# Patient Record
Sex: MALE | Race: BLACK OR AFRICAN AMERICAN | Employment: UNEMPLOYED | URBAN - METROPOLITAN AREA
[De-identification: names, ages, dates, MRNs, and addresses within clinical notes are randomized per-mention and may not be internally consistent; named-entity substitution may affect disease eponyms.]

---

## 2017-01-20 DIAGNOSIS — I42.9 CARDIOMYOPATHY (HCC): ICD-10-CM

## 2017-01-20 DIAGNOSIS — I10 ESSENTIAL HYPERTENSION WITH GOAL BLOOD PRESSURE LESS THAN 130/80: ICD-10-CM

## 2017-01-20 DIAGNOSIS — I50.22 CHRONIC SYSTOLIC HEART FAILURE (HCC): ICD-10-CM

## 2017-01-20 RX ORDER — BUMETANIDE 1 MG/1
1 TABLET ORAL DAILY
Qty: 30 TAB | Refills: 5 | Status: SHIPPED | OUTPATIENT
Start: 2017-01-20 | End: 2017-08-03 | Stop reason: SDUPTHER

## 2017-02-08 ENCOUNTER — CLINICAL SUPPORT (OUTPATIENT)
Dept: CARDIOLOGY CLINIC | Age: 52
End: 2017-02-08

## 2017-02-08 ENCOUNTER — OFFICE VISIT (OUTPATIENT)
Dept: CARDIOLOGY CLINIC | Age: 52
End: 2017-02-08

## 2017-02-08 VITALS
SYSTOLIC BLOOD PRESSURE: 110 MMHG | BODY MASS INDEX: 34.05 KG/M2 | HEIGHT: 65 IN | HEART RATE: 60 BPM | DIASTOLIC BLOOD PRESSURE: 80 MMHG | RESPIRATION RATE: 16 BRPM | OXYGEN SATURATION: 99 % | WEIGHT: 204.4 LBS

## 2017-02-08 DIAGNOSIS — E11.9 DIABETES MELLITUS, TYPE II, INSULIN DEPENDENT (HCC): ICD-10-CM

## 2017-02-08 DIAGNOSIS — I10 ESSENTIAL HYPERTENSION: Primary | ICD-10-CM

## 2017-02-08 DIAGNOSIS — R06.09 DYSPNEA ON EXERTION: ICD-10-CM

## 2017-02-08 DIAGNOSIS — I10 ESSENTIAL HYPERTENSION: ICD-10-CM

## 2017-02-08 DIAGNOSIS — I42.9 CARDIOMYOPATHY (HCC): Primary | ICD-10-CM

## 2017-02-08 DIAGNOSIS — I50.22 CHRONIC SYSTOLIC CONGESTIVE HEART FAILURE (HCC): ICD-10-CM

## 2017-02-08 DIAGNOSIS — Z79.4 DIABETES MELLITUS, TYPE II, INSULIN DEPENDENT (HCC): ICD-10-CM

## 2017-02-08 DIAGNOSIS — I42.9 CARDIOMYOPATHY (HCC): ICD-10-CM

## 2017-02-08 NOTE — PROGRESS NOTES
HISTORY OF PRESENT ILLNESS  Sury  is a 54 y.o. male     SUMMARY:   Problem List  Date Reviewed: 2/8/2017          Codes Class Noted    Chronic systolic heart failure ([facility] ICD-10-CM: I50.22  ICD-9-CM: 428.22  2/8/2015        Cardiomyopathy ([facility] ICD-10-CM: I42.9  ICD-9-CM: 425.4  9/26/2014    Overview Addendum 12/11/2015  7:42 AM by Joanie Blanchard MD     2002 (approx) diagnosed [place] by echo, neg stress test and started on usual meds. 2007 (approx) fup echo lvef 25%  2/15 cardiac cath, no sig cad  2/15 single lead AICD implant             Hypertension ICD-10-CM: I10  ICD-9-CM: 401.9  9/26/2014        Diabetes mellitus, type II, insulin dependent (HCC) (Chronic) ICD-10-CM: E11.9, Z79.4  ICD-9-CM: 250.00, V58.67  9/26/2014              Current Outpatient Prescriptions on File Prior to Visit   Medication Sig    bumetanide (BUMEX) 1 mg tablet Take 1 Tab by mouth daily.  spironolactone (ALDACTONE) 25 mg tablet Take 1 Tab by mouth daily. Indications: CHRONIC HEART FAILURE    carvedilol (COREG) 25 mg tablet Take 2 Tabs by mouth two (2) times daily (with meals).  lisinopril (PRINIVIL, ZESTRIL) 40 mg tablet Take 1 Tab by mouth daily.  hydrALAZINE (APRESOLINE) 100 mg tablet Take 1 Tab by mouth three (3) times daily.  potassium 99 mg tablet Take 99 mg by mouth daily.  insulin regular (NOVOLIN R) 100 unit/mL injection by SubCUTAneous route.  Insulin Needles, Disposable, 31 ndle For 75/25 insulin administration    Oral Medication Containers (SHARPS CONTAINER) misc 1    Blood-Glucose Meter monitoring kit 1    glucose blood VI test strips (BLOOD GLUCOSE TEST) strip 1    multivitamin with iron (DAILY MULTI-VITAMINS/IRON) tablet Take 1 tablet by mouth daily. No current facility-administered medications on file prior to visit.         CARDIOLOGY STUDIES TO DATE:  9/14 echo dilated lv with lvef 15%, mod lae, mild to mod mr, mild pul regurg    2/15 echo lvef 50% lae  2/15 echo lvef 65%, no sig valve abnormalities      Chief Complaint   Patient presents with    Cardiomyopathy     HPI :  Mr. Carlos Carroll is doing great. He is still exercising regularly and is taking good care of his diabetes. He says his last A1c was around 6. He had an echocardiogram today, and we reviewed that. His EF is now completely normal.     CARDIAC ROS:   negative for chest pain, dyspnea, palpitations, syncope, orthopnea, paroxysmal nocturnal dyspnea, exertional chest pressure/discomfort, claudication, lower extremity edema    Family History   Problem Relation Age of Onset    Hypertension Father     Diabetes Father     Diabetes Mother     Diabetes Brother     Hypertension Brother        Past Medical History   Diagnosis Date    Diabetes ([facility]     Heart failure ([facility]      CHF, cardiomyopathy    Hypertension     ICD (implantable cardioverter-defibrillator) in place      left upper chest       GENERAL ROS:  A comprehensive review of systems was negative except for that written in the HPI.     Visit Vitals    /80 (BP 1 Location: Left arm, BP Patient Position: Sitting)    Pulse 60    Resp 16    Ht 5' 5\" (1.651 m)    Wt 204 lb 6.4 oz (92.7 kg)    SpO2 99%    BMI 34.01 kg/m2       Wt Readings from Last 3 Encounters:   02/08/17 204 lb 6.4 oz (92.7 kg)   08/03/16 199 lb 9.6 oz (90.5 kg)   03/02/16 203 lb 14.8 oz (92.5 kg)            BP Readings from Last 3 Encounters:   02/08/17 110/80   08/03/16 130/84   03/02/16 (!) 197/106       PHYSICAL EXAM  General appearance: alert, cooperative, no distress, appears stated age  Neck: supple, symmetrical, trachea midline, no adenopathy, no carotid bruit and no JVD  Lungs: clear to auscultation bilaterally  Heart: regular rate and rhythm, S1, S2 normal, no murmur, click, rub or gallop  Extremities: extremities normal, atraumatic, no cyanosis or edema    Lab Results   Component Value Date/Time    Cholesterol, total 152 02/11/2015 01:30 PM    HDL Cholesterol 34 02/11/2015 01:30 PM    LDL, calculated 100.8 02/11/2015 01:30 PM    Triglyceride 86 02/11/2015 01:30 PM    CHOL/HDL Ratio 4.5 02/11/2015 01:30 PM     ASSESSMENT  Mr. Marilyn Sinclair is doing well on a good medical regimen. I am hesitant to drop any of his medications at this point because he has had issues with labile blood pressure in the past, and he is not having any serious side-effects, so we are going to keep things just the way they are for now. He needs no further cardiac testing at this time. current treatment plan is effective, no change in therapy  lab results and schedule of future lab studies reviewed with patient  reviewed diet, exercise and weight control    Encounter Diagnoses   Name Primary?  Cardiomyopathy ([facility] Yes    Essential hypertension     Diabetes mellitus, type II, insulin dependent ([facility]      No orders of the defined types were placed in this encounter. Follow-up Disposition:  Return in about 6 months (around 8/8/2017).     Keila Eisenberg MD  2/8/2017

## 2017-02-08 NOTE — MR AVS SNAPSHOT
Visit Information Date & Time Provider Department Dept. Phone Encounter #  
 2/8/2017  3:00 PM Alex Sena MD [facility] [phone] [id number] Follow-up Instructions Return in about 6 months (around 8/8/2017). Upcoming Health Maintenance Date Due  
 FOOT EXAM Q1 4/24/1977 MICROALBUMIN Q1 4/24/1977 EYE EXAM RETINAL OR DILATED Q1 4/24/1977 DTaP/Tdap/Td series (1 - Tdap) 4/24/1988 HEMOGLOBIN A1C Q6M 8/10/2015 LIPID PANEL Q1 2/11/2016 INFLUENZA AGE 9 TO ADULT 8/1/2016 Allergies as of 2/8/2017  Review Complete On: 2/8/2017 By: Alex Sena MD  
 No Known Allergies Current Immunizations  Never Reviewed Name Date Pneumococcal Polysaccharide (PPSV-23) 2/10/2015 10:26 AM  
  
 Not reviewed this visit You Were Diagnosed With   
  
 Codes Comments Cardiomyopathy ([facility]    -  Primary ICD-10-CM: I42.9 ICD-9-CM: 425.4 Essential hypertension     ICD-10-CM: I10 
ICD-9-CM: 401.9 Diabetes mellitus, type II, insulin dependent (HCC)     ICD-10-CM: E11.9, Z79.4 ICD-9-CM: 250.00, V58.67 Vitals BP Pulse Resp Height(growth percentile) Weight(growth percentile) SpO2  
 110/80 (BP 1 Location: Left arm, BP Patient Position: Sitting) 60 16 5' 5\" (1.651 m) 204 lb 6.4 oz (92.7 kg) 99% BMI Smoking Status 34.01 kg/m2 Never Smoker BMI and BSA Data Body Mass Index Body Surface Area 34.01 kg/m 2 2.06 m 2 Preferred Pharmacy Pharmacy Name Phone [facility] [address], [address] [place] [address] Avenue [phone] Your Updated Medication List  
  
   
This list is accurate as of: 2/8/17  3:27 PM.  Always use your most recent med list.  
  
  
  
  
 Blood-Glucose Meter monitoring kit 1  
  
 bumetanide 1 mg tablet Commonly known as:  Shantanu Benz Take 1 Tab by mouth daily. carvedilol 25 mg tablet Commonly known as:  Marlene Chen  
 Take 2 Tabs by mouth two (2) times daily (with meals). DAILY MULTI-VITAMINS/IRON tablet Generic drug:  multivitamin with iron Take 1 tablet by mouth daily. glucose blood VI test strips strip Commonly known as:  blood glucose test  
1  
  
 hydrALAZINE 100 mg tablet Commonly known as:  APRESOLINE Take 1 Tab by mouth three (3) times daily. Insulin Needles (Disposable) 31 Ndle For 75/25 insulin administration  
  
 lisinopril 40 mg tablet Commonly known as:  Ora Bee Take 1 Tab by mouth daily. NovoLIN R 100 unit/mL injection Generic drug:  insulin regular  
by SubCUTAneous route. Oral Medication Containers Misc Commonly known as:  SHARPS CONTAINER  
1  
  
 potassium 99 mg tablet Take 99 mg by mouth daily. spironolactone 25 mg tablet Commonly known as:  ALDACTONE Take 1 Tab by mouth daily. Indications: CHRONIC HEART FAILURE Follow-up Instructions Return in about 6 months (around 8/8/2017). Introducing [facility] & HEALTH SERVICES! [facility] introduces [employer] patient portal. Now you can access parts of your medical record, email your doctor's office, and request medication refills online. 1. In your internet browser, go to [email or web address] 2. Click on the First Time User? Click Here link in the Sign In box. You will see the New Member Sign Up page. 3. Enter your Kyp Access Code exactly as it appears below. You will not need to use this code after youve completed the sign-up process. If you do not sign up before the expiration date, you must request a new code. · Kyp Access Code: [email or web address] Expires: 5/9/2017  3:27 PM 
 
4. Enter the last four digits of your Social Security Number (xxxx) and Date of Birth (mm/dd/yyyy) as indicated and click Submit. You will be taken to the next sign-up page. 5. Create a Kyp ID.  This will be your Kyp login ID and cannot be changed, so think of one that is secure and easy to remember. 6. Create a [employer] password. You can change your password at any time. 7. Enter your Password Reset Question and Answer. This can be used at a later time if you forget your password. 8. Enter your e-mail address. You will receive e-mail notification when new information is available in [address] 9. Click Sign Up. You can now view and download portions of your medical record. 10. Click the Download Summary menu link to download a portable copy of your medical information. If you have questions, please visit the Frequently Asked Questions section of the [employer] website. Remember, [employer] is NOT to be used for urgent needs. For medical emergencies, dial 911. Now available from your iPhone and Android! Please provide this summary of care documentation to your next provider. Your primary care clinician is listed as Octavio Swift. If you have any questions after today's visit, please call [phone].

## 2017-05-04 ENCOUNTER — OFFICE VISIT (OUTPATIENT)
Dept: CARDIOLOGY CLINIC | Age: 52
End: 2017-05-04

## 2017-05-04 VITALS
DIASTOLIC BLOOD PRESSURE: 80 MMHG | OXYGEN SATURATION: 98 % | BODY MASS INDEX: 34.09 KG/M2 | HEART RATE: 81 BPM | RESPIRATION RATE: 16 BRPM | SYSTOLIC BLOOD PRESSURE: 120 MMHG | HEIGHT: 65 IN | WEIGHT: 204.6 LBS

## 2017-05-04 DIAGNOSIS — I42.9 CARDIOMYOPATHY, UNSPECIFIED TYPE (HCC): Primary | ICD-10-CM

## 2017-05-04 DIAGNOSIS — E11.9 DIABETES MELLITUS, TYPE II, INSULIN DEPENDENT (HCC): Chronic | ICD-10-CM

## 2017-05-04 DIAGNOSIS — R07.9 CHEST PAIN, UNSPECIFIED TYPE: ICD-10-CM

## 2017-05-04 DIAGNOSIS — Z79.4 DIABETES MELLITUS, TYPE II, INSULIN DEPENDENT (HCC): Chronic | ICD-10-CM

## 2017-05-04 DIAGNOSIS — I50.22 CHRONIC SYSTOLIC HEART FAILURE (HCC): ICD-10-CM

## 2017-05-04 DIAGNOSIS — I10 ESSENTIAL HYPERTENSION: ICD-10-CM

## 2017-05-04 NOTE — MR AVS SNAPSHOT
Visit Information Date & Time Provider Department Dept. Phone Encounter #  
 5/4/2017 11:40 AM Evie Garcia MD [facility] [place] [phone] [id number] Follow-up Instructions Return in about 6 months (around 11/4/2017). Your Appointments 8/9/2017  2:20 PM  
ESTABLISHED PATIENT with Evie Garcia MD  
[facility] ([facility]) Appt Note: 6 month follow up  
 [address]  
[address] [facility] 7 [address] Upcoming Health Maintenance Date Due  
 FOOT EXAM Q1 4/24/1977 MICROALBUMIN Q1 4/24/1977 EYE EXAM RETINAL OR DILATED Q1 4/24/1977 DTaP/Tdap/Td series (1 - Tdap) 4/24/1988 HEMOGLOBIN A1C Q6M 8/10/2015 LIPID PANEL Q1 2/11/2016 FOBT Q 1 YEAR AGE 50-75 4/24/2017 INFLUENZA AGE 9 TO ADULT 8/1/2017 Allergies as of 5/4/2017  Review Complete On: 5/4/2017 By: Evie Garcia MD  
 No Known Allergies Current Immunizations  Never Reviewed Name Date Pneumococcal Polysaccharide (PPSV-23) 2/10/2015 10:26 AM  
  
 Not reviewed this visit You Were Diagnosed With   
  
 Codes Comments Cardiomyopathy, unspecified type    -  Primary ICD-10-CM: I42.9 ICD-9-CM: 425.4 Essential hypertension     ICD-10-CM: I10 
ICD-9-CM: 401.9 Chronic systolic heart failure (HCC)     ICD-10-CM: I50.22 ICD-9-CM: 428.22 Diabetes mellitus, type II, insulin dependent (HCC)     ICD-10-CM: E11.9, Z79.4 ICD-9-CM: 250.00, V58.67 Chest pain, unspecified type     ICD-10-CM: R07.9 ICD-9-CM: 786.50 Vitals BP Pulse Resp Height(growth percentile) Weight(growth percentile) SpO2  
 120/80 (BP 1 Location: Left arm, BP Patient Position: Sitting) 81 16 5' 5\" (1.651 m) 204 lb 9.6 oz (92.8 kg) 98% BMI Smoking Status 34.05 kg/m2 Never Smoker Vitals History BMI and BSA Data Body Mass Index Body Surface Area 34.05 kg/m 2 2.06 m 2 Preferred Pharmacy Pharmacy Name Phone [facility] [address] [place] [address] [address] [phone] Your Updated Medication List  
  
   
This list is accurate as of: 5/4/17 12:11 PM.  Always use your most recent med list.  
  
  
  
  
 Blood-Glucose Meter monitoring kit 1  
  
 bumetanide 1 mg tablet Commonly known as:  Shelagh [place] Take 1 Tab by mouth daily. carvedilol 25 mg tablet Commonly known as:  Azalia Denton Take 2 Tabs by mouth two (2) times daily (with meals). DAILY MULTI-VITAMINS/IRON tablet Generic drug:  multivitamin with iron Take 1 tablet by mouth daily. glucose blood VI test strips strip Commonly known as:  blood glucose test  
1 Insulin Needles (Disposable) 31 Ndle For 75/25 insulin administration  
  
 lisinopril 40 mg tablet Commonly known as:  Tomás Daft Take 1 Tab by mouth daily. NovoLIN R 100 unit/mL injection Generic drug:  insulin regular  
by SubCUTAneous route. Oral Medication Containers Misc Commonly known as:  SHARPS CONTAINER  
1  
  
 potassium 99 mg tablet Take 99 mg by mouth daily. spironolactone 25 mg tablet Commonly known as:  ALDACTONE Take 1 Tab by mouth daily. Indications: CHRONIC HEART FAILURE We Performed the Following AMB POC EKG ROUTINE W/ 12 LEADS, INTER & REP [24226 CPT(R)] Follow-up Instructions Return in about 6 months (around 11/4/2017). Introducing [facility] & HEALTH SERVICES! [facility] introduces [employer] patient portal. Now you can access parts of your medical record, email your doctor's office, and request medication refills online. 1. In your internet browser, go to [email or web address] 2. Click on the First Time User? Click Here link in the Sign In box. You will see the New Member Sign Up page. 3. Enter your [employer] Access Code exactly as it appears below. You will not need to use this code after youve completed the sign-up process. If you do not sign up before the expiration date, you must request a new code. · [employer] Access Code: [email or web address] Expires: 5/9/2017  4:27 PM 
 
4. Enter the last four digits of your Social Security Number (xxxx) and Date of Birth (mm/dd/yyyy) as indicated and click Submit. You will be taken to the next sign-up page. 5. Create a Fyber ID. This will be your Fyber login ID and cannot be changed, so think of one that is secure and easy to remember. 6. Create a F[employer]ber password. You can change your password at any time. 7. Enter your Password Reset Question and Answer. This can be used at a later time if you forget your password. 8. Enter your e-mail address. You will receive e-mail notification when new information is available in [address] 9. Click Sign Up. You can now view and download portions of your medical record. 10. Click the Download Summary menu link to download a portable copy of your medical information. If you have questions, please visit the Frequently Asked Questions section of the [employer] website. Remember, [employer] is NOT to be used for urgent needs. For medical emergencies, dial 911. Now available from your iPhone and Android! Please provide this summary of care documentation to your next provider. Your primary care clinician is listed as Fernando Roman. If you have any questions after today's visit, please call [phone].

## 2017-05-04 NOTE — PROGRESS NOTES
HISTORY OF PRESENT ILLNESS  Chloe Vaughan is a 50 y.o. male     SUMMARY:   Problem List  Date Reviewed: 5/4/2017          Codes Class Noted    Chronic systolic heart failure ([facility] ICD-10-CM: I50.22  ICD-9-CM: 428.22  2/8/2015        Cardiomyopathy ([facility] ICD-10-CM: I42.9  ICD-9-CM: 425.4  9/26/2014    Overview Addendum 12/11/2015  7:42 AM by Heron Swartz MD     2002 (approx) diagnosed [place] by echo, neg stress test and started on usual meds. 2007 (approx) fup echo lvef 25%  2/15 cardiac cath, no sig cad  2/15 single lead AICD implant             Hypertension ICD-10-CM: I10  ICD-9-CM: 401.9  9/26/2014        Diabetes mellitus, type II, insulin dependent (HCC) (Chronic) ICD-10-CM: E11.9, Z79.4  ICD-9-CM: 250.00, V58.67  9/26/2014              Current Outpatient Prescriptions on File Prior to Visit   Medication Sig    bumetanide (BUMEX) 1 mg tablet Take 1 Tab by mouth daily.  spironolactone (ALDACTONE) 25 mg tablet Take 1 Tab by mouth daily. Indications: CHRONIC HEART FAILURE    carvedilol (COREG) 25 mg tablet Take 2 Tabs by mouth two (2) times daily (with meals).  lisinopril (PRINIVIL, ZESTRIL) 40 mg tablet Take 1 Tab by mouth daily.  hydrALAZINE (APRESOLINE) 100 mg tablet Take 1 Tab by mouth three (3) times daily.  potassium 99 mg tablet Take 99 mg by mouth daily.  insulin regular (NOVOLIN R) 100 unit/mL injection by SubCUTAneous route.  Insulin Needles, Disposable, 31 ndle For 75/25 insulin administration    Oral Medication Containers (SHARPS CONTAINER) misc 1    Blood-Glucose Meter monitoring kit 1    glucose blood VI test strips (BLOOD GLUCOSE TEST) strip 1    multivitamin with iron (DAILY MULTI-VITAMINS/IRON) tablet Take 1 tablet by mouth daily. No current facility-administered medications on file prior to visit.         CARDIOLOGY STUDIES TO DATE:  9/14 echo dilated lv with lvef 15%, mod lae, mild to mod mr, mild pul regurg    2/15 echo lvef 50% lae  2/15 echo lvef 65%, no sig valve abnormalities      Chief Complaint   Patient presents with    Cardiomyopathy     HPI :  Mr. Corine Anaya is doing well. He had gone back on Isordil a month or two ago because he was no longer using Viagra and when he ran out the nurses said he needed to come in here and talk to us about that before we considered refilling it. He and I had a long conversation and basically at this time with recovery of his LV function, I do not think there is any reason for him to stay on either Isordil or Hydralazine since he is already on Coreg and Lisinopril. Dr. Ana Canales is following his lipids. CARDIAC ROS:   negative for chest pain, dyspnea, palpitations, syncope, orthopnea, paroxysmal nocturnal dyspnea, exertional chest pressure/discomfort, claudication, lower extremity edema    Family History   Problem Relation Age of Onset    Hypertension Father     Diabetes Father     Diabetes Mother     Diabetes Brother     Hypertension Brother        Past Medical History:   Diagnosis Date    Diabetes ([address]     Heart failure ([address]     CHF, cardiomyopathy    Hypertension     ICD (implantable cardioverter-defibrillator) in place     left upper chest       GENERAL ROS:  A comprehensive review of systems was negative except for that written in the HPI.     Visit Vitals    /80 (BP 1 Location: Left arm, BP Patient Position: Sitting)    Pulse 81    Resp 16    Ht 5' 5\" (1.651 m)    Wt 204 lb 9.6 oz (92.8 kg)    SpO2 98%    BMI 34.05 kg/m2       Wt Readings from Last 3 Encounters:   05/04/17 204 lb 9.6 oz (92.8 kg)   02/08/17 204 lb 6.4 oz (92.7 kg)   08/03/16 199 lb 9.6 oz (90.5 kg)            BP Readings from Last 3 Encounters:   05/04/17 120/80   02/08/17 110/80   08/03/16 130/84       PHYSICAL EXAM  General appearance: alert, cooperative, no distress, appears stated age  Neck: supple, symmetrical, trachea midline, no adenopathy, no carotid bruit and no JVD  Lungs: clear to auscultation bilaterally  Heart: regular rate and rhythm, S1, S2 normal, no murmur, click, rub or gallop  Extremities: extremities normal, atraumatic, no cyanosis or edema    Lab Results   Component Value Date/Time    Cholesterol, total 152 02/11/2015 01:30 PM    HDL Cholesterol 34 02/11/2015 01:30 PM    LDL, calculated 100.8 02/11/2015 01:30 PM    Triglyceride 86 02/11/2015 01:30 PM    CHOL/HDL Ratio 4.5 02/11/2015 01:30 PM     ASSESSMENT  Mr. Sandy Romberg is stable, asymptomatic and well compensated at this point and needs no cardiac testing. We will make the changes that I outlined and will see how he does. We will get his most recent blood work from Dr. Darion Lang. current treatment plan is effective, no change in therapy  lab results and schedule of future lab studies reviewed with patient  reviewed diet, exercise and weight control    Encounter Diagnoses   Name Primary?  Cardiomyopathy, unspecified type Yes    Essential hypertension     Chronic systolic heart failure (HCC)     Diabetes mellitus, type II, insulin dependent (HCC)     Chest pain, unspecified type      Orders Placed This Encounter    AMB POC EKG ROUTINE W/ 12 LEADS, INTER & REP       Follow-up Disposition:  Return in about 6 months (around 11/4/2017).     Moe Rosen MD  5/4/2017

## 2017-05-15 ENCOUNTER — APPOINTMENT (OUTPATIENT)
Dept: GENERAL RADIOLOGY | Age: 52
End: 2017-05-15
Attending: EMERGENCY MEDICINE
Payer: MEDICARE

## 2017-05-15 ENCOUNTER — HOSPITAL ENCOUNTER (EMERGENCY)
Age: 52
Discharge: HOME | End: 2017-05-15
Attending: EMERGENCY MEDICINE
Payer: MEDICARE

## 2017-05-15 VITALS
RESPIRATION RATE: 16 BRPM | TEMPERATURE: 97.8 F | DIASTOLIC BLOOD PRESSURE: 73 MMHG | SYSTOLIC BLOOD PRESSURE: 130 MMHG | OXYGEN SATURATION: 100 % | HEART RATE: 59 BPM | BODY MASS INDEX: 33.75 KG/M2 | WEIGHT: 202.82 LBS

## 2017-05-15 DIAGNOSIS — R05.9 COUGH: Primary | ICD-10-CM

## 2017-05-15 DIAGNOSIS — R07.89 CHEST TIGHTNESS: ICD-10-CM

## 2017-05-15 LAB — TROPONIN I BLD-MCNC: <0.04 NG/ML (ref 0–0.08)

## 2017-05-15 PROCEDURE — 99284 EMERGENCY DEPT VISIT MOD MDM: CPT

## 2017-05-15 PROCEDURE — 36415 COLL VENOUS BLD VENIPUNCTURE: CPT | Performed by: EMERGENCY MEDICINE

## 2017-05-15 PROCEDURE — 71020 XR CHEST PA LAT: CPT

## 2017-05-15 PROCEDURE — 93005 ELECTROCARDIOGRAM TRACING: CPT

## 2017-05-15 PROCEDURE — 84484 ASSAY OF TROPONIN QUANT: CPT

## 2017-05-15 RX ORDER — ALBUTEROL SULFATE 90 UG/1
2 AEROSOL, METERED RESPIRATORY (INHALATION)
Qty: 1 INHALER | Refills: 0 | Status: SHIPPED | OUTPATIENT
Start: 2017-05-15 | End: 2018-06-15 | Stop reason: ALTCHOICE

## 2017-05-15 RX ORDER — PREDNISONE 20 MG/1
60 TABLET ORAL DAILY
Qty: 15 TAB | Refills: 0 | Status: SHIPPED | OUTPATIENT
Start: 2017-05-15 | End: 2017-05-20

## 2017-05-15 NOTE — ED NOTES
Assumed care of patient who presents to ED with c/o cough, rhinitis, and intermittent sharp chest and back tightness x 1 week. He saw his cardiologist and PCP and was told he had a virus. Patient presents because symptoms not resolved. Patient in NAD. Dr. Carly Whelan in to assess patient. Call bell in reach. Will continue to monitor.

## 2017-05-15 NOTE — ED PROVIDER NOTES
HPI Comments: Bunny Negron is a 50 y.o. male with PMHx of HTN, and DM presenting ambulatory to [facility] c/o coughing for for 1 week and a half. Pt notes that the cough is occasionally productive with clear mucus. He reports additional symptoms of chest tightness, SOB, rhinorrhea, sneezing, itchiness, and sore throat. Per pt, his symptoms are worse at night. Pt notes that his symptoms feel like allergies, but the last time something similar happened he needed heart surgery and had an ICD placed. However, he has sen his cardiologist who does not think that his symptoms are cardiac related given normal blood pressure and Echo 1 month ago. His last stress test was 1 year ago and the results were normal. Pt notes that his EF levels are almost back to normal. He denies nausea, vomiting, diaphoresis, or wheezing. PCP: Malissa Avendaño MD    There are no other complaints, changes, or physical findings at this time. The history is provided by the patient. No  was used. Past Medical History:   Diagnosis Date    Diabetes ([address]     Heart failure ([address]     CHF, cardiomyopathy    Hypertension     ICD (implantable cardioverter-defibrillator) in place     left upper chest       Past Surgical History:   Procedure Laterality Date    HX HEART CATHETERIZATION  2/2015    x1    HX IMPLANTABLE CARDIOVERTER DEFIBRILLATOR  2/16/2015         Family History:   Problem Relation Age of Onset    Hypertension Father     Diabetes Father     Diabetes Mother     Diabetes Brother     Hypertension Brother        Social History     Social History    Marital status:      Spouse name: N/A    Number of children: N/A    Years of education: N/A     Occupational History    Not on file.      Social History Main Topics    Smoking status: Never Smoker    Smokeless tobacco: Never Used    Alcohol use No    Drug use: No    Sexual activity: Not on file     Other Topics Concern    Not on file     Social History Narrative         ALLERGIES: Review of patient's allergies indicates no known allergies. Review of Systems   Constitutional: Negative for chills, diaphoresis, fever and unexpected weight change. HENT: Positive for rhinorrhea, sneezing and sore throat. Eyes: Negative for pain. Respiratory: Positive for cough, chest tightness and shortness of breath. Negative for wheezing and stridor. Cardiovascular: Negative for chest pain and leg swelling. Gastrointestinal: Negative for abdominal pain, blood in stool, diarrhea, nausea and vomiting. Genitourinary: Negative for difficulty urinating, dysuria and flank pain. Musculoskeletal: Negative for back pain and neck stiffness. Skin: Negative for rash. +itchiness   Neurological: Negative for seizures, syncope, weakness, light-headedness and headaches. Psychiatric/Behavioral: Negative for confusion. Patient Vitals for the past 12 hrs:   Temp Pulse Resp BP SpO2   05/15/17 1259 97.8 °F (36.6 °C) (!) 59 16 130/73 100 %            Physical Exam   Constitutional: He is oriented to person, place, and time. He appears well-developed and well-nourished. No distress. HENT:   Nose: Nose normal.   Mouth/Throat: Oropharynx is clear and moist. No oropharyngeal exudate. +nasal crease   Eyes: Conjunctivae and EOM are normal. Pupils are equal, round, and reactive to light. No scleral icterus. Neck: Normal range of motion. Neck supple. No JVD present. Cardiovascular: Normal rate, regular rhythm, normal heart sounds and intact distal pulses. No murmur heard. Pulmonary/Chest: Effort normal and breath sounds normal. No stridor. No respiratory distress. He has no wheezes. He has no rales. ICD left upper chest   Abdominal: Soft. Bowel sounds are normal. He exhibits no distension. There is no tenderness. There is no rebound and no guarding. Musculoskeletal: He exhibits no edema or tenderness.    Neurological: He is alert and oriented to person, place, and time. He has normal strength. He displays no atrophy and no tremor. No cranial nerve deficit or sensory deficit. He exhibits normal muscle tone. He displays a negative Romberg sign. Coordination and gait normal.   Reflex Scores:       Bicep reflexes are 2+ on the right side and 2+ on the left side. Patellar reflexes are 2+ on the right side and 2+ on the left side. Skin: Skin is warm and dry. He is not diaphoretic. Psychiatric: He has a normal mood and affect. Nursing note and vitals reviewed. MDM  Number of Diagnoses or Management Options  Chest tightness:   Cough:   Diagnosis management comments: Cough and intermittent chest tightness in pt with significant allergic symptoms. CXR clear. Troponin negative. Symptoms most likely represent reactive airway disease as component of his atopy. Pt reassured. Stable for discharge. Amount and/or Complexity of Data Reviewed  Clinical lab tests: ordered and reviewed  Tests in the radiology section of CPT®: ordered and reviewed  Tests in the medicine section of CPT®: ordered and reviewed  Review and summarize past medical records: yes  Independent visualization of images, tracings, or specimens: yes    Patient Progress  Patient progress: stable    ED Course       Procedures    EKG interpretation: (Preliminary)  2:12 PM  Rhythm: sinus bradycardia with 1st degree AV block; and regular . Rate (approx.): 58; Axis: left axis deviation; IA interval: 212, Poor R Wave Progression; QRS interval: normal ; ST/T wave: T wave abnormality; Other findings: no changes from previous ekg.     LABORATORY TESTS:  Recent Results (from the past 12 hour(s))   EKG, 12 LEAD, INITIAL    Collection Time: 05/15/17  2:12 PM   Result Value Ref Range    Ventricular Rate 58 BPM    Atrial Rate 58 BPM    P-R Interval 212 ms    QRS Duration 106 ms    Q-T Interval 418 ms    QTC Calculation (Bezet) 410 ms    Calculated P Axis 58 degrees    Calculated R Axis -34 degrees Calculated T Axis -148 degrees    Diagnosis       Sinus bradycardia with 1st degree AV block  Left axis deviation  Low voltage QRS  Cannot rule out Anterior infarct , age undetermined  T wave abnormality, consider lateral ischemia  When compared with ECG of 08-FEB-2015 11:10,  Significant changes have occurred     POC TROPONIN-I    Collection Time: 05/15/17  2:33 PM   Result Value Ref Range    Troponin-I (POC) <0.04 0.00 - 0.08 ng/mL       IMAGING RESULTS:    EXAM: XR CHEST PA LAT     INDICATION: Cough.     COMPARISON: February 16, 2015.     FINDINGS: PA and lateral radiographs of the chest demonstrate clear lungs and  pleural margins. Cardiac, mediastinal and hilar contours appear normal. A left  actually pacemaker with single electrode extending to the right ventricular apex  is again shown. Diffuse hepatic skeletal hyperostosis is again noted.      IMPRESSION  IMPRESSION: No acute findings       IMPRESSION:  1. Cough    2. Chest tightness        PLAN:  1. Discharge Medication List as of 5/15/2017  2:54 PM      START taking these medications    Details   predniSONE (DELTASONE) 20 mg tablet Take 3 Tabs by mouth daily for 5 days. , Normal, Disp-15 Tab, R-0      albuterol (PROVENTIL HFA, VENTOLIN HFA, PROAIR HFA) 90 mcg/actuation inhaler Take 2 Puffs by inhalation every four (4) hours as needed for Wheezing., Normal, Disp-1 Inhaler, R-0         CONTINUE these medications which have NOT CHANGED    Details   bumetanide (BUMEX) 1 mg tablet Take 1 Tab by mouth daily. , Normal, Disp-30 Tab, R-5      spironolactone (ALDACTONE) 25 mg tablet Take 1 Tab by mouth daily. Indications: CHRONIC HEART FAILURE, Normal, Disp-90 Tab, R-3      carvedilol (COREG) 25 mg tablet Take 2 Tabs by mouth two (2) times daily (with meals). , Normal, Disp-120 Tab, R-5      lisinopril (PRINIVIL, ZESTRIL) 40 mg tablet Take 1 Tab by mouth daily. , Normal, Disp-30 Tab, R-5      potassium 99 mg tablet Take 99 mg by mouth daily. , Historical Med insulin regular (NOVOLIN R) 100 unit/mL injection by SubCUTAneous route., Historical Med      Insulin Needles, Disposable, 31 ndle For 75/25 insulin administration, Print, Disp-150 Each, R-2      Oral Medication Containers (SHARPS CONTAINER) misc 1, Print, Disp-1 Each, R-1      Blood-Glucose Meter monitoring kit 1, Print, Disp-1 Kit, R-0      glucose blood VI test strips (BLOOD GLUCOSE TEST) strip 1, Print, Disp-1 Package, R-11      multivitamin with iron (DAILY MULTI-VITAMINS/IRON) tablet Take 1 tablet by mouth daily. , Historical Med           2. Follow-up Information     Follow up With Details Comments Silvano Chiang MD Call in 2 days  [address] [address]  [phone]      Herrera House MD Call As needed [address] [facility] [phone]          Return to ED if worse     DISCHARGE NOTE:  2:58 PM  The patient is ready for discharge. The patient's signs, symptoms, diagnosis, and discharge instructions have been discussed and the patient has conveyed their understanding. The patient is to follow up as recommended or return to the ER should their symptoms worsen. Plan has been discussed and the patient is in agreement. This note is prepared by Paty Robert, acting as Scribe for MD Simona Purvis MD: The scribe's documentation has been prepared under my direction and personally reviewed by me in its entirety. I confirm that the note above accurately reflects all work, treatment, procedures, and medical decision making performed by me.

## 2017-05-15 NOTE — ED NOTES
MD has reviewed discharge instructions with the patient. The patient verbalized understanding. Patient discharged home.

## 2017-05-15 NOTE — DISCHARGE INSTRUCTIONS
Cough: Care Instructions  Your Care Instructions  A cough is your body's response to something that bothers your throat or airways. Many things can cause a cough. You might cough because of a cold or the flu, bronchitis, or asthma. Smoking, postnasal drip, allergies, and stomach acid that backs up into your throat also can cause coughs. A cough is a symptom, not a disease. Most coughs stop when the cause, such as a cold, goes away. You can take a few steps at home to cough less and feel better. Follow-up care is a key part of your treatment and safety. Be sure to make and go to all appointments, and call your doctor if you are having problems. It's also a good idea to know your test results and keep a list of the medicines you take. How can you care for yourself at home? · Drink lots of water and other fluids. This helps thin the mucus and soothes a dry or sore throat. Honey or lemon juice in hot water or tea may ease a dry cough. · Take cough medicine as directed by your doctor. · Prop up your head on pillows to help you breathe and ease a dry cough. · Try cough drops to soothe a dry or sore throat. Cough drops don't stop a cough. Medicine-flavored cough drops are no better than candy-flavored drops or hard candy. · Do not smoke. Avoid secondhand smoke. If you need help quitting, talk to your doctor about stop-smoking programs and medicines. These can increase your chances of quitting for good. When should you call for help? Call 911 anytime you think you may need emergency care. For example, call if:  · You have severe trouble breathing. Call your doctor now or seek immediate medical care if:  · You cough up blood. · You have new or worse trouble breathing. · You have a new or higher fever. · You have a new rash.   Watch closely for changes in your health, and be sure to contact your doctor if:  · You cough more deeply or more often, especially if you notice more mucus or a change in the color of your mucus. · You have new symptoms, such as a sore throat, an earache, or sinus pain. · You do not get better as expected. Where can you learn more? Go to http://jennifer-ysabel.info/. Enter D279 in the search box to learn more about \"Cough: Care Instructions. \"  Current as of: May 27, 2016  Content Version: 11.2  © 5941-7561 [employer]. Care instructions adapted under license by [employer] (which disclaims liability or warranty for this information). If you have questions about a medical condition or this instruction, always ask your healthcare professional. [address] any warranty or liability for your use of this information.

## 2017-05-16 LAB
ATRIAL RATE: 58 BPM
CALCULATED P AXIS, ECG09: 58 DEGREES
CALCULATED R AXIS, ECG10: -34 DEGREES
CALCULATED T AXIS, ECG11: -148 DEGREES
DIAGNOSIS, 93000: NORMAL
P-R INTERVAL, ECG05: 212 MS
Q-T INTERVAL, ECG07: 418 MS
QRS DURATION, ECG06: 106 MS
QTC CALCULATION (BEZET), ECG08: 410 MS
VENTRICULAR RATE, ECG03: 58 BPM

## 2017-07-05 DIAGNOSIS — R06.02 SOB (SHORTNESS OF BREATH): ICD-10-CM

## 2017-07-05 RX ORDER — LISINOPRIL 40 MG/1
40 TABLET ORAL DAILY
Qty: 30 TAB | Refills: 5 | Status: SHIPPED | OUTPATIENT
Start: 2017-07-05 | End: 2018-02-12 | Stop reason: SDUPTHER

## 2017-11-21 ENCOUNTER — OFFICE VISIT (OUTPATIENT)
Dept: CARDIOLOGY CLINIC | Age: 52
End: 2017-11-21

## 2017-11-21 VITALS
HEIGHT: 65 IN | WEIGHT: 206.6 LBS | BODY MASS INDEX: 34.42 KG/M2 | SYSTOLIC BLOOD PRESSURE: 120 MMHG | DIASTOLIC BLOOD PRESSURE: 84 MMHG | RESPIRATION RATE: 16 BRPM

## 2017-11-21 DIAGNOSIS — I10 ESSENTIAL HYPERTENSION: ICD-10-CM

## 2017-11-21 DIAGNOSIS — I42.0 DILATED CARDIOMYOPATHY (HCC): Primary | ICD-10-CM

## 2017-11-21 DIAGNOSIS — I50.22 CHRONIC SYSTOLIC HEART FAILURE (HCC): ICD-10-CM

## 2017-11-21 DIAGNOSIS — I10 ESSENTIAL HYPERTENSION WITH GOAL BLOOD PRESSURE LESS THAN 130/80: ICD-10-CM

## 2017-11-21 RX ORDER — BUMETANIDE 1 MG/1
TABLET ORAL
Qty: 30 TAB | Refills: 3 | Status: SHIPPED | OUTPATIENT
Start: 2017-11-21 | End: 2018-02-12 | Stop reason: SDUPTHER

## 2017-11-21 NOTE — PROGRESS NOTES
HISTORY OF PRESENT ILLNESS  Ken Castanon is a 50 y.o. male     SUMMARY:   Problem List  Date Reviewed: 11/21/2017          Codes Class Noted    Chronic systolic heart failure ([facility] ICD-10-CM: I50.22  ICD-9-CM: 428.22  2/8/2015        Cardiomyopathy ([facility] ICD-10-CM: I42.9  ICD-9-CM: 425.4  9/26/2014    Overview Addendum 11/9/2017  7:20 AM by Mackenzie Angel MD     2002 (approx) diagnosed [place] by echo, neg stress test and started on usual meds. 2007 (approx) fup echo lvef 25%  2/15 cardiac cath, no sig cad  2/15 single lead AICD implant  2/17 lvh, otherwise normal echo             Hypertension ICD-10-CM: I10  ICD-9-CM: 401.9  9/26/2014        Diabetes mellitus, type II, insulin dependent (HCC) (Chronic) ICD-10-CM: E11.9, Z79.4  ICD-9-CM: 250.00, V58.67  9/26/2014              Current Outpatient Prescriptions on File Prior to Visit   Medication Sig    carvedilol (COREG) 25 mg tablet TAKE TWO TABLETS BY MOUTH TWICE DAILY WITH MEALS    spironolactone (ALDACTONE) 25 mg tablet TAKE ONE TABLET BY MOUTH ONCE DAILY    lisinopril (PRINIVIL, ZESTRIL) 40 mg tablet Take 1 Tab by mouth daily.  albuterol (PROVENTIL HFA, VENTOLIN HFA, PROAIR HFA) 90 mcg/actuation inhaler Take 2 Puffs by inhalation every four (4) hours as needed for Wheezing.  potassium 99 mg tablet Take 99 mg by mouth daily.  insulin regular (NOVOLIN R) 100 unit/mL injection by SubCUTAneous route.  Insulin Needles, Disposable, 31 ndle For 75/25 insulin administration    Blood-Glucose Meter monitoring kit 1    glucose blood VI test strips (BLOOD GLUCOSE TEST) strip 1    multivitamin with iron (DAILY MULTI-VITAMINS/IRON) tablet Take 1 tablet by mouth daily.  Oral Medication Containers (SHARPS CONTAINER) misc 1     No current facility-administered medications on file prior to visit.         CARDIOLOGY STUDIES TO DATE:  9/14 echo dilated lv with lvef 15%, mod lae, mild to mod mr, mild pul regurg    2/15 echo lvef 50% lae  2/15 echo lvef 65%, no sig valve abnormalities       Chief Complaint   Patient presents with    Cardiomyopathy     HPI :  Mr. Briana Harley is doing well from a cardiac standpoint with no worrisome symptoms. He has gotten out of his exercise routine, put on a little bit of weight and he says his last hemoglobin A1C was up over 8, which is a major change for where he was a year or so ago. CARDIAC ROS:   negative for chest pain, dyspnea, palpitations, syncope, orthopnea, paroxysmal nocturnal dyspnea, exertional chest pressure/discomfort, claudication, lower extremity edema    Family History   Problem Relation Age of Onset    Hypertension Father     Diabetes Father     Diabetes Mother     Diabetes Brother     Hypertension Brother        Past Medical History:   Diagnosis Date    [facility] ([facility]     Heart failure ([facility]     CHF, cardiomyopathy    Hypertension     ICD (implantable cardioverter-defibrillator) in place     left upper chest       GENERAL ROS:  A comprehensive review of systems was negative except for that written in the HPI.     Visit Vitals    /84 (BP 1 Location: Right arm, BP Patient Position: Sitting)    Resp 16    Ht 5' 5\" (1.651 m)    Wt 206 lb 9.6 oz (93.7 kg)    BMI 34.38 kg/m2       Wt Readings from Last 3 Encounters:   11/21/17 206 lb 9.6 oz (93.7 kg)   05/15/17 202 lb 13.2 oz (92 kg)   05/04/17 204 lb 9.6 oz (92.8 kg)            BP Readings from Last 3 Encounters:   11/21/17 120/84   05/15/17 130/73   05/04/17 120/80       PHYSICAL EXAM  General appearance: alert, cooperative, no distress, appears stated age  Neck: supple, symmetrical, trachea midline, no adenopathy, no carotid bruit and no JVD  Lungs: clear to auscultation bilaterally  Heart: regular rate and rhythm, S1, S2 normal, no murmur, click, rub or gallop  Extremities: extremities normal, atraumatic, no cyanosis or edema    Lab Results   Component Value Date/Time    Cholesterol, total 152 02/11/2015 01:30 PM    HDL Cholesterol 34 02/11/2015 01:30 PM    LDL, calculated 100.8 02/11/2015 01:30 PM    Triglyceride 86 02/11/2015 01:30 PM    CHOL/HDL Ratio 4.5 02/11/2015 01:30 PM     ASSESSMENT  Mr. Ishan Pereira is stable, asymptomatic and well compensated on a good medical regimen and needs no cardiac testing at this time. current treatment plan is effective, no change in therapy  lab results and schedule of future lab studies reviewed with patient  reviewed diet, exercise and weight control    Encounter Diagnoses   Name Primary?     Dilated cardiomyopathy ([address] Yes    Essential hypertension     Essential hypertension with goal blood pressure less than 130/80     Chronic systolic heart failure (HCC)      Orders Placed This Encounter    bumetanide (BUMEX) 1 mg tablet       Follow-up Disposition: Not on File    Mackenzie Angel MD  11/21/2017

## 2017-11-21 NOTE — MR AVS SNAPSHOT
Visit Information Date & Time Provider Department Dept. Phone Encounter #  
 11/21/2017  3:00 PM Alyson Escalera MD [facility] [place] [phone] [id number] Your Appointments 5/22/2018  3:00 PM  
ESTABLISHED PATIENT with Alyson Escalera MD  
[facility] ([facility]) Appt Note: 6 mo f/u per Dr. Patricia Hargrove [address]  [address] [place] [address]  
[address] [address] Upcoming Health Maintenance Date Due  
 FOOT EXAM Q1 4/24/1977 MICROALBUMIN Q1 4/24/1977 EYE EXAM RETINAL OR DILATED Q1 4/24/1977 DTaP/Tdap/Td series (1 - Tdap) 4/24/1988 HEMOGLOBIN A1C Q6M 8/10/2015 LIPID PANEL Q1 2/11/2016 FOBT Q 1 YEAR AGE 50-75 4/24/2017 Influenza Age 5 to Adult 8/1/2017 Allergies as of 11/21/2017  Review Complete On: 11/21/2017 By: Alyson Escalera MD  
 No Known Allergies Current Immunizations  Never Reviewed Name Date Pneumococcal Polysaccharide (PPSV-23) 2/10/2015 10:26 AM  
  
 Not reviewed this visit You Were Diagnosed With   
  
 Codes Comments Dilated cardiomyopathy ([facility]    -  Primary ICD-10-CM: I42.0 ICD-9-CM: 425.4 Essential hypertension     ICD-10-CM: I10 
ICD-9-CM: 401.9 Essential hypertension with goal blood pressure less than 130/80     ICD-10-CM: I10 
ICD-9-CM: 401.9 Chronic systolic heart failure (HCC)     ICD-10-CM: I50.22 ICD-9-CM: 428.22 Vitals BP Resp Height(growth percentile) Weight(growth percentile) BMI Smoking Status 120/84 (BP 1 Location: Right arm, BP Patient Position: Sitting) 16 5' 5\" (1.651 m) 206 lb 9.6 oz (93.7 kg) 34.38 kg/m2 Never Smoker BMI and BSA Data Body Mass Index Body Surface Area  
 34.38 kg/m 2 2.07 m 2 Preferred Pharmacy Pharmacy Name Phone [facility] [address] [phone] Your Updated Medication List  
  
   
This list is accurate as of: 11/21/17  3:31 PM.  Always use your most recent med list.  
  
  
  
  
 albuterol 90 mcg/actuation inhaler Commonly known as:  PROVENTIL HFA, VENTOLIN HFA, PROAIR HFA Take 2 Puffs by inhalation every four (4) hours as needed for Wheezing. Blood-Glucose Meter monitoring kit 1  
  
 bumetanide 1 mg tablet Commonly known as:  Andrey Jw TAKE HALF TABLET BY MOUTH ONCE DAILY  
  
 carvedilol 25 mg tablet Commonly known as:  COREG  
TAKE TWO TABLETS BY MOUTH TWICE DAILY WITH MEALS  
  
 DAILY MULTI-VITAMINS/IRON tablet Generic drug:  multivitamin with iron Take 1 tablet by mouth daily. glucose blood VI test strips strip Commonly known as:  blood glucose test  
1 Insulin Needles (Disposable) 31 Ndle For 75/25 insulin administration  
  
 lisinopril 40 mg tablet Commonly known as:  Kassi Duarte Take 1 Tab by mouth daily. NovoLIN R 100 unit/mL injection Generic drug:  insulin regular  
by SubCUTAneous route. Oral Medication Containers Misc Commonly known as:  SHARPS CONTAINER  
1  
  
 potassium 99 mg tablet Take 99 mg by mouth daily. spironolactone 25 mg tablet Commonly known as:  ALDACTONE  
TAKE ONE TABLET BY MOUTH ONCE DAILY Prescriptions Sent to Pharmacy Refills  
 bumetanide (BUMEX) 1 mg tablet 3 Sig: TAKE HALF TABLET BY MOUTH ONCE DAILY Class: Normal  
 Pharmacy: [address] [place], [address] [place] [address] Ph #: [phone] Introducing [facility]! [facility] introduces [employer] patient portal. Now you can access parts of your medical record, email your doctor's office, and request medication refills online. 1. In your internet browser, go to [email or web address] [email or web address]/Data [employer]acet 2. Click on the First Time User? Click Here link in the Sign In box. You will see the New Member Sign Up page. 3. Enter your TravelLine Access Code exactly as it appears below. You will not need to use this code after youve completed the sign-up process. If you do not sign up before the expiration date, you must request a new code. · TravelLine Access Code: [email or web address] Expires: 2/19/2018  3:14 PM 
 
4. Enter the last four digits of your Social Security Number (xxxx) and Date of Birth (mm/dd/yyyy) as indicated and click Submit. You will be taken to the next sign-up page. 5. Create a TravelLine ID. This will be your TravelLine login ID and cannot be changed, so think of one that is secure and easy to remember. 6. Create a TravelLine password. You can change your password at any time. 7. Enter your Password Reset Question and Answer. This can be used at a later time if you forget your password. 8. Enter your e-mail address. You will receive e-mail notification when new information is available in [address] 9. Click Sign Up. You can now view and download portions of your medical record. 10. Click the Download Summary menu link to download a portable copy of your medical information. If you have questions, please visit the Frequently Asked Questions section of the TravelLine website. Remember, TravelLine is NOT to be used for urgent needs. For medical emergencies, dial 911. Now available from your iPhone and Android! Please provide this summary of care documentation to your next provider. Your primary care clinician is listed as Altamease Andres. If you have any questions after today's visit, please call [phone].

## 2018-02-12 DIAGNOSIS — I10 ESSENTIAL HYPERTENSION WITH GOAL BLOOD PRESSURE LESS THAN 130/80: ICD-10-CM

## 2018-02-12 DIAGNOSIS — I50.22 CHRONIC SYSTOLIC HEART FAILURE (HCC): ICD-10-CM

## 2018-02-12 DIAGNOSIS — R06.02 SOB (SHORTNESS OF BREATH): ICD-10-CM

## 2018-02-12 RX ORDER — BUMETANIDE 1 MG/1
TABLET ORAL
Qty: 45 TAB | Refills: 1 | Status: SHIPPED | OUTPATIENT
Start: 2018-02-12 | End: 2018-02-15 | Stop reason: SDUPTHER

## 2018-02-12 RX ORDER — LISINOPRIL 40 MG/1
40 TABLET ORAL DAILY
Qty: 90 TAB | Refills: 1 | Status: SHIPPED | OUTPATIENT
Start: 2018-02-12 | End: 2018-06-15 | Stop reason: SDUPTHER

## 2018-02-12 NOTE — TELEPHONE ENCOUNTER
Requested Prescriptions     Signed Prescriptions Disp Refills    bumetanide (BUMEX) 1 mg tablet 45 Tab 1     Sig: TAKE HALF TABLET BY MOUTH ONCE DAILY     Authorizing Provider: Yessi Rushing     Ordering User: FRANCISCO Zavala    lisinopril (PRINIVIL, ZESTRIL) 40 mg tablet 90 Tab 1     Sig: Take 1 Tab by mouth daily. Authorizing Provider: Yessi Rushing     Ordering User: Taurus Webber   '    Verbal order per Dr. Chip Hanson. Follow up scheduled on 5-22-18.

## 2018-02-12 NOTE — TELEPHONE ENCOUNTER
Pt states he needs a new prescription for bumetanide because he needs to take a whole pill instead of half due to holding too much fluid. Requested Prescriptions     Pending Prescriptions Disp Refills    bumetanide (BUMEX) 1 mg tablet 30 Tab 3     Sig: TAKE HALF TABLET BY MOUTH ONCE DAILY    lisinopril (PRINIVIL, ZESTRIL) 40 mg tablet 30 Tab 5     Sig: Take 1 Tab by mouth daily. Thanks!   Kathy Fontanez

## 2018-02-15 ENCOUNTER — TELEPHONE (OUTPATIENT)
Dept: CARDIOLOGY CLINIC | Age: 53
End: 2018-02-15

## 2018-02-15 DIAGNOSIS — I10 ESSENTIAL HYPERTENSION WITH GOAL BLOOD PRESSURE LESS THAN 130/80: ICD-10-CM

## 2018-02-15 DIAGNOSIS — I50.22 CHRONIC SYSTOLIC HEART FAILURE (HCC): ICD-10-CM

## 2018-02-15 RX ORDER — BUMETANIDE 0.5 MG/1
0.5 TABLET ORAL DAILY
Qty: 30 TAB | Refills: 5 | Status: SHIPPED | OUTPATIENT
Start: 2018-02-15 | End: 2019-08-21 | Stop reason: ALTCHOICE

## 2018-02-15 NOTE — TELEPHONE ENCOUNTER
Pharmacy told pt that he needs a new script written for the bumetanide. It needs to change from 1 mg to .5 mg before they fill it. Please give her a call back [phone]. Thanks!   Angel Anne

## 2018-02-15 NOTE — TELEPHONE ENCOUNTER
Requested Prescriptions     Signed Prescriptions Disp Refills    bumetanide (BUMEX) 0.5 mg tablet 30 Tab 5     Sig: Take 1 Tab by mouth daily. Authorizing Provider: Marta Postin     Ordering User: Loretta Carver    Per Dr. Gosia Burnett verbal orders     I corrected rx. I made a note to pharmacy to ensure patient knows to take the 0.5 mg (full tab) daily.

## 2018-03-31 DIAGNOSIS — I10 ESSENTIAL HYPERTENSION WITH GOAL BLOOD PRESSURE LESS THAN 130/80: ICD-10-CM

## 2018-03-31 DIAGNOSIS — I42.9 CARDIOMYOPATHY (HCC): ICD-10-CM

## 2018-03-31 DIAGNOSIS — I50.22 CHRONIC SYSTOLIC HEART FAILURE (HCC): ICD-10-CM

## 2018-04-03 RX ORDER — CARVEDILOL 25 MG/1
TABLET ORAL
Qty: 180 TAB | Refills: 1 | Status: SHIPPED | OUTPATIENT
Start: 2018-04-03 | End: 2018-07-04 | Stop reason: SDUPTHER

## 2018-04-03 NOTE — TELEPHONE ENCOUNTER
Request for Coreg 25 mg BID. Last office visit 11/21/17, next office visit 5/22/18. Refills per verbal order from Dr. Shahnaz Barron.

## 2018-05-07 DIAGNOSIS — I10 ESSENTIAL HYPERTENSION WITH GOAL BLOOD PRESSURE LESS THAN 130/80: ICD-10-CM

## 2018-05-07 DIAGNOSIS — I50.22 CHRONIC SYSTOLIC HEART FAILURE (HCC): ICD-10-CM

## 2018-06-15 ENCOUNTER — OFFICE VISIT (OUTPATIENT)
Dept: CARDIOLOGY CLINIC | Age: 53
End: 2018-06-15

## 2018-06-15 VITALS
WEIGHT: 211 LBS | DIASTOLIC BLOOD PRESSURE: 90 MMHG | BODY MASS INDEX: 35.16 KG/M2 | OXYGEN SATURATION: 99 % | HEIGHT: 65 IN | HEART RATE: 66 BPM | RESPIRATION RATE: 18 BRPM | SYSTOLIC BLOOD PRESSURE: 130 MMHG

## 2018-06-15 DIAGNOSIS — E11.9 DIABETES MELLITUS, TYPE II, INSULIN DEPENDENT (HCC): ICD-10-CM

## 2018-06-15 DIAGNOSIS — Z79.4 DIABETES MELLITUS, TYPE II, INSULIN DEPENDENT (HCC): ICD-10-CM

## 2018-06-15 DIAGNOSIS — I42.0 DILATED CARDIOMYOPATHY (HCC): ICD-10-CM

## 2018-06-15 DIAGNOSIS — E66.01 SEVERE OBESITY (BMI 35.0-39.9): ICD-10-CM

## 2018-06-15 DIAGNOSIS — I10 ESSENTIAL HYPERTENSION: Primary | ICD-10-CM

## 2018-06-15 DIAGNOSIS — R06.02 SOB (SHORTNESS OF BREATH): ICD-10-CM

## 2018-06-15 RX ORDER — LISINOPRIL 20 MG/1
20 TABLET ORAL DAILY
Qty: 30 TAB | Refills: 11 | Status: SHIPPED | OUTPATIENT
Start: 2018-06-15 | End: 2018-09-27 | Stop reason: SDUPTHER

## 2018-06-15 NOTE — MR AVS SNAPSHOT
[address] [facility] [address] [facility] 
[phone] Patient: Alexis Almanzar MRN: [id number] :1965 Visit Information Date & Time Provider Department Dept. Phone Encounter #  
 6/15/2018  8:20 AM Zoey Barroso MD [facility] [place] [phone] [id number] Follow-up Instructions Return in about 6 months (around 12/15/2018). Upcoming Health Maintenance Date Due  
 FOOT EXAM Q1 1977 MICROALBUMIN Q1 1977 EYE EXAM RETINAL OR DILATED Q1 1977 DTaP/Tdap/Td series (1 - Tdap) 1988 HEMOGLOBIN A1C Q6M 8/10/2015 LIPID PANEL Q1 2016 FOBT Q 1 YEAR AGE 50-75 2017 MEDICARE YEARLY EXAM 3/14/2018 Influenza Age 5 to Adult 2018 Allergies as of 6/15/2018  Review Complete On: 6/15/2018 By: Zoey Barroso MD  
 No Known Allergies Current Immunizations  Never Reviewed Name Date Pneumococcal Polysaccharide (PPSV-23) 2/10/2015 10:26 AM  
  
 Not reviewed this visit You Were Diagnosed With   
  
 Codes Comments Essential hypertension    -  Primary ICD-10-CM: I10 
ICD-9-CM: 401.9 Dilated cardiomyopathy ([facility]     ICD-10-CM: I42.0 ICD-9-CM: 425.4 Severe obesity (BMI 35.0-39.9) (HCC)     ICD-10-CM: E66.01 
ICD-9-CM: 278.01 Diabetes mellitus, type II, insulin dependent (HCC)     ICD-10-CM: E11.9, Z79.4 ICD-9-CM: 250.00, V58.67 Vitals BP Pulse Resp Height(growth percentile) Weight(growth percentile) SpO2  
 130/90 66 18 5' 5\" (1.651 m) 211 lb (95.7 kg) 99% BMI Smoking Status 35.11 kg/m2 Never Smoker Vitals History BMI and BSA Data Body Mass Index Body Surface Area  
 35.11 kg/m 2 2.09 m 2 Preferred Pharmacy Pharmacy Name Phone [facility] [address] [place] [address] Dr [address], [address] [place] [address] Avenue [phone] Your Updated Medication List  
  
   
 This list is accurate as of 6/15/18  8:46 AM.  Always use your most recent med list.  
  
  
  
  
 Blood-Glucose Meter monitoring kit 1  
  
 bumetanide 0.5 mg tablet Commonly known as:  Ole Fabian Take 1 Tab by mouth daily. carvedilol 25 mg tablet Commonly known as:  COREG  
TAKE TWO TABLETS BY MOUTH TWICE DAILY WITH MEALS  
  
 DAILY MULTI-VITAMINS/IRON tablet Generic drug:  multivitamin with iron Take 1 tablet by mouth daily. glucose blood VI test strips strip Commonly known as:  blood glucose test  
1 Insulin Needles (Disposable) 31 Ndle For 75/25 insulin administration  
  
 lisinopril 40 mg tablet Commonly known as:  Isa Drought Take 1 Tab by mouth daily. NovoLIN R Regular U-100 Insuln 100 unit/mL injection Generic drug:  insulin regular 20 Units by SubCUTAneous route. Oral Medication Containers Misc Commonly known as:  SHARPS CONTAINER  
1  
  
 potassium 99 mg tablet Take 99 mg by mouth daily. spironolactone 25 mg tablet Commonly known as:  ALDACTONE  
TAKE ONE TABLET BY MOUTH ONCE DAILY Follow-up Instructions Return in about 6 months (around 12/15/2018). Introducing [facility]! Maykel Momin introduces SendHub patient portal. Now you can access parts of your medical record, email your doctor's office, and request medication refills online. 1. In your internet browser, go to [email or web address] 2. Click on the First Time User? Click Here link in the Sign In box. You will see the New Member Sign Up page. 3. Enter your SendHub Access Code exactly as it appears below. You will not need to use this code after youve completed the sign-up process. If you do not sign up before the expiration date, you must request a new code. · SendHub Access Code: [email or web address] Expires: 9/13/2018  8:45 AM 
 
4.  Enter the last four digits of your Social Security Number (xxxx) and Date of Birth (mm/dd/yyyy) as indicated and click Submit. You will be taken to the next sign-up page. 5. Create a wali ID. This will be your wali login ID and cannot be changed, so think of one that is secure and easy to remember. 6. Create a wali password. You can change your password at any time. 7. Enter your Password Reset Question and Answer. This can be used at a later time if you forget your password. 8. Enter your e-mail address. You will receive e-mail notification when new information is available in [address] 9. Click Sign Up. You can now view and download portions of your medical record. 10. Click the Download Summary menu link to download a portable copy of your medical information. If you have questions, please visit the Frequently Asked Questions section of the wali website. Remember, wali is NOT to be used for urgent needs. For medical emergencies, dial 911. Now available from your iPhone and Android! Please provide this summary of care documentation to your next provider. Your primary care clinician is listed as Moon Hu. If you have any questions after today's visit, please call [phone].

## 2018-06-15 NOTE — PROGRESS NOTES
HISTORY OF PRESENT ILLNESS  Georges Singh is a 48 y.o. male     SUMMARY:   Problem List  Date Reviewed: 6/15/2018          Codes Class Noted    Severe obesity (BMI 35.0-39.9) ([facility] ICD-10-CM: E66.01  ICD-9-CM: 278.01  6/15/2018        Chronic systolic heart failure ([facility] ICD-10-CM: I50.22  ICD-9-CM: 428.22  2/8/2015        Cardiomyopathy ([facility] ICD-10-CM: I42.9  ICD-9-CM: 425.4  9/26/2014    Overview Addendum 11/9/2017  7:20 AM by Cathie Day MD     2002 (approx) diagnosed [place] by echo, neg stress test and started on usual meds. 2007 (approx) fup echo lvef 25%  2/15 cardiac cath, no sig cad  2/15 single lead AICD implant  2/17 lvh, otherwise normal echo             Hypertension ICD-10-CM: I10  ICD-9-CM: 401.9  9/26/2014        Diabetes mellitus, type II, insulin dependent (HCC) (Chronic) ICD-10-CM: E11.9, Z79.4  ICD-9-CM: 250.00, V58.67  9/26/2014              Current Outpatient Prescriptions on File Prior to Visit   Medication Sig    carvedilol (COREG) 25 mg tablet TAKE TWO TABLETS BY MOUTH TWICE DAILY WITH MEALS    bumetanide (BUMEX) 0.5 mg tablet Take 1 Tab by mouth daily.  lisinopril (PRINIVIL, ZESTRIL) 40 mg tablet Take 1 Tab by mouth daily. (Patient taking differently: Take 20 mg by mouth daily.)    spironolactone (ALDACTONE) 25 mg tablet TAKE ONE TABLET BY MOUTH ONCE DAILY    potassium 99 mg tablet Take 99 mg by mouth daily.  insulin regular (NOVOLIN R) 100 unit/mL injection 20 Units by SubCUTAneous route.  Insulin Needles, Disposable, 31 ndle For 75/25 insulin administration    Oral Medication Containers (SHARPS CONTAINER) misc 1    Blood-Glucose Meter monitoring kit 1    glucose blood VI test strips (BLOOD GLUCOSE TEST) strip 1    multivitamin with iron (DAILY MULTI-VITAMINS/IRON) tablet Take 1 tablet by mouth daily. No current facility-administered medications on file prior to visit.         CARDIOLOGY STUDIES TO DATE:  9/14 echo dilated lv with lvef 15%, mod lae, mild to mod mr, mild pul regurg    2/15 echo lvef 50% lae  2/15 echo lvef 65%, no sig valve abnormalities        Chief Complaint   Patient presents with    Cardiomyopathy     HPI :  Mr. Ashford Host apparently has developed some kidney issues and is now seeing Dr. Barney Jimenez, who cut his Lisinopril dose and cut his Bumex and put him on Spironolactone. He has gotten out of his exercise routine and continues to gain weight and sugars have not been well controlled. Apparently, his wife got a kidney transplant in April, which put a wrinkle in things, though fortunately she is doing really, really well. CARDIAC ROS:   negative for chest pain, dyspnea, palpitations, syncope, orthopnea, paroxysmal nocturnal dyspnea, exertional chest pressure/discomfort, claudication, lower extremity edema    Family History   Problem Relation Age of Onset    Hypertension Father     Diabetes Father     Diabetes Mother     Diabetes Brother     Hypertension Brother        Past Medical History:   Diagnosis Date    Diabetes ([facility] [facility]     Heart failure ([facility] [facility]     CHF, cardiomyopathy    Hypertension     ICD (implantable cardioverter-defibrillator) in place     left upper chest       GENERAL ROS:  A comprehensive review of systems was negative except for that written in the HPI.     Visit Vitals    /90    Pulse 66    Resp 18    Ht 5' 5\" (1.651 m)    Wt 211 lb (95.7 kg)    SpO2 99%    BMI 35.11 kg/m2       Wt Readings from Last 3 Encounters:   06/15/18 211 lb (95.7 kg)   11/21/17 206 lb 9.6 oz (93.7 kg)   05/15/17 202 lb 13.2 oz (92 kg)            BP Readings from Last 3 Encounters:   06/15/18 130/90   11/21/17 120/84   05/15/17 130/73       PHYSICAL EXAM  General appearance: alert, cooperative, no distress, appears stated age  Neck: supple, symmetrical, trachea midline, no adenopathy, no carotid bruit and no JVD  Lungs: clear to auscultation bilaterally  Heart: regular rate and rhythm, S1, S2 normal, no murmur, click, rub or gallop  Extremities: extremities normal, atraumatic, no cyanosis or edema    Lab Results   Component Value Date/Time    Cholesterol, total 152 02/11/2015 01:30 PM    HDL Cholesterol 34 02/11/2015 01:30 PM    LDL, calculated 100.8 (H) 02/11/2015 01:30 PM    Triglyceride 86 02/11/2015 01:30 PM    CHOL/HDL Ratio 4.5 02/11/2015 01:30 PM     ASSESSMENT  Mr. Beau Vela is stable from a cardiac perspective on a good medical regimen and needs no cardiac testing. I talked to him about the importance of keeping followup with Dr. Valentin Carolina, who should be managing his blood pressure medicines now that he has developed kidney disease since those medicines with diabetics can all interact in an unfavorable way. current treatment plan is effective, no change in therapy  lab results and schedule of future lab studies reviewed with patient  reviewed diet, exercise and weight control    Encounter Diagnoses   Name Primary?  Essential hypertension Yes    Dilated cardiomyopathy (HCC)     Severe obesity (BMI 35.0-39.9) ([address]     Diabetes mellitus, type II, insulin dependent ([address]      No orders of the defined types were placed in this encounter. Follow-up Disposition:  Return in about 6 months (around 12/15/2018).     Alverto Kwan MD  6/15/2018

## 2018-07-04 DIAGNOSIS — I50.22 CHRONIC SYSTOLIC HEART FAILURE (HCC): ICD-10-CM

## 2018-07-04 DIAGNOSIS — I42.9 CARDIOMYOPATHY (HCC): ICD-10-CM

## 2018-07-04 DIAGNOSIS — I10 ESSENTIAL HYPERTENSION WITH GOAL BLOOD PRESSURE LESS THAN 130/80: ICD-10-CM

## 2018-07-05 RX ORDER — CARVEDILOL 25 MG/1
TABLET ORAL
Qty: 180 TAB | Refills: 1 | Status: SHIPPED | OUTPATIENT
Start: 2018-07-05 | End: 2019-08-12 | Stop reason: SDUPTHER

## 2018-09-27 DIAGNOSIS — R06.02 SOB (SHORTNESS OF BREATH): ICD-10-CM

## 2018-09-27 RX ORDER — LISINOPRIL 20 MG/1
20 TABLET ORAL DAILY
Qty: 90 TAB | Refills: 3 | Status: SHIPPED | OUTPATIENT
Start: 2018-09-27 | End: 2019-08-09 | Stop reason: ALTCHOICE

## 2018-09-27 NOTE — TELEPHONE ENCOUNTER
Requested Prescriptions     Signed Prescriptions Disp Refills    lisinopril (PRINIVIL, ZESTRIL) 20 mg tablet 90 Tab 3     Sig: Take 1 Tab by mouth daily.      Authorizing Provider: Nella Neil     Ordering User: Aishwarya Delgado    Per Dr. Dimitry Best verbal orders

## 2019-08-05 ENCOUNTER — HOSPITAL ENCOUNTER (EMERGENCY)
Age: 54
Discharge: HOME | End: 2019-08-05
Attending: EMERGENCY MEDICINE | Admitting: EMERGENCY MEDICINE
Payer: MEDICARE

## 2019-08-05 ENCOUNTER — APPOINTMENT (OUTPATIENT)
Dept: GENERAL RADIOLOGY | Age: 54
End: 2019-08-05
Attending: PHYSICIAN ASSISTANT
Payer: MEDICARE

## 2019-08-05 VITALS
WEIGHT: 216 LBS | BODY MASS INDEX: 32.74 KG/M2 | TEMPERATURE: 97.8 F | HEART RATE: 67 BPM | DIASTOLIC BLOOD PRESSURE: 99 MMHG | SYSTOLIC BLOOD PRESSURE: 159 MMHG | HEIGHT: 68 IN | OXYGEN SATURATION: 99 % | RESPIRATION RATE: 20 BRPM

## 2019-08-05 DIAGNOSIS — B97.89 VIRAL RESPIRATORY ILLNESS: ICD-10-CM

## 2019-08-05 DIAGNOSIS — R79.89 ELEVATED BRAIN NATRIURETIC PEPTIDE (BNP) LEVEL: ICD-10-CM

## 2019-08-05 DIAGNOSIS — J98.8 VIRAL RESPIRATORY ILLNESS: ICD-10-CM

## 2019-08-05 DIAGNOSIS — R06.02 SOB (SHORTNESS OF BREATH): Primary | ICD-10-CM

## 2019-08-05 LAB
ALBUMIN SERPL-MCNC: 3.7 G/DL (ref 3.5–5)
ALBUMIN/GLOB SERPL: 1.1 {RATIO} (ref 1.1–2.2)
ALP SERPL-CCNC: 115 U/L (ref 45–117)
ALT SERPL-CCNC: 17 U/L (ref 12–78)
ANION GAP SERPL CALC-SCNC: 8 MMOL/L (ref 5–15)
AST SERPL-CCNC: 12 U/L (ref 15–37)
BASOPHILS # BLD: 0.1 K/UL (ref 0–0.1)
BASOPHILS NFR BLD: 1 % (ref 0–1)
BILIRUB SERPL-MCNC: 0.2 MG/DL (ref 0.2–1)
BNP SERPL-MCNC: 3394 PG/ML
BUN SERPL-MCNC: 26 MG/DL (ref 6–20)
BUN/CREAT SERPL: 12 (ref 12–20)
CALCIUM SERPL-MCNC: 9.1 MG/DL (ref 8.5–10.1)
CHLORIDE SERPL-SCNC: 105 MMOL/L (ref 97–108)
CO2 SERPL-SCNC: 24 MMOL/L (ref 21–32)
COMMENT, HOLDF: NORMAL
CREAT SERPL-MCNC: 2.19 MG/DL (ref 0.7–1.3)
DIFFERENTIAL METHOD BLD: ABNORMAL
EOSINOPHIL # BLD: 0.2 K/UL (ref 0–0.4)
EOSINOPHIL NFR BLD: 3 % (ref 0–7)
ERYTHROCYTE [DISTWIDTH] IN BLOOD BY AUTOMATED COUNT: 13.2 % (ref 11.5–14.5)
GLOBULIN SER CALC-MCNC: 3.5 G/DL (ref 2–4)
GLUCOSE SERPL-MCNC: 227 MG/DL (ref 65–100)
HCT VFR BLD AUTO: 37 % (ref 36.6–50.3)
HGB BLD-MCNC: 11.6 G/DL (ref 12.1–17)
IMM GRANULOCYTES # BLD AUTO: 0 K/UL (ref 0–0.04)
IMM GRANULOCYTES NFR BLD AUTO: 0 % (ref 0–0.5)
LYMPHOCYTES # BLD: 1.6 K/UL (ref 0.8–3.5)
LYMPHOCYTES NFR BLD: 21 % (ref 12–49)
MCH RBC QN AUTO: 26.7 PG (ref 26–34)
MCHC RBC AUTO-ENTMCNC: 31.4 G/DL (ref 30–36.5)
MCV RBC AUTO: 85.1 FL (ref 80–99)
MONOCYTES # BLD: 0.8 K/UL (ref 0–1)
MONOCYTES NFR BLD: 10 % (ref 5–13)
NEUTS SEG # BLD: 5.1 K/UL (ref 1.8–8)
NEUTS SEG NFR BLD: 65 % (ref 32–75)
NRBC # BLD: 0 K/UL (ref 0–0.01)
NRBC BLD-RTO: 0 PER 100 WBC
PLATELET # BLD AUTO: 171 K/UL (ref 150–400)
PMV BLD AUTO: 12.3 FL (ref 8.9–12.9)
POTASSIUM SERPL-SCNC: 3.8 MMOL/L (ref 3.5–5.1)
PROT SERPL-MCNC: 7.2 G/DL (ref 6.4–8.2)
RBC # BLD AUTO: 4.35 M/UL (ref 4.1–5.7)
SAMPLES BEING HELD,HOLD: NORMAL
SODIUM SERPL-SCNC: 137 MMOL/L (ref 136–145)
TROPONIN I SERPL-MCNC: <0.05 NG/ML
WBC # BLD AUTO: 7.7 K/UL (ref 4.1–11.1)

## 2019-08-05 PROCEDURE — 99285 EMERGENCY DEPT VISIT HI MDM: CPT

## 2019-08-05 PROCEDURE — 83880 ASSAY OF NATRIURETIC PEPTIDE: CPT

## 2019-08-05 PROCEDURE — 80053 COMPREHEN METABOLIC PANEL: CPT

## 2019-08-05 PROCEDURE — 93005 ELECTROCARDIOGRAM TRACING: CPT

## 2019-08-05 PROCEDURE — 36415 COLL VENOUS BLD VENIPUNCTURE: CPT

## 2019-08-05 PROCEDURE — 84484 ASSAY OF TROPONIN QUANT: CPT

## 2019-08-05 PROCEDURE — 71046 X-RAY EXAM CHEST 2 VIEWS: CPT

## 2019-08-05 PROCEDURE — 85025 COMPLETE CBC W/AUTO DIFF WBC: CPT

## 2019-08-05 PROCEDURE — 96374 THER/PROPH/DIAG INJ IV PUSH: CPT

## 2019-08-05 PROCEDURE — 74011000250 HC RX REV CODE- 250: Performed by: EMERGENCY MEDICINE

## 2019-08-05 RX ORDER — BUMETANIDE 0.25 MG/ML
0.5 INJECTION INTRAMUSCULAR; INTRAVENOUS
Status: COMPLETED | OUTPATIENT
Start: 2019-08-05 | End: 2019-08-05

## 2019-08-05 RX ADMIN — BUMETANIDE 0.5 MG: 0.25 INJECTION INTRAMUSCULAR; INTRAVENOUS at 20:57

## 2019-08-05 NOTE — ED NOTES
Bedside shift change report given to [address] (oncoming nurse) by Regna Strauss RN (offgoing nurse). Report included the following information SBAR and ED Summary.

## 2019-08-05 NOTE — ED TRIAGE NOTES
Triage Note: Patient is coming in with cough, sneezing, headache for the past 2 days. Patient started with chest pain and back pain yesterday. Patient states son has been sick with the same symptoms.

## 2019-08-05 NOTE — ED PROVIDER NOTES
48 y.o. male with past medical history significant for HTN, diabetes, heart failure, and ICD who presents from home via personal vehicle with chief complaint of congestion. Patient states onset of  symptoms began 5 days ago. Patient states congestion is associated with SOB, cough, chest tightness, and SOB. Patient reports symptoms are similar to previous pneumonia diagnosis. Patient states his pneumonia resulted in a catheterization and the placement of an AICD due to CHF. Patient notes starting 3 days ago he also started having elevated BP levels. Patient affirms sore throat, SOB, non-productive cough, congestion, back pain, and chest tightness. There are no other acute medical concerns at this time. Social hx: Never smoker  PCP: Mili Hollis MD    Note written by John Eckert, as dictated by Lani Jose MD 7:26 PM       The history is provided by the patient. No  was used.         Past Medical History:   Diagnosis Date    Diabetes ([address]     Heart failure ([address]     CHF, cardiomyopathy    Hypertension     ICD (implantable cardioverter-defibrillator) in place     left upper chest       Past Surgical History:   Procedure Laterality Date    HX HEART CATHETERIZATION  2/2015    x1    HX IMPLANTABLE CARDIOVERTER DEFIBRILLATOR  2/16/2015         Family History:   Problem Relation Age of Onset    Hypertension Father     Diabetes Father     Diabetes Mother     Diabetes Brother     Hypertension Brother        Social History     Socioeconomic History    Marital status:      Spouse name: Not on file    Number of children: Not on file    Years of education: Not on file    Highest education level: Not on file   Occupational History    Not on file   Social Needs    Financial resource strain: Not on file    Food insecurity:     Worry: Not on file     Inability: Not on file    Transportation needs:     Medical: Not on file     Non-medical: Not on file   Tobacco Use    Smoking status: Never Smoker    Smokeless tobacco: Never Used   Substance and Sexual Activity    Alcohol use: No    Drug use: No    Sexual activity: Not on file   Lifestyle    Physical activity:     Days per week: Not on file     Minutes per session: Not on file    Stress: Not on file   Relationships    Social connections:     Talks on phone: Not on file     Gets together: Not on file     Attends Latter day service: Not on file     Active member of club or organization: Not on file     Attends meetings of clubs or organizations: Not on file     Relationship status: Not on file    Intimate partner violence:     Fear of current or ex partner: Not on file     Emotionally abused: Not on file     Physically abused: Not on file     Forced sexual activity: Not on file   Other Topics Concern    Not on file   Social History Narrative    Not on file         ALLERGIES: Patient has no known allergies. Review of Systems   Constitutional: Negative for appetite change, chills and fever. HENT: Positive for congestion and sore throat. Negative for rhinorrhea and trouble swallowing. Eyes: Negative for photophobia. Respiratory: Positive for cough, chest tightness and shortness of breath. Cardiovascular: Negative for chest pain and palpitations. Gastrointestinal: Negative for abdominal pain, nausea and vomiting. Genitourinary: Negative for dysuria, frequency and hematuria. Musculoskeletal: Positive for back pain. Negative for arthralgias. Neurological: Negative for dizziness, syncope and weakness. Psychiatric/Behavioral: Negative for behavioral problems. The patient is not nervous/anxious. Vitals:    08/05/19 1821 08/05/19 1908   BP:  (!) 184/109   Pulse:  67   Resp:  22   Temp:  97.8 °F (36.6 °C)   SpO2: 99% 99%   Weight:  98 kg (216 lb)   Height:  5' 8\" (1.727 m)            Physical Exam   Constitutional: He appears well-developed and well-nourished. HENT:   Head: Normocephalic and atraumatic. Mouth/Throat: Oropharynx is clear and moist.   Eyes: Pupils are equal, round, and reactive to light. EOM are normal.   Neck: Normal range of motion. Neck supple. Cardiovascular: Normal rate, regular rhythm, normal heart sounds and intact distal pulses. Exam reveals no gallop and no friction rub. No murmur heard. AICD located in left upper chest wall   Pulmonary/Chest: Effort normal. No respiratory distress. He has no wheezes. He has no rales. Abdominal: Soft. There is no tenderness. There is no rebound. Musculoskeletal: Normal range of motion. He exhibits no tenderness. Neurological: He is alert. No cranial nerve deficit. Motor; symmetric   Skin: No erythema. Psychiatric: He has a normal mood and affect. His behavior is normal.   Nursing note and vitals reviewed. Note written by John Fulton, as dictated by Romana Bejarano MD 7:26 PM       [facility]       Procedures          Note: Blood pressure is down to 150/95. Patient's breathing is better. Chest x-ray is clear. Recent echocardiogram was improved with normal ejection fraction. Previous echocardiogram showed grade 1 diastolic dysfunction. Patient's nephrologist had taken him off Spironolactone. He continues to take Bumex, lisinopril, and a beta-blocker. D-dimer is elevated over 3000 but had been 8000 previously. Patient reports a 6 or 8-hour episode of shortness of breath that resolved after getting to the ER. Plan is to consult the cardiologist about treatment plan. 8:07 PM  Purnima Herrera MD     CONSULT NOTE:  8:24 PM Romana Bejarano MD spoke with Dr. Dixie Enrique, Consult for Cardiology. Discussed available diagnostic tests and clinical findings. Plan is to give IV diuretic and meet Dr. Calvin Zimmer in the office later this week. ED EKG interpretation:  Rhythm: normal sinus rhythm; and regular . Rate (approx.): 75; Axis: normal; P wave: normal; QRS interval: prolonged; ST/T wave: T wave inverted; in  Lead: ; Other findings: .  This EKG was interpreted by Didier García MD,ED Provider.  9:37 PM

## 2019-08-06 LAB
ATRIAL RATE: 73 BPM
CALCULATED P AXIS, ECG09: 53 DEGREES
CALCULATED R AXIS, ECG10: -23 DEGREES
CALCULATED T AXIS, ECG11: 167 DEGREES
DIAGNOSIS, 93000: NORMAL
P-R INTERVAL, ECG05: 192 MS
Q-T INTERVAL, ECG07: 414 MS
QRS DURATION, ECG06: 124 MS
QTC CALCULATION (BEZET), ECG08: 456 MS
VENTRICULAR RATE, ECG03: 73 BPM

## 2019-08-06 NOTE — ED NOTES
Discharge instructions given to patient by MD and nurse. Pt has been given counseling and verbalizes understanding. IV d/c. Pt ambulated off of unit in no signs of distress.

## 2019-08-06 NOTE — DISCHARGE INSTRUCTIONS
Shortness of Breath: Care Instructions  Your Care Instructions  Shortness of breath has many causes. Sometimes conditions such as anxiety can lead to shortness of breath. Some people get mild shortness of breath when they exercise. Trouble breathing also can be a symptom of a serious problem, such as asthma, lung disease, emphysema, heart problems, and pneumonia. If your shortness of breath continues, you may need tests and treatment. Watch for any changes in your breathing and other symptoms. Follow-up care is a key part of your treatment and safety. Be sure to make and go to all appointments, and call your doctor if you are having problems. It's also a good idea to know your test results and keep a list of the medicines you take. How can you care for yourself at home? · Do not smoke or allow others to smoke around you. If you need help quitting, talk to your doctor about stop-smoking programs and medicines. These can increase your chances of quitting for good. · Get plenty of rest and sleep. · Take your medicines exactly as prescribed. Call your doctor if you think you are having a problem with your medicine. · Find healthy ways to deal with stress. ? Exercise daily. ? Get plenty of sleep. ? Eat regularly and well. When should you call for help? Call 911 anytime you think you may need emergency care. For example, call if:    · You have severe shortness of breath.     · You have symptoms of a heart attack. These may include:  ? Chest pain or pressure, or a strange feeling in the chest.  ? Sweating. ? Shortness of breath. ? Nausea or vomiting. ? Pain, pressure, or a strange feeling in the back, neck, jaw, or upper belly or in one or both shoulders or arms. ? Lightheadedness or sudden weakness. ? A fast or irregular heartbeat. After you call 911, the  may tell you to chew 1 adult-strength or 2 to 4 low-dose aspirin. Wait for an ambulance.  Do not try to drive yourself.    Call your doctor now or seek immediate medical care if:    · Your shortness of breath gets worse or you start to wheeze. Wheezing is a high-pitched sound when you breathe.     · You wake up at night out of breath or have to prop your head up on several pillows to breathe.     · You are short of breath after only light activity or while at rest.    Watch closely for changes in your health, and be sure to contact your doctor if:    · You do not get better over the next 1 to 2 days. Where can you learn more? Go to http://jennifer-ysabel.info/. Enter S780 in the search box to learn more about \"Shortness of Breath: Care Instructions. \"  Current as of: September 5, 2018  Content Version: 12.1  © 9054-2469 [employer]. Care instructions adapted under license by [employer] (which disclaims liability or warranty for this information). If you have questions about a medical condition or this instruction, always ask your healthcare professional. [place] [address] any warranty or liability for your use of this information. Viral Infections: Care Instructions  Your Care Instructions    You don't feel well, but it's not clear what's causing it. You may have a viral infection. Viruses cause many illnesses, such as the common cold, influenza, fever, rashes, and the diarrhea, nausea, and vomiting that are often called \"stomach flu. \" You may wonder if antibiotic medicines could make you feel better. But antibiotics only treat infections caused by bacteria. They don't work on viruses. The good news is that viral infections usually aren't serious. Most will go away in a few days without medical treatment. In the meantime, there are a few things you can do to make yourself more comfortable. Follow-up care is a key part of your treatment and safety. Be sure to make and go to all appointments, and call your doctor if you are having problems.  It's also a good idea to know your test results and keep a list of the medicines you take. How can you care for yourself at home? · Get plenty of rest if you feel tired. · Take an over-the-counter pain medicine if needed, such as acetaminophen (Tylenol), ibuprofen (Advil, Motrin), or naproxen (Aleve). Read and follow all instructions on the label. · Be careful when taking over-the-counter cold or flu medicines and Tylenol at the same time. Many of these medicines have acetaminophen, which is Tylenol. Read the labels to make sure that you are not taking more than the recommended dose. Too much acetaminophen (Tylenol) can be harmful. · Drink plenty of fluids, enough so that your urine is light yellow or clear like water. If you have kidney, heart, or liver disease and have to limit fluids, talk with your doctor before you increase the amount of fluids you drink. · Stay home from work, school, and other public places while you have a fever. When should you call for help? Call 911 anytime you think you may need emergency care. For example, call if:    · You have severe trouble breathing.     · You passed out (lost consciousness).    Call your doctor now or seek immediate medical care if:    · You seem to be getting much sicker.     · You have a new or higher fever.     · You have blood in your stools.     · You have new belly pain, or your pain gets worse.     · You have a new rash.    Watch closely for changes in your health, and be sure to contact your doctor if:    · You start to get better and then get worse.     · You do not get better as expected. Where can you learn more? Go to http://jennifer-ysabel.info/. Enter F626 in the search box to learn more about \"Viral Infections: Care Instructions. \"  Current as of: July 30, 2018  Content Version: 12.1  © 6677-4286 Healthwise, [employer]. Care instructions adapted under license by [employer] (which disclaims liability or warranty for this information).  If you have questions about a medical condition or this instruction, always ask your healthcare professional. [address] any warranty or liability for your use of this information. Patient Education      Return to ER if breathing gets worse  Shortness of Breath: Care Instructions  Your Care Instructions  Shortness of breath has many causes. Sometimes conditions such as anxiety can lead to shortness of breath. Some people get mild shortness of breath when they exercise. Trouble breathing also can be a symptom of a serious problem, such as asthma, lung disease, emphysema, heart problems, and pneumonia. If your shortness of breath continues, you may need tests and treatment. Watch for any changes in your breathing and other symptoms. Follow-up care is a key part of your treatment and safety. Be sure to make and go to all appointments, and call your doctor if you are having problems. It's also a good idea to know your test results and keep a list of the medicines you take. How can you care for yourself at home? · Do not smoke or allow others to smoke around you. If you need help quitting, talk to your doctor about stop-smoking programs and medicines. These can increase your chances of quitting for good. · Get plenty of rest and sleep. · Take your medicines exactly as prescribed. Call your doctor if you think you are having a problem with your medicine. · Find healthy ways to deal with stress. ? Exercise daily. ? Get plenty of sleep. ? Eat regularly and well. When should you call for help? Call 911 anytime you think you may need emergency care. For example, call if:    · You have severe shortness of breath.     · You have symptoms of a heart attack. These may include:  ? Chest pain or pressure, or a strange feeling in the chest.  ? Sweating. ? Shortness of breath. ? Nausea or vomiting.   ? Pain, pressure, or a strange feeling in the back, neck, jaw, or upper belly or in one or both shoulders or arms. ? Lightheadedness or sudden weakness. ? A fast or irregular heartbeat. After you call 911, the  may tell you to chew 1 adult-strength or 2 to 4 low-dose aspirin. Wait for an ambulance. Do not try to drive yourself.    Call your doctor now or seek immediate medical care if:    · Your shortness of breath gets worse or you start to wheeze. Wheezing is a high-pitched sound when you breathe.     · You wake up at night out of breath or have to prop your head up on several pillows to breathe.     · You are short of breath after only light activity or while at rest.    Watch closely for changes in your health, and be sure to contact your doctor if:    · You do not get better over the next 1 to 2 days. Where can you learn more? Go to http://jennifer-ysabel.info/. Enter S780 in the search box to learn more about \"Shortness of Breath: Care Instructions. \"  Current as of: September 5, 2018  Content Version: 12.1  © 4667-3915 [employer]. Care instructions adapted under license by [employer] (which disclaims liability or warranty for this information). If you have questions about a medical condition or this instruction, always ask your healthcare professional. [address] any warranty or liability for your use of this information. Patient Education        Viral Infections: Care Instructions  Your Care Instructions    You don't feel well, but it's not clear what's causing it. You may have a viral infection. Viruses cause many illnesses, such as the common cold, influenza, fever, rashes, and the diarrhea, nausea, and vomiting that are often called \"stomach flu. \" You may wonder if antibiotic medicines could make you feel better. But antibiotics only treat infections caused by bacteria. They don't work on viruses. The good news is that viral infections usually aren't serious.  Most will go away in a few days without medical treatment. In the meantime, there are a few things you can do to make yourself more comfortable. Follow-up care is a key part of your treatment and safety. Be sure to make and go to all appointments, and call your doctor if you are having problems. It's also a good idea to know your test results and keep a list of the medicines you take. How can you care for yourself at home? · Get plenty of rest if you feel tired. · Take an over-the-counter pain medicine if needed, such as acetaminophen (Tylenol), ibuprofen (Advil, Motrin), or naproxen (Aleve). Read and follow all instructions on the label. · Be careful when taking over-the-counter cold or flu medicines and Tylenol at the same time. Many of these medicines have acetaminophen, which is Tylenol. Read the labels to make sure that you are not taking more than the recommended dose. Too much acetaminophen (Tylenol) can be harmful. · Drink plenty of fluids, enough so that your urine is light yellow or clear like water. If you have kidney, heart, or liver disease and have to limit fluids, talk with your doctor before you increase the amount of fluids you drink. · Stay home from work, school, and other public places while you have a fever. When should you call for help? Call 911 anytime you think you may need emergency care. For example, call if:    · You have severe trouble breathing.     · You passed out (lost consciousness).    Call your doctor now or seek immediate medical care if:    · You seem to be getting much sicker.     · You have a new or higher fever.     · You have blood in your stools.     · You have new belly pain, or your pain gets worse.     · You have a new rash.    Watch closely for changes in your health, and be sure to contact your doctor if:    · You start to get better and then get worse.     · You do not get better as expected. Where can you learn more? Go to http://phyllis.info/.   Enter E637 in the search box to learn more about \"Viral Infections: Care Instructions. \"  Current as of: July 30, 2018  Content Version: 12.1  © 4545-2212 Healthwise[employer]. Care instructions adapted under license by [employer] (which disclaims liability or warranty for this information). If you have questions about a medical condition or this instruction, always ask your healthcare professional. [address] any warranty or liability for your use of this information.

## 2019-08-09 ENCOUNTER — OFFICE VISIT (OUTPATIENT)
Dept: CARDIOLOGY CLINIC | Age: 54
End: 2019-08-09

## 2019-08-09 VITALS
SYSTOLIC BLOOD PRESSURE: 144 MMHG | RESPIRATION RATE: 17 BRPM | WEIGHT: 219.4 LBS | BODY MASS INDEX: 33.25 KG/M2 | HEIGHT: 68 IN | DIASTOLIC BLOOD PRESSURE: 94 MMHG | OXYGEN SATURATION: 99 % | HEART RATE: 67 BPM

## 2019-08-09 DIAGNOSIS — I10 ESSENTIAL HYPERTENSION WITH GOAL BLOOD PRESSURE LESS THAN 130/80: ICD-10-CM

## 2019-08-09 DIAGNOSIS — I42.8 OTHER CARDIOMYOPATHY (HCC): Primary | ICD-10-CM

## 2019-08-09 DIAGNOSIS — E11.9 DIABETES MELLITUS, TYPE II, INSULIN DEPENDENT (HCC): ICD-10-CM

## 2019-08-09 DIAGNOSIS — R06.02 SOB (SHORTNESS OF BREATH): ICD-10-CM

## 2019-08-09 DIAGNOSIS — Z79.4 DIABETES MELLITUS, TYPE II, INSULIN DEPENDENT (HCC): ICD-10-CM

## 2019-08-09 RX ORDER — LISINOPRIL 40 MG/1
40 TABLET ORAL DAILY
COMMUNITY
End: 2019-11-26 | Stop reason: SDUPTHER

## 2019-08-09 NOTE — PROGRESS NOTES
HISTORY OF PRESENT ILLNESS  Jim Aleman is a 48 y.o. male     SUMMARY:   Problem List  Date Reviewed: 8/9/2019          Codes Class Noted    Severe obesity (BMI 35.0-39. 9) ICD-10-CM: E66.01  ICD-9-CM: 278.01  6/15/2018        Chronic [facility] ([facility] ICD-10-CM: I50.22  ICD-9-CM: 428.22  2/8/2015        Cardiomyopathy ([facility] ICD-10-CM: I42.9  ICD-9-CM: 425.4  9/26/2014    Overview Addendum 11/9/2017  7:20 AM by Kimi Deluca MD     2002 (approx) diagnosed [place] by echo, neg stress test and started on usual meds. 2007 (approx) fup echo lvef 25%  2/15 cardiac cath, no sig cad  2/15 single lead AICD implant  2/17 lvh, otherwise normal echo             Hypertension ICD-10-CM: I10  ICD-9-CM: 401.9  9/26/2014        Diabetes mellitus, type II, insulin dependent (HCC) (Chronic) ICD-10-CM: E11.9, Z79.4  ICD-9-CM: 250.00, V58.67  9/26/2014              Current Outpatient Medications on File Prior to Visit   Medication Sig    carvedilol (COREG) 25 mg tablet TAKE 2 TABLETS BY MOUTH TWICE DAILY WITH MEALS    bumetanide (BUMEX) 0.5 mg tablet Take 1 Tab by mouth daily.  insulin regular (NOVOLIN R) 100 unit/mL injection 20 Units by SubCUTAneous route two (2) times a day.  Insulin Needles, Disposable, 31 ndle For 75/25 insulin administration    Oral Medication Containers (SHARPS CONTAINER) misc 1    Blood-Glucose Meter monitoring kit 1    glucose blood VI test strips (BLOOD GLUCOSE TEST) strip 1    multivitamin with iron (DAILY MULTI-VITAMINS/IRON) tablet Take 1 tablet by mouth daily.  potassium 99 mg tablet Take 99 mg by mouth daily. No current facility-administered medications on file prior to visit.         CARDIOLOGY STUDIES TO DATE:  9/14 echo dilated lv with lvef 15%, mod lae, mild to mod mr, mild pul regurg    2/15 echo lvef 50% lae  2/15 echo lvef 65%, no sig valve abnormalities  2/17 lvh, otherwise normal echo    Chief Complaint   Patient presents with    Cardiomyopathy HPI :  Mr. Brian Walton was recently in the emergency room with chest congestion and cough and shortness of breath. I have reviewed that record and summarized it in the appropriate places in his chart. His workup was unrevealing. Chest x-ray was negative. The only real abnormality was his chronic renal insufficiency and he had an elevated BNP. His symptoms have been gradually resolving. He says his most recent A1c was about 8.5. He just recently started exercising and he has had some problems with elevated blood pressures for several weeks. He says he is following a low sodium diet. CARDIAC ROS:   negative for chest pain, palpitations, syncope, orthopnea, paroxysmal nocturnal dyspnea, exertional chest pressure/discomfort, claudication, lower extremity edema    Family History   Problem Relation Age of Onset    Hypertension Father     Diabetes Father     Diabetes Mother     Diabetes Brother     Hypertension Brother        Past Medical History:   Diagnosis Date    Diabetes ([facility]     Heart failure ([facility]     CHF, cardiomyopathy    Hypertension     ICD (implantable cardioverter-defibrillator) in place     left upper chest       GENERAL ROS:  A comprehensive review of systems was negative except for that written in the HPI.     Visit Vitals  BP (!) 144/94 (BP 1 Location: Right arm, BP Patient Position: Sitting)   Pulse 67   Resp 17   Ht 5' 8\" (1.727 m)   Wt 219 lb 6.4 oz (99.5 kg)   SpO2 99%   BMI 33.36 kg/m²       Wt Readings from Last 3 Encounters:   08/09/19 219 lb 6.4 oz (99.5 kg)   08/05/19 216 lb (98 kg)   06/15/18 211 lb (95.7 kg)            BP Readings from Last 3 Encounters:   08/09/19 (!) 144/94   08/05/19 (!) 159/99   06/15/18 130/90       PHYSICAL EXAM  General appearance: alert, cooperative, no distress, appears stated age  Neurologic: Alert and oriented X 3  Neck: supple, symmetrical, trachea midline, no adenopathy, no carotid bruit and no JVD  Lungs: clear to auscultation bilaterally  Heart: regular rate and rhythm, S1, S2 normal, no murmur, click, rub or gallop  Extremities: extremities normal, atraumatic, no cyanosis or edema    Lab Results   Component Value Date/Time    Cholesterol, total 152 02/11/2015 01:30 PM    HDL Cholesterol 34 02/11/2015 01:30 PM    LDL, calculated 100.8 (H) 02/11/2015 01:30 PM    Triglyceride 86 02/11/2015 01:30 PM    CHOL/HDL Ratio 4.5 02/11/2015 01:30 PM     ASSESSMENT  Mr. Carol Jeffries does not appear to be in heart failure. However, it is concerning about his elevated BNP, though that must be taken in light of his renal insufficiency as well. We need to repeat his echocardiogram and when he returns, we will recheck his blood pressure. In the meantime, I want him to increase his Lisinopril to 40 mg a day and we gave him written instructions and prescription. current treatment plan is effective, no change in therapy  lab results and schedule of future lab studies reviewed with patient  reviewed diet, exercise and weight control    Encounter Diagnoses   Name Primary?  Other cardiomyopathy ([address] Yes    Essential hypertension with goal blood pressure less than 130/80     SOB (shortness of breath)     Diabetes mellitus, type II, insulin dependent (HCC)      No orders of the defined types were placed in this encounter. Follow-up and Dispositions    · Return in about 3 months (around 11/9/2019).          Bev Johnson MD  8/9/2019

## 2019-08-12 DIAGNOSIS — I10 ESSENTIAL HYPERTENSION WITH GOAL BLOOD PRESSURE LESS THAN 130/80: ICD-10-CM

## 2019-08-12 DIAGNOSIS — I50.22 CHRONIC SYSTOLIC HEART FAILURE (HCC): ICD-10-CM

## 2019-08-12 DIAGNOSIS — I42.9 CARDIOMYOPATHY (HCC): ICD-10-CM

## 2019-08-12 RX ORDER — CARVEDILOL 25 MG/1
TABLET ORAL
Qty: 360 TAB | Refills: 1 | Status: SHIPPED | OUTPATIENT
Start: 2019-08-12 | End: 2020-03-24

## 2019-08-12 NOTE — TELEPHONE ENCOUNTER
Requested Prescriptions     Signed Prescriptions Disp Refills    carvedilol (COREG) 25 mg tablet 360 Tab 1     Sig: TAKE 2 TABLETS BY MOUTH TWICE DAILY WITH MEALS     Authorizing Provider: Pura Martinez     Ordering User: Asha Tam    Per Dr. Gely Barrientos verbal orders

## 2019-08-16 ENCOUNTER — OFFICE VISIT (OUTPATIENT)
Dept: CARDIOLOGY CLINIC | Age: 54
End: 2019-08-16

## 2019-08-16 VITALS — DIASTOLIC BLOOD PRESSURE: 88 MMHG | SYSTOLIC BLOOD PRESSURE: 132 MMHG

## 2019-08-16 DIAGNOSIS — I10 ESSENTIAL HYPERTENSION: Primary | ICD-10-CM

## 2019-08-16 NOTE — PROGRESS NOTES
Patient in the office for echo and BP check. /92 right arm. He has increased his lisinopril to 40 mg and did take meds today. Patient stated his SBP at home yesterday was around 120 at this time and he feels good. He checks BP in left arm at home so I rechecked in left arm and got lower reading 132/88. Patient will continue meds and continue to monitor BP at home. He will call if he gets readings >140/90. Patient verbalized understanding and denied further questions or concerns.

## 2019-08-20 ENCOUNTER — TELEPHONE (OUTPATIENT)
Dept: CARDIOLOGY CLINIC | Age: 54
End: 2019-08-20

## 2019-08-20 NOTE — TELEPHONE ENCOUNTER
Patient of Dr. Abhijit Helm. Dr. Chapin Hargrove read patient's echo, reviewed chart note, and advised patient be seen by a physician in the office this week. He also advised patient have a CBC and CMP. Called patient. Verified patient's identity with two identifiers. Notified him Dr. Enrico Garrett is out of the office and Dr. Chapin Hargrove reviewed his echo results. Notified him of Dr. Shahzad Nugent advice. Patient is seeing his kidney doctor (Dr. Ranjan Winter) today. Patient agreed to appointment with Dr. Chapin Hargrove tomorrow and I told him we could send him for labs after the appointment if needed. Will request records from Dr. Ranjan Winter. Patient verbalized understanding and denied further questions or concerns. Future Appointments   Date Time Provider Chelsy Tammie   8/21/2019  9:00 AM Marita Man  [address]   11/15/2019 11:40 AM MD Dereck Connolly [address] records request to Dr. Steph Rios office. Received note from Dr. Steph Rios office.

## 2019-08-21 ENCOUNTER — OFFICE VISIT (OUTPATIENT)
Dept: CARDIOLOGY CLINIC | Age: 54
End: 2019-08-21

## 2019-08-21 VITALS
SYSTOLIC BLOOD PRESSURE: 146 MMHG | OXYGEN SATURATION: 98 % | HEART RATE: 71 BPM | RESPIRATION RATE: 16 BRPM | DIASTOLIC BLOOD PRESSURE: 96 MMHG | HEIGHT: 68 IN | BODY MASS INDEX: 33.25 KG/M2 | WEIGHT: 219.4 LBS

## 2019-08-21 DIAGNOSIS — I42.0 DILATED CARDIOMYOPATHY (HCC): ICD-10-CM

## 2019-08-21 DIAGNOSIS — E11.9 DIABETES MELLITUS, TYPE II, INSULIN DEPENDENT (HCC): ICD-10-CM

## 2019-08-21 DIAGNOSIS — Z79.4 DIABETES MELLITUS, TYPE II, INSULIN DEPENDENT (HCC): ICD-10-CM

## 2019-08-21 DIAGNOSIS — I50.22 CHRONIC SYSTOLIC HEART FAILURE (HCC): ICD-10-CM

## 2019-08-21 DIAGNOSIS — I10 ESSENTIAL HYPERTENSION: Primary | ICD-10-CM

## 2019-08-21 DIAGNOSIS — N18.30 CKD (CHRONIC KIDNEY DISEASE) STAGE 3, GFR 30-59 ML/MIN (HCC): ICD-10-CM

## 2019-08-21 RX ORDER — AMLODIPINE BESYLATE 5 MG/1
5 TABLET ORAL DAILY
Qty: 30 TAB | Refills: 11 | Status: SHIPPED | OUTPATIENT
Start: 2019-08-21 | End: 2020-08-31

## 2019-08-21 RX ORDER — BUMETANIDE 1 MG/1
TABLET ORAL DAILY
COMMUNITY
End: 2020-11-27 | Stop reason: SDUPTHER

## 2019-08-21 NOTE — PROGRESS NOTES
HISTORY OF PRESENT ILLNESS  Neena Madden is a 48 y.o. male. He has diabetes and chronic kidney disease followed by Dr. Nelda Singletary. He also has a history of dilated nonischemic cardiomyopathy with an implantable defibrillator. His EF with blood pressure control has been as good as 50% done six months ago. A recent echocardiogram shows that it is back to 25%. When last seen by Dr. Enciso, his lisinopril was increased to 40 mg a day. Blood work was done yesterday by Dr. Nelda Singletary but I do not have the results. I do have his note which suggested tapering the lisinopril considering adding back Aldactone. Previously when he took Aldactone he had hyperkalemia. He also took Norvasc years ago but it was stopped for unclear reasons. At one point, he was also on hydralazine with Isordil. His creatinine has been as high as 2.5 noted in Dr. Deepti Prado notes. Two weeks ago, it was 2.15. HPI  Patient Active Problem List   Diagnosis Code    [facility] I[facility]9    Hypertension I10    Diabetes mellitus, type II, insulin dependent (HCC) E11.9, Z79.4    Chronic systolic heart failure (HCC) I50.22    Severe obesity (BMI 35.0-39. 9) E66.01     Current Outpatient Medications   Medication Sig Dispense Refill    bumetanide (BUMEX) 1 mg tablet Take  by mouth daily.  amLODIPine (NORVASC) 5 mg tablet Take 1 Tab by mouth daily. 30 Tab 11    carvedilol (COREG) 25 mg tablet TAKE 2 TABLETS BY MOUTH TWICE DAILY WITH MEALS 360 Tab 1    lisinopril (PRINIVIL, ZESTRIL) 40 mg tablet Take 40 mg by mouth daily.  insulin regular (NOVOLIN R) 100 unit/mL injection 20 Units by SubCUTAneous route two (2) times a day.       Insulin Needles, Disposable, 31 ndle For 75/25 insulin administration 150 Each 2    Oral Medication Containers (SHARPS CONTAINER) misc 1 1 Each 1    Blood-Glucose Meter monitoring kit 1 1 Kit 0    glucose blood VI test strips (BLOOD GLUCOSE TEST) strip 1 1 Package 11    multivitamin with iron (DAILY MULTI-VITAMINS/IRON) tablet Take 1 tablet by mouth daily. Past Medical History:   Diagnosis Date    Diabetes ([facility]     Heart failure ([facility]     CHF, cardiomyopathy    Hypertension     ICD (implantable cardioverter-defibrillator) in place     left upper chest     Past Surgical History:   Procedure Laterality Date    HX HEART CATHETERIZATION  2/2015    x1    HX IMPLANTABLE CARDIOVERTER DEFIBRILLATOR  2/16/2015       Review of Systems   Constitutional: Negative. HENT: Negative. Eyes: Negative. Respiratory: Negative. Cardiovascular: Negative. Gastrointestinal: Negative. Musculoskeletal: Negative. Skin: Negative. Neurological: Negative. Endo/Heme/Allergies: Negative. Psychiatric/Behavioral: Negative. Visit Vitals  BP (!) 154/104 (BP 1 Location: Left arm, BP Patient Position: Sitting)   Pulse 71   Resp 16   Ht 5' 8\" (1.727 m)   Wt 219 lb 6.4 oz (99.5 kg)   SpO2 98%   BMI 33.36 kg/m²       Physical Exam   Constitutional: He is oriented to person, place, and time. He appears well-nourished. HENT:   Head: Atraumatic. Eyes: Conjunctivae are normal.   Neck: Neck supple. Cardiovascular: Normal rate, regular rhythm and normal heart sounds. Exam reveals no gallop and no friction rub. No murmur heard. Pulmonary/Chest: He has no wheezes. He has no rales. Abdominal: Bowel sounds are normal. There is no tenderness. Musculoskeletal: He exhibits no edema. Neurological: He is oriented to person, place, and time. No cranial nerve deficit. Skin: Skin is dry. Psychiatric: His behavior is normal.   Nursing note and vitals reviewed. ASSESSMENT and PLAN  It certainly seems that his left ventricular function varies depending on his degree of blood pressure control. I am concerned that the high dose lisinopril may be bumping his creatinine further although I do not know his recent lab values.  For now, I will empirically reduce the lisinopril from 40 to 20 mg a day and will start him back on amlodipine 5 mg a day. He will call if his blood pressure is not improved and the dose can be increased to 10 mg a day. He has no edema to exam today. I will have him return to see Dr. Naomi Rivas in two weeks.

## 2020-01-09 DIAGNOSIS — R06.02 SOB (SHORTNESS OF BREATH): ICD-10-CM

## 2020-01-09 RX ORDER — LISINOPRIL 20 MG/1
TABLET ORAL
Qty: 30 TAB | Refills: 0 | Status: SHIPPED | OUTPATIENT
Start: 2020-01-09 | End: 2020-02-11

## 2020-01-09 NOTE — TELEPHONE ENCOUNTER
Requested Prescriptions     Signed Prescriptions Disp Refills    lisinopril (PRINIVIL, ZESTRIL) 20 mg tablet 30 Tab 0     Sig: TAKE 1 TABLET BY MOUTH ONCE DAILY *NEED  APPT  FOR  FURTHER  REFILLS*     Authorizing Provider: Chelo Villegas     Ordering User: Pura Pillai    Per Dr. Larissa Lorenzo verbal orders

## 2020-02-10 DIAGNOSIS — R06.02 SOB (SHORTNESS OF BREATH): ICD-10-CM

## 2020-02-11 RX ORDER — LISINOPRIL 20 MG/1
TABLET ORAL
Qty: 30 TAB | Refills: 0 | Status: SHIPPED | OUTPATIENT
Start: 2020-02-11 | End: 2021-07-19 | Stop reason: SDUPTHER

## 2020-02-22 ENCOUNTER — APPOINTMENT (OUTPATIENT)
Dept: GENERAL RADIOLOGY | Age: 55
End: 2020-02-22
Attending: NURSE PRACTITIONER
Payer: MEDICARE

## 2020-02-22 ENCOUNTER — HOSPITAL ENCOUNTER (EMERGENCY)
Age: 55
Discharge: HOME | End: 2020-02-22
Attending: EMERGENCY MEDICINE | Admitting: EMERGENCY MEDICINE
Payer: MEDICARE

## 2020-02-22 VITALS
HEART RATE: 80 BPM | SYSTOLIC BLOOD PRESSURE: 195 MMHG | RESPIRATION RATE: 16 BRPM | TEMPERATURE: 98.4 F | DIASTOLIC BLOOD PRESSURE: 96 MMHG | OXYGEN SATURATION: 100 %

## 2020-02-22 DIAGNOSIS — J06.9 VIRAL URI WITH COUGH: ICD-10-CM

## 2020-02-22 DIAGNOSIS — H65.01 NON-RECURRENT ACUTE SEROUS OTITIS MEDIA OF RIGHT EAR: Primary | ICD-10-CM

## 2020-02-22 PROCEDURE — 99282 EMERGENCY DEPT VISIT SF MDM: CPT

## 2020-02-22 PROCEDURE — 71046 X-RAY EXAM CHEST 2 VIEWS: CPT

## 2020-02-22 RX ORDER — MUPIROCIN 20 MG/G
OINTMENT TOPICAL 2 TIMES DAILY
Qty: 1 G | Refills: 1 | Status: SHIPPED | OUTPATIENT
Start: 2020-02-22 | End: 2020-12-30 | Stop reason: ALTCHOICE

## 2020-02-22 RX ORDER — AMOXICILLIN AND CLAVULANATE POTASSIUM 875; 125 MG/1; MG/1
1 TABLET, FILM COATED ORAL 2 TIMES DAILY
Qty: 14 TAB | Refills: 0 | Status: SHIPPED | OUTPATIENT
Start: 2020-02-22 | End: 2020-12-30 | Stop reason: ALTCHOICE

## 2020-02-22 NOTE — LETTER
NOTIFICATION RETURN TO WORK / SCHOOL 
 
2/22/2020 8:51 PM 
 
Mr. Collette Mention [address] [place] [address] [phone] To Whom It May Concern: 
 
Collette Mention is currently under the care of [place] [id number], [facility] DEP. He will return to work/school on: 2/25/2020 If there are questions or concerns please have the patient contact our office. Sincerely, Jose Luis Diaz NP

## 2020-02-22 NOTE — ED PROVIDER NOTES
48 y.o. male with past medical history significant for HTN, DM, and HFrEF (25%) s/p AICD who presents from home with right ear pain and cough. Pt c/o  productive cough for about a week and a half that is worse at night when lying down with associated pain in right ear, rhinorrhea, headache, cold sore to right mouth and sore throat. Denies fevers, chills. Pt states that he has not been taking any medication for his symptoms. No ill contacts. Pt denies chest pain, leg swelling, fever, dizziness, SOB, or noticed change in weight. Pt states that he takes Coreg 25mg BID. His BP is elevated today because he did not take is morning coreg. There are no other acute medical concerns at this time. Social hx: Denies tobacco use or EtOH use.   PCP: Siomara Arreola MD    Note written by John Abdi, as dictated by Arabella Flores NP 6:48 PM             Past Medical History:   Diagnosis Date    [facility]     Heart failure ([facility]     CHF, cardiomyopathy    Hypertension     ICD (implantable cardioverter-defibrillator) in place     left upper chest       Past Surgical History:   Procedure Laterality Date    HX HEART CATHETERIZATION  2/2015    x1    HX IMPLANTABLE CARDIOVERTER DEFIBRILLATOR  2/16/2015         Family History:   Problem Relation Age of Onset    Hypertension Father     Diabetes Father     Diabetes Mother     Diabetes Brother     Hypertension Brother        Social History     Socioeconomic History    Marital status:      Spouse name: Not on file    Number of children: Not on file    Years of education: Not on file    Highest education level: Not on file   Occupational History    Not on file   Social Needs    Financial resource strain: Not on file    Food insecurity:     Worry: Not on file     Inability: Not on file    Transportation needs:     Medical: Not on file     Non-medical: Not on file   Tobacco Use    Smoking status: Never Smoker    Smokeless tobacco: Never Used Substance and Sexual Activity    Alcohol use: No    Drug use: No    Sexual activity: Not on file   Lifestyle    Physical activity:     Days per week: Not on file     Minutes per session: Not on file    Stress: Not on file   Relationships    Social connections:     Talks on phone: Not on file     Gets together: Not on file     Attends Jainism service: Not on file     Active member of club or organization: Not on file     Attends meetings of clubs or organizations: Not on file     Relationship status: Not on file    Intimate partner violence:     Fear of current or ex partner: Not on file     Emotionally abused: Not on file     Physically abused: Not on file     Forced sexual activity: Not on file   Other Topics Concern    Not on file   Social History Narrative    Not on file         ALLERGIES: Patient has no known allergies. Review of Systems   Constitutional: Negative for chills, diaphoresis, fever and unexpected weight change. HENT: Positive for ear pain, rhinorrhea and sore throat. Negative for facial swelling and trouble swallowing. Eyes: Negative for visual disturbance. Respiratory: Positive for cough. Negative for shortness of breath and wheezing. Cardiovascular: Negative for chest pain and leg swelling. Gastrointestinal: Negative for abdominal pain, diarrhea, nausea and vomiting. Genitourinary: Negative for dysuria. Musculoskeletal: Positive for neck pain. Negative for joint swelling and myalgias. Skin: Negative. Negative for rash. Neurological: Positive for headaches. Hematological: Negative for adenopathy. Psychiatric/Behavioral: Negative for suicidal ideas. All other systems reviewed and are negative. Vitals:    02/22/20 1823   BP: (!) 195/96   Pulse: 80   Resp: 16   Temp: 98.4 °F (36.9 °C)   SpO2: 100%            Physical Exam  Vitals signs and nursing note reviewed. Constitutional:       General: He is not in acute distress.      Appearance: He is well-developed. HENT:      Head: Normocephalic and atraumatic. Right Ear: Tenderness present. No drainage. No mastoid tenderness. Tympanic membrane is erythematous and bulging. Left Ear: There is impacted cerumen. Ears:      Comments: Right post auricular lymph tenderness. And inferior to right ear. Tragus ttp. NO canal swelling. Mouth/Throat:      Mouth: Mucous membranes are moist.      Pharynx: Oropharynx is clear. Uvula midline. No pharyngeal swelling, oropharyngeal exudate, posterior oropharyngeal erythema or uvula swelling. Comments:  Crusting on right chin  Eyes:      Conjunctiva/sclera: Conjunctivae normal.   Neck:      Musculoskeletal: Full passive range of motion without pain, normal range of motion and neck supple. Comments: Right posterior cervical lymph node tenderness. NO swelling. Cardiovascular:      Rate and Rhythm: Normal rate and regular rhythm. Heart sounds: Normal heart sounds. Pulmonary:      Effort: Pulmonary effort is normal. No respiratory distress. Breath sounds: Normal breath sounds. Abdominal:      Palpations: Abdomen is soft. Tenderness: There is no abdominal tenderness. There is no guarding. Musculoskeletal: Normal range of motion. Skin:     General: Skin is warm and dry. Neurological:      Mental Status: He is alert and oriented to person, place, and time. Motor: No abnormal muscle tone. Note written by John Jesus, as dictated by Nelson Maher NP 6:56 PM    MDM  Number of Diagnoses or Management Options  Non-recurrent acute serous otitis media of right ear:   Viral URI with cough:   Diagnosis management comments: Patient is a 59-year-old male with a past medical history of hypertension, diabetes and heart failure who is reportedly medication compliant who presents today with a significant symptoms of right ear pain and a cough for the past week and a half. Pain is worse in the right ear.   Has taken no medication for this. On examination he has an erythematous tympanic membrane. Difficult to examine fully given partial obscured by cerumen. He has pain to the right tragus and some postauricular pain. No mastoid tenderness. No hearing difficulty. Endorses sore throat the examination shows no sign of infection or inflammation. Complaining of a cough that is worse at nighttime. His lungs are clear with no wheezing. Chest x-ray shows no effusions or pneumonia. Oxygen saturation is 100%. Symptoms are most likely viral in nature. However right ear examination is concerning for infectious process. We will start him on Augmentin. Recommend Tylenol for pain. He can do Robitussin with codeine at nighttime only for his cough given his history of hypertension. Blood pressure discussed with the patient. He will take his medications when he gets home which is when they are due. No concern for fluid overload today. Discussed all the test results with the patient. Should his symptoms not improve after a couple of days on his antibiotics, he will follow-up with his primary care doctor. Should his symptoms worsen in any way, he will return back to the emergency department       Amount and/or Complexity of Data Reviewed  Tests in the radiology section of CPT®: ordered and reviewed           Procedures  8:43 PM  Patient's results have been reviewed with them. Patient and/or family have verbally conveyed their understanding and agreement of the patient's signs, symptoms, diagnosis, treatment and prognosis and additionally agree to follow up as recommended or return to the Emergency Room should their condition change prior to follow-up. Discharge instructions have also been provided to the patient with some educational information regarding their diagnosis as well a list of reasons why they would want to return to the ER prior to their follow-up appointment should their condition change.

## 2020-02-22 NOTE — ED TRIAGE NOTES
Triage note: Pt arrives with c/o right earache, persistent productive cough, and chest congestion for a few days

## 2020-02-23 NOTE — DISCHARGE INSTRUCTIONS
Patient Education        Middle Ear Fluid: Care Instructions  Your Care Instructions    Fluid often builds up inside the ear during a cold or allergies. Usually the fluid drains away, but sometimes a small tube in the ear, called the eustachian tube, stays blocked for months. Symptoms of fluid buildup may include:  · Popping, ringing, or a feeling of fullness or pressure in the ear. · Trouble hearing. · Balance problems and dizziness. In most cases, you can treat yourself at home. Follow-up care is a key part of your treatment and safety. Be sure to make and go to all appointments, and call your doctor if you are having problems. It's also a good idea to know your test results and keep a list of the medicines you take. How can you care for yourself at home? · In most cases, the fluid clears up within a few months without treatment. You may need more tests if the fluid does not clear up after 3 months. · If your doctor prescribed antibiotics, take them as directed. Do not stop taking them just because you feel better. You need to take the full course of antibiotics. When should you call for help? Call your doctor now or seek immediate medical care if:    · You have symptoms of infection, such as:  ? Increased pain, swelling, warmth, or redness. ? Pus draining from the area. ? A fever.    Watch closely for changes in your health, and be sure to contact your doctor if:    · You notice changes in hearing.     · You do not get better as expected. Where can you learn more? Go to http://jennifer-ysabel.info/. Enter Y022 in the search box to learn more about \"Middle Ear Fluid: Care Instructions. \"  Current as of: October 21, 2018  Content Version: 12.2  © 1060-9125 [employer]. Care instructions adapted under license by [employer] (which disclaims liability or warranty for this information).  If you have questions about a medical condition or this instruction, always ask your healthcare professional. [place] [address] any warranty or liability for your use of this information.

## 2020-03-24 DIAGNOSIS — I50.22 CHRONIC SYSTOLIC HEART FAILURE (HCC): ICD-10-CM

## 2020-03-24 DIAGNOSIS — I42.9 CARDIOMYOPATHY (HCC): ICD-10-CM

## 2020-03-24 DIAGNOSIS — I10 ESSENTIAL HYPERTENSION WITH GOAL BLOOD PRESSURE LESS THAN 130/80: ICD-10-CM

## 2020-03-24 RX ORDER — CARVEDILOL 25 MG/1
TABLET ORAL
Qty: 360 TAB | Refills: 0 | Status: SHIPPED | OUTPATIENT
Start: 2020-03-24 | End: 2020-08-03

## 2020-03-24 NOTE — TELEPHONE ENCOUNTER
Requested Prescriptions     Signed Prescriptions Disp Refills    carvediloL (COREG) 25 mg tablet 360 Tab 0     Sig: TAKE 2 TABLETS BY MOUTH TWICE DAILY WITH MEALS     Authorizing Provider: Virgina Olszewski     Ordering User: Aishwarya Best verbal orders

## 2020-08-02 DIAGNOSIS — I10 ESSENTIAL HYPERTENSION WITH GOAL BLOOD PRESSURE LESS THAN 130/80: ICD-10-CM

## 2020-08-02 DIAGNOSIS — I42.9 CARDIOMYOPATHY (HCC): ICD-10-CM

## 2020-08-02 DIAGNOSIS — I50.22 CHRONIC SYSTOLIC HEART FAILURE (HCC): ICD-10-CM

## 2020-08-03 RX ORDER — CARVEDILOL 25 MG/1
TABLET ORAL
Qty: 120 TAB | Refills: 0 | Status: SHIPPED | OUTPATIENT
Start: 2020-08-03 | End: 2020-10-07

## 2020-08-03 NOTE — TELEPHONE ENCOUNTER
Requested Prescriptions     Signed Prescriptions Disp Refills    carvediloL (COREG) 25 mg tablet 120 Tab 0     Sig: Take 2 tablets by mouth twice daily. NEED TO SCHEDULE OFFICE OR VIRTUAL FOLLOW UP FOR FURTHER REFILLS.      Authorizing Provider: Claudia Brantley     Ordering User: Marsha Sheffield    Per Dr. Victoriano Stark verbal orders

## 2020-08-31 DIAGNOSIS — I10 ESSENTIAL HYPERTENSION WITH GOAL BLOOD PRESSURE LESS THAN 130/80: Primary | ICD-10-CM

## 2020-08-31 RX ORDER — AMLODIPINE BESYLATE 5 MG/1
5 TABLET ORAL DAILY
Qty: 30 TAB | Refills: 0 | Status: SHIPPED | OUTPATIENT
Start: 2020-08-31 | End: 2020-10-05

## 2020-08-31 NOTE — TELEPHONE ENCOUNTER
Requested Prescriptions     Signed Prescriptions Disp Refills    amLODIPine (NORVASC) 5 mg tablet 30 Tab 0     Sig: Take 1 Tab by mouth daily.  PLEASE SCHEDULE VIRTUAL OR OFFICE VISIT FOLLOW UP     Authorizing Provider: Tanya Finch     Ordering User: Hannah Diamond    Per Dr. Sebas Hancock verbal order

## 2020-10-05 DIAGNOSIS — I10 ESSENTIAL HYPERTENSION WITH GOAL BLOOD PRESSURE LESS THAN 130/80: ICD-10-CM

## 2020-10-05 RX ORDER — AMLODIPINE BESYLATE 5 MG/1
TABLET ORAL
Qty: 30 TAB | Refills: 0 | Status: SHIPPED | OUTPATIENT
Start: 2020-10-05 | End: 2020-11-09

## 2020-10-05 NOTE — TELEPHONE ENCOUNTER
Requested Prescriptions     Signed Prescriptions Disp Refills    amLODIPine (NORVASC) 5 mg tablet 30 Tab 0     Sig: TAKE 1 TABLET BY MOUTH ONCE DAILY.  *PLEASE SCHEDULE VIRTUAL OR OFFICE VISIT FOLLOW-UP*     Authorizing Provider: Ale Lehman     Ordering User: Lawanda Pavon    Per Dr. Anoop Kilpatrick verbal order

## 2020-10-07 DIAGNOSIS — I42.9 CARDIOMYOPATHY (HCC): ICD-10-CM

## 2020-10-07 DIAGNOSIS — I50.22 CHRONIC SYSTOLIC HEART FAILURE (HCC): ICD-10-CM

## 2020-10-07 DIAGNOSIS — I10 ESSENTIAL HYPERTENSION WITH GOAL BLOOD PRESSURE LESS THAN 130/80: ICD-10-CM

## 2020-10-07 RX ORDER — CARVEDILOL 25 MG/1
TABLET ORAL
Qty: 120 TAB | Refills: 0 | Status: SHIPPED | OUTPATIENT
Start: 2020-10-07 | End: 2020-12-10

## 2020-10-07 NOTE — TELEPHONE ENCOUNTER
Requested Prescriptions     Signed Prescriptions Disp Refills    carvediloL (COREG) 25 mg tablet 120 Tab 0     Sig: TAKE 2 TABLETS BY MOUTH TWICE DAILY.  NEEDS TO SCHEDULE OFFICE OR VIRTUAL FOLLOW-UP FOR FURTHER REFILLS     Authorizing Provider: Irina Roblero     Ordering User: Michael Landers    Per Dr. Asha Mckay verbal orders

## 2020-11-08 DIAGNOSIS — I10 ESSENTIAL HYPERTENSION WITH GOAL BLOOD PRESSURE LESS THAN 130/80: ICD-10-CM

## 2020-11-09 RX ORDER — AMLODIPINE BESYLATE 5 MG/1
TABLET ORAL
Qty: 15 TAB | Refills: 0 | Status: SHIPPED | OUTPATIENT
Start: 2020-11-09 | End: 2020-11-30

## 2020-11-09 NOTE — TELEPHONE ENCOUNTER
Requested Prescriptions     Signed Prescriptions Disp Refills    amLODIPine (NORVASC) 5 mg tablet 15 Tab 0     Sig: TAKE 1 TABLET BY MOUTH ONCE DAILY.  *PLEASE SCHEDULE VIRTUAL OR OFFICE VISIT FOLLOW-UP*     Authorizing Provider: Ruth Ann Peters     Ordering User: Iveth Patton    Per Dr. Meseret Womack verbal order     NEEDS VIRTUAL OR OFFICE VISIT F/U FOR FURTHER REFILLS

## 2020-11-27 DIAGNOSIS — R06.02 SOB (SHORTNESS OF BREATH): ICD-10-CM

## 2020-11-27 DIAGNOSIS — I50.22 CHRONIC SYSTOLIC HEART FAILURE (HCC): ICD-10-CM

## 2020-11-27 DIAGNOSIS — I10 ESSENTIAL HYPERTENSION WITH GOAL BLOOD PRESSURE LESS THAN 130/80: Primary | ICD-10-CM

## 2020-11-27 DIAGNOSIS — I10 ESSENTIAL HYPERTENSION WITH GOAL BLOOD PRESSURE LESS THAN 130/80: ICD-10-CM

## 2020-11-27 RX ORDER — BUMETANIDE 1 MG/1
1 TABLET ORAL DAILY
Qty: 30 TAB | Refills: 0 | Status: SHIPPED | OUTPATIENT
Start: 2020-11-27 | End: 2020-12-28

## 2020-11-27 NOTE — TELEPHONE ENCOUNTER
Requested Prescriptions     Signed Prescriptions Disp Refills    bumetanide (BUMEX) 1 mg tablet 30 Tab 0     Sig: Take 1 Tab by mouth daily.      Authorizing Provider: Jennifer Armendariz     Ordering User: Joselin Cortez    Per Dr. Precious Manzanares verbal order

## 2020-11-30 RX ORDER — AMLODIPINE BESYLATE 5 MG/1
5 TABLET ORAL DAILY
Qty: 30 TAB | Refills: 0 | Status: SHIPPED | OUTPATIENT
Start: 2020-11-30 | End: 2021-02-17

## 2020-12-09 DIAGNOSIS — I10 ESSENTIAL HYPERTENSION WITH GOAL BLOOD PRESSURE LESS THAN 130/80: ICD-10-CM

## 2020-12-09 DIAGNOSIS — I50.22 CHRONIC SYSTOLIC HEART FAILURE (HCC): ICD-10-CM

## 2020-12-09 DIAGNOSIS — I42.9 CARDIOMYOPATHY (HCC): ICD-10-CM

## 2020-12-10 RX ORDER — CARVEDILOL 25 MG/1
TABLET ORAL
Qty: 120 TAB | Refills: 0 | Status: SHIPPED | OUTPATIENT
Start: 2020-12-10 | End: 2020-12-30 | Stop reason: SDUPTHER

## 2020-12-10 NOTE — TELEPHONE ENCOUNTER
Requested Prescriptions     Signed Prescriptions Disp Refills    carvediloL (COREG) 25 mg tablet 120 Tab 0     Sig: TAKE 2 TABLETS BY MOUTH TWICE DAILY .  APPOINTMENT REQUIRED FOR FUTURE REFILLS     Authorizing Provider: Tracie Martínez     Ordering User: Ilan Joshi    Per Dr. Taylor Justin verbal orders     Future Appointments   Date Time Provider Chelsy Cho   12/30/2020 10:20 AM MD BHARTI Whatley AMB

## 2020-12-26 DIAGNOSIS — R06.02 SOB (SHORTNESS OF BREATH): ICD-10-CM

## 2020-12-26 DIAGNOSIS — I50.22 CHRONIC SYSTOLIC HEART FAILURE (HCC): ICD-10-CM

## 2020-12-26 DIAGNOSIS — I10 ESSENTIAL HYPERTENSION WITH GOAL BLOOD PRESSURE LESS THAN 130/80: ICD-10-CM

## 2020-12-28 RX ORDER — BUMETANIDE 1 MG/1
TABLET ORAL
Qty: 30 TAB | Refills: 0 | Status: SHIPPED | OUTPATIENT
Start: 2020-12-28 | End: 2020-12-30 | Stop reason: SDUPTHER

## 2020-12-28 NOTE — TELEPHONE ENCOUNTER
Requested Prescriptions     Signed Prescriptions Disp Refills    bumetanide (BUMEX) 1 mg tablet 30 Tab 0     Sig: Take 1 tablet by mouth once daily     Authorizing Provider: Sasha Hurtado     Ordering User: Roselia Moreno    Per Dr. Lady Gil verbal orders     Future Appointments   Date Time Provider Chelsy Cho   12/30/2020 10:20 AM MD BHARTI Douglas AMB

## 2020-12-30 ENCOUNTER — OFFICE VISIT (OUTPATIENT)
Dept: CARDIOLOGY CLINIC | Age: 55
End: 2020-12-30
Payer: MEDICARE

## 2020-12-30 VITALS
DIASTOLIC BLOOD PRESSURE: 100 MMHG | RESPIRATION RATE: 18 BRPM | SYSTOLIC BLOOD PRESSURE: 150 MMHG | HEART RATE: 74 BPM | BODY MASS INDEX: 33.34 KG/M2 | WEIGHT: 220 LBS | HEIGHT: 68 IN | OXYGEN SATURATION: 98 %

## 2020-12-30 DIAGNOSIS — I10 ESSENTIAL HYPERTENSION WITH GOAL BLOOD PRESSURE LESS THAN 130/80: ICD-10-CM

## 2020-12-30 DIAGNOSIS — I42.0 DILATED CARDIOMYOPATHY (HCC): Primary | ICD-10-CM

## 2020-12-30 DIAGNOSIS — I10 ESSENTIAL HYPERTENSION: ICD-10-CM

## 2020-12-30 DIAGNOSIS — I50.22 CHRONIC SYSTOLIC HEART FAILURE (HCC): ICD-10-CM

## 2020-12-30 DIAGNOSIS — N18.32 STAGE 3B CHRONIC KIDNEY DISEASE (HCC): ICD-10-CM

## 2020-12-30 DIAGNOSIS — I42.9 CARDIOMYOPATHY (HCC): ICD-10-CM

## 2020-12-30 DIAGNOSIS — R06.02 SOB (SHORTNESS OF BREATH): ICD-10-CM

## 2020-12-30 PROCEDURE — G0463 HOSPITAL OUTPT CLINIC VISIT: HCPCS | Performed by: SPECIALIST

## 2020-12-30 PROCEDURE — G8755 DIAS BP > OR = 90: HCPCS | Performed by: SPECIALIST

## 2020-12-30 PROCEDURE — 99214 OFFICE O/P EST MOD 30 MIN: CPT | Performed by: SPECIALIST

## 2020-12-30 PROCEDURE — 3017F COLORECTAL CA SCREEN DOC REV: CPT | Performed by: SPECIALIST

## 2020-12-30 PROCEDURE — G8417 CALC BMI ABV UP PARAM F/U: HCPCS | Performed by: SPECIALIST

## 2020-12-30 PROCEDURE — G8510 SCR DEP NEG, NO PLAN REQD: HCPCS | Performed by: SPECIALIST

## 2020-12-30 PROCEDURE — G8427 DOCREV CUR MEDS BY ELIG CLIN: HCPCS | Performed by: SPECIALIST

## 2020-12-30 PROCEDURE — G8753 SYS BP > OR = 140: HCPCS | Performed by: SPECIALIST

## 2020-12-30 RX ORDER — CARVEDILOL 25 MG/1
50 TABLET ORAL 2 TIMES DAILY
Qty: 60 TAB | Refills: 5 | Status: SHIPPED | OUTPATIENT
Start: 2020-12-30 | End: 2021-07-19 | Stop reason: SDUPTHER

## 2020-12-30 RX ORDER — BUMETANIDE 1 MG/1
TABLET ORAL
Qty: 30 TAB | Refills: 5 | Status: SHIPPED | OUTPATIENT
Start: 2020-12-30 | End: 2021-07-19 | Stop reason: SDUPTHER

## 2020-12-30 NOTE — PROGRESS NOTES
HISTORY OF PRESENT ILLNESS  Julieta Flynn is a 50 y.o. male     SUMMARY:   Problem List  Date Reviewed: 12/30/2020          Codes Class Noted    Severe obesity (BMI 35.0-39. 9) ICD-10-CM: E66.01  ICD-9-CM: 278.01  6/15/2018        Chronic systolic [facility] failure ([facility] ICD-10-CM: I50.22  ICD-9-CM: 428.22  2/8/2015        Cardiomyopathy ([facility] ICD-10-CM: I42.9  ICD-9-CM: 425.4  9/26/2014    Overview Addendum 11/9/2017  7:20 AM by Stas Patiño MD     2002 (approx) diagnosed [place] by echo, neg stress test and started on usual meds. 2007 (approx) fup echo lvef 25%  2/15 cardiac cath, no sig cad  2/15 single lead AICD implant  2/17 lvh, otherwise normal echo             Hypertension ICD-10-CM: I10  ICD-9-CM: 401.9  9/26/2014        Diabetes mellitus, type II, insulin dependent (HCC) (Chronic) ICD-10-CM: E11.9, Z79.4  ICD-9-CM: 250.00, V58.67  9/26/2014              Current Outpatient Medications on File Prior to Visit   Medication Sig    OTHER Patient reports taking statin prescribed by endocrinologist    bumetanide (BUMEX) 1 mg tablet Take 1 tablet by mouth once daily    carvediloL (COREG) 25 mg tablet TAKE 2 TABLETS BY MOUTH TWICE DAILY . APPOINTMENT REQUIRED FOR FUTURE REFILLS (Patient taking differently: 25 mg. Patient taking two tablets once daily since 03/2020    Original dose:  (TAKE 2 TABLETS BY MOUTH TWICE DAILY . APPOINTMENT REQUIRED FOR FUTURE REFILLS))    amLODIPine (NORVASC) 5 mg tablet Take 1 Tab by mouth daily.  lisinopril (PRINIVIL, ZESTRIL) 20 mg tablet TAKE 1 TABLET BY MOUTH ONCE DAILY . APPOINTMENT REQUIRED FOR FUTURE REFILLS    insulin regular (NOVOLIN R) 100 unit/mL injection 20 Units by SubCUTAneous route two (2) times a day.     Insulin Needles, Disposable, 31 ndle For 75/25 insulin administration    Blood-Glucose Meter monitoring kit 1    glucose blood VI test strips (BLOOD GLUCOSE TEST) strip 1    multivitamin with iron (DAILY MULTI-VITAMINS/IRON) tablet Take 1 tablet by mouth daily. No current facility-administered medications on file prior to visit. CARDIOLOGY STUDIES TO DATE:  9/14 echo dilated lv with lvef 15%, mod lae, mild to mod mr, mild pul regurg    2/15 echo lvef 50% lae  2/15 echo lvef 65%, no sig valve abnormalities  2/17 lvh, otherwise normal echo  08/16/19 dilated lv with mod lvh and lvef 25%. Lae, mild mr    Chief Complaint   Patient presents with    Follow-up     HPI :  He has not been seen since August 2019. At that time his lisinopril dose was reduced and he was started on 5 mg of amlodipine. In March April he had shingles and blood pressure was extremely low for more than a month so he cut off all his medications and then a few months ago started back but he is only taking his carvedilol once a day. He had a virtual visit with his nephrologist in September and was told his kidney function was stable. His hemoglobin A1c was over 14 a couple of months ago so he is now seeing an endocrinologist.  He is back in the gym some but has not lost the weight that he is gained. Fortunately he is not having any cardiac symptoms. His blood pressure is elevated today. CARDIAC ROS:   negative for chest pain, dyspnea, palpitations, syncope, orthopnea, paroxysmal nocturnal dyspnea, exertional chest pressure/discomfort, claudication, lower extremity edema    Family History   Problem Relation Age of Onset    Hypertension Father     Diabetes Father     Diabetes Mother     Diabetes Brother     Hypertension Brother        Past Medical History:   Diagnosis Date    Diabetes ([facility]     Heart failure ([facility]     CHF, cardiomyopathy    Hypertension     ICD (implantable cardioverter-defibrillator) in place     left upper chest       GENERAL ROS:  A comprehensive review of systems was negative except for that written in the HPI.     Visit Vitals  BP (!) 150/100 (BP 1 Location: Left arm, BP Patient Position: Sitting)   Pulse 74   Resp 18   Ht 5' 8\" (1.727 m)   Wt 220 lb (99.8 kg)   SpO2 98%   BMI 33.45 kg/m²       Wt Readings from Last 3 Encounters:   12/30/20 220 lb (99.8 kg)   08/21/19 219 lb 6.4 oz (99.5 kg)   08/16/19 219 lb (99.3 kg)            BP Readings from Last 3 Encounters:   12/30/20 (!) 150/100   02/22/20 (!) 195/96   08/21/19 (!) 146/96       PHYSICAL EXAM  General appearance: alert, cooperative, no distress, appears stated age  Neurologic: Alert and oriented X 3  Neck: supple, symmetrical, trachea midline, no adenopathy, no carotid bruit and no JVD  Lungs: clear to auscultation bilaterally  Heart: regular rate and rhythm, S1, S2 normal, no murmur, click, rub or gallop  Extremities: extremities normal, atraumatic, no cyanosis or edema    Lab Results   Component Value Date/Time    Cholesterol, total 152 02/11/2015 01:30 PM    HDL Cholesterol 34 02/11/2015 01:30 PM    LDL, calculated 100.8 (H) 02/11/2015 01:30 PM    Triglyceride 86 02/11/2015 01:30 PM    CHOL/HDL Ratio 4.5 02/11/2015 01:30 PM     ASSESSMENT :      He needs better blood pressure control. Working to start by increasing his carvedilol up to 50 mg twice a day. He will monitor at home and if it remains elevated will double his amlodipine. We see him in follow-up he needs a repeat echo to see her  terms of LV function. We will get a copy of his most recent lab work from his nephrologist.  current treatment plan is effective, no change in therapy  lab results and schedule of future lab studies reviewed with patient  reviewed diet, exercise and weight control    Encounter Diagnoses   Name Primary?  Dilated cardiomyopathy ([facility] Yes    Essential hypertension     Stage 3b chronic kidney disease      Orders Placed This Encounter    OTHER       Follow-up and Dispositions    · Return in about 6 months (around 6/30/2021). Viridiana Cross MD  12/30/2020  Please note that this dictation was completed with [employer], the [employer] voice recognition software.   Quite often unanticipated grammatical, syntax, homophones, and other interpretive errors are inadvertently transcribed by the computer software. Please disregard these errors. Please excuse any errors that have escaped final proofreading. Thank you.

## 2021-02-17 ENCOUNTER — OFFICE VISIT (OUTPATIENT)
Dept: CARDIOLOGY CLINIC | Age: 56
End: 2021-02-17
Payer: MEDICARE

## 2021-02-17 ENCOUNTER — ANCILLARY PROCEDURE (OUTPATIENT)
Dept: CARDIOLOGY CLINIC | Age: 56
End: 2021-02-17
Payer: MEDICARE

## 2021-02-17 VITALS
BODY MASS INDEX: 33.8 KG/M2 | WEIGHT: 223 LBS | SYSTOLIC BLOOD PRESSURE: 140 MMHG | HEIGHT: 68 IN | DIASTOLIC BLOOD PRESSURE: 90 MMHG

## 2021-02-17 VITALS
HEART RATE: 72 BPM | HEIGHT: 68 IN | WEIGHT: 223 LBS | SYSTOLIC BLOOD PRESSURE: 140 MMHG | RESPIRATION RATE: 14 BRPM | OXYGEN SATURATION: 98 % | DIASTOLIC BLOOD PRESSURE: 90 MMHG | BODY MASS INDEX: 33.8 KG/M2

## 2021-02-17 DIAGNOSIS — I10 ESSENTIAL HYPERTENSION WITH GOAL BLOOD PRESSURE LESS THAN 130/80: ICD-10-CM

## 2021-02-17 DIAGNOSIS — I42.0 DILATED CARDIOMYOPATHY (HCC): Primary | ICD-10-CM

## 2021-02-17 DIAGNOSIS — I42.0 DILATED CARDIOMYOPATHY (HCC): ICD-10-CM

## 2021-02-17 DIAGNOSIS — I10 ESSENTIAL HYPERTENSION: ICD-10-CM

## 2021-02-17 DIAGNOSIS — E11.9 DIABETES MELLITUS, TYPE II, INSULIN DEPENDENT (HCC): ICD-10-CM

## 2021-02-17 DIAGNOSIS — N18.32 STAGE 3B CHRONIC KIDNEY DISEASE (HCC): ICD-10-CM

## 2021-02-17 DIAGNOSIS — Z79.4 DIABETES MELLITUS, TYPE II, INSULIN DEPENDENT (HCC): ICD-10-CM

## 2021-02-17 LAB
ECHO AO ASC DIAM: 2.92 CM
ECHO AO ROOT DIAM: 3.42 CM
ECHO AV AREA PEAK VELOCITY: 2.21 CM2
ECHO AV AREA VTI: 2.48 CM2
ECHO AV AREA/BSA PEAK VELOCITY: 1 CM2/M2
ECHO AV AREA/BSA VTI: 1.2 CM2/M2
ECHO AV MEAN GRADIENT: 3.05 MMHG
ECHO AV PEAK GRADIENT: 5.97 MMHG
ECHO AV PEAK VELOCITY: 122.2 CM/S
ECHO AV VTI: 23.5 CM
ECHO LA AREA 4C: 24.55 CM2
ECHO LA MAJOR AXIS: 4.8 CM
ECHO LA MINOR AXIS: 2.24 CM
ECHO LA VOL 2C: 73 ML (ref 18–58)
ECHO LA VOL 4C: 81 ML (ref 18–58)
ECHO LA VOL BP: 85.39 ML (ref 18–58)
ECHO LA VOL/BSA BIPLANE: 39.89 ML/M2 (ref 16–28)
ECHO LA VOLUME INDEX A2C: 34.1 ML/M2 (ref 16–28)
ECHO LA VOLUME INDEX A4C: 37.84 ML/M2 (ref 16–28)
ECHO LV E' LATERAL VELOCITY: 3.98 CM/S
ECHO LV E' SEPTAL VELOCITY: 3.66 CM/S
ECHO LV EDV A2C: 161.24 ML
ECHO LV EDV A4C: 181.76 ML
ECHO LV EDV BP: 177.05 ML (ref 67–155)
ECHO LV EDV INDEX A4C: 84.9 ML/M2
ECHO LV EDV INDEX BP: 82.7 ML/M2
ECHO LV EDV NDEX A2C: 75.3 ML/M2
ECHO LV EJECTION FRACTION A2C: 30 PERCENT
ECHO LV EJECTION FRACTION A4C: 26 PERCENT
ECHO LV EJECTION FRACTION BIPLANE: 28.6 PERCENT (ref 55–100)
ECHO LV ESV A2C: 113.36 ML
ECHO LV ESV A4C: 135.03 ML
ECHO LV ESV BP: 126.35 ML (ref 22–58)
ECHO LV ESV INDEX A2C: 53 ML/M2
ECHO LV ESV INDEX A4C: 63.1 ML/M2
ECHO LV ESV INDEX BP: 59 ML/M2
ECHO LV GLOBAL LONGITUDINAL STRAIN (GLS): -7.7 PERCENT
ECHO LV INTERNAL DIMENSION DIASTOLIC: 5.95 CM (ref 4.2–5.9)
ECHO LV INTERNAL DIMENSION SYSTOLIC: 4.98 CM
ECHO LV IVSD: 1.41 CM (ref 0.6–1)
ECHO LV MASS 2D: 392.3 G (ref 88–224)
ECHO LV MASS INDEX 2D: 183.3 G/M2 (ref 49–115)
ECHO LV POSTERIOR WALL DIASTOLIC: 1.44 CM (ref 0.6–1)
ECHO LVOT DIAM: 2.2 CM
ECHO LVOT PEAK GRADIENT: 2.01 MMHG
ECHO LVOT PEAK VELOCITY: 70.85 CM/S
ECHO LVOT SV: 58.2 ML
ECHO LVOT VTI: 15.26 CM
ECHO MV A VELOCITY: 83.39 CM/S
ECHO MV AREA PHT: 4.25 CM2
ECHO MV E DECELERATION TIME (DT): 178.55 MS
ECHO MV E VELOCITY: 82.39 CM/S
ECHO MV E/A RATIO: 0.99
ECHO MV E/E' LATERAL: 20.7
ECHO MV E/E' RATIO (AVERAGED): 21.61
ECHO MV E/E' SEPTAL: 22.51
ECHO MV PRESSURE HALF TIME (PHT): 51.78 MS
ECHO RV TAPSE: 2.49 CM (ref 1.5–2)
GLOBAL LONGITUDINAL STRAIN 2 CHAMBER: -9.4 PERCENT
GLOBAL LONGITUDINAL STRAIN 4 CHAMBER: -6.9 PERCENT
GLOBAL LONGITUDINAL STRAIN LONG AXIS: -6.7 PERCENT
LA VOL DISK BP: 78.5 ML (ref 18–58)

## 2021-02-17 PROCEDURE — 3046F HEMOGLOBIN A1C LEVEL >9.0%: CPT | Performed by: SPECIALIST

## 2021-02-17 PROCEDURE — G8510 SCR DEP NEG, NO PLAN REQD: HCPCS | Performed by: SPECIALIST

## 2021-02-17 PROCEDURE — G0463 HOSPITAL OUTPT CLINIC VISIT: HCPCS | Performed by: SPECIALIST

## 2021-02-17 PROCEDURE — 99214 OFFICE O/P EST MOD 30 MIN: CPT | Performed by: SPECIALIST

## 2021-02-17 PROCEDURE — G8753 SYS BP > OR = 140: HCPCS | Performed by: SPECIALIST

## 2021-02-17 PROCEDURE — 93306 TTE W/DOPPLER COMPLETE: CPT | Performed by: SPECIALIST

## 2021-02-17 PROCEDURE — G8417 CALC BMI ABV UP PARAM F/U: HCPCS | Performed by: SPECIALIST

## 2021-02-17 PROCEDURE — G8755 DIAS BP > OR = 90: HCPCS | Performed by: SPECIALIST

## 2021-02-17 PROCEDURE — G8427 DOCREV CUR MEDS BY ELIG CLIN: HCPCS | Performed by: SPECIALIST

## 2021-02-17 PROCEDURE — 2022F DILAT RTA XM EVC RTNOPTHY: CPT | Performed by: SPECIALIST

## 2021-02-17 PROCEDURE — 3017F COLORECTAL CA SCREEN DOC REV: CPT | Performed by: SPECIALIST

## 2021-02-17 RX ORDER — AMLODIPINE BESYLATE 10 MG/1
10 TABLET ORAL DAILY
Qty: 30 TAB | Refills: 5 | Status: SHIPPED | OUTPATIENT
Start: 2021-02-17 | End: 2021-05-19 | Stop reason: SDUPTHER

## 2021-02-17 NOTE — PROGRESS NOTES
HISTORY OF PRESENT ILLNESS  Merlinda Furlough is a 50 y.o. male     SUMMARY:   Problem List  Date Reviewed: 2/17/2021          Codes Class Noted    Severe obesity (BMI 35.0-39. 9) ICD-10-CM: E66.01  ICD-9-CM: 278.01  6/15/2018        Chronic systolic heart failure ([facility] ICD-10-CM: I50.22  ICD-9-CM: 428.22  2/8/2015        Cardiomyopathy ([facility] ICD-10-CM: I42.9  ICD-9-CM: 425.4  9/26/2014    Overview Addendum 11/9/2017  7:20 AM by Violetta Campos MD     2002 (approx) diagnosed chippenham by echo, neg stress test and started on usual meds. 2007 (approx) fup echo lvef 25%  2/15 cardiac cath, no sig cad  2/15 single lead AICD implant  2/17 lvh, otherwise normal echo             Hypertension ICD-10-CM: I10  ICD-9-CM: 401.9  9/26/2014        Diabetes mellitus, type II, insulin dependent (HCC) (Chronic) ICD-10-CM: E11.9, Z79.4  ICD-9-CM: 250.00, V58.67  9/26/2014              Current Outpatient Medications on File Prior to Visit   Medication Sig    OTHER Patient reports taking statin prescribed by endocrinologist    bumetanide (BUMEX) 1 mg tablet Take 1 tablet by mouth once daily    carvediloL (COREG) 25 mg tablet Take 2 Tabs by mouth two (2) times a day.  amLODIPine (NORVASC) 5 mg tablet Take 1 Tab by mouth daily.  lisinopril (PRINIVIL, ZESTRIL) 20 mg tablet TAKE 1 TABLET BY MOUTH ONCE DAILY . APPOINTMENT REQUIRED FOR FUTURE REFILLS    insulin regular (NOVOLIN R) 100 unit/mL injection 20 Units by SubCUTAneous route two (2) times a day.  Insulin Needles, Disposable, 31 ndle For 75/25 insulin administration    Blood-Glucose Meter monitoring kit 1    glucose blood VI test strips (BLOOD GLUCOSE TEST) strip 1    multivitamin with iron (DAILY MULTI-VITAMINS/IRON) tablet Take 1 tablet by mouth daily. No current facility-administered medications on file prior to visit.         CARDIOLOGY STUDIES TO DATE:  9/14 echo dilated lv with lvef 15%, mod lae, mild to mod mr, mild pul regurg    2/15 echo lvef 50% lae  2/15 echo lvef 65%, no sig valve abnormalities  2/17 lvh, otherwise normal echo  08/16/19 dilated lv with mod lvh and lvef 25%. Lae, mild mr    Chief Complaint   Patient presents with    Follow-up     HPI :  He is doing well though his blood pressure is still elevated. When we last met we doubled his carvedilol dose. He is back in the gym and his weight has leveled out. He says his sugars have been running in the 1 40-1 50 range which is pretty good for him. CARDIAC ROS:   negative for chest pain, dyspnea, palpitations, syncope, orthopnea, paroxysmal nocturnal dyspnea, exertional chest pressure/discomfort, claudication, lower extremity edema    Family History   Problem Relation Age of Onset    Hypertension Father     Diabetes Father     Diabetes Mother     Diabetes Brother     Hypertension Brother        Past Medical History:   Diagnosis Date    Diabetes ([facility]     Heart failure ([facility]     CHF, cardiomyopathy    Hypertension     ICD (implantable cardioverter-defibrillator) in place     left upper chest       GENERAL ROS:  A comprehensive review of systems was negative except for that written in the HPI.     Visit Vitals  BP (!) 140/90 (BP 1 Location: Right arm, BP Patient Position: Sitting, BP Cuff Size: Adult)   Pulse 72   Resp 14   Ht 5' 8\" (1.727 m)   Wt 223 lb (101.2 kg)   SpO2 98%   BMI 33.91 kg/m²       Wt Readings from Last 3 Encounters:   02/17/21 223 lb (101.2 kg)   02/17/21 223 lb (101.2 kg)   12/30/20 220 lb (99.8 kg)            BP Readings from Last 3 Encounters:   02/17/21 (!) 140/90   02/17/21 (!) 140/90   12/30/20 (!) 150/100       PHYSICAL EXAM  General appearance: alert, cooperative, no distress, appears stated age  Neurologic: Alert and oriented X 3  Neck: supple, symmetrical, trachea midline, no adenopathy, no carotid bruit and no JVD  Lungs: clear to auscultation bilaterally  Heart: regular rate and rhythm, S1, S2 normal, no murmur, click, rub or gallop  Extremities: extremities normal, atraumatic, no cyanosis or edema    Lab Results   Component Value Date/Time    Cholesterol, total 152 02/11/2015 01:30 PM    HDL Cholesterol 34 02/11/2015 01:30 PM    LDL, calculated 100.8 (H) 02/11/2015 01:30 PM    Triglyceride 86 02/11/2015 01:30 PM    CHOL/HDL Ratio 4.5 02/11/2015 01:30 PM     ASSESSMENT :      He had an echocardiogram today and will follow those results up by phone. We are going to double his amlodipine dose and going forward we may need to increase his lisinopril but I want to get his most recent all lab work before we make that change since he does have kidney disease. current treatment plan is effective, no change in therapy  lab results and schedule of future lab studies reviewed with patient  reviewed diet, exercise and weight control    Encounter Diagnoses   Name Primary?  Dilated cardiomyopathy (HCC) Yes    Essential hypertension     Stage 3b chronic kidney disease     Diabetes mellitus, type II, insulin dependent ([facility]      No orders of the defined types were placed in this encounter. Follow-up and Dispositions    · Return in about 3 months (around 5/17/2021). Kraig Roger MD  2/17/2021  Please note that this dictation was completed with Catmoji, the computer voice recognition software. Quite often unanticipated grammatical, syntax, homophones, and other interpretive errors are inadvertently transcribed by the computer software. Please disregard these errors. Please excuse any errors that have escaped final proofreading. Thank you.

## 2021-02-18 NOTE — PROGRESS NOTES
Heart muscle has gotten weak again, probably because of poorly controlled bp. Stay on meds.  We will repeat echo in a few months

## 2021-02-23 ENCOUNTER — TELEPHONE (OUTPATIENT)
Dept: CARDIOLOGY CLINIC | Age: 56
End: 2021-02-23

## 2021-02-23 NOTE — TELEPHONE ENCOUNTER
----- Message from Haja Griffiths MD sent at 2/18/2021  8:12 AM EST -----  Heart muscle has gotten weak again, probably because of poorly controlled bp. Stay on meds.  We will repeat echo in a few months

## 2021-02-26 NOTE — TELEPHONE ENCOUNTER
Identifiers x 2. Informed of echo results and Dr. Elzbieta Boyle recommendations. Confirmed blood pressure medications and follow up. Verbalized understanding.       Future Appointments   Date Time Provider Chelsy Cho   5/17/2021  9:40 AM MD BHARTI Nathan AMB

## 2021-05-19 DIAGNOSIS — I10 ESSENTIAL HYPERTENSION WITH GOAL BLOOD PRESSURE LESS THAN 130/80: ICD-10-CM

## 2021-05-19 RX ORDER — AMLODIPINE BESYLATE 10 MG/1
10 TABLET ORAL DAILY
Qty: 90 TABLET | Refills: 1 | Status: SHIPPED | OUTPATIENT
Start: 2021-05-19 | End: 2021-11-24 | Stop reason: SDUPTHER

## 2021-05-19 NOTE — TELEPHONE ENCOUNTER
Requested Prescriptions     Signed Prescriptions Disp Refills    amLODIPine (NORVASC) 10 mg tablet 90 Tablet 1     Sig: Take 1 Tablet by mouth daily.      Authorizing Provider: Missy Garcia     Ordering User: Yvette Nugent    Per Dr. Christiana Barthel verbal orders

## 2021-07-19 ENCOUNTER — OFFICE VISIT (OUTPATIENT)
Dept: CARDIOLOGY CLINIC | Age: 56
End: 2021-07-19
Payer: MEDICARE

## 2021-07-19 VITALS
WEIGHT: 224 LBS | HEART RATE: 78 BPM | SYSTOLIC BLOOD PRESSURE: 150 MMHG | OXYGEN SATURATION: 99 % | BODY MASS INDEX: 33.95 KG/M2 | HEIGHT: 68 IN | DIASTOLIC BLOOD PRESSURE: 90 MMHG | RESPIRATION RATE: 18 BRPM

## 2021-07-19 DIAGNOSIS — N18.32 STAGE 3B CHRONIC KIDNEY DISEASE (HCC): ICD-10-CM

## 2021-07-19 DIAGNOSIS — I50.22 CHRONIC SYSTOLIC HEART FAILURE (HCC): ICD-10-CM

## 2021-07-19 DIAGNOSIS — R06.02 SOB (SHORTNESS OF BREATH): ICD-10-CM

## 2021-07-19 DIAGNOSIS — E11.9 DIABETES MELLITUS, TYPE II, INSULIN DEPENDENT (HCC): ICD-10-CM

## 2021-07-19 DIAGNOSIS — I10 ESSENTIAL HYPERTENSION WITH GOAL BLOOD PRESSURE LESS THAN 130/80: ICD-10-CM

## 2021-07-19 DIAGNOSIS — Z79.4 DIABETES MELLITUS, TYPE II, INSULIN DEPENDENT (HCC): ICD-10-CM

## 2021-07-19 DIAGNOSIS — I42.9 CARDIOMYOPATHY (HCC): ICD-10-CM

## 2021-07-19 DIAGNOSIS — I42.0 DILATED CARDIOMYOPATHY (HCC): Primary | ICD-10-CM

## 2021-07-19 PROCEDURE — 99214 OFFICE O/P EST MOD 30 MIN: CPT | Performed by: SPECIALIST

## 2021-07-19 PROCEDURE — G8753 SYS BP > OR = 140: HCPCS | Performed by: SPECIALIST

## 2021-07-19 PROCEDURE — 3046F HEMOGLOBIN A1C LEVEL >9.0%: CPT | Performed by: SPECIALIST

## 2021-07-19 PROCEDURE — G8417 CALC BMI ABV UP PARAM F/U: HCPCS | Performed by: SPECIALIST

## 2021-07-19 PROCEDURE — 3017F COLORECTAL CA SCREEN DOC REV: CPT | Performed by: SPECIALIST

## 2021-07-19 PROCEDURE — 2022F DILAT RTA XM EVC RTNOPTHY: CPT | Performed by: SPECIALIST

## 2021-07-19 PROCEDURE — G8755 DIAS BP > OR = 90: HCPCS | Performed by: SPECIALIST

## 2021-07-19 PROCEDURE — G0463 HOSPITAL OUTPT CLINIC VISIT: HCPCS | Performed by: SPECIALIST

## 2021-07-19 PROCEDURE — G8427 DOCREV CUR MEDS BY ELIG CLIN: HCPCS | Performed by: SPECIALIST

## 2021-07-19 PROCEDURE — G8510 SCR DEP NEG, NO PLAN REQD: HCPCS | Performed by: SPECIALIST

## 2021-07-19 RX ORDER — BUMETANIDE 1 MG/1
TABLET ORAL
Qty: 30 TABLET | Refills: 5 | Status: SHIPPED | OUTPATIENT
Start: 2021-07-19 | End: 2021-12-06

## 2021-07-19 RX ORDER — HUMAN INSULIN 100 [IU]/ML
22 INJECTION, SUSPENSION SUBCUTANEOUS
COMMUNITY
Start: 2021-06-09

## 2021-07-19 RX ORDER — LISINOPRIL 40 MG/1
40 TABLET ORAL DAILY
Qty: 30 TABLET | Refills: 5 | Status: SHIPPED | OUTPATIENT
Start: 2021-07-19 | End: 2021-12-07 | Stop reason: ALTCHOICE

## 2021-07-19 RX ORDER — SIMVASTATIN 40 MG/1
TABLET, FILM COATED ORAL
COMMUNITY
Start: 2021-06-03

## 2021-07-19 RX ORDER — CARVEDILOL 25 MG/1
50 TABLET ORAL 2 TIMES DAILY
Qty: 60 TABLET | Refills: 5 | Status: SHIPPED | OUTPATIENT
Start: 2021-07-19 | End: 2022-03-09 | Stop reason: SDUPTHER

## 2021-07-19 NOTE — PROGRESS NOTES
HISTORY OF PRESENT ILLNESS  Cortney Akers is a 49 y.o. male     SUMMARY:   Problem List  Date Reviewed: 7/19/2021        Codes Class Noted    Severe obesity (BMI 35.0-39. 9) ICD-10-CM: E66.01  ICD-9-CM: 278.01  6/15/2018        Chronic systolic heart failure ([facility] ICD-10-CM: I50.22  ICD-9-CM: 428.22  2/8/2015        Cardiomyopathy ([facility] ICD-10-CM: I42.9  ICD-9-CM: 425.4  9/26/2014    Overview Addendum 11/9/2017  7:20 AM by Alesia Lynn MD     2002 (approx) diagnosed chippenham by echo, neg stress test and started on usual meds. 2007 (approx) fup echo lvef 25%  2/15 cardiac cath, no sig cad  2/15 single lead AICD implant  2/17 lvh, otherwise normal echo             Hypertension ICD-10-CM: I10  ICD-9-CM: 401.9  9/26/2014        Diabetes mellitus, type II, insulin dependent (HCC) (Chronic) ICD-10-CM: E11.9, Z79.4  ICD-9-CM: 250.00, V58.67  9/26/2014              Current Outpatient Medications on File Prior to Visit   Medication Sig    NovoLIN 70/30 U-100 Insulin 100 unit/mL (70-30) injection INJECT UP TO 32 UNITS SUBCUTANEOUSLY WITH BREAKFAST AND WITH SUPPER FOLLOWING SLIDING SCALE AS DIRECTED BY DR Omar Oscar    simvastatin (ZOCOR) 40 mg tablet TAKE 1 TABLET BY MOUTH ONCE DAILY AT BEDTIME    amLODIPine (NORVASC) 10 mg tablet Take 1 Tablet by mouth daily.  bumetanide (BUMEX) 1 mg tablet Take 1 tablet by mouth once daily    carvediloL (COREG) 25 mg tablet Take 2 Tabs by mouth two (2) times a day.  lisinopril (PRINIVIL, ZESTRIL) 20 mg tablet TAKE 1 TABLET BY MOUTH ONCE DAILY . APPOINTMENT REQUIRED FOR FUTURE REFILLS    Insulin Needles, Disposable, 31 ndle For 75/25 insulin administration    Blood-Glucose Meter monitoring kit 1    glucose blood VI test strips (BLOOD GLUCOSE TEST) strip 1    multivitamin with iron (DAILY MULTI-VITAMINS/IRON) tablet Take 1 tablet by mouth daily. No current facility-administered medications on file prior to visit.        CARDIOLOGY STUDIES TO DATE:  9/14 echo dilated lv with lvef 15%, mod lae, mild to mod mr, mild pul regurg     2/15 echo lvef 50% lae   2/15 echo lvef 65%, no sig valve abnormalities   2/17 lvh, otherwise normal echo   08/16/19 dilated lv with mod lvh and lvef 25%. Lae, mild mr  2/21 echo lvef 29% lvh, lae, tr mr    Chief Complaint   Patient presents with    Follow-up     HPI :  He is doing well though his blood pressure is up today. 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 He did take all his blood pressure medicine late today which may be part of the problem. He sees the kidney doctors in August as well. He said he is not sure what his last A1c was but it is coming down dramatically over the last 6 months  CARDIAC ROS:   negative for chest pain, dyspnea, palpitations, syncope, orthopnea, paroxysmal nocturnal dyspnea, exertional chest pressure/discomfort, claudication, lower extremity edema    Family History   Problem Relation Age of Onset    Hypertension Father     Diabetes Father     Diabetes Mother     Diabetes Brother     Hypertension Brother        Past Medical History:   Diagnosis Date    Diabetes ([address]     Heart failure ([address]     CHF, cardiomyopathy    Hypertension     ICD (implantable cardioverter-defibrillator) in place     left upper chest       GENERAL ROS:  A comprehensive review of systems was negative except for that written in the HPI.     Visit Vitals  BP (!) 150/90 (BP 1 Location: Left upper arm, BP Patient Position: Sitting, BP Cuff Size: Adult)   Pulse 78   Resp 18   Ht 5' 8\" (1.727 m)   Wt 224 lb (101.6 kg)   SpO2 99%   BMI 34.06 kg/m²       Wt Readings from Last 3 Encounters:   07/19/21 224 lb (101.6 kg)   02/17/21 223 lb (101.2 kg)   02/17/21 223 lb (101.2 kg)            BP Readings from Last 3 Encounters:   07/19/21 (!) 150/90   02/17/21 (!) 140/90   02/17/21 (!) 140/90       PHYSICAL EXAM  General appearance: alert, cooperative, no distress, appears stated age  Neurologic: Alert and oriented X 3  Neck: supple, symmetrical, trachea midline, no adenopathy, no carotid bruit and no JVD  Lungs: clear to auscultation bilaterally  Heart: regular rate and rhythm, S1, S2 normal, no murmur, click, rub or gallop  Extremities: extremities normal, atraumatic, no cyanosis or edema    Lab Results   Component Value Date/Time    Cholesterol, total 152 02/11/2015 01:30 PM    HDL Cholesterol 34 02/11/2015 01:30 PM    LDL, calculated 100.8 (H) 02/11/2015 01:30 PM    Triglyceride 86 02/11/2015 01:30 PM    CHOL/HDL Ratio 4.5 02/11/2015 01:30 PM     ASSESSMENT :      He is stable and probably class I right now. He still needs to work on his weight and were going to double his lisinopril to 40 mg a day. We will get a copy of his most recent blood work and I again mention the importance of salt restriction. We will repeat his echo when he returns for follow-up. current treatment plan is effective, no change in therapy  lab results and schedule of future lab studies reviewed with patient  reviewed diet, exercise and weight control    Encounter Diagnoses   Name Primary?  Dilated cardiomyopathy ([address] Yes    Essential hypertension with goal blood pressure less than 130/80     Stage 3b chronic kidney disease (HCC)     Diabetes mellitus, type II, insulin dependent (HCC)      Orders Placed This Encounter    NovoLIN 70/30 U-100 Insulin 100 unit/mL (70-30) injection    simvastatin (ZOCOR) 40 mg tablet       Follow-up and Dispositions    · Return in about 3 months (around 10/19/2021). Kit Vasquez MD  7/19/2021  Please note that this dictation was completed with Picfair, the [employer] voice recognition software. Quite often unanticipated grammatical, syntax, homophones, and other interpretive errors are inadvertently transcribed by the computer software. Please disregard these errors. Please excuse any errors that have escaped final proofreading. Thank you.

## 2021-08-24 ENCOUNTER — OFFICE VISIT (OUTPATIENT)
Dept: URGENT CARE | Age: 56
End: 2021-08-24
Payer: MEDICARE

## 2021-08-24 VITALS — OXYGEN SATURATION: 98 % | TEMPERATURE: 98.1 F | HEART RATE: 78 BPM | RESPIRATION RATE: 16 BRPM

## 2021-08-24 DIAGNOSIS — Z20.822 EXPOSURE TO COVID-19 VIRUS: Primary | ICD-10-CM

## 2021-08-24 DIAGNOSIS — Z20.822 SUSPECTED COVID-19 VIRUS INFECTION: ICD-10-CM

## 2021-08-24 LAB — SARS-COV-2 POC: NEGATIVE

## 2021-08-24 PROCEDURE — 87426 SARSCOV CORONAVIRUS AG IA: CPT | Performed by: FAMILY MEDICINE

## 2021-08-24 PROCEDURE — 3017F COLORECTAL CA SCREEN DOC REV: CPT | Performed by: FAMILY MEDICINE

## 2021-08-24 PROCEDURE — G8432 DEP SCR NOT DOC, RNG: HCPCS | Performed by: FAMILY MEDICINE

## 2021-08-24 PROCEDURE — G8417 CALC BMI ABV UP PARAM F/U: HCPCS | Performed by: FAMILY MEDICINE

## 2021-08-24 PROCEDURE — 99212 OFFICE O/P EST SF 10 MIN: CPT | Performed by: FAMILY MEDICINE

## 2021-08-24 PROCEDURE — G8427 DOCREV CUR MEDS BY ELIG CLIN: HCPCS | Performed by: FAMILY MEDICINE

## 2021-08-24 PROCEDURE — G8756 NO BP MEASURE DOC: HCPCS | Performed by: FAMILY MEDICINE

## 2021-08-24 NOTE — PROGRESS NOTES
This patient was seen at [address] [place] [address] Urgent Care while in their vehicle due to COVID-19 pandemic with PPE and focused examination in order to decrease community viral transmission. The patient/guardian gave verbal consent to treat. The history is provided by the patient. Daughter tested positive for covid  Mild cold/ cough- no fever/ chills      Past Medical History:   Diagnosis Date    Diabetes ([facility]     Heart failure ([facility]     CHF, cardiomyopathy    Hypertension     ICD (implantable cardioverter-defibrillator) in place     left upper chest        Past Surgical History:   Procedure Laterality Date    HX HEART CATHETERIZATION  2/2015    x1    HX IMPLANTABLE CARDIOVERTER DEFIBRILLATOR  2/16/2015         Family History   Problem Relation Age of Onset    Hypertension Father     Diabetes Father     Diabetes Mother     Diabetes Brother     Hypertension Brother         Social History     Socioeconomic History    Marital status:      Spouse name: Not on file    Number of children: Not on file    Years of education: Not on file    Highest education level: Not on file   Occupational History    Not on file   Tobacco Use    Smoking status: Never Smoker    Smokeless tobacco: Never Used   Vaping Use    Vaping Use: Never used   Substance and Sexual Activity    Alcohol use: No    Drug use: No    Sexual activity: Not on file   Other Topics Concern    Not on file   Social History Narrative    Not on file     Social Determinants of Health     Financial Resource Strain:     Difficulty of Paying Living Expenses:    Food Insecurity:     Worried About Running Out of Food in the Last Year:     Ran Out of Food in the Last Year:    Transportation Needs:     Lack of Transportation (Medical):      Lack of Transportation (Non-Medical):    Physical Activity:     Days of Exercise per Week:     Minutes of Exercise per Session:    Stress:     Feeling of Stress :    Social Connections:     Frequency of Communication with Friends and Family:     Frequency of Social Gatherings with Friends and Family:     Attends L[employer] Services:     Active Member of Clubs or Organizations:     Attends Club or Organization Meetings:     Marital Status:    Intimate Partner Violence:     Fear of Current or Ex-Partner:     Emotionally Abused:     Physically Abused:     Sexually Abused: ALLERGIES: Patient has no known allergies. Review of Systems   All other systems reviewed and are negative. Vitals:    08/24/21 1238   Pulse: 78   Resp: 16   Temp: 98.1 °F (36.7 °C)   SpO2: 98%       Physical Exam  Vitals and nursing note reviewed. Constitutional:       General: He is not in acute distress. Appearance: He is not ill-appearing. Pulmonary:      Effort: Pulmonary effort is normal. No respiratory distress. Breath sounds: Normal breath sounds. MDM    Procedures      ICD-10-CM ICD-9-CM    1. Exposure to COVID-19 virus  Z20.822 V01.79 AMB POC SARS-COV-2   2. Suspected COVID-19 virus infection  Z20.822 V01.79 AMB POC SARS-COV-2     No orders of the defined types were placed in this encounter. No results found for any visits on 08/24/21. The patients condition was discussed with the patient and they understand. The patient is to follow up with primary care doctor. If signs and symptoms become worse the pt is to go to the ER. The patient is to take medications as prescribed.

## 2021-11-24 DIAGNOSIS — I10 ESSENTIAL HYPERTENSION WITH GOAL BLOOD PRESSURE LESS THAN 130/80: ICD-10-CM

## 2021-11-24 RX ORDER — AMLODIPINE BESYLATE 10 MG/1
10 TABLET ORAL DAILY
Qty: 90 TABLET | Refills: 1 | Status: SHIPPED | OUTPATIENT
Start: 2021-11-24 | End: 2022-05-24

## 2021-11-24 NOTE — TELEPHONE ENCOUNTER
Patient requesting refill, patient is out of this [facility] [address] DONAL JACKSON  [phone]          [facility]:[phone]

## 2021-11-24 NOTE — TELEPHONE ENCOUNTER
Requested Prescriptions     Signed Prescriptions Disp Refills    amLODIPine (NORVASC) 10 mg tablet 90 Tablet 1     Sig: Take 1 Tablet by mouth daily.      Authorizing Provider: Jacklyn Rangel     Ordering User: Patti Dietz verbal orders

## 2021-12-06 ENCOUNTER — OFFICE VISIT (OUTPATIENT)
Dept: CARDIOLOGY CLINIC | Age: 56
End: 2021-12-06
Payer: MEDICARE

## 2021-12-06 ENCOUNTER — ANCILLARY PROCEDURE (OUTPATIENT)
Dept: CARDIOLOGY CLINIC | Age: 56
End: 2021-12-06
Payer: MEDICARE

## 2021-12-06 VITALS
SYSTOLIC BLOOD PRESSURE: 136 MMHG | DIASTOLIC BLOOD PRESSURE: 84 MMHG | WEIGHT: 225 LBS | OXYGEN SATURATION: 97 % | BODY MASS INDEX: 34.1 KG/M2 | HEIGHT: 68 IN | RESPIRATION RATE: 14 BRPM

## 2021-12-06 VITALS — BODY MASS INDEX: 34.1 KG/M2 | WEIGHT: 225 LBS | HEIGHT: 68 IN

## 2021-12-06 DIAGNOSIS — I10 PRIMARY HYPERTENSION: ICD-10-CM

## 2021-12-06 DIAGNOSIS — R06.02 SOB (SHORTNESS OF BREATH): ICD-10-CM

## 2021-12-06 DIAGNOSIS — I42.0 DILATED CARDIOMYOPATHY (HCC): Primary | ICD-10-CM

## 2021-12-06 DIAGNOSIS — N18.32 STAGE 3B CHRONIC KIDNEY DISEASE (HCC): ICD-10-CM

## 2021-12-06 DIAGNOSIS — I10 ESSENTIAL HYPERTENSION WITH GOAL BLOOD PRESSURE LESS THAN 130/80: ICD-10-CM

## 2021-12-06 DIAGNOSIS — I42.0 DILATED CARDIOMYOPATHY (HCC): ICD-10-CM

## 2021-12-06 DIAGNOSIS — I50.22 CHRONIC SYSTOLIC HEART FAILURE (HCC): ICD-10-CM

## 2021-12-06 LAB
ECHO AO ASC DIAM: 3.19 CM
ECHO AO ROOT DIAM: 3.13 CM
ECHO AV AREA PEAK VELOCITY: 1.92 CM2
ECHO AV AREA VTI: 1.76 CM2
ECHO AV AREA/BSA PEAK VELOCITY: 0.9 CM2/M2
ECHO AV AREA/BSA VTI: 0.8 CM2/M2
ECHO AV MEAN GRADIENT: 4.66 MMHG
ECHO AV PEAK GRADIENT: 7.3 MMHG
ECHO AV PEAK VELOCITY: 135.05 CM/S
ECHO AV VTI: 27.57 CM
ECHO LA AREA 4C: 26.8 CM2
ECHO LA MAJOR AXIS: 4.76 CM
ECHO LA MINOR AXIS: 2.21 CM
ECHO LA VOL 2C: 64.91 ML (ref 18–58)
ECHO LA VOL 4C: 90.75 ML (ref 18–58)
ECHO LA VOL BP: 88.07 ML (ref 18–58)
ECHO LA VOL/BSA BIPLANE: 40.96 ML/M2 (ref 16–28)
ECHO LA VOLUME INDEX A2C: 30.19 ML/M2 (ref 16–28)
ECHO LA VOLUME INDEX A4C: 42.21 ML/M2 (ref 16–28)
ECHO LV E' LATERAL VELOCITY: 3.56 CM/S
ECHO LV E' SEPTAL VELOCITY: 4 CM/S
ECHO LV EDV A2C: 163.32 ML
ECHO LV EDV A4C: 157.63 ML
ECHO LV EDV BP: 163.26 ML (ref 67–155)
ECHO LV EDV INDEX A4C: 73.3 ML/M2
ECHO LV EDV INDEX BP: 75.9 ML/M2
ECHO LV EDV NDEX A2C: 76 ML/M2
ECHO LV EJECTION FRACTION A2C: 29 PERCENT
ECHO LV EJECTION FRACTION A4C: 31 PERCENT
ECHO LV EJECTION FRACTION BIPLANE: 28.1 PERCENT (ref 55–100)
ECHO LV ESV A2C: 115.63 ML
ECHO LV ESV A4C: 108.17 ML
ECHO LV ESV BP: 117.4 ML (ref 22–58)
ECHO LV ESV INDEX A2C: 53.8 ML/M2
ECHO LV ESV INDEX A4C: 50.3 ML/M2
ECHO LV ESV INDEX BP: 54.6 ML/M2
ECHO LV INTERNAL DIMENSION DIASTOLIC: 5.56 CM (ref 4.2–5.9)
ECHO LV INTERNAL DIMENSION SYSTOLIC: 4.6 CM
ECHO LV IVSD: 1.21 CM (ref 0.6–1)
ECHO LV MASS 2D: 321.4 G (ref 88–224)
ECHO LV MASS INDEX 2D: 149.5 G/M2 (ref 49–115)
ECHO LV POSTERIOR WALL DIASTOLIC: 1.46 CM (ref 0.6–1)
ECHO LVOT DIAM: 2.07 CM
ECHO LVOT PEAK GRADIENT: 2.39 MMHG
ECHO LVOT PEAK VELOCITY: 77.37 CM/S
ECHO LVOT SV: 48.4 ML
ECHO LVOT VTI: 14.43 CM
ECHO MV A VELOCITY: 99.48 CM/S
ECHO MV E DECELERATION TIME (DT): 195.7 MS
ECHO MV E VELOCITY: 92.06 CM/S
ECHO MV E/A RATIO: 0.93
ECHO MV E/E' LATERAL: 25.86
ECHO MV E/E' RATIO (AVERAGED): 24.44
ECHO MV E/E' SEPTAL: 23.02
ECHO PV MAX VELOCITY: 79.27 CM/S
ECHO PV PEAK INSTANTANEOUS GRADIENT SYSTOLIC: 2.51 MMHG
LA VOL DISK BP: 82.65 ML (ref 18–58)

## 2021-12-06 PROCEDURE — G8754 DIAS BP LESS 90: HCPCS | Performed by: SPECIALIST

## 2021-12-06 PROCEDURE — 93306 TTE W/DOPPLER COMPLETE: CPT | Performed by: SPECIALIST

## 2021-12-06 PROCEDURE — G8417 CALC BMI ABV UP PARAM F/U: HCPCS | Performed by: SPECIALIST

## 2021-12-06 PROCEDURE — 99213 OFFICE O/P EST LOW 20 MIN: CPT | Performed by: SPECIALIST

## 2021-12-06 PROCEDURE — G8752 SYS BP LESS 140: HCPCS | Performed by: SPECIALIST

## 2021-12-06 PROCEDURE — G8432 DEP SCR NOT DOC, RNG: HCPCS | Performed by: SPECIALIST

## 2021-12-06 PROCEDURE — G8427 DOCREV CUR MEDS BY ELIG CLIN: HCPCS | Performed by: SPECIALIST

## 2021-12-06 PROCEDURE — G0463 HOSPITAL OUTPT CLINIC VISIT: HCPCS | Performed by: SPECIALIST

## 2021-12-06 PROCEDURE — 3017F COLORECTAL CA SCREEN DOC REV: CPT | Performed by: SPECIALIST

## 2021-12-06 RX ORDER — BUMETANIDE 0.5 MG/1
0.5 TABLET ORAL 2 TIMES DAILY
Qty: 60 TABLET | Refills: 5 | Status: SHIPPED | OUTPATIENT
Start: 2021-12-06 | End: 2021-12-08 | Stop reason: SDUPTHER

## 2021-12-06 NOTE — PROGRESS NOTES
HISTORY OF PRESENT ILLNESS  Amanda Samuel is a 49 y.o. male     SUMMARY:   Problem List  Date Reviewed: 12/6/2021          Codes Class Noted    Severe obesity (BMI 35.0-39. 9) ICD-10-CM: E66.01  ICD-9-CM: 278.01  6/15/2018        Chronic systolic heart failure ([facility] ICD-10-CM: I50.22  ICD-9-CM: 428.22  2/8/2015        Cardiomyopathy ([facility] ICD-10-CM: I42.9  ICD-9-CM: 425.4  9/26/2014    Overview Addendum 11/9/2017  7:20 AM by Claudia Nava MD     2002 (approx) diagnosed chippenham by echo, neg stress test and started on usual meds. 2007 (approx) fup echo lvef 25%  2/15 cardiac cath, no sig cad  2/15 single lead AICD implant  2/17 lvh, otherwise normal echo             Hypertension ICD-10-CM: I10  ICD-9-CM: 401.9  9/26/2014        Diabetes mellitus, type II, insulin dependent (HCC) (Chronic) ICD-10-CM: E11.9, Z79.4  ICD-9-CM: 250.00, V58.67  9/26/2014              Current Outpatient Medications on File Prior to Visit   Medication Sig    amLODIPine (NORVASC) 10 mg tablet Take 1 Tablet by mouth daily.  NovoLIN 70/30 U-100 Insulin 100 unit/mL (70-30) injection INJECT UP TO 32 UNITS SUBCUTANEOUSLY WITH BREAKFAST AND WITH SUPPER FOLLOWING SLIDING SCALE AS DIRECTED BY DR Torsten New    simvastatin (ZOCOR) 40 mg tablet TAKE 1 TABLET BY MOUTH ONCE DAILY AT BEDTIME    lisinopriL (PRINIVIL, ZESTRIL) 40 mg tablet Take 1 Tablet by mouth daily.  carvediloL (COREG) 25 mg tablet Take 2 Tablets by mouth two (2) times a day.  bumetanide (BUMEX) 1 mg tablet Take 1 tablet by mouth once daily    Insulin Needles, Disposable, 31 ndle For 75/25 insulin administration    Blood-Glucose Meter monitoring kit 1    glucose blood VI test strips (BLOOD GLUCOSE TEST) strip 1    multivitamin with iron (DAILY MULTI-VITAMINS/IRON) tablet Take 1 tablet by mouth daily. No current facility-administered medications on file prior to visit.        CARDIOLOGY STUDIES TO DATE:  9/14 echo dilated lv with lvef 15%, mod lae, mild to mod mr, mild pul regurg     2/15 echo lvef 50% lae   2/15 echo lvef 65%, no sig valve abnormalities   2/17 lvh, otherwise normal echo   08/16/19 dilated lv with mod lvh and lvef 25%. Lae, mild mr  2/21 echo lvef 29% lvh, lae, tr mr    Chief Complaint   Patient presents with    Follow-up     HPI :  He is doing fairly well. Blood pressure is finally controlled and although he is not lost any weight he is exercising regularly with no worrisome symptoms. A says his last A1c was around 8 and he is keeping regular follow-up with nephrology. CARDIAC ROS:   negative for chest pain, dyspnea, palpitations, syncope, orthopnea, paroxysmal nocturnal dyspnea, exertional chest pressure/discomfort, claudication, lower extremity edema    Family History   Problem Relation Age of Onset    Hypertension Father     Diabetes Father     Diabetes Mother     Diabetes Brother     Hypertension Brother        Past Medical History:   Diagnosis Date    [facility] ([facility]     Heart failure ([facility]     CHF, cardiomyopathy    Hypertension     ICD (implantable cardioverter-defibrillator) in place     left upper chest       GENERAL ROS:  A comprehensive review of systems was negative except for that written in the HPI.     Visit Vitals  /84 (BP 1 Location: Right arm, BP Patient Position: Sitting, BP Cuff Size: Adult)   Resp 14   Ht 5' 8\" (1.727 m)   Wt 225 lb (102.1 kg)   SpO2 97%   BMI 34.21 kg/m²       Wt Readings from Last 3 Encounters:   12/06/21 225 lb (102.1 kg)   12/06/21 225 lb (102.1 kg)   07/19/21 224 lb (101.6 kg)            BP Readings from Last 3 Encounters:   12/06/21 136/84   07/19/21 (!) 150/90   02/17/21 (!) 140/90       PHYSICAL EXAM  General appearance: alert, cooperative, no distress, appears stated age  Neurologic: Alert and oriented X 3  Neck: supple, symmetrical, trachea midline, no adenopathy, no carotid bruit and no JVD  Lungs: clear to auscultation bilaterally  Heart: regular rate and rhythm, S1, S2 normal, no murmur, click, rub or gallop  Extremities: extremities normal, atraumatic, no cyanosis or edema    Lab Results   Component Value Date/Time    Cholesterol, total 152 02/11/2015 01:30 PM    HDL Cholesterol 34 02/11/2015 01:30 PM    LDL, calculated 100.8 (H) 02/11/2015 01:30 PM    Triglyceride 86 02/11/2015 01:30 PM    CHOL/HDL Ratio 4.5 02/11/2015 01:30 PM     ASSESSMENT :      He is stable asymptomatic, and well compensated on a good medical regimen. An echocardiogram today will follow these results up by phone. current treatment plan is effective, no change in therapy  lab results and schedule of future lab studies reviewed with patient  reviewed diet, exercise and weight control    Encounter Diagnoses   Name Primary?  Dilated cardiomyopathy ([facility] Yes    Primary hypertension     Stage 3b chronic kidney disease ([facility]      No orders of the defined types were placed in this encounter. Follow-up and Dispositions    · Return in about 4 months (around 4/6/2022). Kike James MD  12/6/2021  Please note that this dictation was completed with Palamida, the [employer] voice recognition software. Quite often unanticipated grammatical, syntax, homophones, and other interpretive errors are inadvertently transcribed by the computer software. Please disregard these errors. Please excuse any errors that have escaped final proofreading. Thank you.

## 2021-12-07 ENCOUNTER — TELEPHONE (OUTPATIENT)
Dept: CARDIOLOGY CLINIC | Age: 56
End: 2021-12-07

## 2021-12-07 DIAGNOSIS — I42.0 DILATED CARDIOMYOPATHY (HCC): Primary | ICD-10-CM

## 2021-12-07 RX ORDER — SACUBITRIL AND VALSARTAN 97; 103 MG/1; MG/1
1 TABLET, FILM COATED ORAL 2 TIMES DAILY
Qty: 60 TABLET | Refills: 3 | Status: SHIPPED | OUTPATIENT
Start: 2021-12-07 | End: 2022-04-12

## 2021-12-07 NOTE — TELEPHONE ENCOUNTER
Called stephen TINEO on  requesting call back. Also said I would send [employer] message. Also sent [employer] message. Detail Level: Detailed

## 2021-12-07 NOTE — PROGRESS NOTES
Heart muscle is still weak. We should stop lisinopril for 3 days and then begin full dose entresto. He has plenty of bp and has been on 40mg lisinopril, so dont think we need to titrate entresto. Lets bring him in in 3 months for limited echo.  thanks

## 2021-12-07 NOTE — TELEPHONE ENCOUNTER
Called pt. Verified patient's identity with two identifiers. Notified pt of results and Dr. Taran Orellana message. Explained importance of stopping lisinopril completely 3 days prior to new med and how entresto is taken, also why entresto titrating is being skipped, etc. Scheduled limited echo and appointment in 3 months. Patient verbalized understanding and denied further questions or concerns. Requested Prescriptions     Signed Prescriptions Disp Refills    sacubitriL-valsartan (Entresto)  mg tablet 60 Tablet 3     Sig: Take 1 Tablet by mouth two (2) times a day.  Stop lisinopril 3 days prior to starting entresto     Authorizing Provider: Alejandrina Tate     Ordering User: [facility]    Per Dr. Taran Orellana verbal orders     Future Appointments   Date Time Provider Chelsy Cho   3/9/2022 10:00 AM VASCULAR, ADELFO GONZALEZ   3/9/2022 10:40 AM MD BHARTI Longoria AMB

## 2021-12-08 DIAGNOSIS — R06.02 SOB (SHORTNESS OF BREATH): ICD-10-CM

## 2021-12-08 DIAGNOSIS — I42.0 DILATED CARDIOMYOPATHY (HCC): ICD-10-CM

## 2021-12-08 DIAGNOSIS — I10 ESSENTIAL HYPERTENSION WITH GOAL BLOOD PRESSURE LESS THAN 130/80: ICD-10-CM

## 2021-12-08 DIAGNOSIS — I50.22 CHRONIC SYSTOLIC HEART FAILURE (HCC): ICD-10-CM

## 2021-12-08 RX ORDER — BUMETANIDE 0.5 MG/1
0.5 TABLET ORAL 2 TIMES DAILY
Qty: 60 TABLET | Refills: 5 | Status: SHIPPED | OUTPATIENT
Start: 2021-12-08 | End: 2022-04-12

## 2022-02-24 NOTE — TELEPHONE ENCOUNTER
----- Message from Jules Pedro MD sent at 12/7/2021  8:52 AM EST -----  Heart muscle is still weak. We should stop lisinopril for 3 days and then begin full dose entresto. He has plenty of bp and has been on 40mg lisinopril, so dont think we need to titrate entresto. Lets bring him in in 3 months for limited echo.  thanks same name as above

## 2022-03-09 ENCOUNTER — ANCILLARY PROCEDURE (OUTPATIENT)
Dept: CARDIOLOGY CLINIC | Age: 57
End: 2022-03-09
Payer: MEDICARE

## 2022-03-09 ENCOUNTER — OFFICE VISIT (OUTPATIENT)
Dept: CARDIOLOGY CLINIC | Age: 57
End: 2022-03-09
Payer: MEDICARE

## 2022-03-09 VITALS
RESPIRATION RATE: 14 BRPM | SYSTOLIC BLOOD PRESSURE: 134 MMHG | WEIGHT: 226 LBS | BODY MASS INDEX: 34.25 KG/M2 | DIASTOLIC BLOOD PRESSURE: 86 MMHG | OXYGEN SATURATION: 98 % | HEART RATE: 70 BPM | HEIGHT: 68 IN

## 2022-03-09 DIAGNOSIS — I50.22 CHRONIC SYSTOLIC HEART FAILURE (HCC): ICD-10-CM

## 2022-03-09 DIAGNOSIS — I42.0 DILATED CARDIOMYOPATHY (HCC): ICD-10-CM

## 2022-03-09 DIAGNOSIS — I10 ESSENTIAL HYPERTENSION: ICD-10-CM

## 2022-03-09 DIAGNOSIS — I42.9 CARDIOMYOPATHY (HCC): ICD-10-CM

## 2022-03-09 DIAGNOSIS — I42.0 DILATED CARDIOMYOPATHY (HCC): Primary | ICD-10-CM

## 2022-03-09 DIAGNOSIS — I10 ESSENTIAL HYPERTENSION WITH GOAL BLOOD PRESSURE LESS THAN 130/80: ICD-10-CM

## 2022-03-09 LAB
ECHO AO ROOT DIAM: 3.2 CM
ECHO AO ROOT INDEX: 1.49 CM/M2
ECHO LA DIAMETER INDEX: 2.51 CM/M2
ECHO LA DIAMETER: 5.4 CM
ECHO LA TO AORTIC ROOT RATIO: 1.69
ECHO LV EDV A2C: 189 ML
ECHO LV EDV A4C: 165 ML
ECHO LV EDV BP: 184 ML (ref 67–155)
ECHO LV EDV INDEX A4C: 77 ML/M2
ECHO LV EDV INDEX BP: 86 ML/M2
ECHO LV EDV NDEX A2C: 88 ML/M2
ECHO LV EJECTION FRACTION 3D: 28 %
ECHO LV EJECTION FRACTION A2C: 28 %
ECHO LV EJECTION FRACTION A4C: 30 %
ECHO LV EJECTION FRACTION BIPLANE: 31 % (ref 55–100)
ECHO LV ESV A2C: 135 ML
ECHO LV ESV A4C: 114 ML
ECHO LV ESV BP: 127 ML (ref 22–58)
ECHO LV ESV INDEX A2C: 63 ML/M2
ECHO LV ESV INDEX A4C: 53 ML/M2
ECHO LV ESV INDEX BP: 59 ML/M2
ECHO LV FRACTIONAL SHORTENING: 14 % (ref 28–44)
ECHO LV GLOBAL LONGITUDINAL STRAIN (GLS): -5.8 %
ECHO LV INTERNAL DIMENSION DIASTOLE INDEX: 3.07 CM/M2
ECHO LV INTERNAL DIMENSION DIASTOLIC: 6.6 CM (ref 4.2–5.9)
ECHO LV INTERNAL DIMENSION SYSTOLIC INDEX: 2.65 CM/M2
ECHO LV INTERNAL DIMENSION SYSTOLIC: 5.7 CM
ECHO LV IVSD: 1.3 CM (ref 0.6–1)
ECHO LV MASS 2D: 452.4 G (ref 88–224)
ECHO LV MASS INDEX 2D: 210.4 G/M2 (ref 49–115)
ECHO LV POSTERIOR WALL DIASTOLIC: 1.5 CM (ref 0.6–1)
ECHO LV RELATIVE WALL THICKNESS RATIO: 0.45
ECHO LVOT CARDIAC OUTPUT: 3.6 LITER/MINUTE
ECHO LVOT CARDIAC OUTPUT: 3.6 LITER/MINUTE

## 2022-03-09 PROCEDURE — G8427 DOCREV CUR MEDS BY ELIG CLIN: HCPCS | Performed by: SPECIALIST

## 2022-03-09 PROCEDURE — G8417 CALC BMI ABV UP PARAM F/U: HCPCS | Performed by: SPECIALIST

## 2022-03-09 PROCEDURE — 3017F COLORECTAL CA SCREEN DOC REV: CPT | Performed by: SPECIALIST

## 2022-03-09 PROCEDURE — G8754 DIAS BP LESS 90: HCPCS | Performed by: SPECIALIST

## 2022-03-09 PROCEDURE — G0463 HOSPITAL OUTPT CLINIC VISIT: HCPCS | Performed by: SPECIALIST

## 2022-03-09 PROCEDURE — G8752 SYS BP LESS 140: HCPCS | Performed by: SPECIALIST

## 2022-03-09 PROCEDURE — 99213 OFFICE O/P EST LOW 20 MIN: CPT | Performed by: SPECIALIST

## 2022-03-09 PROCEDURE — 76376 3D RENDER W/INTRP POSTPROCES: CPT | Performed by: SPECIALIST

## 2022-03-09 PROCEDURE — G8510 SCR DEP NEG, NO PLAN REQD: HCPCS | Performed by: SPECIALIST

## 2022-03-09 PROCEDURE — 93356 MYOCRD STRAIN IMG SPCKL TRCK: CPT | Performed by: SPECIALIST

## 2022-03-09 PROCEDURE — 93308 TTE F-UP OR LMTD: CPT | Performed by: SPECIALIST

## 2022-03-09 RX ORDER — CARVEDILOL 25 MG/1
50 TABLET ORAL 2 TIMES DAILY
Qty: 120 TABLET | Refills: 5 | Status: SHIPPED | OUTPATIENT
Start: 2022-03-09 | End: 2022-09-20

## 2022-03-09 RX ORDER — LISINOPRIL 40 MG/1
20 TABLET ORAL DAILY
COMMUNITY
End: 2022-04-12

## 2022-03-09 NOTE — PROGRESS NOTES
HISTORY OF PRESENT ILLNESS  Siomara Ely is a 49 y.o. male     SUMMARY:   Problem List  Date Reviewed: 3/9/2022          Codes Class Noted    Severe obesity (BMI 35.0-39. 9) ICD-10-CM: E66.01  ICD-9-CM: 278.01  6/15/2018        Chronic systolic heart failure ([facility] ICD-10-CM: I50.22  ICD-9-CM: 428.22  2/8/2015        Cardiomyopathy ([facility] ICD-10-CM: I42.9  ICD-9-CM: 425.4  9/26/2014    Overview Addendum 11/9/2017  7:20 AM by Lupe Gonzalez MD     2002 (approx) diagnosed [place] by echo, neg stress test and started on usual meds. 2007 (approx) fup echo lvef 25%  2/15 cardiac cath, no sig cad  2/15 single lead AICD implant  2/17 lvh, otherwise normal echo             Hypertension ICD-10-CM: I10  ICD-9-CM: 401.9  9/26/2014        Diabetes mellitus, type II, insulin dependent (HCC) (Chronic) ICD-10-CM: E11.9, Z79.4  ICD-9-CM: 250.00, V58.67  9/26/2014              Current Outpatient Medications on File Prior to Visit   Medication Sig    lisinopriL (PRINIVIL, ZESTRIL) 40 mg tablet Take 20 mg by mouth daily.  bumetanide (BUMEX) 0.5 mg tablet Take 1 Tablet by mouth two (2) times a day.  amLODIPine (NORVASC) 10 mg tablet Take 1 Tablet by mouth daily.  NovoLIN 70/30 U-100 Insulin 100 unit/mL (70-30) injection INJECT UP TO 32 UNITS SUBCUTANEOUSLY WITH BREAKFAST AND WITH SUPPER FOLLOWING SLIDING SCALE AS DIRECTED BY DR Maine Mcginnis    simvastatin (ZOCOR) 40 mg tablet TAKE 1 TABLET BY MOUTH ONCE DAILY AT BEDTIME    carvediloL (COREG) 25 mg tablet Take 2 Tablets by mouth two (2) times a day.  Insulin Needles, Disposable, 31 ndle For 75/25 insulin administration    Blood-Glucose Meter monitoring kit 1    glucose blood VI test strips (BLOOD GLUCOSE TEST) strip 1    multivitamin with iron (DAILY MULTI-VITAMINS/IRON) tablet Take 1 tablet by mouth daily.  sacubitriL-valsartan (Entresto)  mg tablet Take 1 Tablet by mouth two (2) times a day.  Stop lisinopril 3 days prior to starting entresto (Patient not taking: Reported on 3/9/2022)     No current facility-administered medications on file prior to visit. CARDIOLOGY STUDIES TO DATE:  9/14 echo dilated lv with lvef 15%, mod lae, mild to mod mr, mild pul regurg     2/15 echo lvef 50% lae   2/15 echo lvef 65%, no sig valve abnormalities   2/17 lvh, otherwise normal echo   08/16/19 dilated lv with mod lvh and lvef 25%. Lae, mild mr  2/21 echo lvef 29% lvh, lae, tr mr  12/21 lvef 28%, mild lve, lvh, lae, pacer icd present    Chief Complaint   Patient presents with    Follow-up     HPI :  He is doing well from a cardiac standpoint with really no complaints. He tried to get [facility] through patient assistance but was unsuccessful in spite of our help so he is back on lisinopril. He is got an appointment with nephrology next month and his echo today showed no improvement in his ejection fraction. His AICD is being monitored by [facility]. He says his sugars have been running from . He is not exercising as much as he would like because of work constraints but his weight is stable. CARDIAC ROS:   negative for chest pain, palpitations, syncope, orthopnea, paroxysmal nocturnal dyspnea, exertional chest pressure/discomfort, claudication, lower extremity edema    Family History   Problem Relation Age of Onset    Hypertension Father     Diabetes Father     Diabetes Mother     Diabetes Brother     Hypertension Brother        Past Medical History:   Diagnosis Date    Diabetes ([facility]     Heart failure ([facility]     CHF, cardiomyopathy    Hypertension     ICD (implantable cardioverter-defibrillator) in place     left upper chest       GENERAL ROS:  A comprehensive review of systems was negative except for that written in the HPI.     Visit Vitals  /86 (BP 1 Location: Right arm, BP Patient Position: Sitting, BP Cuff Size: Adult)   Pulse 70   Resp 14   Ht 5' 8\" (1.727 m)   Wt 226 lb (102.5 kg)   SpO2 98%   BMI 34.36 kg/m²       Wt Readings from Last 3 Encounters: 03/09/22 226 lb (102.5 kg)   12/06/21 225 lb (102.1 kg)   12/06/21 225 lb (102.1 kg)            BP Readings from Last 3 Encounters:   03/09/22 134/86   12/06/21 136/84   07/19/21 (!) 150/90       PHYSICAL EXAM  General appearance: alert, cooperative, no distress, appears stated age  Neurologic: Alert and oriented X 3  Neck: supple, symmetrical, trachea midline, no adenopathy, no carotid bruit and no JVD  Lungs: clear to auscultation bilaterally  Heart: regular rate and rhythm, S1, S2 normal, no murmur, click, rub or gallop  Extremities: extremities normal, atraumatic, no cyanosis or edema    Lab Results   Component Value Date/Time    Cholesterol, total 152 02/11/2015 01:30 PM    HDL Cholesterol 34 02/11/2015 01:30 PM    LDL, calculated 100.8 (H) 02/11/2015 01:30 PM    Triglyceride 86 02/11/2015 01:30 PM    CHOL/HDL Ratio 4.5 02/11/2015 01:30 PM     ASSESSMENT :      He is stable and fairly well compensated at this point. Working to refer him back to advanced heart failure since his ejection fraction is not improved and he does have diabetes and significant renal insufficiency which complicates his management. They may be away for them to get him some medications in spite of his income and they need to be involved now with everything else is going on. He is agreeable to that. current treatment plan is effective, no change in therapy  lab results and schedule of future lab studies reviewed with patient  reviewed diet, exercise and weight control    Encounter Diagnoses   Name Primary?  Dilated cardiomyopathy ([facility] Yes    Essential hypertension      Orders Placed This Encounter    lisinopriL (PRINIVIL, ZESTRIL) 40 mg tablet       Follow-up and Dispositions    · Return in about 3 months (around 6/9/2022). Lazarus Benson MD  3/9/2022  Please note that this dictation was completed with [employer], the [employer] voice recognition software.   Quite often unanticipated grammatical, syntax, homophones, and other interpretive errors are inadvertently transcribed by the computer software. Please disregard these errors. Please excuse any errors that have escaped final proofreading. Thank you.

## 2022-03-15 ENCOUNTER — TELEPHONE (OUTPATIENT)
Dept: CARDIOLOGY CLINIC | Age: 57
End: 2022-03-15

## 2022-03-15 NOTE — TELEPHONE ENCOUNTER
New referral received from Dr. Aye Hamilton. Patient has been called and scheduled. Appointment on Thursday March 24th at 1pm.    Patient is aware to bring all medications with him to this appointment. Informed patient that our office will call the day before with an reminder and will screen for Covid at that time.

## 2022-03-23 ENCOUNTER — TELEPHONE (OUTPATIENT)
Dept: CARDIOLOGY CLINIC | Age: 57
End: 2022-03-23

## 2022-03-23 NOTE — TELEPHONE ENCOUNTER
Patient called to reschedule his new patient appointment.       Appointment will be rescheduled to Tuesday April 12th at 1pm.

## 2022-04-12 ENCOUNTER — OFFICE VISIT (OUTPATIENT)
Dept: CARDIOLOGY CLINIC | Age: 57
End: 2022-04-12
Payer: MEDICARE

## 2022-04-12 VITALS
HEART RATE: 61 BPM | BODY MASS INDEX: 36 KG/M2 | OXYGEN SATURATION: 98 % | RESPIRATION RATE: 16 BRPM | TEMPERATURE: 97.7 F | HEIGHT: 67 IN | SYSTOLIC BLOOD PRESSURE: 120 MMHG | WEIGHT: 229.4 LBS | DIASTOLIC BLOOD PRESSURE: 78 MMHG

## 2022-04-12 DIAGNOSIS — I50.22 CHRONIC SYSTOLIC HEART FAILURE (HCC): ICD-10-CM

## 2022-04-12 DIAGNOSIS — R06.02 SOB (SHORTNESS OF BREATH): ICD-10-CM

## 2022-04-12 DIAGNOSIS — E61.1 IRON DEFICIENCY: ICD-10-CM

## 2022-04-12 DIAGNOSIS — I10 ESSENTIAL HYPERTENSION WITH GOAL BLOOD PRESSURE LESS THAN 130/80: ICD-10-CM

## 2022-04-12 DIAGNOSIS — Z79.4 DIABETES MELLITUS, TYPE II, INSULIN DEPENDENT (HCC): ICD-10-CM

## 2022-04-12 DIAGNOSIS — Z13.220 LIPID SCREENING: ICD-10-CM

## 2022-04-12 DIAGNOSIS — R53.83 FATIGUE, UNSPECIFIED TYPE: Primary | ICD-10-CM

## 2022-04-12 DIAGNOSIS — E55.9 VITAMIN D DEFICIENCY: ICD-10-CM

## 2022-04-12 DIAGNOSIS — E11.9 DIABETES MELLITUS, TYPE II, INSULIN DEPENDENT (HCC): ICD-10-CM

## 2022-04-12 PROCEDURE — 99203 OFFICE O/P NEW LOW 30 MIN: CPT | Performed by: INTERNAL MEDICINE

## 2022-04-12 RX ORDER — GLUCOSAMINE SULFATE 1500 MG
1000 POWDER IN PACKET (EA) ORAL DAILY
COMMUNITY

## 2022-04-12 RX ORDER — ISOSORBIDE DINITRATE 10 MG/1
10 TABLET ORAL 3 TIMES DAILY
Qty: 90 TABLET | Refills: 2 | Status: SHIPPED | OUTPATIENT
Start: 2022-04-12 | End: 2022-08-15

## 2022-04-12 RX ORDER — BUMETANIDE 1 MG/1
1 TABLET ORAL 2 TIMES DAILY
Qty: 60 TABLET | Refills: 0 | Status: SHIPPED | OUTPATIENT
Start: 2022-04-12 | End: 2022-05-18

## 2022-04-12 RX ORDER — HYDRALAZINE HYDROCHLORIDE 10 MG/1
10 TABLET, FILM COATED ORAL 3 TIMES DAILY
Qty: 90 TABLET | Refills: 2 | Status: SHIPPED | OUTPATIENT
Start: 2022-04-12 | End: 2022-08-15

## 2022-04-12 NOTE — PATIENT INSTRUCTIONS
Medication changes:    STOP taking lisinopril    Start taking Hydralazine 10 mg 1 tablet 3 times a day    Start taking Isordil 10 mg 1 tablet 3 times a day    Increase Bumex to 1 mg 1 tablet 2 times a day    DO NOT take Entresto    Please take this to your pharmacy to notify them of the change in medications. Testing Ordered:    Labs have been ordered to be drawn in one week at [address] [place] [address]. Lab slips given in clinic, please take these with you to have your labs drawn. We will call you with any abnormal results that require a change in medication regimen. Other Recommendations:      Call [facility] in 3 days (on Friday) with a log of your weights, blood pressures and heart rates. Take your weight every day at the same time. Take your blood pressure before medications and 2 hours after medications. A referral to sleep medicine and ACP specialists has been placed. You will be contacted for scheduling. A referral to Nutrition has been placed. Please contact [phone] to schedule an appointment. Ensure your drinking an adequate amount of water with a goal of 6-8 eight ounce glasses (1.5-2 liters) of fluid daily. Your urine should be clear and light yellow straw colored. If your blood pressure begins to consistently run below 90/60 and/or you begin to experience dizziness or lightheadedness, please contact the [facility] [address] at [phone]. Follow up 7-10 days with [facility]      Please monitor your weights daily upon waking and after using the bathroom. Keep a written records of your weights and bring to your next appointment. If you have a weight gain of 3 or more pounds overnight OR 5 or more pounds in one week please contact our office. Thank you for allowing us the privilege of being a part of your healthcare team! Please do not hesitate to contact our office at [phone] with any questions or concerns.        Virtual Heart Failure Support Group    [facility] Araceli Swartz invites you to learn more about heart failure and to share your questions, ideas, and experiences with others. Each month, the Heart Failure Support Group features a new educational topic and a guest speaker, followed by an interactive discussion. Our Heart Failure Nurse Navigator will moderate each session. You will be able to participate by phone, tablet or computer through [address] [place] [address]. This support group takes place on the 3rd Thursday of each month from 6:00-7:30PM. All individuals living with heart failure and their caregivers are welcome to join. If you are interested in participating, please contact us at [email or web address] and you will be sent the link to join the [employer].

## 2022-04-12 NOTE — PROGRESS NOTES
[facility] in [address] [facility], [facility] Note    Patient name: Luana Cordova  Patient : 1965  Patient MRN: [id number]  Date of service: 22    Primary care physician: Meggan Stephens MD  Primary general cardiologist:  Dr. Juliocesar Otoole    Primary F cardiologist: Denise Andrade MD    CHIEF COMPLAINT:  Chronic systolic heart failure    PLAN OF CARE:  · 48 y/o male with h/o non-ischemic cardiomyopathy, LVEF 28%, stage C, NYHA class IIIA symptoms and CKD, stage 3 (Cr 2.1-2.47) undergoing optimization of GDMT (taken off nephrotoxic meds through diuresis and to allow renal recovery and up-titrating alternative HF meds); once patient is euvolemic he should have [address] +/- ischemic evaluation (possibly LHC depending on renal function, last [facility] )  · Most likely etiology of worsening HF is chronic volume overload due to underestimated severity of renal failure +/- possibly inadequately/untreated MINA +/- need EKG next visit to r/o LBBB +/- undergoing eval    PLAN:  Continue current medical therapy for heart failure  Continue current dose of coreg 50mg twice daily  Continue current dose of norvasc 10mg daily  Discontinue lisinopril and do not take entresto through diuresis; to allow renal recovery  Start hydralazine 10mg TID and isordil 10mg TID and uptitrate on the phone and next visit  Continue current dose of spironolactone  Does not take SGLT2 inhibitor  Increase current dose of bumex 1mg twice daily  Not on allopurinol or uloric, check uric acid level  Does not take aspirin   Continue current dose, check lipid profile, CPK and LFT   ICD interrogation every 3 months per routine  Schedule sleep study    Obtain genetic testing  Check 6MW when diuresed  Acreening HF labs: CBC, BMP, Mg, LFT, uric acid, pro-NT-BNP, iron profile with ferritin, TSH, vitamin D level, lipid profile, CPK, gammopathy profile, HIV, hepatitis panel, JAYDA, HGBA1C, PSA screen  Reinforced low salt diet  Reinforced fluid restriction to 6 x 8oz glasses per day  Provided educational materials \"Living with heart failure\"   Provided advanced care plan forms to be filled out    Referral to nutritionist for weight loss and HF diet   Follow-up with nephrology (has referral next month)  Follow-up with primary cardiologist  Follow-up with EP cardiologist  Recommend flu, covid and pneumonia vaccinations  Return to [facility] in 7-10 days with NP to review labs and check on diuresis    IMPRESSION:  Fatigue  Shortness of breath  Volume overload  Chronic systolic heart failure   Stage C, NYHA class IIIA symptoms   Non-ischemic cardiomyopathy, LVEF 28% (HF diagnosed around 2002)   Normal coronaries by [facility] 2015  S/p single lead ICD (2/2015)  Cardiac risk factors   HTN   HL   DM2   Morbid obesity, BMI 35  CKD, stage 3  Anemia    CARDIAC IMAGING:  Echo (3/9/22)    Study limited to the assessment of ejection fraction.   Left Ventricle: Left ventricle is moderately dilated. Mildly increased wall thickness. Severe global hypokinesis present. Calculared ejection fraction is 28% by 3D volume and 31% by 2D Fernandez's biplane. Global longitudinal strain is reduced with a value of -5.8%. Echo (12/6/21)  · LV: Calculated LVEF is 28%. Biplane method used to measure ejection fraction. Mildly dilated left ventricle. Mildly to moderately increased wall thickness. Moderate-to-severely and globally reduced systolic function. Moderate (grade 2) left ventricular diastolic dysfunction. · LA: Mildly dilated left atrium. Left Atrium volume index is 40 mL/m2. · MV: Moderate mitral annular calcification. Very mild mitral valve regurgitation is present. · RV: Not well visualized. Pacer/ICD present. Echo (2/17/21)  · LV: Calculated LVEF is 29%. Biplane method used to measure ejection fraction. Mildly dilated left ventricle. Mild to moderate hypertrophy. Severely and globally reduced systolic function.  [place] motion: normal. Abnormal left ventricular strain. Global longitudinal strain is -8%. Moderate (grade 2) left ventricular diastolic dysfunction. · LA: Mildly dilated left atrium. Left Atrium volume index is 40 mL/m2. · MV: Moderate mitral annular calcification. Very mild mitral valve regurgitation is present. Echo (8/16/19)  · Left Ventricle: Mildly dilated left ventricle. Moderate concentric hypertrophy. Severe global systolic dysfunction. Estimated left ventricular ejection fraction is 21 - 25%. Biplane method used to measure ejection fraction. Left ventricular global hypokinesis. Abnormal left ventricular strain. Inconclusive left ventricular diastolic function. · Left Atrium: Mildly dilated left atrium. · Mitral Valve: Moderate mitral annular calcification. Mild mitral valve regurgitation. EKG (8/5/19) NSR 73bpm QRS 124ms  LHC (2/105) normal cors    HEMODYNAMICS:  RHC not done  CPEST not done    6 Min Walk Report 3/2/2015   (PRE) HR 78   (PRE) O2 Sat 99   (POST) HR 90   (POST) O2 Sat 99   Distance in Meters 386.18     HISTORY OF PRESENT ILLNESS:  I had the pleasure of seeing Varun Gallardo in [address] [place] [address] at [facility] [place]. Briefly, Varun Gallardo is a 49 y.o. male with h/o morbid obesity, BMI 35, HTN, HL, DM2, chronic systolic heart failure, stage C, NYHA class IIIA symptoms, non-ischemic cardiomyopathy, LVEF 28% (HF diagnosed around 2002), normal coronaries by [facility] 2015 s/p single lead ICD (2/2015) and worsening renal function, CKD stage 2 and anemia. Patient was referred to [facility] to establish long-term care. INTERVAL HISTORY:  Today, patient presents for initial clinic visit. Patient is doing \"okay\". Patient walked to our clinic from parking lot slowly. Patient can walk half a block without symptoms of fatigue or shortness of breath. Patient can perform home activities without problem.      Patient denies symptoms of volume overload or leg edema. Patient states has abdominal bloating but no change of appetite. Patient's weight remained stable. Patient has two pillow orthopnea, denies PND or nocturia. Patient denies irregular heart rate or palpitations. No presyncope or syncope. ICD has not fired. Patient denies other cardiac symptoms such as chest pain or leg pain with walking. Patient is compliant with fluid restriction and taking medications as prescribed. Patient manages his own medications. REVIEW OF SYSTEMS:  General: Denies fever, night sweats. Ear, nose and throat: Denies difficulty hearing, sinus problems, runny nose, post-nasal drip, ringing in ears, mouth sores, loose teeth, ear pain, nosebleeds, sore throate, facial pain or numbess  Cardiovascular: see above in the interval history  Respiratory: Denies cough, wheezing, sputum production, hemoptysis. Gastrointestinal: Denies heartburn, constipation, diarrhea, abdominal pain, nausea, vomiting, difficulty swallowing, blood in stool  Kidney and bladder: Denies painful urination, frequent urination, urgency  Musculoskeletal: Denies joint pain, muscle weakness  Skin and hair: Denies change in existing skin lesions, hair loss or increase, breast changes    PHYSICAL EXAM:  Visit Vitals  /78 (BP 1 Location: Right arm, BP Patient Position: Sitting, BP Cuff Size: Adult)   Pulse 61   Temp 97.7 °F (36.5 °C) (Oral)   Resp 16   Ht 5' 7\" (1.702 m)   Wt 229 lb 6.4 oz (104.1 kg)   SpO2 98%   BMI 35.93 kg/m²     General: Patient is well developed, well-nourished in no acute distress  HEENT: Normocephalic and atraumatic. No scleral icterus. Pupils are equal, round and reactive to light and accomodation. No conjunctival injection. Oropharynx is clear. Neck: Supple. No evidence of thyroid enlargements or lymphadenopathy. JVD: Cannot be appreciated   Lungs: Breath sounds are equal and clear bilaterally. No wheezes, rhonchi, or rales.   Heart: Regular rate and rhythm with normal S1 and S2. No murmurs, gallops or rubs. Abdomen: Soft, no mass or tenderness. No organomegaly or hernia. Bowel sounds present. Genitourinary and rectal: deferred  Extremities: No cyanosis, clubbing, or edema. Neurologic: No focal sensory or motor deficits are noted. Grossly intact. Psychiatric: Awake, alert an doriented x 3. Appropriate mood and affect. Skin: Warm, dry and well perfused. No lesions, nodules or rashes are noted. PAST MEDICAL HISTORY:  Past Medical History:   Diagnosis Date    Diabetes ([address]     Heart failure (HCC)     CHF, cardiomyopathy    Hypertension     ICD (implantable cardioverter-defibrillator) in place     left upper chest       PAST SURGICAL HISTORY:  Past Surgical History:   Procedure Laterality Date    HX HEART CATHETERIZATION  2/2015    x1    HX IMPLANTABLE CARDIOVERTER DEFIBRILLATOR  2/16/2015       FAMILY HISTORY:  Family History   Problem Relation Age of Onset    Hypertension Father     Diabetes Father     Diabetes Mother     Diabetes Brother     Hypertension Brother        SOCIAL HISTORY:  Social History     Socioeconomic History    Marital status:    Tobacco Use    Smoking status: Never Smoker    Smokeless tobacco: Never Used   Vaping Use    Vaping Use: Never used   Substance and Sexual Activity    Alcohol use: No    Drug use: No       LABORATORY RESULTS:  No flowsheet data found. ALLERGY:  No Known Allergies     CURRENT MEDICATIONS:    Current Outpatient Medications:     cholecalciferol (VITAMIN D3) 25 mcg (1,000 unit) cap, Take 1,000 Units by mouth daily. , Disp: , Rfl:     bumetanide (BUMEX) 1 mg tablet, Take 1 Tablet by mouth two (2) times a day., Disp: 60 Tablet, Rfl: 0    hydrALAZINE (APRESOLINE) 10 mg tablet, Take 1 Tablet by mouth three (3) times daily. , Disp: 90 Tablet, Rfl: 2    isosorbide dinitrate (ISORDIL) 10 mg tablet, Take 1 Tablet by mouth three (3) times daily. , Disp: 90 Tablet, Rfl: 2    carvediloL (COREG) 25 mg tablet, Take 2 Tablets by mouth two (2) times a day., Disp: 120 Tablet, Rfl: 5    amLODIPine (NORVASC) 10 mg tablet, Take 1 Tablet by mouth daily. , Disp: 90 Tablet, Rfl: 1    NovoLIN 70/30 U-100 Insulin 100 unit/mL (70-30) injection, INJECT UP TO 32 UNITS SUBCUTANEOUSLY WITH BREAKFAST AND WITH SUPPER FOLLOWING SLIDING SCALE AS DIRECTED BY DR Pancho Monique, Disp: , Rfl:     simvastatin (ZOCOR) 40 mg tablet, TAKE 1 TABLET BY MOUTH ONCE DAILY AT BEDTIME, Disp: , Rfl:     Insulin Needles, Disposable, 31 ndle, For 75/25 insulin administration, Disp: 150 Each, Rfl: 2    Blood-Glucose Meter monitoring kit, 1, Disp: 1 Kit, Rfl: 0    glucose blood VI test strips (BLOOD GLUCOSE TEST) strip, 1, Disp: 1 Package, Rfl: 11    multivitamin with iron (DAILY MULTI-VITAMINS/IRON) tablet, Take 1 tablet by mouth daily. , Disp: , Rfl:     Thank you for your referral and allowing me to participate in this patient's care.     Marce Cobos MD PhD  [address] [place] [address], [address]  Phone: [phone]  Fax: [phone]    PATIENT CARE TEAM:  Patient Care Team:  Shauna Russell MD as PCP - General (Family Medicine)  Edward Garcia MD (Cardiology)  Oxana Stubbs MD (Nephrology)     Total visit time: 40 minutes (> 50% spent face-to-face counseling)

## 2022-04-12 NOTE — LETTER
4/12/2022 1:20 PM    Dear Dr. Tom Way,     Thank you for your consult of patient Claus Mccarty to the [address] [place] [address] at [facility].  He was seen on 4/12/2022. I have attached a copy of the progress note detailing my exam and plan of care. We appreciate your confidence in our clinic to provide the highest quality of care to your patient. If I can answer any questions regarding our plan of care, please don't hesitate to contact my office at [phone].           Sincerely,          Andres Herrera MD

## 2022-04-13 ENCOUNTER — TELEPHONE (OUTPATIENT)
Dept: CARDIOLOGY CLINIC | Age: 57
End: 2022-04-13

## 2022-04-13 ENCOUNTER — PATIENT OUTREACH (OUTPATIENT)
Dept: CASE MANAGEMENT | Age: 57
End: 2022-04-13

## 2022-04-13 NOTE — TELEPHONE ENCOUNTER
Nutrition referral faxed to [facility] ([phone]). This will be reviewed and they will contact patient to schedule his appointment.

## 2022-04-13 NOTE — ACP (ADVANCE CARE PLANNING)
Advance Care Planning   Ambulatory ACP Specialist Patient Outreach    Date:  4/13/2022    ACP Specialist:  Isak Ravi LPN    Outreach call to patient in follow-up to ACP Specialist referral from:    [x] PCP  [] Provider   [] Ambulatory Care Management [] Other     For:                  [x] Advance Directive Assistance              [] Complete Portable DNR order              [] Complete POST/MOST              [] Code Status Discussion             [] Discuss Goals of Care             [] Early ACP Decision-Making              [] Other (Specify)    Date Referral Received: 4/13/22    Today's Outreach:  [x] First   [] Second  [] Third       Third outreach made by: [] Phone  [] Email / mail    [] [email or web address]     Intervention:  [] Spoke with Patient   [x] Left VM requesting return call      Outcome: The first attempt was made to contact pt regarding the ACP referral. No answer on mobile phone. VM left requesting a return call. Will attempt second outreach within one week. Next Step:   [] ACP scheduled conversation  [x] Outreach again in one week               [] Email / Mail ACP Info Sheets  [] Email / Mail Advance Directive   [] Closing referral.  Routing closure to referring provider/staff and to ACP Specialist . [] Closure letter mailed to patient with invitation to contact ACP Specialist if / when ready.   Thank you for this referral.

## 2022-04-15 ENCOUNTER — PATIENT MESSAGE (OUTPATIENT)
Dept: CARDIOLOGY CLINIC | Age: 57
End: 2022-04-15

## 2022-04-15 ENCOUNTER — TELEPHONE (OUTPATIENT)
Dept: CARDIOLOGY CLINIC | Age: 57
End: 2022-04-15

## 2022-04-15 NOTE — TELEPHONE ENCOUNTER
1018 - called pt to check on weights, blood pressures and heart rates since making medication changes on 4/12 at office visit using two pt identifiers. Pt states he is at work and does not have his BP/HR/wt readings with him but will call once he gets home from work. Asked pt what time he would be home, pt states it will be around 6 pm today. Advised pt our office closes at 5 pm and requested pt send Epiclist message with bp/hr/wts once he gets home today. Pt states understanding and has no further questions or concerns at this time.     Hugo Olmedo RN

## 2022-04-18 ENCOUNTER — TELEPHONE (OUTPATIENT)
Dept: CARDIOLOGY CLINIC | Age: 57
End: 2022-04-18

## 2022-04-18 NOTE — TELEPHONE ENCOUNTER
Tip eHad NP  You 4 minutes ago (9:17 AM)     LP    Looks great, continue. Message text      3078 - called pt using two pt identifiers. Advised pt of ZAHRA Miller NP above message. Pt states understanding and has no further questions or concerns at this time.     Evens Carrion RN

## 2022-04-18 NOTE — TELEPHONE ENCOUNTER
Sleep Medicine referral faxed to [facility] [address] ([phone]). This will be reviewed and they will contact patient to schedule his appointment.

## 2022-04-21 ENCOUNTER — TELEPHONE (OUTPATIENT)
Dept: CARDIOLOGY CLINIC | Age: 57
End: 2022-04-21

## 2022-04-21 NOTE — TELEPHONE ENCOUNTER
Telephone Call RE:  Appointment reminder     Outcome:     [x] Patient confirmed appointment   [] Patient rescheduled appointment for    [] Unable to reach  [] Left message             [] Other:     ---------------------             [x] Screened for [facility]

## 2022-04-22 ENCOUNTER — OFFICE VISIT (OUTPATIENT)
Dept: CARDIOLOGY CLINIC | Age: 57
End: 2022-04-22
Payer: MEDICARE

## 2022-04-22 VITALS
BODY MASS INDEX: 35.75 KG/M2 | HEIGHT: 67 IN | WEIGHT: 227.8 LBS | DIASTOLIC BLOOD PRESSURE: 82 MMHG | HEART RATE: 69 BPM | RESPIRATION RATE: 12 BRPM | OXYGEN SATURATION: 98 % | TEMPERATURE: 97.2 F | SYSTOLIC BLOOD PRESSURE: 122 MMHG

## 2022-04-22 DIAGNOSIS — I50.22 CHRONIC SYSTOLIC HEART FAILURE (HCC): Primary | ICD-10-CM

## 2022-04-22 PROCEDURE — 99214 OFFICE O/P EST MOD 30 MIN: CPT | Performed by: NURSE PRACTITIONER

## 2022-04-22 PROCEDURE — G8427 DOCREV CUR MEDS BY ELIG CLIN: HCPCS | Performed by: NURSE PRACTITIONER

## 2022-04-22 PROCEDURE — 93000 ELECTROCARDIOGRAM COMPLETE: CPT | Performed by: NURSE PRACTITIONER

## 2022-04-22 PROCEDURE — G8432 DEP SCR NOT DOC, RNG: HCPCS | Performed by: NURSE PRACTITIONER

## 2022-04-22 PROCEDURE — G8752 SYS BP LESS 140: HCPCS | Performed by: NURSE PRACTITIONER

## 2022-04-22 PROCEDURE — G8417 CALC BMI ABV UP PARAM F/U: HCPCS | Performed by: NURSE PRACTITIONER

## 2022-04-22 PROCEDURE — 3017F COLORECTAL CA SCREEN DOC REV: CPT | Performed by: NURSE PRACTITIONER

## 2022-04-22 PROCEDURE — G8754 DIAS BP LESS 90: HCPCS | Performed by: NURSE PRACTITIONER

## 2022-04-22 NOTE — PATIENT INSTRUCTIONS
Medication changes:     No medication changes     Please take this to your pharmacy to notify them of the change in medications. Testing Ordered:    Genetic testing completed in clinic    ekg completed in clinic     Please present to a local lab rhea to have labs drawn next week. You will be notified of any abnormal results that require a change in medication regimen. Other Recommendations:     Please monitor your weights daily upon waking and after using the bathroom. Keep a written records of your weights and bring to your next appointment. If you have a weight gain of 3 or more pounds overnight OR 5 or more pounds in one week please contact our office. Take blood pressures and heart rates in the morning before medication and two hours after medication. Write them down and keep a log. Report log on April 29th. [phone] option 2       Ensure your drinking an adequate amount of water with a goal of 6-8 eight ounce glasses (1.5-2 liters) of fluid daily. Your urine should be clear and light yellow straw colored. If your blood pressure begins to consistently run below 90/60 and/or you begin to experience dizziness or lightheadedness, please contact the [facility] at [phone]. Follow up 1 month with [facility]      Please monitor your weights daily upon waking and after using the bathroom. Keep a written records of your weights and bring to your next appointment. If you have a weight gain of 3 or more pounds overnight OR 5 or more pounds in one week please contact our office. Thank you for allowing us the privilege of kayley  Please do not hesitate to contact our office at [phone] with any questions or concerns. Virtual Heart Failure [address] invites you to learn more about heart failure and to share your questions, ideas, and experiences with others.  Each month, the Heart Failure Support Group features a new educational topic and a guest speaker, followed by an interactive discussion. Our Heart Failure Nurse Navigator will moderate each session. You will be able to participate by phone, tablet or computer through [address] [place] [address]. This support group takes place on the 3rd Thursday of each month from 6:00-7:30PM. All individuals living with heart failure and their caregivers are welcome to join. If you are interested in participating, please contact us at [email or web address] and you will be sent the link to join the [employer].

## 2022-04-22 NOTE — PROGRESS NOTES
[facility] in [place], [facility] Note    Patient name: Stacie Barriga  Patient : 1965  Patient MRN: [id number]  Date of service: 22    Primary care physician: Armando Guzman MD  Primary general cardiologist:  Dr. Mary Kay Wood    Primary F cardiologist: Anitra Dos Santos MD    CHIEF COMPLAINT:  Chronic systolic heart failure    PLAN OF CARE:  · 50 y/o male with h/o non-ischemic cardiomyopathy, LVEF 28%, stage C, NYHA class IIIA symptoms and CKD, stage 3 (Cr 2.1-2.47) undergoing optimization of GDMT (taken off nephrotoxic meds through diuresis and to allow renal recovery and up-titrating alternative HF meds); once patient is euvolemic he should have [address] +/- ischemic evaluation (possibly LHC depending on renal function, last [place] [facility] )  · Most likely etiology of worsening HF is chronic volume overload due to underestimated severity of renal failure +/- possibly inadequately/untreated MINA; evaluation ongoing     PLAN:  Continue current medical therapy for heart failure  Continue current dose of coreg 50mg twice daily  Continue current dose of norvasc 10mg daily  Hold ARNI/MRA to allow renal recovery  Continue hydralazine 10mg TID and isordil 10mg TID   Does not take SGLT2 inhibitor, labs pending   Continue bumex 1mg twice daily; will reassess via phone next week   Not on allopurinol or uloric, check uric acid level  Does not take aspirin   Continue current dose of statin, check lipid profile, CPK and LFT   ICD interrogation every 3 months per routine  Sleep study scheduled May 13th  Genetic testing today  Check 6MW when diuresed  Screening HF labs: CBC, BMP, Mg, LFT, uric acid, pro-NT-BNP, iron profile with ferritin, TSH, vitamin D level, lipid profile, CPK, gammopathy profile, HIV, hepatitis panel, JAYDA, HGBA1C, PSA screen - not done at last visit, patient to go to lab within 1 week   Reinforced low salt diet  Reinforced fluid restriction to 6 x 8oz glasses per day  Provided educational materials \"Living with heart failure\"   Previously provided advanced care plan forms to be filled out    Follow-up with nephrology (has referral next month)  Follow-up with primary cardiologist  Follow-up with EP cardiologist  Recommend flu, covid and pneumonia vaccinations    Return in 1 month     IMPRESSION:  Fatigue  Shortness of breath  Volume overload  Chronic systolic heart failure   Stage C, NYHA class IIIA symptoms   Non-ischemic cardiomyopathy, LVEF 28% (HF diagnosed around 2002)   Normal coronaries by [facility] 2015  S/p single lead ICD (2/2015)  Cardiac risk factors   HTN   HL   DM2   Morbid obesity, BMI 35  CKD, stage 3  Anemia    CARDIAC IMAGING:  Echo (3/9/22)    Study limited to the assessment of ejection fraction.   Left Ventricle: Left ventricle is moderately dilated. Mildly increased wall thickness. Severe global hypokinesis present. Calculared ejection fraction is 28% by 3D volume and 31% by 2D Fernandez's biplane. Global longitudinal strain is reduced with a value of -5.8%. Echo (12/6/21)  · LV: Calculated LVEF is 28%. Biplane method used to measure ejection fraction. Mildly dilated left ventricle. Mildly to moderately increased wall thickness. Moderate-to-severely and globally reduced systolic function. Moderate (grade 2) left ventricular diastolic dysfunction. · LA: Mildly dilated left atrium. Left Atrium volume index is 40 mL/m2. · MV: Moderate mitral annular calcification. Very mild mitral valve regurgitation is present. · RV: Not well visualized. Pacer/ICD present. Echo (2/17/21)  · LV: Calculated LVEF is 29%. Biplane method used to measure ejection fraction. Mildly dilated left ventricle. Mild to moderate hypertrophy. Severely and globally reduced systolic function. Wall motion: normal. Abnormal left ventricular strain. Global longitudinal strain is -8%. Moderate (grade 2) left ventricular diastolic dysfunction.   · LA: Mildly dilated left atrium. Left Atrium volume index is 40 mL/m2. · MV: Moderate mitral annular calcification. Very mild mitral valve regurgitation is present. Echo (8/16/19)  · Left Ventricle: Mildly dilated left ventricle. Moderate concentric hypertrophy. Severe global systolic dysfunction. Estimated left ventricular ejection fraction is 21 - 25%. Biplane method used to measure ejection fraction. Left ventricular global hypokinesis. Abnormal left ventricular strain. Inconclusive left ventricular diastolic function. · Left Atrium: Mildly dilated left atrium. · Mitral Valve: Moderate mitral annular calcification. Mild mitral valve regurgitation. EKG (8/5/19) NSR 73bpm QRS 124ms  LHC (2/105) normal cors    HEMODYNAMICS:  RHC not done  CPEST not done    6 Min Walk Report 3/2/2015   (PRE) HR 78   (PRE) O2 Sat 99   (POST) HR 90   (POST) O2 Sat 99   Distance in Meters 386.18     HISTORY OF PRESENT ILLNESS:  I had the pleasure of seeing Prem Hines in [address] [place] [address] at [facility] in [place]. Briefly, Prem Hines is a 49 y.o. male with h/o morbid obesity, BMI 35, HTN, HL, DM2, chronic systolic heart failure, stage C, NYHA class IIIA symptoms, non-ischemic cardiomyopathy, LVEF 28% (HF diagnosed around 2002), normal coronaries by [facility] 2015 s/p single lead ICD (2/2015) and worsening renal function, CKD stage 2 and anemia. Patient was referred to [facility] to establish long-term care. INTERVAL HISTORY:  Today, patient presents for follow up visit. He feels \"much better\" today following increase in diuretics at last visit. He denies dizziness, lightheadedness, CP, palpitations, nausea, vomiting. His LE edema is nearly resolved  He is compliant with his medications and trying to follow a low Na+ diet. REVIEW OF SYSTEMS:  General: Denies fever, night sweats.   Ear, nose and throat: Denies difficulty hearing, sinus problems, runny nose, post-nasal drip, ringing in ears, mouth sores, loose teeth, ear pain, nosebleeds, sore throate, facial pain or numbess  Cardiovascular: see above in the interval history  Respiratory: Denies cough, wheezing, sputum production, hemoptysis. Gastrointestinal: Denies heartburn, constipation, diarrhea, abdominal pain, nausea, vomiting, difficulty swallowing, blood in stool  Kidney and bladder: Denies painful urination, frequent urination, urgency  Musculoskeletal: Denies joint pain, muscle weakness  Skin and hair: Denies change in existing skin lesions, hair loss or increase    PHYSICAL EXAM:  Visit Vitals  /82 (BP 1 Location: Left arm, BP Patient Position: Sitting, BP Cuff Size: Adult)   Pulse 69   Temp 97.2 °F (36.2 °C) (Oral)   Resp 12   Ht 5' 7\" (1.702 m)   Wt 227 lb 12.8 oz (103.3 kg)   SpO2 98%   BMI 35.68 kg/m²     General: Patient is well developed, well-nourished in no acute distress  HEENT: Normocephalic and atraumatic. No scleral icterus. Pupils are equal, round and reactive to light and accomodation. No conjunctival injection. Oropharynx is clear. Neck: Supple. No evidence of thyroid enlargements or lymphadenopathy. JVD: Cannot be appreciated   Lungs: Breath sounds are equal and clear bilaterally. No wheezes, rhonchi, or rales. Heart: Regular rate and rhythm with normal S1 and S2. No murmurs, gallops or rubs. Abdomen: Soft, no mass or tenderness. No organomegaly or hernia. Bowel sounds present. Genitourinary and rectal: deferred  Extremities: No cyanosis, clubbing, or edema. Neurologic: No focal sensory or motor deficits are noted. Grossly intact. Psychiatric: Awake, alert an doriented x 3. Appropriate mood and affect. Skin: Warm, dry and well perfused. No lesions, nodules or rashes are noted.     PAST MEDICAL HISTORY:  Past Medical History:   Diagnosis Date    Diabetes [facility]     Heart failure ([facility]     CHF, cardiomyopathy    Hypertension     ICD (implantable cardioverter-defibrillator) in place     left upper chest       PAST SURGICAL HISTORY:  Past Surgical History:   Procedure Laterality Date    HX HEART CATHETERIZATION  2/2015    x1    HX IMPLANTABLE CARDIOVERTER DEFIBRILLATOR  2/16/2015       FAMILY HISTORY:  Family History   Problem Relation Age of Onset    Hypertension Father     Diabetes Father     Diabetes Mother     Diabetes Brother     Hypertension Brother        SOCIAL HISTORY:  Social History     Socioeconomic History    Marital status:    Tobacco Use    Smoking status: Never Smoker    Smokeless tobacco: Never Used   Vaping Use    Vaping Use: Never used   Substance and Sexual Activity    Alcohol use: No    Drug use: No       LABORATORY RESULTS:  No flowsheet data found. ALLERGY:  No Known Allergies     CURRENT MEDICATIONS:    Current Outpatient Medications:     cholecalciferol (VITAMIN D3) 25 mcg (1,000 unit) cap, Take 1,000 Units by mouth daily. , Disp: , Rfl:     bumetanide (BUMEX) 1 mg tablet, Take 1 Tablet by mouth two (2) times a day., Disp: 60 Tablet, Rfl: 0    hydrALAZINE (APRESOLINE) 10 mg tablet, Take 1 Tablet by mouth three (3) times daily. , Disp: 90 Tablet, Rfl: 2    isosorbide dinitrate (ISORDIL) 10 mg tablet, Take 1 Tablet by mouth three (3) times daily. , Disp: 90 Tablet, Rfl: 2    carvediloL (COREG) 25 mg tablet, Take 2 Tablets by mouth two (2) times a day., Disp: 120 Tablet, Rfl: 5    amLODIPine (NORVASC) 10 mg tablet, Take 1 Tablet by mouth daily. , Disp: 90 Tablet, Rfl: 1    NovoLIN 70/30 U-100 Insulin 100 unit/mL (70-30) injection, INJECT UP TO 32 UNITS SUBCUTANEOUSLY WITH BREAKFAST AND WITH SUPPER FOLLOWING SLIDING SCALE AS DIRECTED BY DR Waddell Houck, Disp: , Rfl:     simvastatin (ZOCOR) 40 mg tablet, TAKE 1 TABLET BY MOUTH ONCE DAILY AT BEDTIME, Disp: , Rfl:     Insulin Needles, Disposable, 31 ndle, For 75/25 insulin administration, Disp: 150 Each, Rfl: 2    Blood-Glucose Meter monitoring kit, 1, Disp: 1 Kit, Rfl: 0    glucose blood VI test strips (BLOOD GLUCOSE TEST) strip, 1, Disp: 1 Package, Rfl: 11    multivitamin with iron (DAILY MULTI-VITAMINS/IRON) tablet, Take 1 tablet by mouth daily. , Disp: , Rfl:     Thank you for your referral and allowing me to participate in this patient's care.     Fredy Meadows NP   [address] [place] [address], [address]  Phone: [phone]    PATIENT CARE TEAM:  Patient Care Team:  Mary Allan MD as PCP - General (Family Medicine)  Giselle Javed MD (Cardiology)  Starr Ruby MD (Nephrology)

## 2022-04-26 ENCOUNTER — PATIENT OUTREACH (OUTPATIENT)
Dept: CASE MANAGEMENT | Age: 57
End: 2022-04-26

## 2022-04-26 NOTE — ACP (ADVANCE CARE PLANNING)
Advance Care Planning   Ambulatory ACP Specialist Patient Outreach    Date:  4/26/2022    ACP Specialist:  Jerome Metz LPN    Outreach call to patient in follow-up to ACP Specialist referral from:    [x] PCP  [] Provider   [] Ambulatory Care Management [] Other     For:                  [x] Advance Directive Assistance              [] Complete Portable DNR order              [] Complete POST/MOST              [] Code Status Discussion             [] Discuss Goals of Care             [] Early ACP Decision-Making              [] Other (Specify)    Date Referral Received: 4/13/22    Today's Outreach:  [] First   [x] Second  [] Third       Third outreach made by: [] Phone  [] Email / mail    [] [employer]     Intervention:  [x] Spoke with Patient   [] Left VM requesting return call      Outcome: LPN spoke with the pt who wishes to move forward in having an ACP conversation with an ACP specialist. Pt is alert and oriented x4. Appointment scheduled for 5/2/2022 at 1 pm. ACP information has been e-mailed to the pt for review prior to the appointment. Next Step:   [x] ACP scheduled conversation  [] Outreach again in one week               [x] Email / Mail ACP Info Sheets  [] Email / Mail Advance Directive   [] Closing referral.  Routing closure to referring provider/staff and to ACP Specialist . [] Closure letter mailed to patient with invitation to contact ACP Specialist if / when ready.   Thank you for this referral.

## 2022-04-29 ENCOUNTER — TELEPHONE (OUTPATIENT)
Dept: CARDIOLOGY CLINIC | Age: 57
End: 2022-04-29

## 2022-04-29 ENCOUNTER — DOCUMENTATION ONLY (OUTPATIENT)
Dept: CASE MANAGEMENT | Age: 57
End: 2022-04-29

## 2022-04-29 ENCOUNTER — HOSPITAL ENCOUNTER (EMERGENCY)
Age: 57
Discharge: HOME | End: 2022-04-29
Attending: EMERGENCY MEDICINE
Payer: MEDICARE

## 2022-04-29 VITALS
RESPIRATION RATE: 18 BRPM | BODY MASS INDEX: 35.08 KG/M2 | DIASTOLIC BLOOD PRESSURE: 77 MMHG | SYSTOLIC BLOOD PRESSURE: 143 MMHG | HEART RATE: 84 BPM | WEIGHT: 224 LBS | OXYGEN SATURATION: 100 % | TEMPERATURE: 98.5 F
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
## 2022-04-29 PROCEDURE — 74011250637 HC RX REV CODE- 250/637: Performed by: EMERGENCY MEDICINE

## 2022-04-29 PROCEDURE — 99283 EMERGENCY DEPT VISIT LOW MDM: CPT

## 2022-04-29 RX ORDER — ACETAMINOPHEN 500 MG
1000 TABLET ORAL ONCE
Status: COMPLETED | OUTPATIENT
Start: 2022-04-29 | End: 2022-04-29

## 2022-04-29 RX ADMIN — ACETAMINOPHEN 1000 MG: 500 TABLET ORAL at 14:00
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
## 2022-04-29 NOTE — ACP (ADVANCE CARE PLANNING)
Advance Care Planning   Ambulatory ACP Specialist Patient Outreach    Date:  4/29/2022    ACP Specialist:  Edward Johnson LCSW  Outreach call to patient in follow-up to ACP Specialist referral from:    [] PCP  [x] Provider   [] Ambulatory Care Management [] Other     For:                  [x] Advance Directive Assistance              [] Complete Portable DNR order              [] Complete POST/MOST              [] Code Status Discussion             [] Discuss Goals of Care             [] Early ACP Decision-Making              [] Other (Specify)    Date Referral Received:4/13/22    Today's Outreach:  [x] First   [] Second  [] Third       Third outreach made by: [] Phone  [] Email / mail    [] MyChart     Intervention:  [] Spoke with Patient   [] Left VM requesting return call      Outcome: Reminder call provided regarding upcoming ACP appt. Unfortunately, there was no answer at the time of this call. VM was left with details of call. Review of EMR notes ACP from 2015. Will review it for accuracy and complete new document if needed. Next Step:   [x] ACP scheduled conversation  [] Outreach again in one week               [] Email / Mail ACP Info Sheets  [] Email / Mail Advance Directive   [] Closing referral.  Routing closure to referring provider/staff and to ACP Specialist . [] Closure letter mailed to patient with invitation to contact ACP Specialist if / when ready.   Thank you for this referral.         Edward Johnson LCSW, [facility]  [address] [address]

## 2022-04-29 NOTE — ED NOTES
Patient is alert and oriented x 4 and in no acute distress at this time. Respirations are at a regular rate, depth, and pattern. Patient updated on plan of care and has no questions or concerns at this time. Call bell within reach. Will continue to monitor. Please reference nursing assessment. Emergency Department Nursing Plan of Care       The Nursing Plan of Care is developed from the Nursing assessment and Emergency Department Attending provider initial evaluation. The plan of care may be reviewed in the ED Provider note.     The Plan of Care was developed with the following considerations:   Patient / Family readiness to learn indicated by:verbalized understanding and successful return demonstration  Persons(s) to be included in education: patient  Barriers to Learning/Limitations:No    Signed     Khoa López RN    4/29/2022   1:50 PM
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
## 2022-05-02 ENCOUNTER — DOCUMENTATION ONLY (OUTPATIENT)
Dept: CASE MANAGEMENT | Age: 57
End: 2022-05-02

## 2022-05-02 ENCOUNTER — PATIENT MESSAGE (OUTPATIENT)
Dept: CARDIOLOGY CLINIC | Age: 57
End: 2022-05-02

## 2022-05-02 NOTE — ACP (ADVANCE CARE PLANNING)
Advance Care Planning     Advance Care Planning Clinical Specialist  Conversation Note      Date of ACP Conversation: 05/02/22    Conversation Conducted with:  Patient with Decision Making Capacity    ACP Clinical Specialist: Dixon Sicard, [address] [facility] Decision Maker:    Current Designated Health Care Decision Maker:     Primary Decision Maker: Linda Leung Partner - [phone]    Secondary Decision Maker: Yessy Baldwin - [phone]    Today we identified his HCDMs, reviewed his wishes for various medical scenarios, and will complete a new ACP document. Care Preferences    Hospitalization: \"If your health worsens and it becomes clear that your chance of recovery is unlikely, what would your preference be regarding hospitalization? \"    Choice:  [x]  The patient wants hospitalization  []  The patient prefers comfort-focused treatment without hospitalization. Pt explains that he would prefer to go to the hospital, as he worries about his family's ability to remain in a place, like the home, after his death. Ventilation: \"If you were in your present state of health and suddenly became very ill and were unable to breathe on your own, what would your preference be about the use of a ventilator (breathing machine) if it were available to you? \"      If patient would desire the use of a ventilator (breathing machine), answer \"yes\", if not \"no\":yes, \"If they think that they can improve my condition, then yes. \"     \"If your health worsens and it becomes clear that your chance of recovery is unlikely, what would your preference be about the use of a ventilator (breathing machine) if it were available to you? \"     Would the patient desire the use of a ventilator (breathing machine)? NO. \"I don't want to be on it if it is just keeping me alive. \"      Resuscitation  \"CPR works best to restart the heart when there is a sudden event, like a heart attack, in someone who is otherwise healthy. Unfortunately, CPR does not typically restart the heart for people who have serious health conditions or who are very sick. \"    \"In the event your heart stopped as a result of an underlying serious health condition, would you want attempts to be made to restart your heart (answer \"yes\" for attempt to resuscitate) or would you prefer a natural death (answer \"no\" for do not attempt to resuscitate)? \" yes, Pt was informed of the risks/benefits associated with CPR. He explains that he WOULD want CPR at this time in his life, because he still has a child under 17y/o. \"Maybe in another 5 or 10 years, once he is on his own, I may not want to go through that (CPR). \" Normalized changing medical wishes over time. Encouraged Pt to keep his medical team up to date on his wishes. Also discussed the ability to \"turn off\" defibrillator in circumstances where Pt's do not wish to be shocked. Introduced idea of DNR, should he make this choice in the future. [x] Yes  [] No   Educated Patient / Ligia Leonard regarding differences between Advance Directives and portable DNR orders. Length of ACP Conversation in minutes:  45 minutes    Conversation Outcomes:  [x] ACP discussion completed  [] Existing advance directive reviewed with patient; no changes to patient's previously recorded wishes   [x] New Advance Directive completed   [] Portable Do Not Resuscitate prepared for Provider review and signature  [] POLST/POST/MOLST/MOST prepared for Provider review and signature      Follow-up plan:    [] Schedule follow-up conversation to continue planning  [] Referred individual to Provider for additional questions/concerns   [x] Advised patient/agent/surrogate to review completed ACP document and update if needed with changes in condition, patient preferences or care setting     [x] This note routed to one or more involved healthcare providers    5/2- Pt was very pleasant and well engaged for his conversation.  He talked openly about his wishes for medical care and commented that some of his answers may change as his youngest child grows up and moves out of the home. At this time, Pt's hope is to live as long as he can to see his family grow and improve. He was able to identify his HCDMs and will be completing a new ACP document. Once finalized, his document will be uploaded into the EMR. At that time, this referral will be recommended for closure.       Maricarmen Francisco, MAGUI, [facility]-  Advance Care   Population Health

## 2022-05-04 NOTE — TELEPHONE ENCOUNTER
Mar Rodarte NP  You 7 minutes ago (3:39 PM)     LP    Please ask him to go get the labs that were ordered at his last visit and we can make changes if needed. Message text      [phone] - called pt using two pt identifiers. Advised pt of ZAHRA Miller NP above message. Pt states he will try to go tomorrow. Asked pt if he still had lab slips that were provided in clinic. Pt states he has lab slips. Instructed pt to take lab slips with him to have labs drawn. Pt states understanding and has no further questions or concerns at this time.     Alicia Fernandez RN

## 2022-05-09 LAB
25(OH)D3+25(OH)D2 SERPL-MCNC: 23.2 NG/ML (ref 30–100)
ALBUMIN SERPL ELPH-MCNC: 3.6 G/DL (ref 2.9–4.4)
ALBUMIN SERPL-MCNC: 4 G/DL (ref 3.8–4.9)
ALBUMIN/GLOB SERPL: 1.2 {RATIO} (ref 0.7–1.7)
ALBUMIN/GLOB SERPL: 1.5 {RATIO} (ref 1.2–2.2)
ALP SERPL-CCNC: 124 IU/L (ref 44–121)
ALPHA1 GLOB SERPL ELPH-MCNC: 0.3 G/DL (ref 0–0.4)
ALPHA2 GLOB SERPL ELPH-MCNC: 0.7 G/DL (ref 0.4–1)
ALT SERPL-CCNC: 9 IU/L (ref 0–44)
AST SERPL-CCNC: 16 IU/L (ref 0–40)
B-GLOBULIN SERPL ELPH-MCNC: 1 G/DL (ref 0.7–1.3)
BILIRUB SERPL-MCNC: 0.3 MG/DL (ref 0–1.2)
BUN SERPL-MCNC: 30 MG/DL (ref 6–24)
BUN/CREAT SERPL: 13 (ref 9–20)
CALCIUM SERPL-MCNC: 9.2 MG/DL (ref 8.7–10.2)
CENTROMERE B AB SER-ACNC: <0.2 AI (ref 0–0.9)
CHLORIDE SERPL-SCNC: 100 MMOL/L (ref 96–106)
CHOLEST SERPL-MCNC: 132 MG/DL (ref 100–199)
CHROMATIN AB SERPL-ACNC: <0.2 AI (ref 0–0.9)
CK SERPL-CCNC: 125 U/L (ref 41–331)
CO2 SERPL-SCNC: 21 MMOL/L (ref 20–29)
CREAT SERPL-MCNC: 2.29 MG/DL (ref 0.76–1.27)
DSDNA AB SER-ACNC: <1 IU/ML (ref 0–9)
EGFR: 33 ML/MIN/1.73
ENA JO1 AB SER-ACNC: <0.2 AI (ref 0–0.9)
ENA RNP AB SER-ACNC: <0.2 AI (ref 0–0.9)
ENA SCL70 AB SER-ACNC: <0.2 AI (ref 0–0.9)
ENA SM AB SER-ACNC: <0.2 AI (ref 0–0.9)
ENA SS-A AB SER-ACNC: <0.2 AI (ref 0–0.9)
ENA SS-B AB SER-ACNC: <0.2 AI (ref 0–0.9)
ERYTHROCYTE [DISTWIDTH] IN BLOOD BY AUTOMATED COUNT: 13.8 % (ref 11.6–15.4)
ERYTHROCYTE [SEDIMENTATION RATE] IN BLOOD BY WESTERGREN METHOD: 34 MM/HR (ref 0–30)
EST. AVERAGE GLUCOSE BLD GHB EST-MCNC: 186 MG/DL
FERRITIN SERPL-MCNC: 100 NG/ML (ref 30–400)
GAMMA GLOB SERPL ELPH-MCNC: 1.1 G/DL (ref 0.4–1.8)
GLOBULIN SER CALC-MCNC: 2.7 G/DL (ref 1.5–4.5)
GLOBULIN SER-MCNC: 3.1 G/DL (ref 2.2–3.9)
GLUCOSE SERPL-MCNC: 298 MG/DL (ref 65–99)
HBA1C MFR BLD: 8.1 % (ref 4.8–5.6)
HCT VFR BLD AUTO: 31.5 % (ref 37.5–51)
HDLC SERPL-MCNC: 39 MG/DL
HGB BLD-MCNC: 9.9 G/DL (ref 13–17.7)
IGA SERPL-MCNC: 246 MG/DL (ref 90–386)
IGG SERPL-MCNC: 1162 MG/DL (ref 603–1613)
IGM SERPL-MCNC: 35 MG/DL (ref 20–172)
INTERPRETATION SERPL IEP-IMP: ABNORMAL
IRON SATN MFR SERPL: 12 % (ref 15–55)
IRON SERPL-MCNC: 33 UG/DL (ref 38–169)
KAPPA LC FREE SER-MCNC: 48.8 MG/L (ref 3.3–19.4)
KAPPA LC FREE/LAMBDA FREE SER: 1.39 {RATIO} (ref 0.26–1.65)
LAMBDA LC FREE SERPL-MCNC: 35.1 MG/L (ref 5.7–26.3)
LDLC SERPL CALC-MCNC: 79 MG/DL (ref 0–99)
M PROTEIN SERPL ELPH-MCNC: ABNORMAL G/DL
MAGNESIUM SERPL-MCNC: 1.9 MG/DL (ref 1.6–2.3)
MCH RBC QN AUTO: 26.1 PG (ref 26.6–33)
MCHC RBC AUTO-ENTMCNC: 31.4 G/DL (ref 31.5–35.7)
MCV RBC AUTO: 83 FL (ref 79–97)
NT-PROBNP SERPL-MCNC: 3323 PG/ML (ref 0–210)
PLATELET # BLD AUTO: 216 X10E3/UL (ref 150–450)
PLEASE NOTE:, 149534: ABNORMAL
POTASSIUM SERPL-SCNC: 4.4 MMOL/L (ref 3.5–5.2)
PROT SERPL-MCNC: 6.7 G/DL (ref 6–8.5)
RBC # BLD AUTO: 3.79 X10E6/UL (ref 4.14–5.8)
SEE BELOW, 164869: NORMAL
SODIUM SERPL-SCNC: 139 MMOL/L (ref 134–144)
T4 FREE SERPL-MCNC: 1.29 NG/DL (ref 0.82–1.77)
TIBC SERPL-MCNC: 265 UG/DL (ref 250–450)
TRIGL SERPL-MCNC: 68 MG/DL (ref 0–149)
TROPONIN T SERPL HS-MCNC: 56 NG/L (ref 0–22)
TSH SERPL DL<=0.005 MIU/L-ACNC: 2.08 UIU/ML (ref 0.45–4.5)
UIBC SERPL-MCNC: 232 UG/DL (ref 111–343)
URATE SERPL-MCNC: 8.4 MG/DL (ref 3.8–8.4)
VLDLC SERPL CALC-MCNC: 14 MG/DL (ref 5–40)
WBC # BLD AUTO: 7.5 X10E3/UL (ref 3.4–10.8)

## 2022-05-10 ENCOUNTER — TELEPHONE (OUTPATIENT)
Dept: CARDIOLOGY CLINIC | Age: 57
End: 2022-05-10

## 2022-05-10 NOTE — TELEPHONE ENCOUNTER
Las from 5/5 reviewed (received 5/9). Cr slightly increased, 2.29. NTproBNP remains markedly elevated 3323. Inquire re: weights, symptoms. Iron low, send to [facility] for Venofer 200 mg IV x 2.     BG remains elevated and HbA1c 8.1; further management per PCP. Will forward results to PCP. Vit D deficient, will confirm pt taking repletion as ordered. Follow up as scheduled with [facility] 5/20.

## 2022-05-10 NOTE — TELEPHONE ENCOUNTER
[phone] - called pt using two pt identifiers. Pt request to call me back in a little while. Will await return call. 5/11/22:  1503 - called pt d/t not receiving return call, no answer, mailbox full, unable to leave voicemail. Will send [employer] message requesting for pt to call. Will attempt to call pt again later. 5/12/22:  Received voicemail from pt returning call. 1422 - called pt using two pt identifiers. Advised pt of ZAHRA Miller NP below message. Pt reports his weight has been between 220-221 lbs. Pt reports he still has swelling in his L ankle but has decreased since his appointment. Pt denies any SOB, chest pain, heart palpitations and lightheaded/dizziness. Pt reports he has an endocrinologist Dr. Naomi Bass who he request we send his Lab results to as well re: BG and HA1C. Advised pt I would fax those results to both his PCP and endocrine dr. Fany Garcia pt I would send venofer order to [facility], pt request he go to [facility]. Pt reports he has not been taking his vit d listed on his med list but will start. Advised pt I would let provider know about vit d supplement, symptoms and his weights and call back with any recommendations. Pt states understanding and has no further questions or concerns at this time. Venofer order faxed to [facility]. Fax confirmation received. Labs faxed to PCP and Endocrinologist. Fax confirmation received. Cherie Murrell NP  You 21 hours ago (4:33 PM)     LP    OK, pt to resume Vit D as listed in chart. Message text      [email or web address] message sent to pt via pt request with above recommendations from ZAHRA Miller NP.    Ana Cabrera, RN

## 2022-05-16 RX ORDER — SODIUM CHLORIDE 9 MG/ML
25 INJECTION, SOLUTION INTRAVENOUS ONCE
Status: DISPENSED | OUTPATIENT
Start: 2022-05-17 | End: 2022-05-17

## 2022-05-17 ENCOUNTER — HOSPITAL ENCOUNTER (OUTPATIENT)
Dept: INFUSION THERAPY | Age: 57
Discharge: HOME | End: 2022-05-17

## 2022-05-19 ENCOUNTER — TELEPHONE (OUTPATIENT)
Dept: CARDIOLOGY CLINIC | Age: 57
End: 2022-05-19

## 2022-05-19 NOTE — TELEPHONE ENCOUNTER
Telephone Call RE:  Appointment reminder     Outcome:     [] Patient confirmed appointment   [] Patient rescheduled appointment for    [x] Unable to reach  [] Left message             [] Other:     ---------------------             [] Screened for COVID    Voicemail was full.     Sharri Lovelace

## 2022-05-23 RX ORDER — SODIUM CHLORIDE 9 MG/ML
25 INJECTION, SOLUTION INTRAVENOUS CONTINUOUS
Status: DISPENSED | OUTPATIENT
Start: 2022-05-31 | End: 2022-05-31

## 2022-05-24 ENCOUNTER — HOSPITAL ENCOUNTER (OUTPATIENT)
Dept: INFUSION THERAPY | Age: 57
Discharge: HOME | End: 2022-05-24

## 2022-05-24 DIAGNOSIS — I10 ESSENTIAL HYPERTENSION WITH GOAL BLOOD PRESSURE LESS THAN 130/80: ICD-10-CM

## 2022-05-24 RX ORDER — AMLODIPINE BESYLATE 10 MG/1
TABLET ORAL
Qty: 90 TABLET | Refills: 0 | Status: SHIPPED | OUTPATIENT
Start: 2022-05-24 | End: 2022-08-25

## 2022-05-31 ENCOUNTER — HOSPITAL ENCOUNTER (OUTPATIENT)
Dept: INFUSION THERAPY | Age: 57
Discharge: HOME | End: 2022-05-31

## 2022-05-31 RX ORDER — SODIUM CHLORIDE 9 MG/ML
25 INJECTION, SOLUTION INTRAVENOUS CONTINUOUS
Status: DISPENSED | OUTPATIENT
Start: 2022-06-07 | End: 2022-06-07

## 2022-06-07 ENCOUNTER — HOSPITAL ENCOUNTER (OUTPATIENT)
Dept: INFUSION THERAPY | Age: 57
Discharge: HOME | End: 2022-06-07

## 2022-06-07 RX ORDER — SODIUM CHLORIDE 9 MG/ML
25 INJECTION, SOLUTION INTRAVENOUS ONCE
Status: DISPENSED | OUTPATIENT
Start: 2022-06-14 | End: 2022-06-14

## 2022-06-13 ENCOUNTER — TELEPHONE (OUTPATIENT)
Dept: CARDIOLOGY CLINIC | Age: 57
End: 2022-06-13

## 2022-06-13 NOTE — TELEPHONE ENCOUNTER
Telephone call to patient to re-schedule one month follow up appointment for Thursday, 6/23/22 at 9:00 a.m. with  Darlene Belle NP.

## 2022-06-14 ENCOUNTER — HOSPITAL ENCOUNTER (OUTPATIENT)
Dept: INFUSION THERAPY | Age: 57
Discharge: HOME | End: 2022-06-14

## 2022-06-20 ENCOUNTER — TELEPHONE (OUTPATIENT)
Dept: CARDIOLOGY CLINIC | Age: 57
End: 2022-06-20

## 2022-06-20 DIAGNOSIS — I50.22 CHRONIC SYSTOLIC HEART FAILURE (HCC): ICD-10-CM

## 2022-06-20 DIAGNOSIS — I10 ESSENTIAL HYPERTENSION WITH GOAL BLOOD PRESSURE LESS THAN 130/80: ICD-10-CM

## 2022-06-20 DIAGNOSIS — R06.02 SOB (SHORTNESS OF BREATH): ICD-10-CM

## 2022-06-20 RX ORDER — BUMETANIDE 1 MG/1
1 TABLET ORAL 2 TIMES DAILY
Qty: 60 TABLET | Refills: 0 | Status: SHIPPED | OUTPATIENT
Start: 2022-06-20 | End: 2022-07-18 | Stop reason: SDUPTHER

## 2022-06-20 NOTE — TELEPHONE ENCOUNTER
Patient left  requesting refill for bumex. Requested Prescriptions     Signed Prescriptions Disp Refills    bumetanide (BUMEX) 1 mg tablet 60 Tablet 0     Sig: Take 1 Tablet by mouth two (2) times a day. Authorizing Provider: Jac Johnson     Ordering User: Mary Morrissey     Called patient using two patient identifiers. Notified patient that a refill for bumex was sent but advised him he will need to follow up as directed on 6/23 this Thursday at 9 am to continue to receive refills. patient sated understanding and states he will be coming to his appt on Thursday.  Meryle Libman RN

## 2022-06-22 ENCOUNTER — TELEPHONE (OUTPATIENT)
Dept: CARDIOLOGY CLINIC | Age: 57
End: 2022-06-22

## 2022-06-22 NOTE — TELEPHONE ENCOUNTER
Telephone Call RE:  Appointment reminder     Outcome:     [] Patient confirmed appointment   [] Patient rescheduled appointment for    [] Unable to reach  [] Left message             [] Other:     ---------------------             [] Screened for [facility]

## 2022-06-23 ENCOUNTER — OFFICE VISIT (OUTPATIENT)
Dept: CARDIOLOGY CLINIC | Age: 57
End: 2022-06-23
Payer: MEDICARE

## 2022-06-23 VITALS
OXYGEN SATURATION: 99 % | SYSTOLIC BLOOD PRESSURE: 138 MMHG | RESPIRATION RATE: 16 BRPM | HEART RATE: 70 BPM | WEIGHT: 227.4 LBS | DIASTOLIC BLOOD PRESSURE: 82 MMHG | BODY MASS INDEX: 35.69 KG/M2 | TEMPERATURE: 98 F | HEIGHT: 67 IN

## 2022-06-23 DIAGNOSIS — I42.8 NICM (NONISCHEMIC CARDIOMYOPATHY) (HCC): ICD-10-CM

## 2022-06-23 DIAGNOSIS — I50.22 CHRONIC SYSTOLIC HEART FAILURE (HCC): ICD-10-CM

## 2022-06-23 DIAGNOSIS — R06.02 SOB (SHORTNESS OF BREATH): Primary | ICD-10-CM

## 2022-06-23 PROCEDURE — 99214 OFFICE O/P EST MOD 30 MIN: CPT | Performed by: NURSE PRACTITIONER

## 2022-06-23 PROCEDURE — G8417 CALC BMI ABV UP PARAM F/U: HCPCS | Performed by: NURSE PRACTITIONER

## 2022-06-23 PROCEDURE — G8432 DEP SCR NOT DOC, RNG: HCPCS | Performed by: NURSE PRACTITIONER

## 2022-06-23 PROCEDURE — 94618 PULMONARY STRESS TESTING: CPT | Performed by: NURSE PRACTITIONER

## 2022-06-23 PROCEDURE — G8752 SYS BP LESS 140: HCPCS | Performed by: NURSE PRACTITIONER

## 2022-06-23 PROCEDURE — 3017F COLORECTAL CA SCREEN DOC REV: CPT | Performed by: NURSE PRACTITIONER

## 2022-06-23 PROCEDURE — G8427 DOCREV CUR MEDS BY ELIG CLIN: HCPCS | Performed by: NURSE PRACTITIONER

## 2022-06-23 PROCEDURE — G8754 DIAS BP LESS 90: HCPCS | Performed by: NURSE PRACTITIONER

## 2022-06-23 NOTE — PROGRESS NOTES
[facility] in [place], [facility] Note    Patient name: Eden Rose  Patient : 1965  Patient MRN: [id number]  Date of service: 22    Primary care physician: Haydee Ball MD  Primary general cardiologist:  Dr. Christelle Lacy    Primary F cardiologist: Lorna Camp MD    CHIEF COMPLAINT:  Chronic systolic heart failure    PLAN OF CARE:  · 57 y/o male with h/o non-ischemic cardiomyopathy, LVEF 28%, stage C, NYHA class IIIA symptoms and CKD, stage 3 (Cr 2.1-2.47) undergoing optimization of GDMT (taken off nephrotoxic meds through diuresis and to allow renal recovery and up-titrating alternative HF meds); once patient is euvolemic he should have [address] +/- ischemic evaluation (possibly LHC depending on renal function, last [facility] )  · Most likely etiology of worsening HF is chronic volume overload due to underestimated severity of renal failure +/- possibly inadequately/untreated MINA; evaluation ongoing       PLAN:  Continue current medical therapy for heart failure  Continue coreg 50mg twice daily  Continue norvasc 10mg daily  Hold ARNI/MRA to allow renal recovery  Continue hydralazine 10mg TID and isordil 10mg TID   Does not take SGLT2 inhibitor, labs pending   Increase bumex 2mg QAM and then 1mg QPM through , then Monday back to 1mg BID  Not on allopurinol or uloric, check uric acid level  Does not take aspirin   Continue current dose of statin, check lipid profile, CPK and LFT   Needs to call to schedule iron infusions   ICD interrogation every 3 months per routine  Has consult scheduled with sleep med next week  Check 6MW    Echo again in Sept   Routine HF labs:   Reinforced low salt diet  Reinforced fluid restriction to 6 x 8oz glasses per day  Previously provided advanced care plan forms to be filled out    Follow-up with nephrology, has appt in August  Follow-up with primary cardiologist  Follow-up with EP cardiologist  Recommend flu, covid and pneumonia vaccinations    Follow up in [facility] in Sept with or after echo. IMPRESSION:  Fatigue  Shortness of breath  Volume overload  Chronic systolic heart failure   Stage C, NYHA class IIIA symptoms   Non-ischemic cardiomyopathy, LVEF 28% (HF diagnosed around 2002)   Normal coronaries by [facility] 2015  S/p single lead ICD (2/2015)  Cardiac risk factors   HTN   HL   DM2   Morbid obesity, BMI 35  CKD, stage 3  Anemia        CARDIAC IMAGING:  Echo (3/9/22)    Study limited to the assessment of ejection fraction.   Left Ventricle: Left ventricle is moderately dilated. Mildly increased wall thickness. Severe global hypokinesis present. Calculared ejection fraction is 28% by 3D volume and 31% by 2D Fernandez's biplane. Global longitudinal strain is reduced with a value of -5.8%. Echo (12/6/21)  · LV: Calculated LVEF is 28%. Biplane method used to measure ejection fraction. Mildly dilated left ventricle. Mildly to moderately increased wall thickness. Moderate-to-severely and globally reduced systolic function. Moderate (grade 2) left ventricular diastolic dysfunction. · LA: Mildly dilated left atrium. Left Atrium volume index is 40 mL/m2. · MV: Moderate mitral annular calcification. Very mild mitral valve regurgitation is present. · RV: Not well visualized. Pacer/ICD present. Echo (2/17/21)  · LV: Calculated LVEF is 29%. Biplane method used to measure ejection fraction. Mildly dilated left ventricle. Mild to moderate hypertrophy. Severely and globally reduced systolic function. Wall motion: normal. Abnormal left ventricular strain. Global longitudinal strain is -8%. Moderate (grade 2) left ventricular diastolic dysfunction. · LA: Mildly dilated left atrium. Left Atrium volume index is 40 mL/m2. · MV: Moderate mitral annular calcification. Very mild mitral valve regurgitation is present. Echo (8/16/19)  · Left Ventricle: Mildly dilated left ventricle.  Moderate concentric hypertrophy. Severe global systolic dysfunction. Estimated left ventricular ejection fraction is 21 - 25%. Biplane method used to measure ejection fraction. Left ventricular global hypokinesis. Abnormal left ventricular strain. Inconclusive left ventricular diastolic function. · Left Atrium: Mildly dilated left atrium. · Mitral Valve: Moderate mitral annular calcification. Mild mitral valve regurgitation. EKG (8/5/19) NSR 73bpm QRS 124ms  LHC (2/105) normal cors      6 Min Walk Report 3/2/2015   (PRE) HR 78   (PRE) O2 Sat 99   (POST) HR 90   (POST) O2 Sat 99   Distance in Meters 386.18     HISTORY OF PRESENT ILLNESS:  I had the pleasure of seeing Claus Mccarty in [facility] at [address] in [facility]. Briefly, Claus Mccarty is a 56 y.o. male with h/o morbid obesity, BMI 35, HTN, HL, DM2, chronic systolic heart failure, stage C, NYHA class IIIA symptoms, non-ischemic cardiomyopathy, LVEF 28% (HF diagnosed around 2002), normal coronaries by [facility] 2015 s/p single lead ICD (2/2015) and worsening renal function, CKD stage 2 and anemia. [facility]. INTERVAL HISTORY:  Today, patient presents for follow up visit. He reports feeling well overall. He notes that he has a little bit of leg swelling and some increased SOB starting back. He is able to walk around the grocery store without issue. He can walk up one flight of stairs without any issue. He is working full time, office job and feels well with that, tries to get up frequently to move and take a break from sitting. He denies dizziness, lightheadedness, CP, palpitations, nausea, vomiting. His LE edema is nearly resolved  He is compliant with his medications and trying to follow a low Na+ diet. REVIEW OF SYSTEMS:  Review of Systems   Constitutional: Negative for chills, fever and weight loss.    Respiratory: Positive for shortness of breath. Negative for cough. Cardiovascular: Positive for leg swelling. Negative for chest pain, palpitations and orthopnea. Gastrointestinal: Negative for abdominal pain, constipation, diarrhea, heartburn, nausea and vomiting. Neurological: Negative for dizziness and weakness. PHYSICAL EXAM:  Visit Vitals  /82 (BP 1 Location: Left arm, BP Patient Position: Sitting)   Pulse 70   Temp 98 °F (36.7 °C) (Oral)   Resp 16   Ht 5' 7\" (1.702 m)   Wt 227 lb 6.4 oz (103.1 kg)   SpO2 99%   BMI 35.62 kg/m²     Physical Exam  Constitutional:       General: He is not in acute distress. Appearance: Normal appearance. HENT:      Head: Normocephalic and atraumatic. Neck:      Vascular: No hepatojugular reflux or JVD. Cardiovascular:      Rate and Rhythm: Normal rate and regular rhythm. Pulses: Normal pulses. Heart sounds: Normal heart sounds. No murmur heard. No friction rub. No gallop. Pulmonary:      Effort: Pulmonary effort is normal. No respiratory distress. Breath sounds: Normal breath sounds. Abdominal:      General: Bowel sounds are normal. There is no distension. Palpations: Abdomen is soft. Tenderness: There is no abdominal tenderness. Skin:     General: Skin is warm and dry. Capillary Refill: Capillary refill takes less than 2 seconds. Neurological:      General: No focal deficit present. Mental Status: He is alert and oriented to person, place, and time. Psychiatric:         Mood and Affect: Mood normal.         Behavior: Behavior normal.         Thought Content:  Thought content normal.         Judgment: Judgment normal.            PAST MEDICAL HISTORY:  Past Medical History:   Diagnosis Date    Diabetes ([facility]     Heart failure ([facility]     CHF, cardiomyopathy    Hypertension     ICD (implantable cardioverter-defibrillator) in place     left upper chest       PAST SURGICAL HISTORY:  Past Surgical History:   Procedure Laterality Date    HX HEART CATHETERIZATION  2/2015    x1    HX IMPLANTABLE CARDIOVERTER DEFIBRILLATOR  2/16/2015       FAMILY HISTORY:  Family History   Problem Relation Age of Onset    Hypertension Father     Diabetes Father     Diabetes Mother     Diabetes Brother     Hypertension Brother        SOCIAL HISTORY:  Social History     Socioeconomic History    Marital status:    Tobacco Use    Smoking status: Never Smoker    Smokeless tobacco: Never Used   Vaping Use    Vaping Use: Never used   Substance and Sexual Activity    Alcohol use: No    Drug use: No       LABORATORY RESULTS:  Labs Latest Ref Rng & Units 5/5/2022   WBC 3.4 - 10.8 x10E3/uL 7.5   RBC 4.14 - 5.80 x10E6/uL 3.79(L)   Hemoglobin 13.0 - 17.7 g/dL 9.9(L)   Hematocrit 37.5 - 51.0 % 31. 5(L)   MCV 79 - 97 fL 83   Platelets 585 - 318 B97O6/   Albumin 3.8 - 4.9 g/dL 4.0   Calcium 8.7 - 10.2 mg/dL 9.2   Glucose 65 - 99 mg/dL 298(H)   BUN 6 - 24 mg/dL 30(H)   Creatinine 0.76 - 1.27 mg/dL 2.29(H)   Sodium 134 - 144 mmol/L 139   Potassium 3.5 - 5.2 mmol/L 4.4   TSH 0.450 - 4.500 uIU/mL 2.080   Some recent data might be hidden       ALLERGY:  No Known Allergies     CURRENT MEDICATIONS:    Current Outpatient Medications:     bumetanide (BUMEX) 1 mg tablet, Take 1 Tablet by mouth two (2) times a day., Disp: 60 Tablet, Rfl: 0    amLODIPine (NORVASC) 10 mg tablet, Take 1 tablet by mouth once daily, Disp: 90 Tablet, Rfl: 0    cholecalciferol (VITAMIN D3) 25 mcg (1,000 unit) cap, Take 1,000 Units by mouth daily. , Disp: , Rfl:     hydrALAZINE (APRESOLINE) 10 mg tablet, Take 1 Tablet by mouth three (3) times daily. , Disp: 90 Tablet, Rfl: 2    isosorbide dinitrate (ISORDIL) 10 mg tablet, Take 1 Tablet by mouth three (3) times daily. , Disp: 90 Tablet, Rfl: 2    carvediloL (COREG) 25 mg tablet, Take 2 Tablets by mouth two (2) times a day., Disp: 120 Tablet, Rfl: 5    NovoLIN 70/30 U-100 Insulin 100 unit/mL (70-30) injection, 22 Units. , Disp: , Rfl:    simvastatin (ZOCOR) 40 mg tablet, TAKE 1 TABLET BY MOUTH ONCE DAILY AT BEDTIME, Disp: , Rfl:     Insulin Needles, Disposable, 31 ndle, For 75/25 insulin administration, Disp: 150 Each, Rfl: 2    Blood-Glucose Meter monitoring kit, 1, Disp: 1 Kit, Rfl: 0    glucose blood VI test strips (BLOOD GLUCOSE TEST) strip, 1, Disp: 1 Package, Rfl: 11    multivitamin with iron (DAILY MULTI-VITAMINS/IRON) tablet, Take 1 tablet by mouth daily. , Disp: , Rfl:     Thank you for your referral and allowing me to participate in this patient's care.     Nicki Bhagat NP  [address]  Office [phone]  Fax [phone]    PATIENT CARE TEAM:  Patient Care Team:  Liliane Frankel, MD as PCP - General (Family Medicine)  Rebeca Paz MD (Cardiovascular Disease Physician)  Jonelle Rincon MD (Nephrology)  Sanjuana Chen MD (Internal Medicine Physician)

## 2022-06-23 NOTE — PROGRESS NOTES
Chief Complaint   Patient presents with    CHF     f/u1 month     Patient states Shortness of breath and ankle swelling has returned for the last couple of weeks. 1. Have you been to the ER, urgent care clinic since your last visit? Hospitalized since your last visit? No    2. Have you seen or consulted any other health care providers outside of the [address] [place] [address] since your last visit? Include any pap smears or colon screening.  No    Visit Vitals  /82 (BP 1 Location: Left arm, BP Patient Position: Sitting)   Pulse 70   Temp 98 °F (36.7 °C) (Oral)   Resp 16   Ht 5' 7\" (1.702 m)   Wt 227 lb 6.4 oz (103.1 kg)   SpO2 99%   BMI 35.62 kg/m²

## 2022-06-23 NOTE — PATIENT INSTRUCTIONS
Medication changes:    Increase bumex to 2 mg in the morning and 1 mg in the afternoon until Sunday, then on Monday, go back to 1 mg twice daily    Please take this to your pharmacy to notify them of the change in medications. Testing Ordered:    Lab work has been ordered. Please present to a [facility] location of your choice next week with the orders provided to have lab work done. Please wear a mask when you present to [facility] and practice diligent hand hygeine before and after your visit. Our office will notify you of any abnormal results requiring changes to your current plan of care. An order for an echocardiogram  has been placed to be done in september. You will be receiving an automated call from [facility] to schedule this test. If you are unavailable to receive the call or would like to contact coordination of care yourself you may contact [phone] to schedule. You will need to contact coordination of care yourself if you miss their calls as they will only make 3 attempts to reach you. Other Recommendations:      Please call the outpatient infusion center: [phone]  To schedule your iron infusion     Ensure your drinking an adequate amount of water with a goal of 6-8 eight ounce glasses (1.5-2 liters) of fluid daily. Your urine should be clear and light yellow straw colored. If your blood pressure begins to consistently run below 90/60 and/or you begin to experience dizziness or lightheadedness, please contact the [facility] at [phone]. Follow up in 3 months after your echocardiogram with NP with [facility]      Please monitor your weights daily upon waking and after using the bathroom. Keep a written records of your weights and bring to your next appointment. If you have a weight gain of 3 or more pounds overnight OR 5 or more pounds in one week please contact our office.        Thank you for allowing us the privilege of being a part of your healthcare team! Please do not hesitate to contact our office at [phone] with any questions or concerns. Virtual Heart Failure [address] invites you to learn more about heart failure and to share your questions, ideas, and experiences with others. Each month, the Heart Failure Support Group features a new educational topic and a guest speaker, followed by an interactive discussion. Our Heart Failure Nurse Navigator will moderate each session. You will be able to participate by phone, tablet or computer through [employer]. This support group takes place on the 3rd Thursday of each month from 6:00-7:30PM. All individuals living with heart failure and their caregivers are welcome to join. If you are interested in participating, please contact us at [email or web address] and you will be sent the link to join the [employer].

## 2022-06-30 ENCOUNTER — OFFICE VISIT (OUTPATIENT)
Dept: SLEEP MEDICINE | Age: 57
End: 2022-06-30
Payer: MEDICARE

## 2022-06-30 VITALS
BODY MASS INDEX: 36.1 KG/M2 | TEMPERATURE: 98.2 F | HEIGHT: 67 IN | DIASTOLIC BLOOD PRESSURE: 82 MMHG | OXYGEN SATURATION: 98 % | SYSTOLIC BLOOD PRESSURE: 134 MMHG | WEIGHT: 230 LBS | HEART RATE: 73 BPM

## 2022-06-30 DIAGNOSIS — G47.33 OSA (OBSTRUCTIVE SLEEP APNEA): Primary | ICD-10-CM

## 2022-06-30 DIAGNOSIS — I10 PRIMARY HYPERTENSION: ICD-10-CM

## 2022-06-30 DIAGNOSIS — I50.22 CHRONIC SYSTOLIC HEART FAILURE (HCC): ICD-10-CM

## 2022-06-30 PROCEDURE — G8432 DEP SCR NOT DOC, RNG: HCPCS | Performed by: INTERNAL MEDICINE

## 2022-06-30 PROCEDURE — G8752 SYS BP LESS 140: HCPCS | Performed by: INTERNAL MEDICINE

## 2022-06-30 PROCEDURE — G8417 CALC BMI ABV UP PARAM F/U: HCPCS | Performed by: INTERNAL MEDICINE

## 2022-06-30 PROCEDURE — 3017F COLORECTAL CA SCREEN DOC REV: CPT | Performed by: INTERNAL MEDICINE

## 2022-06-30 PROCEDURE — G8427 DOCREV CUR MEDS BY ELIG CLIN: HCPCS | Performed by: INTERNAL MEDICINE

## 2022-06-30 PROCEDURE — 99204 OFFICE O/P NEW MOD 45 MIN: CPT | Performed by: INTERNAL MEDICINE

## 2022-06-30 PROCEDURE — G8754 DIAS BP LESS 90: HCPCS | Performed by: INTERNAL MEDICINE

## 2022-06-30 NOTE — PATIENT INSTRUCTIONS
[address] [place]. [place], [address]  Tel.  [phone]  Fax. 100 [facility] 60  [place], [address] [facility]  Tel.  [phone]  Fax. [phone] [address]  Tel.  [phone]  Fax. [phone]     Sleep Apnea: After Your Visit  Your Care Instructions  Sleep apnea occurs when you frequently stop breathing for 10 seconds or longer during sleep. It can be mild to severe, based on the number of times per hour that you stop breathing or have slowed breathing. Blocked or narrowed airways in your nose, mouth, or throat can cause sleep apnea. Your airway can become blocked when your throat muscles and tongue relax during sleep. Sleep apnea is common, occurring in 1 out of 20 individuals. Individuals having any of the following characteristics should be evaluated and treated right away due to high risk and detrimental consequences from untreated sleep apnea:  1. Obesity  2. Congestive Heart failure  3. Atrial Fibrillation  4. Uncontrolled Hypertension  5. Type II Diabetes  6. Night-time Arrhythmias  7. Stroke  8. Pulmonary Hypertension  9. High-risk Driving Populations (pilots, truck drivers, etc.)  10. Patients Considering Weight-loss Surgery    How do you know you have sleep apnea? You probably have sleep apnea if you answer 'yes' to 3 or more of the following questions:  S - Have you been told that you Snore? T - Are you often Tired during the day? O - Has anyone Observed you stop breathing while sleeping? P- Do you have (or are being treated for) high blood Pressure? B - Are you obese (Body Mass Index > 35)? A - Is your Age 48years old or older? N - Is your Neck size greater than 16 inches? G - Are you male Gender? A sleep physician can prescribe a breathing device that prevents tissues in the throat from blocking your airway.  Or your doctor may recommend using a dental device (oral breathing device) to help keep your airway open. In some cases, surgery may be needed to remove enlarged tissues in the throat. Follow-up care is a key part of your treatment and safety. Be sure to make and go to all appointments, and call your doctor if you are having problems. It's also a good idea to know your test results and keep a list of the medicines you take. How can you care for yourself at home? · Lose weight, if needed. It may reduce the number of times you stop breathing or have slowed breathing. · Go to bed at the same time every night. · Sleep on your side. It may stop mild apnea. If you tend to roll onto your back, sew a pocket in the back of your pajama top. Put a tennis ball into the pocket, and stitch the pocket shut. This will help keep you from sleeping on your back. · Avoid alcohol and medicines such as sleeping pills and sedatives before bed. · Do not smoke. Smoking can make sleep apnea worse. If you need help quitting, talk to your doctor about stop-smoking programs and medicines. These can increase your chances of quitting for good. · Prop up the head of your bed 4 to 6 inches by putting bricks under the legs of the bed. · Treat breathing problems, such as a stuffy nose, caused by a cold or allergies. · Use a continuous positive airway pressure (CPAP) breathing machine if lifestyle changes do not help your apnea and your doctor recommends it. The machine keeps your airway from closing when you sleep. · If CPAP does not help you, ask your doctor whether you should try other breathing machines. A bilevel positive airway pressure machine has two types of air pressureâone for breathing in and one for breathing out. Another device raises or lowers air pressure as needed while you breathe. · If your nose feels dry or bleeds when using one of these machines, talk with your doctor about increasing moisture in the air. A humidifier may help.   · If your nose is runny or stuffy from using a breathing machine, talk with your doctor about using decongestants or a corticosteroid nasal spray. When should you call for help? Watch closely for changes in your health, and be sure to contact your doctor if:  · You still have sleep apnea even though you have made lifestyle changes. · You are thinking of trying a device such as CPAP. · You are having problems using a CPAP or similar machine. Where can you learn more? Go to [employer]. Enter B032 in the search box to learn more about \"Sleep Apnea: After Your Visit. \"   © 7184-5252 Healthwise, Incorporated. Care instructions adapted under license by [facility] [employer] (which disclaims liability or warranty for this information). This care instruction is for use with your licensed healthcare professional. If you have questions about a medical condition or this instruction, always ask your healthcare professional. Eren[employer] Ros any warranty or liability for your use of this information. PROPER SLEEP HYGIENE    What to avoid  · Do not have drinks with caffeine, such as coffee or black tea, for 8 hours before bed. · Do not smoke or use other types of tobacco near bedtime. Nicotine is a stimulant and can keep you awake. · Avoid drinking alcohol late in the evening, because it can cause you to wake in the middle of the night. · Do not eat a big meal close to bedtime. If you are hungry, eat a light snack. · Do not drink a lot of water close to bedtime, because the need to urinate may wake you up during the night. · Do not read or watch TV in bed. Use the bed only for sleeping and sexual activity. What to try  · Go to bed at the same time every night, and wake up at the same time every morning. Do not take naps during the day. · Keep your bedroom quiet, dark, and cool. · Get regular exercise, but not within 3 to 4 hours of your bedtime. .  · Sleep on a comfortable pillow and mattress.   · If watching the clock makes you anxious, turn it facing away from you so you cannot see the time. · If you worry when you lie down, start a worry book. Well before bedtime, write down your worries, and then set the book and your concerns aside. · Try meditation or other relaxation techniques before you go to bed. · If you cannot fall asleep, get up and go to another room until you feel sleepy. Do something relaxing. Repeat your bedtime routine before you go to bed again. · Make your house quiet and calm about an hour before bedtime. Turn down the lights, turn off the TV, log off the computer, and turn down the volume on music. This can help you relax after a busy day. Drowsy Driving  The [address] [place] [address] Road Traffic Safety Administration cites drowsiness as a causing factor in more than 207,857 police reported crashes annually, resulting in 76,000 injuries and 1,500 deaths. Other surveys suggest 55% of people polled have driven while drowsy in the past year, 23% had fallen asleep but not crashed, 3% crashed, and 2% had and accident due to drowsy driving. Who is at risk? Young Drivers: One study of drowsy driving accidents states that 55% of the drivers were under 25 years. Of those, 75% were male. Shift Workers and Travelers: People who work overnight or travel across time zones frequently are at higher risk of experiencing Circadian Rhythm Disorders. They are trying to work and function when their body is programed to sleep. Sleep Deprived: Lack of sleep has a serious impact on your ability to pay attention or focus on a task. Consistently getting less than the average of 8 hours your body needs creates partial or cumulative sleep deprivation. Untreated Sleep Disorders: Sleep Apnea, Narcolepsy, R.L.S., and other sleep disorders (untreated) prevent a person from getting enough restful sleep. This leads to excessive daytime sleepiness and increases the risk for drowsy driving accidents by up to 7 times.   Medications / Alcohol: Even over the counter medications can cause drowsiness. Medications that impair a drivers attention should have a warning label. Alcohol naturally makes you sleepy and on its own can cause accidents. Combined with excessive drowsiness its effects are amplified. Signs of Drowsy Driving:   * You don't remember driving the last few miles   * You may drift out of your ted   * You are unable to focus and your thoughts wander   * You may yawn more often than normal   * You have difficulty keeping your eyes open / nodding off   * Missing traffic signs, speeding, or tailgating  Prevention-   Good sleep hygiene, lifestyle and behavioral choices have the most impact on drowsy driving. There is no substitute for sleep and the average person requires 8 hours nightly. If you find yourself driving drowsy, stop and sleep. Consider the sleep hygiene tips provided during your visit as well. Medication Refill Policy: Refills for all medications require 1 week advance notice. Please have your pharmacy fax a refill request. We are unable to fax, or call in \"controled substance\" medications and you will need to pick these prescriptions up from our office. M[employer] Activation    Thank you for requesting access to [employer]. Please follow the instructions below to securely access and download your online medical record. M[employer]aya allows you to send messages to your doctor, view your test results, renew your prescriptions, schedule appointments, and more. How Do I Sign Up? 1. In your internet browser, go to [email or web address]. 2. Click on the First Time User? Click Here link in the Sign In box. You will see the New Member Sign Up page. 3. Enter your [employer] Access Code exactly as it appears below. You will not need to use this code after youve completed the sign-up process. If you do not sign up before the expiration date, you must request a new code. M[employer]aya Access Code:  Activation code not generated  Current Mabaya Status: Active (This is the date your [employer] access code will )    4. Enter the last four digits of your Social Security Number (xxxx) and Date of Birth (mm/dd/yyyy) as indicated and click Submit. You will be taken to the next sign-up page. 5. Create a Etonkidst ID. This will be your [employer] login ID and cannot be changed, so think of one that is secure and easy to remember. 6. Create a [employer] password. You can change your password at any time. 7. Enter your Password Reset Question and Answer. This can be used at a later time if you forget your password. 8. Enter your e-mail address. You will receive e-mail notification when new information is available in [address] 9. Click Sign Up. You can now view and download portions of your medical record. 10. Click the Download Summary menu link to download a portable copy of your medical information. Additional Information    If you have questions, please call [phone]. Remember, [employer] is NOT to be used for urgent needs. For medical emergencies, dial 911.

## 2022-06-30 NOTE — PROGRESS NOTES
[address] [facility] [facility]. [place], [address]  Tel.  [phone]  Fax. 100 [facility] 60  [place], [address] [facility]  Tel.  [phone]  Fax. [phone] [address] [facility] Bak[address]  Tel.  [phone]  Fax. [phone]       Varun Gallardo is a 56y.o. year old male referred by Nelly Farnsworth MD  for evaluation of a sleep disorder. ASSESSMENT/PLAN:      ICD-10-CM ICD-9-CM    1. MINA (obstructive sleep apnea)  G47.33 327.23 POLYSOMNOGRAPHY 1 NIGHT   2. Chronic systolic heart failure (HCC)  I50.22 428.22    3. Primary hypertension  I10 401.9    4. BMI 35.0-35.9,adult  Z68.35 V85.35        Patient has a history and examination consistent with the diagnosis of sleep apnea. Follow-up and Dispositions    · Return for telephone follow-up after testing is completed. * The patient currently has a High Risk for having sleep apnea. STOP-BANG score 7.    * Sleep testing was ordered for initial evaluation. Orders Placed This Encounter    POLYSOMNOGRAPHY 1 NIGHT     Standing Status:   Future     Standing Expiration Date:   9/30/2022     Order Specific Question:   Reason for Exam     Answer:   MINA       * He was provided information on sleep apnea including corresponding risk factors and the importance of proper treatment. * Treatment options were reviewed in detail. he would like to proceed with PAP therapy. Patient will be seen in follow-up in 6-8 weeks after PAP setup to gauge treatment response and adherence to therapy. * The patient was counseled regarding proper sleep hygiene, with emphasis on ensuring sufficient total sleep time; safe driving and the benefits of exercise and weight loss. * All of his questions were addressed.     2. Chronic systolic heart failure ([address] - continue on current regimen, he will continue to monitor his symptom and follow up with his primary care provider for reevaluation/adjustment of medications if warranted. I have reviewed the relationship between hypertension as it relates to sleep-disordered breathing. 3. Hypertension -  continue on current regimen, he will continue to monitor his BP and follow up with his primary care provider for reevaluation/adjustment of medications if warranted. I have reviewed the relationship between hypertension as it relates to sleep-disordered breathing. 4. Recommended a dedicated weight loss program through appropriate diet and exercise regimen as significant weight reduction has been shown to reduce severity of obstructive sleep apnea. SUBJECTIVE/OBJECTIVE:    Minoo Lay is an 56 y.o. male referred for evaluation for a sleep disorder. He complains of snoring associated with awakening in the middle of the night because of urination. He does report of feeling tired on waking on some days and has a history heart failure. He was referred by cardiology for sleep evaluation. Symptoms began a few years ago, unchanged since that time. He usually can fall asleep in 15-20 minutes. Family or house members note snoring. He denies of symptoms indicative of cataplexy, sleep paralysis or sleep related hallucinations. He denies of a history of unusual movements occurring during sleep. Minoo Lay (P) does wake up frequently at night. He (P) is bothered by waking up too early and left unable to get back to sleep. He actually sleeps about (P) 4 hours at night and wakes up about (P) 4 times during the night. He (P) does not work shifts: [place] Gore indicates he (P) does get too little sleep at night. His bedtime is (P) 2330. He awakens at (P) 0841. He (P) does take naps. He takes (P) 1 naps a week lasting (P) 45 min to an hour. He has the following observed behaviors: (P) Loud snoring,Light snoring;  . Other remarks: The patient has not undergone diagnostic testing for the current problems.      Review of Systems:  Constitutional:  No significant weight loss or weight gain  Eyes:  No blurred vision  CVS:  No significant chest pain  Pulm:  No significant shortness of breath  GI:  No significant nausea or vomiting  :  No significant nocturia  Musculoskeletal:  No significant joint pain at night  Skin:  No significant rashes  Neuro:  No significant dizziness   Psych:  No active mood issues    Sleep Review of Systems: notable for Negative difficulty falling asleep; Positive awakenings at night; Positive perceived regular dreaming; Negative nightmares; Negative  early morning headaches; Negative  memory problems; Negative  concentration issues; Negative caffeine;  Negative alcohol;   Negative history of any automobile or occupational accidents due to daytime drowsiness. Kirksey Sleepiness Score: (P) 7   and Modified F.O.S.Q. Score Total / 2: (P) 18.5    Visit Vitals  /82   Pulse 73   Temp 98.2 °F (36.8 °C)   Ht 5' 7\" (1.702 m)   Wt 230 lb (104.3 kg)   SpO2 98%   BMI 36.02 kg/m²           General:   Alert, oriented, not in acute distress   Eyes:  Anicteric Sclerae; intact EOM's   Nose:  No obvious nasal septum deviation    Oropharynx:   Mallampati score 4, thick tongue base, uvula not seen due to low-lying soft palate, narrow tonsilo-pharyngeal pilars, tongue scalloped   Neck:   midline trachea,  no JVD   Chest/Lungs:  symmetrical lung expansion ,clear lung fields on auscultation    CVS:  Normal rate, regular rhythm    Extremities:  No obvious rashes, mild edema    Neuro:  No focal deficits; No obvious tremor    Psych:  Normal eye contact; normal  affect, normal countenance          Ariel Anna MD, FAASM  Diplomate American Board of Sleep Medicine  Diplomate in Sleep Medicine - ABP    Electronically signed.  06/30/22

## 2022-07-07 ENCOUNTER — TELEPHONE (OUTPATIENT)
Dept: CARDIOLOGY CLINIC | Age: 57
End: 2022-07-07

## 2022-07-07 NOTE — LETTER
7/7/2022 12:29 PM    Mr. Juan Bass  [address] [facility], [address] [facility] [address]     Dear Mr. Hood Vadim is a copy of your genetic testing results that were completed during your visit to the [address] [place] [address]. I am pleased to let you that your results were negative. No further testing is necessary for genetics. I will be happy to discuss this further with you at your next appointment in the heart failure clinic.          Sincerely,            Le Overton MD

## 2022-07-18 ENCOUNTER — TELEPHONE (OUTPATIENT)
Dept: CARDIOLOGY CLINIC | Age: 57
End: 2022-07-18

## 2022-07-18 DIAGNOSIS — R06.02 SOB (SHORTNESS OF BREATH): ICD-10-CM

## 2022-07-18 DIAGNOSIS — I10 ESSENTIAL HYPERTENSION WITH GOAL BLOOD PRESSURE LESS THAN 130/80: ICD-10-CM

## 2022-07-18 DIAGNOSIS — I50.22 CHRONIC SYSTOLIC HEART FAILURE (HCC): ICD-10-CM

## 2022-07-18 RX ORDER — BUMETANIDE 1 MG/1
1 TABLET ORAL 2 TIMES DAILY
Qty: 60 TABLET | Refills: 1 | Status: SHIPPED | OUTPATIENT
Start: 2022-07-18 | End: 2022-09-12

## 2022-07-18 NOTE — TELEPHONE ENCOUNTER
Requested Prescriptions     Signed Prescriptions Disp Refills    bumetanide (BUMEX) 1 mg tablet 60 Tablet 1     Sig: Take 1 Tablet by mouth two (2) times a day.      Authorizing Provider: Saundra Vazquez     Ordering User: Leonardo Ann     Patient notified

## 2022-07-18 NOTE — TELEPHONE ENCOUNTER
Received call from patient stating there are no more refills left for his medication bumetanide. Patient prescriptions are filled with [facility] on [facility]. Patient requesting call back.

## 2022-08-24 ENCOUNTER — TELEPHONE (OUTPATIENT)
Dept: SLEEP MEDICINE | Age: 57
End: 2022-08-24

## 2022-08-25 ENCOUNTER — TELEPHONE (OUTPATIENT)
Dept: SLEEP MEDICINE | Age: 57
End: 2022-08-25

## 2022-08-25 DIAGNOSIS — G47.33 OSA (OBSTRUCTIVE SLEEP APNEA): Primary | ICD-10-CM

## 2022-08-25 DIAGNOSIS — I10 ESSENTIAL HYPERTENSION WITH GOAL BLOOD PRESSURE LESS THAN 130/80: ICD-10-CM

## 2022-08-25 RX ORDER — AMLODIPINE BESYLATE 10 MG/1
TABLET ORAL
Qty: 90 TABLET | Refills: 0 | Status: SHIPPED | OUTPATIENT
Start: 2022-08-25

## 2022-08-25 NOTE — TELEPHONE ENCOUNTER
Patient called to reschedule PSG, order expires on 9/30/22  Needs a new order to link PSG for 10/7/22

## 2022-08-25 NOTE — TELEPHONE ENCOUNTER
Orders Placed This Encounter    POLYSOMNOGRAPHY 1 NIGHT     Standing Status:   Future     Standing Expiration Date:   2/25/2023     Order Specific Question:   Reason for Exam     Answer:   MINA

## 2022-08-25 NOTE — TELEPHONE ENCOUNTER
Requested Prescriptions     Signed Prescriptions Disp Refills    amLODIPine (NORVASC) 10 mg tablet 90 Tablet 0     Sig: Take 1 tablet by mouth once daily.  *NEED TO SCHEDULE FOLLOW UP APPOINTMENT FOR FURTHER REFILLS*     Authorizing Provider: Adolfo Leyva     Ordering User: Xi Figueroa    Per Dr. Morgan Stevens verbal orders

## 2022-09-19 DIAGNOSIS — I50.22 CHRONIC SYSTOLIC HEART FAILURE (HCC): ICD-10-CM

## 2022-09-19 DIAGNOSIS — I42.9 CARDIOMYOPATHY (HCC): ICD-10-CM

## 2022-09-19 DIAGNOSIS — I10 ESSENTIAL HYPERTENSION WITH GOAL BLOOD PRESSURE LESS THAN 130/80: ICD-10-CM

## 2022-09-20 RX ORDER — CARVEDILOL 25 MG/1
TABLET ORAL
Qty: 120 TABLET | Refills: 0 | Status: SHIPPED | OUTPATIENT
Start: 2022-09-20 | End: 2022-10-24

## 2022-09-20 NOTE — TELEPHONE ENCOUNTER
Requested Prescriptions     Signed Prescriptions Disp Refills    carvediloL (COREG) 25 mg tablet 120 Tablet 0     Sig: Take 2 tablets by mouth twice daily     Authorizing Provider: Antonio Torres     Ordering User: Martha Hoff    Per Dr. Michelle Guthrie verbal orders

## 2022-10-04 ENCOUNTER — TELEPHONE (OUTPATIENT)
Dept: CARDIOLOGY CLINIC | Age: 57
End: 2022-10-04

## 2022-10-04 NOTE — TELEPHONE ENCOUNTER
I called patient per Baljeet Mcmahon to cancel appt for 10/5/22 due to patient not scheduling echocardiogram and not getting requested labwork. Call went to Voicemail.   I called his girlfriend Kamryn Sheffield (on PHI) and gave her number to schedule echo and get labs and then call back to reschedule appt

## 2022-10-06 ENCOUNTER — TELEPHONE (OUTPATIENT)
Dept: SLEEP MEDICINE | Age: 57
End: 2022-10-06

## 2022-10-21 ENCOUNTER — HOSPITAL ENCOUNTER (OUTPATIENT)
Dept: NON INVASIVE DIAGNOSTICS | Age: 57
Discharge: HOME | End: 2022-10-21
Attending: NURSE PRACTITIONER
Payer: MEDICARE

## 2022-10-21 DIAGNOSIS — R06.02 SOB (SHORTNESS OF BREATH): ICD-10-CM

## 2022-10-21 DIAGNOSIS — I50.22 CHRONIC SYSTOLIC HEART FAILURE (HCC): ICD-10-CM

## 2022-10-21 LAB
ECHO AO ROOT DIAM: 2 CM
ECHO AV AREA PEAK VELOCITY: 1.5 CM2
ECHO AV AREA PLAN: 2.9 CM2
ECHO AV PEAK GRADIENT: 6 MMHG
ECHO AV PEAK VELOCITY: 1.3 M/S
ECHO AV VELOCITY RATIO: 0.62
ECHO LA DIAMETER: 5.6 CM
ECHO LA TO AORTIC ROOT RATIO: 2.8
ECHO LA VOL 2C: 190 ML (ref 18–58)
ECHO LA VOL 4C: 97 ML (ref 18–58)
ECHO LA VOL BP: 136 ML (ref 18–58)
ECHO LA VOLUME AREA LENGTH: 158 ML
ECHO LV EDV A4C: 191 ML
ECHO LV EJECTION FRACTION A4C: 24 %
ECHO LV ESV A4C: 145 ML
ECHO LV FRACTIONAL SHORTENING: 17 % (ref 28–44)
ECHO LV INTERNAL DIMENSION DIASTOLIC: 6.5 CM (ref 4.2–5.9)
ECHO LV INTERNAL DIMENSION SYSTOLIC: 5.4 CM
ECHO LV IVSD: 1 CM (ref 0.6–1)
ECHO LV MASS 2D: 358.6 G (ref 88–224)
ECHO LV POSTERIOR WALL DIASTOLIC: 1.4 CM (ref 0.6–1)
ECHO LV RELATIVE WALL THICKNESS RATIO: 0.43
ECHO LVOT AREA: 2.5 CM2
ECHO LVOT DIAM: 1.8 CM
ECHO LVOT PEAK GRADIENT: 3 MMHG
ECHO LVOT PEAK VELOCITY: 0.8 M/S
ECHO MV A VELOCITY: 0.24 M/S
ECHO MV AREA PHT: 5.6 CM2
ECHO MV AREA PLAN: 5.5 CM2
ECHO MV E DECELERATION TIME (DT): 110.8 MS
ECHO MV E VELOCITY: 0.69 M/S
ECHO MV E/A RATIO: 2.88
ECHO MV MAX VELOCITY: 1.4 M/S
ECHO MV MEAN GRADIENT: 3 MMHG
ECHO MV MEAN VELOCITY: 0.7 M/S
ECHO MV PEAK GRADIENT: 8 MMHG
ECHO MV PRESSURE HALF TIME (PHT): 39.2 MS
ECHO MV REGURGITANT PEAK GRADIENT: 121 MMHG
ECHO MV REGURGITANT PEAK VELOCITY: 5.5 M/S
ECHO MV VTI: 32.7 CM
ECHO PULMONARY ARTERY END DIASTOLIC PRESSURE: 22 MMHG
ECHO PV MAX VELOCITY: 0.7 M/S
ECHO PV PEAK GRADIENT: 2 MMHG

## 2022-10-21 PROCEDURE — 93306 TTE W/DOPPLER COMPLETE: CPT

## 2022-10-21 PROCEDURE — 93306 TTE W/DOPPLER COMPLETE: CPT | Performed by: SPECIALIST

## 2022-10-23 DIAGNOSIS — I10 ESSENTIAL HYPERTENSION WITH GOAL BLOOD PRESSURE LESS THAN 130/80: ICD-10-CM

## 2022-10-23 DIAGNOSIS — I50.22 CHRONIC SYSTOLIC HEART FAILURE (HCC): ICD-10-CM

## 2022-10-23 DIAGNOSIS — I42.9 CARDIOMYOPATHY (HCC): ICD-10-CM

## 2022-10-24 RX ORDER — CARVEDILOL 25 MG/1
50 TABLET ORAL 2 TIMES DAILY
Qty: 120 TABLET | Refills: 0 | Status: SHIPPED | OUTPATIENT
Start: 2022-10-24 | End: 2022-11-25

## 2022-10-24 NOTE — TELEPHONE ENCOUNTER
Requested Prescriptions     Signed Prescriptions Disp Refills    carvediloL (COREG) 25 mg tablet 120 Tablet 0     Sig: Take 2 Tablets by mouth two (2) times a day.  *NEED TO SCHEDULE FOLLOW UP APPOINTMENT FOR FURTHER REFILLS*     Authorizing Provider: Radha Stewart     Ordering User: Peterson Ocasio    Per Dr. Lopez Showers verbal orders

## 2022-11-04 ENCOUNTER — APPOINTMENT (OUTPATIENT)
Dept: GENERAL RADIOLOGY | Age: 57
End: 2022-11-04
Attending: NURSE PRACTITIONER
Payer: MEDICARE

## 2022-11-04 ENCOUNTER — HOSPITAL ENCOUNTER (EMERGENCY)
Age: 57
Discharge: HOME | End: 2022-11-04
Attending: EMERGENCY MEDICINE
Payer: MEDICARE

## 2022-11-04 VITALS
SYSTOLIC BLOOD PRESSURE: 126 MMHG | WEIGHT: 231.04 LBS | TEMPERATURE: 97.9 F | DIASTOLIC BLOOD PRESSURE: 74 MMHG | HEART RATE: 74 BPM | HEIGHT: 68 IN | BODY MASS INDEX: 35.02 KG/M2 | OXYGEN SATURATION: 96 % | RESPIRATION RATE: 18 BRPM

## 2022-11-04 DIAGNOSIS — R60.0 BILATERAL LEG EDEMA: ICD-10-CM

## 2022-11-04 DIAGNOSIS — R05.1 ACUTE COUGH: ICD-10-CM

## 2022-11-04 DIAGNOSIS — N17.9 AKI (ACUTE KIDNEY INJURY) (HCC): Primary | ICD-10-CM

## 2022-11-04 DIAGNOSIS — R09.81 SINUS CONGESTION: ICD-10-CM

## 2022-11-04 LAB
ALBUMIN SERPL-MCNC: 3.5 G/DL (ref 3.5–5)
ALBUMIN/GLOB SERPL: 0.9 {RATIO} (ref 1.1–2.2)
ALP SERPL-CCNC: 117 U/L (ref 45–117)
ALT SERPL-CCNC: 37 U/L (ref 12–78)
ANION GAP SERPL CALC-SCNC: 6 MMOL/L (ref 5–15)
AST SERPL-CCNC: 19 U/L (ref 15–37)
BASOPHILS # BLD: 0.1 K/UL (ref 0–0.1)
BASOPHILS NFR BLD: 1 % (ref 0–1)
BILIRUB SERPL-MCNC: 0.9 MG/DL (ref 0.2–1)
BNP SERPL-MCNC: 8701 PG/ML (ref 0–125)
BUN SERPL-MCNC: 31 MG/DL (ref 6–20)
BUN/CREAT SERPL: 12 (ref 12–20)
CALCIUM SERPL-MCNC: 8.9 MG/DL (ref 8.5–10.1)
CHLORIDE SERPL-SCNC: 104 MMOL/L (ref 97–108)
CO2 SERPL-SCNC: 27 MMOL/L (ref 21–32)
CREAT SERPL-MCNC: 2.52 MG/DL (ref 0.7–1.3)
DIFFERENTIAL METHOD BLD: ABNORMAL
EOSINOPHIL # BLD: 0.1 K/UL (ref 0–0.4)
EOSINOPHIL NFR BLD: 1 % (ref 0–7)
ERYTHROCYTE [DISTWIDTH] IN BLOOD BY AUTOMATED COUNT: 17 % (ref 11.5–14.5)
FLUAV AG NPH QL IA: NEGATIVE
FLUBV AG NOSE QL IA: NEGATIVE
GLOBULIN SER CALC-MCNC: 3.7 G/DL (ref 2–4)
GLUCOSE SERPL-MCNC: 104 MG/DL (ref 65–100)
HCT VFR BLD AUTO: 31.6 % (ref 36.6–50.3)
HGB BLD-MCNC: 9.8 G/DL (ref 12.1–17)
IMM GRANULOCYTES # BLD AUTO: 0.1 K/UL (ref 0–0.04)
IMM GRANULOCYTES NFR BLD AUTO: 1 % (ref 0–0.5)
LYMPHOCYTES # BLD: 0.9 K/UL (ref 0.8–3.5)
LYMPHOCYTES NFR BLD: 6 % (ref 12–49)
MCH RBC QN AUTO: 25.7 PG (ref 26–34)
MCHC RBC AUTO-ENTMCNC: 31 G/DL (ref 30–36.5)
MCV RBC AUTO: 82.7 FL (ref 80–99)
MONOCYTES # BLD: 0.9 K/UL (ref 0–1)
MONOCYTES NFR BLD: 6 % (ref 5–13)
NEUTS SEG # BLD: 13.2 K/UL (ref 1.8–8)
NEUTS SEG NFR BLD: 85 % (ref 32–75)
NRBC # BLD: 0 K/UL (ref 0–0.01)
NRBC BLD-RTO: 0 PER 100 WBC
PLATELET # BLD AUTO: 199 K/UL (ref 150–400)
PMV BLD AUTO: 12.2 FL (ref 8.9–12.9)
POTASSIUM SERPL-SCNC: 4.1 MMOL/L (ref 3.5–5.1)
PROT SERPL-MCNC: 7.2 G/DL (ref 6.4–8.2)
RBC # BLD AUTO: 3.82 M/UL (ref 4.1–5.7)
SODIUM SERPL-SCNC: 137 MMOL/L (ref 136–145)
TROPONIN-HIGH SENSITIVITY: 43 NG/L (ref 0–76)
WBC # BLD AUTO: 15.3 K/UL (ref 4.1–11.1)

## 2022-11-04 PROCEDURE — 36415 COLL VENOUS BLD VENIPUNCTURE: CPT

## 2022-11-04 PROCEDURE — 84484 ASSAY OF TROPONIN QUANT: CPT

## 2022-11-04 PROCEDURE — 99284 EMERGENCY DEPT VISIT MOD MDM: CPT

## 2022-11-04 PROCEDURE — 83880 ASSAY OF NATRIURETIC PEPTIDE: CPT

## 2022-11-04 PROCEDURE — 71045 X-RAY EXAM CHEST 1 VIEW: CPT

## 2022-11-04 PROCEDURE — 80053 COMPREHEN METABOLIC PANEL: CPT

## 2022-11-04 PROCEDURE — 85025 COMPLETE CBC W/AUTO DIFF WBC: CPT

## 2022-11-04 PROCEDURE — 87804 INFLUENZA ASSAY W/OPTIC: CPT

## 2022-11-04 RX ORDER — DEXTROMETHORPHAN HYDROBROMIDE, GUAIFENESIN 20; 400 MG/20ML; MG/20ML
SOLUTION ORAL
Qty: 118 ML | Refills: 0 | Status: SHIPPED | OUTPATIENT
Start: 2022-11-04

## 2022-11-04 RX ORDER — AMOXICILLIN 875 MG/1
875 TABLET, FILM COATED ORAL 2 TIMES DAILY
Qty: 20 TABLET | Refills: 0 | Status: SHIPPED | OUTPATIENT
Start: 2022-11-04 | End: 2022-11-14

## 2022-11-04 NOTE — ED NOTES
Patient presents with N/V, chills, dyspnea, cough, congestion, and leg swelling X weeks. Hx of CHF. 2+ pitting edema in BLE. A&OX4. Emergency Department Nursing Plan of Care       The Nursing Plan of Care is developed from the Nursing assessment and Emergency Department Attending provider initial evaluation. The plan of care may be reviewed in the ED Provider note.     The Plan of Care was developed with the following considerations:   Patient / Family readiness to learn indicated by:verbalized understanding and appropriate questions asked  Persons(s) to be included in education: patient  Barriers to Learning/Limitations:No    Signed     Davina Toure RN    11/4/2022   10:23 AM

## 2022-11-04 NOTE — ED TRIAGE NOTES
TRIAGE NOTE:  Patient here with c/o flu like symptoms for 2-3 weeks. Patient states that he has has a cough and fatigue, and also reports recent vomiting. Patient denies contact with anyone with known illness, denies fevers. Patient however does report concern that he has a \"cardiac problem\" and states he has been on Bumex for fluid control, and currently reports bilateral ankle swelling.

## 2022-11-11 ENCOUNTER — TELEPHONE (OUTPATIENT)
Dept: CARDIOLOGY CLINIC | Age: 57
End: 2022-11-11

## 2022-11-11 DIAGNOSIS — R06.02 SOB (SHORTNESS OF BREATH): ICD-10-CM

## 2022-11-11 DIAGNOSIS — I50.22 CHRONIC SYSTOLIC HEART FAILURE (HCC): ICD-10-CM

## 2022-11-11 DIAGNOSIS — I10 ESSENTIAL HYPERTENSION WITH GOAL BLOOD PRESSURE LESS THAN 130/80: ICD-10-CM

## 2022-11-11 NOTE — TELEPHONE ENCOUNTER
Spoke with patient to schedule follow up appt.   Scheduled 11/23 @ 8:30am      Patient would also like call from nursing regarding his recent blood work that he had taken at the hospital last Friday

## 2022-11-15 RX ORDER — BUMETANIDE 1 MG/1
2 TABLET ORAL 2 TIMES DAILY
Qty: 120 TABLET | Refills: 1 | Status: SHIPPED | OUTPATIENT
Start: 2022-11-15 | End: 2022-11-23

## 2022-11-15 NOTE — TELEPHONE ENCOUNTER
I returned call to patient, he states he was seen in ED last week, they treated him with amoxicillin and Robitussin. He is less short of breath, denies chest pain, but still has a lot of swelling from knees to toes. Weight yesterday 229 lb, /87 (prior to meds)-he is at work and unable to re-check BP. He is currently taking bumex 1 mg twice daily. He has a follow up in [facility] on 11/23, please advise. Left message for patient to return call with message per IAN Mitchell:  Increase to 2mg BID until his weight comes down to 224lb or until his visit. Requested Prescriptions     Signed Prescriptions Disp Refills    bumetanide (BUMEX) 1 mg tablet 120 Tablet 1     Sig: Take 2 Tablets by mouth two (2) times a day. Authorizing Provider: Ric Bangura     Ordering User: Kellee Steve       Return call to patient educated on new orders to Increase to 2mg BID until his weight comes down to 224lb or until his visit per Willian Tucker NP. Patient verbalizes understanding.

## 2022-11-23 ENCOUNTER — OFFICE VISIT (OUTPATIENT)
Dept: CARDIOLOGY CLINIC | Age: 57
End: 2022-11-23
Payer: MEDICARE

## 2022-11-23 VITALS
WEIGHT: 233 LBS | OXYGEN SATURATION: 96 % | HEART RATE: 67 BPM | TEMPERATURE: 97 F | RESPIRATION RATE: 18 BRPM | DIASTOLIC BLOOD PRESSURE: 78 MMHG | SYSTOLIC BLOOD PRESSURE: 116 MMHG | BODY MASS INDEX: 35.31 KG/M2 | HEIGHT: 68 IN

## 2022-11-23 DIAGNOSIS — I50.22 CHRONIC SYSTOLIC HEART FAILURE (HCC): ICD-10-CM

## 2022-11-23 DIAGNOSIS — N18.32 STAGE 3B CHRONIC KIDNEY DISEASE (HCC): Primary | ICD-10-CM

## 2022-11-23 DIAGNOSIS — R06.02 SOB (SHORTNESS OF BREATH): ICD-10-CM

## 2022-11-23 DIAGNOSIS — I10 ESSENTIAL HYPERTENSION WITH GOAL BLOOD PRESSURE LESS THAN 130/80: ICD-10-CM

## 2022-11-23 PROCEDURE — G8427 DOCREV CUR MEDS BY ELIG CLIN: HCPCS | Performed by: NURSE PRACTITIONER

## 2022-11-23 PROCEDURE — 3074F SYST BP LT 130 MM HG: CPT | Performed by: NURSE PRACTITIONER

## 2022-11-23 PROCEDURE — G8432 DEP SCR NOT DOC, RNG: HCPCS | Performed by: NURSE PRACTITIONER

## 2022-11-23 PROCEDURE — G8417 CALC BMI ABV UP PARAM F/U: HCPCS | Performed by: NURSE PRACTITIONER

## 2022-11-23 PROCEDURE — 99000 SPECIMEN HANDLING OFFICE-LAB: CPT | Performed by: NURSE PRACTITIONER

## 2022-11-23 PROCEDURE — G8752 SYS BP LESS 140: HCPCS | Performed by: NURSE PRACTITIONER

## 2022-11-23 PROCEDURE — 3017F COLORECTAL CA SCREEN DOC REV: CPT | Performed by: NURSE PRACTITIONER

## 2022-11-23 PROCEDURE — 99214 OFFICE O/P EST MOD 30 MIN: CPT | Performed by: NURSE PRACTITIONER

## 2022-11-23 PROCEDURE — 3078F DIAST BP <80 MM HG: CPT | Performed by: NURSE PRACTITIONER

## 2022-11-23 PROCEDURE — G8754 DIAS BP LESS 90: HCPCS | Performed by: NURSE PRACTITIONER

## 2022-11-23 RX ORDER — BUMETANIDE 1 MG/1
2 TABLET ORAL 3 TIMES DAILY
Qty: 180 TABLET | Refills: 1 | Status: SHIPPED | OUTPATIENT
Start: 2022-11-23 | End: 2022-11-29

## 2022-11-23 NOTE — PATIENT INSTRUCTIONS
Medication changes:    Increase bumex to 2 mg three times daily. Goal weight loss is 5 lb in next week    Please take this to your pharmacy to notify them of the change in medications. Testing Ordered:    none    Other Recommendations:     A referral to Nephrology has been placed. You will be contacted for scheduling. Ensure your drinking an adequate amount of water with a goal of 6-8 eight ounce glasses (1.5-2 liters) of fluid daily. Your urine should be clear and light yellow straw colored. If your blood pressure begins to consistently run below 90/60 and/or you begin to experience dizziness or lightheadedness, please contact the [facility] at [phone]. Follow up in one week with nurse visit for weight check, BP check and labs with [facility]      Please monitor your weights daily upon waking and after using the bathroom. Keep a written records of your weights and bring to your next appointment. If you have a weight gain of 3 or more pounds overnight OR 5 or more pounds in one week please contact our office. Thank you for allowing us the privilege of being a part of your healthcare team! Please do not hesitate to contact our office at [phone] with any questions or concerns. Virtual Heart Failure [address] invites you to learn more about heart failure and to share your questions, ideas, and experiences with others. Each month, the Heart Failure Support Group features a new educational topic and a guest speaker, followed by an interactive discussion. Our Heart Failure Nurse Navigator will moderate each session. You will be able to participate by phone, tablet or computer through [address] [place] [address]. This support group takes place on the 3rd Thursday of each month from 6:00-7:30PM. All individuals living with heart failure and their caregivers are welcome to join.      If you are interested in participating, please contact us at [email or web address] and you will be sent the link to join the [employer].

## 2022-11-23 NOTE — LETTER
11/23/2022    Patient: Claire Manzanares   YOB: 1965   Date of Visit: 11/23/2022     Donald Miguel MD  [address] [address]  Via Fax: [phone]    Dear Donald Miguel MD,      Thank you for referring Mr. Claire Manzanares to [address] [place] [address] for evaluation. My notes for this consultation are attached. If you have questions, please do not hesitate to call me. I look forward to following your patient along with you.       Sincerely,    Fam Bush NP

## 2022-11-23 NOTE — PROGRESS NOTES
[facility] in [place], [facility] Note    Patient name: Reanna Meng  Patient : 1965  Patient MRN: [id number]  Date of service: 22    Primary care physician: Shemar Hamilton MD  Primary general cardiologist:  Dr. Sebas Tubbs    Primary [facility] cardiologist: Robert Membreno MD    CHIEF COMPLAINT:  Chief Complaint   Patient presents with    Shortness of Breath     W/ exertion    Leg Swelling     Bilateral          PLAN OF CARE:  55 y/o male with h/o non-ischemic cardiomyopathy, LVEF 28%, stage C, NYHA class IIIA symptoms and CKD, stage 3 (Cr 2.1-2.47) undergoing optimization of GDMT (taken off nephrotoxic meds through diuresis and to allow renal recovery and up-titrating alternative HF meds); once patient is euvolemic he should have [address] +/- ischemic evaluation (possibly LHC depending on renal function, last [facility] )  Most likely etiology of worsening HF is chronic volume overload due to underestimated severity of renal failure +/- possibly inadequately/untreated MINA; evaluation ongoing       PLAN:  Continue current medical therapy for heart failure  Continue coreg 50mg twice daily  Continue norvasc 10mg daily  Hold ARNI/MRA to allow renal recovery  Continue hydralazine 10mg TID and isordil 10mg TID   Consider Farxiga if ok with nephrology and based on renal function  Increase bumex 2mg TID until 5lb weight loss   Does not take aspirin   Continue current dose of statin  ICD interrogation every 3 months per routine  Needs to reschedule sleep study    Repeat TTE in 3 months  Routine HF labs next week including repeat iron profile   Reinforced low salt diet  Reinforced fluid restriction to 6 x 8oz glasses per day  Previously provided advanced care plan forms to be filled out    Follow-up with nephrology, would like referral sent to new nephrologist   Follow-up with primary cardiologist  Follow-up with EP cardiologist  Needs updated flu vaccine         IMPRESSION:  Fatigue  Shortness of breath  Volume overload  Chronic systolic heart failure  Stage C, NYHA class IIIA symptoms  Non-ischemic cardiomyopathy, LVEF 28% (HF diagnosed around 2002)  Normal coronaries by [facility] 2015  S/p single lead ICD (2/2015)  Cardiac risk factors  HTN  HL  DM2  Morbid obesity,  Body mass index is 35.43 kg/m². CKD, stage 3  Anemia        CARDIAC IMAGING:  Echo 10/21/22    Left Ventricle: Severely reduced left ventricular systolic function with a visually estimated EF of 20 - 25%. Left ventricle is moderately dilated. Mild posterior thickening. Normal wall motion. Normal diastolic function. Left Atrium: Left atrium is moderately dilated. Mitral Valve: Mild regurgitation with a centrally directed jet. Tricuspid Valve: Mild regurgitation with a centrally directed jet. Echo (3/9/22)    Study limited to the assessment of ejection fraction. Left Ventricle: Left ventricle is moderately dilated. Mildly increased wall thickness. Severe global hypokinesis present. Calculared ejection fraction is 28% by 3D volume and 31% by 2D Fernandez's biplane. Global longitudinal strain is reduced with a value of -5.8%. Echo (12/6/21)  LV: Calculated LVEF is 28%. Biplane method used to measure ejection fraction. Mildly dilated left ventricle. Mildly to moderately increased wall thickness. Moderate-to-severely and globally reduced systolic function. Moderate (grade 2) left ventricular diastolic dysfunction. LA: Mildly dilated left atrium. Left Atrium volume index is 40 mL/m2. MV: Moderate mitral annular calcification. Very mild mitral valve regurgitation is present. RV: Not well visualized. Pacer/ICD present. Echo (2/17/21)  LV: Calculated LVEF is 29%. Biplane method used to measure ejection fraction. Mildly dilated left ventricle. Mild to moderate hypertrophy. Severely and globally reduced systolic function. Wall motion: normal. Abnormal left ventricular strain.  Global longitudinal strain is -8%. Moderate (grade 2) left ventricular diastolic dysfunction. LA: Mildly dilated left atrium. Left Atrium volume index is 40 mL/m2. MV: Moderate mitral annular calcification. Very mild mitral valve regurgitation is present. Echo (8/16/19)  Left Ventricle: Mildly dilated left ventricle. Moderate concentric hypertrophy. Severe global systolic dysfunction. Estimated left ventricular ejection fraction is 21 - 25%. Biplane method used to measure ejection fraction. Left ventricular global hypokinesis. Abnormal left ventricular strain. Inconclusive left ventricular diastolic function. Left Atrium: Mildly dilated left atrium. Mitral Valve: Moderate mitral annular calcification. Mild mitral valve regurgitation. EKG (8/5/19) NSR 73bpm QRS 124ms  LHC (2/105) normal cors      6 Min Walk Report 6/23/2022 3/2/2015   (PRE) HR 76 78   (PRE) O2 Sat 99 99   (POST) HR 89 90   (POST) O2 Sat 96 99   Distance in Meters 358.84 386.18     HISTORY OF PRESENT ILLNESS:  I had the pleasure of seeing Mario Martinez in [address] [place] [address] at [address] [place] [address]. Briefly, Mario Martinez is a 56 y.o. male with h/o morbid obesity, BMI 35, HTN, HL, DM2, chronic systolic heart failure, stage C, NYHA class IIIA symptoms, non-ischemic cardiomyopathy, LVEF 28% (HF diagnosed around 2002), normal coronaries by [facility] 2015 s/p single lead ICD (2/2015) and worsening renal function, CKD stage 2 and anemia. [facility]. INTERVAL HISTORY:  Today, patient presents for follow up visit. Patient notes worsening Chetan edema and VILLARREAL, he cannot climb a flight of stairs at this time. His weight is trending up, his appetite is diminished and he is fatigued at the end of his work day ( sedentary work) and struggles with other activities.  He is compliant with medications, recent CXR was clear, renal function remains elevated but he has not followed up with nephrology in a while. His PCP is retiring and he would like to transition his care to [facility] as a medical home. REVIEW OF SYSTEMS:  Review of Systems   Constitutional:  Positive for malaise/fatigue. Negative for chills, fever and weight loss. Respiratory:  Positive for shortness of breath. Negative for cough. Cardiovascular:  Positive for leg swelling. Negative for chest pain, palpitations and orthopnea. Gastrointestinal:  Negative for abdominal pain, constipation, diarrhea, heartburn, nausea and vomiting. Musculoskeletal:  Negative for falls and myalgias. Neurological:  Negative for dizziness and weakness. Psychiatric/Behavioral:  Negative for depression. PHYSICAL EXAM:  Visit Vitals  /78 (BP 1 Location: Left upper arm, BP Patient Position: Sitting, BP Cuff Size: Large adult)   Pulse 67   Temp 97 °F (36.1 °C) (Oral)   Resp 18   Ht 5' 8\" (1.727 m)   Wt 233 lb (105.7 kg)   SpO2 96%   BMI 35.43 kg/m²     Physical Exam  Vitals reviewed. Constitutional:       General: He is not in acute distress. Appearance: Normal appearance. HENT:      Head: Normocephalic and atraumatic. Neck:      Vascular: No hepatojugular reflux or JVD. Cardiovascular:      Rate and Rhythm: Normal rate and regular rhythm. Pulses: Normal pulses. Heart sounds: Normal heart sounds. No murmur heard. No friction rub. No gallop. Pulmonary:      Effort: Pulmonary effort is normal. No respiratory distress. Breath sounds: Normal breath sounds. Abdominal:      General: Bowel sounds are normal. There is no distension. Palpations: Abdomen is soft. Tenderness: There is no abdominal tenderness. Musculoskeletal:         General: Swelling present. Skin:     General: Skin is warm and dry. Capillary Refill: Capillary refill takes less than 2 seconds. Neurological:      General: No focal deficit present.       Mental Status: He is alert and oriented to person, place, and time. Psychiatric:         Mood and Affect: Mood normal.         Behavior: Behavior normal.         Thought Content: Thought content normal.         Judgment: Judgment normal.          PAST MEDICAL HISTORY:  Past Medical History:   Diagnosis Date    Diabetes ([address]     Heart failure (HCC)     CHF, cardiomyopathy    Hypertension     ICD (implantable cardioverter-defibrillator) in place     left upper chest       PAST SURGICAL HISTORY:  Past Surgical History:   Procedure Laterality Date    HX HEART CATHETERIZATION  2/2015    x1    HX IMPLANTABLE CARDIOVERTER DEFIBRILLATOR  2/16/2015       FAMILY HISTORY:  Family History   Problem Relation Age of Onset    Hypertension Father     Diabetes Father     Diabetes Mother     Diabetes Brother     Hypertension Brother        SOCIAL HISTORY:  Social History     Socioeconomic History    Marital status:    Tobacco Use    Smoking status: Never    Smokeless tobacco: Never   Vaping Use    Vaping Use: Never used   Substance and Sexual Activity    Alcohol use: No    Drug use: No    Sexual activity: Not Currently       LABORATORY RESULTS:  Labs Latest Ref Rng & Units 11/4/2022   WBC 4.1 - 11.1 K/uL 15. 3(H)   RBC 4.10 - 5.70 M/uL 3.82(L)   Hemoglobin 12.1 - 17.0 g/dL 9.8(L)   Hematocrit 36.6 - 50.3 % 31. 6(L)   MCV 80.0 - 99.0 FL 82.7   Platelets 660 - 779 K/uL 199   Lymphocytes 12 - 49 % 6(L)   Monocytes 5 - 13 % 6   Eosinophils 0 - 7 % 1   Basophils 0 - 1 % 1   Albumin 3.5 - 5.0 g/dL 3.5   Calcium 8.5 - 10.1 MG/DL 8.9   Glucose 65 - 100 mg/dL 104(H)   BUN 6 - 20 MG/DL 31(H)   Creatinine 0.70 - 1.30 MG/DL 2.52(H)   Sodium 136 - 145 mmol/L 137   Potassium 3.5 - 5.1 mmol/L 4.1   Some recent data might be hidden       ALLERGY:  No Known Allergies     CURRENT MEDICATIONS:    Current Outpatient Medications:     isosorbide dinitrate (ISORDIL) 10 mg tablet, TAKE 1 TABLET BY MOUTH THREE TIMES DAILY, Disp: 90 Tablet, Rfl: 0    hydrALAZINE (APRESOLINE) 10 mg tablet, TAKE 1 TABLET BY MOUTH THREE TIMES DAILY, Disp: 90 Tablet, Rfl: 0    bumetanide (BUMEX) 1 mg tablet, Take 2 Tablets by mouth two (2) times a day., Disp: 120 Tablet, Rfl: 1    dextromethorphan-guaiFENesin (Robitussin Cough-Chest Carroll DM) 5-100 mg/5 mL liqd, Take 10 ml every 6 hours as needed for cough, Disp: 118 mL, Rfl: 0    carvediloL (COREG) 25 mg tablet, Take 2 Tablets by mouth two (2) times a day. *NEED TO SCHEDULE FOLLOW UP APPOINTMENT FOR FURTHER REFILLS*, Disp: 120 Tablet, Rfl: 0    amLODIPine (NORVASC) 10 mg tablet, Take 1 tablet by mouth once daily. *NEED TO SCHEDULE FOLLOW UP APPOINTMENT FOR FURTHER REFILLS*, Disp: 90 Tablet, Rfl: 0    cholecalciferol (VITAMIN D3) 25 mcg (1,000 unit) cap, Take 1,000 Units by mouth daily. , Disp: , Rfl:     NovoLIN 70/30 U-100 Insulin 100 unit/mL (70-30) injection, 22 Units. , Disp: , Rfl:     simvastatin (ZOCOR) 40 mg tablet, TAKE 1 TABLET BY MOUTH ONCE DAILY AT BEDTIME, Disp: , Rfl:     Insulin Needles, Disposable, 31 ndle, For 75/25 insulin administration, Disp: 150 Each, Rfl: 2    Blood-Glucose Meter monitoring kit, 1, Disp: 1 Kit, Rfl: 0    glucose blood VI test strips (BLOOD GLUCOSE TEST) strip, 1, Disp: 1 Package, Rfl: 11    multivitamin with iron tablet, Take 1 tablet by mouth daily. , Disp: , Rfl:     Thank you for your referral and allowing me to participate in this patient's care.     Bill Lynn NP  [address] [address]  Office [phone]  Fax [phone]    PATIENT CARE TEAM:  Patient Care Team:  Jeffrey Al MD as PCP - General (Family Medicine)  Sigrid Ruffin MD (Cardiovascular Disease Physician)  Arla Cheadle, MD (Nephrology)  Mark Roberts MD (Internal Medicine Physician)

## 2022-11-25 DIAGNOSIS — I42.9 CARDIOMYOPATHY (HCC): ICD-10-CM

## 2022-11-25 DIAGNOSIS — I50.22 CHRONIC SYSTOLIC HEART FAILURE (HCC): ICD-10-CM

## 2022-11-25 DIAGNOSIS — I10 ESSENTIAL HYPERTENSION WITH GOAL BLOOD PRESSURE LESS THAN 130/80: ICD-10-CM

## 2022-11-25 RX ORDER — AMLODIPINE BESYLATE 10 MG/1
TABLET ORAL
Qty: 15 TABLET | Refills: 0 | Status: SHIPPED | OUTPATIENT
Start: 2022-11-25

## 2022-11-25 RX ORDER — CARVEDILOL 25 MG/1
TABLET ORAL
Qty: 120 TABLET | Refills: 0 | Status: SHIPPED | OUTPATIENT
Start: 2022-11-25

## 2022-11-25 NOTE — TELEPHONE ENCOUNTER
Requested Prescriptions     Signed Prescriptions Disp Refills    amLODIPine (NORVASC) 10 mg tablet 15 Tablet 0     Sig: TAKE 1 TABLET BY MOUTH ONCE DAILY .  APPOINTMENT REQUIRED FOR FUTURE REFILLS     Authorizing Provider: Alejandrina Willis     Ordering User: Daysi Busch    Per Dr. Haydee Moody verbal orders

## 2022-11-25 NOTE — TELEPHONE ENCOUNTER
Requested Prescriptions     Signed Prescriptions Disp Refills    carvediloL (COREG) 25 mg tablet 120 Tablet 0     Sig: TAKE 2 TABLETS BY MOUTH TWICE DAILY .  APPOINTMENT REQUIRED FOR FUTURE REFILLS     Authorizing Provider: Jatinder Maier     Ordering User: Telma Goldstein    Per Dr. Nguyen Somers verbal orders

## 2022-11-28 ENCOUNTER — TELEPHONE (OUTPATIENT)
Dept: CARDIOLOGY CLINIC | Age: 57
End: 2022-11-28

## 2022-11-29 ENCOUNTER — CLINICAL SUPPORT (OUTPATIENT)
Dept: CARDIOLOGY CLINIC | Age: 57
End: 2022-11-29
Payer: MEDICARE

## 2022-11-29 VITALS
DIASTOLIC BLOOD PRESSURE: 86 MMHG | SYSTOLIC BLOOD PRESSURE: 118 MMHG | WEIGHT: 225.6 LBS | TEMPERATURE: 97.7 F | BODY MASS INDEX: 34.3 KG/M2 | HEART RATE: 68 BPM | OXYGEN SATURATION: 98 %

## 2022-11-29 DIAGNOSIS — I10 ESSENTIAL HYPERTENSION WITH GOAL BLOOD PRESSURE LESS THAN 130/80: ICD-10-CM

## 2022-11-29 DIAGNOSIS — R06.02 SOB (SHORTNESS OF BREATH): ICD-10-CM

## 2022-11-29 DIAGNOSIS — I50.22 CHRONIC SYSTOLIC HEART FAILURE (HCC): ICD-10-CM

## 2022-11-29 PROCEDURE — 3078F DIAST BP <80 MM HG: CPT | Performed by: NURSE PRACTITIONER

## 2022-11-29 PROCEDURE — 99211 OFF/OP EST MAY X REQ PHY/QHP: CPT | Performed by: NURSE PRACTITIONER

## 2022-11-29 PROCEDURE — 3074F SYST BP LT 130 MM HG: CPT | Performed by: NURSE PRACTITIONER

## 2022-11-29 RX ORDER — BUMETANIDE 1 MG/1
2 TABLET ORAL 2 TIMES DAILY
Qty: 180 TABLET | Refills: 1
Start: 2022-11-29

## 2022-11-29 NOTE — PATIENT INSTRUCTIONS
Medication changes:    Resume Bumex 2mg (2 tablets) two times per day. If weight goes back up to 233lb please resume Bumex 2mg (2 tablets) three times per day. You may take and over the counter lidocaine patch for the rib pain. Please take this to your pharmacy to notify them of the change in medications. Testing Ordered:    Lab work has been ordered. [facility]  Our office will notify you of any abnormal results requiring changes to your current plan of care. Other Recommendations:     Please record blood pressure, heart rate and weights in log and bring to all appts    Please find a PCP  JASON Lucia [facility] internal medicine- Phone: [phone]  [address] [place] [address] and primary care- Phone: Phone: [phone]       Ensure your drinking an adequate amount of water with a goal of 6-8 eight ounce glasses (1.5-2 liters) of fluid daily. Your urine should be clear and light yellow straw colored. If your blood pressure begins to consistently run below 90/60 and/or you begin to experience dizziness or lightheadedness, please contact the [facility] [employer] [facility] at [phone]. Please monitor your weights daily upon waking and after using the bathroom. Keep a written records of your weights and bring to your next appointment. If you have a weight gain of 3 or more pounds overnight OR 5 or more pounds in one week please contact our office. Follow up 6-8 weeks with [facility]        Thank you for allowing us the privilege of being a part of your healthcare team! Please do not hesitate to contact our office at [phone] with any questions or concerns. Virtual Heart Failure [employer] [address] invites you to learn more about heart failure and to share your questions, ideas, and experiences with others.  Each month, the Heart Failure Support Group features a new educational topic and a guest speaker, followed by an interactive discussion. Our Heart Failure Nurse Navigator will moderate each session. You will be able to participate by phone, tablet or computer through [employer]. This support group takes place on the 3rd Thursday of each month from 6:00-7:30PM. All individuals living with heart failure and their caregivers are welcome to join. If you are interested in participating, please contact us at [email or web address] and you will be sent the link to join the [employer].

## 2022-11-29 NOTE — PROGRESS NOTES
Patient seen today for nurse visit due to recent increase in Bumex dose to 2mg three times per day. Patient reports he feels much better, breathing is better still some dyspnea with exertion, weight down from 233lb to 225.6lb, patient still has 1+ pitting edema but he states this is improved. Patient states his blood pressure got a little low an he was \"seeing spots\" after increasing bumex so he decreased his hydralazine and isosorbide to twice a day. Patient did not call in to report these symptoms, RN educated patient that in future patient should call the office to report symptoms and let provider determine next steps/medication changes. Patient verbalized understanding. Patient forgot log of BP and weights at home, states his last night BP was 120/79. Patient reports persistent cough since hospitalization with clear sputum. Patient complains of right rib pain. 9/10 when coughing also notes pain in right chest with coughing but states that is more mild. Patient has not followed up with any providers regarding cough. Patient states PCP retired and he needs to establish care with a new provider and would like help with finding a [facility] PCP. RN collaborated with [employer] Joaquin [employer] regarding above information. Orders received for patient to return to Bumex 2mg BID. If weight goes back up patient to go back to Bumex 2mg TID. Provider states patient can use over the counter lidocaine patch for rib pain. Educated patient to follow up with PCP if cough doesn't resolved or symptoms worsen. RN gave patient [facility] PCP nearby per his request. Reminded patient to have labs down this week per provider. Labs already ordered in system and patient educated to go to [facility] lab draw site. RN also encouraged patient to record BP/ HR and weights and bring to all appts. Patient verbalized understanding.

## 2022-12-12 DIAGNOSIS — I10 ESSENTIAL HYPERTENSION WITH GOAL BLOOD PRESSURE LESS THAN 130/80: ICD-10-CM

## 2022-12-12 RX ORDER — AMLODIPINE BESYLATE 10 MG/1
TABLET ORAL
Qty: 7 TABLET | Refills: 0 | Status: SHIPPED | OUTPATIENT
Start: 2022-12-12

## 2022-12-12 NOTE — TELEPHONE ENCOUNTER
Requested Prescriptions     Signed Prescriptions Disp Refills    amLODIPine (NORVASC) 10 mg tablet 7 Tablet 0     Sig: TAKE 1 TABLET BY MOUTH ONCE DAILY .  APPOINTMENT REQUIRED FOR FUTURE REFILLS     Authorizing Provider: Bryce Ortiz     Ordering User: Iveth Smith    Per Dr. Sanju Mendez verbal orders

## 2022-12-13 ENCOUNTER — HOSPITAL ENCOUNTER (EMERGENCY)
Age: 57
Discharge: HOME | End: 2022-12-14
Attending: EMERGENCY MEDICINE
Payer: MEDICARE

## 2022-12-13 ENCOUNTER — APPOINTMENT (OUTPATIENT)
Dept: ULTRASOUND IMAGING | Age: 57
End: 2022-12-13
Attending: EMERGENCY MEDICINE
Payer: MEDICARE

## 2022-12-13 VITALS
HEIGHT: 69 IN | SYSTOLIC BLOOD PRESSURE: 123 MMHG | RESPIRATION RATE: 18 BRPM | TEMPERATURE: 98.4 F | HEART RATE: 73 BPM | WEIGHT: 232.81 LBS | BODY MASS INDEX: 34.48 KG/M2 | OXYGEN SATURATION: 100 % | DIASTOLIC BLOOD PRESSURE: 92 MMHG

## 2022-12-13 DIAGNOSIS — R60.0 BILATERAL LEG EDEMA: Primary | ICD-10-CM

## 2022-12-13 DIAGNOSIS — N18.9 CHRONIC RENAL FAILURE, UNSPECIFIED CKD STAGE: ICD-10-CM

## 2022-12-13 DIAGNOSIS — I50.22 CHRONIC SYSTOLIC CONGESTIVE HEART FAILURE (HCC): ICD-10-CM

## 2022-12-13 LAB
ALBUMIN SERPL-MCNC: 3.4 G/DL (ref 3.5–5)
ALBUMIN/GLOB SERPL: 0.8 {RATIO} (ref 1.1–2.2)
ALP SERPL-CCNC: 120 U/L (ref 45–117)
ALT SERPL-CCNC: 23 U/L (ref 12–78)
ANION GAP SERPL CALC-SCNC: 6 MMOL/L (ref 5–15)
AST SERPL-CCNC: 26 U/L (ref 15–37)
BASOPHILS # BLD: 0.1 K/UL (ref 0–0.1)
BASOPHILS NFR BLD: 1 % (ref 0–1)
BILIRUB SERPL-MCNC: 0.4 MG/DL (ref 0.2–1)
BNP SERPL-MCNC: 8210 PG/ML
BUN SERPL-MCNC: 39 MG/DL (ref 6–20)
BUN/CREAT SERPL: 14 (ref 12–20)
CALCIUM SERPL-MCNC: 8.9 MG/DL (ref 8.5–10.1)
CHLORIDE SERPL-SCNC: 105 MMOL/L (ref 97–108)
CO2 SERPL-SCNC: 27 MMOL/L (ref 21–32)
CREAT SERPL-MCNC: 2.78 MG/DL (ref 0.7–1.3)
DIFFERENTIAL METHOD BLD: ABNORMAL
EOSINOPHIL # BLD: 0.2 K/UL (ref 0–0.4)
EOSINOPHIL NFR BLD: 2 % (ref 0–7)
ERYTHROCYTE [DISTWIDTH] IN BLOOD BY AUTOMATED COUNT: 16.5 % (ref 11.5–14.5)
GLOBULIN SER CALC-MCNC: 4.2 G/DL (ref 2–4)
GLUCOSE SERPL-MCNC: 144 MG/DL (ref 65–100)
HCT VFR BLD AUTO: 34.9 % (ref 36.6–50.3)
HGB BLD-MCNC: 10.8 G/DL (ref 12.1–17)
IMM GRANULOCYTES # BLD AUTO: 0 K/UL (ref 0–0.04)
IMM GRANULOCYTES NFR BLD AUTO: 0 % (ref 0–0.5)
LYMPHOCYTES # BLD: 1.7 K/UL (ref 0.8–3.5)
LYMPHOCYTES NFR BLD: 18 % (ref 12–49)
MCH RBC QN AUTO: 25.2 PG (ref 26–34)
MCHC RBC AUTO-ENTMCNC: 30.9 G/DL (ref 30–36.5)
MCV RBC AUTO: 81.5 FL (ref 80–99)
MONOCYTES # BLD: 0.8 K/UL (ref 0–1)
MONOCYTES NFR BLD: 9 % (ref 5–13)
NEUTS SEG # BLD: 6.3 K/UL (ref 1.8–8)
NEUTS SEG NFR BLD: 70 % (ref 32–75)
NRBC # BLD: 0 K/UL (ref 0–0.01)
NRBC BLD-RTO: 0 PER 100 WBC
PLATELET # BLD AUTO: 253 K/UL (ref 150–400)
PMV BLD AUTO: 12.3 FL (ref 8.9–12.9)
POTASSIUM SERPL-SCNC: 3.9 MMOL/L (ref 3.5–5.1)
PROT SERPL-MCNC: 7.6 G/DL (ref 6.4–8.2)
RBC # BLD AUTO: 4.28 M/UL (ref 4.1–5.7)
SODIUM SERPL-SCNC: 138 MMOL/L (ref 136–145)
WBC # BLD AUTO: 9 K/UL (ref 4.1–11.1)

## 2022-12-13 PROCEDURE — 83880 ASSAY OF NATRIURETIC PEPTIDE: CPT

## 2022-12-13 PROCEDURE — 36415 COLL VENOUS BLD VENIPUNCTURE: CPT

## 2022-12-13 PROCEDURE — 85025 COMPLETE CBC W/AUTO DIFF WBC: CPT

## 2022-12-13 PROCEDURE — 93971 EXTREMITY STUDY: CPT

## 2022-12-13 PROCEDURE — 80053 COMPREHEN METABOLIC PANEL: CPT

## 2022-12-13 PROCEDURE — 99284 EMERGENCY DEPT VISIT MOD MDM: CPT

## 2022-12-13 NOTE — Clinical Note
[address]  [facility] EMERGENCY DEPT  [address] [place] [id number] [phone]    Work/School Note    Date: 12/13/2022    To Whom It May concern:      Bailey Rivera was seen and treated today in the emergency room by the following provider(s):  Attending Provider: Jj Balderas MD.      Bailey Rivera is excused from work/school on 12/13/22. He is clear to return to work/school on 12/14/22.         Sincerely,          Donna Butler MD

## 2022-12-13 NOTE — Clinical Note
[address]  [facility] EMERGENCY DEPT  [address]  [place] [id number] [phone]    Work/School Note    Date: 12/13/2022    To Whom It May concern:      Nicki Natarajan was seen and treated today in the emergency room by the following provider(s):  Attending Provider: Mary Jo Jimenez MD.      Nicki Natarajan is excused from work/school on 12/13/22. He is clear to return to work/school on 12/14/22.         Sincerely,          Misti Velarde MD

## 2022-12-14 NOTE — ED PROVIDER NOTES
EMERGENCY DEPARTMENT HISTORY AND PHYSICAL EXAM      Date: 12/13/2022  Patient Name: Hans Evans    History of Presenting Illness     Chief Complaint   Patient presents with    Peripheral Edema     Patient stated that he has been retaining fluid in both of his legs but noted that he believes the left is worse than the right. He is also worried about new discoloration to the left ankle. Denies any numbness or tingling. Denies any difficulty walking. Patient takes Bumex daily. History Provided By: Patient    HPI: Hans Evans, 56 y.o. male with PMHx significant for diabetes, CHF/cardiomyopathy AICD, hypertension presents to the ED with bilateral leg swelling with discoloration and darkening of skin on lateral left foot. He reports he has had chronic swelling, but thinks that the discoloration is new and he is concerned about a possible blood clot. He reports dyspnea on exertion that is chronic but improved now that he is on increased doses of Bumex. He denies any chest pain or cough. 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 He has compression stockings but has not been wearing them because they feel too tight. He denies any fevers or chills. Denies any nausea, vomiting, diarrhea. PCP: Heaven Mccauley MD    No current facility-administered medications on file prior to encounter. Current Outpatient Medications on File Prior to Encounter   Medication Sig Dispense Refill    amLODIPine (NORVASC) 10 mg tablet TAKE 1 TABLET BY MOUTH ONCE DAILY . APPOINTMENT REQUIRED FOR FUTURE REFILLS 7 Tablet 0    bumetanide (BUMEX) 1 mg tablet Take 2 Tablets by mouth two (2) times a day. 180 Tablet 1    carvediloL (COREG) 25 mg tablet TAKE 2 TABLETS BY MOUTH TWICE DAILY .  APPOINTMENT REQUIRED FOR FUTURE REFILLS 120 Tablet 0    isosorbide dinitrate (ISORDIL) 10 mg tablet TAKE 1 TABLET BY MOUTH THREE TIMES DAILY (Patient taking differently: Take 10 mg by mouth two (2) times a day.) 90 Tablet 0 hydrALAZINE (APRESOLINE) 10 mg tablet TAKE 1 TABLET BY MOUTH THREE TIMES DAILY (Patient taking differently: Take 10 mg by mouth two (2) times a day.) 90 Tablet 0    [DISCONTINUED] dextromethorphan-guaiFENesin (Robitussin Cough-Chest Carroll DM) 5-100 mg/5 mL liqd Take 10 ml every 6 hours as needed for cough 118 mL 0    cholecalciferol (VITAMIN D3) 25 mcg (1,000 unit) cap Take 1,000 Units by mouth daily. NovoLIN 70/30 U-100 Insulin 100 unit/mL (70-30) injection 22 Units by SubCUTAneous route daily. simvastatin (ZOCOR) 40 mg tablet TAKE 1 TABLET BY MOUTH ONCE DAILY AT BEDTIME      Insulin Needles, Disposable, 31 ndle For 75/25 insulin administration 150 Each 2    Blood-Glucose Meter monitoring kit 1 1 Kit 0    glucose blood VI test strips (BLOOD GLUCOSE TEST) strip 1 1 Package 11    multivitamin with iron tablet Take 1 tablet by mouth daily. Past History     Past Medical History:  Past Medical History:   Diagnosis Date    Diabetes ([address]     Heart failure ([address]     CHF, cardiomyopathy    Hypertension     ICD (implantable cardioverter-defibrillator) in place     left upper chest       Past Surgical History:  Past Surgical History:   Procedure Laterality Date    HX HEART CATHETERIZATION  2/2015    x1    HX IMPLANTABLE CARDIOVERTER DEFIBRILLATOR  2/16/2015       Family History:  Family History   Problem Relation Age of Onset    Diabetes Mother     Hypertension Father     Diabetes Father     Diabetes Brother     Hypertension Brother        Social History:  Social History     Tobacco Use    Smoking status: Never    Smokeless tobacco: Never   Vaping Use    Vaping Use: Never used   Substance Use Topics    Alcohol use: No    Drug use: No       Allergies:  No Known Allergies      Review of Systems   Review of Systems   Constitutional:  Negative for chills and fever. HENT: Negative. Respiratory:  Positive for shortness of breath (Mild shortness of breath with exertion, chronic).  Negative for cough and chest tightness. Cardiovascular:  Positive for leg swelling. Negative for chest pain and palpitations. Gastrointestinal:  Negative for abdominal pain. Genitourinary: Negative. Musculoskeletal: Negative. Neurological:  Negative for dizziness and light-headedness. Psychiatric/Behavioral: Negative. All other systems reviewed and are negative.     Physical Exam    General appearance -overweight, well appearing, and in no distress  Eyes - pupils equal and reactive, extraocular eye movements intact  ENT - mucous membranes moist, pharynx normal without lesions  Neck - supple, no significant adenopathy; non-tender to palpation  Chest - clear to auscultation, no wheezes, rales or rhonchi; non-tender to palpation, pacemaker left upper chest  Heart - normal rate and regular rhythm, S1 and S2 normal, no murmurs noted  Abdomen - soft, nontender, nondistended, no masses or organomegaly  Musculoskeletal - no joint tenderness, deformity or swelling; normal ROM  Extremities - peripheral pulses normal, 2+ bilateral pedal edema, hyperpigmentation lateral left ankle and foot, no erythema or warmth  Skin - normal coloration and turgor, no rashes  Neurological - alert, oriented x3, normal speech, no focal findings or movement disorder noted    Diagnostic Study Results     Labs -     Recent Results (from the past 12 hour(s))   CBC WITH AUTOMATED DIFF    Collection Time: 12/13/22  7:50 PM   Result Value Ref Range    WBC 9.0 4.1 - 11.1 K/uL    RBC 4.28 4.10 - 5.70 M/uL    HGB 10.8 (L) 12.1 - 17.0 g/dL    HCT 34.9 (L) 36.6 - 50.3 %    MCV 81.5 80.0 - 99.0 FL    MCH 25.2 (L) 26.0 - 34.0 PG    MCHC 30.9 30.0 - 36.5 g/dL    RDW 16.5 (H) 11.5 - 14.5 %    PLATELET 840 912 - 805 K/uL    MPV 12.3 8.9 - 12.9 FL    NRBC 0.0 0  WBC    ABSOLUTE NRBC 0.00 0.00 - 0.01 K/uL    NEUTROPHILS 70 32 - 75 %    LYMPHOCYTES 18 12 - 49 %    MONOCYTES 9 5 - 13 %    EOSINOPHILS 2 0 - 7 %    BASOPHILS 1 0 - 1 %    IMMATURE GRANULOCYTES 0 0.0 - 0.5 %    ABS. NEUTROPHILS 6.3 1.8 - 8.0 K/UL    ABS. LYMPHOCYTES 1.7 0.8 - 3.5 K/UL    ABS. MONOCYTES 0.8 0.0 - 1.0 K/UL    ABS. EOSINOPHILS 0.2 0.0 - 0.4 K/UL    ABS. BASOPHILS 0.1 0.0 - 0.1 K/UL    ABS. IMM. GRANS. 0.0 0.00 - 0.04 K/UL    DF AUTOMATED     METABOLIC PANEL, COMPREHENSIVE    Collection Time: 12/13/22  7:50 PM   Result Value Ref Range    Sodium 138 136 - 145 mmol/L    Potassium 3.9 3.5 - 5.1 mmol/L    Chloride 105 97 - 108 mmol/L    CO2 27 21 - 32 mmol/L    Anion gap 6 5 - 15 mmol/L    Glucose 144 (H) 65 - 100 mg/dL    BUN 39 (H) 6 - 20 MG/DL    Creatinine 2.78 (H) 0.70 - 1.30 MG/DL    BUN/Creatinine ratio 14 12 - 20      eGFR 26 (L) >60 ml/min/1.73m2    Calcium 8.9 8.5 - 10.1 MG/DL    Bilirubin, total 0.4 0.2 - 1.0 MG/DL    ALT (SGPT) 23 12 - 78 U/L    AST (SGOT) 26 15 - 37 U/L    Alk. phosphatase 120 (H) 45 - 117 U/L    Protein, total 7.6 6.4 - 8.2 g/dL    Albumin 3.4 (L) 3.5 - 5.0 g/dL    Globulin 4.2 (H) 2.0 - 4.0 g/dL    A-G Ratio 0.8 (L) 1.1 - 2.2     NT-PRO BNP    Collection Time: 12/13/22  7:50 PM   Result Value Ref Range    NT pro-BNP 8,210 (H) <125 PG/ML       Radiologic Studies -   DUPLEX LOWER EXT VENOUS LEFT   Final Result        CT Results  (Last 48 hours)      None          CXR Results  (Last 48 hours)      None              Medical Decision Making   I am the first provider for this patient. I reviewed the vital signs, available nursing notes, past medical history, past surgical history, family history and social history. Vital Signs-Reviewed the patient's vital signs. Patient Vitals for the past 12 hrs:   Temp Pulse Resp BP SpO2   12/13/22 1942 98.4 °F (36.9 °C) 73 18 (!) 123/92 100 %           Records Reviewed: Nursing Notes and Old Medical Records    Provider Notes (Medical Decision Making):   Patient presents with bilateral leg swelling that is chronic, but he is concerned about possible DVT given discoloration of lateral left ankle and foot that he just noticed today. CBC, CMP, proBNP, duplex ultrasound planned. ED Course:   Initial assessment performed. The patients presenting problems have been discussed, and they are in agreement with the care plan formulated and outlined with them. I have encouraged them to ask questions as they arise throughout their visit. Progress Notes:  Labs and imaging reviewed. Patient has chronic renal insufficiency. proBNP is elevated as expected. Duplex ultrasound does not show any evidence of DVT in left lower extremity. Will discharge with instructions to wear compression stockings and keep legs elevated as much as possible. Follow-up with cardiologist and congestive heart failure specialist.  Continue diuretics. Disposition:  Discharge home    PLAN:  1. Current Discharge Medication List        2. Follow-up Information       Follow up With Specialties Details Why Contact Info    [facility] EMERGENCY DEPT Emergency Medicine  If symptoms worsen [address] [place] [address]  [phone]    [facility], [address] [facility] Vascular Surgery, Cardiovascular Disease Physician   [address] [facility]  [phone]            Return to ED if worse     Diagnosis     Clinical Impression:   1. Bilateral leg edema    2. Chronic renal failure, unspecified CKD stage    3.  Chronic systolic congestive heart failure ([address]

## 2022-12-14 NOTE — ED NOTES
Patient has some swelling on lower extremities and  discoloration on both feet. There is  a small lesion on his right lower leg.

## 2022-12-29 ENCOUNTER — OFFICE VISIT (OUTPATIENT)
Dept: CARDIOLOGY CLINIC | Age: 57
End: 2022-12-29
Payer: MEDICARE

## 2022-12-29 VITALS
BODY MASS INDEX: 33.77 KG/M2 | SYSTOLIC BLOOD PRESSURE: 130 MMHG | RESPIRATION RATE: 18 BRPM | WEIGHT: 228 LBS | OXYGEN SATURATION: 99 % | DIASTOLIC BLOOD PRESSURE: 82 MMHG | HEIGHT: 69 IN | HEART RATE: 82 BPM

## 2022-12-29 DIAGNOSIS — I42.9 CARDIOMYOPATHY (HCC): ICD-10-CM

## 2022-12-29 DIAGNOSIS — I10 ESSENTIAL HYPERTENSION WITH GOAL BLOOD PRESSURE LESS THAN 130/80: ICD-10-CM

## 2022-12-29 DIAGNOSIS — I10 PRIMARY HYPERTENSION: ICD-10-CM

## 2022-12-29 DIAGNOSIS — N18.32 STAGE 3B CHRONIC KIDNEY DISEASE (HCC): ICD-10-CM

## 2022-12-29 DIAGNOSIS — I42.0 DILATED CARDIOMYOPATHY (HCC): Primary | ICD-10-CM

## 2022-12-29 DIAGNOSIS — I50.22 CHRONIC SYSTOLIC HEART FAILURE (HCC): ICD-10-CM

## 2022-12-29 RX ORDER — CARVEDILOL 25 MG/1
TABLET ORAL
Qty: 120 TABLET | Refills: 11 | Status: SHIPPED | OUTPATIENT
Start: 2022-12-29

## 2022-12-29 NOTE — PROGRESS NOTES
HISTORY OF PRESENT ILLNESS  Hans Evans is a 56 y.o. male     SUMMARY:   Problem List  Date Reviewed: 12/29/2022            Codes Class Noted    Severe obesity (BMI 35.0-39. 9) ICD-10-CM: E66.01  ICD-9-CM: 278.01  6/15/2018        Chronic systolic heart failure ([facility] ICD-10-CM: I50.22  ICD-9-CM: 428.22  2/8/2015        Cardiomyopathy ([facility] ICD-10-CM: I42.9  ICD-9-CM: 425.4  9/26/2014    Overview Addendum 11/9/2017  7:20 AM by Asha Patel MD     2002 (approx) diagnosed [place] by echo, neg stress test and started on usual meds. 2007 (approx) fup echo lvef 25%  2/15 cardiac cath, no sig cad  2/15 single lead AICD implant  2/17 lvh, otherwise normal echo             Hypertension ICD-10-CM: I10  ICD-9-CM: 401.9  9/26/2014        Diabetes mellitus, type II, insulin dependent (HCC) (Chronic) ICD-10-CM: E11.9, Z79.4  ICD-9-CM: 250.00, V58.67  9/26/2014           Current Outpatient Medications on File Prior to Visit   Medication Sig    amLODIPine (NORVASC) 10 mg tablet TAKE 1 TABLET BY MOUTH ONCE DAILY . *Must Keep appointment for further refills. bumetanide (BUMEX) 1 mg tablet Take 2 Tablets by mouth two (2) times a day. (Patient taking differently: Take 2 mg by mouth three (3) times daily.)    carvediloL (COREG) 25 mg tablet TAKE 2 TABLETS BY MOUTH TWICE DAILY . APPOINTMENT REQUIRED FOR FUTURE REFILLS    isosorbide dinitrate (ISORDIL) 10 mg tablet TAKE 1 TABLET BY MOUTH THREE TIMES DAILY (Patient taking differently: Take 10 mg by mouth two (2) times a day.)    hydrALAZINE (APRESOLINE) 10 mg tablet TAKE 1 TABLET BY MOUTH THREE TIMES DAILY (Patient taking differently: Take 10 mg by mouth two (2) times a day.)    cholecalciferol (VITAMIN D3) 25 mcg (1,000 unit) cap Take 1,000 Units by mouth daily. NovoLIN 70/30 U-100 Insulin 100 unit/mL (70-30) injection 22 Units by SubCUTAneous route daily.     simvastatin (ZOCOR) 40 mg tablet TAKE 1 TABLET BY MOUTH ONCE DAILY AT BEDTIME    Insulin [place], Disposable, 31 ndle For 75/25 insulin administration    Blood-Glucose Meter monitoring kit 1    glucose blood VI test strips (BLOOD GLUCOSE TEST) strip 1    multivitamin with iron tablet Take 1 tablet by mouth daily. No current facility-administered medications on file prior to visit. CARDIOLOGY STUDIES TO DATE:  9/14 echo dilated lv with lvef 15%, mod lae, mild to mod mr, mild pul regurg     2/15 echo lvef 50% lae   2/15 echo lvef 65%, no sig valve abnormalities   2/17 lvh, otherwise normal echo   08/16/19 dilated lv with mod lvh and lvef 25%. Lae, mild mr  2/21 echo lvef 29% lvh, lae, tr mr  12/21 lvef 28%, mild lve, lvh, lae, pacer icd present    3/22 echo lve, lvef 31%    Chief Complaint   Patient presents with    Follow-up     HPI :  He was doing really well until a month or so ago when he developed some sort of upper respiratory infection and put on a bunch of fluid weight diuretics had to be increased but things are coming back down to baseline. He is keeping regular follow-up with advanced heart failure and is looking for a new nephrologist and endocrinologist associated with [employer] and particularly with [place]. He is uncertain as to what his last A1c was. He is in the process of trying to reschedule a sleep eval.  He still working full-time and goes to the gym 3 days a week.   CARDIAC ROS:   negative for chest pain, dyspnea, palpitations, syncope, orthopnea, paroxysmal nocturnal dyspnea, exertional chest pressure/discomfort, claudication    Family History   Problem Relation Age of Onset    Diabetes Mother     Hypertension Father     Diabetes Father     Diabetes Brother     Hypertension Brother        Past Medical History:   Diagnosis Date    Diabetes ([facility]     Heart failure ([facility]     CHF, cardiomyopathy    Hypertension     ICD (implantable cardioverter-defibrillator) in place     left upper chest       GENERAL ROS:  A comprehensive review of systems was negative except for that written in the HPI. Visit Vitals  /82 (BP 1 Location: Left upper arm, BP Patient Position: Sitting, BP Cuff Size: Adult)   Pulse 82   Resp 18   Ht 5' 9\" (1.753 m)   Wt 228 lb (103.4 kg)   SpO2 99%   BMI 33.67 kg/m²       Wt Readings from Last 3 Encounters:   12/29/22 228 lb (103.4 kg)   12/13/22 232 lb 12.9 oz (105.6 kg)   11/29/22 225 lb 9.6 oz (102.3 kg)            BP Readings from Last 3 Encounters:   12/29/22 130/82   12/13/22 (!) 123/92   11/29/22 118/86       PHYSICAL EXAM  General appearance: alert, cooperative, no distress, appears stated age  Neurologic: Alert and oriented X 3  Neck: supple, symmetrical, trachea midline, no adenopathy, no carotid bruit, and no JVD  Lungs: clear to auscultation bilaterally  Heart: regular rate and rhythm, S1, S2 normal, no murmur, click, rub or gallop  Extremities: edema 1+    Lab Results   Component Value Date/Time    Cholesterol, total 132 05/05/2022 01:34 PM    Cholesterol, total 152 02/11/2015 01:30 PM    HDL Cholesterol 39 (L) 05/05/2022 01:34 PM    HDL Cholesterol 34 02/11/2015 01:30 PM    LDL, calculated 79 05/05/2022 01:34 PM    LDL, calculated 100.8 (H) 02/11/2015 01:30 PM    Triglyceride 68 05/05/2022 01:34 PM    Triglyceride 86 02/11/2015 01:30 PM    CHOL/HDL Ratio 4.5 02/11/2015 01:30 PM     ASSESSMENT :      He is stable and minimally symptomatic at this point. Not sure what happened with that upper respiratory issue unless he developed some pulmonary hypertension transiently from hypoxemia. I strongly encouraged him to follow-up with the sleep study. We gave him the names of [facility] employed and or affiliated endocrinology and nephrology. current treatment plan is effective, no change in therapy  lab results and schedule of future lab studies reviewed with patient  reviewed diet, exercise and weight control    Encounter Diagnoses   Name Primary?     Dilated cardiomyopathy ([address] Yes    Stage 3b chronic kidney disease ([facility]     Primary hypertension No orders of the defined types were placed in this encounter. Follow-up and Dispositions    Return in about 6 months (around 6/29/2023). Khushi Smalls MD  12/29/2022  Please note that this dictation was completed with [employer], the computer voice recognition software. Quite often unanticipated grammatical, syntax, homophones, and other interpretive errors are inadvertently transcribed by the computer software. Please disregard these errors. Please excuse any errors that have escaped final proofreading. Thank you.

## 2023-01-13 ENCOUNTER — OFFICE VISIT (OUTPATIENT)
Dept: CARDIOLOGY CLINIC | Age: 58
End: 2023-01-13
Payer: MEDICARE

## 2023-01-13 VITALS
DIASTOLIC BLOOD PRESSURE: 80 MMHG | SYSTOLIC BLOOD PRESSURE: 116 MMHG | RESPIRATION RATE: 18 BRPM | OXYGEN SATURATION: 99 % | WEIGHT: 235 LBS | HEIGHT: 69 IN | HEART RATE: 73 BPM | TEMPERATURE: 97.1 F | BODY MASS INDEX: 34.8 KG/M2

## 2023-01-13 DIAGNOSIS — I50.22 CHRONIC SYSTOLIC HEART FAILURE (HCC): Primary | ICD-10-CM

## 2023-01-13 DIAGNOSIS — E55.9 VITAMIN D DEFICIENCY: ICD-10-CM

## 2023-01-13 PROCEDURE — 3074F SYST BP LT 130 MM HG: CPT | Performed by: NURSE PRACTITIONER

## 2023-01-13 PROCEDURE — G8427 DOCREV CUR MEDS BY ELIG CLIN: HCPCS | Performed by: NURSE PRACTITIONER

## 2023-01-13 PROCEDURE — 99214 OFFICE O/P EST MOD 30 MIN: CPT | Performed by: NURSE PRACTITIONER

## 2023-01-13 PROCEDURE — 3078F DIAST BP <80 MM HG: CPT | Performed by: NURSE PRACTITIONER

## 2023-01-13 PROCEDURE — 3017F COLORECTAL CA SCREEN DOC REV: CPT | Performed by: NURSE PRACTITIONER

## 2023-01-13 PROCEDURE — G8417 CALC BMI ABV UP PARAM F/U: HCPCS | Performed by: NURSE PRACTITIONER

## 2023-01-13 PROCEDURE — G8432 DEP SCR NOT DOC, RNG: HCPCS | Performed by: NURSE PRACTITIONER

## 2023-01-13 RX ORDER — BUMETANIDE 2 MG/1
TABLET ORAL
Qty: 90 TABLET | Refills: 0 | Status: SHIPPED | OUTPATIENT
Start: 2023-01-13

## 2023-01-13 NOTE — PATIENT INSTRUCTIONS
Medication changes:    INCREASE BUMEX  4mg in the morning and 2mg in the evening until weigh tis 216 lbs then reduce bumex to 2mg twice a day and take an additional tablet daily in the morning as needed for weight gain of 3 lbs or more overnight or 5 lbs or more in one week. Mag    Please take this to your pharmacy to notify them of the change in medications. Testing Ordered: Other Recommendations:       Please monitor your weights daily upon waking after using the bathroom. Keep a written records of your weights and bring to your next appointment. If you have a weight gain of 3 or more pounds overnight OR 5 or more pounds in one week please contact our office. Call the office at [phone] option 2 or send a [employer] message in two weeks to report weight log and symptoms      A referral has been sent to [facility] nephrology associates Dr. Tonny Juares Please contact them for scheduling of new patient appointment at  [phone]. Please call and schedule your appointment with Sleep Medicine. You can call one of the numbers listed below. [facility], [address]  Tel.  [phone]  Fax. [facility] [address]  [address]  Tel.  [phone]  Fax. [phone]       It is also recommended that you make a new patient appointment with a primary care provider. Please review the list provided and call to schedule an appointment. Ensure your drinking an adequate amount of water with a goal of 6-8 eight ounce glasses (1.5-2 liters) of fluid daily. Your urine should be clear and light yellow straw colored. If your blood pressure begins to consistently run below 90/60 and/or you begin to experience dizziness or lightheadedness, please contact the [facility] [address] at [phone]. Follow up 3 months with NP with [facility]      Please monitor your weights daily upon waking after using the bathroom. Keep a written records of your weights and bring to your next appointment. If you have a weight gain of 3 or more pounds overnight OR 5 or more pounds in one week please contact our office. Thank you for allowing us the privilege of being a part of your healthcare team! Please do not hesitate to contact our office at [phone] with any questions or concerns. Virtual Heart Failure [address] invites you to learn more about heart failure and to share your questions, ideas, and experiences with others. Each month, the Heart Failure Support Group features a new educational topic and a guest speaker, followed by an interactive discussion. Our Heart Failure Nurse Navigator will moderate each session. You will be able to participate by phone, tablet or computer through [address] [place] [address]. This support group takes place on the 3rd Thursday of each month from 6:00-7:30PM. All individuals living with heart failure and their caregivers are welcome to join. If you are interested in participating, please contact us at [email or web address] and you will be sent the link to join the [employer]. WDL

## 2023-01-13 NOTE — PROGRESS NOTES
[facility] in [place], 105 [facility] Note    Patient name: Christianne Vera  Patient : 1965  Patient MRN: [id number]  Date of service: 23    Primary care physician: uRchi Andrade MD  Primary general cardiologist:  Dr. Milagros James    Primary [facility] cardiologist: Vinita Hancock MD    CHIEF COMPLAINT:  Chief Complaint   Patient presents with    Shortness of Breath     W/ exertion    Leg Swelling     bilateral    CHF         PLAN OF CARE:  55 y/o male with h/o non-ischemic cardiomyopathy, LVEF 28%, stage C, NYHA class IIIA symptoms and CKD, stage 3 (Cr 2.1-2.47) undergoing optimization of GDMT (taken off nephrotoxic meds through diuresis and to allow renal recovery and up-titrating alternative HF meds); once patient is euvolemic he should have [address] +/- ischemic evaluation (possibly LHC depending on renal function, last [facility] )  Most likely etiology of worsening HF is chronic volume overload due to underestimated severity of renal failure +/- possibly inadequately/untreated MINA; evaluation ongoing       PLAN:  Continue current medical therapy for heart failure  Continue coreg 50mg twice daily  Continue norvasc 10mg daily  Hold ARNI/MRA to allow renal recovery  Continue hydralazine 10mg BID and isordil 10mg BID   Consider [place] if ok with nephrology and based on renal function  Increase bumex to 4mg QAM and 2mg QPM; goal weight around 216 lbs  Does not take aspirin   Continue current dose of statin  ICD interrogation every 3 months per routine  Still needs to reschedule sleep study    Last echo in Oct; plan for repeat in April with Dr. Milagros James?   Routine HF labs today including Vit D  Reinforced low salt diet  Reinforced fluid restriction to 6 x 8oz glasses per day  Previously provided advanced care plan forms to be filled out    Follow-up with nephrology, needs to make appt with new provider  Needs to establish new PCP  Follow-up with primary cardiologist, Dr. Yissel Tong with EP cardiologist  Needs updated flu vaccine         IMPRESSION:  Fatigue  Shortness of breath  Volume overload  Chronic systolic heart failure  Stage C, NYHA class IIIA symptoms  Non-ischemic cardiomyopathy, LVEF 28% (HF diagnosed around 2002)  Normal coronaries by [facility] 2015  S/p single lead ICD (2/2015)  Cardiac risk factors  HTN  HL  DM2  Morbid obesity,  Body mass index is 34.7 kg/m². CKD, stage 3  Anemia        CARDIAC IMAGING:  Echo 10/21/22    Left Ventricle: Severely reduced left ventricular systolic function with a visually estimated EF of 20 - 25%. Left ventricle is moderately dilated. Mild posterior thickening. Normal wall motion. Normal diastolic function. Left Atrium: Left atrium is moderately dilated. Mitral Valve: Mild regurgitation with a centrally directed jet. Tricuspid Valve: Mild regurgitation with a centrally directed jet. Echo (3/9/22)    Study limited to the assessment of ejection fraction. Left Ventricle: Left ventricle is moderately dilated. Mildly increased wall thickness. Severe global hypokinesis present. Calculared ejection fraction is 28% by 3D volume and 31% by 2D Fernandez's biplane. Global longitudinal strain is reduced with a value of -5.8%. Echo (12/6/21)  LV: Calculated LVEF is 28%. Biplane method used to measure ejection fraction. Mildly dilated left ventricle. Mildly to moderately increased wall thickness. Moderate-to-severely and globally reduced systolic function. Moderate (grade 2) left ventricular diastolic dysfunction. LA: Mildly dilated left atrium. Left Atrium volume index is 40 mL/m2. MV: Moderate mitral annular calcification. Very mild mitral valve regurgitation is present. RV: Not well visualized. Pacer/ICD present. Echo (2/17/21)  LV: Calculated LVEF is 29%. Biplane method used to measure ejection fraction. Mildly dilated left ventricle. Mild to moderate hypertrophy.  Severely and globally reduced systolic function. Wall motion: normal. Abnormal left ventricular strain. Global longitudinal strain is -8%. Moderate (grade 2) left ventricular diastolic dysfunction. LA: Mildly dilated left atrium. Left Atrium volume index is 40 mL/m2. MV: Moderate mitral annular calcification. Very mild mitral valve regurgitation is present. Echo (8/16/19)  Left Ventricle: Mildly dilated left ventricle. Moderate concentric hypertrophy. Severe global systolic dysfunction. Estimated left ventricular ejection fraction is 21 - 25%. Biplane method used to measure ejection fraction. Left ventricular global hypokinesis. Abnormal left ventricular strain. Inconclusive left ventricular diastolic function. Left Atrium: Mildly dilated left atrium. Mitral Valve: Moderate mitral annular calcification. Mild mitral valve regurgitation. EKG (8/5/19) NSR 73bpm QRS 124ms  LHC (2/105) normal cors      6 Min Walk Report 6/23/2022 3/2/2015   (PRE) HR 76 78   (PRE) O2 Sat 99 99   (POST) HR 89 90   (POST) O2 Sat 96 99   Distance in Meters 358.84 386.18     HISTORY OF PRESENT ILLNESS:  I had the pleasure of seeing Christianne Vera in [address] [place] [address] at [address] [place] [address]. Briefly, Christianne Vera is a 56 y.o. male with h/o morbid obesity, BMI 35, HTN, HL, DM2, chronic systolic heart failure, stage C, NYHA class IIIA symptoms, non-ischemic cardiomyopathy, LVEF 28% (HF diagnosed around 2002), normal coronaries by [facility] 2015 s/p single lead ICD (2/2015) and worsening renal function, CKD stage 2 and anemia. [facility]. INTERVAL HISTORY:  Today, patient presents for follow up visit. He reports feeling \"ok\" overall. He has some SOB with activity. He has started back at the gym- he sometimes can get on the exercise bike and ride for 20 mins, and other days he feels dyspneic walking from his car into his workplace. No dizziness, palpitations or CP. Appetite is good, no nausea or early satiety. Has some mild orthopnea, but reports this is improved. Does have LE edema and abdominal fullness. REVIEW OF SYSTEMS:  Review of Systems   Constitutional:  Positive for malaise/fatigue. Negative for chills, fever and weight loss. Respiratory:  Positive for shortness of breath. Negative for cough. Cardiovascular:  Positive for orthopnea and leg swelling. Negative for chest pain and palpitations. Gastrointestinal:  Negative for abdominal pain, constipation, diarrhea, heartburn, nausea and vomiting. Musculoskeletal:  Negative for falls and myalgias. Neurological:  Negative for dizziness and weakness. Psychiatric/Behavioral:  Negative for depression. PHYSICAL EXAM:  Visit Vitals  /80 (BP 1 Location: Left upper arm, BP Patient Position: Sitting, BP Cuff Size: Large adult)   Pulse 73   Temp 97.1 °F (36.2 °C) (Oral)   Resp 18   Ht 5' 9\" (1.753 m)   Wt 235 lb (106.6 kg)   SpO2 99%   BMI 34.70 kg/m²       Physical Exam  Vitals reviewed. Constitutional:       General: He is not in acute distress. Appearance: Normal appearance. HENT:      Head: Normocephalic and atraumatic. Neck:      Vascular: Hepatojugular reflux and JVD present. Cardiovascular:      Rate and Rhythm: Normal rate and regular rhythm. Pulses: Normal pulses. Heart sounds: Normal heart sounds. No murmur heard. No friction rub. No gallop. Pulmonary:      Effort: Pulmonary effort is normal. No respiratory distress. Breath sounds: Normal breath sounds. Abdominal:      General: Bowel sounds are normal. There is no distension. Palpations: Abdomen is soft. Tenderness: There is no abdominal tenderness. Musculoskeletal:         General: Swelling present. Right lower le+ Pitting Edema present. Left lower le+ Pitting Edema present. Skin:     General: Skin is warm and dry.       Capillary Refill: Capillary refill takes less than 2 seconds. Neurological:      General: No focal deficit present. Mental Status: He is alert and oriented to person, place, and time. Psychiatric:         Mood and Affect: Mood normal.         Behavior: Behavior normal.         Thought Content: Thought content normal.         Judgment: Judgment normal.          PAST MEDICAL HISTORY:  Past Medical History:   Diagnosis Date    Diabetes ([address]     Heart failure (HCC)     CHF, cardiomyopathy    Hypertension     ICD (implantable cardioverter-defibrillator) in place     left upper chest       PAST SURGICAL HISTORY:  Past Surgical History:   Procedure Laterality Date    HX HEART CATHETERIZATION  2/2015    x1    HX IMPLANTABLE CARDIOVERTER DEFIBRILLATOR  2/16/2015       FAMILY HISTORY:  Family History   Problem Relation Age of Onset    Diabetes Mother     Hypertension Father     Diabetes Father     Diabetes Brother     Hypertension Brother        SOCIAL HISTORY:  Social History     Socioeconomic History    Marital status:    Tobacco Use    Smoking status: Never    Smokeless tobacco: Never   Vaping Use    Vaping Use: Never used   Substance and Sexual Activity    Alcohol use: Yes     Comment: 1 a month    Drug use: No    Sexual activity: Not Currently       LABORATORY RESULTS:  Labs Latest Ref Rng & Units 12/13/2022   WBC 4.1 - 11.1 K/uL 9.0   RBC 4.10 - 5.70 M/uL 4.28   Hemoglobin 12.1 - 17.0 g/dL 10. 8(L)   Hematocrit 36.6 - 50.3 % 34. 9(L)   MCV 80.0 - 99.0 FL 81.5   Platelets 547 - 503 K/uL 253   Lymphocytes 12 - 49 % 18   Monocytes 5 - 13 % 9   Eosinophils 0 - 7 % 2   Basophils 0 - 1 % 1   Albumin 3.5 - 5.0 g/dL 3.4(L)   Calcium 8.5 - 10.1 MG/DL 8.9   Glucose 65 - 100 mg/dL 144(H)   BUN 6 - 20 MG/DL 39(H)   Creatinine 0.70 - 1.30 MG/DL 2.78(H)   Sodium 136 - 145 mmol/L 138   Potassium 3.5 - 5.1 mmol/L 3.9   Some recent data might be hidden       ALLERGY:  No Known Allergies     CURRENT MEDICATIONS:    Current Outpatient Medications:     carvediloL (COREG) 25 mg tablet, TAKE 2 TABLETS BY MOUTH TWICE DAILY . , Disp: 120 Tablet, Rfl: 11    amLODIPine (NORVASC) 10 mg tablet, TAKE 1 TABLET BY MOUTH ONCE DAILY . *Must Keep appointment for further refills. , Disp: 30 Tablet, Rfl: 0    bumetanide (BUMEX) 1 mg tablet, Take 2 Tablets by mouth two (2) times a day., Disp: 180 Tablet, Rfl: 1    cholecalciferol (VITAMIN D3) 25 mcg (1,000 unit) cap, Take 1,000 Units by mouth daily. , Disp: , Rfl:     NovoLIN 70/30 U-100 Insulin 100 unit/mL (70-30) injection, 22 Units by SubCUTAneous route daily. , Disp: , Rfl:     Insulin Needles, Disposable, 31 ndle, For 75/25 insulin administration, Disp: 150 Each, Rfl: 2    Blood-Glucose Meter monitoring kit, 1, Disp: 1 Kit, Rfl: 0    glucose blood VI test strips (BLOOD GLUCOSE TEST) strip, 1, Disp: 1 Package, Rfl: 11    multivitamin with iron tablet, Take 1 tablet by mouth daily. , Disp: , Rfl:     isosorbide dinitrate (ISORDIL) 10 mg tablet, TAKE 1 TABLET BY MOUTH THREE TIMES DAILY (Patient taking differently: Take 10 mg by mouth two (2) times a day.), Disp: 90 Tablet, Rfl: 0    hydrALAZINE (APRESOLINE) 10 mg tablet, TAKE 1 TABLET BY MOUTH THREE TIMES DAILY (Patient taking differently: Take 10 mg by mouth two (2) times a day.), Disp: 90 Tablet, Rfl: 0    simvastatin (ZOCOR) 40 mg tablet, TAKE 1 TABLET BY MOUTH ONCE DAILY AT BEDTIME, Disp: , Rfl:     Thank you for your referral and allowing me to participate in this patient's care.     Hardik Kemp NP  [address] [facility] [place]  [address]  Office [phone]  Fax [phone]    PATIENT CARE TEAM:  Patient Care Team:  Quyen Ho MD as PCP - General (Family Medicine)  Elie Menendez MD (Cardiovascular Disease Physician)  Bryson Rivas MD (Nephrology)  Jocy Adams MD (Internal Medicine Physician)

## 2023-01-15 LAB
ALBUMIN SERPL-MCNC: 3.6 G/DL (ref 3.5–5)
ALBUMIN/GLOB SERPL: 1.1 (ref 1.1–2.2)
ALP SERPL-CCNC: 128 U/L (ref 45–117)
ALT SERPL-CCNC: 29 U/L (ref 12–78)
ANION GAP SERPL CALC-SCNC: 5 MMOL/L (ref 5–15)
AST SERPL-CCNC: 18 U/L (ref 15–37)
BILIRUB SERPL-MCNC: 0.5 MG/DL (ref 0.2–1)
BNP SERPL-MCNC: 7451 PG/ML
BUN SERPL-MCNC: 42 MG/DL (ref 6–20)
BUN/CREAT SERPL: 14 (ref 12–20)
CALCIUM SERPL-MCNC: 9 MG/DL (ref 8.5–10.1)
CHLORIDE SERPL-SCNC: 106 MMOL/L (ref 97–108)
CO2 SERPL-SCNC: 29 MMOL/L (ref 21–32)
CREAT SERPL-MCNC: 2.91 MG/DL (ref 0.7–1.3)
GLOBULIN SER CALC-MCNC: 3.3 G/DL (ref 2–4)
GLUCOSE SERPL-MCNC: 152 MG/DL (ref 65–100)
MAGNESIUM SERPL-MCNC: 2 MG/DL (ref 1.6–2.4)
POTASSIUM SERPL-SCNC: 4 MMOL/L (ref 3.5–5.1)
PROT SERPL-MCNC: 6.9 G/DL (ref 6.4–8.2)
SODIUM SERPL-SCNC: 140 MMOL/L (ref 136–145)
URATE SERPL-MCNC: 11.5 MG/DL (ref 3.5–7.2)

## 2023-01-25 DIAGNOSIS — I10 ESSENTIAL HYPERTENSION WITH GOAL BLOOD PRESSURE LESS THAN 130/80: ICD-10-CM

## 2023-01-25 RX ORDER — AMLODIPINE BESYLATE 10 MG/1
TABLET ORAL
Qty: 30 TABLET | Refills: 5 | Status: SHIPPED | OUTPATIENT
Start: 2023-01-25

## 2023-01-25 NOTE — TELEPHONE ENCOUNTER
Requested Prescriptions     Signed Prescriptions Disp Refills    amLODIPine (NORVASC) 10 mg tablet 30 Tablet 5     Sig: TAKE 1 TABLET BY MOUTH ONCE DAILY .      Authorizing Provider: Vijay Sherman     Ordering User: Nikki Jimenes verbal orders

## 2023-02-03 ENCOUNTER — TELEPHONE (OUTPATIENT)
Dept: CARDIOLOGY CLINIC | Age: 58
End: 2023-02-03

## 2023-02-03 NOTE — TELEPHONE ENCOUNTER
Called patient in regards to weight logs with no answer. Left  with [facility] call back number. Will await return call.  Gloria Faust RN

## 2023-02-09 NOTE — TELEPHONE ENCOUNTER
Labs reviewed    Vit D low, start pt on ergocalciferol 50,000 units weekly x12 weeks, then can recheck    Uric acid level elevated also- start allopurinol 50mg daily    Follow up again to see if weight and symptoms are improved with increased diuretics and see if pt is scheduled with nephrology. He does not have a follow up appt with us yet either. Thanks.

## 2023-02-09 NOTE — TELEPHONE ENCOUNTER
Labs reviewed     Vit D low, start pt on ergocalciferol 50,000 units weekly x12 weeks, then can recheck     Uric acid level elevated also- start allopurinol 50mg daily     Follow up again to see if weight and symptoms are improved with increased diuretics and see if pt is scheduled with nephrology. He does not have a follow up appt with us yet either. Thanks. 2/9/23     Called patient in regards to above information with no answer. Left message with [facility] call back number. Will await return call. Lauren Trujillo RN     2/10/23    Returned patients call with no answer. Left vm with [facility] call back number. Will await return call. 2/13/23    Called patient using two patient identifiers. Notified patient on above information per Linden Bradford NP. Patient states understanding of instructions and will  medications from pharmacy. Patient notes that he has gone back to taking bumex 2 mg daily over the last several days. He states that his weight is 222 lbs today. Patient did not have logs with him to provide accurate weights over the last 2 weeks. He states that he believes that he started around 236 lbs. Patient states that he lowered his dose of bumex when he started to feel tight in his labs. Patient notes that he has not seen a nephrologist because he would like to see one within the [facility] system. Patient states he would like a referral to [facility] nephrology. Patient denies sob or swelling at this time. JOCELINE Larry RN  Caller: Unspecified (1 week ago)  I don't think there is a \"[facility]\" nephrology, but we can send referral to [facility] as they see pts in our hospitals? Can we please ask pt to go for repeat labs- BMP, Mg, NT Pro in the next week?        Continue to monitor weights and if gain >3lbs in a day or 5lbs in a week, then needs to go back to bumex 2mg BID.               2/14/23 called patient in regards to above message with no answer. Left vm with [facility] call back number. Will await return call. Nikita Bella RN        2/15/23   Called patient using two patient identifiers. Advised patient on above information per Marce Grimaldo Np. Patient stated understanding of instructions. Patient [facility]. He also notes he will create follow up with Nephrology Dr. Goyo Ji. Provided patient with number. Patient had no further questions at this time.  Nikita Bella RN

## 2023-03-01 ENCOUNTER — TELEPHONE (OUTPATIENT)
Dept: CARDIOLOGY CLINIC | Age: 58
End: 2023-03-01

## 2023-03-01 NOTE — TELEPHONE ENCOUNTER
Per E. Stephen NP Nt-pro bnp elevated- check on weights and if he has follow up with nephrology      Called patient using two patient identifiers. Patient states that he has noticed weight gain recently. He states he is 235 lbs today but at one time was around 222lbs. Patient did not have daily logs with him and is unsure of what time frame he gained weight. Patient confirms that he is taking bumex 2mg BID. Patient states he has noticed sob with exertion occasionally specifically with longer distances. , swelling in legs and top of feet, and burning in legs and top of feet. Patient denies chest pain, palpitations, or dizziness at this time. Patient states he does not have an appt with Dr. Latasha Sears until may because that was the soonest available appt. Advised patient that I will send message to provider and call back with any changes. Notified patient to send weight logs if able via my chart once he gets home. Patient stated understanding. Discussed note, my chart message, and labs with Preston Donato NP who recommended v.o.r.b  ER evaluation       Called patient using two patient identifiers. Advised patient per Preston Donato NP. He states understanding and is in agreement to be evaluated in ER. Educated patient not to drive himself but have someone else drive him. He stated understanding of instructions and had no further questions.  Ángel Carney RN

## 2023-03-01 NOTE — TELEPHONE ENCOUNTER
Called pt to schedule 3 mo follow-up in April. Appt is Fri, 4/14/23 at 2:30 p.m. with Dr. Evie Mcmahan.

## 2023-03-02 ENCOUNTER — HOSPITAL ENCOUNTER (INPATIENT)
Age: 58
LOS: 8 days | Discharge: HOME | End: 2023-03-10
Attending: STUDENT IN AN ORGANIZED HEALTH CARE EDUCATION/TRAINING PROGRAM | Admitting: FAMILY MEDICINE
Payer: MEDICARE

## 2023-03-02 ENCOUNTER — APPOINTMENT (OUTPATIENT)
Dept: VASCULAR SURGERY | Age: 58
End: 2023-03-02
Attending: FAMILY MEDICINE
Payer: MEDICARE

## 2023-03-02 ENCOUNTER — APPOINTMENT (OUTPATIENT)
Dept: GENERAL RADIOLOGY | Age: 58
End: 2023-03-02
Attending: STUDENT IN AN ORGANIZED HEALTH CARE EDUCATION/TRAINING PROGRAM
Payer: MEDICARE

## 2023-03-02 DIAGNOSIS — I50.23 ACUTE ON CHRONIC SYSTOLIC HEART FAILURE (HCC): Primary | ICD-10-CM

## 2023-03-02 DIAGNOSIS — Z79.4 TYPE 2 DIABETES MELLITUS WITH HYPERGLYCEMIA, WITH LONG-TERM CURRENT USE OF INSULIN (HCC): ICD-10-CM

## 2023-03-02 DIAGNOSIS — I50.9 CONGESTIVE HEART FAILURE, UNSPECIFIED HF CHRONICITY, UNSPECIFIED HEART FAILURE TYPE (HCC): ICD-10-CM

## 2023-03-02 DIAGNOSIS — N18.9 ACUTE KIDNEY INJURY SUPERIMPOSED ON CHRONIC KIDNEY DISEASE (HCC): ICD-10-CM

## 2023-03-02 DIAGNOSIS — N17.9 ACUTE KIDNEY INJURY SUPERIMPOSED ON CHRONIC KIDNEY DISEASE (HCC): ICD-10-CM

## 2023-03-02 DIAGNOSIS — E10.65 HYPERGLYCEMIA DUE TO TYPE 1 DIABETES MELLITUS (HCC): ICD-10-CM

## 2023-03-02 DIAGNOSIS — I42.9 CARDIOMYOPATHY, UNSPECIFIED TYPE (HCC): ICD-10-CM

## 2023-03-02 DIAGNOSIS — E11.65 TYPE 2 DIABETES MELLITUS WITH HYPERGLYCEMIA, WITH LONG-TERM CURRENT USE OF INSULIN (HCC): ICD-10-CM

## 2023-03-02 DIAGNOSIS — E87.79 CARDIAC VOLUME OVERLOAD: ICD-10-CM

## 2023-03-02 LAB
ALBUMIN SERPL-MCNC: 3.2 G/DL (ref 3.5–5)
ALBUMIN/GLOB SERPL: 0.7 (ref 1.1–2.2)
ALP SERPL-CCNC: 135 U/L (ref 45–117)
ALT SERPL-CCNC: 28 U/L (ref 12–78)
ANION GAP SERPL CALC-SCNC: 6 MMOL/L (ref 5–15)
AST SERPL-CCNC: 33 U/L (ref 15–37)
BASOPHILS # BLD: 0.1 K/UL (ref 0–0.1)
BASOPHILS NFR BLD: 1 % (ref 0–1)
BILIRUB SERPL-MCNC: 0.4 MG/DL (ref 0.2–1)
BNP SERPL-MCNC: 9738 PG/ML
BUN SERPL-MCNC: 43 MG/DL (ref 6–20)
BUN/CREAT SERPL: 14 (ref 12–20)
CALCIUM SERPL-MCNC: 9 MG/DL (ref 8.5–10.1)
CHLORIDE SERPL-SCNC: 106 MMOL/L (ref 97–108)
CO2 SERPL-SCNC: 28 MMOL/L (ref 21–32)
COMMENT, HOLDF: NORMAL
CREAT SERPL-MCNC: 3.06 MG/DL (ref 0.7–1.3)
DIFFERENTIAL METHOD BLD: ABNORMAL
EOSINOPHIL # BLD: 0.5 K/UL (ref 0–0.4)
EOSINOPHIL NFR BLD: 8 % (ref 0–7)
ERYTHROCYTE [DISTWIDTH] IN BLOOD BY AUTOMATED COUNT: 17.4 % (ref 11.5–14.5)
EST. AVERAGE GLUCOSE BLD GHB EST-MCNC: 194 MG/DL
GLOBULIN SER CALC-MCNC: 4.5 G/DL (ref 2–4)
GLUCOSE BLD STRIP.AUTO-MCNC: 101 MG/DL (ref 65–117)
GLUCOSE BLD STRIP.AUTO-MCNC: 108 MG/DL (ref 65–117)
GLUCOSE BLD STRIP.AUTO-MCNC: 66 MG/DL (ref 65–117)
GLUCOSE SERPL-MCNC: 120 MG/DL (ref 65–100)
HBA1C MFR BLD: 8.4 % (ref 4–5.6)
HCT VFR BLD AUTO: 34.3 % (ref 36.6–50.3)
HGB BLD-MCNC: 10.2 G/DL (ref 12.1–17)
IMM GRANULOCYTES # BLD AUTO: 0 K/UL (ref 0–0.04)
IMM GRANULOCYTES NFR BLD AUTO: 0 % (ref 0–0.5)
LYMPHOCYTES # BLD: 0.8 K/UL (ref 0.8–3.5)
LYMPHOCYTES NFR BLD: 12 % (ref 12–49)
MCH RBC QN AUTO: 25.2 PG (ref 26–34)
MCHC RBC AUTO-ENTMCNC: 29.7 G/DL (ref 30–36.5)
MCV RBC AUTO: 84.7 FL (ref 80–99)
MONOCYTES # BLD: 0.5 K/UL (ref 0–1)
MONOCYTES NFR BLD: 8 % (ref 5–13)
NEUTS SEG # BLD: 4.8 K/UL (ref 1.8–8)
NEUTS SEG NFR BLD: 71 % (ref 32–75)
NRBC # BLD: 0 K/UL (ref 0–0.01)
NRBC BLD-RTO: 0 PER 100 WBC
PLATELET # BLD AUTO: 211 K/UL (ref 150–400)
PMV BLD AUTO: 12.8 FL (ref 8.9–12.9)
POTASSIUM SERPL-SCNC: 3.7 MMOL/L (ref 3.5–5.1)
PROT SERPL-MCNC: 7.7 G/DL (ref 6.4–8.2)
RBC # BLD AUTO: 4.05 M/UL (ref 4.1–5.7)
SAMPLES BEING HELD,HOLD: NORMAL
SERVICE CMNT-IMP: NORMAL
SODIUM SERPL-SCNC: 140 MMOL/L (ref 136–145)
TROPONIN I SERPL HS-MCNC: 40 NG/L (ref 0–57)
WBC # BLD AUTO: 6.8 K/UL (ref 4.1–11.1)

## 2023-03-02 PROCEDURE — 36415 COLL VENOUS BLD VENIPUNCTURE: CPT

## 2023-03-02 PROCEDURE — 74011000250 HC RX REV CODE- 250: Performed by: FAMILY MEDICINE

## 2023-03-02 PROCEDURE — 82962 GLUCOSE BLOOD TEST: CPT

## 2023-03-02 PROCEDURE — 65660000001 HC RM ICU INTERMED STEPDOWN

## 2023-03-02 PROCEDURE — 74011000250 HC RX REV CODE- 250: Performed by: STUDENT IN AN ORGANIZED HEALTH CARE EDUCATION/TRAINING PROGRAM

## 2023-03-02 PROCEDURE — 71046 X-RAY EXAM CHEST 2 VIEWS: CPT

## 2023-03-02 PROCEDURE — 74011250637 HC RX REV CODE- 250/637: Performed by: FAMILY MEDICINE

## 2023-03-02 PROCEDURE — 93005 ELECTROCARDIOGRAM TRACING: CPT

## 2023-03-02 PROCEDURE — 85025 COMPLETE CBC W/AUTO DIFF WBC: CPT

## 2023-03-02 PROCEDURE — 83880 ASSAY OF NATRIURETIC PEPTIDE: CPT

## 2023-03-02 PROCEDURE — 93970 EXTREMITY STUDY: CPT

## 2023-03-02 PROCEDURE — 80053 COMPREHEN METABOLIC PANEL: CPT

## 2023-03-02 PROCEDURE — 96374 THER/PROPH/DIAG INJ IV PUSH: CPT

## 2023-03-02 PROCEDURE — 83036 HEMOGLOBIN GLYCOSYLATED A1C: CPT

## 2023-03-02 PROCEDURE — 99285 EMERGENCY DEPT VISIT HI MDM: CPT

## 2023-03-02 PROCEDURE — 74011250636 HC RX REV CODE- 250/636: Performed by: FAMILY MEDICINE

## 2023-03-02 PROCEDURE — 84484 ASSAY OF TROPONIN QUANT: CPT

## 2023-03-02 RX ORDER — ACETAMINOPHEN 325 MG/1
650 TABLET ORAL
Status: DISCONTINUED | OUTPATIENT
Start: 2023-03-02 | End: 2023-03-10 | Stop reason: HOSPADM

## 2023-03-02 RX ORDER — BUMETANIDE 0.25 MG/ML
2 INJECTION INTRAMUSCULAR; INTRAVENOUS
Status: COMPLETED | OUTPATIENT
Start: 2023-03-02 | End: 2023-03-02

## 2023-03-02 RX ORDER — INSULIN LISPRO 100 [IU]/ML
INJECTION, SOLUTION INTRAVENOUS; SUBCUTANEOUS
Status: DISCONTINUED | OUTPATIENT
Start: 2023-03-02 | End: 2023-03-03

## 2023-03-02 RX ORDER — HEPARIN SODIUM 5000 [USP'U]/ML
5000 INJECTION, SOLUTION INTRAVENOUS; SUBCUTANEOUS EVERY 8 HOURS
Status: DISCONTINUED | OUTPATIENT
Start: 2023-03-02 | End: 2023-03-10 | Stop reason: HOSPADM

## 2023-03-02 RX ORDER — ONDANSETRON 2 MG/ML
4 INJECTION INTRAMUSCULAR; INTRAVENOUS
Status: DISCONTINUED | OUTPATIENT
Start: 2023-03-02 | End: 2023-03-10 | Stop reason: HOSPADM

## 2023-03-02 RX ORDER — ERGOCALCIFEROL 1.25 MG/1
50000 CAPSULE ORAL
Status: DISCONTINUED | OUTPATIENT
Start: 2023-03-02 | End: 2023-03-10 | Stop reason: HOSPADM

## 2023-03-02 RX ORDER — AMLODIPINE BESYLATE 5 MG/1
10 TABLET ORAL DAILY
Status: DISCONTINUED | OUTPATIENT
Start: 2023-03-03 | End: 2023-03-04

## 2023-03-02 RX ORDER — HYDRALAZINE HYDROCHLORIDE 10 MG/1
10 TABLET, FILM COATED ORAL 2 TIMES DAILY
COMMUNITY
End: 2023-03-10

## 2023-03-02 RX ORDER — HYDRALAZINE HYDROCHLORIDE 10 MG/1
10 TABLET, FILM COATED ORAL 2 TIMES DAILY
Status: DISCONTINUED | OUTPATIENT
Start: 2023-03-02 | End: 2023-03-04

## 2023-03-02 RX ORDER — POLYETHYLENE GLYCOL 3350 17 G/17G
17 POWDER, FOR SOLUTION ORAL DAILY PRN
Status: DISCONTINUED | OUTPATIENT
Start: 2023-03-02 | End: 2023-03-10 | Stop reason: HOSPADM

## 2023-03-02 RX ORDER — IBUPROFEN 200 MG
4 TABLET ORAL AS NEEDED
Status: DISCONTINUED | OUTPATIENT
Start: 2023-03-02 | End: 2023-03-10 | Stop reason: HOSPADM

## 2023-03-02 RX ORDER — SODIUM CHLORIDE 0.9 % (FLUSH) 0.9 %
5-40 SYRINGE (ML) INJECTION AS NEEDED
Status: DISCONTINUED | OUTPATIENT
Start: 2023-03-02 | End: 2023-03-10 | Stop reason: HOSPADM

## 2023-03-02 RX ORDER — CARVEDILOL 12.5 MG/1
25 TABLET ORAL 2 TIMES DAILY WITH MEALS
Status: DISCONTINUED | OUTPATIENT
Start: 2023-03-03 | End: 2023-03-10 | Stop reason: HOSPADM

## 2023-03-02 RX ORDER — INSULIN GLARGINE 100 [IU]/ML
28 INJECTION, SOLUTION SUBCUTANEOUS
Status: DISCONTINUED | OUTPATIENT
Start: 2023-03-03 | End: 2023-03-03

## 2023-03-02 RX ORDER — ONDANSETRON 4 MG/1
4 TABLET, ORALLY DISINTEGRATING ORAL
Status: DISCONTINUED | OUTPATIENT
Start: 2023-03-02 | End: 2023-03-10 | Stop reason: HOSPADM

## 2023-03-02 RX ORDER — ISOSORBIDE DINITRATE 10 MG/1
10 TABLET ORAL 2 TIMES DAILY
Status: DISCONTINUED | OUTPATIENT
Start: 2023-03-02 | End: 2023-03-04

## 2023-03-02 RX ORDER — ALLOPURINOL 100 MG/1
50 TABLET ORAL DAILY
Status: DISCONTINUED | OUTPATIENT
Start: 2023-03-03 | End: 2023-03-10 | Stop reason: HOSPADM

## 2023-03-02 RX ORDER — SODIUM CHLORIDE 0.9 % (FLUSH) 0.9 %
5-40 SYRINGE (ML) INJECTION EVERY 8 HOURS
Status: DISCONTINUED | OUTPATIENT
Start: 2023-03-02 | End: 2023-03-10 | Stop reason: HOSPADM

## 2023-03-02 RX ORDER — ISOSORBIDE DINITRATE 10 MG/1
10 TABLET ORAL 2 TIMES DAILY
COMMUNITY
End: 2023-03-10

## 2023-03-02 RX ADMIN — HYDRALAZINE HYDROCHLORIDE 10 MG: 10 TABLET, FILM COATED ORAL at 21:48

## 2023-03-02 RX ADMIN — BUMETANIDE 2 MG: 0.25 INJECTION INTRAMUSCULAR; INTRAVENOUS at 18:45

## 2023-03-02 RX ADMIN — ISOSORBIDE DINITRATE 10 MG: 20 TABLET ORAL at 21:47

## 2023-03-02 RX ADMIN — Medication 10 ML: at 23:02

## 2023-03-02 RX ADMIN — HEPARIN SODIUM 5000 UNITS: 5000 INJECTION INTRAVENOUS; SUBCUTANEOUS at 23:05

## 2023-03-02 RX ADMIN — ERGOCALCIFEROL 50000 UNITS: 1.25 CAPSULE ORAL at 21:48

## 2023-03-02 NOTE — Clinical Note
Right internal jugular artery. Accessed successfully. Radial access needle used. Using ultrasound guidance.  Number of attempts =  1.

## 2023-03-02 NOTE — ED PROVIDER NOTES
Patient is a 51-year-old male history of cardiomyopathy with systolic heart failure and AICD placement here today secondary to peripheral edema and shortness of breath. Patient reports 5 days of progressive shortness of breath as well as peripheral edema. Gets short of breath with minimal exertion. Reports that he has had swelling of the legs and abdomen. Has gained 14 pounds in the past 5 days or so. He spoke with Dr. Narda Novoa advanced heart failure who recommended coming to the ED. Patient takes Bumex 2 mg twice daily as well as as needed. Denies chest pain. Denies other complaints at this time.        Past Medical History:   Diagnosis Date    Diabetes ([facility]     Heart failure ([facility]     CHF, cardiomyopathy    Hypertension     ICD (implantable cardioverter-defibrillator) in place     left upper chest       Past Surgical History:   Procedure Laterality Date    HX HEART CATHETERIZATION  2/2015    x1    HX IMPLANTABLE CARDIOVERTER DEFIBRILLATOR  2/16/2015         Family History:   Problem Relation Age of Onset    Diabetes Mother     Hypertension Father     Diabetes Father     Diabetes Brother     Hypertension Brother        Social History     Socioeconomic History    Marital status:      Spouse name: Not on file    Number of children: Not on file    Years of education: Not on file    Highest education level: Not on file   Occupational History    Not on file   Tobacco Use    Smoking status: Never    Smokeless tobacco: Never   Vaping Use    Vaping Use: Never used   Substance and Sexual Activity    Alcohol use: Yes     Comment: 1 a month    Drug use: No    Sexual activity: Not Currently   Other Topics Concern    Not on file   Social History Narrative    Not on file     Social Determinants of Health     Financial Resource Strain: Not on file   Food Insecurity: Not on file   Transportation Needs: Not on file   Physical Activity: Not on file   Stress: Not on file   Social Connections: Not on file   Intimate Partner Violence: Not on file   Housing Stability: Not on file         ALLERGIES: Patient has no known allergies. Review of Systems   Constitutional:  Positive for fatigue. Negative for chills and fever. HENT:  Negative for congestion and sore throat. Eyes:  Negative for pain and redness. Respiratory:  Positive for shortness of breath. Negative for cough. Cardiovascular:  Positive for leg swelling. Negative for chest pain and palpitations. Gastrointestinal:  Negative for abdominal pain, diarrhea, nausea and vomiting. Genitourinary:  Negative for frequency and hematuria. Musculoskeletal:  Negative for back pain and neck pain. Skin:  Negative for rash and wound. Neurological:  Negative for dizziness and headaches. Hematological:  Does not bruise/bleed easily. Vitals:    03/02/23 1528 03/02/23 1758   BP: 133/81 134/77   Pulse: 71 72   Resp: 18 17   Temp: 97.3 °F (36.3 °C) 97.6 °F (36.4 °C)   SpO2: 98% 99%   Weight: 106.6 kg (235 lb)    Height: 5' 8\" (1.727 m)             Physical Exam  Vitals and nursing note reviewed. Constitutional:       General: He is not in acute distress. Appearance: He is well-developed. HENT:      Head: Normocephalic and atraumatic. Eyes:      Conjunctiva/sclera: Conjunctivae normal.      Pupils: Pupils are equal, round, and reactive to light. Cardiovascular:      Rate and Rhythm: Normal rate and regular rhythm. Heart sounds: Normal heart sounds. No murmur heard. No friction rub. No gallop. Comments: Equal radial, dp, and pt pulses  Pulmonary:      Effort: Pulmonary effort is normal. No respiratory distress. Breath sounds: Normal breath sounds. No wheezing or rales. Abdominal:      General: Bowel sounds are normal. There is no distension. Palpations: Abdomen is soft. Tenderness: There is no abdominal tenderness. There is no guarding or rebound. Musculoskeletal:         General: Normal range of motion.       Cervical back: Normal range of motion and neck supple. Right lower leg: Edema present. Left lower leg: Edema present. Skin:     General: Skin is warm and dry. Capillary Refill: Capillary refill takes less than 2 seconds. Findings: No rash. Neurological:      Mental Status: He is alert and oriented to person, place, and time. Medical Decision Making  Work-up:  No leukocytosis  Anemic  Creatinine slightly uptrending at 3.06  Normal sodium and potassium  BNP elevated    chest x-ray shows enlarged cardiac silhouette     I discussed with advanced heart failure team, recommending Bumex 2 mg twice daily IV and they will see the patient        16-year-old male presents today with acute on chronic systolic heart failure with peripheral edema and shortness of breath. Vital signs here are stable without hypoxia, tachypnea or tachycardia. Normotensive. Patient advised to come here by his heart failure team, I consulted them, will diurese with IV Bumex. Problems Addressed:  Acute on chronic systolic heart failure [facility]): chronic illness or injury with exacerbation, progression, or side effects of treatment  Cardiomyopathy, unspecified type [facility]): chronic illness or injury    Amount and/or Complexity of Data Reviewed  Labs: ordered. Radiology: ordered. ECG/medicine tests: ordered. Risk  Prescription drug management. Decision regarding hospitalization. ED Course as of 03/02/23 1825   Thu Mar 02, 2023   1547 EKG time 3:35 PM  Normal sinus rhythm rate of 72 with a first-degree AV block, normal axis, narrow QRS, normal QTc, nonspecific ST-T wave changes without ST elevations or depressions. [CC]      ED Course User Index  [CC] Kolby Valencia,        Procedures          Perfect Serve Consult for Admission  6:59 PM    ED Room Number: ER23/23  Patient Name and age:  Glenna Saldana 56 y.o.  male  Working Diagnosis:   1.  Acute on chronic systolic heart failure (HCC)    2. Cardiomyopathy, unspecified type ([facility]        COVID-19 Suspicion:  no  Sepsis present:  no  Reassessment needed: no  Code Status:  Full Code  Readmission: no  Isolation Requirements:  no  Recommended Level of Care:  telemetry  56 y.o. male sent in by adv heart failure team for diuresis. Getting bumex 2mg IV BID per heart failure recs. Vs ok.

## 2023-03-02 NOTE — PROGRESS NOTES
Pharmacist Admission Medication Reconciliation:    Information obtained from: This medication history was obtained from the patient and his medical records. RxQuery data available¹:  YES-partial; missing hydralazine, isosorbide dinitrate, and insulin    Summary:     Medications added: NA  Medications deleted: NA  Dose changes: hydralazine BID (vs TID), isosorbide BID (vs TID), added PM dose of insulin     Active and held inpatient orders were reviewed and no changes are needed. ¹RxQuery pharmacy benefit data reflects medications processed through the patient's insurance, however this data does NOT capture whether the medication is currently being taken by the patient. Allergies:  Patient has no known allergies. Significant PMH/Disease States:   Past Medical History:   Diagnosis Date    Diabetes ([facility]     Heart failure ([facility]     CHF, cardiomyopathy    Hypertension     ICD (implantable cardioverter-defibrillator) in place     left upper chest     Chief Complaint for this Admission:    Chief Complaint   Patient presents with    Shortness of Breath     Prior to Admission Medications:   Prior to Admission Medications   Prescriptions Last Dose Informant Patient Reported? Taking?   allopurinoL (ZYLOPRIM) 100 mg tablet 3/2/2023 at am  No Yes   Sig: Take 0.5 Tablets by mouth daily. amLODIPine (NORVASC) 10 mg tablet 3/2/2023 at am  No Yes   Sig: TAKE 1 TABLET BY MOUTH ONCE DAILY . bumetanide (BUMEX) 2 mg tablet 3/2/2023 at am  No Yes   Sig: Take one tablet by mouth twice a day may take an additional tablet in the morning daily as needed for weight gain of 3 lbs or more overnight or 5 lbs or more in one week   carvediloL (COREG) 25 mg tablet 3/2/2023 at am  No Yes   Sig: TAKE 2 TABLETS BY MOUTH TWICE DAILY . ergocalciferol (ERGOCALCIFEROL) 1,250 mcg (50,000 unit) capsule 2/23/2023  No Yes   Sig: Take 1 Capsule by mouth every seven (7) days.    hydrALAZINE (APRESOLINE) 10 mg tablet 3/2/2023 at am  Yes Yes   Sig: Take 10 mg by mouth two (2) times a day. insulin NPH/insulin regular (NOVOLIN 70/30, HUMULIN 70/30) 100 unit/mL (70-30) injection 3/2/2023 at am  Yes Yes   Si Units by SubCUTAneous route Daily (before breakfast). (22 units in the morning; 26 units in the evening)   insulin NPH/insulin regular (NovoLIN 70/30 U-100 Insulin) 100 unit/mL (70-30) injection 3/1/2023  Yes Yes   Si-27 Units by SubCUTAneous route Daily (before dinner). (22 units in the morning; 26-27 units in the evening; evening dose is on a sliding scale)   isosorbide dinitrate (ISORDIL) 10 mg tablet 3/2/2023 at am  Yes Yes   Sig: Take 10 mg by mouth two (2) times a day. multivitamin with iron tablet 3/2/2023 at am  Yes Yes   Sig: Take 1 tablet by mouth daily. simvastatin (ZOCOR) 40 mg tablet 3/1/2023  Yes Yes   Sig: Take 40 mg by mouth nightly. Facility-Administered Medications: None     Thank you for allowing me to participate in this patient's care. Please call  or  with any questions. Viral Guidry PharmLilly LOPEZ., BCPS, BCPPS

## 2023-03-02 NOTE — ED TRIAGE NOTES
Patient arrives to ED, referred from advanced heart failure clinic. Patient reports 14 pound weight gain in 5 days with increased swelling to legs and abdomen.

## 2023-03-03 ENCOUNTER — APPOINTMENT (OUTPATIENT)
Dept: NON INVASIVE DIAGNOSTICS | Age: 58
End: 2023-03-03
Attending: FAMILY MEDICINE
Payer: MEDICARE

## 2023-03-03 ENCOUNTER — APPOINTMENT (OUTPATIENT)
Dept: ULTRASOUND IMAGING | Age: 58
End: 2023-03-03
Attending: INTERNAL MEDICINE
Payer: MEDICARE

## 2023-03-03 LAB
ALBUMIN SERPL-MCNC: 2.6 G/DL (ref 3.5–5)
ALBUMIN/GLOB SERPL: 0.7 (ref 1.1–2.2)
ALP SERPL-CCNC: 110 U/L (ref 45–117)
ALT SERPL-CCNC: 23 U/L (ref 12–78)
AMPHET UR QL SCN: NEGATIVE
ANION GAP SERPL CALC-SCNC: 7 MMOL/L (ref 5–15)
APTT PPP: 24.4 SEC (ref 22.1–31)
AST SERPL-CCNC: 28 U/L (ref 15–37)
ATRIAL RATE: 72 BPM
BARBITURATES UR QL SCN: NEGATIVE
BASOPHILS # BLD: 0.1 K/UL (ref 0–0.1)
BASOPHILS NFR BLD: 1 % (ref 0–1)
BENZODIAZ UR QL: NEGATIVE
BILIRUB SERPL-MCNC: 0.5 MG/DL (ref 0.2–1)
BNP SERPL-MCNC: ABNORMAL PG/ML
BUN SERPL-MCNC: 36 MG/DL (ref 6–20)
BUN/CREAT SERPL: 15 (ref 12–20)
CALCIUM SERPL-MCNC: 8.2 MG/DL (ref 8.5–10.1)
CALCULATED P AXIS, ECG09: 56 DEGREES
CALCULATED R AXIS, ECG10: -13 DEGREES
CALCULATED T AXIS, ECG11: 47 DEGREES
CANNABINOIDS UR QL SCN: NEGATIVE
CHLORIDE SERPL-SCNC: 111 MMOL/L (ref 97–108)
CO2 SERPL-SCNC: 24 MMOL/L (ref 21–32)
COCAINE UR QL SCN: NEGATIVE
CREAT SERPL-MCNC: 2.46 MG/DL (ref 0.7–1.3)
CREAT UR-MCNC: 81.6 MG/DL
DIAGNOSIS, 93000: NORMAL
DIFFERENTIAL METHOD BLD: ABNORMAL
DRUG SCRN COMMENT,DRGCM: NORMAL
ECHO LV EDV A2C: 119 ML
ECHO LV EDV A4C: 84 ML
ECHO LV EDV BP: 104 ML (ref 67–155)
ECHO LV EDV INDEX A4C: 39 ML/M2
ECHO LV EDV INDEX BP: 48 ML/M2
ECHO LV EDV NDEX A2C: 55 ML/M2
ECHO LV EJECTION FRACTION A2C: 25 %
ECHO LV EJECTION FRACTION A4C: 22 %
ECHO LV EJECTION FRACTION BIPLANE: 25 % (ref 55–100)
ECHO LV ESV A2C: 89 ML
ECHO LV ESV A4C: 65 ML
ECHO LV ESV BP: 79 ML (ref 22–58)
ECHO LV ESV INDEX A2C: 41 ML/M2
ECHO LV ESV INDEX A4C: 30 ML/M2
ECHO LV ESV INDEX BP: 36 ML/M2
ECHO LV FRACTIONAL SHORTENING: 11 % (ref 28–44)
ECHO LV INTERNAL DIMENSION DIASTOLE INDEX: 2.8 CM/M2
ECHO LV INTERNAL DIMENSION DIASTOLIC: 6.1 CM (ref 4.2–5.9)
ECHO LV INTERNAL DIMENSION SYSTOLIC INDEX: 2.48 CM/M2
ECHO LV INTERNAL DIMENSION SYSTOLIC: 5.4 CM
ECHO LV IVSD: 1.5 CM (ref 0.6–1)
ECHO LV MASS 2D: 438.7 G (ref 88–224)
ECHO LV MASS INDEX 2D: 201.2 G/M2 (ref 49–115)
ECHO LV POSTERIOR WALL DIASTOLIC: 1.5 CM (ref 0.6–1)
ECHO LV RELATIVE WALL THICKNESS RATIO: 0.49
EOSINOPHIL # BLD: 0.4 K/UL (ref 0–0.4)
EOSINOPHIL NFR BLD: 5 % (ref 0–7)
ERYTHROCYTE [DISTWIDTH] IN BLOOD BY AUTOMATED COUNT: 17.2 % (ref 11.5–14.5)
FERRITIN SERPL-MCNC: 41 NG/ML (ref 26–388)
GLOBULIN SER CALC-MCNC: 3.7 G/DL (ref 2–4)
GLUCOSE BLD STRIP.AUTO-MCNC: 111 MG/DL (ref 65–117)
GLUCOSE BLD STRIP.AUTO-MCNC: 143 MG/DL (ref 65–117)
GLUCOSE BLD STRIP.AUTO-MCNC: 185 MG/DL (ref 65–117)
GLUCOSE BLD STRIP.AUTO-MCNC: 92 MG/DL (ref 65–117)
GLUCOSE SERPL-MCNC: 137 MG/DL (ref 65–100)
HCT VFR BLD AUTO: 31.2 % (ref 36.6–50.3)
HGB BLD-MCNC: 9.2 G/DL (ref 12.1–17)
IMM GRANULOCYTES # BLD AUTO: 0 K/UL (ref 0–0.04)
IMM GRANULOCYTES NFR BLD AUTO: 0 % (ref 0–0.5)
INR PPP: 1.1 (ref 0.9–1.1)
IRON SATN MFR SERPL: 10 % (ref 20–50)
IRON SERPL-MCNC: 32 UG/DL (ref 35–150)
LYMPHOCYTES # BLD: 0.9 K/UL (ref 0.8–3.5)
LYMPHOCYTES NFR BLD: 11 % (ref 12–49)
MAGNESIUM SERPL-MCNC: 1.9 MG/DL (ref 1.6–2.4)
MCH RBC QN AUTO: 25.5 PG (ref 26–34)
MCHC RBC AUTO-ENTMCNC: 29.5 G/DL (ref 30–36.5)
MCV RBC AUTO: 86.4 FL (ref 80–99)
METHADONE UR QL: NEGATIVE
MONOCYTES # BLD: 0.6 K/UL (ref 0–1)
MONOCYTES NFR BLD: 8 % (ref 5–13)
NEUTS SEG # BLD: 6 K/UL (ref 1.8–8)
NEUTS SEG NFR BLD: 75 % (ref 32–75)
NRBC # BLD: 0 K/UL (ref 0–0.01)
NRBC BLD-RTO: 0 PER 100 WBC
OPIATES UR QL: NEGATIVE
P-R INTERVAL, ECG05: 232 MS
PCP UR QL: NEGATIVE
PLATELET # BLD AUTO: 195 K/UL (ref 150–400)
PMV BLD AUTO: 11.8 FL (ref 8.9–12.9)
POTASSIUM SERPL-SCNC: 3.3 MMOL/L (ref 3.5–5.1)
PROT SERPL-MCNC: 6.3 G/DL (ref 6.4–8.2)
PROT UR-MCNC: 68 MG/DL (ref 0–11.9)
PROT/CREAT UR-RTO: 0.8
PROTHROMBIN TIME: 11.8 SEC (ref 9–11.1)
Q-T INTERVAL, ECG07: 244 MS
QRS DURATION, ECG06: 104 MS
QTC CALCULATION (BEZET), ECG08: 267 MS
RBC # BLD AUTO: 3.61 M/UL (ref 4.1–5.7)
SERVICE CMNT-IMP: ABNORMAL
SERVICE CMNT-IMP: ABNORMAL
SERVICE CMNT-IMP: NORMAL
SERVICE CMNT-IMP: NORMAL
SODIUM SERPL-SCNC: 142 MMOL/L (ref 136–145)
THERAPEUTIC RANGE,PTTT: NORMAL SECS (ref 58–77)
TIBC SERPL-MCNC: 311 UG/DL (ref 250–450)
URATE SERPL-MCNC: 9.7 MG/DL (ref 3.5–7.2)
VENTRICULAR RATE, ECG03: 72 BPM
WBC # BLD AUTO: 8.1 K/UL (ref 4.1–11.1)

## 2023-03-03 PROCEDURE — 83735 ASSAY OF MAGNESIUM: CPT

## 2023-03-03 PROCEDURE — 76770 US EXAM ABDO BACK WALL COMP: CPT

## 2023-03-03 PROCEDURE — 80307 DRUG TEST PRSMV CHEM ANLYZR: CPT

## 2023-03-03 PROCEDURE — 85730 THROMBOPLASTIN TIME PARTIAL: CPT

## 2023-03-03 PROCEDURE — 65660000001 HC RM ICU INTERMED STEPDOWN

## 2023-03-03 PROCEDURE — 97165 OT EVAL LOW COMPLEX 30 MIN: CPT

## 2023-03-03 PROCEDURE — 74011000258 HC RX REV CODE- 258: Performed by: NURSE PRACTITIONER

## 2023-03-03 PROCEDURE — 74011250637 HC RX REV CODE- 250/637: Performed by: HOSPITALIST

## 2023-03-03 PROCEDURE — 85610 PROTHROMBIN TIME: CPT

## 2023-03-03 PROCEDURE — 93308 TTE F-UP OR LMTD: CPT

## 2023-03-03 PROCEDURE — 99221 1ST HOSP IP/OBS SF/LOW 40: CPT | Performed by: CLINICAL NURSE SPECIALIST

## 2023-03-03 PROCEDURE — 36415 COLL VENOUS BLD VENIPUNCTURE: CPT

## 2023-03-03 PROCEDURE — 99222 1ST HOSP IP/OBS MODERATE 55: CPT | Performed by: NURSE PRACTITIONER

## 2023-03-03 PROCEDURE — 82728 ASSAY OF FERRITIN: CPT

## 2023-03-03 PROCEDURE — 74011250637 HC RX REV CODE- 250/637: Performed by: FAMILY MEDICINE

## 2023-03-03 PROCEDURE — 74011636637 HC RX REV CODE- 636/637: Performed by: CLINICAL NURSE SPECIALIST

## 2023-03-03 PROCEDURE — 74011000250 HC RX REV CODE- 250: Performed by: FAMILY MEDICINE

## 2023-03-03 PROCEDURE — 82962 GLUCOSE BLOOD TEST: CPT

## 2023-03-03 PROCEDURE — 74011000250 HC RX REV CODE- 250: Performed by: INTERNAL MEDICINE

## 2023-03-03 PROCEDURE — 85025 COMPLETE CBC W/AUTO DIFF WBC: CPT

## 2023-03-03 PROCEDURE — 84156 ASSAY OF PROTEIN URINE: CPT

## 2023-03-03 PROCEDURE — 80053 COMPREHEN METABOLIC PANEL: CPT

## 2023-03-03 PROCEDURE — 83521 IG LIGHT CHAINS FREE EACH: CPT

## 2023-03-03 PROCEDURE — 74011250637 HC RX REV CODE- 250/637: Performed by: INTERNAL MEDICINE

## 2023-03-03 PROCEDURE — 83540 ASSAY OF IRON: CPT

## 2023-03-03 PROCEDURE — 84550 ASSAY OF BLOOD/URIC ACID: CPT

## 2023-03-03 PROCEDURE — 83880 ASSAY OF NATRIURETIC PEPTIDE: CPT

## 2023-03-03 PROCEDURE — 82784 ASSAY IGA/IGD/IGG/IGM EACH: CPT

## 2023-03-03 PROCEDURE — 74011250636 HC RX REV CODE- 250/636: Performed by: NURSE PRACTITIONER

## 2023-03-03 PROCEDURE — 74011250636 HC RX REV CODE- 250/636: Performed by: FAMILY MEDICINE

## 2023-03-03 RX ORDER — SPIRONOLACTONE 25 MG/1
25 TABLET ORAL DAILY
Status: DISCONTINUED | OUTPATIENT
Start: 2023-03-03 | End: 2023-03-10 | Stop reason: HOSPADM

## 2023-03-03 RX ORDER — INSULIN LISPRO 100 [IU]/ML
INJECTION, SOLUTION INTRAVENOUS; SUBCUTANEOUS
Status: DISCONTINUED | OUTPATIENT
Start: 2023-03-03 | End: 2023-03-10 | Stop reason: HOSPADM

## 2023-03-03 RX ORDER — TRAMADOL HYDROCHLORIDE 50 MG/1
50 TABLET ORAL
Status: DISCONTINUED | OUTPATIENT
Start: 2023-03-03 | End: 2023-03-10 | Stop reason: HOSPADM

## 2023-03-03 RX ORDER — BUMETANIDE 0.25 MG/ML
4 INJECTION INTRAMUSCULAR; INTRAVENOUS EVERY 12 HOURS
Status: DISCONTINUED | OUTPATIENT
Start: 2023-03-03 | End: 2023-03-05

## 2023-03-03 RX ORDER — INSULIN LISPRO 100 [IU]/ML
0.05 INJECTION, SOLUTION INTRAVENOUS; SUBCUTANEOUS
Status: DISCONTINUED | OUTPATIENT
Start: 2023-03-03 | End: 2023-03-10 | Stop reason: HOSPADM

## 2023-03-03 RX ADMIN — ALLOPURINOL 50 MG: 100 TABLET ORAL at 09:25

## 2023-03-03 RX ADMIN — Medication 12 UNITS: at 11:53

## 2023-03-03 RX ADMIN — CARVEDILOL 25 MG: 12.5 TABLET, FILM COATED ORAL at 17:58

## 2023-03-03 RX ADMIN — ISOSORBIDE DINITRATE 10 MG: 20 TABLET ORAL at 09:27

## 2023-03-03 RX ADMIN — Medication 5 UNITS: at 17:00

## 2023-03-03 RX ADMIN — BUMETANIDE 4 MG: 0.25 INJECTION, SOLUTION INTRAMUSCULAR; INTRAVENOUS at 09:25

## 2023-03-03 RX ADMIN — BUMETANIDE 4 MG: 0.25 INJECTION, SOLUTION INTRAMUSCULAR; INTRAVENOUS at 21:10

## 2023-03-03 RX ADMIN — Medication 10 ML: at 21:11

## 2023-03-03 RX ADMIN — HEPARIN SODIUM 5000 UNITS: 5000 INJECTION INTRAVENOUS; SUBCUTANEOUS at 05:58

## 2023-03-03 RX ADMIN — HEPARIN SODIUM 5000 UNITS: 5000 INJECTION INTRAVENOUS; SUBCUTANEOUS at 21:10

## 2023-03-03 RX ADMIN — SPIRONOLACTONE 25 MG: 25 TABLET ORAL at 09:26

## 2023-03-03 RX ADMIN — Medication 12 UNITS: at 21:20

## 2023-03-03 RX ADMIN — HYDRALAZINE HYDROCHLORIDE 10 MG: 10 TABLET, FILM COATED ORAL at 17:58

## 2023-03-03 RX ADMIN — IRON SUCROSE 200 MG: 20 INJECTION, SOLUTION INTRAVENOUS at 11:53

## 2023-03-03 RX ADMIN — HYDRALAZINE HYDROCHLORIDE 10 MG: 10 TABLET, FILM COATED ORAL at 09:26

## 2023-03-03 RX ADMIN — POTASSIUM BICARBONATE 40 MEQ: 782 TABLET, EFFERVESCENT ORAL at 18:00

## 2023-03-03 RX ADMIN — AMLODIPINE BESYLATE 10 MG: 5 TABLET ORAL at 09:26

## 2023-03-03 RX ADMIN — Medication 10 ML: at 05:58

## 2023-03-03 RX ADMIN — CARVEDILOL 25 MG: 12.5 TABLET, FILM COATED ORAL at 09:26

## 2023-03-03 RX ADMIN — ACETAMINOPHEN 650 MG: 325 TABLET ORAL at 07:11

## 2023-03-03 RX ADMIN — MULTIPLE VITAMINS W/ MINERALS TAB 1 TABLET: TAB at 09:26

## 2023-03-03 RX ADMIN — ISOSORBIDE DINITRATE 10 MG: 20 TABLET ORAL at 17:58

## 2023-03-03 RX ADMIN — TRAMADOL HYDROCHLORIDE 50 MG: 50 TABLET ORAL at 19:27

## 2023-03-03 RX ADMIN — POTASSIUM BICARBONATE 40 MEQ: 782 TABLET, EFFERVESCENT ORAL at 09:26

## 2023-03-03 RX ADMIN — HEPARIN SODIUM 5000 UNITS: 5000 INJECTION INTRAVENOUS; SUBCUTANEOUS at 13:09

## 2023-03-03 RX ADMIN — Medication 5 UNITS: at 13:10

## 2023-03-03 NOTE — ED NOTES
Bedside and Verbal shift change report given to Toña Herrmann  (oncoming nurse) by Jenelle Wilson (offgoing nurse). Report included the following information SBAR, Kardex, ED Summary, Procedure Summary, Intake/Output, MAR, Recent Results, Med Rec Status, Alarm Parameters , Pre Procedure Checklist, Procedure Verification, and Quality Measures.

## 2023-03-03 NOTE — PROGRESS NOTES
Transition of Care Plan:    RUR:14%  Disposition:awaiting PT/OT final recs   Follow up appointments: To be scheduled by patient's Cardiologist. Patient's cardiologist Dr. Logan Senior to connect patient with a new PCP and Endocrinologist and patient declined to have CM set up new PCP apt. DME needed:none  Transportation at [address] other  [address] or means to access home:    yes     Medicare Letter:1st letter given 3/3/23  Is patient a  and connected with the [place]? No             Caregiver Contact:Significant other Kandis Ching [phone]   Discharge Caregiver contacted prior to discharge? No  Care Conference needed?:  No  Previous HH/SNF/IPR: none               Reason for Admission:  Acute on Chronic HFreF                     RUR Score:   14%                  Plan for utilizing home health:   TBD       PCP: First and Last name:  Lara Perry MD     Name of Practice:    Are you a current patient: Yes/No:    Approximate date of last visit:    Can you participate in a virtual visit with your PCP:                     Current Advanced Directive/Advance Care Plan: Full Code      Healthcare Decision Maker:   Click here to complete [address] including selection of the Healthcare Decision Maker Relationship (ie \"Primary\")             Primary Decision Maker: Beryl [facility] Partner - [phone]    Secondary Decision Maker: Arely Box - [phone]                  Transition of Care Plan:                      Patient lives with his significant other and his youngest son. Patient is independent in his Adls/IADLs and does not have any DME. Patient uses [address] for his prescriptions. Care Management Interventions  PCP Verified by CM: Yes  Palliative Care Criteria Met (RRAT>21 & CHF Dx)?: No  Mode of Transport at Discharge:  Other (see comment) (significant other)  Transition of Care Consult (CM Consult): Discharge Planning  MyChart Signup: No  Discharge Durable Medical Equipment: No  Health Maintenance Reviewed: Yes  Physical Therapy Consult: Yes  Occupational Therapy Consult: Yes  Support Systems: Spouse/Significant Other  Confirm Follow Up Transport: Self   Resource Information Provided?: No  Discharge Location  Patient Expects to be Discharged to[de-identified] Home with family assistance        Patient/family seen: YES       Informed patient/family of BPCI-A Bundle Program. Also advised of potential outreach by Care Transitions Team.    [address] [facility] Delivered to Beneficiary or Representative:YES   Date BCPI -A was given:3/3/23      2:13 PM  CYN Matos

## 2023-03-03 NOTE — ED NOTES
Patient resting in bed with oxygen off. 86% on room air. Place nasal cannula back on patient - 92% on 2 L. BG checked - insulin given per MD order. Patient repositioned in bed - eating lunch. Call light in reach.

## 2023-03-03 NOTE — PROGRESS NOTES
[address] [facility] Adult  Hospitalist Group                                                                                          Hospitalist Progress Note  Flor Cordova MD  Answering service: [phone] from in house phone        Date of Service:  3/3/2023  NAME:  Roxane Eason  :  1965  MRN:  [id number]       Admission Summary:   Roxane Eason is a 56 y.o. male with past medical history of dilated cardiomyopathy, heart failure reduced ejection fraction, AICD , chronic lower extremity edema, type 2 diabetes mellitus, hypertension, stage III CKD presented to the emergency department chief complaints of shortness of breath, leg and abdominal swelling, and unintentional weight gain. Patient has known history of CHF. He newly had recent increase in Bumex from 1 mg to 2 mg twice daily. Unfortunately, he still had worsening symptoms with peripheral edema and swelling of legs and abdomen, which is severe, without specific alleviating factors, with associated shortness of breath and dyspnea on exertion over the past 5 days. Also fully had 14 pound weight gain. His last 2 echocardiogram 10/21/2022 showed reduced left ventricular ejection fraction 20% to 25%. Interval history / Subjective: Follow up for SOB/ leg swelling and CHF   Feeling better today after diuresis on RA     Assessment & Plan:      1. Acute on chronic HFrEF (heart failure with reduced ejection fraction)       Dilated Cardiomyopathy (2014)  - SOB with LE edema   -last 2d echocardiogram 10/21/2022:  EF 20%-25%  -admit to [facility] dailt weight I and O  -consult advanced heart failure team/ cardiologist follow with Dr Joe Sahu   -consider for GDMTs  -on BB + Diuretics need entresto if Renal function stable on aldactone     2. Acute kidney injury superimposed on chronic kidney disease  -BUN 43, creatinine 3.03, GFR 23  -repeat renal panel in a.m. better this morning  -consult nephrologist     3.   Type 2 diabetes mellitus with Hypoglycemia  -Point-of-care glucose 66 with repeat 101 mg/dL  -Placed hypoglycemia protocols  - cont SSI      4. Essential hypertension  -Resume home BP medications  -Monitor blood pressure closely    5. Mild hypokalemia  - replete orally      6. Obesity  -BMI 36.03 K2/M2  -Would encourage weight      Code status: Full  Prophylaxis:   Care Plan discussed with:   Anticipated Disposition:   Inpatient  Cardiac monitoring: Telemetry     Hospital Problems  Date Reviewed: 1/13/2023            Codes Class Noted POA    Acute kidney injury superimposed on chronic kidney disease ([facility] ICD-10-CM: N17.9, N18.9  ICD-9-CM: 584.9, 585.9  3/2/2023 Unknown        Acute on chronic HFrEF (heart failure with reduced ejection fraction) [facility] ICD-10-CM: I50.23  ICD-9-CM: 428.23  3/2/2023 Unknown        Cardiomyopathy ([facility] ICD-10-CM: I42.9  ICD-9-CM: 425.4  9/26/2014 Unknown    Overview Addendum 11/9/2017  7:20 AM by Sonia Leary MD     2002 (approx) diagnosed [place] by echo, neg stress test and started on usual meds. 2007 (approx) fup echo lvef 25%  2/15 cardiac cath, no sig cad  2/15 single lead AICD implant  2/17 lvh, otherwise normal echo                Social Determinants of Health     Tobacco Use: Low Risk     Smoking Tobacco Use: Never    Smokeless Tobacco Use: Never    Passive Exposure: Not on file   Alcohol Use: Not on file   Financial Resource Strain: Not on file   Food Insecurity: Not on file   Transportation Needs: Not on file   Physical Activity: Not on file   Stress: Not on file   Social Connections: Not on file   Intimate Partner Violence: Not on file   Depression: Not at risk    PHQ-2 Score: 0   Housing Stability: Not on file       Review of Systems:   A comprehensive review of systems was negative. Vital Signs:    Last 24hrs VS reviewed since prior progress note.  Most recent are:  Visit Vitals  BP (!) 158/95   Pulse 77   Temp 97.6 °F (36.4 °C)   Resp 19   Ht 5' 8\" (1.727 m)   Wt 107.5 kg (236 lb 15.9 oz)   SpO2 93%   BMI 36.03 kg/m²         Intake/Output Summary (Last 24 hours) at 3/3/2023 0900  Last data filed at 3/3/2023 3588  Gross per 24 hour   Intake 713 ml   Output 1675 ml   Net -962 ml        Physical Examination:     I had a face to face encounter with this patient and independently examined them on 3/3/2023 as outlined below:          General : alert x 3, awake, no acute distress,   HEENT: PEERL, EOMI, moist mucus membrane, TM clear  Neck: supple, no JVD, no meningeal signs  Chest: Clear to auscultation bilaterally   CVS: S1 S2 heard, Capillary refill less than 2 seconds  Abd: soft/ non tender, non distended, BS physiological,   Ext:treca edema today   Neuro/Psych: pleasant mood and affect, CN 2-12 grossly intact, sensory grossly within normal limit, Strength 5/5 in all extremities, DTR 1+ x 4  Skin: warm     Data Review:    I personally reviewed  Image and labs      I have personally and independently reviewed all pertinent labs, diagnostic studies, imaging, and have provided independent interpretation of the same. Labs:     Recent Labs     03/03/23 0433 03/02/23  1558   WBC 8.1 6.8   HGB 9.2* 10.2*   HCT 31.2* 34.3*    211     Recent Labs     03/03/23 0433 03/02/23  1558 02/28/23  1507    140 145*   K 3.3* 3.7 4.0   * 106 104   CO2 24 28 23   BUN 36* 43* 38*   CREA 2.46* 3.06* 2.83*   * 120* 160*   CA 8.2* 9.0 8.9   MG 1.9  --  1.8   URICA 9.7*  --   --      Recent Labs     03/03/23 0433 03/02/23  1558   ALT 23 28    135*   TBILI 0.5 0.4   TP 6.3* 7.7   ALB 2.6* 3.2*   GLOB 3.7 4.5*     Recent Labs     03/03/23 0433   INR 1.1   PTP 11.8*   APTT 24.4      Recent Labs     03/03/23 0433   TIBC 311   PSAT 10*   FERR 41      No results found for: FOL, RBCF   No results for input(s): PH, PCO2, PO2 in the last 72 hours. No results for input(s): CPK, CKNDX, TROIQ in the last 72 hours.     No lab exists for component: CPKMB  Lab Results   Component Value Date/Time    Cholesterol, total 132 05/05/2022 01:34 PM    HDL Cholesterol 39 (L) 05/05/2022 01:34 PM    LDL, calculated 79 05/05/2022 01:34 PM    LDL, calculated 100.8 (H) 02/11/2015 01:30 PM    Triglyceride 68 05/05/2022 01:34 PM    CHOL/HDL Ratio 4.5 02/11/2015 01:30 PM     Lab Results   Component Value Date/Time    Glucose (POC) 143 (H) 03/03/2023 08:30 AM    Glucose (POC) 108 03/02/2023 09:50 PM    Glucose (POC) 101 03/02/2023 08:46 PM    Glucose (POC) 66 03/02/2023 08:12 PM    Glucose (POC) 103 02/17/2015 06:50 AM     Lab Results   Component Value Date/Time    Color YELLOW/STRAW 02/11/2015 12:10 PM    Appearance CLEAR 02/11/2015 12:10 PM    Specific gravity 1.008 02/11/2015 12:10 PM    pH (UA) 6.5 02/11/2015 12:10 PM    Protein TRACE (A) 02/11/2015 12:10 PM    Glucose NEGATIVE 02/11/2015 12:10 PM    Ketone NEGATIVE 02/11/2015 12:10 PM    Bilirubin NEGATIVE 02/11/2015 12:10 PM    Urobilinogen 0.2 02/11/2015 12:10 PM    Nitrites NEGATIVE 02/11/2015 12:10 PM    Leukocyte Esterase NEGATIVE 02/11/2015 12:10 PM    Epithelial cells FEW 02/11/2015 12:10 PM    Bacteria NEGATIVE 02/11/2015 12:10 PM    WBC 0-4 02/11/2015 12:10 PM    RBC 0-5 02/11/2015 12:10 PM       Notes reviewed from all clinical/nonclinical/nursing services involved in patient's clinical care. Care coordination discussions were held with appropriate clinical/nonclinical/ nursing providers based on care coordination needs. Patients current active Medications were reviewed, considered, added and adjusted based on the clinical condition today. Home Medications were reconciled to the best of my ability given all available resources at the time of admission. Route is PO if not otherwise noted.       Admission Status:[id number]:::1}      Medications Reviewed:     Current Facility-Administered Medications   Medication Dose Route Frequency    bumetanide (BUMEX) injection 4 mg  4 mg IntraVENous Q12H    potassium bicarb-citric acid (EFFER-K) tablet 40 mEq  40 mEq Oral BID    spironolactone (ALDACTONE) tablet 25 mg  25 mg Oral DAILY    allopurinoL (ZYLOPRIM) tablet 50 mg  50 mg Oral DAILY    amLODIPine (NORVASC) tablet 10 mg  10 mg Oral DAILY    carvediloL (COREG) tablet 25 mg  25 mg Oral BID WITH MEALS    ergocalciferol capsule 50,000 Units  50,000 Units Oral Q7D    hydrALAZINE (APRESOLINE) tablet 10 mg  10 mg Oral BID    isosorbide dinitrate (ISORDIL) tablet 10 mg  10 mg Oral BID    multivitamin, tx-iron-ca-min (THERA-M w/ IRON) tablet 1 Tablet  1 Tablet Oral ACB    glucose chewable tablet 16 g  4 Tablet Oral PRN    glucagon (GLUCAGEN) injection 1 mg  1 mg IntraMUSCular PRN    dextrose 10 % infusion 0-250 mL  0-250 mL IntraVENous PRN    insulin lispro (HUMALOG) injection   SubCUTAneous AC&HS    acetaminophen (TYLENOL) tablet 650 mg  650 mg Oral Q6H PRN    insulin glargine (LANTUS) injection 28 Units  28 Units SubCUTAneous DAILY WITH DINNER    heparin (porcine) injection 5,000 Units  5,000 Units SubCUTAneous Q8H    sodium chloride (NS) flush 5-40 mL  5-40 mL IntraVENous Q8H    sodium chloride (NS) flush 5-40 mL  5-40 mL IntraVENous PRN    polyethylene glycol (MIRALAX) packet 17 g  17 g Oral DAILY PRN    ondansetron (ZOFRAN ODT) tablet 4 mg  4 mg Oral Q8H PRN    Or    ondansetron (ZOFRAN) injection 4 mg  4 mg IntraVENous Q6H PRN     Current Outpatient Medications   Medication Sig    hydrALAZINE (APRESOLINE) 10 mg tablet Take 10 mg by mouth two (2) times a day. isosorbide dinitrate (ISORDIL) 10 mg tablet Take 10 mg by mouth two (2) times a day. insulin NPH/insulin regular (NovoLIN 70/30 U-100 Insulin) 100 unit/mL (70-30) injection 26-27 Units by SubCUTAneous route Daily (before dinner). (22 units in the morning; 26-27 units in the evening; evening dose is on a sliding scale)    ergocalciferol (ERGOCALCIFEROL) 1,250 mcg (50,000 unit) capsule Take 1 Capsule by mouth every seven (7) days.     allopurinoL (ZYLOPRIM) 100 mg tablet Take 0.5 Tablets by mouth daily. amLODIPine (NORVASC) 10 mg tablet TAKE 1 TABLET BY MOUTH ONCE DAILY . bumetanide (BUMEX) 2 mg tablet Take one tablet by mouth twice a day may take an additional tablet in the morning daily as needed for weight gain of 3 lbs or more overnight or 5 lbs or more in one week    carvediloL (COREG) 25 mg tablet TAKE 2 TABLETS BY MOUTH TWICE DAILY . insulin NPH/insulin regular (NOVOLIN 70/30, HUMULIN 70/30) 100 unit/mL (70-30) injection 22 Units by SubCUTAneous route Daily (before breakfast). (22 units in the morning; 26 units in the evening)    simvastatin (ZOCOR) 40 mg tablet Take 40 mg by mouth nightly. multivitamin with iron tablet Take 1 tablet by mouth daily.      ______________________________________________________________________  EXPECTED LENGTH OF STAY: - - -  ACTUAL LENGTH OF STAY:          1                 Chris Burden MD

## 2023-03-03 NOTE — PROGRESS NOTES
OCCUPATIONAL THERAPY EVALUATION/DISCHARGE  Patient: Cody Gallagher (75 y.o. male)  Date: 3/3/2023  Primary Diagnosis: Acute on chronic HFrEF (heart failure with reduced ejection fraction) ([facility] [I50.23]  Cardiomyopathy ([facility] [I42.9]  Acute kidney injury superimposed on chronic kidney disease ([facility] [N17.9, N18.9]       Precautions: standard    ASSESSMENT  Based on the objective data described below, the patient presents with intact balance, limited lower extremity access due to pain from wounds along back of calves, new O2 use but at functional baseline for self care and functional mobility/transfers. Patient received semi supine on stretcher and agreeable to working with therapy. Patient completing transfers independently and ambulating short distance in room due to oxygen tubing. O2 stable on 2L throughout session. Patient with poor lower extremity access sitting edge of bed due to reported pain on bilateral calves from wounds, wife has been assisting him with lower body ADLs for some time prior to admission. Patient with new O2 use due to episode of decreased saturation but saturating well with mobility on current 2L of oxygen. Overall patient did well with session and is at functional baseline at this time. Patient expressing no needs for continued skilled OT services and no needs for discharge home. Recommend discharge home with continued family assist when medically appropriate. Current Level of Function (ADLs/self-care): independent for mobility/transfers, up to max A for lower body ADLs only    Functional Outcome Measure: The patient scored 21/24 on the AM-PAC outcome measure which is indicative of mostly independent for basic self care tasks.       Other factors to consider for discharge: prior level of function requires assist, supportive family     PLAN :  Recommend with staff: up to chair 3x/day for meals, functional mobility to and from bathroom for toileting, patient in agreement to complete during admission    Recommendation for discharge: (in order for the patient to meet his/her long term goals)  No skilled occupational therapy/ follow up rehabilitation needs identified at this time. This discharge recommendation:  Has not yet been discussed the attending provider and/or case management    IF patient discharges home will need the following DME: none       SUBJECTIVE:   Patient stated I feel like I'm doing pretty well.     OBJECTIVE DATA SUMMARY:   HISTORY:   Past Medical History:   Diagnosis Date    Diabetes ([facility]     Heart failure ([facility]     CHF, cardiomyopathy    Hypertension     ICD (implantable cardioverter-defibrillator) in place     left upper chest     Past Surgical History:   Procedure Laterality Date    HX HEART CATHETERIZATION  2/2015    x1    HX IMPLANTABLE CARDIOVERTER DEFIBRILLATOR  2/16/2015       Prior Level of Function/Environment/Context: independent for majority of ADLs but wife assists with lower body dressing recently  Expanded or extensive additional review of patient history:   Home Situation  Home Environment: Private residence  # Steps to Enter: 5  Rails to Enter: Yes  Hand Rails : Bilateral  One/Two Story Residence: One story  Living Alone: No  Support Systems: Spouse/Significant Other, Child(cristina)  Patient Expects to be Discharged to[de-identified] Home with family assistance  Current DME Used/Available at Home: None  Tub or Shower Type: Tub/Shower combination    EXAMINATION OF PERFORMANCE DEFICITS:  Cognitive/Behavioral Status:  Neurologic State: Alert  Orientation Level: Oriented X4  Cognition: Follows commands; Appropriate decision making        Safety/Judgement: Awareness of environment;Good awareness of safety precautions    Skin: bandages on back of calves    Edema: lower extremity swelling    Hearing:   Auditory  Auditory Impairment: None    Vision/Perceptual:                           Acuity: Within Defined Limits         Range of Motion:  AROM: Within functional limits Strength:  Strength: Within functional limits                Coordination: Tone & Sensation:  Tone: Normal                         Balance:  Sitting: Intact  Standing: Intact; Without support    Functional Mobility and Transfers for ADLs:  Bed Mobility:  Rolling: Modified independent  Supine to Sit: Modified independent  Scooting: Independent    Transfers:  Sit to Stand: Independent  Stand to Sit: Independent  Bed to Chair: Independent  Toilet Transfer : Independent (infer)    ADL Assessment:  Feeding: Independent (infer)    Oral Facial Hygiene/Grooming: Independent (infer in standing)    Bathing: Minimum assistance (infer from LE access)         Upper Body Dressing: Independent (infer)    Lower Body Dressing: Maximum assistance (wife assisting at baseline)    Toileting: Independent (infer from functional reach and balance)                ADL Intervention and task modifications:                                Lower Body Dressing Assistance  Socks: Maximum assistance  Leg Crossed Method Used: No (unable due to pain)  Position Performed: Seated edge of bed         Cognitive Retraining  Safety/Judgement: Awareness of environment;Good awareness of safety precautions    Functional Measure:  [facility] AM-PAC®      Daily Activity Inpatient Short Form (6-Clicks) Version 2  How much HELP from another person do you currently need. .. (If the patient hasn't done an activity recently, how much help from another person do you think they would need if they tried?) Total A Lot A Little None   1. Putting on and taking off regular lower body clothing? []   1 [x]   2 []   3 []   4   2. Bathing (including washing, rinsing, drying)? []   1 []   2 [x]   3 []   4   3. Toileting, which includes using toilet, bedpan, or urinal? []   1 []   2 []   3 [x]   4   4. Putting on and taking off regular upper body clothing? []   1 []   2 []   3 [x]   4   5. Taking care of personal grooming such as brushing teeth?  [] 1 []   2 []   3 [x]   4   6. Eating meals? []   1 []   2 []   3 [x]   4     Raw Score: 21/24                            Cutoff score ?191,2,3 had higher odds of discharging home with home health or need of SNF/IPR    1. He Beltran Bahaiivy Camp. Validity of the AM-PAC 6-Clicks Inpatient Daily Activity and Basic Mobility Short Forms. Physical Therapy Mar 2014, 94 3) [phone]; DOI: 10.2522/ptj.[id number]  2. Jamison Strong. Association of AM-PAC \"6-Clicks\" Basic Mobility and Daily Activity Scores With Discharge Destination. Phys Ther. 2021 Apr 4;101(4):ulld738. doi: 10.1093/ptj/cfvn923. PMID: 81758983. V Zelalem Daniel, Arjun D, Lulú Carrizales, Reymundo K, Lamberto S. Activity Measure for Post-Acute Care \"6-Clicks\" Basic Mobility Scores Predict Discharge Destination After Acute Care Hospitalization in Select Patient Groups: A Retrospective, Observational Study. Arch Rehabil Res Clin Transl. 2022 Jul 16;4(3):727854. doi: 10.1016/j.arrct. 0702.802926. PMID: 14580362; PMCID: [id number]. 4. Claudia Gibbons, Ya DOCKERY. AM-PAC Short Forms Manual 4.0. Revised 2/2020.       Occupational Therapy Evaluation Charge Determination   History Examination Decision-Making   LOW Complexity : Brief history review  LOW Complexity : 1-3 performance deficits relating to physical, cognitive , or psychosocial skils that result in activity limitations and / or participation restrictions  LOW Complexity : No comorbidities that affect functional and no verbal or physical assistance needed to complete eval tasks       Based on the above components, the patient evaluation is determined to be of the following complexity level: LOW   Pain Rating:  Pain on dylan calves    Activity Tolerance:   Good and desaturates with exertion and requires oxygen    After treatment patient left in no apparent distress:    Sitting in chair and Call bell within reach    COMMUNICATION/EDUCATION:   The patients plan of care was discussed with: Physical therapist and Registered nurse.      Thank you for this referral.  Sherif Black OTR/L  Time Calculation: 10 mins

## 2023-03-03 NOTE — DIABETES MGMT
[address] [place] [address]  DIABETES MANAGEMENT CONSULT    Consulted by  Arabella Vargas MD  for advanced nursing evaluation and care for inpatient blood glucose management. Evaluation and Action Plan   Iram Raymundo is a 56year old gentleman, with Type 2 Diabetes on 70/30 insulin, who is admitted with acute on chronic CHF exacerbation. His A1C remains elevated at 8.1% and he verbalizes that he has struggled to get it below 8%. This admission, he did take his morning 70/30 insulin 20 units at breakfast.  His BG was 120 on admission but fell to 66 after not eating in the afternoon. Evening insulin has been held and BG was 137. He has eaten breakfast.  At this time, factors that are impacting BG pattern are his heart failure, ANA, elevated BMI and decreased PO intake. He will need insulin to resume now but will order at reduced doses. Inpatient BG goal is 140-180mg/dl. Management Rationale Action Plan   Medication   Basal needs Using 0.25 units/kg/D NPH 12 units Q12 (20% basal dose reduction from PTA dose)   Nutritional needs Using low sensitivity Will start 5 units Humalog/meal    Hold if patient is NPO or consumes less than 50% of carbohydrates on meal tray    Advance by 2 units daily for persistent pre-prandial hyperglycemia     Corrective insulin Using HIGH sensitivity based on  High sensitivity due to ANA/reduced GFR   Additional orders  Consistent carbohydrate diet (60g CHO/meal)       Diabetes Discharge Plan   Medication  TBD   Referral  [x]        Outpatient diabetes education   Additional orders            Initial Presentation   Iram Raymundo is a 56 y.o. male admitted from the advanced heart failure center on 3/2/23 with a 14lb weight gain in 5 days. LAB: GFR 23, BNP 9738, A1C 8.4%  CXR: Enlarged cardiopericardial silhouette.       HX:   Past Medical History:   Diagnosis Date    Diabetes ([address]     Heart failure ([address]     CHF, cardiomyopathy    Hypertension     ICD (implantable cardioverter-defibrillator) in place     left upper chest        INITIAL DX:   Acute on chronic HFrEF (heart failure with reduced ejection fraction) ([facility]) [I50.23]  Cardiomyopathy ([facility] [I42.9]  Acute kidney injury superimposed on chronic kidney disease ([facility] [N17.9, N18.9]     Current Treatment     [place]: Diuresis, Nephrologist     Hospital Course   Clinical progress has been uncomplicated. Diabetes History   Type 2 Diabetes: Diagnosed about 10 years ago  Ambulatory BG management provided by: PCP Chloe Silva MD  He did see an endocrinologist at [address] [place] [address] this past summer once but does not remember name. Does not want to go back to that practice. Diabetes-related Medical History  Microvascular disease  Nephropathy  Other associated conditions     CHF    Diabetes Medication History  Key Antihyperglycemic Medications               insulin NPH/insulin regular (NovoLIN 70/30 U-100 Insulin) 100 unit/mL (70-30) injection (Taking) 26-27 Units by SubCUTAneous route Daily (before dinner). (22 units in the morning; 26-27 units in the evening; evening dose is on a sliding scale)    insulin NPH/insulin regular (NOVOLIN 70/30, HUMULIN 70/30) 100 unit/mL (70-30) injection (Taking) 22 Units by SubCUTAneous route Daily (before breakfast). (22 units in the morning; 26 units in the evening)             Diabetes self-management practices:   Eating pattern     [] Breakfast  Hash brown and coffee  [] Lunch   [place]  [] Dinner   \"Typical dinner foods\" would not elaborate  [] Bedtime  None  [] Snacks   Limited  [] Beverages  Regular soda, water, tea  [] Dentition status Normal  Physical activity pattern   No intentional activity   Monitoring pattern   Tests twice daily   Fasting    Before Dinner: 150-200, did see a 300 this month  Taking medications pattern  [x] Consistent administration  [x] Affordable  Reducing risks  [] Influenza:  There is no immunization history for the selected administration types on file for this patient. [] Pneumonia:   Immunization History   Administered Date(s) Administered    Pneumococcal Polysaccharide (PPSV-23) 02/10/2015     [] Hepatitis: There is no immunization history for the selected administration types on file for this patient. Social determinants of health impacting diabetes self-management practices   Concerned that you need to know more about how to stay healthy with diabetes  Overall evaluation:    [x] Not achieving A1c target with drug therapy & self-care practices    Subjective   I have had a hard time getting by A1C lower than 8%.      Objective   Physical exam  General Obese AA male in acute distress/ill-appearing. Conversant and cooperative  Neuro  Alert, oriented   Vital Signs Visit Vitals  BP (!) 158/95   Pulse 72   Temp 97.6 °F (36.4 °C)   Resp 19   Ht 5' 8\" (1.727 m)   Wt 106 kg (233 lb 11 oz)   SpO2 93%   BMI 35.53 kg/m²     Skin  Warm and dry. Acanthosis noted along neckline. No lipohypertrophy or lipoatrophy noted at injection sites   Heart   Regular rate and rhythm.  No murmurs, rubs or gallops  Lungs  Clear to auscultation without rales or rhonchi  Extremities No foot wounds, edema in extremities        Laboratory  Recent Labs     23  0433 23  1558 23  1507   * 120* 160*   AGAP 7 6  --    WBC 8.1 6.8  --    CREA 2.46* 3.06* 2.83*   AST 28 33  --    ALT 23 28  --        Factors impacting BG management  Factor Dose Comments   Nutrition:  Standard meals     60 grams/meal    Cardiomyopathy and systolic HF AICD  EG 13-16%  Diuresis    Other:   Kidney function   ANA on CKD  GFR 23 on admit  Now 30      Blood glucose pattern    Significant diabetes-related events over the past 24-72 hours    Fasting B  BG on admit: 120 but 66 at 8pm  A1C 8.4% (up from 8.1% )      Assessment and Nursing Intervention   Nursing Diagnosis Risk for unstable blood glucose pattern   Nursing Intervention Domain 6567 Decision-making Support   Nursing Interventions Examined current inpatient diabetes/blood glucose control   Explored factors facilitating and impeding inpatient management  Explored corrective strategies with patient and responsible inpatient provider   Informed patient of rational for insulin strategy while hospitalized     Nursing Diagnosis 63118 Ineffective Health Management   Nursing Intervention Domain 525 Decision-making Support   Nursing Interventions Identified diabetes self-management practices impeding diabetes control  Discussed diabetes survival skills related to  Healthy Plate eating plan; given handouts  Role of physical activity in improving insulin sensitivity and action  Procedure for blood glucose monitoring & options for low-cost products  Medications plan at discharge     Billing Code(s)   [x] 41136  initial hospital care - 40 minutes     Before making these care recommendations, I personally reviewed the hospitalization record, including notes, laboratory & diagnostic data and current medications, and examined the patient at the bedside (circumstances permitting) before determining care. More than fifty (50) percent of the time was spent in patient counseling and/or care coordination.   Total minutes: 36    PREET Wilcox  Diabetes Clinical Nurse Specialist  [facility]  Access via [email or web address]

## 2023-03-03 NOTE — DISCHARGE INSTRUCTIONS
Download the Heart Failure [place] Davis: Search in your Rover (Android) or miCab [place] Store (Global Weatherphone): Search for- HF [place].    Front Desk HQ    HF [place] is a brand-new phone davis that helps you track daily symptoms, vitals, mood, energy level and more. You can even add your heart failure care team members to view your data and monitor your condition at home. HF [place] lets you:  Track symptoms, medications and more  Share health information with your health care team  Connect with others living with heart failure         Discharge Instructions       PATIENT ID: Libia Toure  MRN: [id number]   [facility]: 1965    DATE OF ADMISSION: [unfilled]    DATE OF DISCHARGE: 3/10/2023    PRIMARY CARE PROVIDER: @PCP@     ATTENDING PHYSICIAN: [unfilled]  DISCHARGING PROVIDER: Kely Caraballo MD    To contact this individual call [phone] and ask the  to page. If unavailable ask to be transferred the Adult Hospitalist Department. DISCHARGE DIAGNOSES CHF Acute on Chronic Systolic CHF    CONSULTATIONS: [unfilled]    PROCEDURES/SURGERIES: Procedure(s):  LEFT AND RIGHT HEART CATH / CORONARY ANGIOGRAPHY    PENDING TEST RESULTS:   At the time of discharge the following test results are still pending:     FOLLOW UP APPOINTMENTS:   [unfilled]     ADDITIONAL CARE RECOMMENDATIONS:     DIET: Cardiac Diet    ACTIVITY: Activity as tolerated    WOUND CARE:     EQUIPMENT needed:       Radiology      DISCHARGE MEDICATIONS:   See Medication Reconciliation Form    It is important that you take the medication exactly as they are prescribed. Keep your medication in the bottles provided by the pharmacist and keep a list of the medication names, dosages, and times to be taken in your wallet. Do not take other medications without consulting your doctor.        NOTIFY YOUR PHYSICIAN FOR ANY OF THE FOLLOWING:   Fever over 101 degrees for 24 hours. Chest pain, shortness of breath, fever, chills, nausea, vomiting, diarrhea, change in mentation, falling, weakness, bleeding. Severe pain or pain not relieved by medications. Or, any other signs or symptoms that you may have questions about.       DISPOSITION:   x Home With:   OT  PT  HH  RN       SNF/Inpatient Rehab/LTAC    Independent/assisted living    Hospice    Other:     CDMP Checked:   Yes x     PROBLEM LIST Updated:  Yes x       Signed:   Ermelinda Lance MD  3/10/2023  10:09 AM

## 2023-03-03 NOTE — CONSULTS
[facility] in [address] [place] [address]  Inpatient Consult Note      Patient name: Roxane Eason  Patient : 1965  Patient MRN: [id number]  Consulting MD: Sydney Jolly MD  Date of service: 23    REASON FOR REFERRAL:  Management of acute on chronic systolic heart failure    PLAN OF CARE:  57 y/o male with h/o non-ischemic cardiomyopathy, LVEF 28%, stage C, NYHA class IIIA symptoms and CKD, stage 3 (Cr 2.1-2.47) undergoing optimization of GDMT (taken off nephrotoxic meds through diuresis and to allow renal recovery and up-titrating alternative HF meds); once patient is euvolemic he should have RHC +/- ischemic evaluation (possibly LHC depending on renal function, last LHC )  Most likely etiology of worsening HF is chronic volume overload due to underestimated severity of renal failure +/- possibly inadequately/untreated MINA; evaluation ongoing   Presented to ED 3/2/23 with fluid overload. INTERVAL HISTORY:  -already feeling better since arrival in ED  -VSS  -creatinine down to 2.46; Mg 1.9; uric acid 9.7; Hg down to 9.2; iron low; pBNP 86419  -weight down 3lbs; I/O neg 1L  -Mr. Vanessa Goldberg is feeling better. He endorses SOB/VILLARREAL, orthopnea, swelling, decreased appetite. No chest pain, palpitations, dizziness. RECOMMENDATIONS:  Continue current medical therapy for heart failure  Continue coreg 25mg twice daily  Continue norvasc 10mg daily  Hold ARNI/MRA to allow renal recovery. Start entresto once GFR stable  Continue hydralazine 10mg BID and isordil 10mg BID   Hold off on SGLT2i until renal function stabilized  Continue IV bumex. Increased to 4mg BID by nephrology today ; goal weight around 216 lbs  Does not appear to need inotropic support   Does not take aspirin   ICD interrogation every 3 months per routine  Still needs to reschedule sleep study  outpatient    ECHO pending  Routine HF labs.   Venofer x2  Reinforced low salt diet  Reinforced fluid restriction to 6 x 8oz glasses per day    Discussed with bedside RN       IMPRESSION:  Fatigue  Shortness of breath/VILLARREAL/orthopnea  Volume overload  Acute on chronic systolic heart failure  Stage C, NYHA class IIIA symptoms  Non-ischemic cardiomyopathy, LVEF 28%  HTN  DM  CKD 4    LIFE GOALS:  Lifestyle goals reviewed with the patient. Patient's personal goals include: getting back home  Important upcoming milestones or family events: TBD  The patient identifies the following as important for living well: TBD    HPI:   From prior notes,  \"Briefly, Dina Quevedo is a 56 y.o. male with h/o morbid obesity, BMI 35, HTN, HL, DM2, chronic systolic heart failure, stage C, NYHA class IIIA symptoms, non-ischemic cardiomyopathy, LVEF 28% (HF diagnosed around 2002), normal coronaries by [employer] s/p single lead ICD (2/2015) and worsening renal function, CKD stage 2 and anemia. Patient was [facility] to [facility] to establish long-term care. \"      CARDIAC IMAGING:  3/3/23: pending     Echo 10/21/22    Left Ventricle: Severely reduced left ventricular systolic function with a visually estimated EF of 20 - 25%. Left ventricle is moderately dilated. Mild posterior thickening. Normal wall motion. Normal diastolic function. Left Atrium: Left atrium is moderately dilated. Mitral Valve: Mild regurgitation with a centrally directed jet. Tricuspid Valve: Mild regurgitation with a centrally directed jet. Echo (3/9/22)    Study limited to the assessment of ejection fraction. Left Ventricle: Left ventricle is moderately dilated. Mildly increased wall thickness. Severe global hypokinesis present. Calculared ejection fraction is 28% by 3D volume and 31% by 2D Fernandez's biplane. Global longitudinal strain is reduced with a value of -5.8%. Echo (12/6/21)  LV: Calculated LVEF is 28%. Biplane method used to measure ejection fraction. Mildly dilated left ventricle. Mildly to moderately increased wall thickness. Moderate-to-severely and globally reduced systolic function. Moderate (grade 2) left ventricular diastolic dysfunction. LA: Mildly dilated left atrium. Left Atrium volume index is 40 mL/m2. MV: Moderate mitral annular calcification. Very mild mitral valve regurgitation is present. RV: Not well visualized. Pacer/ICD present. Echo (2/17/21)  LV: Calculated LVEF is 29%. Biplane method used to measure ejection fraction. Mildly dilated left ventricle. Mild to moderate hypertrophy. Severely and globally reduced systolic function. Wall motion: normal. Abnormal left ventricular strain. Global longitudinal strain is -8%. Moderate (grade 2) left ventricular diastolic dysfunction. LA: Mildly dilated left atrium. Left Atrium volume index is 40 mL/m2. MV: Moderate mitral annular calcification. Very mild mitral valve regurgitation is present. Echo (8/16/19)  Left Ventricle: Mildly dilated left ventricle. Moderate concentric hypertrophy. Severe global systolic dysfunction. Estimated left ventricular ejection fraction is 21 - 25%. Biplane method used to measure ejection fraction. Left ventricular global hypokinesis. Abnormal left ventricular strain. Inconclusive left ventricular diastolic function. Left Atrium: Mildly dilated left atrium. Mitral Valve: Moderate mitral annular calcification. Mild mitral valve regurgitation.      EKG (3/2/23): SR with 1st degree AVB  EKG (8/5/19) NSR 73bpm QRS 124ms      LHC (2/105) normal cors  ICD interrogation    HEMODYNAMICS:  RHC not done  CPEST not done  6MW not done    OTHER IMAGING:  CXR 3/2/23: enlarged cardiac silhouette; no edema   CT chest not done           PHYSICAL EXAM:  Visit Vitals  BP (!) 158/95   Pulse 72   Temp 97.6 °F (36.4 °C)   Resp 19   Ht 5' 8\" (1.727 m)   Wt 233 lb 11 oz (106 kg)   SpO2 93%   BMI 35.53 kg/m²     Physical Assessment:   General Appearance: alert, cooperative, obese AAM resting in bed in NAD; appears stated age  Eyes: sclera anicteric  Mouth/Throat: moist mucous membranes; oral pharynx clear  Neck: supple; unable to see JVD  Pulmonary:  clear to auscultation bilaterally; good effort;   Cardiovascular: regular rate and rhythm; no murmur, click, rub, or gallop  Abdomen: soft, non-tender, distended; bowel sounds normal  Musculoskeletal: no swelling or deformity; moves all extremities  Extremities: 1-2+ pitting edema BLE; palpable distal pulses   Skin: warm and dry;  dressings to legs   Neuro: grossly normal  Psych: normal mood and affect given the setting      REVIEW OF SYSTEMS:  Review of Symptoms:  Constitutional: fatigue  Eyes: negative  Ears, nose, mouth, throat, and face: negative  Respiratory: SOB/VILLARREAL, orthopnea  Cardiovascular: negative  Gastrointestinal: abd swelling and decreased appetite   Genitourinary:negative  Musculoskeletal:negative  Neurological: negative  Behvioral/Psych: negative  Endocrine: negative      PAST MEDICAL HISTORY:  Past Medical History:   Diagnosis Date    Diabetes ([facility]     Heart failure ([facility]     CHF, cardiomyopathy    Hypertension     ICD (implantable cardioverter-defibrillator) in place     left upper chest       PAST SURGICAL HISTORY:  Past Surgical History:   Procedure Laterality Date    HX HEART CATHETERIZATION  2/2015    x1    HX IMPLANTABLE CARDIOVERTER DEFIBRILLATOR  2/16/2015       FAMILY HISTORY:  Family History   Problem Relation Age of Onset    Diabetes Mother     Hypertension Father     Diabetes Father     Diabetes Brother     Hypertension Brother        SOCIAL HISTORY:  Social History     Socioeconomic History    Marital status:    Tobacco Use    Smoking status: Never    Smokeless tobacco: Never   Vaping Use    Vaping Use: Never used   Substance and Sexual Activity    Alcohol use: Yes     Comment: 1 a month    Drug use: No    Sexual activity: Not Currently       LABORATORY RESULTS:     Labs Latest Ref Rng & Units 3/3/2023 3/2/2023 2/28/2023 1/13/2023   WBC 4.1 - 11.1 K/uL 8.1 6.8 - -   RBC 4.10 - 5.70 M/uL 3.61(L) 4.05(L) - -   Hemoglobin 12.1 - 17.0 g/dL 9.2(L) 10. 2(L) - -   Hematocrit 36.6 - 50.3 % 31. 2(L) 34. 3(L) - -   MCV 80.0 - 99.0 FL 86.4 84.7 - -   Platelets 892 - 472 K/uL 195 211 - -   Lymphocytes 12 - 49 % 11(L) 12 - -   Monocytes 5 - 13 % 8 8 - -   Eosinophils 0 - 7 % 5 8(H) - -   Basophils 0 - 1 % 1 1 - -   Albumin 3.5 - 5.0 g/dL 2. 6(L) 3. 2(L) - 3.6   Calcium 8.5 - 10.1 MG/DL 8. 2(L) 9.0 8.9 9.0   Glucose 65 - 100 mg/dL 137(H) 120(H) 160(H) 152(H)   BUN 6 - 20 MG/DL 36(H) 43(H) 38(H) 42(H)   Creatinine 0.70 - 1.30 MG/DL 2.46(H) 3.06(H) 2.83(H) 2.91(H)   Sodium 136 - 145 mmol/L 142 140 145(H) 140   Potassium 3.5 - 5.1 mmol/L 3. 3(L) 3.7 4.0 4.0   Some recent data might be hidden     Lab Results   Component Value Date/Time    TSH 2.080 05/05/2022 01:34 PM    TSH 1.05 02/11/2015 01:30 PM       ALLERGY:  No Known Allergies     CURRENT MEDICATIONS:    Current Facility-Administered Medications:     bumetanide (BUMEX) injection 4 mg, 4 mg, IntraVENous, Q12H, Gabriele Puente MD, 4 mg at 03/03/23 0925    potassium bicarb-citric acid (EFFER-K) tablet 40 mEq, 40 mEq, Oral, BID, Roxanne Cotto MD, 40 mEq at 03/03/23 3153    spironolactone (ALDACTONE) tablet 25 mg, 25 mg, Oral, DAILY, Roxanne Cotto MD, 25 mg at 03/03/23 2204    iron sucrose (VENOFER) 200 mg in 0.9% sodium chloride 100 mL IVPB, 200 mg, IntraVENous, Q24H, Promise Archibald, NP    dextrose 10 % infusion 0-250 mL, 0-250 mL, IntraVENous, PRN, Steffanie Fall CNS    insulin lispro (HUMALOG) injection 5 Units, 0.05 Units/kg, SubCUTAneous, TID WITH MEALS, Steffanie Fall CNS    insulin lispro (HUMALOG) injection, , SubCUTAneous, AC&HS, Steffanie Fall CNS    insulin NPH (NOVOLIN N, HUMULIN N) injection 12 Units, 12 Units, SubCUTAneous, Q12H, Steffanie Fall CNS    allopurinoL (ZYLOPRIM) tablet 50 mg, 50 mg, Oral, DAILY, Emely, Juvencio BEDOLLA MD, 50 mg at 03/03/23 0925    amLODIPine (NORVASC) tablet 10 mg, 10 mg, Oral, DAILY, [place], Salina Devlin MD, 10 mg at 03/03/23 3643    carvediloL (COREG) tablet 25 mg, 25 mg, Oral, BID WITH MEALS, Salina Han MD, 25 mg at 03/03/23 2354    ergocalciferol capsule 50,000 Units, 50,000 Units, Oral, Q7D, Hernán Romeo MD, 50,000 Units at 03/02/23 2148    hydrALAZINE (APRESOLINE) tablet 10 mg, 10 mg, Oral, BID, Salina Han MD, 10 mg at 03/03/23 7405    isosorbide dinitrate (ISORDIL) tablet 10 mg, 10 mg, Oral, BID, Salina Han MD, 10 mg at 03/03/23 3054    multivitamin, tx-iron-ca-min (THERA-M w/ IRON) tablet 1 Tablet, 1 Tablet, Oral, ACB, Salina Han MD, 1 Tablet at 03/03/23 0926    glucose chewable tablet 16 g, 4 Tablet, Oral, PRN, Salina Han MD    glucagon (GLUCAGEN) injection 1 mg, 1 mg, IntraMUSCular, PRN, Salina Han MD    dextrose 10 % infusion 0-250 mL, 0-250 mL, IntraVENous, PRN, Salina Han MD    acetaminophen (TYLENOL) tablet 650 mg, 650 mg, Oral, Q6H PRN, Hernán Romeo MD, 650 mg at 03/03/23 0711    heparin (porcine) injection 5,000 Units, 5,000 Units, SubCUTAneous, Q8H, Salina Han MD, 5,000 Units at 03/03/23 0558    sodium chloride (NS) flush 5-40 mL, 5-40 mL, IntraVENous, Q8H, Emely, Juvencio BEDOLLA MD, 10 mL at 03/03/23 0558    sodium chloride (NS) flush 5-40 mL, 5-40 mL, IntraVENous, PRN, Salina Han MD    polyethylene glycol (MIRALAX) packet 17 g, 17 g, Oral, DAILY PRN, Salina Han MD    ondansetron (ZOFRAN ODT) tablet 4 mg, 4 mg, Oral, Q8H PRN **OR** ondansetron (ZOFRAN) injection 4 mg, 4 mg, IntraVENous, Q6H PRN, Salina Han MD    Current Outpatient Medications:     hydrALAZINE (APRESOLINE) 10 mg tablet, Take 10 mg by mouth two (2) times a day., Disp: , Rfl:     isosorbide dinitrate (ISORDIL) 10 mg tablet, Take 10 mg by mouth two (2) times a day., Disp: , Rfl:     insulin NPH/insulin regular (NovoLIN 70/30 U-100 Insulin) 100 unit/mL (70-30) injection, 26-27 Units by SubCUTAneous route Daily (before dinner).  (22 units in the morning; 26-27 units in the evening; evening dose is on a sliding scale), Disp: , Rfl:     ergocalciferol (ERGOCALCIFEROL) 1,250 mcg (50,000 unit) capsule, Take 1 Capsule by mouth every seven (7) days. , Disp: 12 Capsule, Rfl: 0    allopurinoL (ZYLOPRIM) 100 mg tablet, Take 0.5 Tablets by mouth daily. , Disp: 15 Tablet, Rfl: 1    amLODIPine (NORVASC) 10 mg tablet, TAKE 1 TABLET BY MOUTH ONCE DAILY . , Disp: 30 Tablet, Rfl: 5    bumetanide (BUMEX) 2 mg tablet, Take one tablet by mouth twice a day may take an additional tablet in the morning daily as needed for weight gain of 3 lbs or more overnight or 5 lbs or more in one week, Disp: 90 Tablet, Rfl: 0    carvediloL (COREG) 25 mg tablet, TAKE 2 TABLETS BY MOUTH TWICE DAILY . , Disp: 120 Tablet, Rfl: 11    insulin NPH/insulin regular (NOVOLIN 70/30, HUMULIN 70/30) 100 unit/mL (70-30) injection, 22 Units by SubCUTAneous route Daily (before breakfast). (22 units in the morning; 26 units in the evening), Disp: , Rfl:     simvastatin (ZOCOR) 40 mg tablet, Take 40 mg by mouth nightly., Disp: , Rfl:     multivitamin with iron tablet, Take 1 tablet by mouth daily. , Disp: , Rfl:     PATIENT CARE TEAM:  Patient Care Team:  Katy Galvan MD as PCP - General (Family Medicine)  Gala Florez MD (Cardiovascular Disease Physician)  Robinson Ralph MD (Nephrology)  Elizabeth Blanco MD (Internal Medicine Physician)     Thank you for allowing me to participate in this patient's care. Vicki Wall, JOCELINE  DNP, RN, AGACNP-[address], [address]  Phone: [phone]      On this date 3/3/2023, I have spent a total time of  60 minutes personally reviewing new vitals, test results, notes, telemetry/EKG, face to face encounter/physical exam of patient, writing orders, performing medical decision making, and documenting.

## 2023-03-03 NOTE — H&P
History and Physical    Date of Service:  3/2/2023  Primary Care Provider: Enoch De MD  Source of information: The patient and Chart review    Chief Complaint: Shortness of Breath      History of Presenting Illness:   Chad Vidal is a 56 y.o. male with past medical history of dilated cardiomyopathy, heart failure reduced ejection fraction, AICD 2015, chronic lower extremity edema, type 2 diabetes mellitus, hypertension, stage III CKD presented to the emergency department chief complaints of shortness of breath, leg and abdominal swelling, and unintentional weight gain. Patient has known history of CHF. He newly had recent increase in Bumex from 1 mg to 2 mg twice daily. Unfortunately, he still had worsening symptoms with peripheral edema and swelling of legs and abdomen, which is severe, without specific alleviating factors, with associated shortness of breath and dyspnea on exertion over the past 5 days. Also fully had 14 pound weight gain. His last 2 echocardiogram 10/21/2022 showed reduced left ventricular ejection fraction 20% to 25%. On arrival emergency department, initial ported vital signs temperature 97.3 °F, /81, heart rate 71, respiratory 18, saturation 90% room air. Abnormal labs included hemoglobin 10.2, blood glucose 120, BUN 43, creatinine 3.06, GFR 23, albumin 3.2, alk phos 6135, proBNP 9738. Chest x-ray portable showed enlarged cardiopulmonary silhouette. 12 EKG shows sinus rhythm, first-degree AV block, occasional PVCs, premature supraventricular complexes, ST changes anterior lateral leads at 72 bpm.  ED request admission to the hospital service. REVIEW OF SYSTEMS:  A comprehensive review of systems was negative except for that written in the History of Present Illness.      Past Medical History:   Diagnosis Date    Diabetes ([facility] [address]     Heart failure ([facility] [address]     CHF, cardiomyopathy    Hypertension     ICD (implantable cardioverter-defibrillator) in place left upper chest      Past Surgical History:   Procedure Laterality Date    HX HEART CATHETERIZATION  2/2015    x1    HX IMPLANTABLE CARDIOVERTER DEFIBRILLATOR  2/16/2015     MEDICATIONS:  Prior to Admission medications    Medication Sig Start Date End Date Taking? Authorizing Provider   hydrALAZINE (APRESOLINE) 10 mg tablet Take 10 mg by mouth two (2) times a day. Yes Provider, Historical   isosorbide dinitrate (ISORDIL) 10 mg tablet Take 10 mg by mouth two (2) times a day. Yes Provider, Historical   insulin NPH/insulin regular (NovoLIN 70/30 U-100 Insulin) 100 unit/mL (70-30) injection 26-27 Units by SubCUTAneous route Daily (before dinner). (22 units in the morning; 26-27 units in the evening; evening dose is on a sliding scale)   Yes Provider, Historical   ergocalciferol (ERGOCALCIFEROL) 1,250 mcg (50,000 unit) capsule Take 1 Capsule by mouth every seven (7) days. 2/22/23  Yes Nohemi Samuel NP   allopurinoL (ZYLOPRIM) 100 mg tablet Take 0.5 Tablets by mouth daily. 2/22/23  Yes Noris MORGAN NP   amLODIPine (NORVASC) 10 mg tablet TAKE 1 TABLET BY MOUTH ONCE DAILY . 1/25/23  Yes Cherri Gomez MD   [facility]) 2 mg tablet Take one tablet by mouth twice a day may take an additional tablet in the morning daily as needed for weight gain of 3 lbs or more overnight or 5 lbs or more in one week 1/13/23  Yes Nohemi Samuel NP   carvediloL (COREG) 25 mg tablet TAKE 2 TABLETS BY MOUTH TWICE DAILY . 12/29/22  Yes Cherri Gomez MD   insulin NPH/insulin regular (NOVOLIN 70/30, HUMULIN 70/30) 100 unit/mL (70-30) injection 22 Units by SubCUTAneous route Daily (before breakfast). (22 units in the morning; 26 units in the evening) 6/9/21  Yes Provider, Historical   simvastatin (ZOCOR) 40 mg tablet Take 40 mg by mouth nightly. 6/3/21  Yes Provider, Historical   multivitamin with iron tablet Take 1 tablet by mouth daily.    Yes Other, MD Nadege     No Known Allergies   Family History Problem Relation Age of Onset    Diabetes Mother     Hypertension Father     Diabetes Father     Diabetes Brother     Hypertension Brother       Social History:  reports that he has never smoked. He has never used smokeless tobacco. He reports current alcohol use. He reports that he does not use drugs. Social Determinants of Health     Tobacco Use: Low Risk     Smoking Tobacco Use: Never    Smokeless Tobacco Use: Never    Passive Exposure: Not on file   Alcohol Use: Not on file   Financial Resource Strain: Not on file   Food Insecurity: Not on file   Transportation Needs: Not on file   Physical Activity: Not on file   Stress: Not on file   Social Connections: Not on file   Intimate Partner Violence: Not on file   Depression: Not at risk    PHQ-2 Score: 0   Housing Stability: Not on file        Medications were reconciled to the best of my ability given all available resources at the time of admission. Route is PO if not otherwise noted. Family and social history were personally reviewed, all pertinent and relevant details are outlined as above.     Objective:   Visit Vitals  /81   Pulse 72   Temp 97.6 °F (36.4 °C)   Resp 18   Ht 5' 8\" (1.727 m)   Wt 107.5 kg (236 lb 15.9 oz)   SpO2 99%   BMI 36.03 kg/m²      O2 Device: None (Room air)    PHYSICAL EXAM:   General: Alert x oriented x 3, awake, no acute distress,   HEENT: PEERL, EOMI, moist mucus membranes  Neck: Supple, no JVD, no meningeal signs  Chest: Clear to auscultation bilaterally   CVS: RRR, S1 S2 heard, no murmurs/rubs/gallops  Abd: Soft, non-tender, non-distended, +bowel sounds   Ext: No clubbing, no cyanosis, no edema  Neuro/Psych: Pleasant mood and affect, CN 2-12 grossly intact, sensory grossly within normal limit, Strength 5/5 in all extremities, DTR 1+ x 4  Cap refill: Brisk, less than 3 seconds  Pulses: 2+, symmetric in all extremities  Skin: Warm, dry, without rashes or lesions    Data Review:   I have independently reviewed and interpreted patient's lab and all other diagnostic data    Abnormal Labs Reviewed   CBC WITH AUTOMATED DIFF - Abnormal; Notable for the following components:       Result Value    RBC 4.05 (*)     HGB 10.2 (*)     HCT 34.3 (*)     MCH 25.2 (*)     MCHC 29.7 (*)     RDW 17.4 (*)     EOSINOPHILS 8 (*)     ABS. EOSINOPHILS 0.5 (*)     All other components within normal limits   METABOLIC PANEL, COMPREHENSIVE - Abnormal; Notable for the following components:    Glucose 120 (*)     BUN 43 (*)     Creatinine 3.06 (*)     eGFR 23 (*)     Alk. phosphatase 135 (*)     Albumin 3.2 (*)     Globulin 4.5 (*)     A-G Ratio 0.7 (*)     All other components within normal limits   NT-PRO BNP - Abnormal; Notable for the following components:    NT pro-BNP 9,738 (*)     All other components within normal limits       All Micro Results       None            IMAGING:   XR CHEST PA LAT   Final Result   1. Enlarged cardiopericardial silhouette. ECG/ECHO:    Results for orders placed or performed during the hospital encounter of 03/02/23   EKG, 12 LEAD, INITIAL   Result Value Ref Range    Ventricular Rate 72 BPM    Atrial Rate 72 BPM    P-R Interval 232 ms    QRS Duration 104 ms    Q-T Interval 244 ms    QTC Calculation (Bezet) 267 ms    Calculated P Axis 56 degrees    Calculated R Axis -13 degrees    Calculated T Axis 47 degrees    Diagnosis       Sinus rhythm with 1st degree AV block with occasional ventricular-paced   complexes and premature supraventricular complexes  Right atrial enlargement  Low voltage QRS  Anterolateral infarct , age undetermined  Abnormal ECG  When compared with ECG of 05-AUG-2019 18:27,  Electronic ventricular pacemaker has replaced Sinus rhythm            Notes reviewed from all clinical/nonclinical/nursing services involved in patient's clinical care. Care coordination discussions were held with appropriate clinical/nonclinical/ nursing providers based on care coordination needs.      Assessment: Given the patient's current clinical presentation, there is a high level of concern for decompensation if discharged from the emergency department. Complex decision making was performed, which includes reviewing the patient's available past medical records, laboratory results, and imaging studies. Active Problems:    1. Acute on chronic HFrEF (heart failure with reduced ejection fraction)       Dilated Cardiomyopathy (9/26/2014)      Overview: 2002 (approx) diagnosed [place] by echo, neg stress test and started on       usual meds. 2007 (approx) fup echo lvef 25%      2/15 cardiac cath, no sig cad      2/15 single lead AICD implant      2/17 lvh, otherwise normal echo  -last 2d echocardiogram 10/21/2022:  EF 20%-25%  -admit to [facility]  -consult advanced heart failure team/ cardiologist  -consider for GDMTs  -no ACE/ARB ordered due to ANA  -cautious with diuretics tonight due to elevated creatinine    2. Acute kidney injury superimposed on chronic kidney disease  -BUN 43, creatinine 3.03, GFR 23  -repeat renal panel in a.m.  -consult nephrologist  -avoid nephrotoxic agents    3. Edema of both lower extremities  -keep BLE elevated at rest  -order venous duplex of BLE to rule out DVT    4. Shortness of breath with exertion  -Provide supplemental oxygen if needed  -Currently patient is oxygenating well on room air  -Continuous pulse oximetry monitoring    5. Hypoglycemia  -Point-of-care glucose 66 with repeat 101 mg/dL  -Placed hypoglycemia protocols    6. Type 2 diabetes mellitus  -plan as above  -once BG stabilized then Order Humalog insulin correctional coverage, scheduled blood glucose checks and check hemoglobin A1c level. 7.  Essential hypertension  -Resume home BP medications  -Monitor blood pressure closely    8.   Obesity  -BMI 36.03 K2/M2  -Would encourage weight loss, reduced calorie diet, lifestyle modifications    DIET: Diabetic/carb consistent  ISOLATION PRECAUTIONS: There are currently no Active Isolations  CODE STATUS: Full code  DVT PROPHYLAXIS: Heparin  FUNCTIONAL STATUS PRIOR TO HOSPITALIZATION: Fully active and ambulatory; able to carry on all self-care without restriction. Ambulatory status/function: By self   EARLY MOBILITY ASSESSMENT: Recommend routine ambulation while hospitalized with the assistance of nursing staff  ANTICIPATED DISCHARGE: Greater than 48 hours. ANTICIPATED DISPOSITION: Home with Home Healthcare  EMERGENCY CONTACT/SURROGATE DECISION MAKER:  evi Thompson partner [phone]. CRITICAL CARE WAS PERFORMED FOR THIS ENCOUNTER: NO.    ADVANCED DIRECTIVE/ CODE STATUS:  FULL CODE as per discussion with patient. Signed By: Mary Wu MD     March 2, 2023         Please note that this dictation may have been completed with Dragon, the computer voice recognition software. Quite often unanticipated grammatical, syntax, homophones, and other interpretive errors are inadvertently transcribed by the computer software. Please disregard these errors. Please excuse any errors that have escaped final proofreading.

## 2023-03-03 NOTE — NURSE NAVIGATOR
HEART FAILURE NURSE NAVIGATOR NOTE  [place] [address]    Patient chart was reviewed by Heart Failure (HF) Nurse Navigators for compliance of prescribed treatment with guidelines directed medical therapy (GDMT) and HF database completed. Please, review beneath recommendations for symptomatic patients with HF with Reduced Ejection Fraction ? 40% (HFrEF)* and patients whose LVEF improved > 40% (HFimpEF)* for your consideration when taking care of this patient. Current Medical Therapy:    Name Chase CARDOZA 1965   LVEF 20/25% (10/2022); repeat echo pending   NYHA Functional Class Documentation needed   ARNi/ACEi/ARB Contraindicated d/t ANA   Beta-blocker Coreg 25 mg twice daily   Aldosterone Antagonist Spironolactone 25 mg daily   SGLT2 inhibitor Contraindicated d/t GFR < 30   Hydralazine/Isosorbide Dinitrate Hydralazine 10 mg twice daily   Consulting Cardiologist: Dr. Farzad Calzada [facility]) has been consulted     Recommendations:    Please, add the following GDMT for HFrEF ? 40% [Class 1] or document in discharge summary/progress note why patient cannot take the medication:  ARNi/ACEi or ARB  Beta-blockers (carvedilol, sustained-release metoprolol succinate or bisoprolol)  Aldosterone antagonists GFR > 30 and K< 5  SGLT2 inhibitor  Hydralazine/Isosorbide dinitrate for  Americans with Class III/IV symptoms  Adjust diuretic dose at discharge if hospitalized for volume overload    Consider adding the following GDMT for HFrEF ? 40%, if appropriate [Class 2b]:   Ivabradine for patients on maximally tolerated beta-blocker dose in order to achieve HR 70-80bpm  Digoxin, goal level 0.5-0.9  Polyunsaturated fatty acids  Vericuguat  For patient with hyperkalemia while on RAASi > 5.5, consider adding potassium binders (patiromer, sodium zirconium cycosilicate)    Note: the following medications may be potentially harmful in heart failure [Class 3]:  Calcium channel blockers (doxazosin, diltiazem, verapamil, nifedipine)  Antiarrhythmics (flecanide, disopyrimide, sotalol, dronedarone)  Diabetes medications (thiasolidinediones, saxagliptin, alogliptin)  NSAIDs and BAJWA 2 inhibitors    Consider vaccinations for respiratory illnesses (flu, pneumonia, covid) [Class 2b]    For eligible patients with LVEF < 35% consider discharge with wearable defibrillation [Class 2b] and/or referral for ICD implantation [Class 1] for prevention of sudden cardiac death. Due to severely reduced LVEF < 25% and/or recurrent hospitalizations this patient may benefit from referral to Advanced Heart Failure Program to assess suitability for advanced therapies, such as left-ventricular assist device, heart transplantation, palliative inotropes or palliation [Class 1]. To obtain in-patient consultation or refer to [address] [place] [address], call [phone]    Patient Education:     Teach back in heart failure education provided, including information about medical therapy, lifestyle modifications, diet and fluid restrictions, physical activity. Educational resources provided: Living with Heart Failure booklet; Signs/Symptoms magnet; Weight Calendar; [facility] flyer; Preparation for Successful Discharge Checklist.  Information provided about HF support group. Heart failure avoiding triggers on discharge instructions. Plan for Transitional Care:    Post discharge follow up phone call to be made within 48-72 hours of discharge. Patient will follow-up with PCP. Patient will follow-up with Primary Cardiologist.  Obstructive sleep apnea screening done and patient was referred to Sleep Medicine. Referral/follow-up with Cardiac Rehabilitation.   Referral/follow-up with Advanced Heart Failure Specialist.  Referral/follow-up with Palliative Care Specialist.      Heart Failure Nurse Navigator  Phone: [phone]  /  [phone]    *Recommendations listed above are based on 2022 AHA/ACC/HFSA Guideline for the Management of Heart Failure: A Report of the [address] on Clinical Practice Guidelines. Circulation 2022; [id number]. and 2017 AHA/ACC/HRS guideline for management of patients with ventricular arrhythmias and the prevention of sudden cardiac death: A Report of the [employer] of Cardiology/American Heart Association Task Force on Clinical Practice Guidelines and the Heart Rhythm Society.  Heart Rhythm, Vol 15, No 10, October 2018 *Class of Recommendation: Class 1 (strong), Class 2a (moderate), Class 2b (weak), Class 3 (not recommended, potentially harmful)

## 2023-03-03 NOTE — CONSULTS
[facility]   [address] [facility], [id number] [place] [address]  Phone: [phone]   [phone]     [email or web address]    NEPHROLOGY CONSULT NOTE     Patient: Mariaelena Flores MRN: [id number]  PCP: Katy Galvan MD   :     1965  Age:   56 y.o. Sex:  male      Referring physician: Citlalli Guerrero MD  Reason for consultation: 56 y.o. male with Acute on chronic HFrEF (heart failure with reduced ejection fraction) ([facility] [I50.23]  Cardiomyopathy ([facility] [I42.9]  Acute kidney injury superimposed on chronic kidney disease ([facility] [N17.9, N18.9]   Admission Date: 3/2/2023  5:49 PM  LOS: 1 day      ASSESSMENT and PLAN :   ANA on CKD  -Suspect progressive CKD from poorly controlled DM, HTN  -Volume overloaded on exam  -Start IV Bumex 4 mg twice daily  -Strict I's and O's and Daily labs    CKD 4  -2/2 DM, HTN  -Previously nonnephrotic proteinuria  -Check UPCR, renal US, paraproteinemia screen and UDS  -Baseline creatinine: To be determined  -He will follow-up with me on discharge    Acute on chronic HFrEF  Nonischemic CMP, s/p AICD  -Last ECHO EF 20 to 25%  -MX per AHF team  -Added Aldactone  - start Entresto once GFR stable    DM 2  -Poorly controlled, A1c 8.4  -Establish care with endocrinology    HTN  -No changes to BP meds until volume status optimized      Care Plan discussed with: Patient        Thank you for consulting [facility] in the care of your patient. Subjective:   HPI: Mariaelena Flores is a 56 y.o.  male who has been admitted to the hospital for management of volume overload and associated shortness of breath. Nephrology has been asked to see him for ANA on CKD    In brief, Mariaelena Flores has CKD 2/2 DM, HTN and has seen Dr. Jannine Denver very intermittently in the last several years. His mother had CKD and was on dialysis before she passed away during  with sepsis with shock.   In the last 4 years, patient was seen in our office only twice last time being in 2020. His baseline creatinine at the time was 2.2 mg/dL with a EGFR of 30 mill per minute and a nonnephrotic range proteinuria of 500 mg/g. He had poorly controlled diabetes with A1c greater than 8 for many years. He has been poorly compliant with his follow-ups with endocrinology but has been regular with his follow-up with the advanced heart failure team.    He currently works for [employer] and is requesting transition of his care to the doctors on [facility] closer to his office. He was sent to ER by advanced heart failure clinic for 14 pound weight gain and worsening shortness of breath. He has nonischemic cardiomyopathy and has AICD the etiology of NICM PE is unknown. He has a family history of and ICMP affecting his father. Past Medical Hx:   Past Medical History:   Diagnosis Date    Diabetes ([facility]     Heart failure ([facility]     CHF, cardiomyopathy    Hypertension     ICD (implantable cardioverter-defibrillator) in place     left upper chest        Past Surgical Hx:     Past Surgical History:   Procedure Laterality Date    HX HEART CATHETERIZATION  2/2015    x1    HX IMPLANTABLE CARDIOVERTER DEFIBRILLATOR  2/16/2015         No Known Allergies    Social Hx:  reports that he has never smoked. He has never used smokeless tobacco. He reports current alcohol use. He reports that he does not use drugs. Family History   Problem Relation Age of Onset    Diabetes Mother     Hypertension Father     Diabetes Father     Diabetes Brother     Hypertension Brother        Review of Systems:  A thorough twelve point review of system was performed today. Pertinent positives and negatives are mentioned in the HPI. The reminder of the ROS is negative and noncontributory.      Objective:    Vitals:    Vitals:    03/03/23 0406 03/03/23 0506 03/03/23 0606 03/03/23 0656   BP: 138/80 129/82 137/78 (!) 158/95   Pulse: 82 74 78 77   Resp: 20 22 26 19   Temp:       SpO2: 96% 95% 97% 93% Weight:       Height:         I&O's:  03/02 0701 - 03/03 0700  In: 713 [P.O.:713]  Out: 1675 [Urine:1675]  Visit Vitals  BP (!) 158/95   Pulse 77   Temp 97.6 °F (36.4 °C)   Resp 19   Ht 5' 8\" (1.727 m)   Wt 107.5 kg (236 lb 15.9 oz)   SpO2 93%   BMI 36.03 kg/m²       Physical Exam:  General: Sob at rest  HEENT: PERRL,  No Pallor , No Icterus  Neck: Supple,no mass palpable  Lungs : Diminished bilaterally  CVS: RRR, S1 S2 normal, No murmur   Abdomen: Soft, NT, BS +  Extremities: 2+Edema  Skin: No rash or lesions.   MS: No joint swelling, erythema, warmth  Neurologic: non focal, AAO x 3  Psych: normal affect/    Laboratory Results:    Recent Labs     03/03/23  0433 03/02/23  1558 02/28/23  1507    140 145*   K 3.3* 3.7 4.0   * 106 104   CO2 24 28 23   * 120* 160*   BUN 36* 43* 38*   CREA 2.46* 3.06* 2.83*   CA 8.2* 9.0 8.9   MG 1.9  --  1.8   ALB 2.6* 3.2*  --    ALT 23 28  --    INR 1.1  --   --      Recent Labs     03/03/23  0433 03/02/23  1558   WBC 8.1 6.8   HGB 9.2* 10.2*   HCT 31.2* 34.3*    211     No results found for: SDES  No results found for: CULT  Recent Results (from the past 24 hour(s))   EKG, 12 LEAD, INITIAL    Collection Time: 03/02/23  3:35 PM   Result Value Ref Range    Ventricular Rate 72 BPM    Atrial Rate 72 BPM    P-R Interval 232 ms    QRS Duration 104 ms    Q-T Interval 244 ms    QTC Calculation (Bezet) 267 ms    Calculated P Axis 56 degrees    Calculated R Axis -13 degrees    Calculated T Axis 47 degrees    Diagnosis       Sinus rhythm with 1st degree AV block with occasional ventricular-paced   complexes and premature supraventricular complexes  Right atrial enlargement  Low voltage QRS  Anterolateral infarct , age undetermined  Abnormal ECG  When compared with ECG of 05-AUG-2019 18:27,  Electronic ventricular pacemaker has replaced Sinus rhythm     CBC WITH AUTOMATED DIFF    Collection Time: 03/02/23  3:58 PM   Result Value Ref Range    WBC 6.8 4.1 - 11.1 K/uL RBC 4.05 (L) 4.10 - 5.70 M/uL    HGB 10.2 (L) 12.1 - 17.0 g/dL    HCT 34.3 (L) 36.6 - 50.3 %    MCV 84.7 80.0 - 99.0 FL    MCH 25.2 (L) 26.0 - 34.0 PG    MCHC 29.7 (L) 30.0 - 36.5 g/dL    RDW 17.4 (H) 11.5 - 14.5 %    PLATELET 315 316 - 971 K/uL    MPV 12.8 8.9 - 12.9 FL    NRBC 0.0 0  WBC    ABSOLUTE NRBC 0.00 0.00 - 0.01 K/uL    NEUTROPHILS 71 32 - 75 %    LYMPHOCYTES 12 12 - 49 %    MONOCYTES 8 5 - 13 %    EOSINOPHILS 8 (H) 0 - 7 %    BASOPHILS 1 0 - 1 %    IMMATURE GRANULOCYTES 0 0.0 - 0.5 %    ABS. NEUTROPHILS 4.8 1.8 - 8.0 K/UL    ABS. LYMPHOCYTES 0.8 0.8 - 3.5 K/UL    ABS. MONOCYTES 0.5 0.0 - 1.0 K/UL    ABS. EOSINOPHILS 0.5 (H) 0.0 - 0.4 K/UL    ABS. BASOPHILS 0.1 0.0 - 0.1 K/UL    ABS. IMM. GRANS. 0.0 0.00 - 0.04 K/UL    DF AUTOMATED     METABOLIC PANEL, COMPREHENSIVE    Collection Time: 03/02/23  3:58 PM   Result Value Ref Range    Sodium 140 136 - 145 mmol/L    Potassium 3.7 3.5 - 5.1 mmol/L    Chloride 106 97 - 108 mmol/L    CO2 28 21 - 32 mmol/L    Anion gap 6 5 - 15 mmol/L    Glucose 120 (H) 65 - 100 mg/dL    BUN 43 (H) 6 - 20 MG/DL    Creatinine 3.06 (H) 0.70 - 1.30 MG/DL    BUN/Creatinine ratio 14 12 - 20      eGFR 23 (L) >60 ml/min/1.73m2    Calcium 9.0 8.5 - 10.1 MG/DL    Bilirubin, total 0.4 0.2 - 1.0 MG/DL    ALT (SGPT) 28 12 - 78 U/L    AST (SGOT) 33 15 - 37 U/L    Alk. phosphatase 135 (H) 45 - 117 U/L    Protein, total 7.7 6.4 - 8.2 g/dL    Albumin 3.2 (L) 3.5 - 5.0 g/dL    Globulin 4.5 (H) 2.0 - 4.0 g/dL    A-G Ratio 0.7 (L) 1.1 - 2.2     NT-PRO BNP    Collection Time: 03/02/23  3:58 PM   Result Value Ref Range    NT pro-BNP 9,738 (H) <125 PG/ML   SAMPLES BEING HELD    Collection Time: 03/02/23  3:58 PM   Result Value Ref Range    SAMPLES BEING HELD 1RED,1BLU     COMMENT        Add-on orders for these samples will be processed based on acceptable specimen integrity and analyte stability, which may vary by analyte.    [id number] SENSITIVITY    Collection Time: 03/02/23  3:58 PM Result Value Ref Range    Troponin-High Sensitivity 40 0 - 57 ng/L   GLUCOSE, POC    Collection Time: 03/02/23  8:12 PM   Result Value Ref Range    Glucose (POC) 66 65 - 117 mg/dL    Performed by Maximiliano Disla ([facility])    GLUCOSE, POC    Collection Time: 03/02/23  8:46 PM   Result Value Ref Range    Glucose (POC) 101 65 - 117 mg/dL    Performed by Maximiliano Disla ([facility])    HEMOGLOBIN A1C WITH EAG    Collection Time: 03/02/23  9:16 PM   Result Value Ref Range    Hemoglobin A1c 8.4 (H) 4.0 - 5.6 %    Est. average glucose 194 mg/dL   GLUCOSE, POC    Collection Time: 03/02/23  9:50 PM   Result Value Ref Range    Glucose (POC) 108 65 - 117 mg/dL    Performed by Maximiliano Disla ([facility])    IRON PROFILE    Collection Time: 03/03/23  4:33 AM   Result Value Ref Range    Iron 32 (L) 35 - 150 ug/dL    TIBC 311 250 - 450 ug/dL    Iron % saturation 10 (L) 20 - 50 %   FERRITIN    Collection Time: 03/03/23  4:33 AM   Result Value Ref Range    Ferritin 41 26 - 382 NG/ML   METABOLIC PANEL, COMPREHENSIVE    Collection Time: 03/03/23  4:33 AM   Result Value Ref Range    Sodium 142 136 - 145 mmol/L    Potassium 3.3 (L) 3.5 - 5.1 mmol/L    Chloride 111 (H) 97 - 108 mmol/L    CO2 24 21 - 32 mmol/L    Anion gap 7 5 - 15 mmol/L    Glucose 137 (H) 65 - 100 mg/dL    BUN 36 (H) 6 - 20 MG/DL    Creatinine 2.46 (H) 0.70 - 1.30 MG/DL    BUN/Creatinine ratio 15 12 - 20      eGFR 30 (L) >60 ml/min/1.73m2    Calcium 8.2 (L) 8.5 - 10.1 MG/DL    Bilirubin, total 0.5 0.2 - 1.0 MG/DL    ALT (SGPT) 23 12 - 78 U/L    AST (SGOT) 28 15 - 37 U/L    Alk.  phosphatase 110 45 - 117 U/L    Protein, total 6.3 (L) 6.4 - 8.2 g/dL    Albumin 2.6 (L) 3.5 - 5.0 g/dL    Globulin 3.7 2.0 - 4.0 g/dL    A-G Ratio 0.7 (L) 1.1 - 2.2     MAGNESIUM    Collection Time: 03/03/23  4:33 AM   Result Value Ref Range    Magnesium 1.9 1.6 - 2.4 mg/dL   CBC WITH AUTOMATED DIFF    Collection Time: 03/03/23  4:33 AM   Result Value Ref Range    WBC 8.1 4.1 - 11.1 K/uL    RBC 3.61 (L) 4.10 - 5.70 M/uL    HGB 9.2 (L) 12.1 - 17.0 g/dL    HCT 31.2 (L) 36.6 - 50.3 %    MCV 86.4 80.0 - 99.0 FL    MCH 25.5 (L) 26.0 - 34.0 PG    MCHC 29.5 (L) 30.0 - 36.5 g/dL    RDW 17.2 (H) 11.5 - 14.5 %    PLATELET 216 927 - 504 K/uL    MPV 11.8 8.9 - 12.9 FL    NRBC 0.0 0  WBC    ABSOLUTE NRBC 0.00 0.00 - 0.01 K/uL    NEUTROPHILS 75 32 - 75 %    LYMPHOCYTES 11 (L) 12 - 49 %    MONOCYTES 8 5 - 13 %    EOSINOPHILS 5 0 - 7 %    BASOPHILS 1 0 - 1 %    IMMATURE GRANULOCYTES 0 0.0 - 0.5 %    ABS. NEUTROPHILS 6.0 1.8 - 8.0 K/UL    ABS. LYMPHOCYTES 0.9 0.8 - 3.5 K/UL    ABS. MONOCYTES 0.6 0.0 - 1.0 K/UL    ABS. EOSINOPHILS 0.4 0.0 - 0.4 K/UL    ABS. BASOPHILS 0.1 0.0 - 0.1 K/UL    ABS. IMM. GRANS. 0.0 0.00 - 0.04 K/UL    DF AUTOMATED     PROTHROMBIN TIME + INR    Collection Time: 03/03/23  4:33 AM   Result Value Ref Range    INR 1.1 0.9 - 1.1      Prothrombin time 11.8 (H) 9.0 - 11.1 sec   PTT    Collection Time: 03/03/23  4:33 AM   Result Value Ref Range    aPTT 24.4 22.1 - 31.0 sec    aPTT, therapeutic range     58.0 - 77.0 SECS         Urine dipstick:   Lab Results   Component Value Date/Time    Color YELLOW/STRAW 02/11/2015 12:10 PM    Appearance CLEAR 02/11/2015 12:10 PM    Specific gravity 1.008 02/11/2015 12:10 PM    pH (UA) 6.5 02/11/2015 12:10 PM    Protein TRACE (A) 02/11/2015 12:10 PM    Glucose NEGATIVE 02/11/2015 12:10 PM    Ketone NEGATIVE 02/11/2015 12:10 PM    Bilirubin NEGATIVE 02/11/2015 12:10 PM    Urobilinogen 0.2 02/11/2015 12:10 PM    Nitrites NEGATIVE 02/11/2015 12:10 PM    Leukocyte Esterase NEGATIVE 02/11/2015 12:10 PM    Epithelial cells FEW 02/11/2015 12:10 PM    Bacteria NEGATIVE 02/11/2015 12:10 PM    WBC 0-4 02/11/2015 12:10 PM    RBC 0-5 02/11/2015 12:10 PM       I have reviewed the following:    All pertinent labs, microbiology data, radiology imaging for my assessment     Medications list Personally Reviewed   [x]      Yes     []               No       Medications:  Prior to Admission medications    Medication Sig Start Date End Date Taking? Authorizing Provider   hydrALAZINE (APRESOLINE) 10 mg tablet Take 10 mg by mouth two (2) times a day. Yes Provider, Historical   isosorbide dinitrate (ISORDIL) 10 mg tablet Take 10 mg by mouth two (2) times a day. Yes Provider, Historical   insulin NPH/insulin regular (NovoLIN 70/30 U-100 Insulin) 100 unit/mL (70-30) injection 26-27 Units by SubCUTAneous route Daily (before dinner). (22 units in the morning; 26-27 units in the evening; evening dose is on a sliding scale)   Yes Provider, Historical   ergocalciferol (ERGOCALCIFEROL) 1,250 mcg (50,000 unit) capsule Take 1 Capsule by mouth every seven (7) days. 2/22/23  Yes Kumar Landers NP   allopurinoL (ZYLOPRIM) 100 mg tablet Take 0.5 Tablets by mouth daily. 2/22/23  Yes Lico MORGAN NP   amLODIPine (NORVASC) 10 mg tablet TAKE 1 TABLET BY MOUTH ONCE DAILY . 1/25/23  Yes Ruddy Almanza MD   [facility]) 2 mg tablet Take one tablet by mouth twice a day may take an additional tablet in the morning daily as needed for weight gain of 3 lbs or more overnight or 5 lbs or more in one week 1/13/23  Yes Kumar Landers NP   carvediloL (COREG) 25 mg tablet TAKE 2 TABLETS BY MOUTH TWICE DAILY . 12/29/22  Yes Ruddy Almanza MD   insulin NPH/insulin regular (NOVOLIN 70/30, HUMULIN 70/30) 100 unit/mL (70-30) injection 22 Units by SubCUTAneous route Daily (before breakfast). (22 units in the morning; 26 units in the evening) 6/9/21  Yes Provider, Historical   simvastatin (ZOCOR) 40 mg tablet Take 40 mg by mouth nightly. 6/3/21  Yes Provider, Historical   multivitamin with iron tablet Take 1 tablet by mouth daily. Yes Other, MD Nadege        Thank you for allowing us to participate in the care of this patient. We will follow patient.  Please dont hesitate to call with any questions    Kasandra Faith [facility] Nephrology [facility] [address] [facility] [address] [place] [address]  Phone - [phone]   Fax - [phone]  [email or web address]

## 2023-03-03 NOTE — ED NOTES
Patient given TV dinner and water. Dr Ricky Mann aware of patient's blood sugar 66 treated with orange juice and pablo crackers. Repeat blood sugar 101.  Giving dinner to patient

## 2023-03-03 NOTE — ED NOTES
Report given to [facility], RN, verbalized understanding. No questions at this time.

## 2023-03-03 NOTE — PROGRESS NOTES
TRANSFER - OUT REPORT:    Verbal report given to TAHMINA Valiente(name) on Farnaz Tobar  being transferred to CVSU(unit) for routine progression of care       Report consisted of patients Situation, Background, Assessment and   Recommendations(SBAR). Information from the following report(s) SBAR, Kardex, and MAR was reviewed with the receiving nurse. Lines:   Peripheral IV 03/02/23 Left Hand (Active)   Site Assessment Clean, dry, & intact 03/02/23 1601   Phlebitis Assessment 0 03/02/23 1601   Infiltration Assessment 0 03/02/23 1601   Dressing Status Clean, dry, & intact 03/02/23 1601   Dressing Type Transparent 03/02/23 1601   Hub Color/Line Status Pink;Flushed;Patent 03/02/23 1601   Action Taken Blood drawn 03/02/23 1601        Opportunity for questions and clarification was provided.       Patient transported with:   Monitor  Registered Nurse

## 2023-03-04 LAB
ANION GAP SERPL CALC-SCNC: 6 MMOL/L (ref 5–15)
BUN SERPL-MCNC: 33 MG/DL (ref 6–20)
BUN/CREAT SERPL: 13 (ref 12–20)
CALCIUM SERPL-MCNC: 9.2 MG/DL (ref 8.5–10.1)
CHLORIDE SERPL-SCNC: 106 MMOL/L (ref 97–108)
CO2 SERPL-SCNC: 30 MMOL/L (ref 21–32)
CREAT SERPL-MCNC: 2.52 MG/DL (ref 0.7–1.3)
ERYTHROCYTE [DISTWIDTH] IN BLOOD BY AUTOMATED COUNT: 17.3 % (ref 11.5–14.5)
GLUCOSE BLD STRIP.AUTO-MCNC: 105 MG/DL (ref 65–117)
GLUCOSE BLD STRIP.AUTO-MCNC: 106 MG/DL (ref 65–117)
GLUCOSE BLD STRIP.AUTO-MCNC: 224 MG/DL (ref 65–117)
GLUCOSE BLD STRIP.AUTO-MCNC: 51 MG/DL (ref 65–117)
GLUCOSE BLD STRIP.AUTO-MCNC: 80 MG/DL (ref 65–117)
GLUCOSE BLD STRIP.AUTO-MCNC: 97 MG/DL (ref 65–117)
GLUCOSE SERPL-MCNC: 78 MG/DL (ref 65–100)
HCT VFR BLD AUTO: 28.9 % (ref 36.6–50.3)
HGB BLD-MCNC: 8.8 G/DL (ref 12.1–17)
MCH RBC QN AUTO: 25.2 PG (ref 26–34)
MCHC RBC AUTO-ENTMCNC: 30.4 G/DL (ref 30–36.5)
MCV RBC AUTO: 82.8 FL (ref 80–99)
NRBC # BLD: 0 K/UL (ref 0–0.01)
NRBC BLD-RTO: 0 PER 100 WBC
PLATELET # BLD AUTO: 196 K/UL (ref 150–400)
PMV BLD AUTO: 11.8 FL (ref 8.9–12.9)
POTASSIUM SERPL-SCNC: 3.2 MMOL/L (ref 3.5–5.1)
RBC # BLD AUTO: 3.49 M/UL (ref 4.1–5.7)
SERVICE CMNT-IMP: ABNORMAL
SERVICE CMNT-IMP: ABNORMAL
SERVICE CMNT-IMP: NORMAL
SODIUM SERPL-SCNC: 142 MMOL/L (ref 136–145)
WBC # BLD AUTO: 8 K/UL (ref 4.1–11.1)

## 2023-03-04 PROCEDURE — 74011000250 HC RX REV CODE- 250: Performed by: FAMILY MEDICINE

## 2023-03-04 PROCEDURE — 36415 COLL VENOUS BLD VENIPUNCTURE: CPT

## 2023-03-04 PROCEDURE — 74011250636 HC RX REV CODE- 250/636: Performed by: NURSE PRACTITIONER

## 2023-03-04 PROCEDURE — 74011000258 HC RX REV CODE- 258: Performed by: NURSE PRACTITIONER

## 2023-03-04 PROCEDURE — 85027 COMPLETE CBC AUTOMATED: CPT

## 2023-03-04 PROCEDURE — 74011250636 HC RX REV CODE- 250/636: Performed by: FAMILY MEDICINE

## 2023-03-04 PROCEDURE — 99233 SBSQ HOSP IP/OBS HIGH 50: CPT | Performed by: INTERNAL MEDICINE

## 2023-03-04 PROCEDURE — 97116 GAIT TRAINING THERAPY: CPT

## 2023-03-04 PROCEDURE — 74011250637 HC RX REV CODE- 250/637: Performed by: INTERNAL MEDICINE

## 2023-03-04 PROCEDURE — 65660000001 HC RM ICU INTERMED STEPDOWN

## 2023-03-04 PROCEDURE — 82962 GLUCOSE BLOOD TEST: CPT

## 2023-03-04 PROCEDURE — 74011636637 HC RX REV CODE- 636/637: Performed by: INTERNAL MEDICINE

## 2023-03-04 PROCEDURE — 74011250637 HC RX REV CODE- 250/637: Performed by: HOSPITALIST

## 2023-03-04 PROCEDURE — 74011000250 HC RX REV CODE- 250: Performed by: INTERNAL MEDICINE

## 2023-03-04 PROCEDURE — 74011250637 HC RX REV CODE- 250/637: Performed by: FAMILY MEDICINE

## 2023-03-04 PROCEDURE — 74011636637 HC RX REV CODE- 636/637: Performed by: CLINICAL NURSE SPECIALIST

## 2023-03-04 PROCEDURE — 80048 BASIC METABOLIC PNL TOTAL CA: CPT

## 2023-03-04 PROCEDURE — 97161 PT EVAL LOW COMPLEX 20 MIN: CPT

## 2023-03-04 RX ORDER — ISOSORBIDE DINITRATE 20 MG/1
20 TABLET ORAL 2 TIMES DAILY
Status: DISCONTINUED | OUTPATIENT
Start: 2023-03-04 | End: 2023-03-05

## 2023-03-04 RX ORDER — AMLODIPINE BESYLATE 5 MG/1
5 TABLET ORAL EVERY 12 HOURS
Status: DISCONTINUED | OUTPATIENT
Start: 2023-03-05 | End: 2023-03-05

## 2023-03-04 RX ORDER — HYDRALAZINE HYDROCHLORIDE 25 MG/1
25 TABLET, FILM COATED ORAL 2 TIMES DAILY
Status: DISCONTINUED | OUTPATIENT
Start: 2023-03-04 | End: 2023-03-05

## 2023-03-04 RX ADMIN — HYDRALAZINE HYDROCHLORIDE 10 MG: 10 TABLET, FILM COATED ORAL at 09:25

## 2023-03-04 RX ADMIN — HEPARIN SODIUM 5000 UNITS: 5000 INJECTION INTRAVENOUS; SUBCUTANEOUS at 06:51

## 2023-03-04 RX ADMIN — CARVEDILOL 25 MG: 12.5 TABLET, FILM COATED ORAL at 18:07

## 2023-03-04 RX ADMIN — MULTIPLE VITAMINS W/ MINERALS TAB 1 TABLET: TAB at 06:51

## 2023-03-04 RX ADMIN — HEPARIN SODIUM 5000 UNITS: 5000 INJECTION INTRAVENOUS; SUBCUTANEOUS at 13:59

## 2023-03-04 RX ADMIN — Medication 5 UNITS: at 09:26

## 2023-03-04 RX ADMIN — BUMETANIDE 4 MG: 0.25 INJECTION, SOLUTION INTRAMUSCULAR; INTRAVENOUS at 21:48

## 2023-03-04 RX ADMIN — Medication 10 ML: at 21:49

## 2023-03-04 RX ADMIN — Medication 10 ML: at 06:51

## 2023-03-04 RX ADMIN — POTASSIUM BICARBONATE 40 MEQ: 782 TABLET, EFFERVESCENT ORAL at 18:07

## 2023-03-04 RX ADMIN — CARVEDILOL 25 MG: 12.5 TABLET, FILM COATED ORAL at 09:25

## 2023-03-04 RX ADMIN — AMLODIPINE BESYLATE 10 MG: 5 TABLET ORAL at 09:25

## 2023-03-04 RX ADMIN — Medication 5 UNITS: at 12:46

## 2023-03-04 RX ADMIN — BUMETANIDE 4 MG: 0.25 INJECTION, SOLUTION INTRAMUSCULAR; INTRAVENOUS at 09:25

## 2023-03-04 RX ADMIN — Medication 10 ML: at 13:59

## 2023-03-04 RX ADMIN — HYDRALAZINE HYDROCHLORIDE 25 MG: 25 TABLET, FILM COATED ORAL at 18:07

## 2023-03-04 RX ADMIN — TRAMADOL HYDROCHLORIDE 50 MG: 50 TABLET ORAL at 02:40

## 2023-03-04 RX ADMIN — SPIRONOLACTONE 25 MG: 25 TABLET ORAL at 09:25

## 2023-03-04 RX ADMIN — Medication 1 UNITS: at 22:02

## 2023-03-04 RX ADMIN — ISOSORBIDE DINITRATE 20 MG: 20 TABLET ORAL at 18:07

## 2023-03-04 RX ADMIN — IRON SUCROSE 200 MG: 20 INJECTION, SOLUTION INTRAVENOUS at 11:23

## 2023-03-04 RX ADMIN — ISOSORBIDE DINITRATE 10 MG: 20 TABLET ORAL at 09:25

## 2023-03-04 RX ADMIN — Medication 12 UNITS: at 09:26

## 2023-03-04 RX ADMIN — HEPARIN SODIUM 5000 UNITS: 5000 INJECTION INTRAVENOUS; SUBCUTANEOUS at 21:48

## 2023-03-04 RX ADMIN — POTASSIUM BICARBONATE 40 MEQ: 782 TABLET, EFFERVESCENT ORAL at 09:26

## 2023-03-04 RX ADMIN — Medication 5 UNITS: at 22:02

## 2023-03-04 RX ADMIN — ALLOPURINOL 50 MG: 100 TABLET ORAL at 09:25

## 2023-03-04 NOTE — PROGRESS NOTES
0800: Bedside and Verbal shift change report given to [address] (oncoming nurse) by Surekha Steiner (offgoing nurse). Report included the following information SBAR, Kardex, Intake/Output, MAR, Recent Results, Med Rec Status, and Cardiac Rhythm NSR w/ PVCs .

## 2023-03-04 NOTE — PROGRESS NOTES
[facility] in [place], [place]  Inpatient Progress Note      Patient name: Chad Vidal  Patient : 1965  Patient MRN: [id number]  Consulting MD: Gabriel Ceron MD  Date of service: 23    REASON FOR REFERRAL:  Management of acute on chronic systolic heart failure     PLAN OF CARE:  55 y/o male with h/o non-ischemic cardiomyopathy, LVEF 10-15% from 28%, stage C, NYHA class IIIA symptoms and CKD, stage 3 (Cr 2.1-2.47) undergoing optimization of GDMT (taken off nephrotoxic meds through diuresis and to allow renal recovery and up-titrating alternative HF meds)  Once patient is euvolemic he should have [address] +/- ischemic evaluation (possibly LHC depending on renal function, last [facility] )  Most likely etiology of worsening HF is chronic volume overload due to underestimated severity of renal failure +/- possibly inadequately/untreated MINA; evaluation ongoing   Consult to cardiology re: LHC/[facility] early next week  Patient will likely need to stay for initiation of chronic inotropes as bridge to renal recovery and inpatient workup for combined heart-kidney transplantation; of note, patient is currently not a candidate for LVAD due to Cr > 1.8      RECOMMENDATIONS:  Continue current medical therapy for heart failure  Patient may need inotropic support long-term, check NICOM today  Continue coreg 25mg twice daily  Change norvasc to 5mg twice daily; wean as up-titrating GDMT  Cannot tolerate ACEi/ARB/ARNi in anticipation of cardiac surgery and in hope for renal recovery  Increase hydralazine to 25 mg BID and isordil 20mg BID   Cannot tolerate SGLT2i due to eGFR < 30  Continue IV bumex 4mg BID; dosed by nephrology; goal 1-2 liters per day  Does not take aspirin   ICD interrogation every 3 months per routine  Check hemoccult; if positive, may need scope prior to discharge  Reinforced low salt diet  Reinforced fluid restriction to 6 x 8oz glasses per day    At time of discharge plan on the following:  Schedule sleep study     IMPRESSION:  Fatigue  Shortness of breath/VILLARREAL/orthopnea  Volume overload  Acute on chronic systolic heart failure  Stage C, NYHA class IIIA symptoms  Non-ischemic cardiomyopathy, LVEF 28%  HTN  DM  CKD 4     INTERVAL EVENTS:  No issues overnight  -140s/70s, HR 70-80s  I/O net negative 780cc, urine 1.5 liters  Hgb 8.8  Cr 2.5  TB 0.5  Albumin 2.6  Iron sat 10% and ferritin 41  CXR (3/3/23) clear    LIFE GOALS:  Lifestyle goals reviewed with the patient. Patient's personal goals include: getting back home  Important upcoming milestones or family events: TBD  The patient identifies the following as important for living well: TBD     HPI:   From prior notes,  \"Briefly, Roxane Eason is a 56 y.o. male with h/o morbid obesity, BMI 35, HTN, HL, DM2, chronic systolic heart failure, stage C, NYHA class IIIA symptoms, non-ischemic cardiomyopathy, LVEF 28% (HF diagnosed around 2002), normal coronaries by [employer] s/p single lead ICD (2/2015) and worsening renal function, CKD stage 2 and anemia. Patient was [facility] to [facility] to establish long-term care. \"        CARDIAC IMAGING:  Echo (3/3/23)    Left Ventricle: The EF by visual approximation is 15 - 20%. Left ventricle is moderately dilated. Severely increased wall thickness. Severe global hypokinesis present. Right Ventricle: Not well visualized. Mitral Valve: Thickened leaflets. Moderate annular calcification of the mitral valve. Left Atrium: Left atrium is dilated. LVEDD 6.1cm     Echo 10/21/22    Left Ventricle: Severely reduced left ventricular systolic function with a visually estimated EF of 20 - 25%. Left ventricle is moderately dilated. Mild posterior thickening. Normal wall motion. Normal diastolic function. Left Atrium: Left atrium is moderately dilated. Mitral Valve: Mild regurgitation with a centrally directed jet.     Tricuspid Valve: Mild regurgitation with a centrally directed jet. Echo (3/9/22)    Study limited to the assessment of ejection fraction. Left Ventricle: Left ventricle is moderately dilated. Mildly increased wall thickness. Severe global hypokinesis present. Calculared ejection fraction is 28% by 3D volume and 31% by 2D Fernandez's biplane. Global longitudinal strain is reduced with a value of -5.8%. Echo (12/6/21)  LV: Calculated LVEF is 28%. Biplane method used to measure ejection fraction. Mildly dilated left ventricle. Mildly to moderately increased wall thickness. Moderate-to-severely and globally reduced systolic function. Moderate (grade 2) left ventricular diastolic dysfunction. LA: Mildly dilated left atrium. Left Atrium volume index is 40 mL/m2. MV: Moderate mitral annular calcification. Very mild mitral valve regurgitation is present. RV: Not well visualized. Pacer/ICD present. Echo (2/17/21)  LV: Calculated LVEF is 29%. Biplane method used to measure ejection fraction. Mildly dilated left ventricle. Mild to moderate hypertrophy. Severely and globally reduced systolic function. Wall motion: normal. Abnormal left ventricular strain. Global longitudinal strain is -8%. Moderate (grade 2) left ventricular diastolic dysfunction. LA: Mildly dilated left atrium. Left Atrium volume index is 40 mL/m2. MV: Moderate mitral annular calcification. Very mild mitral valve regurgitation is present. Echo (8/16/19)  Left Ventricle: Mildly dilated left ventricle. Moderate concentric hypertrophy. Severe global systolic dysfunction. Estimated left ventricular ejection fraction is 21 - 25%. Biplane method used to measure ejection fraction. Left ventricular global hypokinesis. Abnormal left ventricular strain. Inconclusive left ventricular diastolic function. Left Atrium: Mildly dilated left atrium. Mitral Valve: Moderate mitral annular calcification. Mild mitral valve regurgitation.      EKG (3/2/23): SR with 1st degree AVB  EKG (8/5/19) NSR 73bpm QRS 124ms        [facility] (2/105) normal cors  ICD interrogation     HEMODYNAMICS:  RHC not done  CPEST not done  6MW not done     OTHER IMAGING:  CXR 3/2/23: enlarged cardiac silhouette; no edema   CT chest not done    PHYSICAL EXAM:  Visit Vitals  /62 (BP 1 Location: Left upper arm, BP Patient Position: At rest)   Pulse 76   Temp 98.4 °F (36.9 °C)   Resp 18   Ht 5' 8\" (1.727 m)   Wt 227 lb 8.2 oz (103.2 kg)   SpO2 95%   BMI 34.59 kg/m²     General: Patient is well developed, well-nourished in no acute distress  HEENT: Unremarkable   Neck: Supple. No evidence of thyroid enlargements or lymphadenopathy. JVD: 14cm  Lungs: Breath sounds are equal and clear bilaterally. No wheezes, rhonchi, or rales. Heart: Regular rate and rhythm with normal S1 and S2. No murmurs, gallops or rubs. Abdomen: Soft, no mass or tenderness. No organomegaly or hernia. Bowel sounds present. Genitourinary and rectal: deferred  Extremities: No cyanosis, clubbing, or 2+ BLE edema. Neurologic: No focal sensory or motor deficits are noted. Grossly intact. Psychiatric: Awake, alert an doriented x 3. Appropriate mood and affect. Skin: Warm, dry and well perfused. REVIEW OF SYSTEMS:  General: Denies fever. Ear, nose and throat: Denies difficulty hearing, sinus problems, nosebleeds  Cardiovascular: see above in the interval history  Respiratory: Denies cough, wheezing, sputum production, hemoptysis.   Gastrointestinal: Denies heartburn, constipation, diarrhea, abdominal pain, nausea, blood in stool  Kidney and bladder: Denies painful urination, frequent urination  Musculoskeletal: Denies joint pain, muscle weakness  Skin and hair: Denies change in existing skin lesions    PAST MEDICAL HISTORY:  Past Medical History:   Diagnosis Date    Diabetes ([address]     Heart failure ([address]     CHF, cardiomyopathy    Hypertension     ICD (implantable cardioverter-defibrillator) in place     left upper chest       PAST SURGICAL HISTORY:  Past Surgical History: Procedure Laterality Date    HX HEART CATHETERIZATION  2/2015    x1    HX IMPLANTABLE CARDIOVERTER DEFIBRILLATOR  2/16/2015       FAMILY HISTORY:  Family History   Problem Relation Age of Onset    Diabetes Mother     Hypertension Father     Diabetes Father     Diabetes Brother     Hypertension Brother        SOCIAL HISTORY:  Social History     Socioeconomic History    Marital status:    Tobacco Use    Smoking status: Never    Smokeless tobacco: Never   Vaping Use    Vaping Use: Never used   Substance and Sexual Activity    Alcohol use: Yes     Comment: 1 a month    Drug use: No    Sexual activity: Not Currently       LABORATORY RESULTS:     Labs Latest Ref Rng & Units 3/4/2023 3/3/2023 3/2/2023 2/28/2023 1/13/2023   WBC 4.1 - 11.1 K/uL 8.0 8.1 6.8 - -   RBC 4.10 - 5.70 M/uL 3.49(L) 3.61(L) 4.05(L) - -   Hemoglobin 12.1 - 17.0 g/dL 8.8(L) 9.2(L) 10. 2(L) - -   Hematocrit 36.6 - 50.3 % 28. 9(L) 31. 2(L) 34. 3(L) - -   MCV 80.0 - 99.0 FL 82.8 86.4 84.7 - -   Platelets 899 - 660 K/uL 196 195 211 - -   Lymphocytes 12 - 49 % - 11(L) 12 - -   Monocytes 5 - 13 % - 8 8 - -   Eosinophils 0 - 7 % - 5 8(H) - -   Basophils 0 - 1 % - 1 1 - -   Albumin 3.5 - 5.0 g/dL - 2. 6(L) 3. 2(L) - 3.6   Calcium 8.5 - 10.1 MG/DL 9.2 8.2(L) 9.0 8.9 9.0   Glucose 65 - 100 mg/dL 78 137(H) 120(H) 160(H) 152(H)   BUN 6 - 20 MG/DL 33(H) 36(H) 43(H) 38(H) 42(H)   Creatinine 0.70 - 1.30 MG/DL 2.52(H) 2.46(H) 3.06(H) 2.83(H) 2.91(H)   Sodium 136 - 145 mmol/L 142 142 140 145(H) 140   Potassium 3.5 - 5.1 mmol/L 3.2(L) 3. 3(L) 3.7 4.0 4.0   Some recent data might be hidden     Lab Results   Component Value Date/Time    TSH 2.080 05/05/2022 01:34 PM    TSH 1.05 02/11/2015 01:30 PM       ALLERGY:  No Known Allergies     CURRENT MEDICATIONS:    Current Facility-Administered Medications:     bumetanide (BUMEX) injection 4 mg, 4 mg, IntraVENous, Q12H, Gabriele Puente MD, 4 mg at 03/04/23 0925    potassium bicarb-citric acid (EFFER-K) tablet 40 mEq, 40 mEq, Oral, BID, Mitra Jensen MD, 40 mEq at 03/04/23 8120    spironolactone (ALDACTONE) tablet 25 mg, 25 mg, Oral, DAILY, Mitra Jensen MD, 25 mg at 03/04/23 0925    dextrose 10 % infusion 0-250 mL, 0-250 mL, IntraVENous, PRN, Steffanie Fall, CNS    insulin lispro (HUMALOG) injection 5 Units, 0.05 Units/kg, SubCUTAneous, TID WITH MEALS, Steffanie Fall, CNS, 5 Units at 03/04/23 0926    insulin lispro (HUMALOG) injection, , SubCUTAneous, AC&HS, Steffanie Fall, CNS    insulin NPH (NOVOLIN N, HUMULIN N) injection 12 Units, 12 Units, SubCUTAneous, Q12H, Steffanie Fall CNS, 12 Units at 03/04/23 0926    traMADoL (ULTRAM) tablet 50 mg, 50 mg, Oral, Q6H PRN, Isabel Delgadillo MD, 50 mg at 03/04/23 0240    allopurinoL (ZYLOPRIM) tablet 50 mg, 50 mg, Oral, DAILY, Juvencio Han MD, 50 mg at 03/04/23 0925    amLODIPine (NORVASC) tablet 10 mg, 10 mg, Oral, DAILY, Mars Han MD, 10 mg at 03/04/23 0925    carvediloL (COREG) tablet 25 mg, 25 mg, Oral, BID WITH MEALS, Juvencio Han MD, 25 mg at 03/04/23 8673    ergocalciferol capsule 50,000 Units, 50,000 Units, Oral, Q7D, Mars Han MD, 50,000 Units at 03/02/23 2148    hydrALAZINE (APRESOLINE) tablet 10 mg, 10 mg, Oral, BID, Mars Han MD, 10 mg at 03/04/23 9961    isosorbide dinitrate (ISORDIL) tablet 10 mg, 10 mg, Oral, BID, Mars Han MD, 10 mg at 03/04/23 9908    multivitamin, tx-iron-ca-min (THERA-M w/ IRON) tablet 1 Tablet, 1 Tablet, Oral, ACBEmely Dante Jo, MD, 1 Tablet at 03/04/23 0651    glucose chewable tablet 16 g, 4 Tablet, Oral, PRN, Mars Han MD    glucagon (GLUCAGEN) injection 1 mg, 1 mg, IntraMUSCular, PRN, Emely, Juvencio BEDOLLA MD    dextrose 10 % infusion 0-250 mL, 0-250 mL, IntraVENous, PRN, Mars Han MD    acetaminophen (TYLENOL) tablet 650 mg, 650 mg, Oral, Q6H PRN, Shavonne Roman MD, 650 mg at 03/03/23 0711    heparin (porcine) injection 5,000 Units, 5,000 Units, SubCUTAneous, Q8H, Flo Shelton MD, 5,000 Units at 03/04/23 1879    sodium chloride (NS) flush 5-40 mL, 5-40 mL, IntraVENous, Q8H, CoppellJuvencio farnsworth MD, 10 mL at 03/04/23 0651    sodium chloride (NS) flush 5-40 mL, 5-40 mL, IntraVENous, PRN, Sabine Han MD    polyethylene glycol (MIRALAX) packet 17 g, 17 g, Oral, DAILY PRN, Sabine Han MD    ondansetron (ZOFRAN ODT) tablet 4 mg, 4 mg, Oral, Q8H PRN **OR** ondansetron (ZOFRAN) injection 4 mg, 4 mg, IntraVENous, Q6H PRN, Flo Shelton MD    PATIENT CARE TEAM:  Patient Care Team:  Annabelle Lemus MD as PCP - General (Family Medicine)  Rosendo Colby MD (Cardiovascular Disease Physician)  John Narvaez MD (Nephrology)  Hetal Desouza MD (Internal Medicine Physician)     Thank you for allowing me to participate in this patient's care.     Marques Brownlee MD   [address] [place] [address], [address]  Phone: [phone]

## 2023-03-04 NOTE — PROGRESS NOTES
0730: Bedside shift change report given to Sharath Velazquez RN (oncoming nurse) by TAHMINA Goldman (offgoing nurse). Report included the following information SBAR, MAR, and Recent Results. 1524: Patient reports feeling dizzy and lightheaded. BG checked: 51. Patient given apple juice x 2, pablo crackers. 1541: BG rechecked for 97. Dr. Yunier Arias notified. HYPOGLYCEMIC EPISODE DOCUMENTATION    Patient with hypoglycemic episode(s) at 1524 (time) on 3/4/2023 (date). BG value(s) pre-treatment 46    Was patient symptomatic? [x] yes, [] no  Patient was treated with the following rescue medications/treatments: [] D50                [] Glucose tablets                [] Glucagon                [x] 4oz juice x 3                [] 6oz reg soda                [] 8oz low fat milk  BG value post-treatment: 97  Once BG treated and value greater than 80mg/dl, pt was provided with the following:  [x] snack  [] meal  Name of MD notified:Dr. Yunier Arias  The following orders were received: None    2000: Bedside shift change report given to Susi, [facility] (oncoming nurse) by Sharath Velazquez RN (offgoing nurse). Report included the following information SBAR, MAR, and Recent Results.

## 2023-03-04 NOTE — PROGRESS NOTES
Progress Note          Pt Name  Faith Pike   Date of Birth 1965   Medical Record Number  [id number]      Age  56 y.o. PCP Travis Schmidt MD   Admit date:  3/2/2023    Room Number  456/01  @ [facility]   Date of Service  3/4/2023     Admission Diagnoses:  Acute on chronic systolic congestive heart failure      Admission Summary:  \" Faith Pike is a 56 y.o. male with past medical history of dilated cardiomyopathy, heart failure reduced ejection fraction, AICD 2015, chronic lower extremity edema, type 2 diabetes mellitus, hypertension, stage III CKD presented to the emergency department chief complaints of shortness of breath, leg and abdominal swelling, and unintentional weight gain. Patient has known history of CHF. He newly had recent increase in Bumex from 1 mg to 2 mg twice daily. Unfortunately, he still had worsening symptoms with peripheral edema and swelling of legs and abdomen, which is severe, without specific alleviating factors, with associated shortness of breath and dyspnea on exertion over the past 5 days. Also fully had 14 pound weight gain. His last 2 echocardiogram 10/21/2022 showed reduced left ventricular ejection fraction 20% to 25%. On arrival emergency department, initial ported vital signs temperature 97.3 °F, /81, heart rate 71, respiratory 18, saturation 90% room air. Abnormal labs included hemoglobin 10.2, blood glucose 120, BUN 43, creatinine 3.06, GFR 23, albumin 3.2, alk phos 6135, proBNP 9738. Chest x-ray portable showed enlarged cardiopulmonary silhouette. 12 EKG shows sinus rhythm, first-degree AV block, occasional PVCs, premature supraventricular complexes, ST changes anterior lateral leads at 72 bpm.  ED request admission to the hospital service.  \"     Assessment and plan:     Acute on chronic systolic heart failure    Dilated Cardiomyopathy (9/26/2014)  Non-ischemic cardiomyopathy EF ~15%     -Appreciate guidance from Dr. Sam Schmidt blocker   -Cont diuretics with goal for achieving optimal fluid status before starting ARNI etc   -Prophylactic Allopurinol in light of advanced heart failure      Acute kidney injury superimposed on chronic kidney disease Stage 3b  -creatinine is slightly better   -repeat renal panel in a.m. better this morning  -consult nephrologist     T2DM with episodes of Hypoglycemia  -Placed hypoglycemia protocols  -Cont SSI   -Halve the NPH insulin in light of recurrent hypoglycemia      Essential hypertension  -Monitor blood pressure closely     Mild hypokalemia  - Replaced orally      Obesity  -BMI is a confounder in this pt with heart failure      Body mass index is 34.59 kg/m².             Present on Admission:  **None**          CODE STATUS   Full    Functional Status       Surrogate decision maker:        Prophylaxis   Lovenox   Discharge Plan:  Home w/Family,      Misc INPATIENT  [place]or: [place] MED[place] / Plan: [place] MEDICARE PART A & B / Product Type: Medicare /   There are currently no Active Isolations No active infections     Query   None noted today    Prognosis   Fair    Social issues  03/04/23 17:33 PM No family or visitor here with the patient today            Subjective Data     \"I feel a lot better, doc \"  Review of Systems - History obtained from the patient  Respiratory ROS: negative for - orthopnea  Cardiovascular ROS: no chest pain or dyspnea on exertion    Objective Data       Comments  Pleasant gentleman sitting on bed in no distress - he just finished eating his dinner      Patient Vitals for the past 24 hrs:   BP   03/04/23 1525 132/81   03/04/23 1117 128/62   03/04/23 0920 (!) 156/88   03/04/23 0647 (!) 145/76   03/04/23 0239 129/82   03/03/23 2340 134/61   03/03/23 2108 125/63   03/03/23 1928 (!) 146/66      Patient Vitals for the past 24 hrs:   Pulse   03/04/23 1600 75   03/04/23 1525 78   03/04/23 1400 78   03/04/23 1200 76   03/04/23 1117 73   03/04/23 1000 78   03/04/23 0920 87   03/04/23 0800 79 03/04/23 0647 82   03/04/23 0557 76   03/04/23 0352 75   03/04/23 0239 77   03/04/23 0200 73   03/03/23 2340 77   03/03/23 2152 74   03/03/23 2108 73   03/03/23 1957 79   03/03/23 1928 76   03/03/23 1800 79      Patient Vitals for the past 24 hrs:   Resp   03/04/23 1525 18   03/04/23 1117 18   03/04/23 0647 20   03/04/23 0239 18   03/03/23 2340 16   03/03/23 1928 18      Patient Vitals for the past 24 hrs:   Temp   03/04/23 1525 98.2 °F (36.8 °C)   03/04/23 1117 98.4 °F (36.9 °C)   03/04/23 0647 97.7 °F (36.5 °C)   03/04/23 0239 98.2 °F (36.8 °C)   03/03/23 2340 97.7 °F (36.5 °C)   03/03/23 1928 98.1 °F (36.7 °C)        SpO2 Readings from Last 6 Encounters:   03/04/23 95%   01/13/23 99%   12/29/22 99%   12/13/22 100%   11/29/22 98%   11/23/22 96%       O2 Flow Rate (L/min): 2 l/min  O2 Device: Nasal cannula Body mass index is 34.59 kg/m². -  Wt Readings from Last 10 Encounters:   03/04/23 103.2 kg (227 lb 8.2 oz)   01/13/23 106.6 kg (235 lb)   12/29/22 103.4 kg (228 lb)   12/13/22 105.6 kg (232 lb 12.9 oz)   11/29/22 102.3 kg (225 lb 9.6 oz)   11/23/22 105.7 kg (233 lb)   11/04/22 104.8 kg (231 lb 0.7 oz)   06/30/22 104.3 kg (230 lb)   06/23/22 103.1 kg (227 lb 6.4 oz)   04/29/22 101.6 kg (224 lb)        Intake/Output Summary (Last 24 hours) at 3/4/2023 1702  Last data filed at 3/4/2023 1525  Gross per 24 hour   Intake 1640 ml   Output 1525 ml   Net 115 ml         Physical Exam:             General:  Alert, cooperative,   well noursished,   well developed,   appears stated age    Ears/Eyes:  Hearing intact  Sclera anicteric.    Pupils equal   Mouth/Throat:  Mucous membranes normal pink and moist     Neck:     Lungs:  Chest excursion symmetrical   Auscultation B/L Symmetrical with   Vesicular breath sounds           CVS:  Regular rate and rhythm   no  murmur,   No click, rub or gallop  S1 normal   S2 normal   Pedal pulses  b/l symmetrical   Abdomen:  Obese  Soft, non-tender  Bowel sounds normal     Extremities: No cyanosis, jaundice  2++  edema noted   No sign of DVT/cord like lesion on palpation  No sign of acute trauma .     Skin:    Skin color, texture, turgor normal. no acute rash or lesions    Lymph nodes:     Musculoskeletal Muscle bulk B/L symmetrical   Neuro Cranial nerves are intact,   motor movement b/l symmetrical,      Psych:  Alert and oriented,   normal mood & affect          Medications reviewed     Current Facility-Administered Medications   Medication Dose Route Frequency    hydrALAZINE (APRESOLINE) tablet 25 mg  25 mg Oral BID    [START ON 3/5/2023] amLODIPine (NORVASC) tablet 5 mg  5 mg Oral Q12H    isosorbide dinitrate (ISORDIL) tablet 20 mg  20 mg Oral BID    bumetanide (BUMEX) injection 4 mg  4 mg IntraVENous Q12H    potassium bicarb-citric acid (EFFER-K) tablet 40 mEq  40 mEq Oral BID    spironolactone (ALDACTONE) tablet 25 mg  25 mg Oral DAILY    dextrose 10 % infusion 0-250 mL  0-250 mL IntraVENous PRN    insulin lispro (HUMALOG) injection 5 Units  0.05 Units/kg SubCUTAneous TID WITH MEALS    insulin lispro (HUMALOG) injection   SubCUTAneous AC&HS    insulin NPH (NOVOLIN N, HUMULIN N) injection 12 Units  12 Units SubCUTAneous Q12H    traMADoL (ULTRAM) tablet 50 mg  50 mg Oral Q6H PRN    allopurinoL (ZYLOPRIM) tablet 50 mg  50 mg Oral DAILY    carvediloL (COREG) tablet 25 mg  25 mg Oral BID WITH MEALS    ergocalciferol capsule 50,000 Units  50,000 Units Oral Q7D    multivitamin, tx-iron-ca-min (THERA-M w/ IRON) tablet 1 Tablet  1 Tablet Oral ACB    glucose chewable tablet 16 g  4 Tablet Oral PRN    glucagon (GLUCAGEN) injection 1 mg  1 mg IntraMUSCular PRN    dextrose 10 % infusion 0-250 mL  0-250 mL IntraVENous PRN    acetaminophen (TYLENOL) tablet 650 mg  650 mg Oral Q6H PRN    heparin (porcine) injection 5,000 Units  5,000 Units SubCUTAneous Q8H    sodium chloride (NS) flush 5-40 mL  5-40 mL IntraVENous Q8H    sodium chloride (NS) flush 5-40 mL  5-40 mL IntraVENous PRN    polyethylene glycol (MIRALAX) packet 17 g  17 g Oral DAILY PRN    ondansetron (ZOFRAN ODT) tablet 4 mg  4 mg Oral Q8H PRN    Or    ondansetron (ZOFRAN) injection 4 mg  4 mg IntraVENous Q6H PRN       Relevant other informations: Other medical conditions listed in [facility] hospital problem list section; all of these and other pertinent data were taken into consideration when treatment plan is developed and customized to this patient's unique overall circumstances and needs. We have reviewed available old medical records within the constraints of this admission process. Data Review:   Recent Days:  All Micro Results       None            Recent Labs     03/04/23 0248 03/03/23 0433 03/02/23  1558   WBC 8.0 8.1 6.8   HGB 8.8* 9.2* 10.2*   HCT 28.9* 31.2* 34.3*    195 211     Recent Labs     03/04/23 0248 03/03/23 0433 03/02/23  1558    142 140   K 3.2* 3.3* 3.7    111* 106   CO2 30 24 28   GLU 78 137* 120*   BUN 33* 36* 43*   CREA 2.52* 2.46* 3.06*   CA 9.2 8.2* 9.0   MG  --  1.9  --    ALB  --  2.6* 3.2*   TBILI  --  0.5 0.4   ALT  --  23 28   INR  --  1.1  --       Lab Results   Component Value Date/Time    TSH 2.080 05/05/2022 01:34 PM            Care Plan discussed with:Patient/Family and Nurse   Other medical conditions are listed in the active hospital problem list section; these and other pertinent data were taken into consideration when the treatment plan was developed and customized to this patient's unique overall circumstances and needs. High complexity decision making was performed for this patient who is at high risk for decompensation with multiple organ involvement. Today total floor/unit time was 25 minutes while caring for this patient and greater than 50% of that time was spent with patient (and/or family) coordinating patients clinical issues; this includes time spent during multidisciplinary rounds.         Shanel Finch MD MPH FACP [facility]  3/4/2023          [delayed entry 3/5/2023 ]

## 2023-03-04 NOTE — PROGRESS NOTES
Problem: Mobility Impaired (Adult and Pediatric)  Goal: *Acute Goals and Plan of Care (Insert Text)  Description: FUNCTIONAL STATUS PRIOR TO ADMISSION: Patient was independent and active without use of DME.    HOME SUPPORT PRIOR TO ADMISSION: The patient lived with spouse but did not require assist.    Physical Therapy Goals  Initiated 3/4/2023  1. Patient will ambulate with independence for 300 feet with spo2 > 92% on RA within 7 day(s). 2.  Patient will ascend/descend 5 stairs with bilateral handrail(s) with independence with spo2 >92% on RA within 7 day(s). Outcome: Progressing Towards Goal   PHYSICAL THERAPY EVALUATION  Patient: Napoleon Begum (45 y.o. male)  Date: 3/4/2023  Primary Diagnosis: Acute on chronic HFrEF (heart failure with reduced ejection fraction) ([facility] [address] [I50.23]  Cardiomyopathy ([facility] [address] [I42.9]  Acute kidney injury superimposed on chronic kidney disease ([facility] [address] [N17.9, N18.9]       Precautions:   Fall      ASSESSMENT  Based on the objective data described below, the patient presents with decreased activity tolerance, new need for supplemental O2, and otherwise near baseline level of functional independence. Patient admitted for CHF, LE edema, LE wounds, and hypoxia. Patient found supine in bed with spo2 88% on 1. 5LPM at rest. Patient weaned to RA, with spo2 85%, and patient titrated back up to 2LPM with spo2 91%. Patient independent with bed mobility and transfers, and ambulated 60 feet x 2 with supervision and steady gait. Patient completed 5 steps with R rail with supervision and with no instability noted. Spo2 89-91% on 2LPM with ambulation and stair training. Patient returned to room and left seated EOB per patient request. Patient is near independent functional baseline, but demonstrates reduced activity tolerance from baseline with need for supplemental O2 at rest and with activity.  Will continue to follow for acute PT for O2 weaning with activity and progression of activity tolerance, however anticipate patient will be able to discharge to home with no PT needs. Pulse oximetry assessment   87% at rest on room air (if 88% or less, skip next steps)  85% while ambulating on room air  88% at rest on 1. 5LPM  88-91% while ambulating on 2LPM       Current Level of Function Impacting Discharge (mobility/balance): ambulates 60 feet x 2 with supervision, completes 5 steps with R rail with supervision with 2LPM O2     Functional Outcome Measure: The patient scored Total Score: 26/28 on the Tinetti outcome measure which is indicative of low fall risk. Other factors to consider for discharge: spo2 85% on RA, requires 2LPM for spo2 89-91% with activity      Patient will benefit from skilled therapy intervention to address the above noted impairments. PLAN :  Recommendations and Planned Interventions: bed mobility training, transfer training, gait training, therapeutic exercises, neuromuscular re-education, patient and family training/education, and therapeutic activities      Frequency/Duration: Patient will be followed by physical therapy:  3 times a week to address goals. Recommendation for discharge: (in order for the patient to meet his/her long term goals)  No skilled physical therapy/ follow up rehabilitation needs identified at this time. This discharge recommendation:  A follow-up discussion with the attending provider and/or case management is planned    IF patient discharges home will need the following DME: none         SUBJECTIVE:   Patient stated That'll be okay.     OBJECTIVE DATA SUMMARY:   HISTORY:    Past Medical History:   Diagnosis Date    Diabetes ([facility] [address]     Heart failure ([facility] [address]     CHF, cardiomyopathy    Hypertension     ICD (implantable cardioverter-defibrillator) in place     left upper chest     Past Surgical History:   Procedure Laterality Date    HX HEART CATHETERIZATION  2/2015    x1    HX IMPLANTABLE CARDIOVERTER DEFIBRILLATOR  2/16/2015       Personal factors and/or comorbidities impacting plan of care: CHF, hx AICD, LE edema, LE wounds     Home Situation  Home Environment: Private residence  # Steps to Enter: 5  Rails to Enter: Yes  Hand Rails : Bilateral  One/Two Story Residence: One story  Living Alone: No  Support Systems: Spouse/Significant Other, Child(cristina)  Patient Expects to be Discharged to[de-identified] Home with family assistance  Current DME Used/Available at Home: None  Tub or Shower Type: Tub/Shower combination    EXAMINATION/PRESENTATION/DECISION MAKING:   Critical Behavior:  Neurologic State: Alert  Orientation Level: Oriented X4  Cognition: Follows commands  Safety/Judgement: Fall prevention, Home safety  Hearing: Auditory  Auditory Impairment: None  Skin:  noted dressings over bilateral calves 2/2 LE wounds   Edema: bilateral LE generalized edema   Range Of Motion:  AROM: Grossly decreased, non-functional                       Strength:    Strength: Within functional limits                    Tone & Sensation:   Tone: Normal              Sensation: Intact               Coordination:  Coordination: Within functional limits  Vision:      Functional Mobility:  Bed Mobility:  Rolling: Independent  Supine to Sit: Independent  Sit to Supine: Independent  Scooting: Independent  Transfers:  Sit to Stand: Independent  Stand to Sit: Independent                       Balance:   Sitting: Intact; Without support  Standing: Intact; Without support  Ambulation/Gait Training:  Distance (ft): 60 Feet (ft) (x2)  Assistive Device: Gait belt  Ambulation - Level of Assistance: Supervision        Gait Abnormalities: Decreased step clearance;Trunk sway increased        Base of Support: Widened  Stance: Weight shift  Speed/Mary: Pace decreased (<100 feet/min)  Step Length: Right shortened;Left shortened                     Stairs:  Number of Stairs Trained: 5  Stairs - Level of Assistance: Supervision   Rail Use: Right       Functional Measure:  Tinetti test:    Sitting Balance: 1  Arises: 2  Attempts to Rise: 2  Immediate Standing Balance: 2  Standing Balance: 2  Nudged: 2  Eyes Closed: 1  Turn 360 Degrees - Continuous/Discontinuous: 1  Turn 360 Degrees - Steady/Unsteady: 1  Sitting Down: 2  Balance Score: 16 Balance total score  Indication of Gait: 1  R Step Length/Height: 1  L Step Length/Height: 1  R Foot Clearance: 1  L Foot Clearance: 1  Step Symmetry: 1  Step Continuity: 1  Path: 2  Trunk: 1  Walking Time: 0  Gait Score: 10 Gait total score  Total Score: 26/28 Overall total score         Tinetti Tool Score Risk of Falls  <19 = High Fall Risk  19-24 = Moderate Fall Risk  25-28 = Low Fall Risk  Tinetti ME. Performance-Oriented Assessment of Mobility Problems in Elderly Patients. [address] 66; [id number].  (Scoring Description: PT Bulletin Feb. 10, 1993)    Older adults: Modesta Reddy et al, 2009; n = 1000 [facility] elderly evaluated with ABC, SHIRAZ, ADL, and IADL)  · Mean SHIRAZ score for males aged 69-68 years = 26.21(3.40)  · Mean SHIRAZ score for females age 69-68 years = 25.16(4.30)  · Mean SHIRAZ score for males over 80 years = 23.29(6.02)  · Mean SHIRAZ score for females over 80 years = 17.20(8.32)        Physical Therapy Evaluation Charge Determination   History Examination Presentation Decision-Making   MEDIUM  Complexity : 1-2 comorbidities / personal factors will impact the outcome/ POC  LOW Complexity : 1-2 Standardized tests and measures addressing body structure, function, activity limitation and / or participation in recreation  LOW Complexity : Stable, uncomplicated  Other outcome measures tinetti  LOW       Based on the above components, the patient evaluation is determined to be of the following complexity level: LOW     Pain Rating:  Patient reports 7/10 pain of bilateral ankles and calves related to LE wounds - RN aware     Activity Tolerance:   Good and desaturates with exertion and requires oxygen      After treatment patient left in no apparent distress:   Call bell within reach and sitting at EOB per patient request    COMMUNICATION/EDUCATION:   The patients plan of care was discussed with: Registered nurse. Fall prevention education was provided and the patient/caregiver indicated understanding.     Thank you for this referral.  Alexandra Mcdonnell, PT   Time Calculation: 17 mins

## 2023-03-04 NOTE — PROGRESS NOTES
[facility]   [address] [facility], [id number] [place] [address]  Phone: [phone]   [phone]    [email or web address]     Nephrology Progress Note    Patient Name : Dominique De La Cruz      : 1965     MRN : [id number]  Date of Admission : 3/2/2023  Date of Servive : 23    CC: Follow up for ANA       Assessment and Plan   ANA on CKD  -Suspect progressive CKD from poorly controlled DM, HTN  -Volume overloaded on exam  -Cont IV Bumex 4 mg twice daily  -Strict I's and O's and Daily labs     CKD 4  -2/2 DM, HTN  -Previously nonnephrotic proteinuria  -UPCR 0.8 mg/mg  -renal US: MRD  -paraproteinemia screen pending  -Baseline creatinine: ~2.8-2.9  -He will follow-up with Dr. Jonathan Rock on discharge    Acute on chronic HFrEF  Nonischemic CMP, s/p AICD  -Last ECHO EF 20 to 25%  -MX per AHF team  -Added Aldactone 25 qd  -start Entresto once GFR stable     DM 2  -Poorly controlled, A1c 8.4  -Establish care with endocrinology     HTN  -No changes to BP meds until volume status optimized        Interval History:  Breathing better. On 2L NC. No cough, fever, n/v. UO 1500cc    Review of Systems: A comprehensive review of systems was negative except for that written in the HPI.     Current Medications:   Current Facility-Administered Medications   Medication Dose Route Frequency    hydrALAZINE (APRESOLINE) tablet 25 mg  25 mg Oral BID    [START ON 3/5/2023] amLODIPine (NORVASC) tablet 5 mg  5 mg Oral Q12H    isosorbide dinitrate (ISORDIL) tablet 20 mg  20 mg Oral BID    bumetanide (BUMEX) injection 4 mg  4 mg IntraVENous Q12H    potassium bicarb-citric acid (EFFER-K) tablet 40 mEq  40 mEq Oral BID    spironolactone (ALDACTONE) tablet 25 mg  25 mg Oral DAILY    dextrose 10 % infusion 0-250 mL  0-250 mL IntraVENous PRN    insulin lispro (HUMALOG) injection 5 Units  0.05 Units/kg SubCUTAneous TID WITH MEALS    insulin lispro (HUMALOG) injection   SubCUTAneous AC&HS    insulin NPH (NOVOLIN N, HUMULIN N) injection 12 Units  12 Units SubCUTAneous Q12H    traMADoL (ULTRAM) tablet 50 mg  50 mg Oral Q6H PRN    allopurinoL (ZYLOPRIM) tablet 50 mg  50 mg Oral DAILY    carvediloL (COREG) tablet 25 mg  25 mg Oral BID WITH MEALS    ergocalciferol capsule 50,000 Units  50,000 Units Oral Q7D    multivitamin, tx-iron-ca-min (THERA-M w/ IRON) tablet 1 Tablet  1 Tablet Oral ACB    glucose chewable tablet 16 g  4 Tablet Oral PRN    glucagon (GLUCAGEN) injection 1 mg  1 mg IntraMUSCular PRN    dextrose 10 % infusion 0-250 mL  0-250 mL IntraVENous PRN    acetaminophen (TYLENOL) tablet 650 mg  650 mg Oral Q6H PRN    heparin (porcine) injection 5,000 Units  5,000 Units SubCUTAneous Q8H    sodium chloride (NS) flush 5-40 mL  5-40 mL IntraVENous Q8H    sodium chloride (NS) flush 5-40 mL  5-40 mL IntraVENous PRN    polyethylene glycol (MIRALAX) packet 17 g  17 g Oral DAILY PRN    ondansetron (ZOFRAN ODT) tablet 4 mg  4 mg Oral Q8H PRN    Or    ondansetron (ZOFRAN) injection 4 mg  4 mg IntraVENous Q6H PRN      No Known Allergies    Objective:  Vitals:    Vitals:    03/04/23 0920 03/04/23 1000 03/04/23 1117 03/04/23 1200   BP: (!) 156/88  128/62    Pulse: 87 78 73 76   Resp:   18    Temp:   98.4 °F (36.9 °C)    SpO2:   95%    Weight:       Height:         Intake and Output:  03/04 0701 - 03/04 1900  In: 620 [P.O.:400;  I.V.:220]  Out: 400 [Urine:400]  03/02 1901 - 03/04 0700  In: 0419 [P.O.:1433]  Out: 7625 [Urine:3175]    Physical Examination:    Pt intubated    No    General: NAD,Conversant   Neck:  Supple, no mass  Resp:  Lungs basal rales, no wheezing , normal respiratory effort  CV:  RRR,  no murmur or rub, + LE edema  GI:  Soft, NT, + BS, no HS megaly  Neurologic:  Non focal  Psych:             AAO x 3 appropriate affect   Skin:  No Rash  :  Zelaya  -  Dialysis Access : NA    []    High complexity decision making was performed  []    Patient is at high-risk of decompensation with multiple organ involvement    Lab Data Personally Reviewed: I have reviewed all the pertinent labs, microbiology data and radiology studies during assessment.     Recent Labs     03/04/23  0248 03/03/23  0433 03/02/23  1558    142 140   K 3.2* 3.3* 3.7    111* 106   CO2 30 24 28   GLU 78 137* 120*   BUN 33* 36* 43*   CREA 2.52* 2.46* 3.06*   CA 9.2 8.2* 9.0   MG  --  1.9  --    ALB  --  2.6* 3.2*   ALT  --  23 28   INR  --  1.1  --      Recent Labs     03/04/23  0248 03/03/23 0433 03/02/23  1558   WBC 8.0 8.1 6.8   HGB 8.8* 9.2* 10.2*   HCT 28.9* 31.2* 34.3*    195 211     No results found for: SDES  No results found for: CULT  Recent Results (from the past 24 hour(s))   ECHO ADULT FOLLOW-UP OR LIMITED    Collection Time: 03/03/23  4:48 PM   Result Value Ref Range    IVSd 1.5 (A) 0.6 - 1.0 cm    LVIDd 6.1 (A) 4.2 - 5.9 cm    LVIDs 5.4 cm    LVPWd 1.5 (A) 0.6 - 1.0 cm    EF BP 25 (A) 55 - 100 %    LV Ejection Fraction A2C 25 %    LV Ejection Fraction A4C 22 %    LV EDV A2C 119 mL    LV EDV A4C 84 mL    LV EDV  67 - 155 mL    LV ESV A2C 89 mL    LV ESV A4C 65 mL    LV ESV BP 79 (A) 22 - 58 mL    Fractional Shortening 2D 11 28 - 44 %    LV ESV Index BP 36 mL/m2    LV EDV Index BP 48 mL/m2    LV ESV Index A4C 30 mL/m2    LV EDV Index A4C 39 mL/m2    LV ESV Index A2C 41 mL/m2    LV EDV Index A2C 55 mL/m2    LVIDd Index 2.80 cm/m2    LVIDs Index 2.48 cm/m2    LV RWT Ratio 0.49     LV Mass 2D 438.7 (A) 88 - 224 g    LV Mass 2D Index 201.2 (A) 49 - 115 g/m2   GLUCOSE, POC    Collection Time: 03/03/23  5:57 PM   Result Value Ref Range    Glucose (POC) 111 65 - 117 mg/dL    Performed by Dallin Gonsalves, POC    Collection Time: 03/03/23  9:17 PM   Result Value Ref Range    Glucose (POC) 92 65 - 117 mg/dL    Performed by WERO QUESADA    CBC W/O DIFF    Collection Time: 03/04/23  2:48 AM   Result Value Ref Range    WBC 8.0 4.1 - 11.1 K/uL    RBC 3.49 (L) 4.10 - 5.70 M/uL    HGB 8.8 (L) 12.1 - 17.0 g/dL    HCT 28.9 (L) 36.6 - 50.3 %    MCV 82.8 80.0 - 99.0 FL    MCH 25.2 (L) 26.0 - 34.0 PG    MCHC 30.4 30.0 - 36.5 g/dL    RDW 17.3 (H) 11.5 - 14.5 %    PLATELET 301 337 - 211 K/uL    MPV 11.8 8.9 - 12.9 FL    NRBC 0.0 0  WBC    ABSOLUTE NRBC 0.00 0.00 - 6.79 K/uL   METABOLIC PANEL, BASIC    Collection Time: 03/04/23  2:48 AM   Result Value Ref Range    Sodium 142 136 - 145 mmol/L    Potassium 3.2 (L) 3.5 - 5.1 mmol/L    Chloride 106 97 - 108 mmol/L    CO2 30 21 - 32 mmol/L    Anion gap 6 5 - 15 mmol/L    Glucose 78 65 - 100 mg/dL    BUN 33 (H) 6 - 20 MG/DL    Creatinine 2.52 (H) 0.70 - 1.30 MG/DL    BUN/Creatinine ratio 13 12 - 20      eGFR 29 (L) >60 ml/min/1.73m2    Calcium 9.2 8.5 - 10.1 MG/DL   GLUCOSE, POC    Collection Time: 03/04/23  6:45 AM   Result Value Ref Range    Glucose (POC) 80 65 - 117 mg/dL    Performed by Ty Sharif    GLUCOSE, POC    Collection Time: 03/04/23 11:18 AM   Result Value Ref Range    Glucose (POC) 105 65 - 117 mg/dL    Performed by Arnold CADENA            I have reviewed the flowsheets. Chart and Pertinent Notes have been reviewed. No change in PMH ,family and social history from Consult note.       Kasandra Johnson [facility]

## 2023-03-04 NOTE — PROGRESS NOTES
[facility]IC[facility] Hemodynamic Monitoring     Visit Vitals  /81 (BP 1 Location: Left upper arm, BP Patient Position: At rest)   Pulse 78   Temp 98.2 °F (36.8 °C)   Resp 18   Ht 5' 8\" (1.727 m)   Wt 103.2 kg (227 lb 8.2 oz)   SpO2 95%   BMI 34.59 kg/m²         Baseline assessment:        CO 5.4        CI 2.5        SVI 34        SVV 19        TPR 1403

## 2023-03-05 LAB
ANION GAP SERPL CALC-SCNC: 7 MMOL/L (ref 5–15)
BUN SERPL-MCNC: 31 MG/DL (ref 6–20)
BUN/CREAT SERPL: 13 (ref 12–20)
CALCIUM SERPL-MCNC: 9 MG/DL (ref 8.5–10.1)
CHLORIDE SERPL-SCNC: 103 MMOL/L (ref 97–108)
CO2 SERPL-SCNC: 29 MMOL/L (ref 21–32)
CREAT SERPL-MCNC: 2.43 MG/DL (ref 0.7–1.3)
ERYTHROCYTE [DISTWIDTH] IN BLOOD BY AUTOMATED COUNT: 17.4 % (ref 11.5–14.5)
GLUCOSE BLD STRIP.AUTO-MCNC: 164 MG/DL (ref 65–117)
GLUCOSE BLD STRIP.AUTO-MCNC: 176 MG/DL (ref 65–117)
GLUCOSE BLD STRIP.AUTO-MCNC: 182 MG/DL (ref 65–117)
GLUCOSE BLD STRIP.AUTO-MCNC: 210 MG/DL (ref 65–117)
GLUCOSE BLD STRIP.AUTO-MCNC: 220 MG/DL (ref 65–117)
GLUCOSE SERPL-MCNC: 180 MG/DL (ref 65–100)
HCT VFR BLD AUTO: 30.7 % (ref 36.6–50.3)
HEMOCCULT STL QL: NEGATIVE
HGB BLD-MCNC: 9.1 G/DL (ref 12.1–17)
MCH RBC QN AUTO: 25.6 PG (ref 26–34)
MCHC RBC AUTO-ENTMCNC: 29.6 G/DL (ref 30–36.5)
MCV RBC AUTO: 86.2 FL (ref 80–99)
NRBC # BLD: 0 K/UL (ref 0–0.01)
NRBC BLD-RTO: 0 PER 100 WBC
PLATELET # BLD AUTO: 213 K/UL (ref 150–400)
PMV BLD AUTO: 12.2 FL (ref 8.9–12.9)
POTASSIUM SERPL-SCNC: 3.5 MMOL/L (ref 3.5–5.1)
RBC # BLD AUTO: 3.56 M/UL (ref 4.1–5.7)
SERVICE CMNT-IMP: ABNORMAL
SODIUM SERPL-SCNC: 139 MMOL/L (ref 136–145)
WBC # BLD AUTO: 8.5 K/UL (ref 4.1–11.1)

## 2023-03-05 PROCEDURE — 85027 COMPLETE CBC AUTOMATED: CPT

## 2023-03-05 PROCEDURE — 80048 BASIC METABOLIC PNL TOTAL CA: CPT

## 2023-03-05 PROCEDURE — 74011250637 HC RX REV CODE- 250/637: Performed by: FAMILY MEDICINE

## 2023-03-05 PROCEDURE — 74011250637 HC RX REV CODE- 250/637: Performed by: INTERNAL MEDICINE

## 2023-03-05 PROCEDURE — 74011000250 HC RX REV CODE- 250: Performed by: FAMILY MEDICINE

## 2023-03-05 PROCEDURE — 65660000001 HC RM ICU INTERMED STEPDOWN

## 2023-03-05 PROCEDURE — 99233 SBSQ HOSP IP/OBS HIGH 50: CPT | Performed by: INTERNAL MEDICINE

## 2023-03-05 PROCEDURE — 74011250636 HC RX REV CODE- 250/636: Performed by: FAMILY MEDICINE

## 2023-03-05 PROCEDURE — 82272 OCCULT BLD FECES 1-3 TESTS: CPT

## 2023-03-05 PROCEDURE — 36415 COLL VENOUS BLD VENIPUNCTURE: CPT

## 2023-03-05 PROCEDURE — 74011636637 HC RX REV CODE- 636/637: Performed by: CLINICAL NURSE SPECIALIST

## 2023-03-05 PROCEDURE — 74011000250 HC RX REV CODE- 250: Performed by: INTERNAL MEDICINE

## 2023-03-05 PROCEDURE — 74011636637 HC RX REV CODE- 636/637: Performed by: INTERNAL MEDICINE

## 2023-03-05 PROCEDURE — 82962 GLUCOSE BLOOD TEST: CPT

## 2023-03-05 PROCEDURE — 74011250637 HC RX REV CODE- 250/637: Performed by: HOSPITALIST

## 2023-03-05 RX ORDER — BUMETANIDE 1 MG/1
4 TABLET ORAL 2 TIMES DAILY
Status: DISCONTINUED | OUTPATIENT
Start: 2023-03-05 | End: 2023-03-06

## 2023-03-05 RX ORDER — HYDRALAZINE HYDROCHLORIDE 50 MG/1
50 TABLET, FILM COATED ORAL 2 TIMES DAILY
Status: DISCONTINUED | OUTPATIENT
Start: 2023-03-05 | End: 2023-03-08

## 2023-03-05 RX ADMIN — POTASSIUM BICARBONATE 40 MEQ: 782 TABLET, EFFERVESCENT ORAL at 17:55

## 2023-03-05 RX ADMIN — ISOSORBIDE DINITRATE 20 MG: 20 TABLET ORAL at 10:01

## 2023-03-05 RX ADMIN — TRAMADOL HYDROCHLORIDE 50 MG: 50 TABLET ORAL at 15:45

## 2023-03-05 RX ADMIN — AMLODIPINE BESYLATE 5 MG: 5 TABLET ORAL at 10:01

## 2023-03-05 RX ADMIN — Medication 5 UNITS: at 17:55

## 2023-03-05 RX ADMIN — Medication 5 UNITS: at 10:00

## 2023-03-05 RX ADMIN — MULTIPLE VITAMINS W/ MINERALS TAB 1 TABLET: TAB at 07:23

## 2023-03-05 RX ADMIN — SPIRONOLACTONE 25 MG: 25 TABLET ORAL at 10:01

## 2023-03-05 RX ADMIN — HEPARIN SODIUM 5000 UNITS: 5000 INJECTION INTRAVENOUS; SUBCUTANEOUS at 13:22

## 2023-03-05 RX ADMIN — Medication 10 ML: at 07:23

## 2023-03-05 RX ADMIN — BUMETANIDE 4 MG: 1 TABLET ORAL at 15:45

## 2023-03-05 RX ADMIN — CARVEDILOL 25 MG: 12.5 TABLET, FILM COATED ORAL at 10:01

## 2023-03-05 RX ADMIN — HYDRALAZINE HYDROCHLORIDE 50 MG: 50 TABLET, FILM COATED ORAL at 17:55

## 2023-03-05 RX ADMIN — CARVEDILOL 25 MG: 12.5 TABLET, FILM COATED ORAL at 17:55

## 2023-03-05 RX ADMIN — Medication 5 UNITS: at 22:54

## 2023-03-05 RX ADMIN — POTASSIUM BICARBONATE 40 MEQ: 782 TABLET, EFFERVESCENT ORAL at 10:01

## 2023-03-05 RX ADMIN — Medication 1 UNITS: at 22:54

## 2023-03-05 RX ADMIN — HYDRALAZINE HYDROCHLORIDE 25 MG: 25 TABLET, FILM COATED ORAL at 10:01

## 2023-03-05 RX ADMIN — HEPARIN SODIUM 5000 UNITS: 5000 INJECTION INTRAVENOUS; SUBCUTANEOUS at 07:23

## 2023-03-05 RX ADMIN — BUMETANIDE 4 MG: 0.25 INJECTION, SOLUTION INTRAMUSCULAR; INTRAVENOUS at 10:12

## 2023-03-05 RX ADMIN — Medication 10 ML: at 13:22

## 2023-03-05 RX ADMIN — Medication 5 UNITS: at 13:21

## 2023-03-05 RX ADMIN — Medication 10 ML: at 22:55

## 2023-03-05 RX ADMIN — ALLOPURINOL 50 MG: 100 TABLET ORAL at 10:01

## 2023-03-05 RX ADMIN — BUMETANIDE 4 MG: 1 TABLET ORAL at 17:55

## 2023-03-05 RX ADMIN — HEPARIN SODIUM 5000 UNITS: 5000 INJECTION INTRAVENOUS; SUBCUTANEOUS at 22:54

## 2023-03-05 RX ADMIN — ISOSORBIDE DINITRATE 30 MG: 20 TABLET ORAL at 17:55

## 2023-03-05 NOTE — PROGRESS NOTES
Progress Note          Pt Name  Chase Gomez   Date of Birth 1965   Medical Record Number  [id number]      Age  56 y.o. PCP Nate Olson MD   Admit date:  3/2/2023    Room Number  456/01  @ [facility]   Date of Service  3/5/2023     Admission Diagnoses:  Acute on chronic systolic congestive heart failure      Admission Summary:  \" Chase Gomez is a 56 y.o. male with past medical history of dilated cardiomyopathy, heart failure reduced ejection fraction, AICD 2015, chronic lower extremity edema, type 2 diabetes mellitus, hypertension, stage III CKD presented to the emergency department chief complaints of shortness of breath, leg and abdominal swelling, and unintentional weight gain. Patient has known history of CHF. He newly had recent increase in Bumex from 1 mg to 2 mg twice daily. Unfortunately, he still had worsening symptoms with peripheral edema and swelling of legs and abdomen, which is severe, without specific alleviating factors, with associated shortness of breath and dyspnea on exertion over the past 5 days. Also fully had 14 pound weight gain. His last 2 echocardiogram 10/21/2022 showed reduced left ventricular ejection fraction 20% to 25%. On arrival emergency department, initial ported vital signs temperature 97.3 °F, /81, heart rate 71, respiratory 18, saturation 90% room air. Abnormal labs included hemoglobin 10.2, blood glucose 120, BUN 43, creatinine 3.06, GFR 23, albumin 3.2, alk phos 6135, proBNP 9738. Chest x-ray portable showed enlarged cardiopulmonary silhouette. 12 EKG shows sinus rhythm, first-degree AV block, occasional PVCs, premature supraventricular complexes, ST changes anterior lateral leads at 72 bpm.  ED request admission to the hospital service.  \"     Assessment and plan:     Acute on chronic systolic heart failure    Dilated Cardiomyopathy (9/26/2014)  Non-ischemic cardiomyopathy EF ~15%     Creatinine is holding steady ~2.4 +/-  Weight looks closer to baseline     -Appreciate guidance from Dr. Vinicio Burger   -Cont Beta blocker   -Cont diuretics with goal for achieving optimal fluid status before starting ARNI etc   -Prophylactic Allopurinol in light of advanced heart failure      Acute kidney injury superimposed on chronic kidney disease Stage 3b  -creatinine is slightly better   -repeat renal panel in a.m. better this morning  -consult nephrologist     T2DM with episodes of Hypoglycemia  -Placed hypoglycemia protocols  -Cont SSI   -Halve the NPH insulin in light of recurrent hypoglycemia      Essential hypertension  -Monitor blood pressure closely     Mild hypokalemia  - Replaced orally      Obesity  -BMI is a confounder in this pt with heart failure      Body mass index is 34.73 kg/m².             Present on Admission:  **None**          CODE STATUS   Full    Functional Status       Surrogate decision maker:        Prophylaxis   Lovenox   Discharge Plan:  Home w/Family,      Misc INPATIENT  Payor: [facility] / Plan: [place] MED[place] PART A & B / Product Type: Medicare /   There are currently no Active Isolations No active infections     Query   None noted today    Prognosis   Fair    Social issues  03/04/23 17:33 PM No family or visitor here with the patient today       3/5/23 12:50 PM No family or visitor here with the patient today        Subjective Data     \"I am fine  \"  Review of Systems - History obtained from the patient  Respiratory ROS: negative for - orthopnea  Cardiovascular ROS: no chest pain or dyspnea on exertion    Objective Data       Comments  Pleasant gentleman lying nearly flat - with obvious shortness of breath      Patient Vitals for the past 24 hrs:   BP   03/05/23 1000 (!) 146/96   03/05/23 0720 135/80   03/05/23 0419 (!) 148/91   03/05/23 0043 (!) 146/96   03/04/23 1904 132/68   03/04/23 1807 (!) 141/77   03/04/23 1525 132/81   03/04/23 1117 128/62        Patient Vitals for the past 24 hrs:   Pulse   03/05/23 1001 (!) 101   03/05/23 1000 81   03/05/23 0800 75   03/05/23 0720 94   03/05/23 0554 67   03/05/23 0419 82   03/05/23 0349 74   03/05/23 0200 74   03/05/23 0043 81   03/04/23 2153 69   03/04/23 2000 75   03/04/23 1904 76   03/04/23 1807 79   03/04/23 1800 79   03/04/23 1600 75   03/04/23 1525 78   03/04/23 1400 78   03/04/23 1200 76   03/04/23 1117 73        Patient Vitals for the past 24 hrs:   Resp   03/05/23 0720 20   03/05/23 0419 20   03/05/23 0043 20   03/04/23 1904 19   03/04/23 1525 18   03/04/23 1117 18        Patient Vitals for the past 24 hrs:   Temp   03/05/23 0720 98.2 °F (36.8 °C)   03/05/23 0419 98.3 °F (36.8 °C)   03/05/23 0043 98.4 °F (36.9 °C)   03/04/23 1904 98.5 °F (36.9 °C)   03/04/23 1525 98.2 °F (36.8 °C)   03/04/23 1117 98.4 °F (36.9 °C)          SpO2 Readings from Last 6 Encounters:   03/05/23 96%   01/13/23 99%   12/29/22 99%   12/13/22 100%   11/29/22 98%   11/23/22 96%       O2 Flow Rate (L/min): 2 l/min  O2 Device: None (Room air) Body mass index is 34.73 kg/m². -  Wt Readings from Last 10 Encounters:   03/05/23 103.6 kg (228 lb 6.3 oz)   01/13/23 106.6 kg (235 lb)   12/29/22 103.4 kg (228 lb)   12/13/22 105.6 kg (232 lb 12.9 oz)   11/29/22 102.3 kg (225 lb 9.6 oz)   11/23/22 105.7 kg (233 lb)   11/04/22 104.8 kg (231 lb 0.7 oz)   06/30/22 104.3 kg (230 lb)   06/23/22 103.1 kg (227 lb 6.4 oz)   04/29/22 101.6 kg (224 lb)        Intake/Output Summary (Last 24 hours) at 3/5/2023 1110  Last data filed at 3/5/2023 0958  Gross per 24 hour   Intake 1540 ml   Output 1200 ml   Net 340 ml           Physical Exam:             General:  Alert, cooperative,   well noursished,   well developed,   appears stated age    Ears/Eyes:  Hearing intact  Sclera anicteric.    Pupils equal   Mouth/Throat:  Mucous membranes normal pink and moist     Neck:     Lungs:  Chest excursion symmetrical   Auscultation B/L Symmetrical with   Vesicular breath sounds           CVS:  Regular rate and rhythm   no  murmur,   No click, rub or gallop  S1 normal   S2 normal      Abdomen:       Extremities:    No cyanosis, jaundice  2++  edema noted      Skin:    Skin color, texture, turgor normal. no acute rash or lesions    Lymph nodes:     Musculoskeletal Muscle bulk B/L symmetrical   Neuro Cranial nerves are intact,   motor movement b/l symmetrical,      Psych:  Alert and oriented,   normal mood & affect          Medications reviewed     Current Facility-Administered Medications   Medication Dose Route Frequency    hydrALAZINE (APRESOLINE) tablet 25 mg  25 mg Oral BID    amLODIPine (NORVASC) tablet 5 mg  5 mg Oral Q12H    isosorbide dinitrate (ISORDIL) tablet 20 mg  20 mg Oral BID    insulin NPH (NOVOLIN N, HUMULIN N) injection 5 Units  5 Units SubCUTAneous Q12H    bumetanide (BUMEX) injection 4 mg  4 mg IntraVENous Q12H    potassium bicarb-citric acid (EFFER-K) tablet 40 mEq  40 mEq Oral BID    spironolactone (ALDACTONE) tablet 25 mg  25 mg Oral DAILY    dextrose 10 % infusion 0-250 mL  0-250 mL IntraVENous PRN    insulin lispro (HUMALOG) injection 5 Units  0.05 Units/kg SubCUTAneous TID WITH MEALS    insulin lispro (HUMALOG) injection   SubCUTAneous AC&HS    traMADoL (ULTRAM) tablet 50 mg  50 mg Oral Q6H PRN    allopurinoL (ZYLOPRIM) tablet 50 mg  50 mg Oral DAILY    carvediloL (COREG) tablet 25 mg  25 mg Oral BID WITH MEALS    ergocalciferol capsule 50,000 Units  50,000 Units Oral Q7D    multivitamin, tx-iron-ca-min (THERA-M w/ IRON) tablet 1 Tablet  1 Tablet Oral ACB    glucose chewable tablet 16 g  4 Tablet Oral PRN    glucagon (GLUCAGEN) injection 1 mg  1 mg IntraMUSCular PRN    dextrose 10 % infusion 0-250 mL  0-250 mL IntraVENous PRN    acetaminophen (TYLENOL) tablet 650 mg  650 mg Oral Q6H PRN    heparin (porcine) injection 5,000 Units  5,000 Units SubCUTAneous Q8H    sodium chloride (NS) flush 5-40 mL  5-40 mL IntraVENous Q8H    sodium chloride (NS) flush 5-40 mL  5-40 mL IntraVENous PRN    polyethylene glycol (MIRALAX) packet 17 g  17 g Oral DAILY PRN ondansetron (ZOFRAN ODT) tablet 4 mg  4 mg Oral Q8H PRN    Or    ondansetron (ZOFRAN) injection 4 mg  4 mg IntraVENous Q6H PRN       Relevant other informations: Other medical conditions listed in [facility] problem list section; all of these and other pertinent data were taken into consideration when treatment plan is developed and customized to this patient's unique overall circumstances and needs. We have reviewed available old medical records within the constraints of this admission process. Data Review:   Recent Days:  All Micro Results       None            Recent Labs     03/05/23 0425 03/04/23 0248 03/03/23 0433 03/02/23  1558   WBC 8.5 8.0 8.1 6.8   HGB 9.1* 8.8* 9.2* 10.2*   HCT 30.7* 28.9* 31.2* 34.3*    196 195 211       Recent Labs     03/05/23 0425 03/04/23 0248 03/03/23 0433 03/02/23  1558    142 142 140   K 3.5 3.2* 3.3* 3.7    106 111* 106   CO2 29 30 24 28   * 78 137* 120*   BUN 31* 33* 36* 43*   CREA 2.43* 2.52* 2.46* 3.06*   CA 9.0 9.2 8.2* 9.0   MG  --   --  1.9  --    ALB  --   --  2.6* 3.2*   TBILI  --   --  0.5 0.4   ALT  --   --  23 28   INR  --   --  1.1  --         Lab Results   Component Value Date/Time    TSH 2.080 05/05/2022 01:34 PM            Care Plan discussed with:Patient/Family and Nurse   Other medical conditions are listed in the active hospital problem list section; these and other pertinent data were taken into consideration when the treatment plan was developed and customized to this patient's unique overall circumstances and needs. High complexity decision making was performed for this patient who is at high risk for decompensation with multiple organ involvement. Today total floor/unit time was 25 minutes while caring for this patient and greater than 50% of that time was spent with patient (and/or family) coordinating patients clinical issues; this includes time spent during multidisciplinary rounds.         Mame Vargas Micheline Zaldivar MD MPH FACP [facility]  3/5/2023

## 2023-03-05 NOTE — PROGRESS NOTES
[facility] in [place], [place]  Inpatient Progress Note      Patient name: Chad Vidal  Patient : 1965  Patient MRN: [id number]  Consulting MD: Gabriel Ceron MD  Date of service: 23    REASON FOR REFERRAL:  Management of acute on chronic systolic heart failure     PLAN OF CARE:  55 y/o male with h/o non-ischemic cardiomyopathy, LVEF 10-15% from 28%, stage C, NYHA class IIIA symptoms and CKD, stage 3 (Cr 2.1-2.47) undergoing optimization of GDMT (taken off nephrotoxic meds through diuresis, to allow renal recovery and up-titrating alternative HF meds)  Most likely etiology of worsening HF is chronic volume overload due to underestimated severity of renal failure +/- possibly inadequately/untreated MINA; evaluation ongoing   Consult placed to cardiology for RHC +/- ischemic evaluation with LHC or NST depending on renal function and recommendation from nephrology and cardiology (last [facility] )  If RHC reveals that heart failure contributes to renal dysfunction, then patient may need initiation of chronic inotropes and completion of inpatient workup for combined heart-kidney transplantation, including GI workup; of note, patient is currently not a candidate for LVAD due to Cr > 1.8      RECOMMENDATIONS:  Continue current medical therapy for heart failure; CI 2.5 by NICOM  Continue coreg 25mg twice daily, HR at goal in 70s  Discontinue norvasc  Cannot tolerate ACEi/ARB/ARNi in hope for renal recovery  Increase hydralazine to 50 mg TID and isordil 30mg TID   Cannot tolerate SGLT2i due to eGFR < 30  Changed bumex to 4mg PO twice daily in anticipation of discharge soon; further recommendations for home regiment per nephrology  Does not take aspirin   ICD interrogation every 3 months per routine  Check hemoccult; if positive, may need scope prior to discharge  Reinforced low salt diet  Reinforced fluid restriction to 6 x 8oz glasses per day  Ordered IP sleep study evaluation  IP consult to cardiology for RHC +/- ischemic evaluation with LHC or NST     At time of discharge plan on the following:  Schedule sleep study     IMPRESSION:  Fatigue  Shortness of breath/VILLARREAL/orthopnea  Volume overload  Acute on chronic systolic heart failure  Stage C, NYHA class IIIA symptoms  Non-ischemic cardiomyopathy, LVEF 28%  S/p ICD  HTN  DM  CKD 4     INTERVAL EVENTS:  No issues overnight  -140s/70s, HR 70-80s  I/O net positive 440cc, urine 1.2 liters  Hgb 9.1  Cr 2.4  TB 0.5  Albumin 2.6  Iron sat 10% and ferritin 41  CXR (3/3/23) clear     LIFE GOALS:  Lifestyle goals reviewed with the patient. Patient's personal goals include: getting back home  Important upcoming milestones or family events: TBD  The patient identifies the following as important for living well: TBD    Patient assessed. High suspicion of sleep apnea due to the following reasons. Will refer for sleep evaluation. [x] Heart Failure  [x] Hypertention  [] Atrial Fibrillation  [x] BMI > 30  [] History of stroke  [x] Diabetes  [] Heavy snoring  [] Witnessed apnea  [] Hypoxemia         HPI:   From prior notes,  \"Briefly, Anna Chatman is a 56 y.o. male with h/o morbid obesity, BMI 35, HTN, HL, DM2, chronic systolic heart failure, stage C, NYHA class IIIA symptoms, non-ischemic cardiomyopathy, LVEF 28% (HF diagnosed around 2002), normal coronaries by [employer] s/p single lead ICD (2/2015) and worsening renal function, CKD stage 2 and anemia. Patient was [facility] to [facility] to establish long-term care. \"        CARDIAC IMAGING:  Echo (3/3/23)    Left Ventricle: The EF by visual approximation is 15 - 20%. Left ventricle is moderately dilated. Severely increased wall thickness. Severe global hypokinesis present. Right Ventricle: Not well visualized. Mitral Valve: Thickened leaflets. Moderate annular calcification of the mitral valve. Left Atrium: Left atrium is dilated.   LVEDD 6.1cm     Echo 10/21/22 Left Ventricle: Severely reduced left ventricular systolic function with a visually estimated EF of 20 - 25%. Left ventricle is moderately dilated. Mild posterior thickening. Normal wall motion. Normal diastolic function. Left Atrium: Left atrium is moderately dilated. Mitral Valve: Mild regurgitation with a centrally directed jet. Tricuspid Valve: Mild regurgitation with a centrally directed jet. Echo (3/9/22)    Study limited to the assessment of ejection fraction. Left Ventricle: Left ventricle is moderately dilated. Mildly increased wall thickness. Severe global hypokinesis present. Calculared ejection fraction is 28% by 3D volume and 31% by 2D Fernandez's biplane. Global longitudinal strain is reduced with a value of -5.8%. Echo (12/6/21)  LV: Calculated LVEF is 28%. Biplane method used to measure ejection fraction. Mildly dilated left ventricle. Mildly to moderately increased wall thickness. Moderate-to-severely and globally reduced systolic function. Moderate (grade 2) left ventricular diastolic dysfunction. LA: Mildly dilated left atrium. Left Atrium volume index is 40 mL/m2. MV: Moderate mitral annular calcification. Very mild mitral valve regurgitation is present. RV: Not well visualized. Pacer/ICD present. Echo (2/17/21)  LV: Calculated LVEF is 29%. Biplane method used to measure ejection fraction. Mildly dilated left ventricle. Mild to moderate hypertrophy. Severely and globally reduced systolic function. Wall motion: normal. Abnormal left ventricular strain. Global longitudinal strain is -8%. Moderate (grade 2) left ventricular diastolic dysfunction. LA: Mildly dilated left atrium. Left Atrium volume index is 40 mL/m2. MV: Moderate mitral annular calcification. Very mild mitral valve regurgitation is present. Echo (8/16/19)  Left Ventricle: Mildly dilated left ventricle. Moderate concentric hypertrophy. Severe global systolic dysfunction.  Estimated left ventricular ejection fraction is 21 - 25%. Biplane method used to measure ejection fraction. Left ventricular global hypokinesis. Abnormal left ventricular strain. Inconclusive left ventricular diastolic function. Left Atrium: Mildly dilated left atrium. Mitral Valve: Moderate mitral annular calcification. Mild mitral valve regurgitation. EKG (3/2/23): SR with 1st degree AVB  EKG (8/5/19) NSR 73bpm QRS 124ms        LHC (2/105) normal cors  ICD interrogation     HEMODYNAMICS:  RHC not done  CPEST not done  6MW not done     OTHER IMAGING:  CXR 3/2/23: enlarged cardiac silhouette; no edema   CT chest not done       PHYSICAL EXAM:  Visit Vitals  /63 (BP 1 Location: Left upper arm, BP Patient Position: At rest)   Pulse 77   Temp 98.5 °F (36.9 °C)   Resp 18   Ht 5' 8\" (1.727 m)   Wt 228 lb 6.3 oz (103.6 kg)   SpO2 93%   BMI 34.73 kg/m²     General: Patient is well developed, well-nourished in no acute distress  HEENT: Unremarkable   Neck: Supple. No evidence of thyroid enlargements or lymphadenopathy. JVD: Cannot be appreciated   Lungs: Breath sounds are equal and clear bilaterally. No wheezes, rhonchi, or rales. Heart: Regular rate and rhythm with normal S1 and S2. No murmurs, gallops or rubs. Abdomen: Soft, no mass or tenderness. No organomegaly or hernia. Bowel sounds present. Genitourinary and rectal: deferred  Extremities: No cyanosis, clubbing, or edema. Neurologic: No focal sensory or motor deficits are noted. Grossly intact. Psychiatric: Awake, alert an doriented x 3. Appropriate mood and affect. Skin: Warm, dry and well perfused. REVIEW OF SYSTEMS:  General: Denies fever. Ear, nose and throat: Denies difficulty hearing, sinus problems, nosebleeds  Cardiovascular: see above in the interval history  Respiratory: Denies cough, wheezing, sputum production, hemoptysis.   Gastrointestinal: Denies heartburn, constipation, diarrhea, abdominal pain, nausea, blood in stool  Kidney and bladder: Denies painful urination, frequent urination  Musculoskeletal: Denies joint pain, muscle weakness  Skin and hair: Denies change in existing skin lesions    PAST MEDICAL HISTORY:  Past Medical History:   Diagnosis Date    Diabetes ([address]     Heart failure (HCC)     CHF, cardiomyopathy    Hypertension     ICD (implantable cardioverter-defibrillator) in place     left upper chest       PAST SURGICAL HISTORY:  Past Surgical History:   Procedure Laterality Date    HX HEART CATHETERIZATION  2/2015    x1    HX IMPLANTABLE CARDIOVERTER DEFIBRILLATOR  2/16/2015       FAMILY HISTORY:  Family History   Problem Relation Age of Onset    Diabetes Mother     Hypertension Father     Diabetes Father     Diabetes Brother     Hypertension Brother        SOCIAL HISTORY:  Social History     Socioeconomic History    Marital status:    Tobacco Use    Smoking status: Never    Smokeless tobacco: Never   Vaping Use    Vaping Use: Never used   Substance and Sexual Activity    Alcohol use: Yes     Comment: 1 a month    Drug use: No    Sexual activity: Not Currently       LABORATORY RESULTS:     Labs Latest Ref Rng & Units 3/5/2023 3/4/2023 3/3/2023 3/2/2023 2/28/2023 1/13/2023   WBC 4.1 - 11.1 K/uL 8.5 8.0 8.1 6.8 - -   RBC 4.10 - 5.70 M/uL 3.56(L) 3.49(L) 3.61(L) 4.05(L) - -   Hemoglobin 12.1 - 17.0 g/dL 9.1(L) 8.8(L) 9.2(L) 10. 2(L) - -   Hematocrit 36.6 - 50.3 % 30. 7(L) 28. 9(L) 31. 2(L) 34. 3(L) - -   MCV 80.0 - 99.0 FL 86.2 82.8 86.4 84.7 - -   Platelets 667 - 978 K/uL 213 196 195 211 - -   Lymphocytes 12 - 49 % - - 11(L) 12 - -   Monocytes 5 - 13 % - - 8 8 - -   Eosinophils 0 - 7 % - - 5 8(H) - -   Basophils 0 - 1 % - - 1 1 - -   Albumin 3.5 - 5.0 g/dL - - 2. 6(L) 3. 2(L) - 3.6   Calcium 8.5 - 10.1 MG/DL 9.0 9.2 8.2(L) 9.0 8.9 9.0   Glucose 65 - 100 mg/dL 180(H) 78 137(H) 120(H) 160(H) 152(H)   BUN 6 - 20 MG/DL 31(H) 33(H) 36(H) 43(H) 38(H) 42(H)   Creatinine 0.70 - 1.30 MG/DL 2.43(H) 2.52(H) 2.46(H) 3.06(H) 2.83(H) 2.91(H)   Sodium 136 - 145 mmol/L 139 142 142 140 145(H) 140   Potassium 3.5 - 5.1 mmol/L 3.5 3. 2(L) 3. 3(L) 3.7 4.0 4.0   Some recent data might be hidden     Lab Results   Component Value Date/Time    TSH 2.080 05/05/2022 01:34 PM    TSH 1.05 02/11/2015 01:30 PM       ALLERGY:  No Known Allergies     CURRENT MEDICATIONS:    Current Facility-Administered Medications:     hydrALAZINE (APRESOLINE) tablet 25 mg, 25 mg, Oral, BID, Claudine Phipps MD, 25 mg at 03/05/23 1001    amLODIPine (NORVASC) tablet 5 mg, 5 mg, Oral, Q12H, Claudine Phipps MD, 5 mg at 03/05/23 1001    isosorbide dinitrate (ISORDIL) tablet 20 mg, 20 mg, Oral, BID, Claudine Phipps MD, 20 mg at 03/05/23 1001    insulin NPH (NOVOLIN N, HUMULIN N) injection 5 Units, 5 Units, SubCUTAneous, Q12H, Anthony Chavez MD, 5 Units at 03/05/23 1000    bumetanide (BUMEX) injection 4 mg, 4 mg, IntraVENous, Q12H, Roxanne Cotto MD, 4 mg at 03/05/23 1012    potassium bicarb-citric acid (EFFER-K) tablet 40 mEq, 40 mEq, Oral, BID, Roxanne Cotto MD, 40 mEq at 03/05/23 1001    spironolactone (ALDACTONE) tablet 25 mg, 25 mg, Oral, DAILY, Roxanne Cotto MD, 25 mg at 03/05/23 1001    dextrose 10 % infusion 0-250 mL, 0-250 mL, IntraVENous, PRN, Steffanie Fall CNS    insulin lispro (HUMALOG) injection 5 Units, 0.05 Units/kg, SubCUTAneous, TID WITH MEALS, Steffanie Fall CNS, 5 Units at 03/05/23 1000    insulin lispro (HUMALOG) injection, , SubCUTAneous, AC&HS, Steffanie Fall CNS, 1 Units at 03/04/23 2202    traMADoL (ULTRAM) tablet 50 mg, 50 mg, Oral, Q6H PRN, Josy Michel MD, 50 mg at 03/04/23 0240    allopurinoL (ZYLOPRIM) tablet 50 mg, 50 mg, Oral, DAILY, Juvencio Han MD, 50 mg at 03/05/23 1001    carvediloL (COREG) tablet 25 mg, 25 mg, Oral, BID WITH MEALS, Juvencio Han MD, 25 mg at 03/05/23 1001    ergocalciferol capsule 50,000 Units, 50,000 Units, Oral, Q7D, Evan Milner MD, 50,000 Units at 03/02/23 2148    multivitamin, tx-iron-ca-min (THERA-M w/ IRON) tablet 1 Tablet, 1 Tablet, Oral, ACB, EmelyJuvencio farnsworth MD, 1 Tablet at 03/05/23 0723    glucose chewable tablet 16 g, 4 Tablet, Oral, PRN, Mirian Han MD    glucagon (GLUCAGEN) injection 1 mg, 1 mg, IntraMUSCular, PRN, Juvencio Han MD    dextrose 10 % infusion 0-250 mL, 0-250 mL, IntraVENous, PRN, Mirian Han MD    acetaminophen (TYLENOL) tablet 650 mg, 650 mg, Oral, Q6H PRN, Harjeet Allen MD, 650 mg at 03/03/23 0711    heparin (porcine) injection 5,000 Units, 5,000 Units, SubCUTAneous, Q8H, Juvencio Han MD, 5,000 Units at 03/05/23 0723    sodium chloride (NS) flush 5-40 mL, 5-40 mL, IntraVENous, Q8H, Juvencio Han MD, 10 mL at 03/05/23 0723    sodium chloride (NS) flush 5-40 mL, 5-40 mL, IntraVENous, PRN, Mirian Han MD    polyethylene glycol (MIRALAX) packet 17 g, 17 g, Oral, DAILY PRN, Mirian Han MD    ondansetron (ZOFRAN ODT) tablet 4 mg, 4 mg, Oral, Q8H PRN **OR** ondansetron (ZOFRAN) injection 4 mg, 4 mg, IntraVENous, Q6H PRN, Harjeet Allen MD    PATIENT CARE TEAM:  Patient Care Team:  Lara Perry MD as PCP - General (Family Medicine)  Harriett Clark MD (Cardiovascular Disease Physician)  Eri Mullins MD (Nephrology)  Missy Funk MD (Internal Medicine Physician)     Thank you for allowing me to participate in this patient's care.     Logan Senior MD   [address] [place] [address], [address]  Phone: [phone]

## 2023-03-05 NOTE — PROGRESS NOTES
0730: Bedside and Verbal shift change report given to Avazeb Abdi 43 (oncoming nurse) by Rene Renee (offgoing nurse). Report included the following information SBAR, Kardex, Intake/Output, MAR, Recent Results, Med Rec Status, and Cardiac Rhythm NSR .

## 2023-03-05 NOTE — PROGRESS NOTES
0730: Bedside shift change report given to Abhishek Garcia RN (oncoming nurse) by TAHMINA Goldman (offgoing nurse). Report included the following information SBAR, MAR, and Recent Results. 2000: Bedside shift change report given to Domi Turner RN (oncoming nurse) by Abhishek Garcia RN (offgoing nurse). Report included the following information SBAR, MAR, and Recent Results.

## 2023-03-06 ENCOUNTER — TRANSCRIBE ORDER (OUTPATIENT)
Dept: CARDIAC REHAB | Age: 58
End: 2023-03-06

## 2023-03-06 DIAGNOSIS — I50.22 SYSTOLIC CHF, CHRONIC (HCC): Primary | ICD-10-CM

## 2023-03-06 LAB
ANION GAP SERPL CALC-SCNC: 6 MMOL/L (ref 5–15)
BUN SERPL-MCNC: 34 MG/DL (ref 6–20)
BUN/CREAT SERPL: 14 (ref 12–20)
CALCIUM SERPL-MCNC: 9.2 MG/DL (ref 8.5–10.1)
CHLORIDE SERPL-SCNC: 103 MMOL/L (ref 97–108)
CO2 SERPL-SCNC: 30 MMOL/L (ref 21–32)
CREAT SERPL-MCNC: 2.46 MG/DL (ref 0.7–1.3)
ERYTHROCYTE [DISTWIDTH] IN BLOOD BY AUTOMATED COUNT: 17.4 % (ref 11.5–14.5)
GLUCOSE BLD STRIP.AUTO-MCNC: 179 MG/DL (ref 65–117)
GLUCOSE BLD STRIP.AUTO-MCNC: 190 MG/DL (ref 65–117)
GLUCOSE BLD STRIP.AUTO-MCNC: 203 MG/DL (ref 65–117)
GLUCOSE BLD STRIP.AUTO-MCNC: 213 MG/DL (ref 65–117)
GLUCOSE SERPL-MCNC: 170 MG/DL (ref 65–100)
HCT VFR BLD AUTO: 30.1 % (ref 36.6–50.3)
HGB BLD-MCNC: 8.8 G/DL (ref 12.1–17)
KAPPA LC FREE SER-MCNC: 64.3 MG/L (ref 3.3–19.4)
KAPPA LC FREE/LAMBDA FREE SER: 1.09 (ref 0.26–1.65)
LAMBDA LC FREE SERPL-MCNC: 59 MG/L (ref 5.7–26.3)
MCH RBC QN AUTO: 24.9 PG (ref 26–34)
MCHC RBC AUTO-ENTMCNC: 29.2 G/DL (ref 30–36.5)
MCV RBC AUTO: 85 FL (ref 80–99)
NRBC # BLD: 0 K/UL (ref 0–0.01)
NRBC BLD-RTO: 0 PER 100 WBC
PLATELET # BLD AUTO: 217 K/UL (ref 150–400)
PMV BLD AUTO: 11.8 FL (ref 8.9–12.9)
POTASSIUM SERPL-SCNC: 3.9 MMOL/L (ref 3.5–5.1)
RBC # BLD AUTO: 3.54 M/UL (ref 4.1–5.7)
SARS-COV-2 RDRP RESP QL NAA+PROBE: NOT DETECTED
SERVICE CMNT-IMP: ABNORMAL
SODIUM SERPL-SCNC: 139 MMOL/L (ref 136–145)
SOURCE, COVRS: NORMAL
WBC # BLD AUTO: 8.4 K/UL (ref 4.1–11.1)

## 2023-03-06 PROCEDURE — 65660000001 HC RM ICU INTERMED STEPDOWN

## 2023-03-06 PROCEDURE — 99231 SBSQ HOSP IP/OBS SF/LOW 25: CPT | Performed by: CLINICAL NURSE SPECIALIST

## 2023-03-06 PROCEDURE — 74011250636 HC RX REV CODE- 250/636: Performed by: FAMILY MEDICINE

## 2023-03-06 PROCEDURE — 74011250637 HC RX REV CODE- 250/637: Performed by: HOSPITALIST

## 2023-03-06 PROCEDURE — 74011250637 HC RX REV CODE- 250/637: Performed by: FAMILY MEDICINE

## 2023-03-06 PROCEDURE — 74011250637 HC RX REV CODE- 250/637: Performed by: INTERNAL MEDICINE

## 2023-03-06 PROCEDURE — 85027 COMPLETE CBC AUTOMATED: CPT

## 2023-03-06 PROCEDURE — 74011000250 HC RX REV CODE- 250: Performed by: FAMILY MEDICINE

## 2023-03-06 PROCEDURE — 80048 BASIC METABOLIC PNL TOTAL CA: CPT

## 2023-03-06 PROCEDURE — 99233 SBSQ HOSP IP/OBS HIGH 50: CPT | Performed by: NURSE PRACTITIONER

## 2023-03-06 PROCEDURE — 74011636637 HC RX REV CODE- 636/637: Performed by: INTERNAL MEDICINE

## 2023-03-06 PROCEDURE — 36415 COLL VENOUS BLD VENIPUNCTURE: CPT

## 2023-03-06 PROCEDURE — 82962 GLUCOSE BLOOD TEST: CPT

## 2023-03-06 PROCEDURE — 74011636637 HC RX REV CODE- 636/637: Performed by: CLINICAL NURSE SPECIALIST

## 2023-03-06 PROCEDURE — 87635 SARS-COV-2 COVID-19 AMP PRB: CPT

## 2023-03-06 RX ORDER — BUMETANIDE 1 MG/1
2 TABLET ORAL 2 TIMES DAILY
Status: DISCONTINUED | OUTPATIENT
Start: 2023-03-06 | End: 2023-03-07

## 2023-03-06 RX ADMIN — CARVEDILOL 25 MG: 12.5 TABLET, FILM COATED ORAL at 18:12

## 2023-03-06 RX ADMIN — Medication 5 UNITS: at 12:51

## 2023-03-06 RX ADMIN — ISOSORBIDE DINITRATE 30 MG: 20 TABLET ORAL at 09:01

## 2023-03-06 RX ADMIN — Medication 5 UNITS: at 09:01

## 2023-03-06 RX ADMIN — SPIRONOLACTONE 25 MG: 25 TABLET ORAL at 09:01

## 2023-03-06 RX ADMIN — Medication 2 UNITS: at 12:50

## 2023-03-06 RX ADMIN — TRAMADOL HYDROCHLORIDE 50 MG: 50 TABLET ORAL at 21:37

## 2023-03-06 RX ADMIN — Medication 10 ML: at 07:37

## 2023-03-06 RX ADMIN — HEPARIN SODIUM 5000 UNITS: 5000 INJECTION INTRAVENOUS; SUBCUTANEOUS at 21:38

## 2023-03-06 RX ADMIN — HYDRALAZINE HYDROCHLORIDE 50 MG: 50 TABLET, FILM COATED ORAL at 18:13

## 2023-03-06 RX ADMIN — Medication 213 UNITS: at 21:38

## 2023-03-06 RX ADMIN — HYDRALAZINE HYDROCHLORIDE 50 MG: 50 TABLET, FILM COATED ORAL at 09:01

## 2023-03-06 RX ADMIN — POTASSIUM BICARBONATE 40 MEQ: 782 TABLET, EFFERVESCENT ORAL at 18:12

## 2023-03-06 RX ADMIN — Medication 10 ML: at 14:31

## 2023-03-06 RX ADMIN — ISOSORBIDE DINITRATE 30 MG: 20 TABLET ORAL at 18:13

## 2023-03-06 RX ADMIN — HEPARIN SODIUM 5000 UNITS: 5000 INJECTION INTRAVENOUS; SUBCUTANEOUS at 07:36

## 2023-03-06 RX ADMIN — ALLOPURINOL 50 MG: 100 TABLET ORAL at 09:01

## 2023-03-06 RX ADMIN — Medication 5 UNITS: at 21:38

## 2023-03-06 RX ADMIN — HEPARIN SODIUM 5000 UNITS: 5000 INJECTION INTRAVENOUS; SUBCUTANEOUS at 14:30

## 2023-03-06 RX ADMIN — MULTIPLE VITAMINS W/ MINERALS TAB 1 TABLET: TAB at 07:37

## 2023-03-06 RX ADMIN — POTASSIUM BICARBONATE 40 MEQ: 782 TABLET, EFFERVESCENT ORAL at 09:01

## 2023-03-06 RX ADMIN — Medication 10 ML: at 21:39

## 2023-03-06 RX ADMIN — CARVEDILOL 25 MG: 12.5 TABLET, FILM COATED ORAL at 09:01

## 2023-03-06 RX ADMIN — BUMETANIDE 2 MG: 1 TABLET ORAL at 18:12

## 2023-03-06 RX ADMIN — Medication 5 UNITS: at 18:12

## 2023-03-06 RX ADMIN — BUMETANIDE 2 MG: 1 TABLET ORAL at 09:01

## 2023-03-06 NOTE — CONSULTS
Nutrition Education    Consult appreciated. Pt engaged in education. States he does not use salt shaker or cook with salt at home but does eat out/fast food/convenience foods. Reviewed low Na+ food options and high Na+ foods to limit. Pt also admits to drinking sodas. Provided suggestions for sugar-free beverages. Written materials left with pt. Educated on CHF and DM diet. Learners: Patient  Readiness: Acceptance  Method: Explanation and Handout  Response: Verbalizes Understanding  Contact name and number provided.     Governor RANDY Hightower  Contact via [employer]

## 2023-03-06 NOTE — DIABETES MGMT
[address] [place] [address]  DIABETES MANAGEMENT CONSULT    Consulted by  Beau Zarate MD  for advanced nursing evaluation and care for inpatient blood glucose management. Evaluation and Action Plan   Faith Pike is a 56year old gentleman, with Type 2 Diabetes on 70/30 insulin, who is admitted with acute on chronic CHF exacerbation. His A1C remains elevated at 8.4% and he verbalizes that he has struggled to get it below 8%. This admission, he did take his morning 70/30 insulin 20 units at breakfast.  His BG was 120 on admission but fell to 66 after not eating in the afternoon. Evening insulin has been held and BG was 137. He has eaten breakfast.  At this time, factors that are impacting BG pattern are his heart failure, ANA, elevated BMI and decreased PO intake. He will need insulin to resume now and ordered at reduced doses. Basal insulin was cut by 50% and BG slightly over goal.  Will modify basal insulin now. Inpatient BG goal is 140-180mg/dl. Management Rationale Action Plan   Medication   Basal needs Using 0.25 units/kg/D Increase NPH to 8 units Q12 once cath is complete     (Only needs 5 units Q12 if NPO)   Nutritional needs Using low sensitivity Continue 5 units Humalog/meal    Hold if patient is NPO or consumes less than 50% of carbohydrates on meal tray    Advance by 2 units daily for persistent pre-prandial hyperglycemia     Corrective insulin Using HIGH sensitivity based on  High sensitivity due to ANA/reduced GFR   Additional orders  Consistent carbohydrate diet (60g CHO/meal)       Diabetes Discharge Plan   Medication  TBD   Referral  [x]        Outpatient diabetes education   Additional orders            Initial Presentation   Faith Pike is a 56 y.o. male admitted from the advanced heart failure center on 3/2/23 with a 14lb weight gain in 5 days. LAB: GFR 23, BNP 9738, A1C 8.4%  CXR: Enlarged cardiopericardial silhouette.       HX:   Past Medical History: Diagnosis Date    Diabetes ([facility]     Heart failure ([facility]     CHF, cardiomyopathy    Hypertension     ICD (implantable cardioverter-defibrillator) in place     left upper chest        INITIAL DX:   Acute on chronic HFrEF (heart failure with reduced ejection fraction) ([facility] [I50.23]  Cardiomyopathy ([facility] [I42.9]  Acute kidney injury superimposed on chronic kidney disease ([facility] [N17.9, N18.9]     Current Treatment     TX: Diuresis, Nephrologist     Hospital Course   Clinical progress has been uncomplicated. 3/2: Admission  Diabetes History   Type 2 Diabetes: Diagnosed about 10 years ago  Ambulatory BG management provided by: PCP Sabine Scruggs MD  He did see an endocrinologist at [address] [place] [address] this past summer once but does not remember name. Does not want to go back to that practice. Diabetes-related Medical History  Microvascular disease  Nephropathy  Other associated conditions     CHF    Diabetes Medication History  Key Antihyperglycemic Medications               insulin NPH/insulin regular (NovoLIN 70/30 U-100 Insulin) 100 unit/mL (70-30) injection (Taking) 26-27 Units by SubCUTAneous route Daily (before dinner). (22 units in the morning; 26-27 units in the evening; evening dose is on a sliding scale)    insulin NPH/insulin regular (NOVOLIN 70/30, HUMULIN 70/30) 100 unit/mL (70-30) injection (Taking) 22 Units by SubCUTAneous route Daily (before breakfast).  (22 units in the morning; 26 units in the evening)             Diabetes self-management practices:   Eating pattern     [] Breakfast  Hash brown and coffee  [] Lunch   [place]  [] Dinner   \"Typical dinner foods\" would not elaborate  [] Bedtime  None  [] Snacks   Limited  [] Beverages  Regular soda, water, tea  [] Dentition status Normal  Physical activity pattern   No intentional activity   Monitoring pattern   Tests twice daily   Fasting    Before Dinner: 150-200, did see a 300 this month  Taking medications pattern  [x] Consistent administration  [x] Affordable  Reducing risks  [] Influenza: There is no immunization history for the selected administration types on file for this patient. [] Pneumonia:   Immunization History   Administered Date(s) Administered    Pneumococcal Polysaccharide (PPSV-23) 02/10/2015     [] Hepatitis: There is no immunization history for the selected administration types on file for this patient. Social determinants of health impacting diabetes self-management practices   Concerned that you need to know more about how to stay healthy with diabetes  Overall evaluation:    [x] Not achieving A1c target with drug therapy & self-care practices    Subjective   I am tired.      Objective   Physical exam  General Obese AA male in acute distress/ill-appearing. Conversant and cooperative  Neuro  Alert, oriented   Vital Signs Visit Vitals  /79 (BP 1 Location: Left upper arm, BP Patient Position: At rest)   Pulse 78   Temp 97.9 °F (36.6 °C)   Resp 16   Ht 5' 8\" (1.727 m)   Wt 102.2 kg (225 lb 5 oz)   SpO2 93%   BMI 34.26 kg/m²     Skin  Warm and dry. Acanthosis noted along neckline. No lipohypertrophy or lipoatrophy noted at injection sites   Heart   Regular rate and rhythm.  No murmurs, rubs or gallops  Lungs  Clear to auscultation without rales or rhonchi  Extremities No foot wounds, edema in extremities        Laboratory  Recent Labs     23  0518 23  0425 23  0248   * 180* 78   AGAP 6 7 6   WBC 8.4 8.5 8.0   CREA 2.46* 2.43* 2.52*         Factors impacting BG management  Factor Dose Comments   Nutrition:  Standard meals     60 grams/meal    Cardiomyopathy and systolic HF AICD  EG   Diuresis    Other:   Kidney function   ANA on CKD  GFR 23 on admit  Now 30      Blood glucose pattern    Significant diabetes-related events over the past 24-72 hours  A1C 8.4%  Fasting B/180  Pre-prandial B-203  A1C 8.4% (up from 8.1% )  Per AHF: Patient will likely need to stay for initiation of chronic inotropes as bridge to renal recovery and inpatient workup for combined heart-kidney transplantation; of note, patient is currently not a candidate for LVAD due to Cr > 1.8    RHC? ? Assessment and Nursing Intervention   Nursing Diagnosis Risk for unstable blood glucose pattern   Nursing Intervention Domain 5250 Decision-making Support   Nursing Interventions Examined current inpatient diabetes/blood glucose control   Explored factors facilitating and impeding inpatient management  Explored corrective strategies with patient and responsible inpatient provider   Informed patient of rational for insulin strategy while hospitalized     Nursing Diagnosis [address]74 Ineffective Health Management   Nursing Intervention Domain 5250 Decision-making Support   Nursing Interventions Identified diabetes self-management practices impeding diabetes control  Discussed diabetes survival skills related to  Healthy Plate eating plan; given handouts  Role of physical activity in improving insulin sensitivity and action  Procedure for blood glucose monitoring & options for low-cost products  Medications plan at discharge     Billing Code(s)   [phone]    Before making these care recommendations, I personally reviewed the hospitalization record, including notes, laboratory & diagnostic data and current medications, and examined the patient at the bedside (circumstances permitting) before determining care. More than fifty (50) percent of the time was spent in patient counseling and/or care coordination.   Total minutes: 25    PREET Lezama  Diabetes Clinical Nurse Specialist  [facility]  Access via [employer] No Repair - Repaired With Adjacent Surgical Defect Text (Leave Blank If You Do Not Want): After obtaining clear surgical margins the defect was repaired concurrently with another surgical defect which was in close approximation.

## 2023-03-06 NOTE — PROGRESS NOTES
Problem: Diabetes Self-Management  Goal: *Disease process and treatment process  Description: Define diabetes and identify own type of diabetes; list 3 options for treating diabetes. Outcome: Progressing Towards Goal  Goal: *Incorporating nutritional management into lifestyle  Description: Describe effect of type, amount and timing of food on blood glucose; list 3 methods for planning meals. Outcome: Progressing Towards Goal  Goal: *Incorporating physical activity into lifestyle  Description: State effect of exercise on blood glucose levels. Outcome: Progressing Towards Goal  Goal: *Developing strategies to promote health/change behavior  Description: Define the ABC's of diabetes; identify appropriate screenings, schedule and personal plan for screenings. Outcome: Progressing Towards Goal  Goal: *Using medications safely  Description: State effect of diabetes medications on diabetes; name diabetes medication taking, action and side effects. Outcome: Progressing Towards Goal  Goal: *Monitoring blood glucose, interpreting and using results  Description: Identify recommended blood glucose targets  and personal targets. Outcome: Progressing Towards Goal  Goal: *Prevention, detection, treatment of acute complications  Description: List symptoms of hyper- and hypoglycemia; describe how to treat low blood sugar and actions for lowering  high blood glucose level. Outcome: Progressing Towards Goal  Goal: *Prevention, detection and treatment of chronic complications  Description: Define the natural course of diabetes and describe the relationship of blood glucose levels to long term complications of diabetes.   Outcome: Progressing Towards Goal  Goal: *Developing strategies to address psychosocial issues  Description: Describe feelings about living with diabetes; identify support needed and support network  Outcome: Progressing Towards Goal     Problem: Patient Education: Go to Patient Education Activity  Goal: Patient/Family Education  Outcome: Progressing Towards Goal     Problem: Patient Education: Go to Patient Education Activity  Goal: Patient/Family Education  Outcome: Progressing Towards Goal     Problem: Heart Failure: Day 3  Goal: Off Pathway (Use only if patient is Off Pathway)  Outcome: Progressing Towards Goal  Goal: Activity/Safety  Outcome: Progressing Towards Goal  Goal: Diagnostic Test/Procedures  Outcome: Progressing Towards Goal  Goal: Nutrition/Diet  Outcome: Progressing Towards Goal  Goal: Discharge Planning  Outcome: Progressing Towards Goal  Goal: Medications  Outcome: Progressing Towards Goal  Goal: Respiratory  Outcome: Progressing Towards Goal  Goal: Treatments/Interventions/Procedures  Outcome: Progressing Towards Goal  Goal: Psychosocial  Outcome: Progressing Towards Goal  Goal: *Oxygen saturation within defined limits  Outcome: Progressing Towards Goal  Goal: *Hemodynamically stable  Outcome: Progressing Towards Goal  Goal: *Optimal pain control at patient's stated goal  Outcome: Progressing Towards Goal  Goal: *Anxiety reduced or absent  Outcome: Progressing Towards Goal  Goal: *Demonstrates progressive activity  Outcome: Progressing Towards Goal     Problem: Falls - Risk of  Goal: *Absence of Falls  Description: Document Ojsé Manuel Fall Risk and appropriate interventions in the flowsheet.   Outcome: Progressing Towards Goal  Note: Fall Risk Interventions:            Medication Interventions: Teach patient to arise slowly, Patient to call before getting OOB                   Problem: Patient Education: Go to Patient Education Activity  Goal: Patient/Family Education  Outcome: Progressing Towards Goal

## 2023-03-06 NOTE — PROGRESS NOTES
R/L heart catheterization tentatively scheduled for 3:30 PM tomorrow and pending rapid covid test ordered by primary team.  .  WIll move up if schedule allows.

## 2023-03-06 NOTE — PROGRESS NOTES
[facility]   [address] [facility], [address] [place] [address]  Phone: [phone]   [phone]    [email or web address]     Nephrology Progress Note    Patient Name : Chase Gomez      : 1965     MRN : [id number]  Date of Admission : 3/2/2023  Date of Servive : 23    CC: Follow up for ANA       Assessment and Plan   ANA on CKD  -Suspect progressive CKD from poorly controlled DM, HTN  -Switched IV Bumex to PO but can decrease to 2mg PO BID from 4 mg as volume status had been better  -Strict I's and O's and Daily labs  - Cr 2.46 from 2.61     CKD 4  -2/2 DM, HTN  -Previously nonnephrotic proteinuria  -UPCR 0.8 mg/mg  -renal US: MRD  -paraproteinemia screen pending  -Baseline creatinine: uncertain  -He will follow-up with Dr. Coby Arreola on discharge    Acute on chronic HFrEF  Nonischemic CMP, s/p AICD  -Last ECHO EF 20 to 25%  -MX per AHF team  -Cont Aldactone 25 qd  -only start Entresto once GFR more stable - for now, Hydralazine/Isordil     DM 2  -Poorly controlled, A1c 8.4  -Establish care with endocrinology     HTN  -No changes to BP meds until volume status optimized        Interval History:  Breathing better. No cough, fever, n/v. UO 1090cc    Review of Systems: A comprehensive review of systems was negative except for that written in the HPI.     Current Medications:   Current Facility-Administered Medications   Medication Dose Route Frequency    bumetanide (BUMEX) tablet 2 mg  2 mg Oral BID    hydrALAZINE (APRESOLINE) tablet 50 mg  50 mg Oral BID    isosorbide dinitrate (ISORDIL) tablet 30 mg  30 mg Oral BID    insulin NPH (NOVOLIN N, HUMULIN N) injection 5 Units  5 Units SubCUTAneous Q12H    potassium bicarb-citric acid (EFFER-K) tablet 40 mEq  40 mEq Oral BID    spironolactone (ALDACTONE) tablet 25 mg  25 mg Oral DAILY    dextrose 10 % infusion 0-250 mL  0-250 mL IntraVENous PRN    insulin lispro (HUMALOG) injection 5 Units  0.05 Units/kg SubCUTAneous TID WITH MEALS    insulin lispro (HUMALOG) injection   SubCUTAneous AC&HS    traMADoL (ULTRAM) tablet 50 mg  50 mg Oral Q6H PRN    allopurinoL (ZYLOPRIM) tablet 50 mg  50 mg Oral DAILY    carvediloL (COREG) tablet 25 mg  25 mg Oral BID WITH MEALS    ergocalciferol capsule 50,000 Units  50,000 Units Oral Q7D    multivitamin, tx-iron-ca-min (THERA-M w/ IRON) tablet 1 Tablet  1 Tablet Oral ACB    glucose chewable tablet 16 g  4 Tablet Oral PRN    glucagon (GLUCAGEN) injection 1 mg  1 mg IntraMUSCular PRN    dextrose 10 % infusion 0-250 mL  0-250 mL IntraVENous PRN    acetaminophen (TYLENOL) tablet 650 mg  650 mg Oral Q6H PRN    heparin (porcine) injection 5,000 Units  5,000 Units SubCUTAneous Q8H    sodium chloride (NS) flush 5-40 mL  5-40 mL IntraVENous Q8H    sodium chloride (NS) flush 5-40 mL  5-40 mL IntraVENous PRN    polyethylene glycol (MIRALAX) packet 17 g  17 g Oral DAILY PRN    ondansetron (ZOFRAN ODT) tablet 4 mg  4 mg Oral Q8H PRN    Or    ondansetron (ZOFRAN) injection 4 mg  4 mg IntraVENous Q6H PRN      No Known Allergies    Objective:  Vitals:    Vitals:    03/06/23 0354 03/06/23 0459 03/06/23 0600 03/06/23 0737   BP:  137/71  136/72   Pulse: 77 78 74 75   Resp:  18  16   Temp:  98.4 °F (36.9 °C)  98.3 °F (36.8 °C)   SpO2:  95%  94%   Weight:    102.2 kg (225 lb 5 oz)   Height:         Intake and Output:  03/06 0701 - 03/06 1900  In: -   Out: 1000 [Urine:1000]  03/04 1901 - 03/06 0700  In: 1660 [P.O.:1660]  Out: Arch Cinnamon [NOPNK:8251]    Physical Examination:    Pt intubated    No    General: NAD,Conversant   Neck:  Supple, no mass  Resp:  Lungs basal rales, no wheezing , normal respiratory effort  CV:  RRR,  no murmur or rub, better LE edema  GI:  Soft, NT, + BS, no HS megaly  Neurologic:  Non focal  Psych:             AAO x 3 appropriate affect   Skin:  No Rash  :  Zelaya  -  Dialysis Access : NA    []    High complexity decision making was performed  []    Patient is at high-risk of decompensation with multiple organ involvement    Lab Data Personally Reviewed: I have reviewed all the pertinent labs, microbiology data and radiology studies during assessment.     Recent Labs     03/06/23 0518 03/05/23 0425 03/04/23 0248    139 142   K 3.9 3.5 3.2*    103 106   CO2 30 29 30   * 180* 78   BUN 34* 31* 33*   CREA 2.46* 2.43* 2.52*   CA 9.2 9.0 9.2     Recent Labs     03/06/23 0518 03/05/23 0425 03/04/23  0248   WBC 8.4 8.5 8.0   HGB 8.8* 9.1* 8.8*   HCT 30.1* 30.7* 28.9*    213 196     No results found for: SDES  No results found for: CULT  Recent Results (from the past 24 hour(s))   OCCULT BLOOD, STOOL    Collection Time: 03/05/23 10:20 AM   Result Value Ref Range    Occult blood, stool Negative NEG     GLUCOSE, POC    Collection Time: 03/05/23 11:40 AM   Result Value Ref Range    Glucose (POC) 220 (H) 65 - 117 mg/dL    Performed by Yusef CADENA    GLUCOSE, POC    Collection Time: 03/05/23  1:17 PM   Result Value Ref Range    Glucose (POC) 176 (H) 65 - 117 mg/dL    Performed by [address] [place] [address], POC    Collection Time: 03/05/23  5:11 PM   Result Value Ref Range    Glucose (POC) 182 (H) 65 - 117 mg/dL    Performed by Romeo Moon    GLUCOSE, POC    Collection Time: 03/05/23 10:47 PM   Result Value Ref Range    Glucose (POC) 210 (H) 65 - 117 mg/dL    Performed by Il Joel    CBC W/O DIFF    Collection Time: 03/06/23  5:18 AM   Result Value Ref Range    WBC 8.4 4.1 - 11.1 K/uL    RBC 3.54 (L) 4.10 - 5.70 M/uL    HGB 8.8 (L) 12.1 - 17.0 g/dL    HCT 30.1 (L) 36.6 - 50.3 %    MCV 85.0 80.0 - 99.0 FL    MCH 24.9 (L) 26.0 - 34.0 PG    MCHC 29.2 (L) 30.0 - 36.5 g/dL    RDW 17.4 (H) 11.5 - 14.5 %    PLATELET 498 258 - 265 K/uL    MPV 11.8 8.9 - 12.9 FL    NRBC 0.0 0  WBC    ABSOLUTE NRBC 0.00 0.00 - 0.86 K/uL   METABOLIC PANEL, BASIC    Collection Time: 03/06/23  5:18 AM   Result Value Ref Range    Sodium 139 136 - 145 mmol/L    Potassium 3.9 3.5 - 5.1 mmol/L Chloride 103 97 - 108 mmol/L    CO2 30 21 - 32 mmol/L    Anion gap 6 5 - 15 mmol/L    Glucose 170 (H) 65 - 100 mg/dL    BUN 34 (H) 6 - 20 MG/DL    Creatinine 2.46 (H) 0.70 - 1.30 MG/DL    BUN/Creatinine ratio 14 12 - 20      eGFR 30 (L) >60 ml/min/1.73m2    Calcium 9.2 8.5 - 10.1 MG/DL   GLUCOSE, POC    Collection Time: 03/06/23  7:37 AM   Result Value Ref Range    Glucose (POC) 179 (H) 65 - 117 mg/dL    Performed by [place] Last            I have reviewed the flowsheets. Chart and Pertinent Notes have been reviewed. No change in PMH ,family and social history from Consult note.       Kasandra Powell [facility]

## 2023-03-06 NOTE — PROGRESS NOTES
[facility] in [place], [place]  Inpatient Progress Note      Patient name: Faith Pike  Patient : 1965  Patient MRN: [id number]  Consulting MD: Jus Jenkins MD  Date of service: 23    REASON FOR REFERRAL:  Management of acute on chronic systolic heart failure     PLAN OF CARE:  57 y/o male with h/o non-ischemic cardiomyopathy, LVEF 10-15% from 28%, stage C, NYHA class IIIA symptoms and CKD, stage 3 (Cr 2.1-2.47) undergoing optimization of GDMT (taken off nephrotoxic meds through diuresis, to allow renal recovery and up-titrating alternative HF meds)  Most likely etiology of worsening HF is chronic volume overload due to underestimated severity of renal failure +/- possibly inadequately/untreated MINA; evaluation ongoing   Consult placed to cardiology for RHC +/- ischemic evaluation with LHC or NST depending on renal function and recommendation from nephrology and cardiology (last [facility] )  If RHC reveals that heart failure contributes to renal dysfunction, then patient may need initiation of chronic inotropes and completion of inpatient workup for combined heart-kidney transplantation, including GI workup; of note, patient is currently not a candidate for LVAD due to Cr > 1.8      RECOMMENDATIONS:  Continue current medical therapy for heart failure; CI 2.5 by NICOM  Continue coreg 25mg twice daily, HR at goal in 70s  Cannot tolerate ACEi/ARB/ARNi in hope for renal recovery  Continue hydralazine 50 mg TID and isordil 30mg TID   Cannot tolerate SGLT2i due to eGFR < 30  Continue bumex 2mg PO twice daily in anticipation of discharge soon; further recommendations for home regimen per nephrology  Does not take aspirin   ICD interrogation every 3 months per routine  Heme occult negative   Reinforced low salt diet  Reinforced fluid restriction to 6 x 8oz glasses per day  Ordered IP sleep study evaluation  IP consult to cardiology for RHC +/- ischemic evaluation with LHC or NST     At time of discharge plan on the following:  Schedule sleep study     IMPRESSION:  Fatigue  Shortness of breath/VILLARREAL/orthopnea  Volume overload  Acute on chronic systolic heart failure  Stage C, NYHA class IIIA symptoms  Non-ischemic cardiomyopathy, LVEF 28%  S/p ICD  HTN  DM  CKD 4     INTERVAL EVENTS:  No issues overnight  -140s/70s, HR 70-80s  I/O net positive 150  Hgb stable at 8.8  Cr 2.4     LIFE GOALS:  Lifestyle goals reviewed with the patient. Patient's personal goals include: getting back home  Important upcoming milestones or family events: TBD  The patient identifies the following as important for living well: TBD    Patient assessed. High suspicion of sleep apnea due to the following reasons. Will refer for sleep evaluation. [x] Heart Failure  [x] Hypertention  [] Atrial Fibrillation  [x] BMI > 30  [] History of stroke  [x] Diabetes  [] Heavy snoring  [] Witnessed apnea  [] Hypoxemia         HPI:   Briefly, Roxane Eason is a 56 y.o. male with h/o morbid obesity, BMI 35, HTN, HL, DM2, chronic systolic heart failure, stage C, NYHA class IIIA symptoms, non-ischemic cardiomyopathy, LVEF 28% (HF diagnosed around 2002), normal coronaries by [facility] 2015 s/p single lead ICD (2/2015) and worsening renal function, CKD stage 2 and anemia. Patient was referred to [facility] to establish long-term care. CARDIAC IMAGING:  Echo (3/3/23)    Left Ventricle: The EF by visual approximation is 15 - 20%. Left ventricle is moderately dilated. Severely increased wall thickness. Severe global hypokinesis present. Right Ventricle: Not well visualized. Mitral Valve: Thickened leaflets. Moderate annular calcification of the mitral valve. Left Atrium: Left atrium is dilated. LVEDD 6.1cm     Echo 10/21/22    Left Ventricle: Severely reduced left ventricular systolic function with a visually estimated EF of 20 - 25%. Left ventricle is moderately dilated.  Mild posterior thickening. Normal wall motion. Normal diastolic function. Left Atrium: Left atrium is moderately dilated. Mitral Valve: Mild regurgitation with a centrally directed jet. Tricuspid Valve: Mild regurgitation with a centrally directed jet. Echo (3/9/22)    Study limited to the assessment of ejection fraction. Left Ventricle: Left ventricle is moderately dilated. Mildly increased wall thickness. Severe global hypokinesis present. Calculared ejection fraction is 28% by 3D volume and 31% by 2D Fernandez's biplane. Global longitudinal strain is reduced with a value of -5.8%. Echo (12/6/21)  LV: Calculated LVEF is 28%. Biplane method used to measure ejection fraction. Mildly dilated left ventricle. Mildly to moderately increased wall thickness. Moderate-to-severely and globally reduced systolic function. Moderate (grade 2) left ventricular diastolic dysfunction. LA: Mildly dilated left atrium. Left Atrium volume index is 40 mL/m2. MV: Moderate mitral annular calcification. Very mild mitral valve regurgitation is present. RV: Not well visualized. Pacer/ICD present. Echo (2/17/21)  LV: Calculated LVEF is 29%. Biplane method used to measure ejection fraction. Mildly dilated left ventricle. Mild to moderate hypertrophy. Severely and globally reduced systolic function. Wall motion: normal. Abnormal left ventricular strain. Global longitudinal strain is -8%. Moderate (grade 2) left ventricular diastolic dysfunction. LA: Mildly dilated left atrium. Left Atrium volume index is 40 mL/m2. MV: Moderate mitral annular calcification. Very mild mitral valve regurgitation is present. Echo (8/16/19)  Left Ventricle: Mildly dilated left ventricle. Moderate concentric hypertrophy. Severe global systolic dysfunction. Estimated left ventricular ejection fraction is 21 - 25%. Biplane method used to measure ejection fraction. Left ventricular global hypokinesis. Abnormal left ventricular strain.  Inconclusive left ventricular diastolic function. Left Atrium: Mildly dilated left atrium. Mitral Valve: Moderate mitral annular calcification. Mild mitral valve regurgitation. EKG (3/2/23): SR with 1st degree AVB  EKG (8/5/19) NSR 73bpm QRS 124ms        LHC (2/105) normal cors  ICD interrogation     HEMODYNAMICS:  RHC pending   CPEST not done  6MW not done     OTHER IMAGING:  CXR 3/2/23: enlarged cardiac silhouette; no edema   CT chest not done       PHYSICAL EXAM:  Visit Vitals  /72   Pulse 74   Temp 98.3 °F (36.8 °C)   Resp 16   Ht 5' 8\" (1.727 m)   Wt 225 lb 5 oz (102.2 kg)   SpO2 94%   BMI 34.26 kg/m²     Physical Exam  Vitals and nursing note reviewed. Constitutional:       Appearance: He is normal weight. He is not ill-appearing. Cardiovascular:      Rate and Rhythm: Normal rate and regular rhythm. Pulses: Normal pulses. Heart sounds: Normal heart sounds. Pulmonary:      Effort: No respiratory distress. Breath sounds: Normal breath sounds. Abdominal:      General: There is no distension. Palpations: Abdomen is soft. Musculoskeletal:         General: Swelling present. Skin:     General: Skin is warm and dry. Neurological:      General: No focal deficit present. Mental Status: He is alert. Psychiatric:         Mood and Affect: Mood normal.        REVIEW OF SYSTEMS:  Review of Systems   Constitutional:  Negative for chills and fever. HENT:  Negative for congestion. Respiratory:  Negative for cough and shortness of breath. Cardiovascular:  Positive for leg swelling. Negative for chest pain and palpitations. Gastrointestinal:  Negative for nausea and vomiting. Musculoskeletal:  Negative for falls and myalgias. Neurological:  Negative for dizziness and headaches. Psychiatric/Behavioral:  Negative for depression.         PAST MEDICAL HISTORY:  Past Medical History:   Diagnosis Date    Diabetes ([facility] [address]     Heart failure (HCC)     CHF, cardiomyopathy    Hypertension ICD (implantable cardioverter-defibrillator) in place     left upper chest       PAST SURGICAL HISTORY:  Past Surgical History:   Procedure Laterality Date    HX HEART CATHETERIZATION  2/2015    x1    HX IMPLANTABLE CARDIOVERTER DEFIBRILLATOR  2/16/2015       FAMILY HISTORY:  Family History   Problem Relation Age of Onset    Diabetes Mother     Hypertension Father     Diabetes Father     Diabetes Brother     Hypertension Brother        SOCIAL HISTORY:  Social History     Socioeconomic History    Marital status:    Tobacco Use    Smoking status: Never    Smokeless tobacco: Never   Vaping Use    Vaping Use: Never used   Substance and Sexual Activity    Alcohol use: Yes     Comment: 1 a month    Drug use: No    Sexual activity: Not Currently       LABORATORY RESULTS:     Labs Latest Ref Rng & Units 3/6/2023 3/5/2023 3/4/2023 3/3/2023 3/2/2023 2/28/2023 1/13/2023   WBC 4.1 - 11.1 K/uL 8.4 8.5 8.0 8.1 6.8 - -   RBC 4.10 - 5.70 M/uL 3.54(L) 3.56(L) 3.49(L) 3.61(L) 4.05(L) - -   Hemoglobin 12.1 - 17.0 g/dL 8.8(L) 9.1(L) 8.8(L) 9.2(L) 10. 2(L) - -   Hematocrit 36.6 - 50.3 % 30. 1(L) 30. 7(L) 28. 9(L) 31. 2(L) 34. 3(L) - -   MCV 80.0 - 99.0 FL 85.0 86.2 82.8 86.4 84.7 - -   Platelets 143 - 697 K/uL 217 213 196 195 211 - -   Lymphocytes 12 - 49 % - - - 11(L) 12 - -   Monocytes 5 - 13 % - - - 8 8 - -   Eosinophils 0 - 7 % - - - 5 8(H) - -   Basophils 0 - 1 % - - - 1 1 - -   Albumin 3.5 - 5.0 g/dL - - - 2. 6(L) 3. 2(L) - 3.6   Calcium 8.5 - 10.1 MG/DL 9.2 9.0 9.2 8.2(L) 9.0 8.9 9.0   Glucose 65 - 100 mg/dL 170(H) 180(H) 78 137(H) 120(H) 160(H) 152(H)   BUN 6 - 20 MG/DL 34(H) 31(H) 33(H) 36(H) 43(H) 38(H) 42(H)   Creatinine 0.70 - 1.30 MG/DL 2.46(H) 2.43(H) 2.52(H) 2.46(H) 3.06(H) 2.83(H) 2.91(H)   Sodium 136 - 145 mmol/L 139 139 142 142 140 145(H) 140   Potassium 3.5 - 5.1 mmol/L 3.9 3.5 3. 2(L) 3. 3(L) 3.7 4.0 4.0   Some recent data might be hidden     Lab Results   Component Value Date/Time    TSH 2.080 05/05/2022 01:34 PM TSH 1.05 02/11/2015 01:30 PM       ALLERGY:  No Known Allergies     CURRENT MEDICATIONS:    Current Facility-Administered Medications:     bumetanide (BUMEX) tablet 2 mg, 2 mg, Oral, BID, Eligio Velázquez MD, 2 mg at 03/06/23 0901    hydrALAZINE (APRESOLINE) tablet 50 mg, 50 mg, Oral, BID, Heriberto Carrel, MD, 50 mg at 03/06/23 0901    isosorbide dinitrate (ISORDIL) tablet 30 mg, 30 mg, Oral, BID, Heriberto Carrel, MD, 30 mg at 03/06/23 0901    insulin NPH (NOVOLIN N, HUMULIN N) injection 5 Units, 5 Units, SubCUTAneous, Q12H, Cynthia Buckner MD, 5 Units at 03/06/23 0901    potassium bicarb-citric acid (EFFER-K) tablet 40 mEq, 40 mEq, Oral, BID, Dania Sarmiento MD, 40 mEq at 03/06/23 0901    spironolactone (ALDACTONE) tablet 25 mg, 25 mg, Oral, DAILY, Dania Sarmiento MD, 25 mg at 03/06/23 0901    dextrose 10 % infusion 0-250 mL, 0-250 mL, IntraVENous, PRN, Steffanie Fall, CNS    insulin lispro (HUMALOG) injection 5 Units, 0.05 Units/kg, SubCUTAneous, TID WITH MEALS, Steffanie Fall CNS, 5 Units at 03/06/23 0901    insulin lispro (HUMALOG) injection, , SubCUTAneous, AC&HS, Steffanie Fall CNS, 1 Units at 03/05/23 2254    traMADoL (ULTRAM) tablet 50 mg, 50 mg, Oral, Q6H PRN, Lana Diez MD, 50 mg at 03/05/23 1545    allopurinoL (ZYLOPRIM) tablet 50 mg, 50 mg, Oral, DAILY, Juvencio Han MD, 50 mg at 03/06/23 0901    carvediloL (COREG) tablet 25 mg, 25 mg, Oral, BID WITH MEALS, Juvencio Han MD, 25 mg at 03/06/23 0901    ergocalciferol capsule 50,000 Units, 50,000 Units, Oral, Q7D, StamfordCharline farnsworth MD, 50,000 Units at 03/02/23 2148    multivitamin, tx-iron-ca-min (THERA-M w/ IRON) tablet 1 Tablet, 1 Tablet, Oral, ACB, [place], Charline De Leon MD, 1 Tablet at 03/06/23 0737    glucose chewable tablet 16 g, 4 Tablet, Oral, PRN, StamfordJuvencio farnsworth MD    glucagon (GLUCAGEN) injection 1 mg, 1 mg, IntraMUSCular, PRN, Juvencio Han MD    dextrose 10 % infusion 0-250 mL, 0-250 mL, IntraVENous, PRN, Aliya Garcia MD    acetaminophen (TYLENOL) tablet 650 mg, 650 mg, Oral, Q6H PRN, Aliya Garcia MD, 650 mg at 03/03/23 0711    heparin (porcine) injection 5,000 Units, 5,000 Units, SubCUTAneous, Q8H, EmelyJuvencio farnsworth MD, 5,000 Units at 03/06/23 0736    sodium chloride (NS) flush 5-40 mL, 5-40 mL, IntraVENous, Q8H, EmelyJuvencio farnsworth MD, 10 mL at 03/06/23 0737    sodium chloride (NS) flush 5-40 mL, 5-40 mL, IntraVENous, PRN, Charline Han MD    polyethylene glycol (MIRALAX) packet 17 g, 17 g, Oral, DAILY PRN, Charline Han MD    ondansetron (ZOFRAN ODT) tablet 4 mg, 4 mg, Oral, Q8H PRN **OR** ondansetron (ZOFRAN) injection 4 mg, 4 mg, IntraVENous, Q6H PRN, Aliya Garcia MD    PATIENT CARE TEAM:  Patient Care Team:  Ever Espinal MD as PCP - General (Family Medicine)  Shoshana Andrews MD (Cardiovascular Disease Physician)  Suhas Zarate MD (Nephrology)  Wellington Short MD (Internal Medicine Physician)     Thank you for allowing me to participate in this patient's care.     Leonela Delarosa NP   [address] [place] [address], [address]  Phone: [phone]

## 2023-03-06 NOTE — PROGRESS NOTES
[facility]  Hospitalist Group                                                                                          Hospitalist Progress Note  Amandeep Olmos MD  Answering service: [phone] OR 36 from in house phone        Date of Service:  3/6/2023  NAME:  Chad Vidal  :  1965  MRN:  [id number]       Admission Summary:   Chad Vidal is a 56 y.o. male with past medical history of dilated cardiomyopathy, heart failure reduced ejection fraction, AICD 2015, chronic lower extremity edema, type 2 diabetes mellitus, hypertension, stage III CKD presented to the emergency department chief complaints of shortness of breath, leg and abdominal swelling, and unintentional weight gain. Patient has known history of CHF. He newly had recent increase in Bumex from 1 mg to 2 mg twice daily. Unfortunately, he still had worsening symptoms with peripheral edema and swelling of legs and abdomen, which is severe, without specific alleviating factors, with associated shortness of breath and dyspnea on exertion over the past 5 days. Also fully had 14 pound weight gain. His last 2 echocardiogram 10/21/2022 showed reduced left ventricular ejection fraction 20% to 25%. On arrival emergency department, initial ported vital signs temperature 97.3 °F, /81, heart rate 71, respiratory 18, saturation 90% room air. Abnormal labs included hemoglobin 10.2, blood glucose 120, BUN 43, creatinine 3.06, GFR 23, albumin 3.2, alk phos 6135, proBNP 9738. Chest x-ray portable showed enlarged cardiopulmonary silhouette. 12 EKG shows sinus rhythm, first-degree AV block, occasional PVCs, premature supraventricular complexes, ST changes anterior lateral leads at 72 bpm.  ED request admission to the hospital service       Interval history / Subjective:   Patient is seen and examined at bedside this AM. He feels good. No overnight events or new complaints.  Waiting for AHF plan   Discussed with nursing, sherry Assessment & Plan:     Acute on Chronic Systolic CHF  - improving   Non-ischemic cardiomyopathy EF ~15%   S/p ICD   -Appreciate  [facility] team input   - strict I&Os , daily weight   - echo : EF  15 - 20%. Left ventricle is moderately dilated. Severely increased wall thickness. Severe global hypokinesis present. - plan for RHC/ LHC per [facility] - cardiology consulted   - c/w coreg, hydralazine, isordil, aldactone   - Cannot tolerate ACEi/ARB/ARNi in hope for renal recovery  - Cont po bumex      ANA on CKD Stage 4 -creatinine is slightly better   - appreciate nephrologist input  - renal US: Increased renal cortical echogenicity bilaterally is likely related to medical  renal disease.  - Switched IV Bumex to PO but can decrease to 2mg PO BID from 4 mg as volume status had been better  - monitor renal function      T2DM with episodes of Hypoglycemia  - hypoglycemia protocols. A1c 8.4   -Cont SSI and NPH 5U bid     Essential hypertension  - c/w coreg, hydralazine       Obesity Body mass index is 34.73 kg/m²   - recommend weight loss      Code status: Full  Prophylaxis: Heparin   Care Plan discussed with: pt, RN, CM   Anticipated Disposition: TBD. Home when cleared by [facility]   Inpatient  Cardiac monitoring: Telemetry     Hospital Problems  Date Reviewed: 1/13/2023            Codes Class Noted POA    Acute kidney injury superimposed on chronic kidney disease ([facility] [address] ICD-10-CM: N17.9, N18.9  ICD-9-CM: 584.9, 585.9  3/2/2023 Unknown        Acute on chronic HFrEF (heart failure with reduced ejection fraction) ([facility] [address] ICD-10-CM: I50.23  ICD-9-CM: 428.23  3/2/2023 Unknown        Card[facility]omy[facility] ICD-10-CM: I42.9  ICD-9-CM: 425.4  9/26/2014 Unknown    Overview Addendum 11/9/2017  7:20 AM by Munir Coello MD     2002 (approx) diagnosed [place] by echo, neg stress test and started on usual meds.   2007 (approx) fup echo lvef 25%  2/15 cardiac cath, no sig cad  2/15 single lead AICD implant  2/17 lvh, otherwise normal echo Social Determinants of Health     Tobacco Use: Low Risk     Smoking Tobacco Use: Never    Smokeless Tobacco Use: Never    Passive Exposure: Not on file   Alcohol Use: Not on file   Financial Resource Strain: Not on file   Food Insecurity: Not on file   Transportation Needs: Not on file   Physical Activity: Not on file   Stress: Not on file   Social Connections: Not on file   Intimate Partner Violence: Not on file   Depression: Not at risk    PHQ-2 Score: 0   Housing Stability: Not on file       Review of Systems:   A comprehensive review of systems was negative. Vital Signs:    Last 24hrs VS reviewed since prior progress note. Most recent are:  Visit Vitals  /72   Pulse 74   Temp 98.3 °F (36.8 °C)   Resp 16   Ht 5' 8\" (1.727 m)   Wt 102.2 kg (225 lb 5 oz)   SpO2 94%   BMI 34.26 kg/m²         Intake/Output Summary (Last 24 hours) at 3/6/2023 1058  Last data filed at 3/6/2023 0858  Gross per 24 hour   Intake 940 ml   Output 2390 ml   Net -1450 ml        Physical Examination:     I had a face to face encounter with this patient and independently examined them on 3/6/2023 as outlined below:          General : alert x 3, awake, no acute distress,   HEENT: PEERL, EOMI, moist mucus membrane  Neck: supple, no JVD, no meningeal signs  Chest: Clear to auscultation bilaterally   CVS: S1 S2 heard, Capillary refill less than 2 seconds  Abd: soft/ non tender, non distended, BS physiological,   Ext: no clubbing, no cyanosis, mild edema, brisk 2+ DP pulses  Neuro/Psych: pleasant mood and affect, CN 2-12 grossly intact, sensory grossly within normal limit, Strength 5/5 in all extremities  Skin: warm     Data Review:    I personally reviewed  Image      I have personally and independently reviewed all pertinent labs, diagnostic studies, imaging, and have provided independent interpretation of the same.      Labs:     Recent Labs     03/06/23  0518 03/05/23  0425   WBC 8.4 8.5   HGB 8.8* 9.1*   HCT 30.1* 30.7*   PLT 217 213     Recent Labs     03/06/23  0518 03/05/23  0425 03/04/23  0248    139 142   K 3.9 3.5 3.2*    103 106   CO2 30 29 30   BUN 34* 31* 33*   CREA 2.46* 2.43* 2.52*   * 180* 78   CA 9.2 9.0 9.2     No results for input(s): ALT, AP, TBIL, TBILI, TP, ALB, GLOB, GGT, AML, LPSE in the last 72 hours. No lab exists for component: SGOT, GPT, AMYP, HLPSE  No results for input(s): INR, PTP, APTT, INREXT in the last 72 hours. No results for input(s): FE, TIBC, PSAT, FERR in the last 72 hours. No results found for: FOL, RBCF   No results for input(s): PH, PCO2, PO2 in the last 72 hours. No results for input(s): CPK, CKNDX, TROIQ in the last 72 hours.     No lab exists for component: CPKMB  Lab Results   Component Value Date/Time    Cholesterol, total 132 05/05/2022 01:34 PM    HDL Cholesterol 39 (L) 05/05/2022 01:34 PM    LDL, calculated 79 05/05/2022 01:34 PM    LDL, calculated 100.8 (H) 02/11/2015 01:30 PM    Triglyceride 68 05/05/2022 01:34 PM    CHOL/HDL Ratio 4.5 02/11/2015 01:30 PM     Lab Results   Component Value Date/Time    Glucose (POC) 179 (H) 03/06/2023 07:37 AM    Glucose (POC) 210 (H) 03/05/2023 10:47 PM    Glucose (POC) 182 (H) 03/05/2023 05:11 PM    Glucose (POC) 176 (H) 03/05/2023 01:17 PM    Glucose (POC) 220 (H) 03/05/2023 11:40 AM     Lab Results   Component Value Date/Time    Color YELLOW/STRAW 02/11/2015 12:10 PM    Appearance CLEAR 02/11/2015 12:10 PM    Specific gravity 1.008 02/11/2015 12:10 PM    pH (UA) 6.5 02/11/2015 12:10 PM    Protein TRACE (A) 02/11/2015 12:10 PM    Glucose NEGATIVE 02/11/2015 12:10 PM    Ketone NEGATIVE 02/11/2015 12:10 PM    Bilirubin NEGATIVE 02/11/2015 12:10 PM    Urobilinogen 0.2 02/11/2015 12:10 PM    Nitrites NEGATIVE 02/11/2015 12:10 PM    Leukocyte Esterase NEGATIVE 02/11/2015 12:10 PM    Epithelial cells FEW 02/11/2015 12:10 PM    Bacteria NEGATIVE 02/11/2015 12:10 PM    WBC 0-4 02/11/2015 12:10 PM    RBC 0-5 02/11/2015 12:10 PM Notes reviewed from all clinical/nonclinical/nursing services involved in patient's clinical care. Care coordination discussions were held with appropriate clinical/nonclinical/ nursing providers based on care coordination needs. Patients current active Medications were reviewed, considered, added and adjusted based on the clinical condition today. Home Medications were reconciled to the best of my ability given all available resources at the time of admission. Route is PO if not otherwise noted.       Admission Status:[id number]:::1}      Medications Reviewed:     Current Facility-Administered Medications   Medication Dose Route Frequency    bumetanide (BUMEX) tablet 2 mg  2 mg Oral BID    hydrALAZINE (APRESOLINE) tablet 50 mg  50 mg Oral BID    isosorbide dinitrate (ISORDIL) tablet 30 mg  30 mg Oral BID    insulin NPH (NOVOLIN N, HUMULIN N) injection 5 Units  5 Units SubCUTAneous Q12H    potassium bicarb-citric acid (EFFER-K) tablet 40 mEq  40 mEq Oral BID    spironolactone (ALDACTONE) tablet 25 mg  25 mg Oral DAILY    dextrose 10 % infusion 0-250 mL  0-250 mL IntraVENous PRN    insulin lispro (HUMALOG) injection 5 Units  0.05 Units/kg SubCUTAneous TID WITH MEALS    insulin lispro (HUMALOG) injection   SubCUTAneous AC&HS    traMADoL (ULTRAM) tablet 50 mg  50 mg Oral Q6H PRN    allopurinoL (ZYLOPRIM) tablet 50 mg  50 mg Oral DAILY    carvediloL (COREG) tablet 25 mg  25 mg Oral BID WITH MEALS    ergocalciferol capsule 50,000 Units  50,000 Units Oral Q7D    multivitamin, tx-iron-ca-min (THERA-M w/ IRON) tablet 1 Tablet  1 Tablet Oral ACB    glucose chewable tablet 16 g  4 Tablet Oral PRN    glucagon (GLUCAGEN) injection 1 mg  1 mg IntraMUSCular PRN    dextrose 10 % infusion 0-250 mL  0-250 mL IntraVENous PRN    acetaminophen (TYLENOL) tablet 650 mg  650 mg Oral Q6H PRN    heparin (porcine) injection 5,000 Units  5,000 Units SubCUTAneous Q8H    sodium chloride (NS) flush 5-40 mL  5-40 mL IntraVENous Q8H    sodium chloride (NS) flush 5-40 mL  5-40 mL IntraVENous PRN    polyethylene glycol (MIRALAX) packet 17 g  17 g Oral DAILY PRN    ondansetron (ZOFRAN ODT) tablet 4 mg  4 mg Oral Q8H PRN    Or    ondansetron (ZOFRAN) injection 4 mg  4 mg IntraVENous Q6H PRN     ______________________________________________________________________  EXPECTED LENGTH OF STAY: - - -  ACTUAL LENGTH OF STAY:          4                 Bobbi Hopkins MD

## 2023-03-06 NOTE — PROGRESS NOTES
Transitions of Care Plan  RUR: 15% - moderate  Clinical Update: L/RHC - timing is TBD  Consults: AHFC; Wound Care; Therapy  Baseline: independent without DME; resides w SO  Barriers to Discharge: medical  Disposition:   Home with family  Estimated Discharge Date:  3/8/23  [facility] Discharge Patient for HF    Chart reviewed for clinical update. Spoke with attending MD. Patient likely to have [facility] tomorrow. Anticipate discharge home with significant other after cardiac cath. CM will continue to follow.     Tanner Pitts, MPH  Care Manager [facility]  Available via [facility] or

## 2023-03-06 NOTE — CARDIO/PULMONARY
Cardiac Rehab: Living with Heart Failure Booklet given to Glenna Saldana. Visited to reinforce previous education for HF and to discuss OP Cardiac Rehab. Educated using teach back method. Discussed diagnosis definition and assessed patient understanding. Reviewed importance of daily weight monitoring and Low Sodium diet (6776-9473 mg. daily). He is able to recall the weight increase that needs to be called to his doctor as well as foods to avoid. Encouraged activity and rest periods within symptom limitations and as ordered by physician. Discussed the Cardiac Rehab Program, benefits, format, and encouraged enrollment, when eligible. Patient is interested and we will follow up, by phone, once eligible.   Reva Jeronimo RN

## 2023-03-06 NOTE — PROGRESS NOTES
0730: Bedside shift change report given to Meme Farris RN (oncoming nurse) by Frank Matos RN (offgoing nurse). Report included the following information SBAR, MAR, and Recent Results. 2000: Bedside shift change report given to Frank Matos RN (oncoming nurse) by Meme Farris RN (offgoing nurse). Report included the following information SBAR, MAR, and Recent Results.

## 2023-03-06 NOTE — PROGRESS NOTES
11:30am: spoke with pt . To start SW SLVAD workup. Pt. Requested to talk later due to lack of sleep. 2:15pm: sw returned to pt. Room to complete SW assessment and learned pt. Is having PIC line placed. Spoke with coordinator for VAD and will attempt to complete the assessment tomorrow morning prior to pt. Discharge.

## 2023-03-06 NOTE — WOUND CARE
WOCN Note:     New consult placed for assessment of posterior legs. Chart reviewed. Assessed in room 456. Admitted DX:  Acute on chronic HFrEF (heart failure with reduced ejection fraction) Cardiomyopathy  Acute kidney injury superimposed on chronic kidney disease   Past Medical History:   Diagnosis Date    Diabetes ([facility] [address]     Heart failure ([facility] [address]     CHF, cardiomyopathy    Hypertension     ICD (implantable cardioverter-defibrillator) in place     left upper chest     Assessment:   Patient is A&O x 4, communicative, continent and mobile. Bed: foam mattress  Patient reports no pain. Heels intact without erythema. Sacrum and buttocks intact without erythema. 1. POA Posterior legs have hyperpigmented scarring from prior wounds. The left posterior leg has an open partial thickness wound (0.5 x 0.5 x 0.1 cm); 100% red; scant serosang drainage. Cleansed and applied Xeroform and foam dressing. Wound, Pressure Prevention & Skin Care Recommendations:    Minimize layers of linen/pads under patient to optimize support surface. 2.  Turn/reposition approximately every 2 hours and offload heels. 3.  Manage moisture/ Keep skin folds clean and dry/minimize brief usage. 4.  Left posterior leg:  Every 3 days cleanse with saline; apply Xeroform and foam dressing. Discussed above plan with patient and Erika MARTEL.     Transition of Care:   Plan to follow as needed while admitted to hospital.    JENNIFER Caro RN [facility]  Available on [employer]  Office [phone]

## 2023-03-07 LAB
ANION GAP SERPL CALC-SCNC: 7 MMOL/L (ref 5–15)
BUN SERPL-MCNC: 36 MG/DL (ref 6–20)
BUN/CREAT SERPL: 14 (ref 12–20)
CALCIUM SERPL-MCNC: 9.1 MG/DL (ref 8.5–10.1)
CHLORIDE SERPL-SCNC: 101 MMOL/L (ref 97–108)
CO2 SERPL-SCNC: 31 MMOL/L (ref 21–32)
CREAT SERPL-MCNC: 2.62 MG/DL (ref 0.7–1.3)
ERYTHROCYTE [DISTWIDTH] IN BLOOD BY AUTOMATED COUNT: 17.8 % (ref 11.5–14.5)
GLUCOSE BLD STRIP.AUTO-MCNC: 144 MG/DL (ref 65–117)
GLUCOSE BLD STRIP.AUTO-MCNC: 189 MG/DL (ref 65–117)
GLUCOSE BLD STRIP.AUTO-MCNC: 193 MG/DL (ref 65–117)
GLUCOSE BLD STRIP.AUTO-MCNC: 195 MG/DL (ref 65–117)
GLUCOSE SERPL-MCNC: 160 MG/DL (ref 65–100)
HCT VFR BLD AUTO: 30.1 % (ref 36.6–50.3)
HGB BLD-MCNC: 9 G/DL (ref 12.1–17)
MCH RBC QN AUTO: 25.4 PG (ref 26–34)
MCHC RBC AUTO-ENTMCNC: 29.9 G/DL (ref 30–36.5)
MCV RBC AUTO: 85 FL (ref 80–99)
NRBC # BLD: 0 K/UL (ref 0–0.01)
NRBC BLD-RTO: 0 PER 100 WBC
PLATELET # BLD AUTO: 225 K/UL (ref 150–400)
PMV BLD AUTO: 12.2 FL (ref 8.9–12.9)
POTASSIUM SERPL-SCNC: 4.1 MMOL/L (ref 3.5–5.1)
RBC # BLD AUTO: 3.54 M/UL (ref 4.1–5.7)
SERVICE CMNT-IMP: ABNORMAL
SODIUM SERPL-SCNC: 139 MMOL/L (ref 136–145)
WBC # BLD AUTO: 7.8 K/UL (ref 4.1–11.1)

## 2023-03-07 PROCEDURE — 99152 MOD SED SAME PHYS/QHP 5/>YRS: CPT | Performed by: INTERNAL MEDICINE

## 2023-03-07 PROCEDURE — 93460 R&L HRT ART/VENTRICLE ANGIO: CPT | Performed by: INTERNAL MEDICINE

## 2023-03-07 PROCEDURE — C1751 CATH, INF, PER/CENT/MIDLINE: HCPCS | Performed by: INTERNAL MEDICINE

## 2023-03-07 PROCEDURE — 85027 COMPLETE CBC AUTOMATED: CPT

## 2023-03-07 PROCEDURE — C1894 INTRO/SHEATH, NON-LASER: HCPCS | Performed by: INTERNAL MEDICINE

## 2023-03-07 PROCEDURE — 74011250637 HC RX REV CODE- 250/637: Performed by: HOSPITALIST

## 2023-03-07 PROCEDURE — 74011250636 HC RX REV CODE- 250/636: Performed by: INTERNAL MEDICINE

## 2023-03-07 PROCEDURE — 77030040934 HC CATH DIAG DXTERITY MEDT -A: Performed by: INTERNAL MEDICINE

## 2023-03-07 PROCEDURE — 77030013744: Performed by: INTERNAL MEDICINE

## 2023-03-07 PROCEDURE — 74011250637 HC RX REV CODE- 250/637: Performed by: INTERNAL MEDICINE

## 2023-03-07 PROCEDURE — 74011250637 HC RX REV CODE- 250/637: Performed by: FAMILY MEDICINE

## 2023-03-07 PROCEDURE — 74011000250 HC RX REV CODE- 250: Performed by: NURSE PRACTITIONER

## 2023-03-07 PROCEDURE — 74011636637 HC RX REV CODE- 636/637: Performed by: CLINICAL NURSE SPECIALIST

## 2023-03-07 PROCEDURE — 76937 US GUIDE VASCULAR ACCESS: CPT | Performed by: INTERNAL MEDICINE

## 2023-03-07 PROCEDURE — 77030019569 HC BND COMPR RAD TERU -B: Performed by: INTERNAL MEDICINE

## 2023-03-07 PROCEDURE — 99223 1ST HOSP IP/OBS HIGH 75: CPT | Performed by: INTERNAL MEDICINE

## 2023-03-07 PROCEDURE — 74011000250 HC RX REV CODE- 250: Performed by: FAMILY MEDICINE

## 2023-03-07 PROCEDURE — 74011000250 HC RX REV CODE- 250: Performed by: INTERNAL MEDICINE

## 2023-03-07 PROCEDURE — 99153 MOD SED SAME PHYS/QHP EA: CPT | Performed by: INTERNAL MEDICINE

## 2023-03-07 PROCEDURE — 77030013406 HC CATH CTRL EDWD -B: Performed by: INTERNAL MEDICINE

## 2023-03-07 PROCEDURE — 2709999900 HC NON-CHARGEABLE SUPPLY: Performed by: INTERNAL MEDICINE

## 2023-03-07 PROCEDURE — B2111ZZ FLUOROSCOPY OF MULTIPLE CORONARY ARTERIES USING LOW OSMOLAR CONTRAST: ICD-10-PCS | Performed by: INTERNAL MEDICINE

## 2023-03-07 PROCEDURE — 65660000001 HC RM ICU INTERMED STEPDOWN

## 2023-03-07 PROCEDURE — 36415 COLL VENOUS BLD VENIPUNCTURE: CPT

## 2023-03-07 PROCEDURE — 80048 BASIC METABOLIC PNL TOTAL CA: CPT

## 2023-03-07 PROCEDURE — 82962 GLUCOSE BLOOD TEST: CPT

## 2023-03-07 PROCEDURE — 74011250636 HC RX REV CODE- 250/636: Performed by: FAMILY MEDICINE

## 2023-03-07 PROCEDURE — 4A023N8 MEASUREMENT OF CARDIAC SAMPLING AND PRESSURE, BILATERAL, PERCUTANEOUS APPROACH: ICD-10-PCS | Performed by: INTERNAL MEDICINE

## 2023-03-07 RX ORDER — LIDOCAINE HYDROCHLORIDE 10 MG/ML
INJECTION INFILTRATION; PERINEURAL AS NEEDED
Status: DISCONTINUED | OUTPATIENT
Start: 2023-03-07 | End: 2023-03-07 | Stop reason: HOSPADM

## 2023-03-07 RX ORDER — SODIUM CHLORIDE 0.9 % (FLUSH) 0.9 %
5-40 SYRINGE (ML) INJECTION EVERY 8 HOURS
Status: DISCONTINUED | OUTPATIENT
Start: 2023-03-07 | End: 2023-03-10 | Stop reason: HOSPADM

## 2023-03-07 RX ORDER — FENTANYL CITRATE 50 UG/ML
INJECTION, SOLUTION INTRAMUSCULAR; INTRAVENOUS AS NEEDED
Status: DISCONTINUED | OUTPATIENT
Start: 2023-03-07 | End: 2023-03-07 | Stop reason: HOSPADM

## 2023-03-07 RX ORDER — MIDAZOLAM HYDROCHLORIDE 1 MG/ML
INJECTION, SOLUTION INTRAMUSCULAR; INTRAVENOUS AS NEEDED
Status: DISCONTINUED | OUTPATIENT
Start: 2023-03-07 | End: 2023-03-07 | Stop reason: HOSPADM

## 2023-03-07 RX ORDER — SODIUM CHLORIDE 0.9 % (FLUSH) 0.9 %
5-40 SYRINGE (ML) INJECTION AS NEEDED
Status: DISCONTINUED | OUTPATIENT
Start: 2023-03-07 | End: 2023-03-10 | Stop reason: HOSPADM

## 2023-03-07 RX ORDER — HEPARIN SODIUM 1000 [USP'U]/ML
INJECTION, SOLUTION INTRAVENOUS; SUBCUTANEOUS AS NEEDED
Status: DISCONTINUED | OUTPATIENT
Start: 2023-03-07 | End: 2023-03-07 | Stop reason: HOSPADM

## 2023-03-07 RX ADMIN — Medication 10 ML: at 07:42

## 2023-03-07 RX ADMIN — CARVEDILOL 25 MG: 12.5 TABLET, FILM COATED ORAL at 08:44

## 2023-03-07 RX ADMIN — Medication 5 UNITS: at 17:56

## 2023-03-07 RX ADMIN — SPIRONOLACTONE 25 MG: 25 TABLET ORAL at 08:44

## 2023-03-07 RX ADMIN — ISOSORBIDE DINITRATE 30 MG: 20 TABLET ORAL at 17:55

## 2023-03-07 RX ADMIN — HYDRALAZINE HYDROCHLORIDE 50 MG: 50 TABLET, FILM COATED ORAL at 17:55

## 2023-03-07 RX ADMIN — SODIUM CHLORIDE, PRESERVATIVE FREE 10 ML: 5 INJECTION INTRAVENOUS at 16:54

## 2023-03-07 RX ADMIN — Medication 8 UNITS: at 22:11

## 2023-03-07 RX ADMIN — ISOSORBIDE DINITRATE 30 MG: 20 TABLET ORAL at 08:43

## 2023-03-07 RX ADMIN — POTASSIUM BICARBONATE 40 MEQ: 782 TABLET, EFFERVESCENT ORAL at 17:55

## 2023-03-07 RX ADMIN — HEPARIN SODIUM 5000 UNITS: 5000 INJECTION INTRAVENOUS; SUBCUTANEOUS at 14:15

## 2023-03-07 RX ADMIN — Medication 10 ML: at 22:00

## 2023-03-07 RX ADMIN — ALLOPURINOL 50 MG: 100 TABLET ORAL at 08:43

## 2023-03-07 RX ADMIN — POTASSIUM BICARBONATE 40 MEQ: 782 TABLET, EFFERVESCENT ORAL at 08:43

## 2023-03-07 RX ADMIN — TRAMADOL HYDROCHLORIDE 50 MG: 50 TABLET ORAL at 22:07

## 2023-03-07 RX ADMIN — Medication 5 UNITS: at 14:15

## 2023-03-07 RX ADMIN — BUMETANIDE 2 MG: 1 TABLET ORAL at 08:44

## 2023-03-07 RX ADMIN — HYDRALAZINE HYDROCHLORIDE 50 MG: 50 TABLET, FILM COATED ORAL at 08:43

## 2023-03-07 RX ADMIN — MULTIPLE VITAMINS W/ MINERALS TAB 1 TABLET: TAB at 07:41

## 2023-03-07 RX ADMIN — HEPARIN SODIUM 5000 UNITS: 5000 INJECTION INTRAVENOUS; SUBCUTANEOUS at 22:09

## 2023-03-07 RX ADMIN — CARVEDILOL 25 MG: 12.5 TABLET, FILM COATED ORAL at 17:55

## 2023-03-07 RX ADMIN — Medication 10 ML: at 14:16

## 2023-03-07 RX ADMIN — Medication 5 UNITS: at 14:14

## 2023-03-07 RX ADMIN — BUMETANIDE 0.5 MG/HR: 0.25 INJECTION INTRAMUSCULAR; INTRAVENOUS at 16:52

## 2023-03-07 RX ADMIN — SODIUM CHLORIDE, PRESERVATIVE FREE 10 ML: 5 INJECTION INTRAVENOUS at 09:55

## 2023-03-07 NOTE — DIABETES MGMT
[facility]  Cardiology Progress Note    Mark Friday Patient Status:  Inpatient    1940 MRN [id number]   [facility] 8NE-A Attending Rama Love MD   Hosp Day # 4 PCP Herminia José MD     Subjective:  No complaints of chest pa [facility]  DIABETES MANAGEMENT CONSULT    Consulted by  Danielle Roy MD  for advanced nursing evaluation and care for inpatient blood glucose management. Evaluation and Action Plan   Apoorva Rees is a 56year old gentleman, with Type 2 Diabetes on 70/30 insulin, who is admitted with acute on chronic CHF exacerbation. His A1C remains elevated at 8.4% and he verbalizes that he has struggled to get it below 8%. This admission, he did take his morning 70/30 insulin 20 units at breakfast.  His BG was 120 on admission but fell to 66 after not eating in the afternoon. Evening insulin has been held and BG was 137. He has eaten breakfast.  At this time, factors that are impacting BG pattern are his heart failure, ANA, elevated BMI and decreased PO intake. He will need insulin to resume now and ordered at reduced doses. Basal insulin was cut by 50% and BG slightly over goal.  Will modify basal insulin now. Inpatient BG goal is 140-180mg/dl. Management Rationale Action Plan   Medication   Basal needs Using 0.25 units/kg/D Please give 5 units NPH x1 now after cath    Increased NPH to 8 units Q12 starting this evening.    (Only needs 5 units Q12 if NPO and experienced hypoglycemia with 12 units Q12)   Nutritional needs Using low sensitivity Continue 5 units Humalog/meal    Hold if patient is NPO or consumes less than 50% of carbohydrates on meal tray    Will advance by 2 units daily for persistent   pre-prandial hyperglycemia     Corrective insulin Using HIGH sensitivity based on  High sensitivity due to ANA/reduced GFR   Additional orders  Consistent carbohydrate diet (60g CHO/meal)       Diabetes Discharge Plan   Medication  TBD   Referral  [x]        Outpatient diabetes education   Additional orders            Initial Presentation   Apoorva Rees is a 56 y.o. male admitted from the advanced heart failure center on 3/2/23 with a 14lb weight gain in 5 days.    LAB: GFR 23, BNP 9738, A1C 8.4%  CXR: Enlarged cardiopericardial silhouette. HX:   Past Medical History:   Diagnosis Date    Diabetes ([facility] [address]     Heart failure ([address]     CHF, cardiomyopathy    Hypertension     ICD (implantable cardioverter-defibrillator) in place     left upper chest        INITIAL DX:   Acute on chronic HFrEF (heart failure with reduced ejection fraction) ([facility] [address] [I50.23]  Cardiomyopathy ([facility] [address] [I42.9]  Acute kidney injury superimposed on chronic kidney disease ([address] [N17.9, N18.9]     Current Treatment     TX: Diuresis, Nephrologist     Hospital Course   Clinical progress has been uncomplicated. 3/2: Admission  3/3: His EF on 3/3/23 shows reduced LV function with EF 15-20%, LV mod dilated, severe LVH, mild MR, Mild Tr. This EF is reduced from 10/21/22 when EF as 20-25%. 3/7: RHC/LHC: RHC shows evidence of volume overload. LHC showed nonobstructive CAD, including ramus 60%. Diabetes History   Type 2 Diabetes: Diagnosed about 10 years ago  Ambulatory BG management provided by: PCP Enoch De MD  He did see an endocrinologist at [address] [place] [address] this past summer once but does not remember name. Does not want to go back to that practice. Diabetes-related Medical History  Microvascular disease  Nephropathy  Other associated conditions     CHF    Diabetes Medication History  Key Antihyperglycemic Medications               insulin NPH/insulin regular (NovoLIN 70/30 U-100 Insulin) 100 unit/mL (70-30) injection (Taking) 26-27 Units by SubCUTAneous route Daily (before dinner). (22 units in the morning; 26-27 units in the evening; evening dose is on a sliding scale)    insulin NPH/insulin regular (NOVOLIN 70/30, HUMULIN 70/30) 100 unit/mL (70-30) injection (Taking) 22 Units by SubCUTAneous route Daily (before breakfast).  (22 units in the morning; 26 units in the evening)             Diabetes self-management practices:   Eating pattern     [] Breakfast  Hash brown and chronic systolic/diastolic HF. EF 50%. Normal coronaries from a cath in 2014 when she had an EF of 35-40%. · Chronic atrial fibrillation, RVR on admission - HR controlled. INR 1.5 today on IV heparin/po Coumadin.    · ICD, hx VT, on amio  · Hx DVT/PE, on coffee  [] Lunch   [place]  [] Dinner   \"Typical dinner foods\" would not elaborate  [] Bedtime  None  [] Snacks   Limited  [] Beverages  Regular soda, water, tea  [] Dentition status Normal  Physical activity pattern   No intentional activity   Monitoring pattern   Tests twice daily   Fasting    Before Dinner: 150-200, did see a 300 this month  Taking medications pattern  [x] Consistent administration  [x] Affordable  Reducing risks  [] Influenza: There is no immunization history for the selected administration types on file for this patient. [] Pneumonia:   Immunization History   Administered Date(s) Administered    Pneumococcal Polysaccharide (PPSV-23) 02/10/2015     [] Hepatitis: There is no immunization history for the selected administration types on file for this patient.   Social determinants of health impacting diabetes self-management practices   Concerned that you need to know more about how to stay healthy with diabetes  Overall evaluation:    [x] Not achieving A1c target with drug therapy & self-care practices    Subjective      Objective   Vital Signs Visit Vitals  /75   Pulse 75   Temp 98.3 °F (36.8 °C)   Resp 22   Ht 5' 8\" (1.727 m)   Wt 101.7 kg (224 lb 3.3 oz)   SpO2 98%   BMI 34.09 kg/m²     Laboratory  Recent Labs     23  0453 23  0518 23  0425   * 170* 180*   AGAP 7 6 7   WBC 7.8 8.4 8.5   CREA 2.62* 2.46* 2.43*     Factors impacting BG management  Factor Dose Comments   Nutrition:  Standard meals     60 grams/meal    Cardiomyopathy and systolic HF AICD  EF 40-50%  Diuresis    Other:   Kidney function   ANA on CKD  GFR 23 on admit        Blood glucose pattern    Significant diabetes-related events over the past 24-72 hours  A1C 8.4%  Fasting BG: 160/170/180  Pre-prandial B-203  A1C 8.4% (up from 8.1% )  Per AHF: Patient will likely need to stay for initiation of chronic inotropes as bridge to renal recovery and inpatient workup for combined heart-kidney transplantation; of note, patient is currently not a candidate for LVAD due to Cr > 1.8        Assessment and Nursing Intervention   Nursing Diagnosis Risk for unstable blood glucose pattern   Nursing Intervention Domain 5250 Decision-making Support   Nursing Interventions Examined current inpatient diabetes/blood glucose control   Explored factors facilitating and impeding inpatient management  Explored corrective strategies with patient and responsible inpatient provider   Informed patient of rational for insulin strategy while hospitalized     Nursing Diagnosis 33130 Ineffective Health Management   Nursing Intervention Domain 5250 Decision-making Support   Nursing Interventions Identified diabetes self-management practices impeding diabetes control  Discussed diabetes survival skills related to  Healthy Plate eating plan; given handouts  Role of physical activity in improving insulin sensitivity and action  Procedure for blood glucose monitoring & options for low-cost products  Medications plan at discharge     Billing Code(s)   No charge    Before making these care recommendations, I personally reviewed the hospitalization record, including notes, laboratory & diagnostic data and current medications, and examined the patient at the bedside (circumstances permitting) before determining care. More than fifty (50) percent of the time was spent in patient counseling and/or care coordination.   Total minutes: 10    PREET Fong  Diabetes Clinical Nurse Specialist  [facility]  Access via [employer]

## 2023-03-07 NOTE — PROGRESS NOTES
Cardiac Cath Lab Recovery Arrival Note:      Flor Rodriguez arrived to Cardiac Cath Lab, Recovery Area. Staff introduced to patient. Patient identifiers verified with NAME and DATE OF BIRTH. Procedure verified with patient. Consent forms reviewed and signed by patient or authorized representative and verified. Allergies verified. Patient and family oriented to department. Patient and family informed of procedure and plan of care. Questions answered with review. Patient prepped for procedure, per orders from physician, prior to arrival.    Patient on cardiac monitor, non-invasive blood pressure, SPO2 monitor. On Room Air. Patient is A&Ox 4. Patient reports No Pain. Patient in stretcher, in low position, with side rails up, call bell within reach, patient instructed to call if assistance as needed. Patient prep in: [address], [place] [address]. Patient family: Aurelia Neighbor in: At Home.    Prep by: Bibi Moise RN

## 2023-03-07 NOTE — PROGRESS NOTES
0605 Telephone shift change report given to Gilberto Meza RN (oncoming nurse) by Mirian Garcia RN (offgoing nurse). Report included the following information SBAR, Kardex, Intake/Output, MAR, Recent Results, and Cardiac Rhythm NSR, ST .       Problem: Diabetes Self-Management  Goal: *Disease process and treatment process  Description: Define diabetes and identify own type of diabetes; list 3 options for treating diabetes. Outcome: Progressing Towards Goal  Goal: *Incorporating nutritional management into lifestyle  Description: Describe effect of type, amount and timing of food on blood glucose; list 3 methods for planning meals. Outcome: Progressing Towards Goal  Goal: *Incorporating physical activity into lifestyle  Description: State effect of exercise on blood glucose levels. Outcome: Progressing Towards Goal  Goal: *Developing strategies to promote health/change behavior  Description: Define the ABC's of diabetes; identify appropriate screenings, schedule and personal plan for screenings. Outcome: Progressing Towards Goal  Goal: *Using medications safely  Description: State effect of diabetes medications on diabetes; name diabetes medication taking, action and side effects. Outcome: Progressing Towards Goal  Goal: *Monitoring blood glucose, interpreting and using results  Description: Identify recommended blood glucose targets  and personal targets. Outcome: Progressing Towards Goal  Goal: *Prevention, detection, treatment of acute complications  Description: List symptoms of hyper- and hypoglycemia; describe how to treat low blood sugar and actions for lowering  high blood glucose level. Outcome: Progressing Towards Goal  Goal: *Prevention, detection and treatment of chronic complications  Description: Define the natural course of diabetes and describe the relationship of blood glucose levels to long term complications of diabetes.   Outcome: Progressing Towards Goal  Goal: *Developing strategies to address psychosocial issues  Description: Describe feelings about living with diabetes; identify support needed and support network  Outcome: Progressing Towards Goal     Problem: Patient Education: Go to Patient Education Activity  Goal: Patient/Family Education  Outcome: Progressing Towards Goal     Problem: Patient Education: Go to Patient Education Activity  Goal: Patient/Family Education  Outcome: Progressing Towards Goal     Problem: Patient Education: Go to Patient Education Activity  Goal: Patient/Family Education  Outcome: Progressing Towards Goal     Problem: Heart Failure: Day 5  Goal: Off Pathway (Use only if patient is Off Pathway)  Outcome: Progressing Towards Goal  Goal: Activity/Safety  Outcome: Progressing Towards Goal  Goal: Diagnostic Test/Procedures  Outcome: Progressing Towards Goal  Goal: Nutrition/Diet  Outcome: Progressing Towards Goal  Goal: Discharge Planning  Outcome: Progressing Towards Goal  Goal: Medications  Outcome: Progressing Towards Goal  Goal: Respiratory  Outcome: Progressing Towards Goal  Goal: Treatments/Interventions/Procedures  Outcome: Progressing Towards Goal  Goal: Psychosocial  Outcome: Progressing Towards Goal     Problem: Falls - Risk of  Goal: *Absence of Falls  Description: Document José Manuel Fall Risk and appropriate interventions in the flowsheet.   Outcome: Progressing Towards Goal  Note: Fall Risk Interventions:            Medication Interventions: Teach patient to arise slowly, Patient to call before getting OOB                   Problem: Patient Education: Go to Patient Education Activity  Goal: Patient/Family Education  Outcome: Progressing Towards Goal

## 2023-03-07 NOTE — PROCEDURES
Findings  1. Elevated left and right-sided filling pressures. 2.  Nonobstructive coronary artery disease. Limited images were taken due to advanced CKD using only 11 cc of dye to rule out significant proximal coronary disease  3. Elevated LVEDP  4. Normal SVR and PVR  5. Cardiac output of 5 L/min and index of 2.35 L/min/m². Access right radial, right internal jugular ultrasound-guided  Contrast 11 cc    Recommendations  1. Continue guideline directed medical therapy for heart failure with reduced ejection fraction  2.   Continue diuresis

## 2023-03-07 NOTE — CONSULTS
[facility]                    Cardiology Care Note     [x]Initial Encounter     []Follow-up    Patient Name: Chad Vidal - :1965 - [facility]:[id number]  Primary Cardiologist: [address] [place] [address] Cardiology Physicians: Christal Armstrong MD  Consulting Cardiologist: [address] [place] [address] Cardiology Physicians: Raheem Zavala MD     Reason for encounter: RHC/+ or- ischemic eval ( LHC or stress test)    HPI:   Chad Vidal is a 56 y.o. male with PMH significant for  nonischemic dilated cardiomyopathy s/p AICD, HFrEF, CKD, HTN, HLD, DM type II who presented with chief c/o SOB, VILLARREAL and edema. His bumex had been increased to 2 mg BID PTA but he still continued with worsening symptoms. Had 14 lb weight gain. Since admission he has been receiving IV diuretics and is net -2.3 liters since admission Advanced heart failure is following and is evaluating patient for possible need for chronic inotropes and possibly need for combined heart-kidney transplant pending on RHC findings. There is also concern that his CKD may be contributing to volume overload so RHC is requested to help sort out. He does deny any history of chest pain. He had a LHC in  which showed nonobstructive CAD including LAD 30%, AV groove 20%, PLB 50%. No history of MI or stents. His EF on 3/3/23 shows reduced LV function with EF 15-20%, LV mod dilated, severe LVH, mild MR, Mild Tr. This EF is reduced from 10/21/22 when EF as 20-25%. Subjective: He reports no chest pain and is able to walk farther than he has done in the past few days. However overall still seems to be systemically congested. .     Assessment and Plan     Cardiomyopathy  Hypertension  CKD  Hyperlipidemia  Type 2 diabetes mellitus  Acute on chronic systolic heart failure. Mr. Valente Rader has history of cardiomyopathy with advanced chronic kidney disease. Uncertain if he has contribution from progressive coronary artery disease. Remote chronic catheterization about 7 years ago had not shown high-grade disease. Nonetheless having CKD puts him at a high risk of having advanced atherosclerosis. Plan to proceed with left and right heart catheterization today. Due to CKD his risk of contrast-induced nephropathy is high and hence we will try to use low-dose of contrast with limited coronary angiography imaging to ensure there is no proximal coronary/left main coronary disease. I discussed the risks/benefits/alternatives of the procedure with the patient. Risks include (but are not limited to) bleeding, infection,stroke,heart attack, need for emergency surgery/pericardiocentesis, need for dialysis or death. The patient understands and agrees to proceed. ____________________________________________________________    Cardiac testing      03/02/23    ECHO ADULT FOLLOW-UP OR LIMITED 03/03/2023 3/3/2023    Interpretation Summary    Left Ventricle: The EF by visual approximation is 15 - 20%. Left ventricle is moderately dilated. Severely increased wall thickness. Severe global hypokinesis present. Right Ventricle: Not well visualized. Mitral Valve: Thickened leaflets. Moderate annular calcification of the mitral valve. Left Atrium: Left atrium is dilated. Signed by: Tiffanie Dia MD on 3/3/2023 10:13 PM              Most recent HS troponins:  No results for input(s): TROPHS in the last 72 hours.     No lab exists for component:  CKMB    ECG: NSR, 1st degree AV block, nonspecific t wave abnormality    Review of Systems:    [x]All other systems reviewed and all negative except as written in HPI    [] Patient unable to provide secondary to condition    Past Medical History:   Diagnosis Date    Diabetes ([facility] [address]     Heart failure ([facility] [address]     CHF, cardiomyopathy    Hypertension     ICD (implantable cardioverter-defibrillator) in place     left upper chest     Past Surgical History:   Procedure Laterality Date    HX HEART CATHETERIZATION 2/2015    x1    HX IMPLANTABLE CARDIOVERTER DEFIBRILLATOR  2/16/2015     Social Hx:  reports that he has never smoked. He has never used smokeless tobacco. He reports current alcohol use. He reports that he does not use drugs. Family Hx: family history includes Diabetes in his brother, father, and mother; Hypertension in his brother and father. No Known Allergies       OBJECTIVE:  Wt Readings from Last 3 Encounters:   03/07/23 101.7 kg (224 lb 3.3 oz)   01/13/23 106.6 kg (235 lb)   12/29/22 103.4 kg (228 lb)       Intake/Output Summary (Last 24 hours) at 3/7/2023 0811  Last data filed at 3/6/2023 2327  Gross per 24 hour   Intake 940 ml   Output 915 ml   Net 25 ml       Physical Exam:    Vitals:   Vitals:    03/07/23 0355 03/07/23 0557 03/07/23 0740 03/07/23 0745   BP:   (!) 152/83    Pulse: 74 75 82    Resp:   16    Temp:   97.9 °F (36.6 °C)    SpO2:   95%    Weight:    101.7 kg (224 lb 3.3 oz)   Height:         Telemetry: NSR    Gen: Well-developed, well-nourished, in no acute distress  Neck: Supple,   Resp: No accessory muscle use, Clear breath sounds, No rales or rhonchi  Card: Regular Rate,Rythm, Normal S1, S2, No murmurs, rubs or gallop. No thrills. Abd:   Soft, non-tender, non-distended, BS+   MSK: No cyanosis  Skin: No rashes    Neuro: Moving all four extremities, follows commands appropriately  Psych: Good insight, oriented to person, place, alert, Nml Affect  LE: tr-1+ BLE edema    Data Review:     Radiology:   XR Results (most recent):  Results from Hospital Encounter encounter on 03/02/23    XR CHEST PA LAT    Narrative  INDICATION: . shortness of breath  Additional history:  COMPARISON: Previous chest xray, 11/4/2022 and 2/22/2020. Nae Other FINDINGS: PA and lateral view of the chest.  .  Lines/tubes/surgical: Cardiac assist device is likely unchanged. Heart/mediastinum: Enlarged cardiopericardial silhouette. Lungs/pleura: Low lung volumes. No visualized pleural effusion or pneumothorax.   Additional Comments: Degenerative changes in the spine. .    Impression  1. Enlarged cardiopericardial silhouette. Recent Labs     03/07/23 0453 03/06/23  0518    139   K 4.1 3.9    103   CO2 31 30   BUN 36* 34*   CREA 2.62* 2.46*   * 170*   CA 9.1 9.2     Recent Labs     03/07/23 0453 03/06/23 0518   WBC 7.8 8.4   HGB 9.0* 8.8*   HCT 30.1* 30.1*    217     No results for input(s): PTP, INR, AP, INREXT in the last 72 hours. No lab exists for component: PTTP, GPT, SGOT  No results for input(s): CHOL, LDLC in the last 72 hours.     No lab exists for component: TGL, HDLC,  HBA1C      Current meds:    Current Facility-Administered Medications:     bumetanide (BUMEX) tablet 2 mg, 2 mg, Oral, BID, Chalo Vaughn MD, 2 mg at 03/06/23 1812    iron sucrose (VENOFER) injection 200 mg, 200 mg, IntraVENous, DAILY, Renee Carpenter MD    hydrALAZINE (APRESOLINE) tablet 50 mg, 50 mg, Oral, BID, Linus Serrano MD, 50 mg at 03/06/23 1813    isosorbide dinitrate (ISORDIL) tablet 30 mg, 30 mg, Oral, BID, Linus Serrano MD, 30 mg at 03/06/23 1813    insulin NPH (NOVOLIN N, HUMULIN N) injection 5 Units, 5 Units, SubCUTAneous, Q12H, Agatha Blanchard MD, 5 Units at 03/06/23 2138    potassium bicarb-citric acid (EFFER-K) tablet 40 mEq, 40 mEq, Oral, BID, Denise Turcios MD, 40 mEq at 03/06/23 1812    spironolactone (ALDACTONE) tablet 25 mg, 25 mg, Oral, DAILY, Denise Turcios MD, 25 mg at 03/06/23 0901    dextrose 10 % infusion 0-250 mL, 0-250 mL, IntraVENous, PRN, Steffanie Fall, CNS    insulin lispro (HUMALOG) injection 5 Units, 0.05 Units/kg, SubCUTAneous, TID WITH MEALS, Steffanie Fall, CNS, 5 Units at 03/06/23 1812    insulin lispro (HUMALOG) injection, , SubCUTAneous, AC&HS, Steffanie Fall CNS, 213 Units at 03/06/23 2138    traMADoL (ULTRAM) tablet 50 mg, 50 mg, Oral, Q6H PRN, April Manning MD, 50 mg at 03/06/23 2137    allopurinoL (ZYLOPRIM) tablet 50 mg, 50 mg, Oral, DAILY, Beau Zarate MD, 50 mg at 03/06/23 0901    carvediloL (COREG) tablet 25 mg, 25 mg, Oral, BID WITH MEALS, Pegram, Severa Colander, MD, 25 mg at 03/06/23 1812    ergocalciferol capsule 50,000 Units, 50,000 Units, Oral, Q7D, Pegram, Severa Colander, MD, 50,000 Units at 03/02/23 2148    multivitamin, tx-iron-ca-min (THERA-M w/ IRON) tablet 1 Tablet, 1 Tablet, Oral, ACB, Pegram, Severa Colander, MD, 1 Tablet at 03/07/23 0741    glucose chewable tablet 16 g, 4 Tablet, Oral, PRN, Pegram, Severa Colander, MD    glucagon (GLUCAGEN) injection 1 mg, 1 mg, IntraMUSCular, PRN, LamyJuvencio farnsworth MD    dextrose 10 % infusion 0-250 mL, 0-250 mL, IntraVENous, PRN, Pegram, Severa Colander, MD    acetaminophen (TYLENOL) tablet 650 mg, 650 mg, Oral, Q6H PRN, Beau Zarate MD, 650 mg at 03/03/23 0711    [Held by provider] heparin (porcine) injection 5,000 Units, 5,000 Units, SubCUTAneous, Q8H, Juvencio Han MD, 5,000 Units at 03/06/23 2138    sodium chloride (NS) flush 5-40 mL, 5-40 mL, IntraVENous, Q8H, Juvencio Han MD, 10 mL at 03/07/23 0742    sodium chloride (NS) flush 5-40 mL, 5-40 mL, IntraVENous, PRN, Pegram, Severa Colander, MD    polyethylene glycol (MIRALAX) packet 17 g, 17 g, Oral, DAILY PRN, Juvencio Han MD    ondansetron (ZOFRAN ODT) tablet 4 mg, 4 mg, Oral, Q8H PRN **OR** ondansetron (ZOFRAN) injection 4 mg, 4 mg, IntraVENous, Q6H PRN, Pegram, Severa Colander, MD    [facility].  JOCELINE Hardy    [facility] Card[facility]  Call center: (G) [phone] (m) [phone]      Dinora Kerr MD

## 2023-03-07 NOTE — PROGRESS NOTES
CATH LAB to RECOVERY ROOM REPORT    Procedure: [facility]     MD:    The procedure was diagnostic only    Verbal Report given to Recovery Nurse on patient being transferred to Cardiac Cath Lab  for routine post-op. Patient stable upon transfer to . Vitals, mental status, MAR, procedural summary discussed with recovery RN. Medication given during procedure:    Contrast:11mL                          Heparin:5000units     Versed:1mg                                                               Fentanyl:50mcg                                                         Sheaths:    Right radial sheath pulled at 0943 am, band at 12mL of air    Right IJ vein sheath pulled at 0943 am, secured with band-aid.

## 2023-03-07 NOTE — PROGRESS NOTES
Per Dr. Nathaly Branch: R/L heart cath done. RHC shows evidence of volume overload with elevated LVEDP, PCWP in 34, C.o.=5.1, C.I. 2.35 by Parvez. LHC showed nonobstructive CAD, including ramus 60%. Total contrast used =11 mls. Full report to follow.

## 2023-03-07 NOTE — PROGRESS NOTES
TRANSFER - IN REPORT:    Verbal report received from [facility] on Apoorva Rees  being received from procedure area for routine progression of care. Report consisted of patients Situation, Background, Assessment and Recommendations(SBAR). Information from the following report(s) SBAR, Procedure Summary, MAR, Recent Results, and Cardiac Rhythm NSR  was reviewed with the receiving clinician. Opportunity for questions and clarification was provided. Assessment completed upon patients arrival to [facility] and care assumed. Cardiac Cath Lab Recovery Arrival Note:    Apoorva Rees arrived to [facility] recovery area. Patient procedure= R/LHC. Patient on cardiac monitor, non-invasive blood pressure, SPO2 monitor. On Room Air . IV  of NS on pump at 25 ml/hr. Patient status doing well without problems. Patient is A&Ox 4. Patient reports No Pain. PROCEDURE SITE CHECK:    Procedure site:without any bleeding and No Hematoma, No pain/discomfort reported at procedure site. No change in patient status. Continue to monitor patient and status.

## 2023-03-07 NOTE — PROGRESS NOTES
[facility] in [address]  Inpatient Progress Note      Patient name: Vahid Cali  Patient : 1965  Patient MRN: [id number]  Consulting MD: Philip Mo MD  Date of service: 23    REASON FOR REFERRAL:  Management of acute on chronic systolic heart failure     PLAN OF CARE:  57 y/o male with h/o non-ischemic cardiomyopathy, LVEF 10-15% from 28%, stage C, NYHA class IIIA symptoms and CKD, stage 3 (Cr 2.1-2.47) undergoing optimization of GDMT (taken off nephrotoxic meds through diuresis, to allow renal recovery and up-titrating alternative HF meds)  Most likely etiology of worsening HF is chronic volume overload due to underestimated severity of renal failure +/- possibly inadequately/untreated MINA; evaluation ongoing   Consult placed to cardiology for RHC +/- ischemic evaluation with LHC or NST depending on renal function and recommendation from nephrology and cardiology (last [facility] )  If RHC reveals that heart failure contributes to renal dysfunction, then patient may need initiation of chronic inotropes and completion of inpatient workup for combined heart-kidney transplantation, including GI workup; of note, patient is currently not a candidate for LVAD due to Cr > 1.8      RECOMMENDATIONS:  Continue current medical therapy for heart failure; CI 2.5 by NICOM  Continue coreg 25mg twice daily, HR at goal in 70s  Cannot tolerate ACEi/ARB/ARNi in hope for renal recovery  Continue hydralazine 50 mg TID and isordil 30mg TID   Cannot tolerate SGLT2i due to eGFR < 30  Change to bumex gtt 0.5mg/hr for elevated filling pressures on RHC today   Does not take aspirin   ICD interrogation every 3 months per routine  Heme occult negative   Reinforced low salt diet  Reinforced fluid restriction to 6 x 8oz glasses per day  Ordered IP sleep study evaluation       At time of discharge plan on the following:  Schedule sleep study IMPRESSION:  Fatigue  Shortness of breath/VILLARREAL/orthopnea  Volume overload  Acute on chronic systolic heart failure  Stage C, NYHA class IIIA symptoms  Non-ischemic cardiomyopathy, LVEF 28%  S/p ICD  HTN  DM  CKD 4     INTERVAL EVENTS:  No issues overnight  -140s/70s, HR 70-80s  I/O net neg 850ml  Hgb stable at 8.8  Cr 2.4     LIFE GOALS:  Lifestyle goals reviewed with the patient. Patient's personal goals include: getting back home  Important upcoming milestones or family events: TBD  The patient identifies the following as important for living well: TBD    Patient assessed. High suspicion of sleep apnea due to the following reasons. Will refer for sleep evaluation. [x] Heart Failure  [x] Hypertention  [] Atrial Fibrillation  [x] BMI > 30  [] History of stroke  [x] Diabetes  [] Heavy snoring  [] Witnessed apnea  [] Hypoxemia         HPI:   Briefly, Libia Toure is a 56 y.o. male with h/o morbid obesity, BMI 35, HTN, HL, DM2, chronic systolic heart failure, stage C, NYHA class IIIA symptoms, non-ischemic cardiomyopathy, LVEF 28% (HF diagnosed around 2002), normal coronaries by [facility] 2015 s/p single lead ICD (2/2015) and worsening renal function, CKD stage 2 and anemia. Patient was referred to [facility] to establish long-term care. CARDIAC IMAGING:  Echo (3/3/23)    Left Ventricle: The EF by visual approximation is 15 - 20%. Left ventricle is moderately dilated. Severely increased wall thickness. Severe global hypokinesis present.   Right Ventricle: Not well visualized.   Mitral Valve: Thickened leaflets. Moderate annular calcification of the mitral valve.   Left Atrium: Left atrium is dilated. LVEDD 6.1cm     Echo 10/21/22    Left Ventricle: Severely reduced left ventricular systolic function with a visually estimated EF of 20 - 25%. Left ventricle is moderately dilated. Mild posterior thickening. Normal wall motion. Normal diastolic function.     Left Atrium: Left atrium is moderately dilated.   Mitral Valve: Mild regurgitation with a centrally directed jet.   Tricuspid Valve: Mild regurgitation with a centrally directed jet. Echo (3/9/22)    Study limited to the assessment of ejection fraction.   Left Ventricle: Left ventricle is moderately dilated. Mildly increased wall thickness. Severe global hypokinesis present. Calculared ejection fraction is 28% by 3D volume and 31% by 2D Fernandez's biplane. Global longitudinal strain is reduced with a value of -5.8%. Echo (12/6/21)  LV: Calculated LVEF is 28%. Biplane method used to measure ejection fraction. Mildly dilated left ventricle. Mildly to moderately increased wall thickness. Moderate-to-severely and globally reduced systolic function. Moderate (grade 2) left ventricular diastolic dysfunction. LA: Mildly dilated left atrium. Left Atrium volume index is 40 mL/m2. MV: Moderate mitral annular calcification. Very mild mitral valve regurgitation is present. RV: Not well visualized. Pacer/ICD present. Echo (2/17/21)  LV: Calculated LVEF is 29%. Biplane method used to measure ejection fraction. Mildly dilated left ventricle. Mild to moderate hypertrophy. Severely and globally reduced systolic function. Wall motion: normal. Abnormal left ventricular strain. Global longitudinal strain is -8%. Moderate (grade 2) left ventricular diastolic dysfunction. LA: Mildly dilated left atrium. Left Atrium volume index is 40 mL/m2. MV: Moderate mitral annular calcification. Very mild mitral valve regurgitation is present. Echo (8/16/19)  Left Ventricle: Mildly dilated left ventricle. Moderate concentric hypertrophy. Severe global systolic dysfunction. Estimated left ventricular ejection fraction is 21 - 25%. Biplane method used to measure ejection fraction. Left ventricular global hypokinesis. Abnormal left ventricular strain. Inconclusive left ventricular diastolic function. Left Atrium: Mildly dilated left atrium. Mitral Valve:  Moderate mitral annular calcification. Mild mitral valve regurgitation. EKG (3/2/23): SR with 1st degree AVB  EKG (8/5/19) NSR 73bpm QRS 124ms        LHC (2/105) normal cors  ICD interrogation     HEMODYNAMICS:  RHC pending   CPEST not done  6MW not done     OTHER IMAGING:  CXR 3/2/23: enlarged cardiac silhouette; no edema   CT chest not done       PHYSICAL EXAM:  Visit Vitals  /82 (BP 1 Location: Left upper arm, BP Patient Position: At rest)   Pulse 73   Temp 98.3 °F (36.8 °C)   Resp 18   Ht 5' 8\" (1.727 m)   Wt 224 lb 3.3 oz (101.7 kg)   SpO2 92%   BMI 34.09 kg/m²     Physical Exam  Vitals and nursing note reviewed. Constitutional:       Appearance: He is normal weight. He is not ill-appearing. Cardiovascular:      Rate and Rhythm: Normal rate and regular rhythm. Pulses: Normal pulses. Heart sounds: Normal heart sounds. Pulmonary:      Effort: No respiratory distress. Breath sounds: Normal breath sounds. Abdominal:      General: There is no distension. Palpations: Abdomen is soft. Musculoskeletal:         General: Swelling present. Skin:     General: Skin is warm and dry. Neurological:      General: No focal deficit present. Mental Status: He is alert. Psychiatric:         Mood and Affect: Mood normal.        REVIEW OF SYSTEMS:  Review of Systems   Constitutional:  Negative for chills and fever. HENT:  Negative for congestion. Respiratory:  Negative for cough and shortness of breath. Cardiovascular:  Positive for leg swelling. Negative for chest pain and palpitations. Gastrointestinal:  Negative for nausea and vomiting. Musculoskeletal:  Negative for falls and myalgias. Neurological:  Negative for dizziness and headaches. Psychiatric/Behavioral:  Negative for depression.         PAST MEDICAL HISTORY:  Past Medical History:   Diagnosis Date    Diabetes ([facility]     Heart failure ([facility]     CHF, cardiomyopathy    Hypertension     ICD (implantable cardioverter-defibrillator) in place     left upper chest       PAST SURGICAL HISTORY:  Past Surgical History:   Procedure Laterality Date    HX HEART CATHETERIZATION  2/2015    x1    HX IMPLANTABLE CARDIOVERTER DEFIBRILLATOR  2/16/2015       FAMILY HISTORY:  Family History   Problem Relation Age of Onset    Diabetes Mother     Hypertension Father     Diabetes Father     Diabetes Brother     Hypertension Brother        SOCIAL HISTORY:  Social History     Socioeconomic History    Marital status:    Tobacco Use    Smoking status: Never    Smokeless tobacco: Never   Vaping Use    Vaping Use: Never used   Substance and Sexual Activity    Alcohol use: Yes     Comment: 1 a month    Drug use: No    Sexual activity: Not Currently       LABORATORY RESULTS:     Labs Latest Ref Rng & Units 3/7/2023 3/6/2023 3/5/2023 3/4/2023 3/3/2023 3/2/2023 2/28/2023   WBC 4.1 - 11.1 K/uL 7.8 8.4 8.5 8.0 8.1 6.8 -   RBC 4.10 - 5.70 M/uL 3.54(L) 3.54(L) 3.56(L) 3.49(L) 3.61(L) 4.05(L) -   Hemoglobin 12.1 - 17.0 g/dL 9. 0(L) 8.8(L) 9.1(L) 8.8(L) 9.2(L) 10. 2(L) -   Hematocrit 36.6 - 50.3 % 30. 1(L) 30. 1(L) 30. 7(L) 28. 9(L) 31. 2(L) 34. 3(L) -   MCV 80.0 - 99.0 FL 85.0 85.0 86.2 82.8 86.4 84.7 -   Platelets 591 - 633 K/uL 225 217 213 196 195 211 -   Lymphocytes 12 - 49 % - - - - 11(L) 12 -   Monocytes 5 - 13 % - - - - 8 8 -   Eosinophils 0 - 7 % - - - - 5 8(H) -   Basophils 0 - 1 % - - - - 1 1 -   Albumin 3.5 - 5.0 g/dL - - - - 2. 6(L) 3. 2(L) -   Calcium 8.5 - 10.1 MG/DL 9.1 9.2 9.0 9.2 8.2(L) 9.0 8.9   Glucose 65 - 100 mg/dL 160(H) 170(H) 180(H) 78 137(H) 120(H) 160(H)   BUN 6 - 20 MG/DL 36(H) 34(H) 31(H) 33(H) 36(H) 43(H) 38(H)   Creatinine 0.70 - 1.30 MG/DL 2.62(H) 2.46(H) 2.43(H) 2.52(H) 2.46(H) 3.06(H) 2.83(H)   Sodium 136 - 145 mmol/L 139 139 139 142 142 140 145(H)   Potassium 3.5 - 5.1 mmol/L 4.1 3.9 3.5 3. 2(L) 3. 3(L) 3.7 4.0   Some recent data might be hidden     Lab Results   Component Value Date/Time    TSH 2.080 05/05/2022 01:34 PM    TSH 1.05 02/11/2015 01:30 PM       ALLERGY:  No Known Allergies     CURRENT MEDICATIONS:    Current Facility-Administered Medications:     sodium chloride (NS) flush 5-40 mL, 5-40 mL, IntraVENous, Q8H, Susan Hardy Level., NP    sodium chloride (NS) flush 5-40 mL, 5-40 mL, IntraVENous, PRN, Susan Hardy Level., NP    bumetanide (BUMEX) tablet 2 mg, 2 mg, Oral, BID, Verna Harada, MD, 2 mg at 03/07/23 0844    iron sucrose (VENOFER) injection 200 mg, 200 mg, IntraVENous, DAILY, Renee Carpenter MD    hydrALAZINE (APRESOLINE) tablet 50 mg, 50 mg, Oral, BID, Ayah Briones MD, 50 mg at 03/07/23 0843    isosorbide dinitrate (ISORDIL) tablet 30 mg, 30 mg, Oral, BID, Ayah Briones MD, 30 mg at 03/07/23 0843    insulin NPH (NOVOLIN N, HUMULIN N) injection 5 Units, 5 Units, SubCUTAneous, Q12H, Araceli Fatima MD, 5 Units at 03/06/23 2138    potassium bicarb-citric acid (EFFER-K) tablet 40 mEq, 40 mEq, Oral, BID, Mitra Jensen MD, 40 mEq at 03/07/23 0843    spironolactone (ALDACTONE) tablet 25 mg, 25 mg, Oral, DAILY, Mitra Jensen MD, 25 mg at 03/07/23 0844    dextrose 10 % infusion 0-250 mL, 0-250 mL, IntraVENous, PRN, Steffanie Fall CNS    insulin lispro (HUMALOG) injection 5 Units, 0.05 Units/kg, SubCUTAneous, TID WITH MEALS, Steffanie Fall CNS, 5 Units at 03/06/23 1812    insulin lispro (HUMALOG) injection, , SubCUTAneous, AC&HS, Steffanie Fall CNS, 213 Units at 03/06/23 2138    traMADoL (ULTRAM) tablet 50 mg, 50 mg, Oral, Q6H PRN, Isabel Delgadillo MD, 50 mg at 03/06/23 2137    allopurinoL (ZYLOPRIM) tablet 50 mg, 50 mg, Oral, DAILY, Juvencio Han MD, 50 mg at 03/07/23 0843    carvediloL (COREG) tablet 25 mg, 25 mg, Oral, BID WITH MEALS, Mars Han MD, 25 mg at 03/07/23 0844    ergocalciferol capsule 50,000 Units, 50,000 Units, Oral, Q7D, Shavonne Roman MD, 50,000 Units at 03/02/23 214    multivitamin, tx-iron-ca-min (THERA-M w/ IRON) tablet 1 Tablet, 1 Tablet, Oral, ACB, Mars Han MD, 1 Tablet at 03/07/23 0741    glucose chewable tablet 16 g, 4 Tablet, Oral, PRN, Mars Han MD    glucagon (GLUCAGEN) injection 1 mg, 1 mg, IntraMUSCular, PRN, Mars Han MD    dextrose 10 % infusion 0-250 mL, 0-250 mL, IntraVENous, PRN, Mars Han MD    acetaminophen (TYLENOL) tablet 650 mg, 650 mg, Oral, Q6H PRN, Shavonne Roman MD, 650 mg at 03/03/23 0711    heparin (porcine) injection 5,000 Units, 5,000 Units, SubCUTAneous, Q8H, Mars Han MD, 5,000 Units at 03/06/23 2138    sodium chloride (NS) flush 5-40 mL, 5-40 mL, IntraVENous, Q8H, [place], Juvencio BEDOLLA MD, 10 mL at 03/07/23 0742    sodium chloride (NS) flush 5-40 mL, 5-40 mL, IntraVENous, PRN, Mars Han MD    polyethylene glycol (MIRALAX) packet 17 g, 17 g, Oral, DAILY PRN, Mars Han MD    ondansetron (ZOFRAN ODT) tablet 4 mg, 4 mg, Oral, Q8H PRN **OR** ondansetron (ZOFRAN) injection 4 mg, 4 mg, IntraVENous, Q6H PRN, Shavonne Roman MD    PATIENT CARE TEAM:  Patient Care Team:  Cierra Akers MD as PCP - General (Family Medicine)  Lawanda Grijalva MD (Cardiovascular Disease Physician)  Elissa Mays MD (Nephrology)  Madelin George MD (Internal Medicine Physician)     Thank you for allowing me to participate in this patient's care.     Janel Delgadillo NP   [address] [place] [address], [address]  Phone: [phone]

## 2023-03-07 NOTE — PROGRESS NOTES
TRANSFER - IN REPORT:    Verbal report received from [facility]) on Dalila nAn  being received from Room 456(unit) for routine progression of care      Report consisted of patients Situation, Background, Assessment and   Recommendations(SBAR). Information from the following report(s) SBAR, Procedure Summary, MAR, Recent Results, and Cardiac Rhythm NSR  was reviewed with the receiving nurse. Opportunity for questions and clarification was provided. Assessment completed upon patients arrival to unit and care assumed.

## 2023-03-07 NOTE — PROGRESS NOTES
Physical Therapy:  3/7/2023    Chart reviewed in preparation for PT treatment. Noted patient currently JEREMY at [facility]. Will defer and continue to follow as able.      Thank you,  Bertha Rosas, PT, DPT

## 2023-03-07 NOTE — PROGRESS NOTES
[address] [facility]  Hospitalist Group                                                                                          Hospitalist Progress Note  Antonette Peters MD  Answering service: [phone] from in house phone        Date of Service:  3/7/2023  NAME:  Faith Pike  :  1965  MRN:  [id number]       Admission Summary:   Faith Pike is a 56 y.o. male with past medical history of dilated cardiomyopathy, heart failure reduced ejection fraction, AICD 2015, chronic lower extremity edema, type 2 diabetes mellitus, hypertension, stage III CKD presented to the emergency department chief complaints of shortness of breath, leg and abdominal swelling, and unintentional weight gain. Patient has known history of CHF. He newly had recent increase in Bumex from 1 mg to 2 mg twice daily. Unfortunately, he still had worsening symptoms with peripheral edema and swelling of legs and abdomen, which is severe, without specific alleviating factors, with associated shortness of breath and dyspnea on exertion over the past 5 days. Also fully had 14 pound weight gain. His last 2 echocardiogram 10/21/2022 showed reduced left ventricular ejection fraction 20% to 25%. On arrival emergency department, initial ported vital signs temperature 97.3 °F, /81, heart rate 71, respiratory 18, saturation 90% room air. Abnormal labs included hemoglobin 10.2, blood glucose 120, BUN 43, creatinine 3.06, GFR 23, albumin 3.2, alk phos 6135, proBNP 9738. Chest x-ray portable showed enlarged cardiopulmonary silhouette. 12 EKG shows sinus rhythm, first-degree AV block, occasional PVCs, premature supraventricular complexes, ST changes anterior lateral leads at 72 bpm.  ED request admission to the hospital service       Interval history / Subjective:   Patient is seen and examined at bedside this AM. He underwent cardiac cath this morning, tolerated well. Denied any complaints to me.    Discussed with nursing, cm      Assessment & Plan:     Acute on Chronic Systolic CHF  - improving   Non-ischemic cardiomyopathy EF ~15%   S/p ICD   -Appreciate  AHF team input   - strict I&Os , daily weight   - echo : EF  15 - 20%. Left ventricle is moderately dilated. Severely increased wall thickness. Severe global hypokinesis present. - c/w coreg, hydralazine, isordil, aldactone   - Cannot tolerate ACEi/ARB/ARNi in hope for renal recovery  - Cont po bumex   - RHC/ LHC done 3/7 - Elevated left and right-sided filling pressures. - further recs per AHF      ANA on CKD Stage 4 -creatinine is slightly better   - appreciate nephrologist input  - renal US: Increased renal cortical echogenicity bilaterally is likely related to medical  renal disease.  - Switched IV Bumex to PO but can decrease to 2mg PO BID from 4 mg as volume status had been better  - monitor renal function      T2DM with episodes of Hypoglycemia  - hypoglycemia protocols. A1c 8.4   -Cont SSI and NPH 10U bid     Essential hypertension  - c/w coreg, hydralazine       Obesity Body mass index is 34.73 kg/m²   - recommend weight loss      Code status: Full  Prophylaxis: Heparin   Care Plan discussed with: pt, RN, CM   Anticipated Disposition: TBD. Home when cleared by [facility]   Inpatient  Cardiac monitoring: Telemetry     Hospital Problems  Date Reviewed: 1/13/2023            Codes Class Noted POA    Acute kidney injury superimposed on chronic kidney disease ([facility] ICD-10-CM: N17.9, N18.9  ICD-9-CM: 584.9, 585.9  3/2/2023 Unknown        Acute on chronic HFrEF (heart failure with reduced ejection fraction) ([facility] ICD-10-CM: I50.23  ICD-9-CM: 428.23  3/2/2023 Unknown        Card[facility] ICD-10-CM: I42.9  ICD-9-CM: 425.4  9/26/2014 Unknown    Overview Addendum 11/9/2017  7:20 AM by Winsome Monson MD     2002 (approx) diagnosed [place] by echo, neg stress test and started on usual meds.   2007 (approx) fup echo lvef 25%  2/15 cardiac cath, no sig cad  2/15 single lead AICD implant  2/17 lvh, otherwise normal echo              Social Determinants of Health     Tobacco Use: Low Risk     Smoking Tobacco Use: Never    Smokeless Tobacco Use: Never    Passive Exposure: Not on file   Alcohol Use: Not on file   Financial Resource Strain: Not on file   Food Insecurity: Not on file   Transportation Needs: Not on file   Physical Activity: Not on file   Stress: Not on file   Social Connections: Not on file   Intimate Partner Violence: Not on file   Depression: Not at risk    PHQ-2 Score: 0   Housing Stability: Not on file       Review of Systems:   A comprehensive review of systems was negative. Vital Signs:    Last 24hrs VS reviewed since prior progress note. Most recent are:  Visit Vitals  /75   Pulse 75   Temp 98.3 °F (36.8 °C)   Resp 22   Ht 5' 8\" (1.727 m)   Wt 101.7 kg (224 lb 3.3 oz)   SpO2 98%   BMI 34.09 kg/m²         Intake/Output Summary (Last 24 hours) at 3/7/2023 1254  Last data filed at 3/7/2023 1045  Gross per 24 hour   Intake 620 ml   Output 815 ml   Net -195 ml          Physical Examination:     I had a face to face encounter with this patient and independently examined them on 3/7/2023 as outlined below:          General : alert x 3, awake, no acute distress,   HEENT: PEERL, EOMI, moist mucus membrane  Neck: supple, no JVD, no meningeal signs  Chest: Clear to auscultation bilaterally   CVS: S1 S2 heard, Capillary refill less than 2 seconds  Abd: soft/ non tender, non distended, BS physiological,   Ext: no clubbing, no cyanosis, mild edema, brisk 2+ DP pulses  Neuro/Psych: pleasant mood and affect, CN 2-12 grossly intact, sensory grossly within normal limit, Strength 5/5 in all extremities  Skin: warm     Data Review:    I personally reviewed  Image      I have personally and independently reviewed all pertinent labs, diagnostic studies, imaging, and have provided independent interpretation of the same.      Labs:     Recent Labs     03/07/23  0453 03/06/23  0552 WBC 7.8 8.4   HGB 9.0* 8.8*   HCT 30.1* 30.1*    217       Recent Labs     03/07/23  0453 03/06/23  0518 03/05/23  0425    139 139   K 4.1 3.9 3.5    103 103   CO2 31 30 29   BUN 36* 34* 31*   CREA 2.62* 2.46* 2.43*   * 170* 180*   CA 9.1 9.2 9.0       No results for input(s): ALT, AP, TBIL, TBILI, TP, ALB, GLOB, GGT, AML, LPSE in the last 72 hours. No lab exists for component: SGOT, GPT, AMYP, HLPSE  No results for input(s): INR, PTP, APTT, INREXT, INREXT in the last 72 hours. No results for input(s): FE, TIBC, PSAT, FERR in the last 72 hours. No results found for: FOL, RBCF   No results for input(s): PH, PCO2, PO2 in the last 72 hours. No results for input(s): CPK, CKNDX, TROIQ in the last 72 hours.     No lab exists for component: CPKMB  Lab Results   Component Value Date/Time    Cholesterol, total 132 05/05/2022 01:34 PM    HDL Cholesterol 39 (L) 05/05/2022 01:34 PM    LDL, calculated 79 05/05/2022 01:34 PM    LDL, calculated 100.8 (H) 02/11/2015 01:30 PM    Triglyceride 68 05/05/2022 01:34 PM    CHOL/HDL Ratio 4.5 02/11/2015 01:30 PM     Lab Results   Component Value Date/Time    Glucose (POC) 195 (H) 03/07/2023 12:09 PM    Glucose (POC) 193 (H) 03/07/2023 07:40 AM    Glucose (POC) 213 (H) 03/06/2023 09:17 PM    Glucose (POC) 190 (H) 03/06/2023 04:08 PM    Glucose (POC) 203 (H) 03/06/2023 11:03 AM     Lab Results   Component Value Date/Time    Color YELLOW/STRAW 02/11/2015 12:10 PM    Appearance CLEAR 02/11/2015 12:10 PM    Specific gravity 1.008 02/11/2015 12:10 PM    pH (UA) 6.5 02/11/2015 12:10 PM    Protein TRACE (A) 02/11/2015 12:10 PM    Glucose NEGATIVE 02/11/2015 12:10 PM    Ketone NEGATIVE 02/11/2015 12:10 PM    Bilirubin NEGATIVE 02/11/2015 12:10 PM    Urobilinogen 0.2 02/11/2015 12:10 PM    Nitrites NEGATIVE 02/11/2015 12:10 PM    Leukocyte Esterase NEGATIVE 02/11/2015 12:10 PM    Epithelial cells FEW 02/11/2015 12:10 PM    Bacteria NEGATIVE 02/11/2015 12:10 PM WBC 0-4 02/11/2015 12:10 PM    RBC 0-5 02/11/2015 12:10 PM       Notes reviewed from all clinical/nonclinical/nursing services involved in patient's clinical care. Care coordination discussions were held with appropriate clinical/nonclinical/ nursing providers based on care coordination needs. Patients current active Medications were reviewed, considered, added and adjusted based on the clinical condition today. Home Medications were reconciled to the best of my ability given all available resources at the time of admission. Route is PO if not otherwise noted.       Admission Status:[id number]:::1}      Medications Reviewed:     Current Facility-Administered Medications   Medication Dose Route Frequency    sodium chloride (NS) flush 5-40 mL  5-40 mL IntraVENous Q8H    sodium chloride (NS) flush 5-40 mL  5-40 mL IntraVENous PRN    bumetanide (BUMEX) 0.25 mg/mL infusion  0.5 mg/hr IntraVENous CONTINUOUS    iron sucrose (VENOFER) injection 200 mg  200 mg IntraVENous DAILY    hydrALAZINE (APRESOLINE) tablet 50 mg  50 mg Oral BID    isosorbide dinitrate (ISORDIL) tablet 30 mg  30 mg Oral BID    insulin NPH (NOVOLIN N, HUMULIN N) injection 5 Units  5 Units SubCUTAneous Q12H    potassium bicarb-citric acid (EFFER-K) tablet 40 mEq  40 mEq Oral BID    spironolactone (ALDACTONE) tablet 25 mg  25 mg Oral DAILY    dextrose 10 % infusion 0-250 mL  0-250 mL IntraVENous PRN    insulin lispro (HUMALOG) injection 5 Units  0.05 Units/kg SubCUTAneous TID WITH MEALS    insulin lispro (HUMALOG) injection   SubCUTAneous AC&HS    traMADoL (ULTRAM) tablet 50 mg  50 mg Oral Q6H PRN    allopurinoL (ZYLOPRIM) tablet 50 mg  50 mg Oral DAILY    carvediloL (COREG) tablet 25 mg  25 mg Oral BID WITH MEALS    ergocalciferol capsule 50,000 Units  50,000 Units Oral Q7D    multivitamin, tx-iron-ca-min (THERA-M w/ IRON) tablet 1 Tablet  1 Tablet Oral ACB    glucose chewable tablet 16 g  4 Tablet Oral PRN    glucagon (GLUCAGEN) injection 1 mg 1 mg IntraMUSCular PRN    dextrose 10 % infusion 0-250 mL  0-250 mL IntraVENous PRN    acetaminophen (TYLENOL) tablet 650 mg  650 mg Oral Q6H PRN    heparin (porcine) injection 5,000 Units  5,000 Units SubCUTAneous Q8H    sodium chloride (NS) flush 5-40 mL  5-40 mL IntraVENous Q8H    sodium chloride (NS) flush 5-40 mL  5-40 mL IntraVENous PRN    polyethylene glycol (MIRALAX) packet 17 g  17 g Oral DAILY PRN    ondansetron (ZOFRAN ODT) tablet 4 mg  4 mg Oral Q8H PRN    Or    ondansetron (ZOFRAN) injection 4 mg  4 mg IntraVENous Q6H PRN     ______________________________________________________________________  EXPECTED LENGTH OF STAY: 3d 21h  ACTUAL LENGTH OF STAY:          5                 Antonette Peters MD

## 2023-03-07 NOTE — PROGRESS NOTES
TRANSFER - OUT REPORT:    Verbal report given to [address] [place] [address] on Faith Pike being transferred to Room 456 for routine progression of care       Report consisted of patients Situation, Background, Assessment and   Recommendations(SBAR). Information from the following report(s) SBAR, Procedure Summary, MAR, Recent Results, and Cardiac Rhythm NSR  was reviewed with the receiving nurse. Opportunity for questions and clarification was provided.

## 2023-03-07 NOTE — PROGRESS NOTES
[facility]   [address] [address], [address] [facility], [facility]  Phone: [phone]   [phone]    [email or web address]     Nephrology Progress Note    Patient Name : Eric Portillo      : 1965     MRN : [id number]  Date of Admission : 3/2/2023  Date of Servive : 23    CC: Follow up for ANA       Assessment and Plan   ANA on CKD  -Suspect progressive CKD from poorly controlled DM, HTN  -Switched IV Bumex to PO but can decrease to 2mg PO BID from 4 mg as volume status had been better  -Strict I's and O's and Daily labs  - Cr 2.62 today  -in light of cardiac cath findings below, agree w/ starting on Bumex gtt 0.5mg/hr    CKD 4  -2/2 DM, HTN  -Previously nonnephrotic proteinuria  -UPCR 0.8 mg/mg  -renal US: MRD  -paraproteinemia screen pending  -Baseline creatinine: uncertain  -He will follow-up with Dr. Zander Logan on discharge    Acute on chronic HFrEF  Nonischemic CMP, s/p AICD  -Last ECHO EF 20 to 25%  -MX per AHF team  -Cont Aldactone 25 qd  -only start Entresto once GFR more stable - for now, Hydralazine/Isordil  -cardiac cath 3/7: elevated left and right-sided filling pressures. Nonobstructive coronary artery disease. Limited images were taken due to advanced CKD using only 11 cc of dye to rule out significant proximal coronary disease. Elevated LVEDP     DM 2  -Poorly controlled, A1c 8.4  -Establish care with endocrinology     HTN  -No changes to BP meds until volume status optimized        Interval History:  Breathing better. No cough, fever, n/v. UO 1915cc    Review of Systems: A comprehensive review of systems was negative except for that written in the HPI.     Current Medications:   Current Facility-Administered Medications   Medication Dose Route Frequency    sodium chloride (NS) flush 5-40 mL  5-40 mL IntraVENous Q8H    sodium chloride (NS) flush 5-40 mL  5-40 mL IntraVENous PRN    bumetanide (BUMEX) 0.25 mg/mL infusion  0.5 mg/hr IntraVENous CONTINUOUS insulin NPH (NOVOLIN N, HUMULIN N) injection 8 Units  8 Units SubCUTAneous Q12H    iron sucrose (VENOFER) injection 200 mg  200 mg IntraVENous DAILY    hydrALAZINE (APRESOLINE) tablet 50 mg  50 mg Oral BID    isosorbide dinitrate (ISORDIL) tablet 30 mg  30 mg Oral BID    potassium bicarb-citric acid (EFFER-K) tablet 40 mEq  40 mEq Oral BID    spironolactone (ALDACTONE) tablet 25 mg  25 mg Oral DAILY    dextrose 10 % infusion 0-250 mL  0-250 mL IntraVENous PRN    insulin lispro (HUMALOG) injection 5 Units  0.05 Units/kg SubCUTAneous TID WITH MEALS    insulin lispro (HUMALOG) injection   SubCUTAneous AC&HS    traMADoL (ULTRAM) tablet 50 mg  50 mg Oral Q6H PRN    allopurinoL (ZYLOPRIM) tablet 50 mg  50 mg Oral DAILY    carvediloL (COREG) tablet 25 mg  25 mg Oral BID WITH MEALS    ergocalciferol capsule 50,000 Units  50,000 Units Oral Q7D    multivitamin, tx-iron-ca-min (THERA-M w/ IRON) tablet 1 Tablet  1 Tablet Oral ACB    glucose chewable tablet 16 g  4 Tablet Oral PRN    glucagon (GLUCAGEN) injection 1 mg  1 mg IntraMUSCular PRN    dextrose 10 % infusion 0-250 mL  0-250 mL IntraVENous PRN    acetaminophen (TYLENOL) tablet 650 mg  650 mg Oral Q6H PRN    heparin (porcine) injection 5,000 Units  5,000 Units SubCUTAneous Q8H    sodium chloride (NS) flush 5-40 mL  5-40 mL IntraVENous Q8H    sodium chloride (NS) flush 5-40 mL  5-40 mL IntraVENous PRN    polyethylene glycol (MIRALAX) packet 17 g  17 g Oral DAILY PRN    ondansetron (ZOFRAN ODT) tablet 4 mg  4 mg Oral Q8H PRN    Or    ondansetron (ZOFRAN) injection 4 mg  4 mg IntraVENous Q6H PRN      No Known Allergies    Objective:  Vitals:    Vitals:    03/07/23 1149 03/07/23 1220 03/07/23 1357 03/07/23 1518   BP: 130/75 136/67  131/79   Pulse: 75 76 77 81   Resp: 22 22     Temp:  97 °F (36.1 °C)  98.2 °F (36.8 °C)   SpO2: 98% 99%  97%   Weight:       Height:         Intake and Output:  03/07 0701 - 03/07 1900  In: 100 [P.O.:100]  Out: 200 [Urine:200]  03/05 1901 - 03/07 0700  In: 1160 [P.O.:1160]  Out: 2565 [Urine:2565]    Physical Examination:    Pt intubated    No    General: NAD,Conversant   Neck:  Supple, no mass  Resp:  Lungs decreased basal BS, no wheezing , normal respiratory effort  CV:  RRR,  no murmur or rub, [id number] LE edema  GI:  Soft, NT, + BS, no HS megaly  Neurologic:  Non focal  Psych:             AAO x 3 appropriate affect   Skin:  No Rash  :  Zelaya  -  Dialysis Access : NA    []    High complexity decision making was performed  []    Patient is at high-risk of decompensation with multiple organ involvement    Lab Data Personally Reviewed: I have reviewed all the pertinent labs, microbiology data and radiology studies during assessment.     Recent Labs     03/07/23 0453 03/06/23 0518 03/05/23 0425    139 139   K 4.1 3.9 3.5    103 103   CO2 31 30 29   * 170* 180*   BUN 36* 34* 31*   CREA 2.62* 2.46* 2.43*   CA 9.1 9.2 9.0     Recent Labs     03/07/23 0453 03/06/23 0518 03/05/23 0425   WBC 7.8 8.4 8.5   HGB 9.0* 8.8* 9.1*   HCT 30.1* 30.1* 30.7*    217 213     No results found for: SDES  No results found for: CULT  Recent Results (from the past 24 hour(s))   GLUCOSE, POC    Collection Time: 03/06/23  4:08 PM   Result Value Ref Range    Glucose (POC) 190 (H) 65 - 117 mg/dL    Performed by Lavonne Yee  PCT    COVID-19 RAPID TEST    Collection Time: 03/06/23  6:20 PM   Result Value Ref Range    Specimen source Nasopharyngeal      COVID-19 rapid test Not detected NOTD     GLUCOSE, POC    Collection Time: 03/06/23  9:17 PM   Result Value Ref Range    Glucose (POC) 213 (H) 65 - 117 mg/dL    Performed by AC Gilmore    CBC W/O DIFF    Collection Time: 03/07/23  4:53 AM   Result Value Ref Range    WBC 7.8 4.1 - 11.1 K/uL    RBC 3.54 (L) 4.10 - 5.70 M/uL    HGB 9.0 (L) 12.1 - 17.0 g/dL    HCT 30.1 (L) 36.6 - 50.3 %    MCV 85.0 80.0 - 99.0 FL    MCH 25.4 (L) 26.0 - 34.0 PG    MCHC 29.9 (L) 30.0 - 36.5 g/dL    RDW 17.8 (H) 11.5 - 14.5 %    PLATELET 327 504 - 409 K/uL    MPV 12.2 8.9 - 12.9 FL    NRBC 0.0 0  WBC    ABSOLUTE NRBC 0.00 0.00 - 0.42 K/uL   METABOLIC PANEL, BASIC    Collection Time: 03/07/23  4:53 AM   Result Value Ref Range    Sodium 139 136 - 145 mmol/L    Potassium 4.1 3.5 - 5.1 mmol/L    Chloride 101 97 - 108 mmol/L    CO2 31 21 - 32 mmol/L    Anion gap 7 5 - 15 mmol/L    Glucose 160 (H) 65 - 100 mg/dL    BUN 36 (H) 6 - 20 MG/DL    Creatinine 2.62 (H) 0.70 - 1.30 MG/DL    BUN/Creatinine ratio 14 12 - 20      eGFR 28 (L) >60 ml/min/1.73m2    Calcium 9.1 8.5 - 10.1 MG/DL   GLUCOSE, POC    Collection Time: 03/07/23  7:40 AM   Result Value Ref Range    Glucose (POC) 193 (H) 65 - 117 mg/dL    Performed by [address] [place] [address], POC    Collection Time: 03/07/23 12:09 PM   Result Value Ref Range    Glucose (POC) 195 (H) 65 - 117 mg/dL    Performed by Rosa Garcia            I have reviewed the flowsheets. Chart and Pertinent Notes have been reviewed. No change in PMH ,family and social history from Consult note.       Kasandra Carmichael Replaced by [facility]

## 2023-03-08 LAB
ANION GAP SERPL CALC-SCNC: 4 MMOL/L (ref 5–15)
BUN SERPL-MCNC: 38 MG/DL (ref 6–20)
BUN/CREAT SERPL: 14 (ref 12–20)
CALCIUM SERPL-MCNC: 9.6 MG/DL (ref 8.5–10.1)
CHLORIDE SERPL-SCNC: 99 MMOL/L (ref 97–108)
CO2 SERPL-SCNC: 33 MMOL/L (ref 21–32)
CREAT SERPL-MCNC: 2.68 MG/DL (ref 0.7–1.3)
ERYTHROCYTE [DISTWIDTH] IN BLOOD BY AUTOMATED COUNT: 17.6 % (ref 11.5–14.5)
GLUCOSE BLD STRIP.AUTO-MCNC: 103 MG/DL (ref 65–117)
GLUCOSE BLD STRIP.AUTO-MCNC: 109 MG/DL (ref 65–117)
GLUCOSE BLD STRIP.AUTO-MCNC: 113 MG/DL (ref 65–117)
GLUCOSE BLD STRIP.AUTO-MCNC: 195 MG/DL (ref 65–117)
GLUCOSE SERPL-MCNC: 109 MG/DL (ref 65–100)
HCT VFR BLD AUTO: 30.1 % (ref 36.6–50.3)
HGB BLD-MCNC: 9.3 G/DL (ref 12.1–17)
IGA SERPL-MCNC: 248 MG/DL (ref 90–386)
IGG SERPL-MCNC: 1186 MG/DL (ref 603–1613)
IGM SERPL-MCNC: 29 MG/DL (ref 20–172)
MCH RBC QN AUTO: 25.7 PG (ref 26–34)
MCHC RBC AUTO-ENTMCNC: 30.9 G/DL (ref 30–36.5)
MCV RBC AUTO: 83.1 FL (ref 80–99)
NRBC # BLD: 0 K/UL (ref 0–0.01)
NRBC BLD-RTO: 0 PER 100 WBC
PLATELET # BLD AUTO: 249 K/UL (ref 150–400)
PMV BLD AUTO: 12.3 FL (ref 8.9–12.9)
POTASSIUM SERPL-SCNC: 4 MMOL/L (ref 3.5–5.1)
PROT PATTERN SERPL IFE-IMP: NORMAL
RBC # BLD AUTO: 3.62 M/UL (ref 4.1–5.7)
SERVICE CMNT-IMP: ABNORMAL
SERVICE CMNT-IMP: NORMAL
SODIUM SERPL-SCNC: 136 MMOL/L (ref 136–145)
WBC # BLD AUTO: 8.5 K/UL (ref 4.1–11.1)

## 2023-03-08 PROCEDURE — 74011000250 HC RX REV CODE- 250: Performed by: FAMILY MEDICINE

## 2023-03-08 PROCEDURE — 74011250637 HC RX REV CODE- 250/637: Performed by: FAMILY MEDICINE

## 2023-03-08 PROCEDURE — 74011000250 HC RX REV CODE- 250: Performed by: NURSE PRACTITIONER

## 2023-03-08 PROCEDURE — 82962 GLUCOSE BLOOD TEST: CPT

## 2023-03-08 PROCEDURE — 74011250636 HC RX REV CODE- 250/636: Performed by: HOSPITALIST

## 2023-03-08 PROCEDURE — 36415 COLL VENOUS BLD VENIPUNCTURE: CPT

## 2023-03-08 PROCEDURE — 74011250637 HC RX REV CODE- 250/637: Performed by: INTERNAL MEDICINE

## 2023-03-08 PROCEDURE — 99231 SBSQ HOSP IP/OBS SF/LOW 25: CPT | Performed by: CLINICAL NURSE SPECIALIST

## 2023-03-08 PROCEDURE — 65660000001 HC RM ICU INTERMED STEPDOWN

## 2023-03-08 PROCEDURE — 74011250637 HC RX REV CODE- 250/637: Performed by: NURSE PRACTITIONER

## 2023-03-08 PROCEDURE — 99233 SBSQ HOSP IP/OBS HIGH 50: CPT | Performed by: NURSE PRACTITIONER

## 2023-03-08 PROCEDURE — 74011250636 HC RX REV CODE- 250/636: Performed by: FAMILY MEDICINE

## 2023-03-08 PROCEDURE — 74011250637 HC RX REV CODE- 250/637: Performed by: HOSPITALIST

## 2023-03-08 PROCEDURE — 85027 COMPLETE CBC AUTOMATED: CPT

## 2023-03-08 PROCEDURE — 80048 BASIC METABOLIC PNL TOTAL CA: CPT

## 2023-03-08 PROCEDURE — 74011636637 HC RX REV CODE- 636/637: Performed by: CLINICAL NURSE SPECIALIST

## 2023-03-08 RX ORDER — HYDRALAZINE HYDROCHLORIDE 50 MG/1
50 TABLET, FILM COATED ORAL 3 TIMES DAILY
Status: DISCONTINUED | OUTPATIENT
Start: 2023-03-08 | End: 2023-03-10 | Stop reason: HOSPADM

## 2023-03-08 RX ADMIN — BUMETANIDE 0.5 MG/HR: 0.25 INJECTION INTRAMUSCULAR; INTRAVENOUS at 08:40

## 2023-03-08 RX ADMIN — ACETAMINOPHEN 650 MG: 325 TABLET ORAL at 02:20

## 2023-03-08 RX ADMIN — POTASSIUM BICARBONATE 40 MEQ: 782 TABLET, EFFERVESCENT ORAL at 09:44

## 2023-03-08 RX ADMIN — CARVEDILOL 25 MG: 12.5 TABLET, FILM COATED ORAL at 09:44

## 2023-03-08 RX ADMIN — SODIUM CHLORIDE, PRESERVATIVE FREE 10 ML: 5 INJECTION INTRAVENOUS at 02:18

## 2023-03-08 RX ADMIN — Medication 5 UNITS: at 09:45

## 2023-03-08 RX ADMIN — Medication 10 ML: at 22:00

## 2023-03-08 RX ADMIN — TRAMADOL HYDROCHLORIDE 50 MG: 50 TABLET ORAL at 23:02

## 2023-03-08 RX ADMIN — Medication 5 UNITS: at 12:56

## 2023-03-08 RX ADMIN — SODIUM CHLORIDE, PRESERVATIVE FREE 10 ML: 5 INJECTION INTRAVENOUS at 06:00

## 2023-03-08 RX ADMIN — HYDRALAZINE HYDROCHLORIDE 50 MG: 50 TABLET, FILM COATED ORAL at 22:58

## 2023-03-08 RX ADMIN — CARVEDILOL 25 MG: 12.5 TABLET, FILM COATED ORAL at 18:02

## 2023-03-08 RX ADMIN — ISOSORBIDE DINITRATE 30 MG: 20 TABLET ORAL at 09:44

## 2023-03-08 RX ADMIN — Medication 8 UNITS: at 09:44

## 2023-03-08 RX ADMIN — HEPARIN SODIUM 5000 UNITS: 5000 INJECTION INTRAVENOUS; SUBCUTANEOUS at 06:54

## 2023-03-08 RX ADMIN — MULTIPLE VITAMINS W/ MINERALS TAB 1 TABLET: TAB at 06:54

## 2023-03-08 RX ADMIN — POTASSIUM BICARBONATE 40 MEQ: 782 TABLET, EFFERVESCENT ORAL at 18:02

## 2023-03-08 RX ADMIN — ISOSORBIDE DINITRATE 30 MG: 20 TABLET ORAL at 22:58

## 2023-03-08 RX ADMIN — SODIUM CHLORIDE, PRESERVATIVE FREE 10 ML: 5 INJECTION INTRAVENOUS at 22:00

## 2023-03-08 RX ADMIN — Medication 10 ML: at 06:00

## 2023-03-08 RX ADMIN — ISOSORBIDE DINITRATE 30 MG: 20 TABLET ORAL at 15:26

## 2023-03-08 RX ADMIN — SPIRONOLACTONE 25 MG: 25 TABLET ORAL at 09:44

## 2023-03-08 RX ADMIN — HYDRALAZINE HYDROCHLORIDE 50 MG: 50 TABLET, FILM COATED ORAL at 15:26

## 2023-03-08 RX ADMIN — TRAMADOL HYDROCHLORIDE 50 MG: 50 TABLET ORAL at 10:00

## 2023-03-08 RX ADMIN — HEPARIN SODIUM 5000 UNITS: 5000 INJECTION INTRAVENOUS; SUBCUTANEOUS at 15:26

## 2023-03-08 RX ADMIN — Medication 5 UNITS: at 18:02

## 2023-03-08 RX ADMIN — IRON SUCROSE 200 MG: 20 INJECTION, SOLUTION INTRAVENOUS at 10:01

## 2023-03-08 RX ADMIN — SODIUM CHLORIDE, PRESERVATIVE FREE 10 ML: 5 INJECTION INTRAVENOUS at 13:00

## 2023-03-08 RX ADMIN — HEPARIN SODIUM 5000 UNITS: 5000 INJECTION INTRAVENOUS; SUBCUTANEOUS at 22:59

## 2023-03-08 RX ADMIN — HYDRALAZINE HYDROCHLORIDE 50 MG: 50 TABLET, FILM COATED ORAL at 09:44

## 2023-03-08 RX ADMIN — ALLOPURINOL 50 MG: 100 TABLET ORAL at 09:44

## 2023-03-08 RX ADMIN — Medication 10 ML: at 13:00

## 2023-03-08 NOTE — DIABETES MGMT
[address] [place] [address]  DIABETES MANAGEMENT CONSULT    Consulted by  Samreen Saba MD  for advanced nursing evaluation and care for inpatient blood glucose management. Evaluation and Action Plan   Masha Diaz is a 56year old gentleman, with Type 2 Diabetes on 70/30 insulin, who is admitted with acute on chronic CHF exacerbation. His A1C remains elevated at 8.4% and he verbalizes that he has struggled to get it below 8%. This admission, he did take his morning 70/30 insulin 20 units at breakfast.  His BG was 120 on admission but fell to 66 after not eating in the afternoon. Evening insulin has been held and BG was 137. He has eaten breakfast.  At this time, factors that are impacting BG pattern are his heart failure, ANA, elevated BMI and decreased PO intake. He will need insulin to resume now and ordered at reduced doses. Basal insulin was cut by 50% and BG slightly over goal.  Will modify basal insulin now. Inpatient BG goal is 140-180mg/dl. Management Rationale Action Plan   Medication   Basal needs Using 0.25 units/kg/D Continue NPH 8 units Q12    (Only needs 5 units Q12 if NPO and experienced hypoglycemia with 12 units Q12)   Nutritional needs Using low sensitivity Continue 5 units Humalog/meal    Hold if patient is NPO or consumes less than 50% of carbohydrates on meal tray    Will advance by 2 units daily for persistent   pre-prandial hyperglycemia     Corrective insulin Using HIGH sensitivity based on  High sensitivity due to ANA/reduced GFR   Additional orders  Consistent carbohydrate diet (60g CHO/meal)       Diabetes Discharge Plan   Medication  TBD   Referral  [x]        Outpatient diabetes education   Additional orders            Initial Presentation   Masha Diaz is a 56 y.o. male admitted from the advanced heart failure center on 3/2/23 with a 14lb weight gain in 5 days. LAB: GFR 23, BNP 9738, A1C 8.4%  CXR: Enlarged cardiopericardial silhouette.       HX: Past Medical History:   Diagnosis Date    Diabetes ([facility]     Heart failure ([facility]     CHF, cardiomyopathy    Hypertension     ICD (implantable cardioverter-defibrillator) in place     left upper chest        INITIAL DX:   Acute on chronic HFrEF (heart failure with reduced ejection fraction) ([facility] [facility] [I50.23]  Cardiomyopathy ([facility] [facility] [I42.9]  Acute kidney injury superimposed on chronic kidney disease ([facility] [N17.9, N18.9]     Current Treatment     TX: Diuresis, Nephrologist     Hospital Course   Clinical progress has been uncomplicated. 3/2: Admission  3/3: His EF on 3/3/23 shows reduced LV function with EF 15-20%, LV mod dilated, severe LVH, mild MR, Mild Tr. This EF is reduced from 10/21/22 when EF as 20-25%. 3/7: RHC/LHC: RHC shows evidence of volume overload. LHC showed nonobstructive CAD, including ramus 60%. Diabetes History   Type 2 Diabetes: Diagnosed about 10 years ago  Ambulatory BG management provided by: PCP Clement Koch MD  He did see an endocrinologist at [place] this past summer once but does not remember name. Does not want to go back to that practice. Diabetes-related Medical History  Microvascular disease  Nephropathy  Other associated conditions     CHF    Diabetes Medication History  Key Antihyperglycemic Medications               insulin NPH/insulin regular (NovoLIN 70/30 U-100 Insulin) 100 unit/mL (70-30) injection (Taking) 26-27 Units by SubCUTAneous route Daily (before dinner). (22 units in the morning; 26-27 units in the evening; evening dose is on a sliding scale)    insulin NPH/insulin regular (NOVOLIN 70/30, HUMULIN 70/30) 100 unit/mL (70-30) injection (Taking) 22 Units by SubCUTAneous route Daily (before breakfast).  (22 units in the morning; 26 units in the evening)             Diabetes self-management practices:   Eating pattern     [] Breakfast  Hash brown and coffee  [] Lunch   [place]  [] Dinner   \"Typical dinner foods\" would not elaborate  [] Bedtime  None  [] Snacks   Limited  [] Beverages  Regular soda, water, tea  [] Dentition status Normal  Physical activity pattern   No intentional activity   Monitoring pattern   Tests twice daily   Fasting    Before Dinner: 150-200, did see a 300 this month  Taking medications pattern  [x] Consistent administration  [x] Affordable  Reducing risks  [] Influenza: There is no immunization history for the selected administration types on file for this patient. [] Pneumonia:   Immunization History   Administered Date(s) Administered    Pneumococcal Polysaccharide (PPSV-23) 02/10/2015     [] Hepatitis: There is no immunization history for the selected administration types on file for this patient. Social determinants of health impacting diabetes self-management practices   Concerned that you need to know more about how to stay healthy with diabetes  Overall evaluation:    [x] Not achieving A1c target with drug therapy & self-care practices    Subjective    \"I am tired today\"  Objective   Physical Exam  Constitutional:       Appearance: Normal appearance. Eyes:      Pupils: Pupils are equal, round, and reactive to light. Cardiovascular:      Rate and Rhythm: Normal rate. Pulmonary:      Effort: Pulmonary effort is normal.   Abdominal:      Palpations: Abdomen is soft. Musculoskeletal:      Cervical back: Neck supple. Skin:     General: Skin is warm and dry. Neurological:      General: No focal deficit present. Mental Status: He is alert and oriented to person, place, and time.    Psychiatric:         Mood and Affect: Mood normal.       Vital Signs Visit Vitals  /68 (BP 1 Location: Left upper arm, BP Patient Position: Lying)   Pulse 75   Temp 98 °F (36.7 °C)   Resp 20   Ht 5' 8\" (1.727 m)   Wt 95.8 kg (211 lb 3.2 oz)   SpO2 98%   BMI 32.11 kg/m²     Laboratory  Recent Labs     03/08/23  0425 03/07/23  0453 03/06/23  0518   * 160* 170*   AGAP 4* 7 6   WBC 8.5 7.8 8.4   CREA 2.68* 2.62* 2.46*     Factors impacting BG management  Factor Dose Comments   Nutrition:  Standard meals     60 grams/meal    Cardiomyopathy and systolic HF AICD  EF 17-23%  Diuresis- Bumex gtt    Other:   Kidney function   ANA on CKD  GFR 23 on admit         Blood glucose pattern    Significant diabetes-related events over the past 24-72 hours  A1C 8.4%  Fasting B/160/170/180  Pre-prandial B-195  A1C 8.4% (up from 8.1% )  Basal: NPH 8 units Q12  Bolus: 8 units humalog/meal (increased from 6 units with meals)  Per AHF: Patient will likely need to stay for initiation of chronic inotropes as bridge to renal recovery and inpatient workup for combined heart-kidney transplantation; of note, patient is currently not a candidate for LVAD due to Cr > 1.8        Assessment and Nursing Intervention   Nursing Diagnosis Risk for unstable blood glucose pattern   Nursing Intervention Domain 5250 Decision-making Support   Nursing Interventions Examined current inpatient diabetes/blood glucose control   Explored factors facilitating and impeding inpatient management  Explored corrective strategies with patient and responsible inpatient provider   Informed patient of rational for insulin strategy while hospitalized     Nursing Diagnosis 38006 Ineffective Health Management   Nursing Intervention Domain 5250 Decision-making Support   Nursing Interventions Identified diabetes self-management practices impeding diabetes control  Discussed diabetes survival skills related to  Healthy Plate eating plan; given handouts  Role of physical activity in improving insulin sensitivity and action  Procedure for blood glucose monitoring & options for low-cost products  Medications plan at discharge     Billing Code(s)   00921    Before making these care recommendations, I personally reviewed the hospitalization record, including notes, laboratory & diagnostic data and current medications, and examined the patient at the bedside (circumstances permitting) before determining care. More than fifty (50) percent of the time was spent in patient counseling and/or care coordination.   Total minutes: 25    PREET Johansen  Diabetes Clinical Nurse Specialist  [facility]  Access via [facility]

## 2023-03-08 NOTE — PROGRESS NOTES
[facility] in [place], [place]  Inpatient Progress Note      Patient name: Dominique De La Cruz  Patient : 1965  Patient MRN: [id number]  Consulting MD: Kaz Mcclendon DO  Date of service: 23    REASON FOR REFERRAL:  Management of acute on chronic systolic heart failure     PLAN OF CARE:  55 y/o male with h/o non-ischemic cardiomyopathy, LVEF 10-15% from 28%, stage C, NYHA class IIIA symptoms and CKD, stage 3 (Cr 2.1-2.47) undergoing optimization of GDMT (taken off nephrotoxic meds through diuresis, to allow renal recovery and up-titrating alternative HF meds)  Most likely etiology of worsening HF is chronic volume overload due to underestimated severity of renal failure +/- possibly inadequately/untreated MINA; evaluation ongoing   Consult placed to cardiology for RHC +/- ischemic evaluation with LHC or NST depending on renal function and recommendation from nephrology and cardiology (last [facility] )  If RHC reveals that heart failure contributes to renal dysfunction, then patient may need initiation of chronic inotropes and completion of inpatient workup for combined heart-kidney transplantation, including GI workup; of note, patient is currently not a candidate for LVAD due to Cr > 1.8      RECOMMENDATIONS:  Continue current medical therapy for heart failure  Continue coreg 25mg twice daily, HR at goal in 70s  Cannot tolerate ACEi/ARB/ARNi in hope for renal recovery  Continue hydralazine 50 mg TID and isordil 30mg TID   Cannot tolerate SGLT2i due to eGFR < 30  Continue bumex gtt 0.5mg/hr for elevated filling pressures on RHC   Does not take aspirin   ICD interrogation every 3 months per routine  Heme occult negative   Reinforced low salt diet  Reinforced fluid restriction to 6 x 8oz glasses per day       At time of discharge plan on the following:  Schedule sleep study     IMPRESSION:  Fatigue  Shortness of breath/VILLARREAL/orthopnea  Volume overload  Acute on chronic systolic heart failure  Stage C, NYHA class IIIA symptoms  Non-ischemic cardiomyopathy, LVEF 28%  S/p ICD  HTN  DM  CKD 4     INTERVAL EVENTS:  No issues overnight  States he feels much better  Weaned off O2  RHC with elevated filling pressures   -140s/70s, HR 70-80s  I/O net neg 2.4L  Cr 2.68     LIFE GOALS:  Lifestyle goals reviewed with the patient. Patient's personal goals include: getting back home  Important upcoming milestones or family events: TBD  The patient identifies the following as important for living well: TBD    Patient assessed. High suspicion of sleep apnea due to the following reasons. Will refer for sleep evaluation. [x] Heart Failure  [x] Hypertention  [] Atrial Fibrillation  [x] BMI > 30  [] History of stroke  [x] Diabetes  [] Heavy snoring  [] Witnessed apnea  [] Hypoxemia         HPI:   Briefly, Apoorva Rees is a 56 y.o. male with h/o morbid obesity, BMI 35, HTN, HL, DM2, chronic systolic heart failure, stage C, NYHA class IIIA symptoms, non-ischemic cardiomyopathy, LVEF 28% (HF diagnosed around 2002), normal coronaries by [facility] 2015 s/p single lead ICD (2/2015) and worsening renal function, CKD stage 2 and anemia. Patient was referred to [facility] to establish long-term care. CARDIAC IMAGING:  Echo (3/3/23)    Left Ventricle: The EF by visual approximation is 15 - 20%. Left ventricle is moderately dilated. Severely increased wall thickness. Severe global hypokinesis present. Right Ventricle: Not well visualized. Mitral Valve: Thickened leaflets. Moderate annular calcification of the mitral valve. Left Atrium: Left atrium is dilated. LVEDD 6.1cm     Echo 10/21/22    Left Ventricle: Severely reduced left ventricular systolic function with a visually estimated EF of 20 - 25%. Left ventricle is moderately dilated. Mild posterior thickening. Normal wall motion. Normal diastolic function. Left Atrium: Left atrium is moderately dilated.     Mitral Valve: Mild regurgitation with a centrally directed jet. Tricuspid Valve: Mild regurgitation with a centrally directed jet. Echo (3/9/22)    Study limited to the assessment of ejection fraction. Left Ventricle: Left ventricle is moderately dilated. Mildly increased wall thickness. Severe global hypokinesis present. Calculared ejection fraction is 28% by 3D volume and 31% by 2D Fernandez's biplane. Global longitudinal strain is reduced with a value of -5.8%. Echo (12/6/21)  LV: Calculated LVEF is 28%. Biplane method used to measure ejection fraction. Mildly dilated left ventricle. Mildly to moderately increased wall thickness. Moderate-to-severely and globally reduced systolic function. Moderate (grade 2) left ventricular diastolic dysfunction. LA: Mildly dilated left atrium. Left Atrium volume index is 40 mL/m2. MV: Moderate mitral annular calcification. Very mild mitral valve regurgitation is present. RV: Not well visualized. Pacer/ICD present. Echo (2/17/21)  LV: Calculated LVEF is 29%. Biplane method used to measure ejection fraction. Mildly dilated left ventricle. Mild to moderate hypertrophy. Severely and globally reduced systolic function. Wall motion: normal. Abnormal left ventricular strain. Global longitudinal strain is -8%. Moderate (grade 2) left ventricular diastolic dysfunction. LA: Mildly dilated left atrium. Left Atrium volume index is 40 mL/m2. MV: Moderate mitral annular calcification. Very mild mitral valve regurgitation is present. Echo (8/16/19)  Left Ventricle: Mildly dilated left ventricle. Moderate concentric hypertrophy. Severe global systolic dysfunction. Estimated left ventricular ejection fraction is 21 - 25%. Biplane method used to measure ejection fraction. Left ventricular global hypokinesis. Abnormal left ventricular strain. Inconclusive left ventricular diastolic function. Left Atrium: Mildly dilated left atrium. Mitral Valve: Moderate mitral annular calcification. Mild mitral valve regurgitation. EKG (3/2/23): SR with 1st degree AVB  EKG (8/5/19) NSR 73bpm QRS 124ms        LHC (2/105) normal cors  3/7/23- non obstructive CAD     ICD interrogation     HEMODYNAMICS:  RHC 3/7/23 CI 2.35, PCWP 34, RA 20     CPEST not done  6MW will do before discharge      OTHER IMAGING:  CXR 3/2/23: enlarged cardiac silhouette; no edema   CT chest not done       PHYSICAL EXAM:  Visit Vitals  BP (!) 153/88 (BP 1 Location: Left upper arm, BP Patient Position: At rest;Lying)   Pulse 86   Temp 98 °F (36.7 °C)   Resp 18   Ht 5' 8\" (1.727 m)   Wt 211 lb 3.2 oz (95.8 kg)   SpO2 98%   BMI 32.11 kg/m²     Physical Exam  Vitals and nursing note reviewed. Constitutional:       Appearance: He is normal weight. He is not ill-appearing. Cardiovascular:      Rate and Rhythm: Normal rate and regular rhythm. Pulses: Normal pulses. Heart sounds: Normal heart sounds. Pulmonary:      Effort: No respiratory distress. Breath sounds: Normal breath sounds. Abdominal:      General: There is no distension. Palpations: Abdomen is soft. Musculoskeletal:         General: No swelling. Skin:     General: Skin is warm and dry. Neurological:      General: No focal deficit present. Mental Status: He is alert. Psychiatric:         Mood and Affect: Mood normal.        REVIEW OF SYSTEMS:  Review of Systems   Constitutional:  Negative for chills and fever. HENT:  Negative for congestion. Respiratory:  Negative for cough and shortness of breath. Cardiovascular:  Negative for chest pain, palpitations and leg swelling. Gastrointestinal:  Negative for nausea and vomiting. Musculoskeletal:  Negative for falls and myalgias. Neurological:  Negative for dizziness and headaches. Psychiatric/Behavioral:  Negative for depression.         PAST MEDICAL HISTORY:  Past Medical History:   Diagnosis Date    Diabetes ([facility] [address]     Heart failure ([facility])     CHF, cardiomyopathy    Hypertension ICD (implantable cardioverter-defibrillator) in place     left upper chest       PAST SURGICAL HISTORY:  Past Surgical History:   Procedure Laterality Date    HX HEART CATHETERIZATION  2/2015    x1    HX IMPLANTABLE CARDIOVERTER DEFIBRILLATOR  2/16/2015       FAMILY HISTORY:  Family History   Problem Relation Age of Onset    Diabetes Mother     Hypertension Father     Diabetes Father     Diabetes Brother     Hypertension Brother        SOCIAL HISTORY:  Social History     Socioeconomic History    Marital status:    Tobacco Use    Smoking status: Never    Smokeless tobacco: Never   Vaping Use    Vaping Use: Never used   Substance and Sexual Activity    Alcohol use: Yes     Comment: 1 a month    Drug use: No    Sexual activity: Not Currently       LABORATORY RESULTS:     Labs Latest Ref Rng & Units 3/8/2023 3/7/2023 3/6/2023 3/5/2023 3/4/2023 3/3/2023 3/2/2023   WBC 4.1 - 11.1 K/uL 8.5 7.8 8.4 8.5 8.0 8.1 6.8   RBC 4.10 - 5.70 M/uL 3.62(L) 3.54(L) 3.54(L) 3.56(L) 3.49(L) 3.61(L) 4.05(L)   Hemoglobin 12.1 - 17.0 g/dL 9.3(L) 9.0(L) 8.8(L) 9.1(L) 8.8(L) 9.2(L) 10. 2(L)   Hematocrit 36.6 - 50.3 % 30. 1(L) 30. 1(L) 30. 1(L) 30. 7(L) 28. 9(L) 31. 2(L) 34. 3(L)   MCV 80.0 - 99.0 FL 83.1 85.0 85.0 86.2 82.8 86.4 84.7   Platelets 161 - 046 K/uL 249 225 217 213 196 195 211   Lymphocytes 12 - 49 % - - - - - 11(L) 12   Monocytes 5 - 13 % - - - - - 8 8   Eosinophils 0 - 7 % - - - - - 5 8(H)   Basophils 0 - 1 % - - - - - 1 1   Albumin 3.5 - 5.0 g/dL - - - - - 2. 6(L) 3. 2(L)   Calcium 8.5 - 10.1 MG/DL 9.6 9.1 9.2 9.0 9.2 8.2(L) 9.0   Glucose 65 - 100 mg/dL 109(H) 160(H) 170(H) 180(H) 78 137(H) 120(H)   BUN 6 - 20 MG/DL 38(H) 36(H) 34(H) 31(H) 33(H) 36(H) 43(H)   Creatinine 0.70 - 1.30 MG/DL 2.68(H) 2.62(H) 2.46(H) 2.43(H) 2.52(H) 2.46(H) 3.06(H)   Sodium 136 - 145 mmol/L 136 139 139 139 142 142 140   Potassium 3.5 - 5.1 mmol/L 4.0 4.1 3.9 3.5 3. 2(L) 3. 3(L) 3.7   Some recent data might be hidden     Lab Results   Component Value Date/Time    TSH 2.080 05/05/2022 01:34 PM    TSH 1.05 02/11/2015 01:30 PM       ALLERGY:  No Known Allergies     CURRENT MEDICATIONS:    Current Facility-Administered Medications:     hydrALAZINE (APRESOLINE) tablet 50 mg, 50 mg, Oral, TID, Stephen, Erika B, NP    isosorbide dinitrate (ISORDIL) tablet 30 mg, 30 mg, Oral, TID, Stephen, Erika B, NP    sodium chloride (NS) flush 5-40 mL, 5-40 mL, IntraVENous, Q8H, [facility] NP, 10 mL at 03/08/23 0600    sodium chloride (NS) flush 5-40 mL, 5-40 mL, IntraVENous, PRN, [facility]., NP    bumetanide (BUMEX) 0.25 mg/mL infusion, 0.5 mg/hr, IntraVENous, CONTINUOUS, Stephen, Erika B, NP, Last Rate: 2 mL/hr at 03/08/23 0840, 0.5 mg/hr at 03/08/23 0840    insulin NPH (NOVOLIN N, HUMULIN N) injection 8 Units, 8 Units, SubCUTAneous, Q12H, Steffanie Fall CNS, 8 Units at 03/08/23 0944    iron sucrose (VENOFER) injection 200 mg, 200 mg, IntraVENous, DAILY, Debeb, QVXXCVO MD YULISA, 200 mg at 03/08/23 1001    potassium bicarb-citric acid (EFFER-K) tablet 40 mEq, 40 mEq, Oral, BID, Raffi Guillermo MD, 40 mEq at 03/08/23 0944    spironolactone (ALDACTONE) tablet 25 mg, 25 mg, Oral, DAILY, Raffi Guillermo MD, 25 mg at 03/08/23 0944    dextrose 10 % infusion 0-250 mL, 0-250 mL, IntraVENous, PRN, Steffanie Fall CNS    insulin lispro (HUMALOG) injection 5 Units, 0.05 Units/kg, SubCUTAneous, TID WITH MEALS, Steffanie Fall CNS, 5 Units at 03/08/23 0945    insulin lispro (HUMALOG) injection, , SubCUTAneous, AC&HS, Steffanie Fall CNS, 213 Units at 03/06/23 2138    traMADoL (ULTRAM) tablet 50 mg, 50 mg, Oral, Q6H PRN, Caroline Velarde MD, 50 mg at 03/08/23 1000    allopurinoL (ZYLOPRIM) tablet 50 mg, 50 mg, Oral, DAILY, Emely, Juvencio BEDOLLA MD, 50 mg at 03/08/23 0944    carvediloL (COREG) tablet 25 mg, 25 mg, Oral, BID WITH MEALS, Pegram, Severa Colander, MD, 25 mg at 03/08/23 0944    ergocalciferol capsule 50,000 Units, 50,000 Units, Oral, Q7D, Pegram, Severa Colander, MD, 50,000 Units at 03/02/23 2148    multivitamin, tx-iron-ca-min (THERA-M w/ IRON) tablet 1 Tablet, 1 Tablet, Oral, ACB, Emely, Jey Worthington MD, 1 Tablet at 03/08/23 0654    glucose chewable tablet 16 g, 4 Tablet, Oral, PRN, Jey Han MD    glucagon (GLUCAGEN) injection 1 mg, 1 mg, IntraMUSCular, PRN, Juvencio Han MD    dextrose 10 % infusion 0-250 mL, 0-250 mL, IntraVENous, PRN, Jey Han MD    acetaminophen (TYLENOL) tablet 650 mg, 650 mg, Oral, Q6H PRN, Gael Moody MD, 650 mg at 03/08/23 0220    heparin (porcine) injection 5,000 Units, 5,000 Units, SubCUTAneous, Q8H, Juvencio Han MD, 5,000 Units at 03/08/23 0654    sodium chloride (NS) flush 5-40 mL, 5-40 mL, IntraVENous, Q8H, Juvencio Han MD, 10 mL at 03/08/23 0600    sodium chloride (NS) flush 5-40 mL, 5-40 mL, IntraVENous, PRN, Jey Han MD    polyethylene glycol (MIRALAX) packet 17 g, 17 g, Oral, DAILY PRN, Jey Han MD    ondansetron (ZOFRAN ODT) tablet 4 mg, 4 mg, Oral, Q8H PRN **OR** ondansetron (ZOFRAN) injection 4 mg, 4 mg, IntraVENous, Q6H PRN, Gael Moody MD    PATIENT CARE TEAM:  Patient Care Team:  Kristin Arguello MD as PCP - General (Family Medicine)  Jacqueline Thorpe MD (Cardiovascular Disease Physician)  Avis Melendez MD (Nephrology)  Doc Silva MD (Internal Medicine Physician)     Thank you for allowing me to participate in this patient's care.     aMgui Hester NP   [address] [place] [address], [address]  Phone: [phone]

## 2023-03-08 NOTE — PROGRESS NOTES
Physical Therapy    Reviewed chart and attempted to treat pt. Pt declining reporting has been up earlier today ambulating in the room. Pt is requesting to rest. Pt is hopeful to discharge tomorrow. Pt is not expressing any concerns with mobility. Will defer at this time and continue to follow.

## 2023-03-08 NOTE — PROGRESS NOTES
[address] [facility] Adult  Hospitalist Group                                                                                          Hospitalist Progress Note  DO Alona Lombardo service: [phone] OR [phone] from in house phone        Date of Service:  3/8/2023  NAME:  Mariaelena Flores  :  1965  MRN:  [id number]       Admission Summary:   Mariaelena Flores is a 56 y.o. male with past medical history of dilated cardiomyopathy, heart failure reduced ejection fraction, AICD 2015, chronic lower extremity edema, type 2 diabetes mellitus, hypertension, stage III CKD presented to the emergency department chief complaints of shortness of breath, leg and abdominal swelling, and unintentional weight gain. Patient has known history of CHF. He newly had recent increase in Bumex from 1 mg to 2 mg twice daily. Unfortunately, he still had worsening symptoms with peripheral edema and swelling of legs and abdomen, which is severe, without specific alleviating factors, with associated shortness of breath and dyspnea on exertion over the past 5 days. Also fully had 14 pound weight gain. His last 2 echocardiogram 10/21/2022 showed reduced left ventricular ejection fraction 20% to 25%. On arrival emergency department, initial ported vital signs temperature 97.3 °F, /81, heart rate 71, respiratory 18, saturation 90% room air. Abnormal labs included hemoglobin 10.2, blood glucose 120, BUN 43, creatinine 3.06, GFR 23, albumin 3.2, alk phos 6135, proBNP 9738. Chest x-ray portable showed enlarged cardiopulmonary silhouette. 12 EKG shows sinus rhythm, first-degree AV block, occasional PVCs, premature supraventricular complexes, ST changes anterior lateral leads at 72 bpm.  ED request admission to the hospital service       Interval history / Subjective:   Patient is seen and examined. Feels much better compared to admission.    Continues on bumex drip. -2450   Has intermittent pain in feet   Bicarb trending up     Assessment & Plan:     Acute on Chronic Systolic CHF  - improving   Non-ischemic cardiomyopathy EF ~15%   S/p ICD   -Appreciate  AHF team input   - strict I&Os , daily weight   - echo : EF  15 - 20%. Left ventricle is moderately dilated. Severely increased wall thickness. Severe global hypokinesis present. - c/w coreg, hydralazine, isordil, aldactone   - Cannot tolerate ACEi/ARB/ARNi in hope for renal recovery  - RHC/ LHC done 3/7 - Elevated left and right-sided filling pressures   - bumex drip initiated 3/7  - further recs per AHF      ANA on CKD Stage 4 -persistent   Contraction alkalosis  - appreciate nephrologist input  - renal US: Increased renal cortical echogenicity bilaterally is likely related to medical  renal disease.  - continues on bumex drip as above  - monitor renal function      T2DM with episodes of Hypoglycemia  - hypoglycemia protocols. A1c 8.4   -Cont SSI and NPH 8 U bid, 5 units premeal insulin      Essential hypertension  - c/w coreg, hydralazine       Obesity Body mass index is 34.73 kg/m²   - recommend weight loss      Code status: Full  Prophylaxis: Heparin   Care Plan discussed with: pt, RN, CM   Anticipated Disposition: >48 hours   Inpatient  Cardiac monitoring: Telemetry     Hospital Problems  Date Reviewed: 1/13/2023            Codes Class Noted POA    Acute kidney injury superimposed on chronic kidney disease ([facility] ICD-10-CM: N17.9, N18.9  ICD-9-CM: 584.9, 585.9  3/2/2023 Unknown        Acute on chronic HFrEF (heart failure with reduced ejection fraction) ([facility] ICD-10-CM: I50.23  ICD-9-CM: 428.23  3/2/2023 Unknown        Cardiomyopathy ([facility] ICD-10-CM: I42.9  ICD-9-CM: 425.4  9/26/2014 Unknown    Overview Addendum 11/9/2017  7:20 AM by Bruce Mercado MD     2002 (approx) diagnosed [place] by echo, neg stress test and started on usual meds.   2007 (approx) fup echo lvef 25%  2/15 cardiac cath, no sig cad  2/15 single lead AICD implant  2/17 lvh, otherwise normal echo              Social Determinants of Health     Tobacco Use: Low Risk     Smoking Tobacco Use: Never    Smokeless Tobacco Use: Never    Passive Exposure: Not on file   Alcohol Use: Not on file   Financial Resource Strain: Not on file   Food Insecurity: Not on file   Transportation Needs: Not on file   Physical Activity: Not on file   Stress: Not on file   Social Connections: Not on file   Intimate Partner Violence: Not on file   Depression: Not at risk    PHQ-2 Score: 0   Housing Stability: Not on file       Review of Systems:   A comprehensive review of systems was negative. Vital Signs:    Last 24hrs VS reviewed since prior progress note. Most recent are:  Visit Vitals  /68 (BP 1 Location: Left upper arm, BP Patient Position: Lying)   Pulse 75   Temp 98 °F (36.7 °C)   Resp 20   Ht 5' 8\" (1.727 m)   Wt 95.8 kg (211 lb 3.2 oz)   SpO2 98%   BMI 32.11 kg/m²         Intake/Output Summary (Last 24 hours) at 3/8/2023 1348  Last data filed at 3/8/2023 1143  Gross per 24 hour   Intake 1182.7 ml   Output 3650 ml   Net -2467.3 ml          Physical Examination:     I had a face to face encounter with this patient and independently examined them on 3/8/2023 as outlined below:          General : alert x 3, awake, no acute distress,   HEENT: PEERL, EOMI, moist mucus membrane  Neck: supple, no JVD, no meningeal signs  Chest: slight bases in the right lung bases, no increase work of breathing  CVS: S1 S2 heard, no murmurs, Capillary refill less than 2 seconds  Abd: soft/ non tender, non distended, BS physiological,   Ext: no clubbing, no cyanosis, trace edema  Neuro/Psych: pleasant mood and affect, Strength 5/5 in all extremities  Skin: warm     Data Review:    I personally reviewed  Image      I have personally and independently reviewed all pertinent labs, diagnostic studies, imaging, and have provided independent interpretation of the same.      Labs:     Recent Labs     03/08/23  0425 03/07/23  0453   WBC 8.5 7. 8   HGB 9.3* 9.0*   HCT 30.1* 30.1*    225       Recent Labs     03/08/23  0425 03/07/23  0453 03/06/23  0518    139 139   K 4.0 4.1 3.9   CL 99 101 103   CO2 33* 31 30   BUN 38* 36* 34*   CREA 2.68* 2.62* 2.46*   * 160* 170*   CA 9.6 9.1 9.2       No results for input(s): ALT, AP, TBIL, TBILI, TP, ALB, GLOB, GGT, AML, LPSE in the last 72 hours. No lab exists for component: SGOT, GPT, AMYP, HLPSE  No results for input(s): INR, PTP, APTT, INREXT, INREXT in the last 72 hours. No results for input(s): FE, TIBC, PSAT, FERR in the last 72 hours. No results found for: FOL, RBCF   No results for input(s): PH, PCO2, PO2 in the last 72 hours. No results for input(s): CPK, CKNDX, TROIQ in the last 72 hours.     No lab exists for component: CPKMB  Lab Results   Component Value Date/Time    Cholesterol, total 132 05/05/2022 01:34 PM    HDL Cholesterol 39 (L) 05/05/2022 01:34 PM    LDL, calculated 79 05/05/2022 01:34 PM    LDL, calculated 100.8 (H) 02/11/2015 01:30 PM    Triglyceride 68 05/05/2022 01:34 PM    CHOL/HDL Ratio 4.5 02/11/2015 01:30 PM     Lab Results   Component Value Date/Time    Glucose (POC) 195 (H) 03/08/2023 11:47 AM    Glucose (POC) 103 03/08/2023 06:49 AM    Glucose (POC) 144 (H) 03/07/2023 09:13 PM    Glucose (POC) 189 (H) 03/07/2023 04:45 PM    Glucose (POC) 195 (H) 03/07/2023 12:09 PM     Lab Results   Component Value Date/Time    Color YELLOW/STRAW 02/11/2015 12:10 PM    Appearance CLEAR 02/11/2015 12:10 PM    Specific gravity 1.008 02/11/2015 12:10 PM    pH (UA) 6.5 02/11/2015 12:10 PM    Protein TRACE (A) 02/11/2015 12:10 PM    Glucose NEGATIVE 02/11/2015 12:10 PM    Ketone NEGATIVE 02/11/2015 12:10 PM    Bilirubin NEGATIVE 02/11/2015 12:10 PM    Urobilinogen 0.2 02/11/2015 12:10 PM    Nitrites NEGATIVE 02/11/2015 12:10 PM    Leukocyte Esterase NEGATIVE 02/11/2015 12:10 PM    Epithelial cells FEW 02/11/2015 12:10 PM    Bacteria NEGATIVE 02/11/2015 12:10 PM    WBC 0-4 02/11/2015 12:10 PM    RBC 0-5 02/11/2015 12:10 PM       Notes reviewed from all clinical/nonclinical/nursing services involved in patient's clinical care. Care coordination discussions were held with appropriate clinical/nonclinical/ nursing providers based on care coordination needs. Patients current active Medications were reviewed, considered, added and adjusted based on the clinical condition today. Home Medications were reconciled to the best of my ability given all available resources at the time of admission. Route is PO if not otherwise noted.       Admission Status:[id number]:::1}      Medications Reviewed:     Current Facility-Administered Medications   Medication Dose Route Frequency    hydrALAZINE (APRESOLINE) tablet 50 mg  50 mg Oral TID    isosorbide dinitrate (ISORDIL) tablet 30 mg  30 mg Oral TID    sodium chloride (NS) flush 5-40 mL  5-40 mL IntraVENous Q8H    sodium chloride (NS) flush 5-40 mL  5-40 mL IntraVENous PRN    bumetanide (BUMEX) 0.25 mg/mL infusion  0.5 mg/hr IntraVENous CONTINUOUS    insulin NPH (NOVOLIN N, HUMULIN N) injection 8 Units  8 Units SubCUTAneous Q12H    iron sucrose (VENOFER) injection 200 mg  200 mg IntraVENous DAILY    potassium bicarb-citric acid (EFFER-K) tablet 40 mEq  40 mEq Oral BID    spironolactone (ALDACTONE) tablet 25 mg  25 mg Oral DAILY    dextrose 10 % infusion 0-250 mL  0-250 mL IntraVENous PRN    insulin lispro (HUMALOG) injection 5 Units  0.05 Units/kg SubCUTAneous TID WITH MEALS    insulin lispro (HUMALOG) injection   SubCUTAneous AC&HS    traMADoL (ULTRAM) tablet 50 mg  50 mg Oral Q6H PRN    allopurinoL (ZYLOPRIM) tablet 50 mg  50 mg Oral DAILY    carvediloL (COREG) tablet 25 mg  25 mg Oral BID WITH MEALS    ergocalciferol capsule 50,000 Units  50,000 Units Oral Q7D    multivitamin, tx-iron-ca-min (THERA-M w/ IRON) tablet 1 Tablet  1 Tablet Oral ACB    glucose chewable tablet 16 g  4 Tablet Oral PRN    glucagon (GLUCAGEN) injection 1 mg  1 mg IntraMUSCular PRN    dextrose 10 % infusion 0-250 mL  0-250 mL IntraVENous PRN    acetaminophen (TYLENOL) tablet 650 mg  650 mg Oral Q6H PRN    heparin (porcine) injection 5,000 Units  5,000 Units SubCUTAneous Q8H    sodium chloride (NS) flush 5-40 mL  5-40 mL IntraVENous Q8H    sodium chloride (NS) flush 5-40 mL  5-40 mL IntraVENous PRN    polyethylene glycol (MIRALAX) packet 17 g  17 g Oral DAILY PRN    ondansetron (ZOFRAN ODT) tablet 4 mg  4 mg Oral Q8H PRN    Or    ondansetron (ZOFRAN) injection 4 mg  4 mg IntraVENous Q6H PRN     ______________________________________________________________________  EXPECTED LENGTH OF STAY: 5d 4h  ACTUAL LENGTH OF STAY:          [address]

## 2023-03-08 NOTE — PROGRESS NOTES
0730 Bedside shift change report given to Jose (oncoming nurse) by Lionel Shukla (offgoing nurse). Report included the following information SBAR, Kardex, ED Summary, Procedure Summary, Intake/Output, MAR, and Recent Results. 0945 TRANSFER - OUT REPORT:    Verbal report given to Marshall(name) abimael Pike  being transferred to Cath lab(unit) for ordered procedure       Report consisted of patients Situation, Background, Assessment and   Recommendations(SBAR). Information from the following report(s) SBAR, Kardex, ED Summary, Procedure Summary, Intake/Output, MAR, and Recent Results was reviewed with the receiving nurse. Lines:   Peripheral IV 03/06/23 Anterior;Proximal;Right Forearm (Active)   Site Assessment Other (Comment) 03/07/23 1518   Phlebitis Assessment 0 03/07/23 0848   Infiltration Assessment 0 03/07/23 0848   Dressing Status Clean, dry, & intact 03/07/23 0848   Dressing Type Tape;Transparent 03/07/23 0848   Hub Color/Line Status Blue;Flushed 03/07/23 0848   Action Taken Open ports on tubing capped 03/07/23 0848   Alcohol Cap Used Yes 03/07/23 0848        Opportunity for questions and clarification was provided. Patient transported with:   Registered Nurse  1200 TRANSFER - IN REPORT:    Verbal report received from Marshall(name) on Faith Pike  being received from Cath lab(unit) for routine post - op      Report consisted of patients Situation, Background, Assessment and   Recommendations(SBAR). Information from the following report(s) SBAR, Kardex, ED Summary, Procedure Summary, Intake/Output, MAR, and Recent Results was reviewed with the receiving nurse. Opportunity for questions and clarification was provided. Assessment completed upon patients arrival to unit and care assumed. 2000 Bedside shift change report given to Dianelys Merida (oncoming nurse) by Jose (offgoing nurse).  Report included the following information SBAR, Kardex, ED Summary, Procedure Summary, Intake/Output, MAR, and Recent Results.

## 2023-03-08 NOTE — PROGRESS NOTES
[facility]   [address] [facility], [address] [facility]  Phone: [phone]   [phone]    [email or web address]     Nephrology Progress Note    Patient Name : Farnaz Tobar      : 1965     MRN : [id number]  Date of Admission : 3/2/2023  Date of Servive : 23    CC: Follow up for ANA       Assessment and Plan   ANA on CKD  -Suspect progressive CKD from poorly controlled DM, HTN  -cont Bumex gtt in light of cath showing elevated filling pressures w/ better UO  -Strict I's and O's and Daily labs  -Cr 2.68 from 2.62     CKD 4  -2/2 DM, HTN  -Previously nonnephrotic proteinuria  -UPCR 0.8 mg/mg  -renal US: MRD  -paraproteinemia screen pending  -Baseline creatinine: uncertain  -He will follow-up with Dr. Amanda Pulido on discharge    Acute on chronic HFrEF  Nonischemic CMP, s/p AICD  -Last ECHO EF 20 to 25%  -MX per AHF team  -Cont Aldactone 25 qd  -only start Entresto once GFR more stable - for now, Hydralazine/Isordil  -cardiac cath 3/7: elevated left and right-sided filling pressures. Nonobstructive coronary artery disease. Limited images were taken due to advanced CKD using only 11 cc of dye to rule out significant proximal coronary disease. Elevated LVEDP     DM 2  -Poorly controlled, A1c 8.4  -Establish care with endocrinology     HTN  -No changes to BP meds until volume status optimized        Interval History:  Breathing better. No cough, fever, n/v. UO better 3200cc from 1915cc on Bumex gtt. Review of Systems: A comprehensive review of systems was negative except for that written in the HPI.     Current Medications:   Current Facility-Administered Medications   Medication Dose Route Frequency    sodium chloride (NS) flush 5-40 mL  5-40 mL IntraVENous Q8H    sodium chloride (NS) flush 5-40 mL  5-40 mL IntraVENous PRN    bumetanide (BUMEX) 0.25 mg/mL infusion  0.5 mg/hr IntraVENous CONTINUOUS    insulin NPH (NOVOLIN N, HUMULIN N) injection 8 Units  8 Units SubCUTAneous Q12H    iron sucrose (VENOFER) injection 200 mg  200 mg IntraVENous DAILY    hydrALAZINE (APRESOLINE) tablet 50 mg  50 mg Oral BID    isosorbide dinitrate (ISORDIL) tablet 30 mg  30 mg Oral BID    potassium bicarb-citric acid (EFFER-K) tablet 40 mEq  40 mEq Oral BID    spironolactone (ALDACTONE) tablet 25 mg  25 mg Oral DAILY    dextrose 10 % infusion 0-250 mL  0-250 mL IntraVENous PRN    insulin lispro (HUMALOG) injection 5 Units  0.05 Units/kg SubCUTAneous TID WITH MEALS    insulin lispro (HUMALOG) injection   SubCUTAneous AC&HS    traMADoL (ULTRAM) tablet 50 mg  50 mg Oral Q6H PRN    allopurinoL (ZYLOPRIM) tablet 50 mg  50 mg Oral DAILY    carvediloL (COREG) tablet 25 mg  25 mg Oral BID WITH MEALS    ergocalciferol capsule 50,000 Units  50,000 Units Oral Q7D    multivitamin, tx-iron-ca-min (THERA-M w/ IRON) tablet 1 Tablet  1 Tablet Oral ACB    glucose chewable tablet 16 g  4 Tablet Oral PRN    glucagon (GLUCAGEN) injection 1 mg  1 mg IntraMUSCular PRN    dextrose 10 % infusion 0-250 mL  0-250 mL IntraVENous PRN    acetaminophen (TYLENOL) tablet 650 mg  650 mg Oral Q6H PRN    heparin (porcine) injection 5,000 Units  5,000 Units SubCUTAneous Q8H    sodium chloride (NS) flush 5-40 mL  5-40 mL IntraVENous Q8H    sodium chloride (NS) flush 5-40 mL  5-40 mL IntraVENous PRN    polyethylene glycol (MIRALAX) packet 17 g  17 g Oral DAILY PRN    ondansetron (ZOFRAN ODT) tablet 4 mg  4 mg Oral Q8H PRN    Or    ondansetron (ZOFRAN) injection 4 mg  4 mg IntraVENous Q6H PRN      No Known Allergies    Objective:  Vitals:    Vitals:    03/08/23 0556 03/08/23 0846 03/08/23 0856 03/08/23 0910   BP:  (!) 153/88     Pulse: 81 86     Resp:       Temp:  98 °F (36.7 °C)     SpO2:   98%    Weight:    95.8 kg (211 lb 3.2 oz)   Height:         Intake and Output:  03/08 0701 - 03/08 1900  In: 431.4 [P.O.:400;  I.V.:31.4]  Out: 700 [Urine:700]  03/06 1901 - 03/08 0700  In: 1 [P.O.:970]  Out: 3375 [Urine:3375]    Physical Examination:    Pt intubated    No    General: NAD,Conversant   Neck:  Supple, no mass  Resp:  Lungs decreased basal BS, no wheezing , normal respiratory effort  CV:  RRR,  no murmur or rub, [id number] LE edema  GI:  Soft, NT, + BS, no HS megaly  Neurologic:  Non focal  Psych:             AAO x 3 appropriate affect   Skin:  No Rash  :  Zelaya  -  Dialysis Access : NA    []    High complexity decision making was performed  []    Patient is at high-risk of decompensation with multiple organ involvement    Lab Data Personally Reviewed: I have reviewed all the pertinent labs, microbiology data and radiology studies during assessment.     Recent Labs     03/08/23 0425 03/07/23 0453 03/06/23 0518    139 139   K 4.0 4.1 3.9   CL 99 101 103   CO2 33* 31 30   * 160* 170*   BUN 38* 36* 34*   CREA 2.68* 2.62* 2.46*   CA 9.6 9.1 9.2     Recent Labs     03/08/23 0425 03/07/23 0453 03/06/23 0518   WBC 8.5 7.8 8.4   HGB 9.3* 9.0* 8.8*   HCT 30.1* 30.1* 30.1*    225 217     No results found for: SDES  No results found for: CULT  Recent Results (from the past 24 hour(s))   GLUCOSE, POC    Collection Time: 03/07/23 12:09 PM   Result Value Ref Range    Glucose (POC) 195 (H) 65 - 117 mg/dL    Performed by [address] [place] [address], POC    Collection Time: 03/07/23  4:45 PM   Result Value Ref Range    Glucose (POC) 189 (H) 65 - 117 mg/dL    Performed by [facility] Laurie    GLUCOSE, POC    Collection Time: 03/07/23  9:13 PM   Result Value Ref Range    Glucose (POC) 144 (H) 65 - 117 mg/dL    Performed by Dia Padilla    CBC W/O DIFF    Collection Time: 03/08/23  4:25 AM   Result Value Ref Range    WBC 8.5 4.1 - 11.1 K/uL    RBC 3.62 (L) 4.10 - 5.70 M/uL    HGB 9.3 (L) 12.1 - 17.0 g/dL    HCT 30.1 (L) 36.6 - 50.3 %    MCV 83.1 80.0 - 99.0 FL    MCH 25.7 (L) 26.0 - 34.0 PG    MCHC 30.9 30.0 - 36.5 g/dL    RDW 17.6 (H) 11.5 - 14.5 %    PLATELET 267 432 - 556 K/uL    MPV 12.3 8.9 - 12.9 FL    NRBC 0.0 0 PER 100 WBC    ABSOLUTE NRBC 0.00 0.00 - 7.52 K/uL   METABOLIC PANEL, BASIC    Collection Time: 03/08/23  4:25 AM   Result Value Ref Range    Sodium 136 136 - 145 mmol/L    Potassium 4.0 3.5 - 5.1 mmol/L    Chloride 99 97 - 108 mmol/L    CO2 33 (H) 21 - 32 mmol/L    Anion gap 4 (L) 5 - 15 mmol/L    Glucose 109 (H) 65 - 100 mg/dL    BUN 38 (H) 6 - 20 MG/DL    Creatinine 2.68 (H) 0.70 - 1.30 MG/DL    BUN/Creatinine ratio 14 12 - 20      eGFR 27 (L) >60 ml/min/1.73m2    Calcium 9.6 8.5 - 10.1 MG/DL   GLUCOSE, POC    Collection Time: 03/08/23  6:49 AM   Result Value Ref Range    Glucose (POC) 103 65 - 117 mg/dL    Performed by Ben Infante            I have reviewed the flowsheets. Chart and Pertinent Notes have been reviewed. No change in PMH ,family and social history from Consult note.       Armando Madsen, [place] [address] [facility]

## 2023-03-08 NOTE — PROGRESS NOTES
[address] [facility]. [place], [address]   Tel.  [phone]   Fax. [address] [facility], [address] [facility]   Tel.  [phone]   Fax. [phone] [address] [place] [address]   Tel.  [phone]   Fax. [phone]       In-patient consult for sleep evaluation received. We will schedule sleep consultation post discharge. Thank you for the opportunity to participate in the care of Mando Forbes and please do not hesitate to contact us should you have any questions.

## 2023-03-08 NOTE — PROGRESS NOTES
0730: Bedside shift change report given to Latia Jimenez RN (oncoming nurse) by Dominic Castillo RN (offgoing nurse). Report included the following information SBAR, MAR, and Recent Results. 2000: Bedside shift change report given to Dominic Castillo RN (oncoming nurse) by Latia Jimenez RN (offgoing nurse). Report included the following information SBAR, MAR, and Recent Results.

## 2023-03-09 ENCOUNTER — TELEPHONE (OUTPATIENT)
Dept: SLEEP MEDICINE | Age: 58
End: 2023-03-09

## 2023-03-09 LAB
ANION GAP SERPL CALC-SCNC: 8 MMOL/L (ref 5–15)
BUN SERPL-MCNC: 36 MG/DL (ref 6–20)
BUN/CREAT SERPL: 13 (ref 12–20)
CALCIUM SERPL-MCNC: 10.2 MG/DL (ref 8.5–10.1)
CHLORIDE SERPL-SCNC: 93 MMOL/L (ref 97–108)
CO2 SERPL-SCNC: 32 MMOL/L (ref 21–32)
CREAT SERPL-MCNC: 2.83 MG/DL (ref 0.7–1.3)
ERYTHROCYTE [DISTWIDTH] IN BLOOD BY AUTOMATED COUNT: 17.6 % (ref 11.5–14.5)
GLUCOSE BLD STRIP.AUTO-MCNC: 128 MG/DL (ref 65–117)
GLUCOSE BLD STRIP.AUTO-MCNC: 149 MG/DL (ref 65–117)
GLUCOSE BLD STRIP.AUTO-MCNC: 214 MG/DL (ref 65–117)
GLUCOSE BLD STRIP.AUTO-MCNC: 236 MG/DL (ref 65–117)
GLUCOSE SERPL-MCNC: 120 MG/DL (ref 65–100)
HCT VFR BLD AUTO: 34.9 % (ref 36.6–50.3)
HGB BLD-MCNC: 10.8 G/DL (ref 12.1–17)
MAGNESIUM SERPL-MCNC: 2.2 MG/DL (ref 1.6–2.4)
MCH RBC QN AUTO: 25.4 PG (ref 26–34)
MCHC RBC AUTO-ENTMCNC: 30.9 G/DL (ref 30–36.5)
MCV RBC AUTO: 81.9 FL (ref 80–99)
NRBC # BLD: 0 K/UL (ref 0–0.01)
NRBC BLD-RTO: 0 PER 100 WBC
PHOSPHATE SERPL-MCNC: 4 MG/DL (ref 2.6–4.7)
PLATELET # BLD AUTO: 275 K/UL (ref 150–400)
PMV BLD AUTO: 11.6 FL (ref 8.9–12.9)
POTASSIUM SERPL-SCNC: 4.1 MMOL/L (ref 3.5–5.1)
RBC # BLD AUTO: 4.26 M/UL (ref 4.1–5.7)
SERVICE CMNT-IMP: ABNORMAL
SODIUM SERPL-SCNC: 133 MMOL/L (ref 136–145)
WBC # BLD AUTO: 9.2 K/UL (ref 4.1–11.1)

## 2023-03-09 PROCEDURE — 83735 ASSAY OF MAGNESIUM: CPT

## 2023-03-09 PROCEDURE — 84100 ASSAY OF PHOSPHORUS: CPT

## 2023-03-09 PROCEDURE — 74011000250 HC RX REV CODE- 250: Performed by: FAMILY MEDICINE

## 2023-03-09 PROCEDURE — 82962 GLUCOSE BLOOD TEST: CPT

## 2023-03-09 PROCEDURE — 36415 COLL VENOUS BLD VENIPUNCTURE: CPT

## 2023-03-09 PROCEDURE — 74011636637 HC RX REV CODE- 636/637: Performed by: CLINICAL NURSE SPECIALIST

## 2023-03-09 PROCEDURE — 99231 SBSQ HOSP IP/OBS SF/LOW 25: CPT | Performed by: CLINICAL NURSE SPECIALIST

## 2023-03-09 PROCEDURE — 85027 COMPLETE CBC AUTOMATED: CPT

## 2023-03-09 PROCEDURE — 74011250637 HC RX REV CODE- 250/637: Performed by: FAMILY MEDICINE

## 2023-03-09 PROCEDURE — 74011250637 HC RX REV CODE- 250/637: Performed by: INTERNAL MEDICINE

## 2023-03-09 PROCEDURE — 99233 SBSQ HOSP IP/OBS HIGH 50: CPT | Performed by: NURSE PRACTITIONER

## 2023-03-09 PROCEDURE — 94618 PULMONARY STRESS TESTING: CPT

## 2023-03-09 PROCEDURE — 74011250636 HC RX REV CODE- 250/636: Performed by: FAMILY MEDICINE

## 2023-03-09 PROCEDURE — 65660000001 HC RM ICU INTERMED STEPDOWN

## 2023-03-09 PROCEDURE — 74011000250 HC RX REV CODE- 250: Performed by: NURSE PRACTITIONER

## 2023-03-09 PROCEDURE — 80048 BASIC METABOLIC PNL TOTAL CA: CPT

## 2023-03-09 PROCEDURE — 74011250637 HC RX REV CODE- 250/637: Performed by: NURSE PRACTITIONER

## 2023-03-09 PROCEDURE — 97116 GAIT TRAINING THERAPY: CPT

## 2023-03-09 PROCEDURE — 74011250637 HC RX REV CODE- 250/637: Performed by: HOSPITALIST

## 2023-03-09 RX ORDER — HYDRALAZINE HYDROCHLORIDE 50 MG/1
50 TABLET, FILM COATED ORAL 3 TIMES DAILY
Qty: 180 TABLET | Refills: 2 | Status: SHIPPED | OUTPATIENT
Start: 2023-03-09

## 2023-03-09 RX ORDER — BUMETANIDE 1 MG/1
4 TABLET ORAL 2 TIMES DAILY
Status: DISCONTINUED | OUTPATIENT
Start: 2023-03-09 | End: 2023-03-10

## 2023-03-09 RX ORDER — BUMETANIDE 1 MG/1
2 TABLET ORAL 2 TIMES DAILY
Status: DISCONTINUED | OUTPATIENT
Start: 2023-03-09 | End: 2023-03-09

## 2023-03-09 RX ORDER — SPIRONOLACTONE 25 MG/1
25 TABLET ORAL DAILY
Qty: 90 TABLET | Refills: 1 | Status: SHIPPED | OUTPATIENT
Start: 2023-03-10

## 2023-03-09 RX ORDER — BUMETANIDE 2 MG/1
4 TABLET ORAL 2 TIMES DAILY
Qty: 180 TABLET | Refills: 4 | Status: SHIPPED | OUTPATIENT
Start: 2023-03-09 | End: 2023-03-10 | Stop reason: SDUPTHER

## 2023-03-09 RX ORDER — ISOSORBIDE DINITRATE 30 MG/1
30 TABLET ORAL 3 TIMES DAILY
Qty: 180 TABLET | Refills: 2 | Status: SHIPPED | OUTPATIENT
Start: 2023-03-09

## 2023-03-09 RX ADMIN — SODIUM CHLORIDE, PRESERVATIVE FREE 10 ML: 5 INJECTION INTRAVENOUS at 23:49

## 2023-03-09 RX ADMIN — POTASSIUM BICARBONATE 40 MEQ: 782 TABLET, EFFERVESCENT ORAL at 11:57

## 2023-03-09 RX ADMIN — ACETAMINOPHEN 650 MG: 325 TABLET ORAL at 09:38

## 2023-03-09 RX ADMIN — Medication 8 UNITS: at 09:40

## 2023-03-09 RX ADMIN — CARVEDILOL 25 MG: 12.5 TABLET, FILM COATED ORAL at 17:42

## 2023-03-09 RX ADMIN — Medication 2 UNITS: at 12:45

## 2023-03-09 RX ADMIN — ALLOPURINOL 50 MG: 100 TABLET ORAL at 09:37

## 2023-03-09 RX ADMIN — Medication 10 ML: at 23:49

## 2023-03-09 RX ADMIN — BUMETANIDE 4 MG: 1 TABLET ORAL at 11:57

## 2023-03-09 RX ADMIN — Medication 5 UNITS: at 12:46

## 2023-03-09 RX ADMIN — ISOSORBIDE DINITRATE 30 MG: 20 TABLET ORAL at 09:37

## 2023-03-09 RX ADMIN — ISOSORBIDE DINITRATE 30 MG: 20 TABLET ORAL at 17:41

## 2023-03-09 RX ADMIN — BUMETANIDE 4 MG: 1 TABLET ORAL at 17:41

## 2023-03-09 RX ADMIN — POTASSIUM BICARBONATE 40 MEQ: 782 TABLET, EFFERVESCENT ORAL at 17:42

## 2023-03-09 RX ADMIN — Medication 2 UNITS: at 17:42

## 2023-03-09 RX ADMIN — CARVEDILOL 25 MG: 12.5 TABLET, FILM COATED ORAL at 09:36

## 2023-03-09 RX ADMIN — HYDRALAZINE HYDROCHLORIDE 50 MG: 50 TABLET, FILM COATED ORAL at 09:36

## 2023-03-09 RX ADMIN — ERGOCALCIFEROL 50000 UNITS: 1.25 CAPSULE ORAL at 21:17

## 2023-03-09 RX ADMIN — SPIRONOLACTONE 25 MG: 25 TABLET ORAL at 09:36

## 2023-03-09 RX ADMIN — Medication 5 UNITS: at 17:42

## 2023-03-09 RX ADMIN — Medication 10 ML: at 06:31

## 2023-03-09 RX ADMIN — MULTIPLE VITAMINS W/ MINERALS TAB 1 TABLET: TAB at 06:30

## 2023-03-09 RX ADMIN — TRAMADOL HYDROCHLORIDE 50 MG: 50 TABLET ORAL at 23:48

## 2023-03-09 RX ADMIN — BUMETANIDE 0.5 MG/HR: 0.25 INJECTION INTRAMUSCULAR; INTRAVENOUS at 06:23

## 2023-03-09 RX ADMIN — ISOSORBIDE DINITRATE 30 MG: 20 TABLET ORAL at 21:17

## 2023-03-09 RX ADMIN — HYDRALAZINE HYDROCHLORIDE 50 MG: 50 TABLET, FILM COATED ORAL at 17:42

## 2023-03-09 RX ADMIN — Medication 8 UNITS: at 21:17

## 2023-03-09 RX ADMIN — HYDRALAZINE HYDROCHLORIDE 50 MG: 50 TABLET, FILM COATED ORAL at 21:17

## 2023-03-09 RX ADMIN — Medication 5 UNITS: at 09:39

## 2023-03-09 RX ADMIN — HEPARIN SODIUM 5000 UNITS: 5000 INJECTION INTRAVENOUS; SUBCUTANEOUS at 13:58

## 2023-03-09 RX ADMIN — HEPARIN SODIUM 5000 UNITS: 5000 INJECTION INTRAVENOUS; SUBCUTANEOUS at 21:17

## 2023-03-09 RX ADMIN — SODIUM CHLORIDE, PRESERVATIVE FREE 10 ML: 5 INJECTION INTRAVENOUS at 06:31

## 2023-03-09 RX ADMIN — HEPARIN SODIUM 5000 UNITS: 5000 INJECTION INTRAVENOUS; SUBCUTANEOUS at 06:23

## 2023-03-09 RX ADMIN — TRAMADOL HYDROCHLORIDE 50 MG: 50 TABLET ORAL at 15:39

## 2023-03-09 NOTE — PROGRESS NOTES
[address] [facility]  Hospitalist Group                                                                                          Hospitalist Progress Note  Magda Barney MD  Answering service: [phone] or [phone] from in house phone        Date of Service:  3/9/2023  NAME:  Eugenia Zuniga  :  1965  MRN:  [id number]       Admission Summary:   Eugenai Zuniga is a 56 y.o. male with past medical history of dilated cardiomyopathy, heart failure reduced ejection fraction, AICD 2015, chronic lower extremity edema, type 2 diabetes mellitus, hypertension, stage III CKD presented to the emergency department chief complaints of shortness of breath, leg and abdominal swelling, and unintentional weight gain. Patient has known history of CHF. He newly had recent increase in Bumex from 1 mg to 2 mg twice daily. Unfortunately, he still had worsening symptoms with peripheral edema and swelling of legs and abdomen, which is severe, without specific alleviating factors, with associated shortness of breath and dyspnea on exertion over the past 5 days. Also fully had 14 pound weight gain. His last 2 echocardiogram 10/21/2022 showed reduced left ventricular ejection fraction 20% to 25%. On arrival emergency department, initial ported vital signs temperature 97.3 °F, /81, heart rate 71, respiratory 18, saturation 90% room air. Abnormal labs included hemoglobin 10.2, blood glucose 120, BUN 43, creatinine 3.06, GFR 23, albumin 3.2, alk phos 6135, proBNP 9738. Chest x-ray portable showed enlarged cardiopulmonary silhouette. 12 EKG shows sinus rhythm, first-degree AV block, occasional PVCs, premature supraventricular complexes, ST changes anterior lateral leads at 72 bpm.  ED request admission to the hospital service       Interval history / Subjective:      Follow-up for heart failure patient feels much better discussed with renal diuretics readjusted to oral will observe overnight possibly home tomorrow Assessment & Plan:     Acute on Chronic Systolic CHF  - improving   Non-ischemic cardiomyopathy EF ~15%   S/p ICD   -Appreciate  AHF team input   - strict I&Os , daily weight   - echo : EF  15 - 20%. Left ventricle is moderately dilated. Severely increased wall thickness. Severe global hypokinesis present. - c/w coreg, hydralazine, isordil, aldactone   - Cannot tolerate ACEi/ARB/ARNi in hope for renal recovery  - RHC/ LHC done 3/7 - Elevated left and right-sided filling pressures   - bumex drip initiated 3/7--switch to oral Bumex 4 mg twice daily per nephrology  - further recs per AHF      ANA on CKD Stage 4 -persistent   Contraction alkalosis  - appreciate nephrologist input  - renal US: Increased renal cortical echogenicity bilaterally is likely related to medical  renal disease.  - continues on bumex oral as above  - monitor renal function      T2DM with episodes of Hypoglycemia  - hypoglycemia protocols. A1c 8.4   -Cont SSI and NPH 8 U bid, 5 units premeal insulin      Essential hypertension  - c/w coreg, hydralazine       Obesity Body mass index is 34.73 kg/m²   - recommend weight loss      Code status: Full  Prophylaxis: Heparin   Care Plan discussed with: pt, RN, CM   Anticipated Disposition: Tomorrow  Inpatient  Cardiac monitoring: Telemetry     Hospital Problems  Date Reviewed: 1/13/2023            Codes Class Noted POA    Acute kidney injury superimposed on chronic kidney disease ([facility] ICD-10-CM: N17.9, N18.9  ICD-9-CM: 584.9, 585.9  3/2/2023 Unknown        Acute on chronic HFrEF (heart failure with reduced ejection fraction) ([facility] ICD-10-CM: I50.23  ICD-9-CM: 428.23  3/2/2023 Unknown        Cardiomyopathy ([facility] ICD-10-CM: I42.9  ICD-9-CM: 425.4  9/26/2014 Unknown    Overview Addendum 11/9/2017  7:20 AM by Nadege Arreola MD     2002 (approx) diagnosed [place] by echo, neg stress test and started on usual meds.   2007 (approx) fup echo lvef 25%  2/15 cardiac cath, no sig cad  2/15 single lead AICD implant  2/17 lvh, otherwise normal echo              Social Determinants of Health     Tobacco Use: Low Risk     Smoking Tobacco Use: Never    Smokeless Tobacco Use: Never    Passive Exposure: Not on file   Alcohol Use: Not on file   Financial Resource Strain: Not on file   Food Insecurity: Not on file   Transportation Needs: Not on file   Physical Activity: Not on file   Stress: Not on file   Social Connections: Not on file   Intimate Partner Violence: Not on file   Depression: Not at risk    PHQ-2 Score: 0   Housing Stability: Not on file       Review of Systems:   A comprehensive review of systems was negative. Vital Signs:    Last 24hrs VS reviewed since prior progress note. Most recent are:  Visit Vitals  BP 97/66 (BP 1 Location: Left upper arm, BP Patient Position: Lying)   Pulse 81   Temp 98.1 °F (36.7 °C)   Resp 16   Ht 5' 8\" (1.727 m)   Wt 93.8 kg (206 lb 12.7 oz)   SpO2 90%   BMI 31.44 kg/m²         Intake/Output Summary (Last 24 hours) at 3/9/2023 1258  Last data filed at 3/9/2023 1227  Gross per 24 hour   Intake 1367.3 ml   Output 2431 ml   Net -1063.7 ml          Physical Examination:     I had a face to face encounter with this patient and independently examined them on 3/9/2023 as outlined below:          General : alert x 3, awake, no acute distress,   HEENT: PEERL, EOMI, moist mucus membrane  Neck: supple, no JVD, no meningeal signs  Chest: slight bases in the right lung bases, no increase work of breathing  CVS: S1 S2 heard, no murmurs, Capillary refill less than 2 seconds  Abd: soft/ non tender, non distended, BS physiological,   Ext: no clubbing, no cyanosis, trace edema  Neuro/Psych: pleasant mood and affect, Strength 5/5 in all extremities  Skin: warm     Data Review:    I personally reviewed  Image      I have personally and independently reviewed all pertinent labs, diagnostic studies, imaging, and have provided independent interpretation of the same.      Labs:     Recent Labs 03/09/23  0250 03/08/23 0425   WBC 9.2 8.5   HGB 10.8* 9.3*   HCT 34.9* 30.1*    249       Recent Labs     03/09/23  0250 03/08/23 0425 03/07/23 0453   * 136 139   K 4.1 4.0 4.1   CL 93* 99 101   CO2 32 33* 31   BUN 36* 38* 36*   CREA 2.83* 2.68* 2.62*   * 109* 160*   CA 10.2* 9.6 9.1   MG 2.2  --   --    PHOS 4.0  --   --        No results for input(s): ALT, AP, TBIL, TBILI, TP, ALB, GLOB, GGT, AML, LPSE in the last 72 hours. No lab exists for component: SGOT, GPT, AMYP, HLPSE  No results for input(s): INR, PTP, APTT, INREXT, INREXT in the last 72 hours. No results for input(s): FE, TIBC, PSAT, FERR in the last 72 hours. No results found for: FOL, RBCF   No results for input(s): PH, PCO2, PO2 in the last 72 hours. No results for input(s): CPK, CKNDX, TROIQ in the last 72 hours.     No lab exists for component: CPKMB  Lab Results   Component Value Date/Time    Cholesterol, total 132 05/05/2022 01:34 PM    HDL Cholesterol 39 (L) 05/05/2022 01:34 PM    LDL, calculated 79 05/05/2022 01:34 PM    LDL, calculated 100.8 (H) 02/11/2015 01:30 PM    Triglyceride 68 05/05/2022 01:34 PM    CHOL/HDL Ratio 4.5 02/11/2015 01:30 PM     Lab Results   Component Value Date/Time    Glucose (POC) 236 (H) 03/09/2023 10:45 AM    Glucose (POC) 128 (H) 03/09/2023 06:27 AM    Glucose (POC) 109 03/08/2023 09:32 PM    Glucose (POC) 113 03/08/2023 05:54 PM    Glucose (POC) 195 (H) 03/08/2023 11:47 AM     Lab Results   Component Value Date/Time    Color YELLOW/STRAW 02/11/2015 12:10 PM    Appearance CLEAR 02/11/2015 12:10 PM    Specific gravity 1.008 02/11/2015 12:10 PM    pH (UA) 6.5 02/11/2015 12:10 PM    Protein TRACE (A) 02/11/2015 12:10 PM    Glucose NEGATIVE 02/11/2015 12:10 PM    Ketone NEGATIVE 02/11/2015 12:10 PM    Bilirubin NEGATIVE 02/11/2015 12:10 PM    Urobilinogen 0.2 02/11/2015 12:10 PM    Nitrites NEGATIVE 02/11/2015 12:10 PM    Leukocyte Esterase NEGATIVE 02/11/2015 12:10 PM    Epithelial cells FEW 02/11/2015 12:10 PM    Bacteria NEGATIVE 02/11/2015 12:10 PM    WBC 0-4 02/11/2015 12:10 PM    RBC 0-5 02/11/2015 12:10 PM       Notes reviewed from all clinical/nonclinical/nursing services involved in patient's clinical care. Care coordination discussions were held with appropriate clinical/nonclinical/ nursing providers based on care coordination needs. Patients current active Medications were reviewed, considered, added and adjusted based on the clinical condition today. Home Medications were reconciled to the best of my ability given all available resources at the time of admission. Route is PO if not otherwise noted.       Admission Status:[id number]:::1}      Medications Reviewed:     Current Facility-Administered Medications   Medication Dose Route Frequency    bumetanide (BUMEX) tablet 4 mg  4 mg Oral BID    hydrALAZINE (APRESOLINE) tablet 50 mg  50 mg Oral TID    isosorbide dinitrate (ISORDIL) tablet 30 mg  30 mg Oral TID    sodium chloride (NS) flush 5-40 mL  5-40 mL IntraVENous Q8H    sodium chloride (NS) flush 5-40 mL  5-40 mL IntraVENous PRN    insulin NPH (NOVOLIN N, HUMULIN N) injection 8 Units  8 Units SubCUTAneous Q12H    potassium bicarb-citric acid (EFFER-K) tablet 40 mEq  40 mEq Oral BID    spironolactone (ALDACTONE) tablet 25 mg  25 mg Oral DAILY    dextrose 10 % infusion 0-250 mL  0-250 mL IntraVENous PRN    insulin lispro (HUMALOG) injection 5 Units  0.05 Units/kg SubCUTAneous TID WITH MEALS    insulin lispro (HUMALOG) injection   SubCUTAneous AC&HS    traMADoL (ULTRAM) tablet 50 mg  50 mg Oral Q6H PRN    allopurinoL (ZYLOPRIM) tablet 50 mg  50 mg Oral DAILY    carvediloL (COREG) tablet 25 mg  25 mg Oral BID WITH MEALS    ergocalciferol capsule 50,000 Units  50,000 Units Oral Q7D    multivitamin, tx-iron-ca-min (THERA-M w/ IRON) tablet 1 Tablet  1 Tablet Oral ACB    glucose chewable tablet 16 g  4 Tablet Oral PRN    glucagon (GLUCAGEN) injection 1 mg  1 mg IntraMUSCular PRN dextrose 10 % infusion 0-250 mL  0-250 mL IntraVENous PRN    acetaminophen (TYLENOL) tablet 650 mg  650 mg Oral Q6H PRN    heparin (porcine) injection 5,000 Units  5,000 Units SubCUTAneous Q8H    sodium chloride (NS) flush 5-40 mL  5-40 mL IntraVENous Q8H    sodium chloride (NS) flush 5-40 mL  5-40 mL IntraVENous PRN    polyethylene glycol (MIRALAX) packet 17 g  17 g Oral DAILY PRN    ondansetron (ZOFRAN ODT) tablet 4 mg  4 mg Oral Q8H PRN    Or    ondansetron (ZOFRAN) injection 4 mg  4 mg IntraVENous Q6H PRN     ______________________________________________________________________  EXPECTED LENGTH OF STAY: 5d 4h  ACTUAL LENGTH OF STAY:          Alin Bojorquez MD

## 2023-03-09 NOTE — PROGRESS NOTES
[facility]   [address] [address] [facility], [address] [facility]  Phone: [phone]   [phone]    [email or web address]     Nephrology Progress Note    Patient Name : Chad Vidal      : 1965     MRN : [id number]  Date of Admission : 3/2/2023  Date of Servive : 23    CC: Follow up for ANA       Assessment and Plan   ANA on CKD 2/2 CRS  -Suspect progressive CKD from poorly controlled DM, HTN  -vol likely optimized w/ Bumex gtt x 2 days and likely can be switched to PO Bumex 4mg BID  -Strict I's and O's and Daily labs  -Cr 2.83 could vary depend on diuretic dosing but not concerning in light of continued diuresis to alleviate renal congestion from CHF    CKD 4  -2/2 DM, HTN  -Previously nonnephrotic proteinuria  -UPCR 0.8 mg/mg  -renal US: MRD  -paraproteinemia screen pending  -Baseline creatinine: uncertain  -He will follow-up with Dr. Wilian Ty on discharge    Acute on chronic HFrEF  Nonischemic CMP, s/p AICD  -Last ECHO EF 20 to 25%  -MX per AHF team  -Cont Aldactone 25 qd  -only start Entresto once GFR more stable - for now, Hydralazine/Isordil  -cardiac cath 3/7: elevated left and right-sided filling pressures. Nonobstructive coronary artery disease. Limited images were taken due to advanced CKD using only 11 cc of dye to rule out significant proximal coronary disease. Elevated LVEDP     DM 2  -Poorly controlled, A1c 8.4  -Establish care with endocrinology     HTN  -No changes to BP meds until volume status optimized        Interval History:  Breathing better. No cough, fever, n/v. UO 3330cc from 3200cc on Bumex gtt. Review of Systems: A comprehensive review of systems was negative except for that written in the HPI.     Current Medications:   Current Facility-Administered Medications   Medication Dose Route Frequency    hydrALAZINE (APRESOLINE) tablet 50 mg  50 mg Oral TID    isosorbide dinitrate (ISORDIL) tablet 30 mg  30 mg Oral TID    sodium chloride (NS) flush 5-40 mL  5-40 mL IntraVENous Q8H    sodium chloride (NS) flush 5-40 mL  5-40 mL IntraVENous PRN    bumetanide (BUMEX) 0.25 mg/mL infusion  0.5 mg/hr IntraVENous CONTINUOUS    insulin NPH (NOVOLIN N, HUMULIN N) injection 8 Units  8 Units SubCUTAneous Q12H    potassium bicarb-citric acid (EFFER-K) tablet 40 mEq  40 mEq Oral BID    spironolactone (ALDACTONE) tablet 25 mg  25 mg Oral DAILY    dextrose 10 % infusion 0-250 mL  0-250 mL IntraVENous PRN    insulin lispro (HUMALOG) injection 5 Units  0.05 Units/kg SubCUTAneous TID WITH MEALS    insulin lispro (HUMALOG) injection   SubCUTAneous AC&HS    traMADoL (ULTRAM) tablet 50 mg  50 mg Oral Q6H PRN    allopurinoL (ZYLOPRIM) tablet 50 mg  50 mg Oral DAILY    carvediloL (COREG) tablet 25 mg  25 mg Oral BID WITH MEALS    ergocalciferol capsule 50,000 Units  50,000 Units Oral Q7D    multivitamin, tx-iron-ca-min (THERA-M w/ IRON) tablet 1 Tablet  1 Tablet Oral ACB    glucose chewable tablet 16 g  4 Tablet Oral PRN    glucagon (GLUCAGEN) injection 1 mg  1 mg IntraMUSCular PRN    dextrose 10 % infusion 0-250 mL  0-250 mL IntraVENous PRN    acetaminophen (TYLENOL) tablet 650 mg  650 mg Oral Q6H PRN    heparin (porcine) injection 5,000 Units  5,000 Units SubCUTAneous Q8H    sodium chloride (NS) flush 5-40 mL  5-40 mL IntraVENous Q8H    sodium chloride (NS) flush 5-40 mL  5-40 mL IntraVENous PRN    polyethylene glycol (MIRALAX) packet 17 g  17 g Oral DAILY PRN    ondansetron (ZOFRAN ODT) tablet 4 mg  4 mg Oral Q8H PRN    Or    ondansetron (ZOFRAN) injection 4 mg  4 mg IntraVENous Q6H PRN      No Known Allergies    Objective:  Vitals:    Vitals:    03/09/23 0557 03/09/23 0700 03/09/23 0958 03/09/23 1000   BP:  129/63 133/71    Pulse: 78 80  89   Resp:  20     Temp:  98.2 °F (36.8 °C)     SpO2:  97%     Weight:   93.8 kg (206 lb 12.7 oz)    Height:         Intake and Output:  No intake/output data recorded.   03/07 1901 - 03/09 0700  In: 9524 [P.O.:1460; I.V.:45]  Out: 12 [Urine:5980]    Physical Examination:    Pt intubated    No    General: NAD,Conversant   Neck:  Supple, no mass  Resp:  Lungs clear , no wheezing , normal respiratory effort  CV:  RRR,  no murmur or rub, no LE edema  GI:  Soft, NT, + BS, no HS megaly  Neurologic:  Non focal  Psych:             AAO x 3 appropriate affect   Skin:  No Rash  :  Zelaya  -  Dialysis Access : NA    []    High complexity decision making was performed  []    Patient is at high-risk of decompensation with multiple organ involvement    Lab Data Personally Reviewed: I have reviewed all the pertinent labs, microbiology data and radiology studies during assessment.     Recent Labs     03/09/23 0250 03/08/23 0425 03/07/23 0453   * 136 139   K 4.1 4.0 4.1   CL 93* 99 101   CO2 32 33* 31   * 109* 160*   BUN 36* 38* 36*   CREA 2.83* 2.68* 2.62*   CA 10.2* 9.6 9.1   MG 2.2  --   --    PHOS 4.0  --   --      Recent Labs     03/09/23 0250 03/08/23 0425 03/07/23 0453   WBC 9.2 8.5 7.8   HGB 10.8* 9.3* 9.0*   HCT 34.9* 30.1* 30.1*    249 225     No results found for: SDES  No results found for: CULT  Recent Results (from the past 24 hour(s))   GLUCOSE, POC    Collection Time: 03/08/23 11:47 AM   Result Value Ref Range    Glucose (POC) 195 (H) 65 - 117 mg/dL    Performed by Gary Villa    GLUCOSE, POC    Collection Time: 03/08/23  5:54 PM   Result Value Ref Range    Glucose (POC) 113 65 - 117 mg/dL    Performed by [address], POC    Collection Time: 03/08/23  9:32 PM   Result Value Ref Range    Glucose (POC) 109 65 - 117 mg/dL    Performed by George Diaz, BASIC    Collection Time: 03/09/23  2:50 AM   Result Value Ref Range    Sodium 133 (L) 136 - 145 mmol/L    Potassium 4.1 3.5 - 5.1 mmol/L    Chloride 93 (L) 97 - 108 mmol/L    CO2 32 21 - 32 mmol/L    Anion gap 8 5 - 15 mmol/L    Glucose 120 (H) 65 - 100 mg/dL    BUN 36 (H) 6 - 20 MG/DL    Creatinine 2.83 (H) 0.70 - 1.30 MG/DL BUN/Creatinine ratio 13 12 - 20      eGFR 26 (L) >60 ml/min/1.73m2    Calcium 10.2 (H) 8.5 - 10.1 MG/DL   MAGNESIUM    Collection Time: 03/09/23  2:50 AM   Result Value Ref Range    Magnesium 2.2 1.6 - 2.4 mg/dL   PHOSPHORUS    Collection Time: 03/09/23  2:50 AM   Result Value Ref Range    Phosphorus 4.0 2.6 - 4.7 MG/DL   CBC W/O DIFF    Collection Time: 03/09/23  2:50 AM   Result Value Ref Range    WBC 9.2 4.1 - 11.1 K/uL    RBC 4.26 4.10 - 5.70 M/uL    HGB 10.8 (L) 12.1 - 17.0 g/dL    HCT 34.9 (L) 36.6 - 50.3 %    MCV 81.9 80.0 - 99.0 FL    MCH 25.4 (L) 26.0 - 34.0 PG    MCHC 30.9 30.0 - 36.5 g/dL    RDW 17.6 (H) 11.5 - 14.5 %    PLATELET 538 818 - 670 K/uL    MPV 11.6 8.9 - 12.9 FL    NRBC 0.0 0  WBC    ABSOLUTE NRBC 0.00 0.00 - 0.01 K/uL   GLUCOSE, POC    Collection Time: 03/09/23  6:27 AM   Result Value Ref Range    Glucose (POC) 128 (H) 65 - 117 mg/dL    Performed by Elizabeth Billy I have reviewed the flowsheets. Chart and Pertinent Notes have been reviewed. No change in PMH ,family and social history from Consult note.       Kassy [facility]An[facility]

## 2023-03-09 NOTE — PROGRESS NOTES
6 MWT results: (Specify if any supplemental oxygen is used, the type, pre, during and post sats.)  Distance Walked in Feet (ft): 435 ft. Luis Felipe Rating of Perceived exertion (0-10 point scale):   Pre Heart Rate: 90 Beginnin   Pre O2 Saturation: 91 (RA)     Mid walk: 0   Post Heart Rate: 95 End: 0   Post O2 Saturation: 85 (RA)    Assistive device used:  gait belt Spo2 recovery within 1 minute post-activity to 92% on RA with cues for PLB        Normative data:   Men 39-80 years old = 1889 feet; Women 3680 years ivj=7217 feet  Modified 10 point Luis Felipe RPE scale utilized:  0 = no breathlessness at all ---> 10 = maximum exertion  Please refer to the flowsheet for any additional vital signs taken during this treatment.

## 2023-03-09 NOTE — PROGRESS NOTES
03/09/23 1350 03/09/23 1352 03/09/23 1354   RT Walking Oximetry   Stage Resting (Room Air) During Walk (Room Air) During Walk (Room Air)   SpO2 95 % 98 % (!) 84 %   HR 80 bpm 84 bpm 89 bpm   Rate of Dyspnea 0 0 0   Symptoms   (no symptoms)   (no symptoms)   (no symptoms but sp02)   O2 Device None (Room air) None (Room air) None (Room air)   O2 Flow Rate (L/min)  --   --   --    FIO2 (%) 21 % 21 % 21 %   Distance Walked in Feet (ft)  --   --   --    Comments  --   --   --       03/09/23 1356   RT Walking Oximetry   Stage After Walk   SpO2 98 %   HR 90 bpm   Rate of Dyspnea 0   Symptoms  --    O2 Device Nasal cannula   O2 Flow Rate (L/min) 2 l/min   FIO2 (%)  --    Distance Walked in Feet (ft) 478 ft   Comments   (Pt dropped to 84%)

## 2023-03-09 NOTE — PROGRESS NOTES
Problem: Mobility Impaired (Adult and Pediatric)  Goal: *Acute Goals and Plan of Care (Insert Text)  Description: FUNCTIONAL STATUS PRIOR TO ADMISSION: Patient was independent and active without use of DME.    HOME SUPPORT PRIOR TO ADMISSION: The patient lived with spouse but did not require assist.    Physical Therapy Goals  Initiated 3/4/2023  1. Patient will ambulate with independence for 300 feet with spo2 > 92% on RA within 7 day(s). 2.  Patient will ascend/descend 5 stairs with bilateral handrail(s) with independence with spo2 >92% on RA within 7 day(s). Outcome: Progressing Towards Goal   PHYSICAL THERAPY TREATMENT/DISCHARGE  Patient: Iram Raymundo (26 y.o. male)  Date: 3/9/2023  Diagnosis: Acute on chronic HFrEF (heart failure with reduced ejection fraction) ([facility] [I50.23]  Cardiomyopathy ([facility] [I42.9]  Acute kidney injury superimposed on chronic kidney disease ([facility] [N17.9, N18.9] <principal problem not specified>  Procedure(s) (LRB):  LEFT AND RIGHT HEART CATH / CORONARY ANGIOGRAPHY (N/A) 2 Days Post-Op  Precautions: Fall  Chart, physical therapy assessment, plan of care and goals were reviewed. ASSESSMENT  Patient continues with skilled PT services and has progressed towards goals. Patient found supine in bed and agreeable to PT. Spo2 91% on RA. Patient is independent with all mobility and ambulated 435 feet on RA as part of 6MWT. Spo2 85-92% while ambulating. Patient initially with antalgic and slow gait due to L ankle pain, however with increased ambulation pain resolved and patient with steady gait pattern. Patient educated on activity pacing and energy conservation in setting of spo2 desaturation with activity. Patient returned to room and within 1 minute of seated rest, spo2 recovered to 92% on RA with cues for PLB. At this time patient requires no further PT intervention, will sign off. Return home with no PT needs.      6 MWT results: (Specify if any supplemental oxygen is used, the type, pre, during and post sats.)  Distance Walked in Feet (ft): 435 ft. Luis Felipe Rating of Perceived exertion (0-10 point scale):   Pre Heart Rate: 90 Beginnin   Pre O2 Saturation: 91 (RA)       Mid walk: 0   Post Heart Rate: 95 End: 0   Post O2 Saturation: 85 (RA)     Assistive device used:  gait belt Spo2 recovery within 1 minute post-activity to 92% on RA with cues for PLB          Normative data:   Men 39-80 years old = 1889 feet; Women 3680 years lsr=8089 feet  Modified 10 point Luis Felipe RPE scale utilized:  0 = no breathlessness at all ---> 10 = maximum exertion  Please refer to the flowsheet for any additional vital signs taken during this treatment. Other factors to consider for discharge: spo2 85-92% during 6MWT, recovers to >92% quickly on RA with seated rest          PLAN :  Patient will be discharged from acute skilled physical therapy at this time. Rationale for discharge:  Goals achieved    Recommendation for discharge: (in order for the patient to meet his/her long term goals)  No skilled physical therapy/ follow up rehabilitation needs identified at this time. This discharge recommendation:  A follow-up discussion with the attending provider and/or case management is planned    IF patient discharges home will need the following DME: patient owns DME required for discharge       SUBJECTIVE:   Patient stated I feel good.     OBJECTIVE DATA SUMMARY:   Critical Behavior:  Neurologic State: Alert  Orientation Level: Oriented X4  Cognition: Follows commands  Safety/Judgement: Fall prevention, Home safety  Functional Mobility Training:  Bed Mobility:  Rolling: Independent  Supine to Sit: Independent  Sit to Supine: Independent  Scooting: Independent        Transfers:  Sit to Stand: Independent  Stand to Sit: Independent                             Balance:  Sitting: Intact; Without support  Standing: Intact; Without support  Ambulation/Gait Training:  Distance (ft): 435 Feet (ft)  Assistive Device: Gait belt  Ambulation - Level of Assistance: Supervision        Gait Abnormalities: Decreased step clearance; Antalgic        Base of Support: Widened  Stance: Weight shift;Left decreased;Right increased  Speed/Mary: Pace decreased (<100 feet/min)  Step Length: Right shortened;Left shortened           Pain Rating:  Patient reporting pain in L ankle initially with gait however this resolved with further gait     Activity Tolerance:   Good, SpO2 stable on RA, and observed SOB with activity    After treatment patient left in no apparent distress:   Sitting in chair and Call bell within reach    COMMUNICATION/COLLABORATION:   The patients plan of care was discussed with: Registered nurse.      Alicia Sheffield, PT   Time Calculation: 16 mins

## 2023-03-09 NOTE — PROGRESS NOTES
[facility] in [place], [place]  Inpatient Progress Note      Patient name: Vahid Cali  Patient : 1965  Patient MRN: [id number]  Consulting MD: Fanny Dias MD  Date of service: 23    REASON FOR REFERRAL:  Management of acute on chronic systolic heart failure     PLAN OF CARE:  57 y/o male with h/o non-ischemic cardiomyopathy, LVEF 10-15% from 28%, stage C, NYHA class IIIA symptoms and CKD, stage 3 (Cr 2.1-2.47) undergoing optimization of GDMT (taken off nephrotoxic meds through diuresis, to allow renal recovery and up-titrating alternative HF meds)  Most likely etiology of worsening HF is chronic volume overload due to underestimated severity of renal failure +/- possibly inadequately/untreated MINA; evaluation ongoing   Consult placed to cardiology for RHC +/- ischemic evaluation with LHC or NST depending on renal function and recommendation from nephrology and cardiology (last [facility] )  If RHC reveals that heart failure contributes to renal dysfunction, then patient may need initiation of chronic inotropes and completion of inpatient workup for combined heart-kidney transplantation, including GI workup; of note, patient is currently not a candidate for LVAD due to Cr > 1.8      RECOMMENDATIONS:  Continue current medical therapy for heart failure  Continue coreg 25mg twice daily, HR at goal in 70s  Cannot tolerate ACEi/ARB/ARNi in hope for renal recovery  Continue hydralazine 50 mg TID and isordil 30mg TID   Will optimize HTN control as OP when euvolemic   Cannot tolerate SGLT2i due to eGFR < 30  Transition to Bumex 4mg BID- d/w nephrology   Continue allopurinol 50mg daily  Cont high dose Vit D supplement x 12 week (2023)  Does not take aspirin   S/p Venofer 200mg   TSH in am   ICD interrogation every 3 months per routine  Heme occult negative   Reinforced low salt diet  Reinforced fluid restriction to 6 x 8oz glasses per day    All other care per primary team, hopefully home tomorrow        At time of discharge plan on the following:  Schedule sleep study     IMPRESSION:  Fatigue  Shortness of breath/VILLARREAL/orthopnea  Volume overload  Acute on chronic systolic heart failure  Stage C, NYHA class IIIA symptoms  Non-ischemic cardiomyopathy, LVEF 28%  S/p ICD  HTN  DM  CKD 4     INTERVAL EVENTS:  No issues overnight  States he continued to feel well   -140s/70s, HR 70-80s  I/O net neg 1.8L  Cr up to 2.83     LIFE GOALS:  Lifestyle goals reviewed with the patient. Patient's personal goals include: getting back home  Important upcoming milestones or family events: TBD  The patient identifies the following as important for living well: TBD    Patient assessed. High suspicion of sleep apnea due to the following reasons. Will refer for sleep evaluation. [x] Heart Failure  [x] Hypertention  [] Atrial Fibrillation  [x] BMI > 30  [] History of stroke  [x] Diabetes  [] Heavy snoring  [] Witnessed apnea  [] Hypoxemia         HPI:   Briefly, Brennen Mcbride is a 56 y.o. male with h/o morbid obesity, BMI 35, HTN, HL, DM2, chronic systolic heart failure, stage C, NYHA class IIIA symptoms, non-ischemic cardiomyopathy, LVEF 28% (HF diagnosed around 2002), normal coronaries by [facility] 2015 s/p single lead ICD (2/2015) and worsening renal function, CKD stage 2 and anemia. Patient was referred to [facility] to establish long-term care. CARDIAC IMAGING:  Echo (3/3/23)    Left Ventricle: The EF by visual approximation is 15 - 20%. Left ventricle is moderately dilated. Severely increased wall thickness. Severe global hypokinesis present. Right Ventricle: Not well visualized. Mitral Valve: Thickened leaflets. Moderate annular calcification of the mitral valve. Left Atrium: Left atrium is dilated. LVEDD 6.1cm     Echo 10/21/22    Left Ventricle: Severely reduced left ventricular systolic function with a visually estimated EF of 20 - 25%.  Left ventricle is moderately dilated. Mild posterior thickening. Normal wall motion. Normal diastolic function. Left Atrium: Left atrium is moderately dilated. Mitral Valve: Mild regurgitation with a centrally directed jet. Tricuspid Valve: Mild regurgitation with a centrally directed jet. Echo (3/9/22)    Study limited to the assessment of ejection fraction. Left Ventricle: Left ventricle is moderately dilated. Mildly increased wall thickness. Severe global hypokinesis present. Calculared ejection fraction is 28% by 3D volume and 31% by 2D Fernandez's biplane. Global longitudinal strain is reduced with a value of -5.8%. Echo (12/6/21)  LV: Calculated LVEF is 28%. Biplane method used to measure ejection fraction. Mildly dilated left ventricle. Mildly to moderately increased wall thickness. Moderate-to-severely and globally reduced systolic function. Moderate (grade 2) left ventricular diastolic dysfunction. LA: Mildly dilated left atrium. Left Atrium volume index is 40 mL/m2. MV: Moderate mitral annular calcification. Very mild mitral valve regurgitation is present. RV: Not well visualized. Pacer/ICD present. Echo (2/17/21)  LV: Calculated LVEF is 29%. Biplane method used to measure ejection fraction. Mildly dilated left ventricle. Mild to moderate hypertrophy. Severely and globally reduced systolic function. Wall motion: normal. Abnormal left ventricular strain. Global longitudinal strain is -8%. Moderate (grade 2) left ventricular diastolic dysfunction. LA: Mildly dilated left atrium. Left Atrium volume index is 40 mL/m2. MV: Moderate mitral annular calcification. Very mild mitral valve regurgitation is present. Echo (8/16/19)  Left Ventricle: Mildly dilated left ventricle. Moderate concentric hypertrophy. Severe global systolic dysfunction. Estimated left ventricular ejection fraction is 21 - 25%. Biplane method used to measure ejection fraction. Left ventricular global hypokinesis.  Abnormal left ventricular strain. Inconclusive left ventricular diastolic function. Left Atrium: Mildly dilated left atrium. Mitral Valve: Moderate mitral annular calcification. Mild mitral valve regurgitation. EKG (3/2/23): SR with 1st degree AVB  EKG (8/5/19) NSR 73bpm QRS 124ms        LHC (2/105) normal cors  3/7/23- non obstructive CAD     ICD interrogation     HEMODYNAMICS:  RHC 3/7/23 CI 2.35, PCWP 34, RA 20     CPEST not done  6MW will do before discharge      OTHER IMAGING:  CXR 3/2/23: enlarged cardiac silhouette; no edema   CT chest not done       PHYSICAL EXAM:  Visit Vitals  /73   Pulse 89   Temp 98.2 °F (36.8 °C)   Resp 20   Ht 5' 8\" (1.727 m)   Wt 206 lb 12.7 oz (93.8 kg)   SpO2 97%   BMI 31.44 kg/m²     Physical Exam  Vitals and nursing note reviewed. Constitutional:       Appearance: He is normal weight. He is not ill-appearing. Cardiovascular:      Rate and Rhythm: Normal rate and regular rhythm. Pulses: Normal pulses. Heart sounds: Normal heart sounds. Pulmonary:      Effort: No respiratory distress. Breath sounds: Normal breath sounds. Abdominal:      General: There is no distension. Palpations: Abdomen is soft. Musculoskeletal:         General: No swelling. Skin:     General: Skin is warm and dry. Neurological:      General: No focal deficit present. Mental Status: He is alert. Psychiatric:         Mood and Affect: Mood normal.        REVIEW OF SYSTEMS:  Review of Systems   Constitutional:  Negative for chills and fever. HENT:  Negative for congestion. Respiratory:  Negative for cough and shortness of breath. Cardiovascular:  Negative for chest pain, palpitations and leg swelling. Gastrointestinal:  Negative for nausea and vomiting. Musculoskeletal:  Negative for falls and myalgias. Neurological:  Negative for dizziness and headaches. Psychiatric/Behavioral:  Negative for depression.         PAST MEDICAL HISTORY:  Past Medical History: Diagnosis Date    Diabetes ([facility] [address]     Heart failure (HCC)     CHF, cardiomyopathy    Hypertension     ICD (implantable cardioverter-defibrillator) in place     left upper chest       PAST SURGICAL HISTORY:  Past Surgical History:   Procedure Laterality Date    HX HEART CATHETERIZATION  2/2015    x1    HX IMPLANTABLE CARDIOVERTER DEFIBRILLATOR  2/16/2015       FAMILY HISTORY:  Family History   Problem Relation Age of Onset    Diabetes Mother     Hypertension Father     Diabetes Father     Diabetes Brother     Hypertension Brother        SOCIAL HISTORY:  Social History     Socioeconomic History    Marital status:    Tobacco Use    Smoking status: Never    Smokeless tobacco: Never   Vaping Use    Vaping Use: Never used   Substance and Sexual Activity    Alcohol use: Yes     Comment: 1 a month    Drug use: No    Sexual activity: Not Currently       LABORATORY RESULTS:     Labs Latest Ref Rng & Units 3/9/2023 3/8/2023 3/7/2023 3/6/2023 3/5/2023 3/4/2023 3/3/2023   WBC 4.1 - 11.1 K/uL 9.2 8.5 7.8 8.4 8.5 8.0 8.1   RBC 4.10 - 5.70 M/uL 4.26 3.62(L) 3.54(L) 3.54(L) 3.56(L) 3.49(L) 3.61(L)   Hemoglobin 12.1 - 17.0 g/dL 10. 8(L) 9.3(L) 9.0(L) 8.8(L) 9.1(L) 8.8(L) 9.2(L)   Hematocrit 36.6 - 50.3 % 34. 9(L) 30. 1(L) 30. 1(L) 30. 1(L) 30. 7(L) 28. 9(L) 31. 2(L)   MCV 80.0 - 99.0 FL 81.9 83.1 85.0 85.0 86.2 82.8 86.4   Platelets 315 - 124 K/uL 275 249 225 217 213 196 195   Lymphocytes 12 - 49 % - - - - - - 11(L)   Monocytes 5 - 13 % - - - - - - 8   Eosinophils 0 - 7 % - - - - - - 5   Basophils 0 - 1 % - - - - - - 1   Albumin 3.5 - 5.0 g/dL - - - - - - 2. 6(L)   Calcium 8.5 - 10.1 MG/DL 10. 2(H) 9.6 9.1 9.2 9.0 9.2 8.2(L)   Glucose 65 - 100 mg/dL 120(H) 109(H) 160(H) 170(H) 180(H) 78 137(H)   BUN 6 - 20 MG/DL 36(H) 38(H) 36(H) 34(H) 31(H) 33(H) 36(H)   Creatinine 0.70 - 1.30 MG/DL 2.83(H) 2.68(H) 2.62(H) 2.46(H) 2.43(H) 2.52(H) 2.46(H)   Sodium 136 - 145 mmol/L 133(L) 136 139 139 139 142 142   Potassium 3.5 - 5.1 mmol/L 4.1 4.0 4.1 3.9 3.5 3. 2(L) 3. 3(L)   Some recent data might be hidden     Lab Results   Component Value Date/Time    TSH 2.080 05/05/2022 01:34 PM    TSH 1.05 02/11/2015 01:30 PM       ALLERGY:  No Known Allergies     CURRENT MEDICATIONS:    Current Facility-Administered Medications:     bumetanide (BUMEX) tablet 4 mg, 4 mg, Oral, BID, Stephen, Erika B, NP    hydrALAZINE (APRESOLINE) tablet 50 mg, 50 mg, Oral, TID, Stephen, Erika B, NP, 50 mg at 03/09/23 0936    isosorbide dinitrate (ISORDIL) tablet 30 mg, 30 mg, Oral, TID, Stephen, Erika B, NP, 30 mg at 03/09/23 0937    sodium chloride (NS) flush 5-40 mL, 5-40 mL, IntraVENous, Q8H, Don Hardy NP, 10 mL at 03/09/23 0631    sodium chloride (NS) flush 5-40 mL, 5-40 mL, IntraVENous, PRN, Penny TOUSSAINT NP    insulin NPH (NOVOLIN N, HUMULIN N) injection 8 Units, 8 Units, SubCUTAneous, Q12H, Steffanie Fall CNS, 8 Units at 03/09/23 0940    potassium bicarb-citric acid (EFFER-K) tablet 40 mEq, 40 mEq, Oral, BID, Nathan Monzon MD, 40 mEq at 03/08/23 1802    spironolactone (ALDACTONE) tablet 25 mg, 25 mg, Oral, DAILY, Nathan Monzon MD, 25 mg at 03/09/23 0936    dextrose 10 % infusion 0-250 mL, 0-250 mL, IntraVENous, PRN, Steffanie Fall CNS    insulin lispro (HUMALOG) injection 5 Units, 0.05 Units/kg, SubCUTAneous, TID WITH MEALS, Steffanie Fall CNS, 5 Units at 03/09/23 0939    insulin lispro (HUMALOG) injection, , SubCUTAneous, AC&HS, Steffanie Fall CNS, 213 Units at 03/06/23 2138    traMADoL (ULTRAM) tablet 50 mg, 50 mg, Oral, Q6H PRN, Citlalli Guerrero MD, 50 mg at 03/08/23 2302    allopurinoL (ZYLOPRIM) tablet 50 mg, 50 mg, Oral, DAILY, Juvencio Han MD, 50 mg at 03/09/23 0937    carvediloL (COREG) tablet 25 mg, 25 mg, Oral, BID WITH MEALS, Juvencio Han MD, 25 mg at 03/09/23 0936    ergocalciferol capsule 50,000 Units, 50,000 Units, Oral, Q7D, Grace Cordova MD, 50,000 Units at 03/02/23 2148    multivitamin, tx-iron-ca-min (THERA-M w/ IRON) tablet 1 Tablet, 1 Tablet, Oral, ACB, Waka, Juvencio BEDOLLA MD, 1 Tablet at 03/09/23 0630    glucose chewable tablet 16 g, 4 Tablet, Oral, PRN, Mj Han MD    glucagon (GLUCAGEN) injection 1 mg, 1 mg, IntraMUSCular, PRN, Juvencio Han MD    dextrose 10 % infusion 0-250 mL, 0-250 mL, IntraVENous, PRN, Mj Han MD    acetaminophen (TYLENOL) tablet 650 mg, 650 mg, Oral, Q6H PRN, Sho Pineda MD, 650 mg at 03/09/23 0938    heparin (porcine) injection 5,000 Units, 5,000 Units, SubCUTAneous, Q8H, Mj Han MD, 5,000 Units at 03/09/23 2669    sodium chloride (NS) flush 5-40 mL, 5-40 mL, IntraVENous, Q8H, Juvencio Han MD, 10 mL at 03/09/23 0631    sodium chloride (NS) flush 5-40 mL, 5-40 mL, IntraVENous, PRN, Mj Han MD    polyethylene glycol (MIRALAX) packet 17 g, 17 g, Oral, DAILY PRN, Mj Han MD    ondansetron (ZOFRAN ODT) tablet 4 mg, 4 mg, Oral, Q8H PRN **OR** ondansetron (ZOFRAN) injection 4 mg, 4 mg, IntraVENous, Q6H PRN, Sho Pineda MD    PATIENT CARE TEAM:  Patient Care Team:  Rosalee Bentley MD as PCP - General (Family Medicine)  Peter To MD (Cardiovascular Disease Physician)  Leroy Mendez MD (Nephrology)  Marshall Solis MD (Internal Medicine Physician)     Thank you for allowing me to participate in this patient's care.     Kerney Nissen, NP   [address] [place] [address], [address]  Phone: [phone]

## 2023-03-09 NOTE — DIABETES MGMT
[facility]  DIABETES MANAGEMENT CONSULT    Consulted by  Courtney Arvizu MD  for advanced nursing evaluation and care for inpatient blood glucose management. Evaluation and Action Plan   Kia Jenkins is a 56year old gentleman, with Type 2 Diabetes on 70/30 insulin, who is admitted with acute on chronic CHF exacerbation. His A1C remains elevated at 8.4% and he verbalizes that he has struggled to get it below 8%. This admission, he did take his morning 70/30 insulin 20 units at breakfast.  His BG was 120 on admission but fell to 66 after not eating in the afternoon. Evening insulin has been held and BG was 137. He has eaten breakfast.  At this time, factors that are impacting BG pattern are his heart failure, ANA, elevated BMI and decreased PO intake. He will need insulin to resume now and ordered at reduced doses. Basal insulin was cut by 50% and BG slightly over goal.  Will modify basal insulin now. Inpatient BG goal is 140-180mg/dl. Management Rationale Action Plan   Medication   Basal needs Using 0.25 units/kg/D Continue NPH 8 units Q12    (Only needs 5 units Q12 if NPO and experienced hypoglycemia with 12 units Q12)   Nutritional needs Using low sensitivity Continue 5 units Humalog/meal    Hold if patient is NPO or consumes less than 50% of carbohydrates on meal tray    Will advance by 2 units daily for persistent   pre-prandial hyperglycemia     Corrective insulin Using HIGH sensitivity based on  High sensitivity due to ANA/reduced GFR   Additional orders  Consistent carbohydrate diet (60g CHO/meal)       Diabetes Discharge Plan   Medication  Adjust 70/30 insulin: 20 units at breakfast (This would give 14 units of basal and 4 units regular with meals), 10 units at dinner (7 units basal with 3 units for dinner). Referral  [x]        Outpatient diabetes education: Order placed.    Additional orders  Will need a FUV with PCP within 1-2 weeks after hospital discharge for ongoing diabetes management   2. Continue to check glucose twice daily and PRN. Initial Presentation   Tabitha Pradhan is a 56 y.o. male admitted from the advanced heart failure center on 3/2/23 with a 14lb weight gain in 5 days. LAB: GFR 23, BNP 9738, A1C 8.4%  CXR: Enlarged cardiopericardial silhouette. HX:   Past Medical History:   Diagnosis Date    Diabetes ([facility] [address]     Heart failure ([address]     CHF, cardiomyopathy    Hypertension     ICD (implantable cardioverter-defibrillator) in place     left upper chest        INITIAL DX:   Acute on chronic HFrEF (heart failure with reduced ejection fraction) ([address] [I50.23]  Cardiomyopathy ([address] [I42.9]  Acute kidney injury superimposed on chronic kidney disease ([address] [N17.9, N18.9]     Current Treatment     TX: Diuresis, Nephrologist     Hospital Course   Clinical progress has been uncomplicated. 3/2: Admission  3/3: His EF on 3/3/23 shows reduced LV function with EF 15-20%, LV mod dilated, severe LVH, mild MR, Mild Tr. This EF is reduced from 10/21/22 when EF as 20-25%. 3/7: RHC/LHC: RHC shows evidence of volume overload. LHC showed nonobstructive CAD, including ramus 60%. Diabetes History   Type 2 Diabetes: Diagnosed about 10 years ago  Ambulatory BG management provided by: PCP Sheila Mark MD  He did see an endocrinologist at [address] [place] [address] this past summer once but does not remember name. Does not want to go back to that practice. Diabetes-related Medical History  Microvascular disease  Nephropathy  Other associated conditions     CHF    Diabetes Medication History  Key Antihyperglycemic Medications               insulin NPH/insulin regular (NovoLIN 70/30 U-100 Insulin) 100 unit/mL (70-30) injection (Taking) 26-27 Units by SubCUTAneous route Daily (before dinner).  (22 units in the morning; 26-27 units in the evening; evening dose is on a sliding scale)    insulin NPH/insulin regular (NOVOLIN 70/30, HUMULIN 70/30) 100 unit/mL (70-30) injection (Taking) 22 Units by SubCUTAneous route Daily (before breakfast). (22 units in the morning; 26 units in the evening)             Diabetes self-management practices:   Eating pattern     [] Breakfast  Hash brown and coffee  [] Lunch   [place]  [] Dinner   \"Typical dinner foods\" would not elaborate  [] Bedtime  None  [] Snacks   Limited  [] Beverages  Regular soda, water, tea  [] Dentition status Normal  Physical activity pattern   No intentional activity   Monitoring pattern   Tests twice daily   Fasting    Before Dinner: 150-200, did see a 300 this month  Taking medications pattern  [x] Consistent administration  [x] Affordable  Reducing risks  [] Influenza: There is no immunization history for the selected administration types on file for this patient. [] Pneumonia:   Immunization History   Administered Date(s) Administered    Pneumococcal Polysaccharide (PPSV-23) 02/10/2015     [] Hepatitis: There is no immunization history for the selected administration types on file for this patient. Social determinants of health impacting diabetes self-management practices   Concerned that you need to know more about how to stay healthy with diabetes  Overall evaluation:    [x] Not achieving A1c target with drug therapy & self-care practices    Subjective    \"I am tired today\"  Objective   Physical Exam  Constitutional:       Appearance: Normal appearance. Eyes:      Pupils: Pupils are equal, round, and reactive to light. Cardiovascular:      Rate and Rhythm: Normal rate. Pulmonary:      Effort: Pulmonary effort is normal.   Abdominal:      Palpations: Abdomen is soft. Musculoskeletal:      Cervical back: Neck supple. Skin:     General: Skin is warm and dry. Neurological:      General: No focal deficit present. Mental Status: He is alert and oriented to person, place, and time.    Psychiatric:         Mood and Affect: Mood normal.       Vital Signs Visit Vitals  BP 97/66 (BP 1 Location: Left upper arm, BP Patient Position: Lying)   Pulse 83   Temp 98.1 °F (36.7 °C)   Resp 16   Ht 5' 8\" (1.727 m)   Wt 93.8 kg (206 lb 12.7 oz)   SpO2 90%   BMI 31.44 kg/m²     Laboratory  Recent Labs     23  0250 23  0425 23  0453   * 109* 160*   AGAP 8 4* 7   WBC 9.2 8.5 7.8   CREA 2.83* 2.68* 2.62*     Factors impacting BG management  Factor Dose Comments   Nutrition:  Standard meals     60 grams/meal    Cardiomyopathy and systolic HF AICD  EF 17-24%  Diuresis- Bumex gtt    Other:   Kidney function   ANA on CKD  GFR 23 on admit         Blood glucose pattern        Significant diabetes-related events over the past 24-72 hours  A1C 8.4%  Fasting B/103/160/170/180  Pre-prandial B-236  A1C 8.4% (up from 8.1% )  Basal: NPH 8 units Q12  Bolus: 5 units humalog/meal  Bumex gtt to oral today  Possibly home tomorrow?       Assessment and Nursing Intervention   Nursing Diagnosis Risk for unstable blood glucose pattern   Nursing Intervention Domain 5250 Decision-making Support   Nursing Interventions Examined current inpatient diabetes/blood glucose control   Explored factors facilitating and impeding inpatient management  Explored corrective strategies with patient and responsible inpatient provider   Informed patient of rational for insulin strategy while hospitalized     Nursing Diagnosis 21436 Ineffective Health Management   Nursing Intervention Domain 5250 Decision-making Support   Nursing Interventions Identified diabetes self-management practices impeding diabetes control  Discussed diabetes survival skills related to  Healthy Plate eating plan; given handouts  Role of physical activity in improving insulin sensitivity and action  Procedure for blood glucose monitoring & options for low-cost products  Medications plan at discharge     Billing Code(s)   [phone]    Before making these care recommendations, I personally reviewed the hospitalization record, including notes, laboratory & diagnostic data and current medications, and examined the patient at the bedside (circumstances permitting) before determining care. More than fifty (50) percent of the time was spent in patient counseling and/or care coordination.   Total minutes: 25    PREET Patel  Diabetes Clinical Nurse Specialist  [facility] Diabetes [facility]  Access via [employer]

## 2023-03-10 ENCOUNTER — TELEPHONE (OUTPATIENT)
Dept: CARDIOLOGY CLINIC | Age: 58
End: 2023-03-10

## 2023-03-10 VITALS
RESPIRATION RATE: 16 BRPM | BODY MASS INDEX: 31.47 KG/M2 | TEMPERATURE: 98.5 F | SYSTOLIC BLOOD PRESSURE: 129 MMHG | OXYGEN SATURATION: 95 % | DIASTOLIC BLOOD PRESSURE: 75 MMHG | WEIGHT: 207.67 LBS | HEART RATE: 83 BPM | HEIGHT: 68 IN

## 2023-03-10 LAB
ANION GAP SERPL CALC-SCNC: 9 MMOL/L (ref 5–15)
BUN SERPL-MCNC: 61 MG/DL (ref 6–20)
BUN/CREAT SERPL: 15 (ref 12–20)
CALCIUM SERPL-MCNC: 9.6 MG/DL (ref 8.5–10.1)
CHLORIDE SERPL-SCNC: 91 MMOL/L (ref 97–108)
CO2 SERPL-SCNC: 32 MMOL/L (ref 21–32)
CREAT SERPL-MCNC: 3.99 MG/DL (ref 0.7–1.3)
GLUCOSE BLD STRIP.AUTO-MCNC: 140 MG/DL (ref 65–117)
GLUCOSE BLD STRIP.AUTO-MCNC: 224 MG/DL (ref 65–117)
GLUCOSE SERPL-MCNC: 251 MG/DL (ref 65–100)
POTASSIUM SERPL-SCNC: 4.7 MMOL/L (ref 3.5–5.1)
SERVICE CMNT-IMP: ABNORMAL
SERVICE CMNT-IMP: ABNORMAL
SODIUM SERPL-SCNC: 132 MMOL/L (ref 136–145)
TSH SERPL DL<=0.05 MIU/L-ACNC: 3 UIU/ML (ref 0.36–3.74)

## 2023-03-10 PROCEDURE — 84443 ASSAY THYROID STIM HORMONE: CPT

## 2023-03-10 PROCEDURE — 74011250637 HC RX REV CODE- 250/637: Performed by: NURSE PRACTITIONER

## 2023-03-10 PROCEDURE — 74011250637 HC RX REV CODE- 250/637: Performed by: INTERNAL MEDICINE

## 2023-03-10 PROCEDURE — 36415 COLL VENOUS BLD VENIPUNCTURE: CPT

## 2023-03-10 PROCEDURE — 74011000250 HC RX REV CODE- 250: Performed by: FAMILY MEDICINE

## 2023-03-10 PROCEDURE — 80048 BASIC METABOLIC PNL TOTAL CA: CPT

## 2023-03-10 PROCEDURE — 82962 GLUCOSE BLOOD TEST: CPT

## 2023-03-10 PROCEDURE — 99233 SBSQ HOSP IP/OBS HIGH 50: CPT | Performed by: NURSE PRACTITIONER

## 2023-03-10 PROCEDURE — 74011250636 HC RX REV CODE- 250/636: Performed by: FAMILY MEDICINE

## 2023-03-10 PROCEDURE — 74011636637 HC RX REV CODE- 636/637: Performed by: CLINICAL NURSE SPECIALIST

## 2023-03-10 PROCEDURE — 74011000250 HC RX REV CODE- 250: Performed by: NURSE PRACTITIONER

## 2023-03-10 PROCEDURE — 74011250637 HC RX REV CODE- 250/637: Performed by: FAMILY MEDICINE

## 2023-03-10 RX ORDER — POTASSIUM CHLORIDE 20 MEQ/1
40 TABLET, EXTENDED RELEASE ORAL 2 TIMES DAILY
Qty: 120 TABLET | Refills: 1 | Status: SHIPPED | OUTPATIENT
Start: 2023-03-10

## 2023-03-10 RX ORDER — BUMETANIDE 1 MG/1
2 TABLET ORAL 2 TIMES DAILY
Status: DISCONTINUED | OUTPATIENT
Start: 2023-03-11 | End: 2023-03-10 | Stop reason: HOSPADM

## 2023-03-10 RX ORDER — POTASSIUM CHLORIDE 20 MEQ/1
40 TABLET, EXTENDED RELEASE ORAL DAILY
Qty: 60 TABLET | Refills: 1 | Status: SHIPPED | OUTPATIENT
Start: 2023-03-10 | End: 2023-03-10

## 2023-03-10 RX ORDER — BUMETANIDE 1 MG/1
2 TABLET ORAL 2 TIMES DAILY
Status: DISCONTINUED | OUTPATIENT
Start: 2023-03-10 | End: 2023-03-10

## 2023-03-10 RX ORDER — BUMETANIDE 2 MG/1
2 TABLET ORAL 2 TIMES DAILY
Qty: 60 TABLET | Refills: 0 | Status: SHIPPED | OUTPATIENT
Start: 2023-03-10 | End: 2023-04-09

## 2023-03-10 RX ADMIN — SODIUM CHLORIDE, PRESERVATIVE FREE 10 ML: 5 INJECTION INTRAVENOUS at 06:47

## 2023-03-10 RX ADMIN — HEPARIN SODIUM 5000 UNITS: 5000 INJECTION INTRAVENOUS; SUBCUTANEOUS at 06:41

## 2023-03-10 RX ADMIN — SPIRONOLACTONE 25 MG: 25 TABLET ORAL at 09:41

## 2023-03-10 RX ADMIN — Medication 2 UNITS: at 11:30

## 2023-03-10 RX ADMIN — Medication 8 UNITS: at 09:41

## 2023-03-10 RX ADMIN — CARVEDILOL 25 MG: 12.5 TABLET, FILM COATED ORAL at 09:41

## 2023-03-10 RX ADMIN — BUMETANIDE 4 MG: 1 TABLET ORAL at 09:41

## 2023-03-10 RX ADMIN — MULTIPLE VITAMINS W/ MINERALS TAB 1 TABLET: TAB at 06:41

## 2023-03-10 RX ADMIN — ALLOPURINOL 50 MG: 100 TABLET ORAL at 09:41

## 2023-03-10 RX ADMIN — HYDRALAZINE HYDROCHLORIDE 50 MG: 50 TABLET, FILM COATED ORAL at 09:41

## 2023-03-10 RX ADMIN — Medication 5 UNITS: at 12:00

## 2023-03-10 RX ADMIN — Medication 10 ML: at 06:47

## 2023-03-10 RX ADMIN — Medication 5 UNITS: at 09:46

## 2023-03-10 RX ADMIN — ISOSORBIDE DINITRATE 30 MG: 20 TABLET ORAL at 09:41

## 2023-03-10 RX ADMIN — POTASSIUM BICARBONATE 40 MEQ: 782 TABLET, EFFERVESCENT ORAL at 09:41

## 2023-03-10 NOTE — PROGRESS NOTES
[facility]   [address] [facility], [address] [facility]  Phone: [phone]   [phone]    [email or web address]     Nephrology Progress Note    Patient Name : Chad Vidal      : 1965     MRN : [id number]  Date of Admission : 3/2/2023  Date of Servive : 03/10/23    CC: Follow up for ANA       Assessment and Plan   ANA on CKD 2/2 CRS  -Suspect progressive CKD from poorly controlled DM, HTN  -vol likely optimized w/ Bumex gtt x 2 days and switched on PO Bumex 4mg BID  -Cr increased to 3.99 from 2.83 -- scale back on Bumex to 2mg BID and no further dose this pm (already got 4mg this am)  -Strict I's and O's   -check BMP tomorrow     CKD 4  -2/2 DM, HTN  -Previously non-nephrotic proteinuria  -UPCR 0.8 mg/mg  -renal US: MRD  -paraproteinemia screen neg  -Baseline creatinine: uncertain  -He will follow-up with Dr. Wilian Ty in 2 weeks after discharge    Acute on chronic HFrEF  Nonischemic CMP, s/p AICD  -Last ECHO EF 20 to 25%  -MX per AHF team  -on Aldactone 25 qd  -only start Entresto once GFR more stable - for now, Hydralazine/Isordil  -cardiac cath 3/7: elevated left and right-sided filling pressures. Nonobstructive coronary artery disease. Limited images were taken due to advanced CKD using only 11 cc of dye to rule out significant proximal coronary disease. Elevated LVEDP     DM 2  -Poorly controlled, A1c 8.4  -Establish care with endocrinology     HTN  -No changes to BP meds until volume status optimized      D/w HF team and primary team.      Interval History:  Breathing well, no leg edema, no VILLARREAL. No cough, fever, n/v. UO 801cc. Review of Systems: A comprehensive review of systems was negative except for that written in the HPI.     Current Medications:   Current Facility-Administered Medications   Medication Dose Route Frequency    bumetanide (BUMEX) tablet 4 mg  4 mg Oral BID    hydrALAZINE (APRESOLINE) tablet 50 mg  50 mg Oral TID    isosorbide dinitrate (ISORDIL) tablet 30 mg  30 mg Oral TID    sodium chloride (NS) flush 5-40 mL  5-40 mL IntraVENous Q8H    sodium chloride (NS) flush 5-40 mL  5-40 mL IntraVENous PRN    insulin NPH (NOVOLIN N, HUMULIN N) injection 8 Units  8 Units SubCUTAneous Q12H    potassium bicarb-citric acid (EFFER-K) tablet 40 mEq  40 mEq Oral BID    spironolactone (ALDACTONE) tablet 25 mg  25 mg Oral DAILY    dextrose 10 % infusion 0-250 mL  0-250 mL IntraVENous PRN    insulin lispro (HUMALOG) injection 5 Units  0.05 Units/kg SubCUTAneous TID WITH MEALS    insulin lispro (HUMALOG) injection   SubCUTAneous AC&HS    traMADoL (ULTRAM) tablet 50 mg  50 mg Oral Q6H PRN    allopurinoL (ZYLOPRIM) tablet 50 mg  50 mg Oral DAILY    carvediloL (COREG) tablet 25 mg  25 mg Oral BID WITH MEALS    ergocalciferol capsule 50,000 Units  50,000 Units Oral Q7D    multivitamin, tx-iron-ca-min (THERA-M w/ IRON) tablet 1 Tablet  1 Tablet Oral ACB    glucose chewable tablet 16 g  4 Tablet Oral PRN    glucagon (GLUCAGEN) injection 1 mg  1 mg IntraMUSCular PRN    dextrose 10 % infusion 0-250 mL  0-250 mL IntraVENous PRN    acetaminophen (TYLENOL) tablet 650 mg  650 mg Oral Q6H PRN    heparin (porcine) injection 5,000 Units  5,000 Units SubCUTAneous Q8H    sodium chloride (NS) flush 5-40 mL  5-40 mL IntraVENous Q8H    sodium chloride (NS) flush 5-40 mL  5-40 mL IntraVENous PRN    polyethylene glycol (MIRALAX) packet 17 g  17 g Oral DAILY PRN    ondansetron (ZOFRAN ODT) tablet 4 mg  4 mg Oral Q8H PRN    Or    ondansetron (ZOFRAN) injection 4 mg  4 mg IntraVENous Q6H PRN      No Known Allergies    Objective:  Vitals:    Vitals:    03/10/23 0354 03/10/23 0551 03/10/23 0650 03/10/23 0805   BP:   104/75 129/75   Pulse: 78 81 85 81   Resp:   18 16   Temp:   98.5 °F (36.9 °C) 98.5 °F (36.9 °C)   SpO2:   95% 95%   Weight:   94.2 kg (207 lb 10.8 oz)    Height:         Intake and Output:  03/10 0701 - 03/10 1900  In: 240 [P.O.:240]  Out: 300 [Urine:300]  03/08 1901 - 03/10 0700  In: 1397.3 [P.O.:1360; I.V.:37.3]  Out: 2551 [Urine:2550]    Physical Examination:    Pt intubated    No    General: NAD,Conversant   Neck:  Supple, no mass  Resp:  Lungs clear , no wheezing , normal respiratory effort  CV:  RRR,  no murmur or rub, no LE edema  GI:  Soft, NT, + BS, no HS megaly  Neurologic:  Non focal  Psych:             AAO x 3 appropriate affect   Skin:  No Rash  :  Zelaya  -  Dialysis Access : NA    []    High complexity decision making was performed  []    Patient is at high-risk of decompensation with multiple organ involvement    Lab Data Personally Reviewed: I have reviewed all the pertinent labs, microbiology data and radiology studies during assessment.     Recent Labs     03/10/23  0951 03/09/23 0250 03/08/23 0425   * 133* 136   K 4.7 4.1 4.0   CL 91* 93* 99   CO2 32 32 33*   * 120* 109*   BUN 61* 36* 38*   CREA 3.99* 2.83* 2.68*   CA 9.6 10.2* 9.6   MG  --  2.2  --    PHOS  --  4.0  --      Recent Labs     03/09/23 0250 03/08/23 0425   WBC 9.2 8.5   HGB 10.8* 9.3*   HCT 34.9* 30.1*    249     No results found for: SDES  No results found for: CULT  Recent Results (from the past 24 hour(s))   GLUCOSE, POC    Collection Time: 03/09/23  4:17 PM   Result Value Ref Range    Glucose (POC) 214 (H) 65 - 117 mg/dL    Performed by AC Gilmore    GLUCOSE, POC    Collection Time: 03/09/23  9:09 PM   Result Value Ref Range    Glucose (POC) 149 (H) 65 - 117 mg/dL    Performed by AC Gilmore    TSH 3RD GENERATION    Collection Time: 03/10/23  3:41 AM   Result Value Ref Range    TSH 3.00 0.36 - 3.74 uIU/mL   GLUCOSE, POC    Collection Time: 03/10/23  6:45 AM   Result Value Ref Range    Glucose (POC) 140 (H) 65 - 117 mg/dL    Performed by Adelina Bliss, BASIC    Collection Time: 03/10/23  9:51 AM   Result Value Ref Range    Sodium 132 (L) 136 - 145 mmol/L    Potassium 4.7 3.5 - 5.1 mmol/L    Chloride 91 (L) 97 - 108 mmol/L    CO2 32 21 - 32 mmol/L    Anion gap 9 5 - 15 mmol/L    Glucose 251 (H) 65 - 100 mg/dL    BUN 61 (H) 6 - 20 MG/DL    Creatinine 3.99 (H) 0.70 - 1.30 MG/DL    BUN/Creatinine ratio 15 12 - 20      eGFR 17 (L) >60 ml/min/1.73m2    Calcium 9.6 8.5 - 10.1 MG/DL           I have reviewed the flowsheets. Chart and Pertinent Notes have been reviewed. No change in PMH ,family and social history from Consult note.       Kasandra Colón [facility]

## 2023-03-10 NOTE — TELEPHONE ENCOUNTER
Pharmacy called to state Effer K not covered by insurance and is also on back order. RN to check with NP for substitution. Per NP cassidy Mitchell to substitute with KlorCon. Requested Prescriptions     Signed Prescriptions Disp Refills    potassium chloride (K-DUR, KLOR-CON M20) 20 mEq tablet 120 Tablet 1     Sig: Take 2 Tablets by mouth two (2) times a day.      Authorizing Provider: Lana Velázquez     Ordering User: Abraham Fu

## 2023-03-10 NOTE — PROGRESS NOTES
[facility] in [place], [place]  Inpatient Progress Note      Patient name: Brennen Mcbride  Patient : 1965  Patient MRN: [id number]  Consulting MD: Nicole Dumont MD  Date of service: 03/10/23    REASON FOR REFERRAL:  Management of acute on chronic systolic heart failure     PLAN OF CARE:  57 y/o male with h/o non-ischemic cardiomyopathy, LVEF 10-15% from 28%, stage C, NYHA class IIIA symptoms and CKD, stage 3 (Cr 2.1-2.47) undergoing optimization of GDMT (taken off nephrotoxic meds through diuresis, to allow renal recovery and up-titrating alternative HF meds)  Most likely etiology of worsening HF is chronic volume overload due to underestimated severity of renal failure +/- possibly inadequately/untreated MINA; evaluation ongoing   Consult placed to cardiology for RHC +/- ischemic evaluation with LHC or NST depending on renal function and recommendation from nephrology and cardiology (last [facility] )  If RHC reveals that heart failure contributes to renal dysfunction, then patient may need initiation of chronic inotropes and completion of inpatient workup for combined heart-kidney transplantation, including GI workup; of note, patient is currently not a candidate for LVAD due to Cr > 1.8      RECOMMENDATIONS:  Continue current medical therapy for heart failure  Continue coreg 25mg twice daily, HR at goal in 70s  Cannot tolerate ACEi/ARB/ARNi in hope for renal recovery  Continue hydralazine 50 mg TID and isordil 30mg TID   Will optimize HTN control as OP when euvolemic   Cannot tolerate SGLT2i due to eGFR < 30  Continue Bumex 4mg BID- d/w nephrology   Continue allopurinol 50mg daily  Cont high dose Vit D supplement x 12 week (2023)  Does not take aspirin   S/p Venofer 200mg   ICD interrogation every 3 months per routine  Heme occult negative   Reinforced low salt diet  Reinforced fluid restriction to 6 x 8oz glasses per day    All other care per primary team, home today, [facility] follow up       At time of discharge plan on the following:  Schedule sleep study     IMPRESSION:  Fatigue  Shortness of breath/VILLARREAL/orthopnea  Volume overload  Acute on chronic systolic heart failure  Stage C, NYHA class IIIA symptoms  Non-ischemic cardiomyopathy, LVEF 28%  S/p ICD  HTN  DM  CKD 4     INTERVAL EVENTS:  No issues overnight  -120s/70s, HR 70-80s  I/O net neg +500ml today   No labs today      LIFE GOALS:  Lifestyle goals reviewed with the patient. Patient's personal goals include: getting back home  Important upcoming milestones or family events: TBD  The patient identifies the following as important for living well: TBD    Patient assessed. High suspicion of sleep apnea due to the following reasons. Will refer for sleep evaluation. [x] Heart Failure  [x] Hypertention  [] Atrial Fibrillation  [x] BMI > 30  [] History of stroke  [x] Diabetes  [] Heavy snoring  [] Witnessed apnea  [] Hypoxemia         HPI:   Briefly, Tabitha Pradhan is a 56 y.o. male with h/o morbid obesity, BMI 35, HTN, HL, DM2, chronic systolic heart failure, stage C, NYHA class IIIA symptoms, non-ischemic cardiomyopathy, LVEF 28% (HF diagnosed around 2002), normal coronaries by [facility] 2015 s/p single lead ICD (2/2015) and worsening renal function, CKD stage 2 and anemia. [facility]. CARDIAC IMAGING:  Echo (3/3/23)    Left Ventricle: The EF by visual approximation is 15 - 20%. Left ventricle is moderately dilated. Severely increased wall thickness. Severe global hypokinesis present. Right Ventricle: Not well visualized. Mitral Valve: Thickened leaflets. Moderate annular calcification of the mitral valve. Left Atrium: Left atrium is dilated. LVEDD 6.1cm     Echo 10/21/22    Left Ventricle: Severely reduced left ventricular systolic function with a visually estimated EF of 20 - 25%. Left ventricle is moderately dilated.  Mild posterior thickening. Normal wall motion. Normal diastolic function. Left Atrium: Left atrium is moderately dilated. Mitral Valve: Mild regurgitation with a centrally directed jet. Tricuspid Valve: Mild regurgitation with a centrally directed jet. Echo (3/9/22)    Study limited to the assessment of ejection fraction. Left Ventricle: Left ventricle is moderately dilated. Mildly increased wall thickness. Severe global hypokinesis present. Calculared ejection fraction is 28% by 3D volume and 31% by 2D Fernandez's biplane. Global longitudinal strain is reduced with a value of -5.8%. Echo (12/6/21)  LV: Calculated LVEF is 28%. Biplane method used to measure ejection fraction. Mildly dilated left ventricle. Mildly to moderately increased wall thickness. Moderate-to-severely and globally reduced systolic function. Moderate (grade 2) left ventricular diastolic dysfunction. LA: Mildly dilated left atrium. Left Atrium volume index is 40 mL/m2. MV: Moderate mitral annular calcification. Very mild mitral valve regurgitation is present. RV: Not well visualized. Pacer/ICD present. Echo (2/17/21)  LV: Calculated LVEF is 29%. Biplane method used to measure ejection fraction. Mildly dilated left ventricle. Mild to moderate hypertrophy. Severely and globally reduced systolic function. Wall motion: normal. Abnormal left ventricular strain. Global longitudinal strain is -8%. Moderate (grade 2) left ventricular diastolic dysfunction. LA: Mildly dilated left atrium. Left Atrium volume index is 40 mL/m2. MV: Moderate mitral annular calcification. Very mild mitral valve regurgitation is present. Echo (8/16/19)  Left Ventricle: Mildly dilated left ventricle. Moderate concentric hypertrophy. Severe global systolic dysfunction. Estimated left ventricular ejection fraction is 21 - 25%. Biplane method used to measure ejection fraction. Left ventricular global hypokinesis. Abnormal left ventricular strain.  Inconclusive left ventricular diastolic function. Left Atrium: Mildly dilated left atrium. Mitral Valve: Moderate mitral annular calcification. Mild mitral valve regurgitation. EKG (3/2/23): SR with 1st degree AVB  EKG (8/5/19) NSR 73bpm QRS 124ms        LHC (2/105) normal cors  3/7/23- non obstructive CAD     ICD interrogation     HEMODYNAMICS:  RHC 3/7/23 CI 2.35, PCWP 34, RA 20     CPEST not done  6MW 3/9/23-  435 feet or 132 meters     OTHER IMAGING:  CXR 3/2/23: enlarged cardiac silhouette; no edema   CT chest not done       PHYSICAL EXAM:  Visit Vitals  /75 (BP 1 Location: Left arm)   Pulse 81   Temp 98.5 °F (36.9 °C)   Resp 16   Ht 5' 8\" (1.727 m)   Wt 207 lb 10.8 oz (94.2 kg)   SpO2 95%   BMI 31.58 kg/m²     Physical Exam  Vitals and nursing note reviewed. Constitutional:       Appearance: He is normal weight. He is not ill-appearing. Cardiovascular:      Rate and Rhythm: Normal rate and regular rhythm. Pulses: Normal pulses. Heart sounds: Normal heart sounds. Pulmonary:      Effort: No respiratory distress. Breath sounds: Normal breath sounds. Abdominal:      General: There is no distension. Palpations: Abdomen is soft. Musculoskeletal:         General: No swelling. Skin:     General: Skin is warm and dry. Neurological:      General: No focal deficit present. Mental Status: He is alert. Psychiatric:         Mood and Affect: Mood normal.        REVIEW OF SYSTEMS:  Review of Systems   Constitutional:  Negative for chills and fever. HENT:  Negative for congestion. Respiratory:  Negative for cough and shortness of breath. Cardiovascular:  Negative for chest pain, palpitations and leg swelling. Gastrointestinal:  Negative for nausea and vomiting. Musculoskeletal:  Negative for falls and myalgias. Neurological:  Negative for dizziness and headaches. Psychiatric/Behavioral:  Negative for depression.         PAST MEDICAL HISTORY:  Past Medical History:   Diagnosis Date    Diabetes ([facility]     Heart failure ([facility]     CHF, cardiomyopathy    Hypertension     ICD (implantable cardioverter-defibrillator) in place     left upper chest       PAST SURGICAL HISTORY:  Past Surgical History:   Procedure Laterality Date    HX HEART CATHETERIZATION  2/2015    x1    HX IMPLANTABLE CARDIOVERTER DEFIBRILLATOR  2/16/2015       FAMILY HISTORY:  Family History   Problem Relation Age of Onset    Diabetes Mother     Hypertension Father     Diabetes Father     Diabetes Brother     Hypertension Brother        SOCIAL HISTORY:  Social History     Socioeconomic History    Marital status:    Tobacco Use    Smoking status: Never    Smokeless tobacco: Never   Vaping Use    Vaping Use: Never used   Substance and Sexual Activity    Alcohol use: Yes     Comment: 1 a month    Drug use: No    Sexual activity: Not Currently       LABORATORY RESULTS:     Labs Latest Ref Rng & Units 3/10/2023 3/9/2023 3/8/2023 3/7/2023 3/6/2023 3/5/2023 3/4/2023   WBC 4.1 - 11.1 K/uL - 9.2 8.5 7.8 8.4 8.5 8.0   RBC 4.10 - 5.70 M/uL - 4.26 3.62(L) 3.54(L) 3.54(L) 3.56(L) 3.49(L)   Hemoglobin 12.1 - 17.0 g/dL - 10. 8(L) 9.3(L) 9.0(L) 8.8(L) 9.1(L) 8.8(L)   Hematocrit 36.6 - 50.3 % - 34. 9(L) 30. 1(L) 30. 1(L) 30. 1(L) 30. 7(L) 28. 9(L)   MCV 80.0 - 99.0 FL - 81.9 83.1 85.0 85.0 86.2 82.8   Platelets 019 - 803 K/uL - 275 249 225 217 213 196   Lymphocytes 12 - 49 % - - - - - - -   Monocytes 5 - 13 % - - - - - - -   Eosinophils 0 - 7 % - - - - - - -   Basophils 0 - 1 % - - - - - - -   Albumin 3.5 - 5.0 g/dL - - - - - - -   Calcium 8.5 - 10.1 MG/DL - 10. 2(H) 9.6 9.1 9.2 9.0 9.2   Glucose 65 - 100 mg/dL - 120(H) 109(H) 160(H) 170(H) 180(H) 78   BUN 6 - 20 MG/DL - 36(H) 38(H) 36(H) 34(H) 31(H) 33(H)   Creatinine 0.70 - 1.30 MG/DL - 2.83(H) 2.68(H) 2.62(H) 2.46(H) 2.43(H) 2.52(H)   Sodium 136 - 145 mmol/L - 133(L) 136 139 139 139 142   Potassium 3.5 - 5.1 mmol/L - 4.1 4.0 4.1 3.9 3.5 3. 2(L)   TSH 0.36 - 3.74 uIU/mL 3.00 - - - - - -   Some recent data might be hidden     Lab Results   Component Value Date/Time    TSH 3.00 03/10/2023 03:41 AM    TSH 2.080 05/05/2022 01:34 PM    TSH 1.05 02/11/2015 01:30 PM       ALLERGY:  No Known Allergies     CURRENT MEDICATIONS:    Current Facility-Administered Medications:     bumetanide (BUMEX) tablet 4 mg, 4 mg, Oral, BID, Stephen, Erika B, NP, 4 mg at 03/10/23 0941    hydrALAZINE (APRESOLINE) tablet 50 mg, 50 mg, Oral, TID, Stephen, Erika B, NP, 50 mg at 03/10/23 0941    isosorbide dinitrate (ISORDIL) tablet 30 mg, 30 mg, Oral, TID, Stephen, Erika B, NP, 30 mg at 03/10/23 0941    sodium chloride (NS) flush 5-40 mL, 5-40 mL, IntraVENous, Q8H, Don aHrdy., NP, 10 mL at 03/10/23 0647    sodium chloride (NS) flush 5-40 mL, 5-40 mL, IntraVENous, PRN, Don Hardy., NP    insulin NPH (NOVOLIN N, HUMULIN N) injection 8 Units, 8 Units, SubCUTAneous, Q12H, Steffanie Fall CNS, 8 Units at 03/10/23 0941    potassium bicarb-citric acid (EFFER-K) tablet 40 mEq, 40 mEq, Oral, BID, Nathan Monzon MD, 40 mEq at 03/10/23 0941    spironolactone (ALDACTONE) tablet 25 mg, 25 mg, Oral, DAILY, Nathan Monzon MD, 25 mg at 03/10/23 0941    dextrose 10 % infusion 0-250 mL, 0-250 mL, IntraVENous, PRN, Steffanie Fall CNS    insulin lispro (HUMALOG) injection 5 Units, 0.05 Units/kg, SubCUTAneous, TID WITH MEALS, Steffanie Fall CNS, 5 Units at 03/10/23 0946    insulin lispro (HUMALOG) injection, , SubCUTAneous, AC&HS, Steffanie Fall CNS, 2 Units at 03/09/23 1742    traMADoL (ULTRAM) tablet 50 mg, 50 mg, Oral, Q6H PRN, Citlalli Guerrero MD, 50 mg at 03/09/23 2348    allopurinoL (ZYLOPRIM) tablet 50 mg, 50 mg, Oral, DAILY, Juvencio Han MD, 50 mg at 03/10/23 0941    carvediloL (COREG) tablet 25 mg, 25 mg, Oral, BID WITH MEALS, Juvencio Han MD, 25 mg at 03/10/23 0941    ergocalciferol capsule 50,000 Units, 50,000 Units, Oral, Q7D, Grace Cordova MD, 50,000 Units at 03/09/23 2117    multivitamin, tx-iron-ca-min (THERA-M w/ IRON) tablet 1 Tablet, 1 Tablet, Oral, ACB, Chrissy Han MD, 1 Tablet at 03/10/23 0641    glucose chewable tablet 16 g, 4 Tablet, Oral, PRN, Chrissy Han MD    glucagon (GLUCAGEN) injection 1 mg, 1 mg, IntraMUSCular, PRN, Juvencio Han MD    dextrose 10 % infusion 0-250 mL, 0-250 mL, IntraVENous, PRN, Chrissy Han MD    acetaminophen (TYLENOL) tablet 650 mg, 650 mg, Oral, Q6H PRN, Cate Chang MD, 650 mg at 03/09/23 0938    heparin (porcine) injection 5,000 Units, 5,000 Units, SubCUTAneous, Q8H, Juvencio Han MD, 5,000 Units at 03/10/23 0641    sodium chloride (NS) flush 5-40 mL, 5-40 mL, IntraVENous, Q8H, Juvencio Han MD, 10 mL at 03/10/23 0647    sodium chloride (NS) flush 5-40 mL, 5-40 mL, IntraVENous, PRN, Chrissy Han MD    polyethylene glycol (MIRALAX) packet 17 g, 17 g, Oral, DAILY PRN, Chrissy Han MD    ondansetron (ZOFRAN ODT) tablet 4 mg, 4 mg, Oral, Q8H PRN **OR** ondansetron (ZOFRAN) injection 4 mg, 4 mg, IntraVENous, Q6H PRN, Cate Chang MD    PATIENT CARE TEAM:  Patient Care Team:  Madeline Oates MD as PCP - General (Family Medicine)  Cherri Gomez MD (Cardiovascular Disease Physician)  Madelyn Bustos MD (Nephrology)  Tejinder Zelaya MD (Internal Medicine Physician)     Thank you for allowing me to participate in this patient's care.     Catarina Deluca NP   [address] [place] [address], [address]  Phone: [phone]

## 2023-03-10 NOTE — TELEPHONE ENCOUNTER
[place] pharmacist with [address] called the office in regards to mutual patient. She wanted to confirm that potassium is being monitored since patient was prescribed spironolactone and potassium by lui Mitchell Np. Advised her that patients potassium is monitored.  Brisa Marquez RN

## 2023-03-10 NOTE — PROGRESS NOTES
Transitions of Care Plan  RUR: 15% - moderate  Clinical Update: stable for discharge  Consults: [facility]; Nephrology  Baseline: independent without DME; resides w wife  Barriers to Discharge: none  Disposition:  Home w family  Estimated Discharge Date: 3/10/23    CM participated in [place] Discharge with bedside nursing and   attending, that includes: Follow up appointments with PCP or specialist  Review of medications  Dispatch health information  Education on symptom management    All questions answered and patient concerns addressed SMAART-E checklist completed and placed in appropriate folder. Medicare pt has received, reviewed, and signed 2nd IM letter informing them of their right to appeal the discharge. Signed copied has been placed on pt bedside chart. Agreeable outside of 4hr window.     Michelle Luz, MPH  Care Manager [facility]  Available via [employer] or

## 2023-03-10 NOTE — DISCHARGE SUMMARY
Discharge Summary       PATIENT ID: Dalila Ann  MRN: [id number]   YOB: 1965    DATE OF ADMISSION: 3/2/2023  5:49 PM    DATE OF DISCHARGE: 3/10/23   PRIMARY CARE PROVIDER: Cherrie Borjas MD     ATTENDING PHYSICIAN: Isabel Byrne  DISCHARGING PROVIDER: Stephenie Cornejo MD    To contact this individual call [phone] and ask the  to page. If unavailable ask to be transferred the Adult Hospitalist Department. CONSULTATIONS: IP CONSULT TO ADVANCED HEART FAILURE  IP CONSULT TO ADVANCED HEART FAILURE  IP CONSULT TO NEPHROLOGY  IP CONSULT TO NEPHROLOGY  IP CONSULT TO CARDIOLOGY    PROCEDURES/SURGERIES: Procedure(s):  LEFT AND RIGHT HEART CATH / CORONARY ANGIOGRAPHY    DISCHARGE DIAGNOSES: Acute on chronic systolic heart failure due to nonischemic cardiomyopathy EF of 15%    Dalila Ann is a 56 y.o. male with past medical history of dilated cardiomyopathy, heart failure reduced ejection fraction, AICD 2015, chronic lower extremity edema, type 2 diabetes mellitus, hypertension, stage III CKD presented to the emergency department chief complaints of shortness of breath, leg and abdominal swelling, and unintentional weight gain. Patient has known history of CHF. He newly had recent increase in Bumex from 1 mg to 2 mg twice daily. Unfortunately, he still had worsening symptoms with peripheral edema and swelling of legs and abdomen, which is severe, without specific alleviating factors, with associated shortness of breath and dyspnea on exertion over the past 5 days. Also fully had 14 pound weight gain. His last 2 echocardiogram 10/21/2022 showed reduced left ventricular ejection fraction 20% to 25%. On arrival emergency department, initial ported vital signs temperature 97.3 °F, /81, heart rate 71, respiratory 18, saturation 90% room air. Abnormal labs included hemoglobin 10.2, blood glucose 120, BUN 43, creatinine 3.06, GFR 23, albumin 3.2, alk phos 6135, proBNP 9738.   Chest x-ray portable showed enlarged cardiopulmonary silhouette. 12 EKG shows sinus rhythm, first-degree AV block, occasional PVCs, premature supraventricular complexes, ST changes anterior lateral leads at 72 bpm.  ED request admission to the hospital service    [address]:   Acute on Chronic Systolic CHF  - improving   Non-ischemic cardiomyopathy EF ~15%   S/p ICD   -Appreciate  AHF team input   - echo : EF  15 - 20%. Left ventricle is moderately dilated. Severely increased wall thickness. Severe global hypokinesis present. - c/w coreg, hydralazine, isordil, aldactone   - Cannot tolerate ACEi/ARB/ARNi in hope for renal recovery  - RHC/ LHC done 3/7 - Elevated left and right-sided filling pressures   - bumex drip initiated 3/7--switch to oral Bumex 2 mg twice daily per nephrology as creatinine was bumping up after starting 4 mg discussed with patient he will go for a follow-up BMP on Tuesday next week at discharge it was reduced to 2 mg twice daily     ANA on CKD Stage 4 -persistent   Contraction alkalosis  - appreciate nephrologist input  - renal US: Increased renal cortical echogenicity bilaterally is likely related to medical  renal disease.  -Continue Bumex 2 mg twice daily on discharge and check BMP on next Tuesday with Dr. Alfredo Galan apology     T2DM with episodes of Hypoglycemia  - hypoglycemia protocols. A1c 8.4   -Cont SSI and NPH 8 U bid, 5 units premeal insulin      Essential hypertension  - c/w coreg, hydralazine       Obesity Body mass index is 34.73 kg/m²   - recommend weight loss        DISCHARGE DIAGNOSES / PLAN:      Discharge home    BMI: Body mass index is 31.58 kg/m². . This patient: Meets criteria for obesity given BMI >/= 30 and < 40 due to excess calories/nutritional. Weight loss and lifestyle modifications should be encouraged as an outpatient.      PENDING TEST RESULTS:   At the time of discharge the following test results are still pending:      ADDITIONAL CARE RECOMMENDATIONS:        NOTIFY YOUR PHYSICIAN FOR ANY OF THE FOLLOWING:   Fever over 101 degrees for 24 hours. Chest pain, shortness of breath, fever, chills, nausea, vomiting, diarrhea, change in mentation, falling, weakness, bleeding. Severe pain or pain not relieved by medications, as well as any other signs or symptoms that you may have questions about. FOLLOW UP APPOINTMENTS:    Follow-up Information       Follow up With Specialties Details Why Contact Info Additional Information    [address] Cardiac Rehabilitation Follow up after discharge to discuss enrollment in outpatient program [address] 40[address] [facility] [address] [place] , [address]. Please arrive 15 minutes prior to your appointment time and you will register in the [place] [address], [address], on the first floor of the [address] Telephone: [phone] Fax: [phone] Driving directions To [facility] and [facility] [place]. Building: Driving WEST on [address], take exit [address] to [employer]. Turn left onto [facility] [address], then turn right into [employer] Parking lot Driving EAST on [address], take exit [address] [facility]. Turn right at the end of the exit ramp. Turn left onto [address], then turn right into [email or web address] lot.     [address] [facility] Cardiology Follow up on 3/13/2023 230pm 200 [facility] [phone]  85 [facility], [facility] [address]. follow up with NEW PCP scheduled for Monday, March 27th, 2023 at 4:00p.m. [address]       Lisa Cavazos MD Nephrology Follow up in 4 day(s) please go for renal function check [facility]  [phone]                  DIET: Cardiac Diet    ACTIVITY: Activity as tolerated    EQUIPMENT needed:     DISCHARGE MEDICATIONS:  Discharge Medication List as of 3/10/2023 11:25 AM        START taking these medications    Details   potassium bicarb-citric acid (EFFER-K) 20 mEq tablet Take 2 Tablets by mouth two (2) times a day., Normal, Disp-180 Tablet, R-1      spironolactone (ALDACTONE) 25 mg tablet Take 1 Tablet by mouth daily. , Normal, Disp-90 Tablet, R-1           CONTINUE these medications which have CHANGED    Details   bumetanide (BUMEX) 2 mg tablet Take 1 Tablet by mouth two (2) times a day for 30 days. , Normal, Disp-60 Tablet, R-0      hydrALAZINE (APRESOLINE) 50 mg tablet Take 1 Tablet by mouth three (3) times daily. , Normal, Disp-180 Tablet, R-2      isosorbide dinitrate (ISORDIL) 30 mg tablet Take 1 Tablet by mouth three (3) times daily. , Normal, Disp-180 Tablet, R-2           CONTINUE these medications which have NOT CHANGED    Details   insulin NPH/insulin regular (NovoLIN 70/30 U-100 Insulin) 100 unit/mL (70-30) injection 26-27 Units by SubCUTAneous route Daily (before dinner). (22 units in the morning; 26-27 units in the evening; evening dose is on a sliding scale), Historical Med      ergocalciferol (ERGOCALCIFEROL) 1,250 mcg (50,000 unit) capsule Take 1 Capsule by mouth every seven (7) days. , Normal, Disp-12 Capsule, R-0      allopurinoL (ZYLOPRIM) 100 mg tablet Take 0.5 Tablets by mouth daily. , Normal, Disp-15 Tablet, R-1      carvediloL (COREG) 25 mg tablet TAKE 2 TABLETS BY MOUTH TWICE DAILY ., Normal, Disp-120 Tablet, R-11      simvastatin (ZOCOR) 40 mg tablet Take 40 mg by mouth nightly., Historical Med      multivitamin with iron tablet Take 1 tablet by mouth daily. , Historical Med           STOP taking these medications       amLODIPine (NORVASC) 10 mg tablet Comments:   Reason for Stopping:               DISPOSITION:   x Home With:   OT  PT  HH  RN       Long term SNF/Inpatient Rehab    Independent/assisted living    Hospice    Other:       PATIENT CONDITION AT DISCHARGE:     Functional status    Poor Deconditioned    x Independent      Cognition   x  Lucid     Forgetful     Dementia      Catheters/lines (plus indication)    Zelaya     PICC     PEG    x None      Code status   x  Full code     DNR      PHYSICAL EXAMINATION AT DISCHARGE:  General:          Alert, cooperative, no distress, appears stated age. HEENT:           Atraumatic, anicteric sclerae, pink conjunctivae                          No oral ulcers, mucosa moist, throat clear, dentition fair  Neck:               Supple, symmetrical  Lungs:             Clear to auscultation bilaterally. No Wheezing or Rhonchi. No rales. Chest wall:      No tenderness  No Accessory muscle use. Heart:              Regular  rhythm,  No  murmur   No edema  Abdomen:        Soft, non-tender. Not distended. Bowel sounds normal  Extremities:     No cyanosis. No clubbing,                            Skin turgor normal, Capillary refill normal  Skin:                Not pale. Not Jaundiced  No rashes   Psych:             Not anxious or agitated. Neurologic:      Alert, moves all extremities, answers questions appropriately and responds to commands       CHRONIC MEDICAL DIAGNOSES:  Problem List as of 3/10/2023 Date Reviewed: 1/13/2023            Codes Class Noted - Resolved    Acute kidney injury superimposed on chronic kidney disease ([facility] ICD-10-CM: N17.9, N18.9  ICD-9-CM: 584.9, 585.9  3/2/2023 - Present        Acute on chronic HFrEF (heart failure with reduced ejection fraction) ([facility] ICD-10-CM: C09.65  ICD-9-CM: 428.23  3/2/2023 - Present        Severe obesity (BMI 35.0-39. 9) ICD-10-CM: E66.01  ICD-9-CM: 278.01  6/15/2018 - Present        Chronic systolic heart failure ([facility] ICD-10-CM: I50.22  ICD-9-CM: 428.22  2/8/2015 - Present        Cardiomyopathy ([facility] ICD-10-CM: I42.9  ICD-9-CM: 425.4  9/26/2014 - Present    Overview Addendum 11/9/2017  7:20 AM by Milly Dailey MD     2002 (approx) diagnosed [place] by echo, neg stress test and started on usual meds.  2007 (approx) fup echo lvef 25%  2/15 cardiac cath, no sig cad  2/15 single lead AICD implant  2/17 lvh, otherwise normal echo             Hypertension ICD-10-CM: I10  ICD-9-CM: 401.9  9/26/2014 - Present        Diabetes mellitus, type II, insulin dependent (HCC) (Chronic) ICD-10-CM: E11.9, Z79.4  ICD-9-CM: 250.00, V58.67  9/26/2014 - Present           Greater than 30  minutes were spent with the patient on counseling and coordination of care    Signed:   Flor Cordova MD  3/10/2023  12:56 PM

## 2023-03-10 NOTE — PROGRESS NOTES
1100: I have reviewed discharge instructions with the patient. The patient verbalized understanding. Current Discharge Medication List        START taking these medications    Details   potassium bicarb-citric acid (EFFER-K) 20 mEq tablet Take 2 Tablets by mouth two (2) times a day. Qty: 180 Tablet, Refills: 1  Start date: 3/9/2023      spironolactone (ALDACTONE) 25 mg tablet Take 1 Tablet by mouth daily. Qty: 90 Tablet, Refills: 1  Start date: 3/10/2023           CONTINUE these medications which have CHANGED    Details   bumetanide (BUMEX) 2 mg tablet Take 2 Tablets by mouth two (2) times a day. Qty: 180 Tablet, Refills: 4  Start date: 3/9/2023      hydrALAZINE (APRESOLINE) 50 mg tablet Take 1 Tablet by mouth three (3) times daily. Qty: 180 Tablet, Refills: 2  Start date: 3/9/2023      isosorbide dinitrate (ISORDIL) 30 mg tablet Take 1 Tablet by mouth three (3) times daily. Qty: 180 Tablet, Refills: 2  Start date: 3/9/2023           CONTINUE these medications which have NOT CHANGED    Details   insulin NPH/insulin regular (NovoLIN 70/30 U-100 Insulin) 100 unit/mL (70-30) injection 26-27 Units by SubCUTAneous route Daily (before dinner). (22 units in the morning; 26-27 units in the evening; evening dose is on a sliding scale)      ergocalciferol (ERGOCALCIFEROL) 1,250 mcg (50,000 unit) capsule Take 1 Capsule by mouth every seven (7) days. Qty: 12 Capsule, Refills: 0      allopurinoL (ZYLOPRIM) 100 mg tablet Take 0.5 Tablets by mouth daily. Qty: 15 Tablet, Refills: 1      carvediloL (COREG) 25 mg tablet TAKE 2 TABLETS BY MOUTH TWICE DAILY . Qty: 120 Tablet, Refills: 11    Associated Diagnoses: Essential hypertension with goal blood pressure less than 130/80; Cardiomyopathy ([address] Chronic systolic heart failure (HCC)      simvastatin (ZOCOR) 40 mg tablet Take 40 mg by mouth nightly. multivitamin with iron tablet Take 1 tablet by mouth daily.            STOP taking these medications       amLODIPine (NORVASC) 10 mg tablet Comments:   Reason for Stopping:              Current Discharge Medication List        START taking these medications    Details   potassium bicarb-citric acid (EFFER-K) 20 mEq tablet Take 2 Tablets by mouth two (2) times a day. Qty: 180 Tablet, Refills: 1  Start date: 3/9/2023      spironolactone (ALDACTONE) 25 mg tablet Take 1 Tablet by mouth daily. Qty: 90 Tablet, Refills: 1  Start date: 3/10/2023           CONTINUE these medications which have CHANGED    Details   bumetanide (BUMEX) 2 mg tablet Take 2 Tablets by mouth two (2) times a day. Qty: 180 Tablet, Refills: 4  Start date: 3/9/2023      hydrALAZINE (APRESOLINE) 50 mg tablet Take 1 Tablet by mouth three (3) times daily. Qty: 180 Tablet, Refills: 2  Start date: 3/9/2023      isosorbide dinitrate (ISORDIL) 30 mg tablet Take 1 Tablet by mouth three (3) times daily. Qty: 180 Tablet, Refills: 2  Start date: 3/9/2023           CONTINUE these medications which have NOT CHANGED    Details   insulin NPH/insulin regular (NovoLIN 70/30 U-100 Insulin) 100 unit/mL (70-30) injection 26-27 Units by SubCUTAneous route Daily (before dinner). (22 units in the morning; 26-27 units in the evening; evening dose is on a sliding scale)      ergocalciferol (ERGOCALCIFEROL) 1,250 mcg (50,000 unit) capsule Take 1 Capsule by mouth every seven (7) days. Qty: 12 Capsule, Refills: 0      allopurinoL (ZYLOPRIM) 100 mg tablet Take 0.5 Tablets by mouth daily. Qty: 15 Tablet, Refills: 1      carvediloL (COREG) 25 mg tablet TAKE 2 TABLETS BY MOUTH TWICE DAILY . Qty: 120 Tablet, Refills: 11    Associated Diagnoses: Essential hypertension with goal blood pressure less than 130/80; Cardiomyopathy ([address] Chronic systolic heart failure (HCC)      simvastatin (ZOCOR) 40 mg tablet Take 40 mg by mouth nightly. multivitamin with iron tablet Take 1 tablet by mouth daily.            STOP taking these medications       amLODIPine (NORVASC) 10 mg tablet Comments: Reason for Stopping:

## 2023-03-10 NOTE — PROGRESS NOTES
Hospital follow-up NEW PCP transitional care appointment has been scheduled with Dr. Christopher Crystal for Monday, March 27th, 2023  at 4:0 p.m. PCP Dr. Mark Steele, retired back in Dec 2022  Pending patient discharge.   Ashley Mays, Care Management Assistant

## 2023-03-10 NOTE — PROGRESS NOTES
1930: Bedside shift change report given to [facility], RNs (oncoming nurse) by Mile Nicole RN (offgoing nurse). Report included the following information SBAR and Kardex. 0730: Bedside shift change report given to Mile Nicole RN (oncoming nurse) by [address] [facility], RNs (offgoing nurse). Report included the following information SBAR and Kardex. Problem: Diabetes Self-Management  Goal: *Disease process and treatment process  Description: Define diabetes and identify own type of diabetes; list 3 options for treating diabetes. Outcome: Progressing Towards Goal  Goal: *Incorporating nutritional management into lifestyle  Description: Describe effect of type, amount and timing of food on blood glucose; list 3 methods for planning meals. Outcome: Progressing Towards Goal  Goal: *Incorporating physical activity into lifestyle  Description: State effect of exercise on blood glucose levels. Outcome: Progressing Towards Goal  Goal: *Developing strategies to promote health/change behavior  Description: Define the ABC's of diabetes; identify appropriate screenings, schedule and personal plan for screenings. Outcome: Progressing Towards Goal  Goal: *Using medications safely  Description: State effect of diabetes medications on diabetes; name diabetes medication taking, action and side effects. Outcome: Progressing Towards Goal  Goal: *Monitoring blood glucose, interpreting and using results  Description: Identify recommended blood glucose targets  and personal targets. Outcome: Progressing Towards Goal  Goal: *Prevention, detection, treatment of acute complications  Description: List symptoms of hyper- and hypoglycemia; describe how to treat low blood sugar and actions for lowering  high blood glucose level.   Outcome: Progressing Towards Goal  Goal: *Prevention, detection and treatment of chronic complications  Description: Define the natural course of diabetes and describe the relationship of blood glucose levels to long term complications of diabetes.   Outcome: Progressing Towards Goal  Goal: *Developing strategies to address psychosocial issues  Description: Describe feelings about living with diabetes; identify support needed and support network  Outcome: Progressing Towards Goal  Goal: *Insulin pump training  Outcome: Progressing Towards Goal  Goal: *Sick day guidelines  Outcome: Progressing Towards Goal  Goal: *Patient Specific Goal (EDIT GOAL, INSERT TEXT)  Outcome: Progressing Towards Goal     Problem: Patient Education: Go to Patient Education Activity  Goal: Patient/Family Education  Outcome: Progressing Towards Goal     Problem: Patient Education: Go to Patient Education Activity  Goal: Patient/Family Education  Outcome: Progressing Towards Goal     Problem: Patient Education: Go to Patient Education Activity  Goal: Patient/Family Education  Outcome: Progressing Towards Goal     Problem: Heart Failure: Day 1  Goal: Off Pathway (Use only if patient is Off Pathway)  Outcome: Progressing Towards Goal  Goal: Activity/Safety  Outcome: Progressing Towards Goal  Goal: Consults, if ordered  Outcome: Progressing Towards Goal  Goal: Diagnostic Test/Procedures  Outcome: Progressing Towards Goal  Goal: Nutrition/Diet  Outcome: Progressing Towards Goal  Goal: Discharge Planning  Outcome: Progressing Towards Goal  Goal: Medications  Outcome: Progressing Towards Goal  Goal: Respiratory  Outcome: Progressing Towards Goal  Goal: Treatments/Interventions/Procedures  Outcome: Progressing Towards Goal  Goal: Psychosocial  Outcome: Progressing Towards Goal  Goal: *Oxygen saturation within defined limits  Outcome: Progressing Towards Goal  Goal: *Hemodynamically stable  Outcome: Progressing Towards Goal  Goal: *Optimal pain control at patient's stated goal  Outcome: Progressing Towards Goal  Goal: *Anxiety reduced or absent  Outcome: Progressing Towards Goal     Problem: Heart Failure: Day 2  Goal: Off Pathway (Use only if patient is Off Pathway)  Outcome: Progressing Towards Goal  Goal: Activity/Safety  Outcome: Progressing Towards Goal  Goal: Consults, if ordered  Outcome: Progressing Towards Goal  Goal: Diagnostic Test/Procedures  Outcome: Progressing Towards Goal  Goal: Nutrition/Diet  Outcome: Progressing Towards Goal  Goal: Discharge Planning  Outcome: Progressing Towards Goal  Goal: Medications  Outcome: Progressing Towards Goal  Goal: Respiratory  Outcome: Progressing Towards Goal  Goal: Treatments/Interventions/Procedures  Outcome: Progressing Towards Goal  Goal: Psychosocial  Outcome: Progressing Towards Goal  Goal: *Oxygen saturation within defined limits  Outcome: Progressing Towards Goal  Goal: *Hemodynamically stable  Outcome: Progressing Towards Goal  Goal: *Optimal pain control at patient's stated goal  Outcome: Progressing Towards Goal  Goal: *Anxiety reduced or absent  Outcome: Progressing Towards Goal  Goal: *Demonstrates progressive activity  Outcome: Progressing Towards Goal     Problem: Heart Failure: Day 3  Goal: Off Pathway (Use only if patient is Off Pathway)  Outcome: Progressing Towards Goal  Goal: Activity/Safety  Outcome: Progressing Towards Goal  Goal: Diagnostic Test/Procedures  Outcome: Progressing Towards Goal  Goal: Nutrition/Diet  Outcome: Progressing Towards Goal  Goal: Discharge Planning  Outcome: Progressing Towards Goal  Goal: Medications  Outcome: Progressing Towards Goal  Goal: Respiratory  Outcome: Progressing Towards Goal  Goal: Treatments/Interventions/Procedures  Outcome: Progressing Towards Goal  Goal: Psychosocial  Outcome: Progressing Towards Goal  Goal: *Oxygen saturation within defined limits  Outcome: Progressing Towards Goal  Goal: *Hemodynamically stable  Outcome: Progressing Towards Goal  Goal: *Optimal pain control at patient's stated goal  Outcome: Progressing Towards Goal  Goal: *Anxiety reduced or absent  Outcome: Progressing Towards Goal  Goal: *Demonstrates progressive activity  Outcome: Progressing Towards Goal     Problem: Heart Failure: Day 4  Goal: Off Pathway (Use only if patient is Off Pathway)  Outcome: Progressing Towards Goal  Goal: Activity/Safety  Outcome: Progressing Towards Goal  Goal: Diagnostic Test/Procedures  Outcome: Progressing Towards Goal  Goal: Nutrition/Diet  Outcome: Progressing Towards Goal  Goal: Discharge Planning  Outcome: Progressing Towards Goal  Goal: Medications  Outcome: Progressing Towards Goal  Goal: Respiratory  Outcome: Progressing Towards Goal  Goal: Treatments/Interventions/Procedures  Outcome: Progressing Towards Goal  Goal: Psychosocial  Outcome: Progressing Towards Goal  Goal: *Oxygen saturation within defined limits  Outcome: Progressing Towards Goal  Goal: *Hemodynamically stable  Outcome: Progressing Towards Goal  Goal: *Optimal pain control at patient's stated goal  Outcome: Progressing Towards Goal  Goal: *Anxiety reduced or absent  Outcome: Progressing Towards Goal  Goal: *Demonstrates progressive activity  Outcome: Progressing Towards Goal

## 2023-03-13 ENCOUNTER — TELEPHONE (OUTPATIENT)
Dept: CARDIOLOGY CLINIC | Age: 58
End: 2023-03-13

## 2023-03-13 DIAGNOSIS — I42.0 DILATED CARDIOMYOPATHY (HCC): Primary | ICD-10-CM

## 2023-03-13 RX ORDER — TRAMADOL HYDROCHLORIDE 50 MG/1
50 TABLET ORAL
Qty: 15 TABLET | Refills: 0 | Status: SHIPPED | OUTPATIENT
Start: 2023-03-13 | End: 2023-03-18

## 2023-03-13 NOTE — TELEPHONE ENCOUNTER
----- Message from Ila Dixon NP sent at 3/13/2023  1:20 PM EDT -----  Regarding: FW: Peripheral Arteryial disease  I will send 5 days of tramadol to his pharmacy but he will need to make an appt with his PCP if he needs further management. Called patient using two patient identifiers. Advised patient on above information per Darwin Medrano NP. Patient verbalized understanding of Instructions. Patient states that he thinks he has an appt on 3/24/23 but will confirm and move if needed.  Dena Aldridge Rn

## 2023-03-17 ENCOUNTER — TELEPHONE (OUTPATIENT)
Dept: CARDIOLOGY CLINIC | Age: 58
End: 2023-03-17

## 2023-03-17 NOTE — TELEPHONE ENCOUNTER
Telephone Call RE:  Appointment reminder     Outcome:     [x] Patient confirmed appointment   [] Patient rescheduled appointment for    [] Unable to reach  [] Left message              [] Other:

## 2023-03-19 ENCOUNTER — APPOINTMENT (OUTPATIENT)
Dept: ULTRASOUND IMAGING | Age: 58
DRG: 683 | End: 2023-03-19
Attending: STUDENT IN AN ORGANIZED HEALTH CARE EDUCATION/TRAINING PROGRAM
Payer: MEDICARE

## 2023-03-19 ENCOUNTER — HOSPITAL ENCOUNTER (INPATIENT)
Age: 58
LOS: 2 days | Discharge: HOME | DRG: 683 | End: 2023-03-21
Attending: STUDENT IN AN ORGANIZED HEALTH CARE EDUCATION/TRAINING PROGRAM | Admitting: FAMILY MEDICINE
Payer: MEDICARE

## 2023-03-19 DIAGNOSIS — E87.5 ACUTE HYPERKALEMIA: ICD-10-CM

## 2023-03-19 DIAGNOSIS — N17.9 AKI (ACUTE KIDNEY INJURY) (HCC): Primary | ICD-10-CM

## 2023-03-19 LAB
ALBUMIN SERPL-MCNC: 3.8 G/DL (ref 3.5–5)
ALBUMIN/GLOB SERPL: 0.7 (ref 1.1–2.2)
ALP SERPL-CCNC: 127 U/L (ref 45–117)
ALT SERPL-CCNC: 61 U/L (ref 12–78)
ANION GAP SERPL CALC-SCNC: 7 MMOL/L (ref 5–15)
APPEARANCE UR: CLEAR
AST SERPL-CCNC: 39 U/L (ref 15–37)
ATRIAL RATE: 75 BPM
BACTERIA URNS QL MICRO: NEGATIVE /HPF
BASOPHILS # BLD: 0.1 K/UL (ref 0–0.1)
BASOPHILS NFR BLD: 1 % (ref 0–1)
BILIRUB SERPL-MCNC: 0.5 MG/DL (ref 0.2–1)
BILIRUB UR QL: NEGATIVE
BUN SERPL-MCNC: 120 MG/DL (ref 6–20)
BUN/CREAT SERPL: 22 (ref 12–20)
CALCIUM SERPL-MCNC: 10.2 MG/DL (ref 8.5–10.1)
CALCULATED P AXIS, ECG09: 64 DEGREES
CALCULATED R AXIS, ECG10: -54 DEGREES
CALCULATED T AXIS, ECG11: 89 DEGREES
CHLORIDE SERPL-SCNC: 99 MMOL/L (ref 97–108)
CHLORIDE UR-SCNC: 61 MMOL/L
CO2 SERPL-SCNC: 22 MMOL/L (ref 21–32)
COLOR UR: NORMAL
COMMENT, HOLDF: NORMAL
CREAT SERPL-MCNC: 5.57 MG/DL (ref 0.7–1.3)
CREAT UR-MCNC: 73.5 MG/DL
CREAT UR-MCNC: 99.5 MG/DL
DIAGNOSIS, 93000: NORMAL
DIFFERENTIAL METHOD BLD: ABNORMAL
EOSINOPHIL # BLD: 0.5 K/UL (ref 0–0.4)
EOSINOPHIL NFR BLD: 6 % (ref 0–7)
EPITH CASTS URNS QL MICRO: NORMAL /LPF
ERYTHROCYTE [DISTWIDTH] IN BLOOD BY AUTOMATED COUNT: 16.6 % (ref 11.5–14.5)
GLOBULIN SER CALC-MCNC: 5.6 G/DL (ref 2–4)
GLUCOSE BLD STRIP.AUTO-MCNC: 157 MG/DL (ref 65–117)
GLUCOSE BLD STRIP.AUTO-MCNC: 193 MG/DL (ref 65–117)
GLUCOSE SERPL-MCNC: 178 MG/DL (ref 65–100)
GLUCOSE UR STRIP.AUTO-MCNC: NEGATIVE MG/DL
HCT VFR BLD AUTO: 41.9 % (ref 36.6–50.3)
HGB BLD-MCNC: 13.1 G/DL (ref 12.1–17)
HGB UR QL STRIP: NEGATIVE
HYALINE CASTS URNS QL MICRO: NORMAL /LPF (ref 0–5)
IMM GRANULOCYTES # BLD AUTO: 0 K/UL (ref 0–0.04)
IMM GRANULOCYTES NFR BLD AUTO: 1 % (ref 0–0.5)
KETONES UR QL STRIP.AUTO: NEGATIVE MG/DL
LEUKOCYTE ESTERASE UR QL STRIP.AUTO: NEGATIVE
LYMPHOCYTES # BLD: 1.1 K/UL (ref 0.8–3.5)
LYMPHOCYTES NFR BLD: 13 % (ref 12–49)
MAGNESIUM SERPL-MCNC: 2.4 MG/DL (ref 1.6–2.4)
MCH RBC QN AUTO: 25.7 PG (ref 26–34)
MCHC RBC AUTO-ENTMCNC: 31.3 G/DL (ref 30–36.5)
MCV RBC AUTO: 82.3 FL (ref 80–99)
MONOCYTES # BLD: 0.9 K/UL (ref 0–1)
MONOCYTES NFR BLD: 10 % (ref 5–13)
NEUTS SEG # BLD: 5.7 K/UL (ref 1.8–8)
NEUTS SEG NFR BLD: 69 % (ref 32–75)
NITRITE UR QL STRIP.AUTO: NEGATIVE
NRBC # BLD: 0 K/UL (ref 0–0.01)
NRBC BLD-RTO: 0 PER 100 WBC
P-R INTERVAL, ECG05: 228 MS
PH UR STRIP: 5 (ref 5–8)
PLATELET # BLD AUTO: 248 K/UL (ref 150–400)
PMV BLD AUTO: 12.2 FL (ref 8.9–12.9)
POTASSIUM SERPL-SCNC: 5.7 MMOL/L (ref 3.5–5.1)
PROT SERPL-MCNC: 9.4 G/DL (ref 6.4–8.2)
PROT UR STRIP-MCNC: NEGATIVE MG/DL
PROT UR-MCNC: 17 MG/DL (ref 0–11.9)
PROT/CREAT UR-RTO: 0.2
Q-T INTERVAL, ECG07: 430 MS
QRS DURATION, ECG06: 122 MS
QTC CALCULATION (BEZET), ECG08: 480 MS
RBC # BLD AUTO: 5.09 M/UL (ref 4.1–5.7)
RBC #/AREA URNS HPF: NORMAL /HPF (ref 0–5)
SAMPLES BEING HELD,HOLD: NORMAL
SERVICE CMNT-IMP: ABNORMAL
SERVICE CMNT-IMP: ABNORMAL
SODIUM SERPL-SCNC: 128 MMOL/L (ref 136–145)
SODIUM UR-SCNC: 36 MMOL/L
SP GR UR REFRACTOMETRY: 1.01 (ref 1–1.03)
UR CULT HOLD, URHOLD: NORMAL
UR CULT HOLD, URHOLD: NORMAL
UROBILINOGEN UR QL STRIP.AUTO: 0.2 EU/DL (ref 0.2–1)
VENTRICULAR RATE, ECG03: 75 BPM
WBC # BLD AUTO: 8.3 K/UL (ref 4.1–11.1)
WBC URNS QL MICRO: NORMAL /HPF (ref 0–4)

## 2023-03-19 PROCEDURE — 36415 COLL VENOUS BLD VENIPUNCTURE: CPT

## 2023-03-19 PROCEDURE — 85025 COMPLETE CBC W/AUTO DIFF WBC: CPT

## 2023-03-19 PROCEDURE — 81001 URINALYSIS AUTO W/SCOPE: CPT

## 2023-03-19 PROCEDURE — 84156 ASSAY OF PROTEIN URINE: CPT

## 2023-03-19 PROCEDURE — 82570 ASSAY OF URINE CREATININE: CPT

## 2023-03-19 PROCEDURE — 74011250637 HC RX REV CODE- 250/637: Performed by: FAMILY MEDICINE

## 2023-03-19 PROCEDURE — 82436 ASSAY OF URINE CHLORIDE: CPT

## 2023-03-19 PROCEDURE — 82962 GLUCOSE BLOOD TEST: CPT

## 2023-03-19 PROCEDURE — 83735 ASSAY OF MAGNESIUM: CPT

## 2023-03-19 PROCEDURE — 74011250636 HC RX REV CODE- 250/636: Performed by: INTERNAL MEDICINE

## 2023-03-19 PROCEDURE — 99285 EMERGENCY DEPT VISIT HI MDM: CPT

## 2023-03-19 PROCEDURE — 93005 ELECTROCARDIOGRAM TRACING: CPT

## 2023-03-19 PROCEDURE — 74011636637 HC RX REV CODE- 636/637: Performed by: FAMILY MEDICINE

## 2023-03-19 PROCEDURE — 80053 COMPREHEN METABOLIC PANEL: CPT

## 2023-03-19 PROCEDURE — 74011000250 HC RX REV CODE- 250: Performed by: FAMILY MEDICINE

## 2023-03-19 PROCEDURE — 76770 US EXAM ABDO BACK WALL COMP: CPT

## 2023-03-19 PROCEDURE — 65270000046 HC RM TELEMETRY

## 2023-03-19 PROCEDURE — 84300 ASSAY OF URINE SODIUM: CPT

## 2023-03-19 PROCEDURE — 74011250637 HC RX REV CODE- 250/637: Performed by: STUDENT IN AN ORGANIZED HEALTH CARE EDUCATION/TRAINING PROGRAM

## 2023-03-19 RX ORDER — ACETAMINOPHEN 325 MG/1
650 TABLET ORAL
Status: DISCONTINUED | OUTPATIENT
Start: 2023-03-19 | End: 2023-03-21 | Stop reason: HOSPADM

## 2023-03-19 RX ORDER — ALLOPURINOL 100 MG/1
50 TABLET ORAL DAILY
Status: DISCONTINUED | OUTPATIENT
Start: 2023-03-20 | End: 2023-03-21 | Stop reason: HOSPADM

## 2023-03-19 RX ORDER — CARVEDILOL 12.5 MG/1
25 TABLET ORAL 2 TIMES DAILY WITH MEALS
Status: DISCONTINUED | OUTPATIENT
Start: 2023-03-20 | End: 2023-03-21 | Stop reason: HOSPADM

## 2023-03-19 RX ORDER — INSULIN LISPRO 100 [IU]/ML
INJECTION, SOLUTION INTRAVENOUS; SUBCUTANEOUS
Status: DISCONTINUED | OUTPATIENT
Start: 2023-03-19 | End: 2023-03-21 | Stop reason: HOSPADM

## 2023-03-19 RX ORDER — HYDRALAZINE HYDROCHLORIDE 50 MG/1
50 TABLET, FILM COATED ORAL 3 TIMES DAILY
Status: DISCONTINUED | OUTPATIENT
Start: 2023-03-19 | End: 2023-03-21 | Stop reason: HOSPADM

## 2023-03-19 RX ORDER — ONDANSETRON 4 MG/1
4 TABLET, ORALLY DISINTEGRATING ORAL
Status: DISCONTINUED | OUTPATIENT
Start: 2023-03-19 | End: 2023-03-21 | Stop reason: HOSPADM

## 2023-03-19 RX ORDER — POLYETHYLENE GLYCOL 3350 17 G/17G
17 POWDER, FOR SOLUTION ORAL DAILY PRN
Status: DISCONTINUED | OUTPATIENT
Start: 2023-03-19 | End: 2023-03-21 | Stop reason: HOSPADM

## 2023-03-19 RX ORDER — ATORVASTATIN CALCIUM 20 MG/1
20 TABLET, FILM COATED ORAL
Status: DISCONTINUED | OUTPATIENT
Start: 2023-03-19 | End: 2023-03-21 | Stop reason: HOSPADM

## 2023-03-19 RX ORDER — SODIUM CHLORIDE 0.9 % (FLUSH) 0.9 %
5-40 SYRINGE (ML) INJECTION EVERY 8 HOURS
Status: DISCONTINUED | OUTPATIENT
Start: 2023-03-19 | End: 2023-03-21 | Stop reason: HOSPADM

## 2023-03-19 RX ORDER — ERGOCALCIFEROL 1.25 MG/1
50000 CAPSULE ORAL
Status: DISCONTINUED | OUTPATIENT
Start: 2023-03-19 | End: 2023-03-21 | Stop reason: HOSPADM

## 2023-03-19 RX ORDER — SODIUM CHLORIDE 0.9 % (FLUSH) 0.9 %
5-40 SYRINGE (ML) INJECTION AS NEEDED
Status: DISCONTINUED | OUTPATIENT
Start: 2023-03-19 | End: 2023-03-21 | Stop reason: HOSPADM

## 2023-03-19 RX ORDER — SODIUM CHLORIDE 9 MG/ML
100 INJECTION, SOLUTION INTRAVENOUS CONTINUOUS
Status: DISPENSED | OUTPATIENT
Start: 2023-03-19 | End: 2023-03-20

## 2023-03-19 RX ORDER — ACETAMINOPHEN 650 MG/1
650 SUPPOSITORY RECTAL
Status: DISCONTINUED | OUTPATIENT
Start: 2023-03-19 | End: 2023-03-21 | Stop reason: HOSPADM

## 2023-03-19 RX ORDER — TRAMADOL HYDROCHLORIDE 50 MG/1
50 TABLET ORAL
Status: DISCONTINUED | OUTPATIENT
Start: 2023-03-19 | End: 2023-03-21 | Stop reason: HOSPADM

## 2023-03-19 RX ORDER — IBUPROFEN 200 MG
4 TABLET ORAL AS NEEDED
Status: DISCONTINUED | OUTPATIENT
Start: 2023-03-19 | End: 2023-03-21 | Stop reason: HOSPADM

## 2023-03-19 RX ORDER — ONDANSETRON 2 MG/ML
4 INJECTION INTRAMUSCULAR; INTRAVENOUS
Status: DISCONTINUED | OUTPATIENT
Start: 2023-03-19 | End: 2023-03-21 | Stop reason: HOSPADM

## 2023-03-19 RX ADMIN — ATORVASTATIN CALCIUM 20 MG: 20 TABLET, FILM COATED ORAL at 22:49

## 2023-03-19 RX ADMIN — Medication 20 UNITS: at 20:48

## 2023-03-19 RX ADMIN — SODIUM ZIRCONIUM CYCLOSILICATE 10 G: 10 POWDER, FOR SUSPENSION ORAL at 18:37

## 2023-03-19 RX ADMIN — Medication 10 ML: at 22:00

## 2023-03-19 RX ADMIN — HYDRALAZINE HYDROCHLORIDE 50 MG: 50 TABLET, FILM COATED ORAL at 22:49

## 2023-03-19 RX ADMIN — Medication 2 UNITS: at 20:25

## 2023-03-19 RX ADMIN — SODIUM CHLORIDE 50 ML/HR: 9 INJECTION, SOLUTION INTRAVENOUS at 17:54

## 2023-03-19 RX ADMIN — ISOSORBIDE DINITRATE 30 MG: 5 TABLET ORAL at 22:49

## 2023-03-19 NOTE — ED NOTES
Verbal shift change report given to [facility] RN (oncoming nurse) by Shiva Lyles RN (offgoing nurse). Report included the following information SBAR, ED Summary, Intake/Output, MAR and Recent Results.

## 2023-03-19 NOTE — CONSULTS
Nephrology Consult Note     [address]                Phone - [phone]   Patient: Chioma Garza   YOB: 1965    Date- 3/19/2023  MRN: [id number]             REASON FOR CONSULTATION: ANA , HYPERKALEMIA  CONSULTING PHYSICIAN:     ADMIT DATE:3/19/2023 PATIENT Janay Fontaine MD     IMPRESSION & PLAN:   ANA DUE TO Prerenal etiology- diuretics use  Hyponatremia- likely due to dehydration- pre renal etiology  Hyperkalemia due to kcl supplement , aldactone, ana  Ckd4  H/o chf  Nonischemic CMP, s/p AICD  DM 2      PLAN-  Start ivf nacl 50 cc/hr  Lokelma x 2 dose  Check urine lytes  Hold diuretics  Hold kcl supplements  Check bladder scan  Check bmp in am  Check urine lytes       Active Problems:    ANA (acute kidney injury) ([facility] [address] (3/19/2023)        [x] High complexity decision making was performed  [x] Patient is at high-risk of decompensation with multiple organ involvement    Subjective:   HPI: Chioma Garza is a 56 y.o. male is admitted with   Chief Complaint   Patient presents with    Abnormal Lab Results    Referral Request    .  He was sent to ER BY DR. Yaneli Morgan for hyperkalemia - k 6.3  In er his k 5.7 , na 128, cr 5.6, bun 120  He is on bumex, kcl40 meq bid, aldactone at home  He denies any sob  No c/o vomiting or diarrhea  His cr 4.0 on 3-10-23  Cr 2.8 in dec 2022      Review of Systems:       A 11 point review of system was performed today. Pertinent positives and negatives are mentioned in the HPI. The reminder of the ROS is negative and noncontributory.     Past Medical History:   Diagnosis Date    Diabetes ([facility] [address]     Heart failure ([facility] [address]     CHF, cardiomyopathy    Hypertension     ICD (implantable cardioverter-defibrillator) in place     left upper chest      Past Surgical History:   Procedure Laterality Date    HX HEART CATHETERIZATION  2/2015    x1    HX IMPLANTABLE CARDIOVERTER DEFIBRILLATOR  2/16/2015      Prior to Admission medications    Medication Sig Start Date End Date Taking? Authorizing Provider   traMADoL (ULTRAM) 50 mg tablet Take 1 Tablet by mouth every eight (8) hours as needed for Pain for up to 5 days. Max Daily Amount: 150 mg. 3/13/23 3/18/23  Stephen Lanora Fuel B, NP   bumetanide (BUMEX) 2 mg tablet Take 1 Tablet by mouth two (2) times a day for 30 days. 3/10/23 4/9/23  Chase Becerra MD   potassium chloride (K-DUR, KLOR-CON M20) 20 mEq tablet Take 2 Tablets by mouth two (2) times a day. 3/10/23   Stephen Lanora Fuel B, NP   hydrALAZINE (APRESOLINE) 50 mg tablet Take 1 Tablet by mouth three (3) times daily. 3/9/23   Stephen, Lanora Fuel B, NP   isosorbide dinitrate (ISORDIL) 30 mg tablet Take 1 Tablet by mouth three (3) times daily. 3/9/23   Stephen Lanora Fuel B, NP   spironolactone (ALDACTONE) 25 mg tablet Take 1 Tablet by mouth daily. 3/10/23   Stephen, Lanora Fuel B, NP   insulin NPH/insulin regular (NovoLIN 70/30 U-100 Insulin) 100 unit/mL (70-30) injection 26-27 Units by SubCUTAneous route Daily (before dinner). (22 units in the morning; 26-27 units in the evening; evening dose is on a sliding scale)    Provider, Historical   ergocalciferol (ERGOCALCIFEROL) 1,250 mcg (50,000 unit) capsule Take 1 Capsule by mouth every seven (7) days. 2/22/23   Birgit Lomas NP   allopurinoL (ZYLOPRIM) 100 mg tablet Take 0.5 Tablets by mouth daily. 2/22/23   Birgit Lomas NP   carvediloL (COREG) 25 mg tablet TAKE 2 TABLETS BY MOUTH TWICE DAILY . 12/29/22   Steffanie Forrest MD   simvastatin (ZOCOR) 40 mg tablet Take 40 mg by mouth nightly. 6/3/21   Provider, Historical   multivitamin with iron tablet Take 1 tablet by mouth daily.     Other, MD Nadege     No Known Allergies   Social History     Tobacco Use    Smoking status: Never    Smokeless tobacco: Never   Substance Use Topics    Alcohol use: Yes     Comment: 1 a month      Family History   Problem Relation Age of Onset    Diabetes Mother     Hypertension Father     Diabetes Father     Diabetes Brother Hypertension Brother         Objective:    Patient Vitals for the past 24 hrs:   Temp Pulse Resp BP SpO2   03/19/23 1204 97.9 °F (36.6 °C) 76 18 (!) 127/91 95 %   03/19/23 1200 -- -- -- -- 98 %   03/19/23 1056 98 °F (36.7 °C) 76 17 123/82 99 %     No intake/output data recorded. Last 3 Recorded Weights in this Encounter    03/19/23 1056   Weight: 92 kg (202 lb 13.2 oz)      Physical Exam:  General:Alert, No distress,   Eyes:No scleral icterus, No conjunctival pallor  Neck:Supple,no mass palpable,no thyromegaly  Lungs:Clears to auscultation Bilaterally, normal respiratory effort  CVS:RRR, S1 S2 normal,  No rub,  Abdomen:Soft, Non tender, No hepatosplenomegaly  Extremities: no LE edema  Skin:No rash or lesions, Warm and DRY   Psych: appropriate affect      :  no carmona  Musculoskeletal : no redness, no joint tenderness  NEURO: Normal Speech, Non focal        CODE STATUS:    Care Plan discussed with:  patient     Chart reviewed. Lab and Radiology Data Personally Reviewed: (see below)    Recent Labs     03/19/23  1123   *   K 5.7*   CL 99   CO2 22   *   CREA 5.57*   *   CA 10.2*   MG 2.4     Recent Labs     03/19/23  1123   WBC 8.3   HGB 13.1   HCT 41.9        No results for input(s): FE, TIBC, PSAT, FERR in the last 72 hours.    Lab Results   Component Value Date/Time    Hemoglobin A1c 8.4 (H) 03/02/2023 09:16 PM    Hemoglobin A1c 8.1 (H) 05/05/2022 01:34 PM    Hemoglobin A1c 10.3 (H) 02/10/2015 03:45 AM      No results found for: CULT  No results found for: MCACR, MCA1, MCA2, MCA3, MCAU, MCAU2, MCALPOCT  Lab Results   Component Value Date/Time    Color YELLOW/STRAW 03/19/2023 11:51 AM    Appearance CLEAR 03/19/2023 11:51 AM    Specific gravity 1.012 03/19/2023 11:51 AM    pH (UA) 5.0 03/19/2023 11:51 AM    Protein Negative 03/19/2023 11:51 AM    Glucose Negative 03/19/2023 11:51 AM    Ketone Negative 03/19/2023 11:51 AM    Bilirubin Negative 03/19/2023 11:51 AM    Urobilinogen 0.2 03/19/2023 11:51 AM    Nitrites Negative 03/19/2023 11:51 AM    Leukocyte Esterase Negative 03/19/2023 11:51 AM    Epithelial cells FEW 03/19/2023 11:51 AM    Bacteria Negative 03/19/2023 11:51 AM    WBC 0-4 03/19/2023 11:51 AM    RBC 0-5 03/19/2023 11:51 AM     Lab Results   Component Value Date/Time    BNP 3,271 (H) 02/09/2015 03:00 AM    NT pro-BNP 10,926 (H) 03/03/2023 04:33 AM    NT pro-BNP 9,738 (H) 03/02/2023 03:58 PM    NT pro-BNP 7,451 (H) 01/13/2023 07:00 PM    NT pro-BNP 8,210 (H) 12/13/2022 07:50 PM    NT pro-BNP 8,701 (H) 11/04/2022 11:01 AM    PROBNP 12,541 (H) 02/28/2023 03:07 PM     US Results (most recent):  Results from Hospital Encounter encounter on 03/19/23    US RETROPERITONEUM COMP    Narrative  INDICATION:  Acute renal insufficiency    COMPARISON:  Ultrasound March 3, 2023    TECHNIQUE: Routine ultrasound images of the kidneys and retroperitoneum were  obtained. FINDINGS: The right kidney measures 8.9 cm in length. The left kidney measures  8.9 cm in length. Both kidneys demonstrate diffusely increased cortical  echogenicity. No renal calculus is seen. There is no hydronephrosis. No renal  cyst or solid mass is evident. The aorta is not well visualized proximally due  to bowel gas. Distally, it is atherosclerotic. The common iliac arteries are  normal in caliber. Visualized portions of the IVC are normal. The urinary  bladder demonstrates no filling defect. Impression  1. No hydronephrosis. 2. Chronic medical renal disease. US RETROPERITONEUM COMP  Narrative: INDICATION:  Acute renal insufficiency    COMPARISON:  Ultrasound March 3, 2023    TECHNIQUE: Routine ultrasound images of the kidneys and retroperitoneum were  obtained. FINDINGS: The right kidney measures 8.9 cm in length. The left kidney measures  8.9 cm in length. Both kidneys demonstrate diffusely increased cortical  echogenicity. No renal calculus is seen. There is no hydronephrosis.  No renal  cyst or solid mass is evident. The aorta is not well visualized proximally due  to bowel gas. Distally, it is atherosclerotic. The common iliac arteries are  normal in caliber. Visualized portions of the IVC are normal. The urinary  bladder demonstrates no filling defect. Impression: 1. No hydronephrosis. 2. Chronic medical renal disease. Prior to Admission Medications   Prescriptions Last Dose Informant Patient Reported? Taking?   allopurinoL (ZYLOPRIM) 100 mg tablet   No No   Sig: Take 0.5 Tablets by mouth daily. bumetanide (BUMEX) 2 mg tablet   No No   Sig: Take 1 Tablet by mouth two (2) times a day for 30 days. carvediloL (COREG) 25 mg tablet   No No   Sig: TAKE 2 TABLETS BY MOUTH TWICE DAILY . ergocalciferol (ERGOCALCIFEROL) 1,250 mcg (50,000 unit) capsule   No No   Sig: Take 1 Capsule by mouth every seven (7) days. hydrALAZINE (APRESOLINE) 50 mg tablet   No No   Sig: Take 1 Tablet by mouth three (3) times daily. insulin NPH/insulin regular (NovoLIN 70/30 U-100 Insulin) 100 unit/mL (70-30) injection   Yes No   Si-27 Units by SubCUTAneous route Daily (before dinner). (22 units in the morning; 26-27 units in the evening; evening dose is on a sliding scale)   isosorbide dinitrate (ISORDIL) 30 mg tablet   No No   Sig: Take 1 Tablet by mouth three (3) times daily. multivitamin with iron tablet   Yes No   Sig: Take 1 tablet by mouth daily. potassium chloride (K-DUR, KLOR-CON M20) 20 mEq tablet   No No   Sig: Take 2 Tablets by mouth two (2) times a day. simvastatin (ZOCOR) 40 mg tablet   Yes No   Sig: Take 40 mg by mouth nightly. spironolactone (ALDACTONE) 25 mg tablet   No No   Sig: Take 1 Tablet by mouth daily. traMADoL (ULTRAM) 50 mg tablet   No No   Sig: Take 1 Tablet by mouth every eight (8) hours as needed for Pain for up to 5 days. Max Daily Amount: 150 mg.       Facility-Administered Medications: None         Imaging:    Medications list Personally Reviewed   [x]      Yes     []               No Thank you for allowing us to participate in the care this patient. We will follow patient with you.   Signed By: Despina Aschoff, MD  [facility]  [facility] [address]  [place], [address]  Phone - [phone]         Fax - [phone] [facility] Office  [address] [place] [address]  Phone - [phone]        Fax - [phone]

## 2023-03-19 NOTE — H&P
History and Physical    Date of Service:  3/19/2023  Primary Care Provider: Uma Weber MD  Source of information: The patient and Chart review    Chief Complaint: Abnormal Lab Results and Referral Request      History of Presenting Illness:   Sharifa Brown is a 56 y.o. male who was sent to the emergency room by his nephrologist due to abnormal labs including hyperkalemia and ANA. Patient had blood work 2 days ago and results were called to the patient today and instructed to go to the emergency room for hyperkalemia and ANA. Patient otherwise denies any acute complaint. Repeat labs here in the ER shows potassium of 5.7 as well as elevated creatinine of 5.5, which is above his baseline. Patient was given [facility] in the ER and hospitalist service requested to admit the patient for further evaluation management. The patient denies any headache, blurry vision, sore throat, trouble swallowing, trouble with speech, chest pain, SOB, cough, fever, chills, N/V/D, abd pain, urinary symptoms, constipation, recent travels, sick contacts, focal or generalized neurological symptoms, falls, injuries, rashes, contact with COVID-19 diagnosed patients, hematemesis, melena, hemoptysis, hematuria, rashes, denies starting any new medications and denies any other concerns or problems besides as mentioned above. REVIEW OF SYSTEMS:  A comprehensive review of systems was negative except for that written in the History of Present Illness. Past Medical History:   Diagnosis Date    Diabetes ([address]     Heart failure ([facility] [address]     CHF, cardiomyopathy    Hypertension     ICD (implantable cardioverter-defibrillator) in place     left upper chest      Past Surgical History:   Procedure Laterality Date    HX HEART CATHETERIZATION  2/2015    x1    HX IMPLANTABLE CARDIOVERTER DEFIBRILLATOR  2/16/2015     Prior to Admission medications    Medication Sig Start Date End Date Taking?  Authorizing Provider   traMADoL Micheline De La Vega) 50 mg tablet Take 1 Tablet by mouth every eight (8) hours as needed for Pain for up to 5 days. Max Daily Amount: 150 mg. 3/13/23 3/18/23  Carly Mitchell NP   bumetanide (BUMEX) 2 mg tablet Take 1 Tablet by mouth two (2) times a day for 30 days. 3/10/23 4/9/23  Debby Manzanares MD   potassium chloride (K-DUR, KLOR-CON M20) 20 mEq tablet Take 2 Tablets by mouth two (2) times a day. 3/10/23   Carly Mitchell NP   hydrALAZINE (APRESOLINE) 50 mg tablet Take 1 Tablet by mouth three (3) times daily. 3/9/23   Carly Mitchell NP   isosorbide dinitrate (ISORDIL) 30 mg tablet Take 1 Tablet by mouth three (3) times daily. 3/9/23   Carly Mitchell NP   spironolactone (ALDACTONE) 25 mg tablet Take 1 Tablet by mouth daily. 3/10/23   Carly Mitchell NP   insulin NPH/insulin regular (NovoLIN 70/30 U-100 Insulin) 100 unit/mL (70-30) injection 26-27 Units by SubCUTAneous route Daily (before dinner). (22 units in the morning; 26-27 units in the evening; evening dose is on a sliding scale)    Provider, Historical   ergocalciferol (ERGOCALCIFEROL) 1,250 mcg (50,000 unit) capsule Take 1 Capsule by mouth every seven (7) days. 2/22/23   Himanshu Naqvi NP   allopurinoL (ZYLOPRIM) 100 mg tablet Take 0.5 Tablets by mouth daily. 2/22/23   Himanshu Naqvi NP   carvediloL (COREG) 25 mg tablet TAKE 2 TABLETS BY MOUTH TWICE DAILY . 12/29/22   Michael Krishna MD   simvastatin (ZOCOR) 40 mg tablet Take 40 mg by mouth nightly. 6/3/21   Provider, Historical   multivitamin with iron tablet Take 1 tablet by mouth daily. Other, MD Nadege     No Known Allergies   Family History   Problem Relation Age of Onset    Diabetes Mother     Hypertension Father     Diabetes Father     Diabetes Brother     Hypertension Brother       Social History:  reports that he has never smoked. He has never used smokeless tobacco. He reports current alcohol use. He reports that he does not use drugs.    Social Determinants of Health Tobacco Use: Low Risk     Smoking Tobacco Use: Never    Smokeless Tobacco Use: Never    Passive Exposure: Not on file   Alcohol Use: Not on file   Financial Resource Strain: Not on file   Food Insecurity: Not on file   Transportation Needs: Not on file   Physical Activity: Not on file   Stress: Not on file   Social Connections: Not on file   Intimate Partner Violence: Not on file   Depression: Not at risk    PHQ-2 Score: 0   Housing Stability: Not on file        Medications were reconciled to the best of my ability given all available resources at the time of admission. Route is PO if not otherwise noted. Family and social history were personally reviewed, all pertinent and relevant details are outlined as above.     Objective:   Visit Vitals  BP (!) 127/91 (BP 1 Location: Left upper arm, BP Patient Position: At rest)   Pulse 76   Temp 97.9 °F (36.6 °C)   Resp 18   Ht 5' 8\" (1.727 m)   Wt 92 kg (202 lb 13.2 oz)   SpO2 95%   BMI 30.84 kg/m²      O2 Device: None (Room air)    PHYSICAL EXAM:   General: Alert x oriented x 3, awake, no acute distress,   HEENT: PEERL, EOMI, moist mucus membranes  Neck: Supple, no JVD, no meningeal signs  Chest: Clear to auscultation bilaterally   CVS: RRR, S1 S2 heard, no murmurs/rubs/gallops  Abd: Soft, non-tender, non-distended, +bowel sounds   Ext: No clubbing, no cyanosis, no edema  Neuro/Psych: Pleasant mood and affect, CN 2-12 grossly intact, sensory grossly within normal limit, Strength 5/5 in all extremities, DTR 1+ x 4  Cap refill: Brisk, less than 3 seconds  Pulses: 2+, symmetric in all extremities  Skin: Warm, dry, without rashes or lesions    Data Review:   I have independently reviewed and interpreted patient's lab and all other diagnostic data    Abnormal Labs Reviewed   PROTEIN/CREATININE RATIO, URINE - Abnormal; Notable for the following components:       Result Value    Protein, urine random 17 (*)     All other components within normal limits   CBC WITH AUTOMATED DIFF - Abnormal; Notable for the following components:    MCH 25.7 (*)     RDW 16.6 (*)     IMMATURE GRANULOCYTES 1 (*)     ABS. EOSINOPHILS 0.5 (*)     All other components within normal limits   METABOLIC PANEL, COMPREHENSIVE - Abnormal; Notable for the following components:    Sodium 128 (*)     Potassium 5.7 (*)     Glucose 178 (*)      (*)     Creatinine 5.57 (*)     BUN/Creatinine ratio 22 (*)     eGFR 11 (*)     Calcium 10.2 (*)     AST (SGOT) 39 (*)     Alk. phosphatase 127 (*)     Protein, total 9.4 (*)     Globulin 5.6 (*)     A-G Ratio 0.7 (*)     All other components within normal limits       All Micro Results       Procedure Component Value Units Date/Time    URINE CULTURE HOLD SAMPLE [[id number]] Collected: 03/19/23 1151    Order Status: Completed Specimen: Urine Updated: 03/19/23 1156     Urine culture hold       Urine on hold in Microbiology dept for 2 days. If unpreserved urine is submitted, it cannot be used for addtional testing after 24 hours, recollection will be required. IMAGING:   US RETROPERITONEUM COMP   Final Result      1. No hydronephrosis. 2. Chronic medical renal disease.               ECG/ECHO:    Results for orders placed or performed during the hospital encounter of 03/19/23   EKG, 12 LEAD, INITIAL   Result Value Ref Range    Ventricular Rate 75 BPM    Atrial Rate 75 BPM    P-R Interval 228 ms    QRS Duration 122 ms    Q-T Interval 430 ms    QTC Calculation (Bezet) 480 ms    Calculated P Axis 64 degrees    Calculated R Axis -54 degrees    Calculated T Axis 89 degrees    Diagnosis       Sinus rhythm with 1st degree AV block  Left anterior fascicular block  Cannot rule out Inferior infarct , age undetermined  Anterolateral infarct (cited on or before 19-MAR-2023)  Abnormal ECG  When compared with ECG of 02-MAR-2023 15:35,  Left anterior fascicular block is now present  Nonspecific T wave abnormality no longer evident in Inferior leads  Nonspecific T wave abnormality no longer evident in Anterior leads  QT has lengthened            Notes reviewed from all clinical/nonclinical/nursing services involved in patient's clinical care. Care coordination discussions were held with appropriate clinical/nonclinical/ nursing providers based on care coordination needs. Assessment:   Given the patient's current clinical presentation, there is a high level of concern for decompensation if discharged from the emergency department. Complex decision making was performed, which includes reviewing the patient's available past medical records, laboratory results, and imaging studies. Active Problems:    ANA (acute kidney injury) ([facility] [address] (3/19/2023)        Plan:     Hyperkalemia  -Likely iatrogenic and likely due to worsening ANA  -Hold potassium supplement, hold Aldactone, received Dominique Butts in the ER  -Monitor on telemetry, repeat labs, further management per nephrology    ANA  -Likely could be induced by diuretics, renal ultrasound negative for hydronephrosis  -Holding Bumex and Aldactone  -Nephrology consulted for further evaluation    Hypertension  -Continue hydralazine, isosorbide, Coreg  -Monitor blood pressure    Diabetes type 2  -Patient takes Humulin 70/30 at home, will convert to NPH  -Start insulin sliding scale coverage, monitor blood sugars    Dyslipidemia  -Continue statin    Estimated length of stay is 2 midnights. DIET: No diet orders on file   ISOLATION PRECAUTIONS: There are currently no Active Isolations  CODE STATUS: Prior   DVT PROPHYLAXIS: SCDs  FUNCTIONAL STATUS PRIOR TO HOSPITALIZATION: Fully active and ambulatory; able to carry on all self-care without restriction. Ambulatory status/function: By self   EARLY MOBILITY ASSESSMENT: Recommend routine ambulation while hospitalized with the assistance of nursing staff  ANTICIPATED DISCHARGE: 24-48 hours.   ANTICIPATED DISPOSITION: Home  EMERGENCY CONTACT/SURROGATE DECISION MAKER:     CRITICAL CARE WAS PERFORMED FOR THIS ENCOUNTER: NO.      Signed By: Dean Granados MD     March 19, 2023         Please note that this dictation may have been completed with Dragon, the computer voice recognition software. Quite often unanticipated grammatical, syntax, homophones, and other interpretive errors are inadvertently transcribed by the computer software. Please disregard these errors. Please excuse any errors that have escaped final proofreading.

## 2023-03-20 ENCOUNTER — APPOINTMENT (OUTPATIENT)
Dept: VASCULAR SURGERY | Age: 58
DRG: 683 | End: 2023-03-20
Attending: INTERNAL MEDICINE
Payer: MEDICARE

## 2023-03-20 LAB
ANION GAP SERPL CALC-SCNC: 8 MMOL/L (ref 5–15)
BUN SERPL-MCNC: 103 MG/DL (ref 6–20)
BUN/CREAT SERPL: 22 (ref 12–20)
CALCIUM SERPL-MCNC: 9.6 MG/DL (ref 8.5–10.1)
CHLORIDE SERPL-SCNC: 103 MMOL/L (ref 97–108)
CO2 SERPL-SCNC: 23 MMOL/L (ref 21–32)
COMMENT, HOLDF: NORMAL
CREAT SERPL-MCNC: 4.67 MG/DL (ref 0.7–1.3)
GLUCOSE BLD STRIP.AUTO-MCNC: 142 MG/DL (ref 65–117)
GLUCOSE BLD STRIP.AUTO-MCNC: 157 MG/DL (ref 65–117)
GLUCOSE BLD STRIP.AUTO-MCNC: 207 MG/DL (ref 65–117)
GLUCOSE BLD STRIP.AUTO-MCNC: 87 MG/DL (ref 65–117)
GLUCOSE SERPL-MCNC: 182 MG/DL (ref 65–100)
POTASSIUM SERPL-SCNC: 5.1 MMOL/L (ref 3.5–5.1)
SAMPLES BEING HELD,HOLD: NORMAL
SERVICE CMNT-IMP: ABNORMAL
SERVICE CMNT-IMP: NORMAL
SODIUM SERPL-SCNC: 134 MMOL/L (ref 136–145)

## 2023-03-20 PROCEDURE — 80048 BASIC METABOLIC PNL TOTAL CA: CPT

## 2023-03-20 PROCEDURE — 65270000046 HC RM TELEMETRY

## 2023-03-20 PROCEDURE — 36415 COLL VENOUS BLD VENIPUNCTURE: CPT

## 2023-03-20 PROCEDURE — 82962 GLUCOSE BLOOD TEST: CPT

## 2023-03-20 PROCEDURE — 93922 UPR/L XTREMITY ART 2 LEVELS: CPT

## 2023-03-20 PROCEDURE — 74011000250 HC RX REV CODE- 250: Performed by: FAMILY MEDICINE

## 2023-03-20 PROCEDURE — 74011250637 HC RX REV CODE- 250/637: Performed by: FAMILY MEDICINE

## 2023-03-20 PROCEDURE — 74011636637 HC RX REV CODE- 636/637: Performed by: FAMILY MEDICINE

## 2023-03-20 RX ADMIN — HYDRALAZINE HYDROCHLORIDE 50 MG: 50 TABLET, FILM COATED ORAL at 08:53

## 2023-03-20 RX ADMIN — ISOSORBIDE DINITRATE 30 MG: 5 TABLET ORAL at 08:53

## 2023-03-20 RX ADMIN — Medication 1 TABLET: at 08:52

## 2023-03-20 RX ADMIN — CARVEDILOL 25 MG: 12.5 TABLET, FILM COATED ORAL at 17:50

## 2023-03-20 RX ADMIN — TRAMADOL HYDROCHLORIDE 50 MG: 50 TABLET, FILM COATED ORAL at 17:58

## 2023-03-20 RX ADMIN — ATORVASTATIN CALCIUM 20 MG: 20 TABLET, FILM COATED ORAL at 22:08

## 2023-03-20 RX ADMIN — CARVEDILOL 25 MG: 12.5 TABLET, FILM COATED ORAL at 08:53

## 2023-03-20 RX ADMIN — ISOSORBIDE DINITRATE 30 MG: 5 TABLET ORAL at 17:50

## 2023-03-20 RX ADMIN — Medication 2 UNITS: at 09:01

## 2023-03-20 RX ADMIN — ALLOPURINOL 50 MG: 100 TABLET ORAL at 08:52

## 2023-03-20 RX ADMIN — Medication 10 ML: at 15:20

## 2023-03-20 RX ADMIN — Medication 3 UNITS: at 12:16

## 2023-03-20 RX ADMIN — Medication 2 UNITS: at 22:10

## 2023-03-20 RX ADMIN — Medication 20 UNITS: at 09:02

## 2023-03-20 RX ADMIN — Medication 10 ML: at 22:17

## 2023-03-20 RX ADMIN — ISOSORBIDE DINITRATE 30 MG: 5 TABLET ORAL at 22:07

## 2023-03-20 RX ADMIN — TRAMADOL HYDROCHLORIDE 50 MG: 50 TABLET, FILM COATED ORAL at 08:53

## 2023-03-20 RX ADMIN — Medication 20 UNITS: at 17:51

## 2023-03-20 RX ADMIN — Medication 10 ML: at 06:55

## 2023-03-20 RX ADMIN — HYDRALAZINE HYDROCHLORIDE 50 MG: 50 TABLET, FILM COATED ORAL at 17:51

## 2023-03-20 NOTE — PROGRESS NOTES
Transition of Care Plan:    RUR:19%  Disposition:home with spouse  Follow up appointments:to be re-scheduled   DME needed:none  Transportation at [facility] or means to access home:   yes     IM Medicare Letter: to be given prior to discharge   [phone]  Discharge Caregiver contacted prior to discharge? no  Care Conference needed?:  no                 Reason for Readmission:   ANA           RUR Score/Risk Level:     19%    PCP: First and Last name:     Name of Practice:    Are you a current patient: Yes/No:    Approximate date of last visit:    Can you participate in a virtual visit with your PCP:     Is a Care Conference indicated:  no      Did you attend your follow up appointment (s): If not, why not:Dr. Elie George for Monday, March 27th, 2023 and one today          Resources/supports as identified by patient/family:   family       Top Challenges facing patient (as identified by patient/family and CM):    none     Finances/Medication cost?       Transportation        Support system or lack thereof? Living arrangements? Self-care/ADLs/Cognition? Current Advanced Directive/Advance Care Plan:             Plan for utilizing home health:   no recommendations             Transition of Care Plan:    Based on readmission, the patient's previous Plan of Care   has been evaluated and/or modified. The current Transition of Care Plan is:           Patient lives with his spouse. Patient is independent in his ADLs/IADLs. Patient does not have any DME. Patient uses [address] on [place]. Care Management Interventions  PCP Verified by CM: Yes  Palliative Care Criteria Met (RRAT>21 & CHF Dx)?: No  Mode of Transport at Discharge:  Other (see comment) (family)  Transition of Care Consult (CM Consult): Discharge Planning  MyChart Signup: No  Discharge Durable Medical Equipment: No  Health Maintenance Reviewed: Yes  Physical Therapy Consult: No  Occupational Therapy Consult: No  Speech Therapy Consult: No  Support Systems: Spouse/Significant Other  Confirm Follow Up Transport: Self  [place] Resource Information Provided?: No  Discharge Location  Patient Expects to be Discharged to[de-identified] Home with family assistance      Readmission Assessment  Number of days since last admission?: 8-30 days  Previous disposition: Home with Family  Who is being interviewed?: Patient  What was the patient's/caregiver's perception as to why they think they needed to return back to the hospital?: Other (Comment)  Did you visit your Primary Care Physician after you left the hospital, before you returned this time?: No  Why weren't you able to visit your PCP?: Other (Comment)  Did you see a specialist, such as Cardiac, Pulmonary, Orthopedic Physician, etc. after you left the hospital?: No  Who advised the patient to return to the hospital?: Self-referral  Does the patient report anything that got in the way of taking their medications?: No  In our efforts to provide the best possible care to you and others like you, can you think of anything that we could have done to help you after you left the hospital the first time, so that you might not have needed to return so soon?: Other (Comment)    1:05 PM  CYN Medley

## 2023-03-20 NOTE — ED NOTES
TRANSFER - OUT REPORT:    Verbal report given to [facility]) on Kaci Carolina  being transferred to (unit) for routine progression of care       Report consisted of patients Situation, Background, Assessment and   Recommendations(SBAR). Information from the following report(s) SBAR, Kardex, and ED Summary was reviewed with the receiving nurse. Lines:   Peripheral IV 03/19/23 Right Antecubital (Active)   Site Assessment Clean, dry, & intact 03/19/23 1125   Phlebitis Assessment 0 03/19/23 1125   Infiltration Assessment 0 03/19/23 1125   Dressing Status Clean, dry, & intact 03/19/23 1125   Dressing Type Transparent 03/19/23 1125   Hub Color/Line Status Pink;Flushed;Patent 03/19/23 1125   Action Taken Blood drawn 03/19/23 1125        Opportunity for questions and clarification was provided.       Patient transported with:  tech

## 2023-03-20 NOTE — PROGRESS NOTES
[facility]   [address] [facility], [address] [place] [address]  Phone: [phone]   [phone]    [email or web address]     Nephrology Progress Note    Patient Name : Christianne Vera      : 1965     MRN : [id number]  Date of Admission : 3/19/2023  Date of Servive : 23    CC: Follow up for ANA       Assessment and Plan   ANA on CKD  -2/2 overdiuresis  -Looks dry on exam.  -Resume IVF and increase NS to 100 cc/h  -Hold Entresto  -Daily labs    CKD stage IV  -2/2 DM, HTN  -Baseline creatinine to be determined: Expect to be 2.5 to 3 mg/dL  -Proteinuria: 0.8 g/g  -Renal US: MRD    Acute on chronic HFrEF  -Nonischemic CMP s/p AICD  -LHC 3/7: Elevated left and right filling pressures, nonocclusive CAD    DM 2  -Poorly controlled    HTN  -Adjust BP meds     Interval History:  Patient is known to me from last admission when had on his initial consult note  His hospital course from last admission was noted. He had significant rise in serum creatinine prior to discharge. He had an outpatient follow-up visit last week that showed a significant worsening of renal function, hyponatremia and hyperkalemia that prompted this admission. Review of Systems: A comprehensive review of systems was negative except for that written in the HPI.     Current Medications:   Current Facility-Administered Medications   Medication Dose Route Frequency    allopurinoL (ZYLOPRIM) tablet 50 mg  50 mg Oral DAILY    carvediloL (COREG) tablet 25 mg  25 mg Oral BID WITH MEALS    ergocalciferol capsule 50,000 Units  50,000 Units Oral Q7D    hydrALAZINE (APRESOLINE) tablet 50 mg  50 mg Oral TID    isosorbide dinitrate (ISORDIL) tablet 30 mg  30 mg Oral TID    multivitamin, tx-iron-ca-min (THERA-M w/ IRON) tablet 1 Tablet  1 Tablet Oral DAILY    atorvastatin (LIPITOR) tablet 20 mg  20 mg Oral QHS    traMADoL (ULTRAM) tablet 50 mg  50 mg Oral Q8H PRN    sodium chloride (NS) flush 5-40 mL  5-40 mL IntraVENous Q8H    sodium chloride (NS) flush 5-40 mL  5-40 mL IntraVENous PRN    acetaminophen (TYLENOL) tablet 650 mg  650 mg Oral Q6H PRN    Or    acetaminophen (TYLENOL) suppository 650 mg  650 mg Rectal Q6H PRN    polyethylene glycol (MIRALAX) packet 17 g  17 g Oral DAILY PRN    ondansetron (ZOFRAN ODT) tablet 4 mg  4 mg Oral Q8H PRN    Or    ondansetron (ZOFRAN) injection 4 mg  4 mg IntraVENous Q6H PRN    insulin lispro (HUMALOG) injection   SubCUTAneous AC&HS    glucose chewable tablet 16 g  4 Tablet Oral PRN    glucagon (GLUCAGEN) injection 1 mg  1 mg IntraMUSCular PRN    dextrose 10 % infusion 0-250 mL  0-250 mL IntraVENous PRN    insulin NPH (NOVOLIN N, HUMULIN N) injection 20 Units  20 Units SubCUTAneous ACB&D      No Known Allergies    Objective:  Vitals:    Vitals:    03/19/23 1746 03/20/23 0601 03/20/23 0653 03/20/23 0909   BP: 131/85 (!) 129/96 124/76 100/69   Pulse: 83 87 87 89   Resp: 18 13 16 18   Temp: 97.6 °F (36.4 °C)  98.7 °F (37.1 °C) 97.8 °F (36.6 °C)   SpO2: 98%   98%   Weight:       Height:         Intake and Output:  No intake/output data recorded. No intake/output data recorded. Physical Examination:        General: NAD,Conversant   Neck:  Supple, no mass  Resp:  Lungs CTA B/L, no wheezing , normal respiratory effort  CV:  RRR,  no murmur or rub, LE edema  GI:  Soft, NT, + BS, no HS megaly  Neurologic:  Non focal  Psych:             AAO x 3 appropriate affect   Skin:  No Rash      []    High complexity decision making was performed  []    Patient is at high-risk of decompensation with multiple organ involvement    Lab Data Personally Reviewed: I have reviewed all the pertinent labs, microbiology data and radiology studies during assessment.     Recent Labs     03/20/23  0321 03/19/23  1123   * 128*   K 5.1 5.7*    99   CO2 23 22   * 178*   * 120*   CREA 4.67* 5.57*   CA 9.6 10.2*   MG  --  2.4   ALB  --  3.8   ALT  --  61     Recent Labs     03/19/23  3028 WBC 8.3   HGB 13.1   HCT 41.9        No results found for: SDES  No results found for: CULT  Recent Results (from the past 24 hour(s))   EKG, 12 LEAD, INITIAL    Collection Time: 03/19/23 11:05 AM   Result Value Ref Range    Ventricular Rate 75 BPM    Atrial Rate 75 BPM    P-R Interval 228 ms    QRS Duration 122 ms    Q-T Interval 430 ms    QTC Calculation (Bezet) 480 ms    Calculated P Axis 64 degrees    Calculated R Axis -54 degrees    Calculated T Axis 89 degrees    Diagnosis       Sinus rhythm with 1st degree AV block  Left anterior fascicular block  Cannot rule out Inferior infarct , age undetermined  Anterolateral infarct (cited on or before 19-MAR-2023)  Abnormal ECG  When compared with ECG of 02-MAR-2023 15:35,  Left anterior fascicular block is now present  Nonspecific T wave abnormality no longer evident in Inferior leads  Nonspecific T wave abnormality no longer evident in Anterior leads  QT has lengthened  Confirmed by Earl Nice ([id number]) on 3/19/2023 5:38:40 PM     CBC WITH AUTOMATED DIFF    Collection Time: 03/19/23 11:23 AM   Result Value Ref Range    WBC 8.3 4.1 - 11.1 K/uL    RBC 5.09 4. 10 - 5.70 M/uL    HGB 13.1 12.1 - 17.0 g/dL    HCT 41.9 36.6 - 50.3 %    MCV 82.3 80.0 - 99.0 FL    MCH 25.7 (L) 26.0 - 34.0 PG    MCHC 31.3 30.0 - 36.5 g/dL    RDW 16.6 (H) 11.5 - 14.5 %    PLATELET 155 700 - 825 K/uL    MPV 12.2 8.9 - 12.9 FL    NRBC 0.0 0  WBC    ABSOLUTE NRBC 0.00 0.00 - 0.01 K/uL    NEUTROPHILS 69 32 - 75 %    LYMPHOCYTES 13 12 - 49 %    MONOCYTES 10 5 - 13 %    EOSINOPHILS 6 0 - 7 %    BASOPHILS 1 0 - 1 %    IMMATURE GRANULOCYTES 1 (H) 0.0 - 0.5 %    ABS. NEUTROPHILS 5.7 1.8 - 8.0 K/UL    ABS. LYMPHOCYTES 1.1 0.8 - 3.5 K/UL    ABS. MONOCYTES 0.9 0.0 - 1.0 K/UL    ABS. EOSINOPHILS 0.5 (H) 0.0 - 0.4 K/UL    ABS. BASOPHILS 0.1 0.0 - 0.1 K/UL    ABS. IMM.  GRANS. 0.0 0.00 - 0.04 K/UL    DF AUTOMATED     METABOLIC PANEL, COMPREHENSIVE    Collection Time: 03/19/23 11:23 AM   Result Value Ref Range    Sodium 128 (L) 136 - 145 mmol/L    Potassium 5.7 (H) 3.5 - 5.1 mmol/L    Chloride 99 97 - 108 mmol/L    CO2 22 21 - 32 mmol/L    Anion gap 7 5 - 15 mmol/L    Glucose 178 (H) 65 - 100 mg/dL     (H) 6 - 20 MG/DL    Creatinine 5.57 (H) 0.70 - 1.30 MG/DL    BUN/Creatinine ratio 22 (H) 12 - 20      eGFR 11 (L) >60 ml/min/1.73m2    Calcium 10.2 (H) 8.5 - 10.1 MG/DL    Bilirubin, total 0.5 0.2 - 1.0 MG/DL    ALT (SGPT) 61 12 - 78 U/L    AST (SGOT) 39 (H) 15 - 37 U/L    Alk. phosphatase 127 (H) 45 - 117 U/L    Protein, total 9.4 (H) 6.4 - 8.2 g/dL    Albumin 3.8 3.5 - 5.0 g/dL    Globulin 5.6 (H) 2.0 - 4.0 g/dL    A-G Ratio 0.7 (L) 1.1 - 2.2     SAMPLES BEING HELD    Collection Time: 03/19/23 11:23 AM   Result Value Ref Range    SAMPLES BEING HELD 1RED 1BLUE     COMMENT        Add-on orders for these samples will be processed based on acceptable specimen integrity and analyte stability, which may vary by analyte. MAGNESIUM    Collection Time: 03/19/23 11:23 AM   Result Value Ref Range    Magnesium 2.4 1.6 - 2.4 mg/dL   PROTEIN/CREATININE RATIO, URINE    Collection Time: 03/19/23 11:51 AM   Result Value Ref Range    Protein, urine random 17 (H) 0.0 - 11.9 mg/dL    Creatinine, urine random 99.50 mg/dL    Protein/Creat.  urine Ratio 0.2     URINALYSIS W/MICROSCOPIC    Collection Time: 03/19/23 11:51 AM   Result Value Ref Range    Color YELLOW/STRAW      Appearance CLEAR CLEAR      Specific gravity 1.012 1.003 - 1.030      pH (UA) 5.0 5.0 - 8.0      Protein Negative NEG mg/dL    Glucose Negative NEG mg/dL    Ketone Negative NEG mg/dL    Bilirubin Negative NEG      Blood Negative NEG      Urobilinogen 0.2 0.2 - 1.0 EU/dL    Nitrites Negative NEG      Leukocyte Esterase Negative NEG      WBC 0-4 0 - 4 /hpf    RBC 0-5 0 - 5 /hpf    Epithelial cells FEW FEW /lpf    Bacteria Negative NEG /hpf    Hyaline cast 0-2 0 - 5 /lpf   URINE CULTURE HOLD SAMPLE    Collection Time: 03/19/23 11:51 AM    Specimen: Urine Result Value Ref Range    Urine culture hold        Urine on hold in Microbiology dept for 2 days. If unpreserved urine is submitted, it cannot be used for addtional testing after 24 hours, recollection will be required. GLUCOSE, POC    Collection Time: 03/19/23  5:46 PM   Result Value Ref Range    Glucose (POC) 157 (H) 65 - 117 mg/dL    Performed by Sania Yarbrough    SODIUM, UR, RANDOM    Collection Time: 03/19/23  5:51 PM   Result Value Ref Range    Sodium,urine random 36 MMOL/L   CREATININE, UR, RANDOM    Collection Time: 03/19/23  5:51 PM   Result Value Ref Range    Creatinine, urine random 73.50 mg/dL   CHLORIDE, URINE RANDOM    Collection Time: 03/19/23  5:51 PM   Result Value Ref Range    Chloride,urine random 61 MMOL/L   URINE CULTURE HOLD SAMPLE    Collection Time: 03/19/23  5:51 PM    Specimen: Urine   Result Value Ref Range    Urine culture hold        Urine on hold in Microbiology dept for 2 days. If unpreserved urine is submitted, it cannot be used for addtional testing after 24 hours, recollection will be required. GLUCOSE, POC    Collection Time: 03/19/23  8:14 PM   Result Value Ref Range    Glucose (POC) 193 (H) 65 - 117 mg/dL    Performed by Kami Lyn RN    METABOLIC PANEL, BASIC    Collection Time: 03/20/23  3:21 AM   Result Value Ref Range    Sodium 134 (L) 136 - 145 mmol/L    Potassium 5.1 3.5 - 5.1 mmol/L    Chloride 103 97 - 108 mmol/L    CO2 23 21 - 32 mmol/L    Anion gap 8 5 - 15 mmol/L    Glucose 182 (H) 65 - 100 mg/dL     (H) 6 - 20 MG/DL    Creatinine 4.67 (H) 0.70 - 1.30 MG/DL    BUN/Creatinine ratio 22 (H) 12 - 20      eGFR 14 (L) >60 ml/min/1.73m2    Calcium 9.6 8.5 - 10.1 MG/DL   SAMPLES BEING HELD    Collection Time: 03/20/23  3:21 AM   Result Value Ref Range    SAMPLES BEING HELD 1RED 1BLUE 1LAV     COMMENT        Add-on orders for these samples will be processed based on acceptable specimen integrity and analyte stability, which may vary by analyte. GLUCOSE, POC    Collection Time: 03/20/23  8:35 AM   Result Value Ref Range    Glucose (POC) 157 (H) 65 - 117 mg/dL    Performed by Petty CADENA            I have reviewed the flowsheets. Chart and Pertinent Notes have been reviewed. No change in PMH ,family and social history from Consult note.       Kasandra Keenan [facility]

## 2023-03-20 NOTE — PROGRESS NOTES
Bedside shift change report given to Mathieu Hughes  (oncoming nurse) by Shahid Pérez  (offgoing nurse). Report included the following information SBAR, Kardex, MAR, and Recent Results.

## 2023-03-20 NOTE — PROGRESS NOTES
Hospitalist Progress Note  Adrienne Enrique MD  Answering service: [phone] from in house phone        Date of Service:  3/20/2023  NAME:  Meagan Wise  :  1965  MRN:  [id number]      Admission Summary:     Patient admitted with hyperkalemia and ANA. Interval history / Subjective:     Patient has no acute complaint. Assessment & Plan:     Hyperkalemia  -Likely iatrogenic and likely due to worsening ANA  -Hold potassium supplement, hold Aldactone, received Lokelma in the ER  -Monitor on telemetry, repeat labs, further management per nephrology     ANA  -Likely could be induced by diuretics, renal ultrasound negative for hydronephrosis  -Holding Bumex and Aldactone  -Renal function is improving, nephrology following     Hypertension  -Continue hydralazine, isosorbide, Coreg  -Monitor blood pressure     Diabetes type 2  -Continue NPH  -Continue insulin sliding scale coverage, monitor blood sugars     Dyslipidemia  -Continue statin     Code status: Full  Prophylaxis: SCDs  Care Plan discussed with: Patient  Anticipated Disposition: 24 to 48 hours     Hospital Problems  Date Reviewed: 2023            Codes Class Noted POA    [facility] ICD-10-CM: N17.9  ICD-9-CM: 584.9  3/19/2023 Unknown               Review of Systems:   A comprehensive review of systems was negative except for that written in the HPI. Vital Signs:    Last 24hrs VS reviewed since prior progress note.  Most recent are:  Visit Vitals  /76   Pulse 87   Temp 98.7 °F (37.1 °C)   Resp 16   Ht 5' 8\" (1.727 m)   Wt 92 kg (202 lb 13.2 oz)   SpO2 98%   BMI 30.84 kg/m²       No intake or output data in the 24 hours ending 23 9827     Physical Examination:     I had a face to face encounter with this patient and independently examined them on 3/20/2023 as outlined below:          General : alert x 3, awake, no acute distress,   HEENT: PEERL, EOMI, moist mucus membrane, TM clear  Neck: supple, no JVD, no meningeal signs  Chest: Clear to auscultation bilaterally   CVS: S1 S2 heard, Capillary refill less than 2 seconds  Abd: soft/ non tender, non distended, BS physiological,   Ext: no clubbing, no cyanosis, no edema, brisk 2+ DP pulses  Neuro/Psych: pleasant mood and affect, CN 2-12 grossly intact, sensory grossly within normal limit, Strength 5/5 in all extremities, DTR 1+ x 4  Skin: warm            Data Review:    Review and/or order of clinical lab test following    I have independently reviewed and interpreted patient's lab and all other diagnostic data    Notes reviewed from all clinical/nonclinical/nursing services involved in patient's clinical care. Care coordination discussions were held with appropriate clinical/nonclinical/ nursing providers based on care coordination needs. Labs:     Recent Labs     03/19/23  1123   WBC 8.3   HGB 13.1   HCT 41.9        Recent Labs     03/20/23  0321 03/19/23  1123   * 128*   K 5.1 5.7*    99   CO2 23 22   * 120*   CREA 4.67* 5.57*   * 178*   CA 9.6 10.2*   MG  --  2.4     Recent Labs     03/19/23  1123   ALT 61   *   TBILI 0.5   TP 9.4*   ALB 3.8   GLOB 5.6*     No results for input(s): INR, PTP, APTT, INREXT in the last 72 hours. No results for input(s): FE, TIBC, PSAT, FERR in the last 72 hours. No results found for: FOL, RBCF   No results for input(s): PH, PCO2, PO2 in the last 72 hours. No results for input(s): CPK, CKNDX, TROIQ in the last 72 hours.     No lab exists for component: CPKMB  Lab Results   Component Value Date/Time    Cholesterol, total 132 05/05/2022 01:34 PM    HDL Cholesterol 39 (L) 05/05/2022 01:34 PM    LDL, calculated 79 05/05/2022 01:34 PM    LDL, calculated 100.8 (H) 02/11/2015 01:30 PM    Triglyceride 68 05/05/2022 01:34 PM    CHOL/HDL Ratio 4.5 02/11/2015 01:30 PM     Lab Results   Component Value Date/Time    Glucose (POC) 193 (H) 03/19/2023 08:14 PM    Glucose (POC) 157 (H) 03/19/2023 05:46 PM    Glucose (POC) 224 (H) 03/10/2023 11:30 AM    Glucose (POC) 140 (H) 03/10/2023 06:45 AM    Glucose (POC) 149 (H) 03/09/2023 09:09 PM     Lab Results   Component Value Date/Time    Color YELLOW/STRAW 03/19/2023 11:51 AM    Appearance CLEAR 03/19/2023 11:51 AM    Specific gravity 1.012 03/19/2023 11:51 AM    pH (UA) 5.0 03/19/2023 11:51 AM    Protein Negative 03/19/2023 11:51 AM    Glucose Negative 03/19/2023 11:51 AM    Ketone Negative 03/19/2023 11:51 AM    Bilirubin Negative 03/19/2023 11:51 AM    Urobilinogen 0.2 03/19/2023 11:51 AM    Nitrites Negative 03/19/2023 11:51 AM    Leukocyte Esterase Negative 03/19/2023 11:51 AM    Epithelial cells FEW 03/19/2023 11:51 AM    Bacteria Negative 03/19/2023 11:51 AM    WBC 0-4 03/19/2023 11:51 AM    RBC 0-5 03/19/2023 11:51 AM         Medications Reviewed:     Current Facility-Administered Medications   Medication Dose Route Frequency    allopurinoL (ZYLOPRIM) tablet 50 mg  50 mg Oral DAILY    carvediloL (COREG) tablet 25 mg  25 mg Oral BID WITH MEALS    ergocalciferol capsule 50,000 Units  50,000 Units Oral Q7D    hydrALAZINE (APRESOLINE) tablet 50 mg  50 mg Oral TID    isosorbide dinitrate (ISORDIL) tablet 30 mg  30 mg Oral TID    multivitamin, tx-iron-ca-min (THERA-M w/ IRON) tablet 1 Tablet  1 Tablet Oral DAILY    atorvastatin (LIPITOR) tablet 20 mg  20 mg Oral QHS    traMADoL (ULTRAM) tablet 50 mg  50 mg Oral Q8H PRN    sodium chloride (NS) flush 5-40 mL  5-40 mL IntraVENous Q8H    sodium chloride (NS) flush 5-40 mL  5-40 mL IntraVENous PRN    acetaminophen (TYLENOL) tablet 650 mg  650 mg Oral Q6H PRN    Or    acetaminophen (TYLENOL) suppository 650 mg  650 mg Rectal Q6H PRN    polyethylene glycol (MIRALAX) packet 17 g  17 g Oral DAILY PRN    ondansetron (ZOFRAN ODT) tablet 4 mg  4 mg Oral Q8H PRN    Or    ondansetron (ZOFRAN) injection 4 mg  4 mg IntraVENous Q6H PRN    insulin lispro (HUMALOG) injection   SubCUTAneous AC&HS    glucose chewable tablet 16 g  4 Tablet Oral PRN    glucagon (GLUCAGEN) injection 1 mg  1 mg IntraMUSCular PRN    dextrose 10 % infusion 0-250 mL  0-250 mL IntraVENous PRN    insulin NPH (NOVOLIN N, HUMULIN N) injection 20 Units  20 Units SubCUTAneous ACB&D    0.9% sodium chloride infusion  50 mL/hr IntraVENous CONTINUOUS     ______________________________________________________________________  EXPECTED LENGTH OF STAY: - - -  ACTUAL LENGTH OF STAY:          1                 Ky Ross MD

## 2023-03-20 NOTE — PROGRESS NOTES
Patient resting in bed, alert and oriented. Problem: Diabetes Self-Management  Goal: *Disease process and treatment process  Description: Define diabetes and identify own type of diabetes; list 3 options for treating diabetes. Outcome: Progressing Towards Goal  Goal: *Incorporating nutritional management into lifestyle  Description: Describe effect of type, amount and timing of food on blood glucose; list 3 methods for planning meals. Outcome: Progressing Towards Goal  Goal: *Incorporating physical activity into lifestyle  Description: State effect of exercise on blood glucose levels. Outcome: Progressing Towards Goal  Goal: *Developing strategies to promote health/change behavior  Description: Define the ABC's of diabetes; identify appropriate screenings, schedule and personal plan for screenings. Outcome: Progressing Towards Goal  Goal: *Using medications safely  Description: State effect of diabetes medications on diabetes; name diabetes medication taking, action and side effects. Outcome: Progressing Towards Goal  Goal: *Monitoring blood glucose, interpreting and using results  Description: Identify recommended blood glucose targets  and personal targets. Outcome: Progressing Towards Goal  Goal: *Prevention, detection, treatment of acute complications  Description: List symptoms of hyper- and hypoglycemia; describe how to treat low blood sugar and actions for lowering  high blood glucose level. Outcome: Progressing Towards Goal  Goal: *Prevention, detection and treatment of chronic complications  Description: Define the natural course of diabetes and describe the relationship of blood glucose levels to long term complications of diabetes.   Outcome: Progressing Towards Goal  Goal: *Developing strategies to address psychosocial issues  Description: Describe feelings about living with diabetes; identify support needed and support network  Outcome: Progressing Towards Goal  Goal: *Insulin pump training  Outcome: Progressing Towards Goal  Goal: *Sick day guidelines  Outcome: Progressing Towards Goal  Goal: *Patient Specific Goal (EDIT GOAL, INSERT TEXT)  Outcome: Progressing Towards Goal     Problem: Patient Education: Go to Patient Education Activity  Goal: Patient/Family Education  Outcome: Progressing Towards Goal

## 2023-03-21 VITALS
SYSTOLIC BLOOD PRESSURE: 114 MMHG | WEIGHT: 202.82 LBS | HEIGHT: 68 IN | RESPIRATION RATE: 18 BRPM | HEART RATE: 84 BPM | BODY MASS INDEX: 30.74 KG/M2 | TEMPERATURE: 98.4 F | DIASTOLIC BLOOD PRESSURE: 75 MMHG | OXYGEN SATURATION: 95 %

## 2023-03-21 LAB
ALBUMIN SERPL-MCNC: 3.3 G/DL (ref 3.5–5)
ANION GAP SERPL CALC-SCNC: 8 MMOL/L (ref 5–15)
ANION GAP SERPL CALC-SCNC: 8 MMOL/L (ref 5–15)
BUN SERPL-MCNC: 86 MG/DL (ref 6–20)
BUN SERPL-MCNC: 88 MG/DL (ref 6–20)
BUN/CREAT SERPL: 23 (ref 12–20)
BUN/CREAT SERPL: 23 (ref 12–20)
CALCIUM SERPL-MCNC: 9.4 MG/DL (ref 8.5–10.1)
CALCIUM SERPL-MCNC: 9.5 MG/DL (ref 8.5–10.1)
CHLORIDE SERPL-SCNC: 108 MMOL/L (ref 97–108)
CHLORIDE SERPL-SCNC: 108 MMOL/L (ref 97–108)
CO2 SERPL-SCNC: 20 MMOL/L (ref 21–32)
CO2 SERPL-SCNC: 20 MMOL/L (ref 21–32)
CREAT SERPL-MCNC: 3.74 MG/DL (ref 0.7–1.3)
CREAT SERPL-MCNC: 3.78 MG/DL (ref 0.7–1.3)
GLUCOSE BLD STRIP.AUTO-MCNC: 151 MG/DL (ref 65–117)
GLUCOSE BLD STRIP.AUTO-MCNC: 96 MG/DL (ref 65–117)
GLUCOSE SERPL-MCNC: 107 MG/DL (ref 65–100)
GLUCOSE SERPL-MCNC: 107 MG/DL (ref 65–100)
LEFT ABI: 1.46
LEFT ARM BP: 137 MMHG
LEFT POSTERIOR TIBIAL: 200 MMHG
PHOSPHATE SERPL-MCNC: 4.6 MG/DL (ref 2.6–4.7)
POTASSIUM SERPL-SCNC: 4.5 MMOL/L (ref 3.5–5.1)
POTASSIUM SERPL-SCNC: 4.8 MMOL/L (ref 3.5–5.1)
RIGHT ABI: 1.22
RIGHT ARM BP: 132 MMHG
RIGHT POSTERIOR TIBIAL: 167 MMHG
SERVICE CMNT-IMP: ABNORMAL
SERVICE CMNT-IMP: NORMAL
SODIUM SERPL-SCNC: 136 MMOL/L (ref 136–145)
SODIUM SERPL-SCNC: 136 MMOL/L (ref 136–145)
VAS LEFT DORSALIS PEDIS BP: 157 MMHG
VAS RIGHT DORSALIS PEDIS BP: 152 MMHG

## 2023-03-21 PROCEDURE — 80048 BASIC METABOLIC PNL TOTAL CA: CPT

## 2023-03-21 PROCEDURE — 36415 COLL VENOUS BLD VENIPUNCTURE: CPT

## 2023-03-21 PROCEDURE — 74011250637 HC RX REV CODE- 250/637: Performed by: FAMILY MEDICINE

## 2023-03-21 PROCEDURE — 80069 RENAL FUNCTION PANEL: CPT

## 2023-03-21 PROCEDURE — 82962 GLUCOSE BLOOD TEST: CPT

## 2023-03-21 PROCEDURE — 74011636637 HC RX REV CODE- 636/637: Performed by: FAMILY MEDICINE

## 2023-03-21 PROCEDURE — 74011000250 HC RX REV CODE- 250: Performed by: FAMILY MEDICINE

## 2023-03-21 RX ADMIN — Medication 1 TABLET: at 09:04

## 2023-03-21 RX ADMIN — Medication 20 UNITS: at 09:06

## 2023-03-21 RX ADMIN — TRAMADOL HYDROCHLORIDE 50 MG: 50 TABLET, FILM COATED ORAL at 01:34

## 2023-03-21 RX ADMIN — CARVEDILOL 25 MG: 12.5 TABLET, FILM COATED ORAL at 09:05

## 2023-03-21 RX ADMIN — HYDRALAZINE HYDROCHLORIDE 50 MG: 50 TABLET, FILM COATED ORAL at 09:05

## 2023-03-21 RX ADMIN — Medication 10 ML: at 06:16

## 2023-03-21 RX ADMIN — ALLOPURINOL 50 MG: 100 TABLET ORAL at 09:05

## 2023-03-21 RX ADMIN — TRAMADOL HYDROCHLORIDE 50 MG: 50 TABLET, FILM COATED ORAL at 09:05

## 2023-03-21 RX ADMIN — ISOSORBIDE DINITRATE 30 MG: 5 TABLET ORAL at 09:04

## 2023-03-21 RX ADMIN — Medication 2 UNITS: at 12:51

## 2023-03-21 NOTE — NURSE NAVIGATOR
Heart Failure Nurse Navigator rounds completed. HF NN provided introduction to self and nurse navigator role. Patient has Living With Heart Failure booklet magnet. Patient given HF Support Group flyer. He is interested in the joining the support group. He provided his e-mail address. Reviewed daily weights which Mr. Robert Hunter states he does. Reviewed low salt diet. He states understand he needs to follow a low salt diet. He states he is going to work on this. Both he and his wife do the cooking at home. He rarely goes out to eat. Reviewed the high salt content of many foods. Reviewed signs and symptoms of heart failure. Advised patient that follow up appointment will be scheduled and placed on Discharge Instructions prior to discharge. Patient made aware he has an appointment with the [facility] on 3/27/23. He states Dr. Coreen Esquivel is going to schedule him for a nephrology appointment on Friday. Will continue to follow until discharge.       Time spent with patient discussing HF education:  20

## 2023-03-21 NOTE — PROGRESS NOTES
Spiritual Care Partner Volunteer visited patient at [facility] in [facility] SURG on 3/21/2023   Documented by:    Rev. Gregg Hill MDiv, [facility]  Staff

## 2023-03-21 NOTE — PROGRESS NOTES
[facility]   [address] [facility], [id number] [place] [address]  Phone: [phone]   [phone]    [email or web address]     Nephrology Progress Note    Patient Name : Jeff Forte      : 1965     MRN : [id number]  Date of Admission : 3/19/2023  Date of Servive : 23    CC: Follow up for ANA       Assessment and Plan   ANA on CKD  -2/2 overdiuresis  -Resolving  -DC home on Entresto and no diuretics  -Follow-up with me in office on Friday    CKD stage IV  -2/2 DM, HTN  -Baseline creatinine to be determined: Expect to be 2.5 to 3 mg/dL  -Proteinuria: 0.8 g/g  -Renal US: MRD    Acute on chronic HFrEF  -Nonischemic CMP s/p AICD  -LHC 3/7: Elevated left and right filling pressures, nonocclusive CAD    DM 2  -Poorly controlled    HTN  -Adjust BP meds     Interval History:  Seen and examined earlier this morning. Renal function improving. He did not receive IV fluids as ordered as orders . Denies any new symptoms        Review of Systems: A comprehensive review of systems was negative except for that written in the HPI.     Current Medications:   Current Facility-Administered Medications   Medication Dose Route Frequency    allopurinoL (ZYLOPRIM) tablet 50 mg  50 mg Oral DAILY    carvediloL (COREG) tablet 25 mg  25 mg Oral BID WITH MEALS    ergocalciferol capsule 50,000 Units  50,000 Units Oral Q7D    hydrALAZINE (APRESOLINE) tablet 50 mg  50 mg Oral TID    isosorbide dinitrate (ISORDIL) tablet 30 mg  30 mg Oral TID    multivitamin, tx-iron-ca-min (THERA-M w/ IRON) tablet 1 Tablet  1 Tablet Oral DAILY    atorvastatin (LIPITOR) tablet 20 mg  20 mg Oral QHS    traMADoL (ULTRAM) tablet 50 mg  50 mg Oral Q8H PRN    sodium chloride (NS) flush 5-40 mL  5-40 mL IntraVENous Q8H    sodium chloride (NS) flush 5-40 mL  5-40 mL IntraVENous PRN    acetaminophen (TYLENOL) tablet 650 mg  650 mg Oral Q6H PRN    Or    acetaminophen (TYLENOL) suppository 650 mg  650 mg Rectal Q6H PRN    polyethylene glycol (MIRALAX) packet 17 g  17 g Oral DAILY PRN    ondansetron (ZOFRAN ODT) tablet 4 mg  4 mg Oral Q8H PRN    Or    ondansetron (ZOFRAN) injection 4 mg  4 mg IntraVENous Q6H PRN    insulin lispro (HUMALOG) injection   SubCUTAneous AC&HS    glucose chewable tablet 16 g  4 Tablet Oral PRN    glucagon (GLUCAGEN) injection 1 mg  1 mg IntraMUSCular PRN    dextrose 10 % infusion 0-250 mL  0-250 mL IntraVENous PRN    insulin NPH (NOVOLIN N, HUMULIN N) injection 20 Units  20 Units SubCUTAneous ACB&D      No Known Allergies    Objective:  Vitals:    Vitals:    03/21/23 0341 03/21/23 0400 03/21/23 0544 03/21/23 1000   BP:  107/72     Pulse: 90 90 87 84   Resp:  18     Temp:  97.9 °F (36.6 °C)     SpO2:  98%     Weight:       Height:         Intake and Output:  03/21 0701 - 03/21 1900  In: 50 [P.O.:50]  Out: -   No intake/output data recorded. Physical Examination:        General: NAD,Conversant   Neck:  Supple, no mass  Resp:  Lungs CTA B/L, no wheezing , normal respiratory effort  CV:  RRR,  no murmur or rub, LE edema  GI:  Soft, NT, + BS, no HS megaly  Neurologic:  Non focal  Psych:             AAO x 3 appropriate affect   Skin:  No Rash      []    High complexity decision making was performed  []    Patient is at high-risk of decompensation with multiple organ involvement    Lab Data Personally Reviewed: I have reviewed all the pertinent labs, microbiology data and radiology studies during assessment.     Recent Labs     03/21/23  0601 03/21/23  0600 03/20/23  0321 03/19/23  1123    136 134* 128*   K 4.8 4.5 5.1 5.7*    108 103 99   CO2 20* 20* 23 22   * 107* 182* 178*   BUN 88* 86* 103* 120*   CREA 3.78* 3.74* 4.67* 5.57*   CA 9.4 9.5 9.6 10.2*   MG  --   --   --  2.4   PHOS  --  4.6  --   --    ALB  --  3.3*  --  3.8   ALT  --   --   --  61       Recent Labs     03/19/23  1123   WBC 8.3   HGB 13.1   HCT 41.9          No results found for: SDES  No results found for: CULT  Recent Results (from the past 24 hour(s))   GLUCOSE, POC    Collection Time: 03/20/23 11:21 AM   Result Value Ref Range    Glucose (POC) 207 (H) 65 - 117 mg/dL    Performed by Laquita Chowdhury   PCT    ANKLE BRACHIAL INDEX    Collection Time: 03/20/23  2:00 PM   Result Value Ref Range    Left dorsalis pedis  mmHg    Left posterior tibial 200 mmHg    Right dorsalis pedis  mmHg    Right posterior tibial 167 mmHg    Left arm  mmHg    Right arm  mmHg    Left LORENA 1.46     Right LORENA 1.22    GLUCOSE, POC    Collection Time: 03/20/23  5:39 PM   Result Value Ref Range    Glucose (POC) 87 65 - 117 mg/dL    Performed by Chilo Pascal    GLUCOSE, POC    Collection Time: 03/20/23  9:07 PM   Result Value Ref Range    Glucose (POC) 142 (H) 65 - 117 mg/dL    Performed by Guilherme Sanchez  PCT    RENAL FUNCTION PANEL    Collection Time: 03/21/23  6:00 AM   Result Value Ref Range    Sodium 136 136 - 145 mmol/L    Potassium 4.5 3.5 - 5.1 mmol/L    Chloride 108 97 - 108 mmol/L    CO2 20 (L) 21 - 32 mmol/L    Anion gap 8 5 - 15 mmol/L    Glucose 107 (H) 65 - 100 mg/dL    BUN 86 (H) 6 - 20 MG/DL    Creatinine 3.74 (H) 0.70 - 1.30 MG/DL    BUN/Creatinine ratio 23 (H) 12 - 20      eGFR 18 (L) >60 ml/min/1.73m2    Calcium 9.5 8.5 - 10.1 MG/DL    Phosphorus 4.6 2.6 - 4.7 MG/DL    Albumin 3.3 (L) 3.5 - 5.0 g/dL   METABOLIC PANEL, BASIC    Collection Time: 03/21/23  6:01 AM   Result Value Ref Range    Sodium 136 136 - 145 mmol/L    Potassium 4.8 3.5 - 5.1 mmol/L    Chloride 108 97 - 108 mmol/L    CO2 20 (L) 21 - 32 mmol/L    Anion gap 8 5 - 15 mmol/L    Glucose 107 (H) 65 - 100 mg/dL    BUN 88 (H) 6 - 20 MG/DL    Creatinine 3.78 (H) 0.70 - 1.30 MG/DL    BUN/Creatinine ratio 23 (H) 12 - 20      eGFR 18 (L) >60 ml/min/1.73m2    Calcium 9.4 8.5 - 10.1 MG/DL   GLUCOSE, POC    Collection Time: 03/21/23  6:18 AM   Result Value Ref Range    Glucose (POC) 96 65 - 117 mg/dL    Performed by Jeremiah CADENA I have reviewed the flowsheets. Chart and Pertinent Notes have been reviewed. No change in PMH ,family and social history from Consult note.       Kasandra Juan [facility]

## 2023-03-21 NOTE — DISCHARGE SUMMARY
Discharge Summary       PATIENT ID: Vanesa Hansen  MRN: [id number]   YOB: 1965    DATE OF ADMISSION: 3/19/2023 11:34 AM    DATE OF DISCHARGE: 3/21/2023   PRIMARY CARE PROVIDER: Francia Adrian MD     ATTENDING PHYSICIAN: Melvina Mortimer  DISCHARGING PROVIDER: Meagan Rhodes MD      CONSULTATIONS: IP CONSULT TO NEPHROLOGY  IP CONSULT TO NEPHROLOGY  IP CONSULT TO HOSPITALIST    PROCEDURES/SURGERIES: * No surgery found *    ADMISSION SUMMARY AND HOSPITAL COURSE:     HPI  Vanesa Hansen is a 56 y.o. male who was sent to the emergency room by his nephrologist due to abnormal labs including hyperkalemia and ANA. Patient had blood work 2 days ago and results were called to the patient today and instructed to go to the emergency room for hyperkalemia and ANA. Patient otherwise denies any acute complaint. Repeat labs here in the ER shows potassium of 5.7 as well as elevated creatinine of 5.5, which is above his baseline. [facility] and hospitalist service requested to admit the patient for further evaluation management. Hospital Course  Patient presents with ANA and hyperkalemia. ANA is likely diuretic induced. Ultrasound negative for hydronephrosis. Patient followed by nephrology, patient's home diuretics including Bumex and Aldactone was held on admission and patient has received some IV fluid with improvement in renal function. Patient to continue to hold diuretics on discharge. Nephrology will follow patient closely and decide timing for resuming diuretics for his CHF. Patient also presents with hyperkalemia. Patient's potassium supplements were held as well as Aldactone was held. Patient has received Lokelma with improvement in calcium level. Patient to continue to hold potassium supplement on discharge, outpatient follow-up with nephrology for reevaluation. DISCHARGE DIAGNOSES / PLAN:        BMI: Body mass index is 30.84 kg/m². . This patient: Meets criteria for obesity given BMI >/= 30 and < 40 due to excess calories/nutritional. Weight loss and lifestyle modifications should be encouraged as an outpatient. PENDING TEST RESULTS:   At the time of discharge the following test results are still pending: None     ADDITIONAL CARE RECOMMENDATIONS:     Follow-up with nephrology as scheduled. DIET: Cardiac Diet and Renal Diet    ACTIVITY: Activity as tolerated    EQUIPMENT needed: None    NOTIFY YOUR PHYSICIAN FOR ANY OF THE FOLLOWING:   Fever over 101 degrees for 24 hours. Chest pain, shortness of breath, fever, chills, nausea, vomiting, diarrhea, change in mentation, falling, weakness, bleeding. Severe pain or pain not relieved by medications, as well as any other signs or symptoms that you may have questions about. FOLLOW UP APPOINTMENTS:    Follow-up Information       Follow up With Specialties Details Why Contact Info    Travis Moon MD Family Medicine   [address] [facility] [address] [phone]               DISCHARGE MEDICATIONS:  Current Discharge Medication List        CONTINUE these medications which have NOT CHANGED    Details   hydrALAZINE (APRESOLINE) 50 mg tablet Take 1 Tablet by mouth three (3) times daily. Qty: 180 Tablet, Refills: 2      isosorbide dinitrate (ISORDIL) 30 mg tablet Take 1 Tablet by mouth three (3) times daily. Qty: 180 Tablet, Refills: 2      insulin NPH/insulin regular (NovoLIN 70/30 U-100 Insulin) 100 unit/mL (70-30) injection 26-27 Units by SubCUTAneous route Daily (before dinner). (22 units in the morning; 26-27 units in the evening; evening dose is on a sliding scale)      ergocalciferol (ERGOCALCIFEROL) 1,250 mcg (50,000 unit) capsule Take 1 Capsule by mouth every seven (7) days. Qty: 12 Capsule, Refills: 0      allopurinoL (ZYLOPRIM) 100 mg tablet Take 0.5 Tablets by mouth daily. Qty: 15 Tablet, Refills: 1      carvediloL (COREG) 25 mg tablet TAKE 2 TABLETS BY MOUTH TWICE DAILY .   Qty: 120 Tablet, Refills: 11    Associated Diagnoses: Essential hypertension with goal blood pressure less than 130/80; Cardiomyopathy ([address] Chronic systolic heart failure (HCC)      simvastatin (ZOCOR) 40 mg tablet Take 40 mg by mouth nightly. multivitamin with iron tablet Take 1 tablet by mouth daily. STOP taking these medications       traMADoL (ULTRAM) 50 mg tablet Comments:   Reason for Stopping:         bumetanide (BUMEX) 2 mg tablet Comments:   Reason for Stopping:         potassium chloride (K-DUR, KLOR-CON M20) 20 mEq tablet Comments:   Reason for Stopping:         spironolactone (ALDACTONE) 25 mg tablet Comments:   Reason for Stopping:               DISPOSITION:  *  Home With:   OT  PT  HH  RN       Long term SNF/Inpatient Rehab    Independent/assisted living    Hospice    Other:     PATIENT CONDITION AT DISCHARGE:     Functional status    Poor     Deconditioned    * Independent      Cognition   *  Lucid     Forgetful     Dementia      Catheters/lines (plus indication)    Zelaya     PICC     PEG    * None      Code status    * Full code     DNR      PHYSICAL EXAMINATION AT DISCHARGE:    General:          Alert, cooperative, no distress, appears stated age. HEENT:           Atraumatic, anicteric sclerae, pink conjunctivae                          No oral ulcers, mucosa moist, throat clear, dentition fair  Neck:               Supple, symmetrical  Lungs:             Clear to auscultation bilaterally. No Wheezing or Rhonchi. No rales. Chest wall:      No tenderness  No Accessory muscle use. Heart:              Regular  rhythm,  No  murmur   No edema  Abdomen:        Soft, non-tender. Not distended. Bowel sounds normal  Extremities:     No cyanosis. No clubbing,                            Skin turgor normal, Capillary refill normal  Skin:                Not pale. Not Jaundiced  No rashes   Psych:             Not anxious or agitated.   Neurologic:      Alert, moves all extremities, answers questions appropriately and responds to commands       CHRONIC MEDICAL DIAGNOSES:  Problem List as of 3/21/2023 Date Reviewed: 1/13/2023            Codes Class Noted - Resolved    ANA (acute kidney injury) [facility]) ICD-10-CM: N17.9  ICD-9-CM: 584.9  3/19/2023 - Present        Acute kidney injury superimposed on chronic kidney disease ([facility] ICD-10-CM: N17.9, N18.9  ICD-9-CM: 584.9, 585.9  3/2/2023 - Present        Acute on chronic HFrEF (heart failure with reduced ejection fraction) ([facility] ICD-10-CM: P39.83  ICD-9-CM: 428.23  3/2/2023 - Present        Severe obesity (BMI 35.0-39. 9) ICD-10-CM: E66.01  ICD-9-CM: 278.01  6/15/2018 - Present        Chronic systolic heart failure ([facility] ICD-10-CM: I50.22  ICD-9-CM: 428.22  2/8/2015 - Present        Cardiomyopathy ([facility] ICD-10-CM: I42.9  ICD-9-CM: 425.4  9/26/2014 - Present    Overview Addendum 11/9/2017  7:20 AM by Janice Michele MD     2002 (approx) diagnosed [place] by echo, neg stress test and started on usual meds.   2007 (approx) fup echo lvef 25%  2/15 cardiac cath, no sig cad  2/15 single lead AICD implant  2/17 lvh, otherwise normal echo             Hypertension ICD-10-CM: I10  ICD-9-CM: 401.9  9/26/2014 - Present        Diabetes mellitus, type II, insulin dependent (HCC) (Chronic) ICD-10-CM: E11.9, Z79.4  ICD-9-CM: 250.00, V58.67  9/26/2014 - Present           Greater than 60 minutes were spent with the patient on counseling and coordination of care    Signed:   Becka Jaeger MD  3/21/2023  10:06 AM

## 2023-03-21 NOTE — NURSE NAVIGATOR
HEART FAILURE NURSE NAVIGATOR NOTE  [address]    Patient chart was reviewed by Heart Failure (HF) Nurse Navigators for compliance of prescribed treatment with guidelines directed medical therapy (GDMT) and HF database completed. Please, review beneath recommendations for symptomatic patients with HF with Reduced Ejection Fraction ? 40% (HFrEF)* and patients whose LVEF improved > 40% (HFimpEF)* for your consideration when taking care of this patient. Current Medical Therapy:    Name Marisol [place]    1965   LVEF 15/20%   NYHA Functional Class Documentation Needed   ARNi/ACEi/ARB Not prescribed Documentation Needed See Below   Beta-blocker Coreg   Aldosterone Antagonist GFR 18   SGLT2 inhibitor GFR 18   Hydralazine/Isosorbide Dinitrate Hydralazine/Isordil   Consulting Cardiologist: See Below     Recommendations:    Please, add the following GDMT for HFrEF ? 40% [Class 1] or document in discharge summary/progress note why patient cannot take the medication:  ARNi/ACEi or ARB  Beta-blockers (carvedilol, sustained-release metoprolol succinate or bisoprolol)  Aldosterone antagonists GFR > 30 and K< 5  SGLT2 inhibitor  Hydralazine/Isosorbide dinitrate for  Americans with Class III/IV symptoms  Adjust diuretic dose at discharge if hospitalized for volume overload    Consider adding the following GDMT for HFrEF ? 40%, if appropriate [Class 2b]:   Ivabradine for patients on maximally tolerated beta-blocker dose in order to achieve HR 70-80bpm  Digoxin, goal level 0.5-0.9  Polyunsaturated fatty acids  Vericuguat  For patient with hyperkalemia while on RAASi > 5.5, consider adding potassium binders (patiromer, sodium zirconium cycosilicate)    Note: the following medications may be potentially harmful in heart failure [Class 3]:  Calcium channel blockers (doxazosin, diltiazem, verapamil, nifedipine)  Antiarrhythmics (flecanide, disopyrimide, sotalol, dronedarone)  Diabetes medications (thiasolidinediones, saxagliptin, alogliptin)  NSAIDs and BAJWA 2 inhibitors    Consider vaccinations for respiratory illnesses (flu, pneumonia, covid) [Class 2b]    Due to severely reduced LVEF < 25% and/or recurrent hospitalizations this patient may benefit from referral to Advanced Heart Failure Program to assess suitability for advanced therapies, such as left-ventricular assist device, heart transplantation, palliative inotropes or palliation [Class 1]. To obtain in-patient consultation or refer to [address] [place] [address], call [phone]    Patient Education:     Teach back in heart failure education provided, including information about medical therapy, lifestyle modifications, diet and fluid restrictions, physical activity. Educational resources provided: Living with Heart Failure booklet; Signs/Symptoms magnet; Weight Calendar; [facility] flyer; Preparation for Successful Discharge Checklist.  Information provided about HF support group. Heart failure avoiding triggers on discharge instructions. Plan for Transitional Care:    Post discharge follow up phone call to be made within 48-72 hours of discharge. Patient will follow-up with PCP. Patient will follow-up with Primary Cardiologist.  Obstructive sleep apnea screening done and patient was referred to Sleep Medicine. Referral/follow-up with Cardiac Rehabilitation. Referral/follow-up with Advanced Heart Failure Specialist.  Referral/follow-up with Palliative Care Specialist.      Heart Failure Nurse Navigator  Phone: [phone]  /  [phone]    *Recommendations listed above are based on 2022 AHA/ACC/HFSA Guideline for the Management of Heart Failure: A Report of the [address] on Clinical Practice Guidelines. Circulation 2022; [id number].  and 2017 AHA/ACC/HRS guideline for management of patients with ventricular arrhythmias and the prevention of sudden cardiac death: A Report of the [employer] of Cardiology/American Heart Association Task Force on Clinical Practice Guidelines and the Heart Rhythm Society.  Heart Rhythm, Vol 15, No 10, October 2018 *Class of Recommendation: Class 1 (strong), Class 2a (moderate), Class 2b (weak), Class 3 (not recommended, potentially harmful)

## 2023-03-21 NOTE — PROGRESS NOTES
Physician Progress Note      PATIENT:               Saman Avendano  CSN #:                  [id number]  :                       1965  ADMIT DATE:       3/19/2023 11:34 AM  DISCH DATE:  RESPONDING  PROVIDER #:        Christelle Pacheco MD          QUERY TEXT:    Pt admitted with ANA. Noted documentation of Acute on Chronic Systolic CHF by ordered Nephrology consultant. Pt denies SOB, no edema noted, and lungs are described as being clear. If possible, please document in progress notes and discharge summary:    The medical record reflects the following:  Risk Factors: hx of CHF, CM  Clinical Indicators: admitted with ANA and Hyperkalemia. Renal consultant has documented A/C Systolic CHF in the  Progress Note. Pt denies SOB or cough, no edema in extremities, lungs are described as being clear on exam. No CXR or BNP have been done so far this admission. Home meds of Apresoline and Bumex are on hold. Treatment: continued on home dose of Coreg, 2 Gm Na diet    Thank you,  Netta Hamilton RN  Clinical Documentation  [phone], or via Perfect Serve  Options provided:  -- Acute on Chronic Systolic CHF confirmed present on admission, please provide clinical indicators. -- Chronic Systolic CHF  -- Other - I will add my own diagnosis  -- Disagree - Not applicable / Not valid  -- Disagree - Clinically unable to determine / Unknown  -- Refer to Clinical Documentation Reviewer    PROVIDER RESPONSE TEXT:    This patient has Chronic Systolic CHF.     Query created by: Dominique Araya on 3/21/2023 8:50 AM      Electronically signed by:  Christelle Pacheco MD 3/21/2023 2:45 PM

## 2023-03-21 NOTE — NURSE NAVIGATOR
Follow up scheduled with [facility] for 3/17/23 at 11:30 am.  Information on After Visit Summary.

## 2023-03-21 NOTE — DISCHARGE INSTRUCTIONS
Download the Heart Failure Independence Davis: Search in your Rebls (Android) or [employer] Store (Lontraphone): Search for- HF Independence Davis.    Nan[employer]af.ca    HF Independence is a brand-new phone davis that helps you track daily symptoms, vitals, mood, energy level and more. You can even add your heart failure care team members to view your data and monitor your condition at home.     HF Independence lets you:  Track symptoms, medications and more  Share health information with your health care team  Connect with others living with heart failure

## 2023-03-21 NOTE — PROGRESS NOTES
Problem: Pain  Goal: *Control of Pain  Outcome: Progressing Towards Goal     Problem: Patient Education: Go to Patient Education Activity  Goal: Patient/Family Education  Outcome: Progressing Towards Goal     Problem: Diabetes Self-Management  Goal: *Monitoring blood glucose, interpreting and using results  Description: Identify recommended blood glucose targets  and personal targets. Outcome: Progressing Towards Goal     Problem: Diabetes Self-Management  Goal: *Using medications safely  Description: State effect of diabetes medications on diabetes; name diabetes medication taking, action and side effects. Outcome: Progressing Towards Goal     Problem: Diabetes Self-Management  Goal: *Incorporating physical activity into lifestyle  Description: State effect of exercise on blood glucose levels. Outcome: Progressing Towards Goal     Problem: Diabetes Self-Management  Goal: *Incorporating nutritional management into lifestyle  Description: Describe effect of type, amount and timing of food on blood glucose; list 3 methods for planning meals.   Outcome: Progressing Towards Goal

## 2023-03-21 NOTE — PROGRESS NOTES
Bedside shift change report given to Valencia RN  (oncoming nurse) by Antony Wall RN  (offgoing nurse). Report included the following information SBAR, Kardex, MAR, and Recent Results.

## 2023-03-22 ENCOUNTER — TELEPHONE (OUTPATIENT)
Dept: CARDIOLOGY CLINIC | Age: 58
End: 2023-03-22

## 2023-03-22 ENCOUNTER — TELEPHONE (OUTPATIENT)
Dept: CASE MANAGEMENT | Age: 58
End: 2023-03-22

## 2023-03-22 NOTE — TELEPHONE ENCOUNTER
Patient called for refill of tramadol, states he cannot see PCP until April and is having 8/10 pain in his legs. I advised that F does not prescribe pain medication, advised [facility] to [facility] pain is 8/10 for further evaluation. He states understanding.

## 2023-03-23 ENCOUNTER — TELEPHONE (OUTPATIENT)
Dept: CARDIOLOGY CLINIC | Age: 58
End: 2023-03-23

## 2023-03-23 NOTE — TELEPHONE ENCOUNTER
Telephone Call RE:  Appointment reminder     Outcome:     [] Patient confirmed appointment   [] Patient rescheduled appointment for    [] Unable to reach  [x] Left message              [] Other:

## 2023-03-27 ENCOUNTER — OFFICE VISIT (OUTPATIENT)
Dept: CARDIOLOGY CLINIC | Age: 58
End: 2023-03-27
Payer: MEDICARE

## 2023-03-27 VITALS
WEIGHT: 207 LBS | RESPIRATION RATE: 18 BRPM | BODY MASS INDEX: 31.37 KG/M2 | HEART RATE: 81 BPM | TEMPERATURE: 97.5 F | DIASTOLIC BLOOD PRESSURE: 74 MMHG | SYSTOLIC BLOOD PRESSURE: 102 MMHG | OXYGEN SATURATION: 100 % | HEIGHT: 68 IN

## 2023-03-27 DIAGNOSIS — I50.22 CHRONIC SYSTOLIC HEART FAILURE (HCC): Primary | ICD-10-CM

## 2023-03-27 PROCEDURE — 99215 OFFICE O/P EST HI 40 MIN: CPT | Performed by: INTERNAL MEDICINE

## 2023-03-27 PROCEDURE — G8432 DEP SCR NOT DOC, RNG: HCPCS | Performed by: INTERNAL MEDICINE

## 2023-03-27 PROCEDURE — 3078F DIAST BP <80 MM HG: CPT | Performed by: INTERNAL MEDICINE

## 2023-03-27 PROCEDURE — 3074F SYST BP LT 130 MM HG: CPT | Performed by: INTERNAL MEDICINE

## 2023-03-27 PROCEDURE — G8417 CALC BMI ABV UP PARAM F/U: HCPCS | Performed by: INTERNAL MEDICINE

## 2023-03-27 PROCEDURE — 1111F DSCHRG MED/CURRENT MED MERGE: CPT | Performed by: INTERNAL MEDICINE

## 2023-03-27 PROCEDURE — G8427 DOCREV CUR MEDS BY ELIG CLIN: HCPCS | Performed by: INTERNAL MEDICINE

## 2023-03-27 PROCEDURE — 3017F COLORECTAL CA SCREEN DOC REV: CPT | Performed by: INTERNAL MEDICINE

## 2023-03-27 RX ORDER — BUMETANIDE 1 MG/1
1 TABLET ORAL 2 TIMES DAILY
COMMUNITY

## 2023-03-27 NOTE — PATIENT INSTRUCTIONS
Medication changes:    No medication changes     Please take this to your pharmacy to notify them of the change in medications. Testing Ordered: Other Recommendations: An order for echo  has been placed to be done end of June. You will be receiving an automated call from Coordination of Care to schedule this test. If you are unavailable to receive the call or would like to contact coordination of care yourself you may contact [phone] to schedule. You will need to contact coordination of care yourself if you miss their calls as they will only make 3 attempts to reach you. Ensure your drinking an adequate amount of water with a goal of 6-8 eight ounce glasses (1.5-2 liters) of fluid daily. Your urine should be clear and light yellow straw colored. If your blood pressure begins to consistently run below 90/60 and/or you begin to experience dizziness or lightheadedness, please contact the [facility] at [phone]. Follow up beginning of July  with Dr. Nati Mclaughlin with [facility]      Please monitor your weights daily upon waking and after using the bathroom. Keep a written records of your weights and bring to your next appointment. If you have a weight gain of 3 or more pounds overnight OR 5 or more pounds in one week please contact our office. Thank you for allowing us the privilege of being a part of your healthcare team! Please do not hesitate to contact our office at [phone] with any questions or concerns. Virtual Heart Failure [address] invites you to learn more about heart failure and to share your questions, ideas, and experiences with others. Each month, the Heart Failure Support Group features a new educational topic and a guest speaker, followed by an interactive discussion. Our Heart Failure Nurse Navigator will moderate each session.  You will be able to participate by phone, tablet or computer through [address] [place] [address]. This support group takes place on the 3rd Thursday of each month from 6:00-7:30PM. All individuals living with heart failure and their caregivers are welcome to join. If you are interested in participating, please contact us at [email or web address] and you will be sent the link to join the [employer].

## 2023-03-27 NOTE — PROGRESS NOTES
[facility] in [place], [address] [place] [address] Note    Patient name: Dominique De La Cruz  Patient : 1965  Patient MRN: [id number]  Date of service: 23    Primary care physicians: Kristin Arguello MD  Primary cardiologist: Dr. Hannah Gerardo  Primary F Cardiologist: [facility]    REASON FOR REFERRAL:  Management of acute on chronic systolic heart failure     PLAN OF CARE:  57 y/o male with h/o non-ischemic cardiomyopathy, LVEF 15-20% (by echo 3/2023), stage C, NYHA class I/II symptoms and CKD, stage 4 (Cr 3.78) off nephrotoxic GDMT to allow for renal recovery and GDMT limited by hypotension, with normal cors by [facility] and borderline normal resting cardiac index thus too soon for inotropic support  Most likely etiology of worsening HF is chronic volume overload due to underestimated severity of renal failure (patient diuresed from 247lbs to 207lbs which is dry weight +/- possibly inadequately/untreated MINA  Patient is too early to consider chronic inotropic therapy due to normal resting cardiac index  Patient may be at the point where he might need cardiac replacement therapies; even with normal resting cardiac index, if his cardiac output with exertion is inadequate, however, patient is unable to undergo CPEST to determine that due to leg pain and non-healing ulcerations of both legs from diabetes.   Patient is not a candidate for organ transplantation due to non-healing wound ulcerations; if wounds improved, he would have to be considered for combined heart-kidney transplant due to low CrCl  Patient is not a candidate for LVAD as alternative to transplant due to Cr > 1.8  I discussed with the patient conservative therapy and focus on recovery of heart function by keeping his dry weight unchanged and making sure sleep apnea evaluated and treated and patient compliance with GDMT  We will see patient quarterly in [facility]      RECOMMENDATIONS:  Continue current medical therapy for heart failure  Continue coreg 25mg twice daily, HR at goal in 70s  Cannot tolerate ACEi/ARB/ARNi in hope for renal recovery  Continue hydralazine 50 mg TID and isordil 30mg TID   Cannot tolerate SGLT2i due to eGFR < 30  Continue Bumex 1mg twice daily; euvolemic today with dry weight 207lbs  Continue allopurinol 50mg daily  Cont high dose Vit D supplement x 12 week (6/2023)  Does not take aspirin   S/p Venofer 200mg, check iron profile with next labwork   ICD interrogation every 3 months per routine  Schedule echocardiogram after 3 months of euvolemic to see if LVEF improved  Make sure patient has sleep study   Heme occult negative   Reinforced low salt diet  Reinforced fluid restriction to 6 x 8oz glasses per day  Referral to nutritionist  Referral to [facility] patient to obtain echo every 3-5 years for both 21 and 33 y/o children  Follow-up with nephrology  Follow-up with PCP on wound care  Follow-up with [facility] in 3 months with MD/NP to discuss echo results      IMPRESSION:  Chronic systolic heart failure, HFrEF  Stage C, NYHA class II symptoms  Non-ischemic cardiomyopathy, LVEF 28%  Normal cors  Low normal resting cardiac index 2.3  S/p ICD  Cardiac risk factors:  HTN  DM  High risk for MINA  Morbid obesity, BMI 31  CKD 4     HPI:   Briefly, Felix Lyons is a 56 y.o. male with h/o morbid obesity, BMI 35, HTN, HL, DM2, chronic systolic heart failure, stage C, NYHA class IIIA symptoms, non-ischemic cardiomyopathy, LVEF 28% (HF diagnosed around 2002), normal coronaries by [facility] 2015 s/p single lead ICD (2/2015) and worsening renal function, CKD stage 2 and anemia. Patient was referred to [facility] to establish long-term care. Echo in 3/2023 showed further worsening of LVEF 15-20%, his RHC showed elevated filing pressures and near normal cardiac index 2.3. Patient underwent large volume diuresis from 247bs to 207lbs now feeling much better; underlying Cr is above 3. CARDIAC IMAGING:  Echo (3/3/23)    Left Ventricle: The EF by visual approximation is 15 - 20%. Left ventricle is moderately dilated. Severely increased wall thickness. Severe global hypokinesis present. Right Ventricle: Not well visualized. Mitral Valve: Thickened leaflets. Moderate annular calcification of the mitral valve. Left Atrium: Left atrium is dilated. LVEDD 6.1cm     Echo 10/21/22    Left Ventricle: Severely reduced left ventricular systolic function with a visually estimated EF of 20 - 25%. Left ventricle is moderately dilated. Mild posterior thickening. Normal wall motion. Normal diastolic function. Left Atrium: Left atrium is moderately dilated. Mitral Valve: Mild regurgitation with a centrally directed jet. Tricuspid Valve: Mild regurgitation with a centrally directed jet. Echo (3/9/22)    Study limited to the assessment of ejection fraction. Left Ventricle: Left ventricle is moderately dilated. Mildly increased wall thickness. Severe global hypokinesis present. Calculared ejection fraction is 28% by 3D volume and 31% by 2D Fernandez's biplane. Global longitudinal strain is reduced with a value of -5.8%. Echo (12/6/21)  LV: Calculated LVEF is 28%. Biplane method used to measure ejection fraction. Mildly dilated left ventricle. Mildly to moderately increased wall thickness. Moderate-to-severely and globally reduced systolic function. Moderate (grade 2) left ventricular diastolic dysfunction. LA: Mildly dilated left atrium. Left Atrium volume index is 40 mL/m2. MV: Moderate mitral annular calcification. Very mild mitral valve regurgitation is present. RV: Not well visualized. Pacer/ICD present. Echo (2/17/21)  LV: Calculated LVEF is 29%. Biplane method used to measure ejection fraction. Mildly dilated left ventricle. Mild to moderate hypertrophy. Severely and globally reduced systolic function. Wall motion: normal. Abnormal left ventricular strain.  Global longitudinal strain is -8%. Moderate (grade 2) left ventricular diastolic dysfunction. LA: Mildly dilated left atrium. Left Atrium volume index is 40 mL/m2. MV: Moderate mitral annular calcification. Very mild mitral valve regurgitation is present. Echo (8/16/19)  Left Ventricle: Mildly dilated left ventricle. Moderate concentric hypertrophy. Severe global systolic dysfunction. Estimated left ventricular ejection fraction is 21 - 25%. Biplane method used to measure ejection fraction. Left ventricular global hypokinesis. Abnormal left ventricular strain. Inconclusive left ventricular diastolic function. Left Atrium: Mildly dilated left atrium. Mitral Valve: Moderate mitral annular calcification. Mild mitral valve regurgitation. EKG (3/2/23): SR with 1st degree AVB  EKG (8/5/19) NSR 73bpm QRS 124ms        LHC (2/105) normal cors  3/7/23- non obstructive CAD      ICD interrogation     HEMODYNAMICS:  RHC 3/7/23 CI 2.35, PCWP 34, RA 20      CPEST not done  6MW 3/9/23-  435 feet or 132 meters     OTHER IMAGING:  CXR 3/2/23: enlarged cardiac silhouette; no edema   CT chest not done     LIFE GOALS:  Lifestyle goals discussed with the patient. INTERVAL HISTORY:  Today, patient presents for routine clinic visit. Patient is doing very well. Patient walked to our clinic from parking garage without having to slow down or stop. Patient can walk more than one block without symptoms of fatigue or shortness of breath or chest pain. Patient can walk one flight of stairs without symptoms of fatigue or shortness of breath or chest pain. Patient can perform home activities without problem, such as cooking, making bed and showering. Patient routinely participates in daily walking for more than 15 minutes. Patient denies symptoms of volume overload or leg edema. Patient denies abdominal bloating or change of appetite. Patient's weight remained stable. Patient denies orthopnea, PND or nocturia.     Patient denies irregular heart rate or palpitations. No presyncope or syncope. ICD has not fired. Patient denies other cardiac symptoms such as chest pain or leg pain with walking. Patient is compliant with fluid restriction and taking medications as prescribed. Patient manages his own medications. PHYSICAL EXAM:  Visit Vitals  /74 (BP 1 Location: Left upper arm, BP Patient Position: Sitting, BP Cuff Size: Large adult)   Pulse 81   Temp 97.5 °F (36.4 °C) (Oral)   Resp 18   Ht 5' 8\" (1.727 m)   Wt 207 lb (93.9 kg)   SpO2 100%   BMI 31.47 kg/m²     General: Patient is well developed, well-nourished in no acute distress  HEENT: Unremarkable   Neck: Supple. No evidence of thyroid enlargements or lymphadenopathy. JVD: Cannot be appreciated   Lungs: Breath sounds are equal and clear bilaterally. No wheezes, rhonchi, or rales. Heart: Regular rate and rhythm with normal S1 and S2. No murmurs, gallops or rubs. Abdomen: Soft, no mass or tenderness. No organomegaly or hernia. Bowel sounds present. Genitourinary and rectal: deferred  Extremities: No cyanosis, clubbing, or edema. Neurologic: No focal sensory or motor deficits are noted. Grossly intact. Psychiatric: Awake, alert an doriented x 3. Appropriate mood and affect. Skin: Warm, dry and well perfused. REVIEW OF SYSTEMS:  General: Denies fever. Ear, nose and throat: Denies difficulty hearing, sinus problems, nosebleeds  Cardiovascular: see above in the interval history  Respiratory: Denies cough, wheezing, sputum production, hemoptysis.   Gastrointestinal: Denies heartburn, constipation, diarrhea, abdominal pain, nausea, blood in stool  Kidney and bladder: Denies painful urination, frequent urination  Musculoskeletal: Denies joint pain, muscle weakness  Skin and hair: Denies change in existing skin lesions    PAST MEDICAL HISTORY:  Past Medical History:   Diagnosis Date    Congestive heart failure ([address]     Coronary artery disease     Diabetes ([address]     Heart failure ([facility]     CHF, cardiomyopathy    Hypertension     ICD (implantable cardioverter-defibrillator) in place     left upper chest    PAD (peripheral artery disease) ([facility]        PAST SURGICAL HISTORY:  Past Surgical History:   Procedure Laterality Date    HX HEART CATHETERIZATION  2/2015    x1    HX IMPLANTABLE CARDIOVERTER DEFIBRILLATOR  2/16/2015       FAMILY HISTORY:  Family History   Problem Relation Age of Onset    Diabetes Mother     Hypertension Father     Diabetes Father     Diabetes Brother     Hypertension Brother        SOCIAL HISTORY:  Social History     Socioeconomic History    Marital status:    Tobacco Use    Smoking status: Never    Smokeless tobacco: Never   Vaping Use    Vaping Use: Never used   Substance and Sexual Activity    Alcohol use: Yes     Comment: 1 a month    Drug use: No    Sexual activity: Not Currently       LABORATORY RESULTS:  Labs Latest Ref Rng & Units 3/21/2023 3/21/2023 3/20/2023 3/19/2023 3/10/2023 3/9/2023 3/8/2023   WBC 4.1 - 11.1 K/uL - - - 8.3 - 9.2 8.5   RBC 4.10 - 5.70 M/uL - - - 5.09 - 4.26 3.62(L)   Hemoglobin 12.1 - 17.0 g/dL - - - 13.1 - 10. 8(L) 9.3(L)   Hematocrit 36.6 - 50.3 % - - - 41.9 - 34. 9(L) 30. 1(L)   MCV 80.0 - 99.0 FL - - - 82.3 - 81.9 83.1   Platelets 099 - 867 K/uL - - - 248 - 275 249   Lymphocytes 12 - 49 % - - - 13 - - -   Monocytes 5 - 13 % - - - 10 - - -   Eosinophils 0 - 7 % - - - 6 - - -   Basophils 0 - 1 % - - - 1 - - -   Albumin 3.5 - 5.0 g/dL - 3. 3(L) - 3.8 - - -   Calcium 8.5 - 10.1 MG/DL 9.4 9.5 9.6 10. 2(H) 9.6 10. 2(H) 9.6   Glucose 65 - 100 mg/dL 107(H) 107(H) 182(H) 178(H) 251(H) 120(H) 109(H)   BUN 6 - 20 MG/DL 88(H) 86(H) 103(H) 120(H) 61(H) 36(H) 38(H)   Creatinine 0.70 - 1.30 MG/DL 3.78(H) 3.74(H) 4.67(H) 5.57(H) 3.99(H) 2.83(H) 2.68(H)   Sodium 136 - 145 mmol/L 136 136 134(L) 128(L) 132(L) 133(L) 136   Potassium 3.5 - 5.1 mmol/L 4.8 4.5 5.1 5.7(H) 4.7 4.1 4.0   TSH 0.36 - 3.74 uIU/mL - - - - 3.00 - -   Some recent data might be hidden ALLERGY:  No Known Allergies     CURRENT MEDICATIONS:    Current Outpatient Medications:     bumetanide (BUMEX) 1 mg tablet, Take 1 mg by mouth two (2) times a day., Disp: , Rfl:     hydrALAZINE (APRESOLINE) 50 mg tablet, Take 1 Tablet by mouth three (3) times daily. , Disp: 180 Tablet, Rfl: 2    isosorbide dinitrate (ISORDIL) 30 mg tablet, Take 1 Tablet by mouth three (3) times daily. , Disp: 180 Tablet, Rfl: 2    insulin NPH/insulin regular (NOVOLIN 70/30, HUMULIN 70/30) 100 unit/mL (70-30) injection, 26-27 Units by SubCUTAneous route Daily (before dinner). (22 units in the morning; 26-27 units in the evening; evening dose is on a sliding scale), Disp: , Rfl:     ergocalciferol (ERGOCALCIFEROL) 1,250 mcg (50,000 unit) capsule, Take 1 Capsule by mouth every seven (7) days. , Disp: 12 Capsule, Rfl: 0    allopurinoL (ZYLOPRIM) 100 mg tablet, Take 0.5 Tablets by mouth daily. , Disp: 15 Tablet, Rfl: 1    carvediloL (COREG) 25 mg tablet, TAKE 2 TABLETS BY MOUTH TWICE DAILY . , Disp: 120 Tablet, Rfl: 11    simvastatin (ZOCOR) 40 mg tablet, Take 40 mg by mouth nightly., Disp: , Rfl:     multivitamin with iron tablet, Take 1 tablet by mouth daily. , Disp: , Rfl:     Thank you for your referral and allowing me to participate in this patient's care.     Leanne Lawrence MD PhD  [address] [place] [address], [address]  Phone: [phone]  Fax: [phone]    PATIENT CARE TEAM:  Patient Care Team:  Cierra Akers MD as PCP - General (Family Medicine)  Lawanda Grijalva MD (Cardiovascular Disease Physician)  Elissa Mays MD (Nephrology)  Madelin George MD (Internal Medicine Physician)     Total visit time: 40 minutes  (> 50% spent face-to-face counseling)

## 2023-04-03 ENCOUNTER — TELEPHONE (OUTPATIENT)
Dept: CARDIOLOGY CLINIC | Age: 58
End: 2023-04-03

## 2023-04-03 NOTE — TELEPHONE ENCOUNTER
Patient called, wanting to go to wound care, I advised he will need a referral from PCP, per note from Dr. Juan Goodwin. I gave him number to wound care, [phone], he will call PCP now.

## 2023-04-13 ENCOUNTER — HOSPITAL ENCOUNTER (INPATIENT)
Age: 58
LOS: 3 days | Discharge: HOME | DRG: 300 | End: 2023-04-16
Attending: EMERGENCY MEDICINE | Admitting: STUDENT IN AN ORGANIZED HEALTH CARE EDUCATION/TRAINING PROGRAM
Payer: MEDICARE

## 2023-04-13 DIAGNOSIS — L08.9 INFECTED WOUND: Primary | ICD-10-CM

## 2023-04-13 DIAGNOSIS — L89.890 PRESSURE INJURY OF LEFT LEG, UNSTAGEABLE (HCC): ICD-10-CM

## 2023-04-13 DIAGNOSIS — T14.8XXA INFECTED WOUND: Primary | ICD-10-CM

## 2023-04-13 PROBLEM — E11.628 DIABETIC FOOT INFECTION (HCC): Status: ACTIVE | Noted: 2023-04-13

## 2023-04-13 LAB
ALBUMIN SERPL-MCNC: 2.8 G/DL (ref 3.5–5)
ALBUMIN/GLOB SERPL: 0.6 (ref 1.1–2.2)
ALP SERPL-CCNC: 173 U/L (ref 45–117)
ALT SERPL-CCNC: 50 U/L (ref 12–78)
ANION GAP SERPL CALC-SCNC: 7 MMOL/L (ref 5–15)
AST SERPL-CCNC: 38 U/L (ref 15–37)
BASOPHILS # BLD: 0.1 K/UL (ref 0–0.1)
BASOPHILS NFR BLD: 1 % (ref 0–1)
BILIRUB SERPL-MCNC: 0.5 MG/DL (ref 0.2–1)
BUN SERPL-MCNC: 62 MG/DL (ref 6–20)
BUN/CREAT SERPL: 19 (ref 12–20)
CALCIUM SERPL-MCNC: 9.6 MG/DL (ref 8.5–10.1)
CHLORIDE SERPL-SCNC: 103 MMOL/L (ref 97–108)
CO2 SERPL-SCNC: 25 MMOL/L (ref 21–32)
COMMENT, HOLDF: NORMAL
CREAT SERPL-MCNC: 3.18 MG/DL (ref 0.7–1.3)
DIFFERENTIAL METHOD BLD: ABNORMAL
EOSINOPHIL # BLD: 0.6 K/UL (ref 0–0.4)
EOSINOPHIL NFR BLD: 6 % (ref 0–7)
ERYTHROCYTE [DISTWIDTH] IN BLOOD BY AUTOMATED COUNT: 17.3 % (ref 11.5–14.5)
GLOBULIN SER CALC-MCNC: 4.8 G/DL (ref 2–4)
GLUCOSE SERPL-MCNC: 159 MG/DL (ref 65–100)
HCT VFR BLD AUTO: 29.4 % (ref 36.6–50.3)
HGB BLD-MCNC: 9.1 G/DL (ref 12.1–17)
IMM GRANULOCYTES # BLD AUTO: 0 K/UL (ref 0–0.04)
IMM GRANULOCYTES NFR BLD AUTO: 0 % (ref 0–0.5)
LACTATE SERPL-SCNC: 0.6 MMOL/L (ref 0.4–2)
LYMPHOCYTES # BLD: 0.9 K/UL (ref 0.8–3.5)
LYMPHOCYTES NFR BLD: 9 % (ref 12–49)
MCH RBC QN AUTO: 26.2 PG (ref 26–34)
MCHC RBC AUTO-ENTMCNC: 31 G/DL (ref 30–36.5)
MCV RBC AUTO: 84.7 FL (ref 80–99)
MONOCYTES # BLD: 0.8 K/UL (ref 0–1)
MONOCYTES NFR BLD: 8 % (ref 5–13)
NEUTS SEG # BLD: 7.5 K/UL (ref 1.8–8)
NEUTS SEG NFR BLD: 76 % (ref 32–75)
NRBC # BLD: 0 K/UL (ref 0–0.01)
NRBC BLD-RTO: 0 PER 100 WBC
PLATELET # BLD AUTO: 261 K/UL (ref 150–400)
PMV BLD AUTO: 11.2 FL (ref 8.9–12.9)
POTASSIUM SERPL-SCNC: 4.1 MMOL/L (ref 3.5–5.1)
PROT SERPL-MCNC: 7.6 G/DL (ref 6.4–8.2)
RBC # BLD AUTO: 3.47 M/UL (ref 4.1–5.7)
RBC MORPH BLD: ABNORMAL
SAMPLES BEING HELD,HOLD: NORMAL
SODIUM SERPL-SCNC: 135 MMOL/L (ref 136–145)
WBC # BLD AUTO: 9.9 K/UL (ref 4.1–11.1)

## 2023-04-13 PROCEDURE — 0HBLXZZ EXCISION OF LEFT LOWER LEG SKIN, EXTERNAL APPROACH: ICD-10-PCS | Performed by: STUDENT IN AN ORGANIZED HEALTH CARE EDUCATION/TRAINING PROGRAM

## 2023-04-13 PROCEDURE — 74011000258 HC RX REV CODE- 258: Performed by: EMERGENCY MEDICINE

## 2023-04-13 PROCEDURE — 96365 THER/PROPH/DIAG IV INF INIT: CPT

## 2023-04-13 PROCEDURE — 87040 BLOOD CULTURE FOR BACTERIA: CPT

## 2023-04-13 PROCEDURE — 74011250636 HC RX REV CODE- 250/636: Performed by: EMERGENCY MEDICINE

## 2023-04-13 PROCEDURE — 85025 COMPLETE CBC W/AUTO DIFF WBC: CPT

## 2023-04-13 PROCEDURE — 99285 EMERGENCY DEPT VISIT HI MDM: CPT

## 2023-04-13 PROCEDURE — 36415 COLL VENOUS BLD VENIPUNCTURE: CPT

## 2023-04-13 PROCEDURE — 80053 COMPREHEN METABOLIC PANEL: CPT

## 2023-04-13 PROCEDURE — 83605 ASSAY OF LACTIC ACID: CPT

## 2023-04-13 PROCEDURE — 65270000032 HC RM SEMIPRIVATE

## 2023-04-13 RX ORDER — ONDANSETRON 2 MG/ML
4 INJECTION INTRAMUSCULAR; INTRAVENOUS
Status: DISCONTINUED | OUTPATIENT
Start: 2023-04-13 | End: 2023-04-16 | Stop reason: HOSPADM

## 2023-04-13 RX ORDER — TRAMADOL HYDROCHLORIDE 50 MG/1
50 TABLET ORAL
Status: DISCONTINUED | OUTPATIENT
Start: 2023-04-13 | End: 2023-04-16 | Stop reason: HOSPADM

## 2023-04-13 RX ORDER — HEPARIN SODIUM 5000 [USP'U]/ML
5000 INJECTION, SOLUTION INTRAVENOUS; SUBCUTANEOUS EVERY 8 HOURS
Status: DISCONTINUED | OUTPATIENT
Start: 2023-04-13 | End: 2023-04-16 | Stop reason: HOSPADM

## 2023-04-13 RX ORDER — BUMETANIDE 1 MG/1
1 TABLET ORAL 2 TIMES DAILY
Status: DISCONTINUED | OUTPATIENT
Start: 2023-04-14 | End: 2023-04-16 | Stop reason: HOSPADM

## 2023-04-13 RX ORDER — HYDRALAZINE HYDROCHLORIDE 50 MG/1
50 TABLET, FILM COATED ORAL 3 TIMES DAILY
Status: DISCONTINUED | OUTPATIENT
Start: 2023-04-13 | End: 2023-04-16 | Stop reason: HOSPADM

## 2023-04-13 RX ORDER — POLYETHYLENE GLYCOL 3350 17 G/17G
17 POWDER, FOR SOLUTION ORAL DAILY PRN
Status: DISCONTINUED | OUTPATIENT
Start: 2023-04-13 | End: 2023-04-16 | Stop reason: HOSPADM

## 2023-04-13 RX ORDER — CARVEDILOL 12.5 MG/1
25 TABLET ORAL 2 TIMES DAILY WITH MEALS
Status: DISCONTINUED | OUTPATIENT
Start: 2023-04-14 | End: 2023-04-16 | Stop reason: HOSPADM

## 2023-04-13 RX ORDER — ACETAMINOPHEN 325 MG/1
650 TABLET ORAL
Status: DISCONTINUED | OUTPATIENT
Start: 2023-04-13 | End: 2023-04-16 | Stop reason: HOSPADM

## 2023-04-13 RX ORDER — ACETAMINOPHEN 650 MG/1
650 SUPPOSITORY RECTAL
Status: DISCONTINUED | OUTPATIENT
Start: 2023-04-13 | End: 2023-04-16 | Stop reason: HOSPADM

## 2023-04-13 RX ORDER — ATORVASTATIN CALCIUM 20 MG/1
20 TABLET, FILM COATED ORAL DAILY
Status: DISCONTINUED | OUTPATIENT
Start: 2023-04-14 | End: 2023-04-16 | Stop reason: HOSPADM

## 2023-04-13 RX ORDER — ONDANSETRON 4 MG/1
4 TABLET, ORALLY DISINTEGRATING ORAL
Status: DISCONTINUED | OUTPATIENT
Start: 2023-04-13 | End: 2023-04-16 | Stop reason: HOSPADM

## 2023-04-13 RX ORDER — SODIUM CHLORIDE 0.9 % (FLUSH) 0.9 %
5-40 SYRINGE (ML) INJECTION AS NEEDED
Status: DISCONTINUED | OUTPATIENT
Start: 2023-04-13 | End: 2023-04-16 | Stop reason: HOSPADM

## 2023-04-13 RX ORDER — SODIUM CHLORIDE 0.9 % (FLUSH) 0.9 %
5-40 SYRINGE (ML) INJECTION EVERY 8 HOURS
Status: DISCONTINUED | OUTPATIENT
Start: 2023-04-13 | End: 2023-04-16 | Stop reason: HOSPADM

## 2023-04-13 RX ADMIN — AMPICILLIN AND SULBACTAM 3 G: 2; 1 INJECTION, POWDER, FOR SOLUTION INTRAMUSCULAR; INTRAVENOUS at 20:40

## 2023-04-14 LAB
ALBUMIN SERPL-MCNC: 2.3 G/DL (ref 3.5–5)
ALBUMIN SERPL-MCNC: 2.5 G/DL (ref 3.5–5)
ALBUMIN/GLOB SERPL: 0.5 (ref 1.1–2.2)
ALBUMIN/GLOB SERPL: 0.6 (ref 1.1–2.2)
ALP SERPL-CCNC: 146 U/L (ref 45–117)
ALP SERPL-CCNC: 155 U/L (ref 45–117)
ALT SERPL-CCNC: 41 U/L (ref 12–78)
ALT SERPL-CCNC: 42 U/L (ref 12–78)
ANION GAP SERPL CALC-SCNC: 6 MMOL/L (ref 5–15)
ANION GAP SERPL CALC-SCNC: 9 MMOL/L (ref 5–15)
AST SERPL-CCNC: 25 U/L (ref 15–37)
AST SERPL-CCNC: 27 U/L (ref 15–37)
BASOPHILS # BLD: 0 K/UL (ref 0–0.1)
BASOPHILS NFR BLD: 0 % (ref 0–1)
BILIRUB SERPL-MCNC: 0.5 MG/DL (ref 0.2–1)
BILIRUB SERPL-MCNC: 0.5 MG/DL (ref 0.2–1)
BUN SERPL-MCNC: 58 MG/DL (ref 6–20)
BUN SERPL-MCNC: 60 MG/DL (ref 6–20)
BUN/CREAT SERPL: 21 (ref 12–20)
BUN/CREAT SERPL: 21 (ref 12–20)
CALCIUM SERPL-MCNC: 8.6 MG/DL (ref 8.5–10.1)
CALCIUM SERPL-MCNC: 9.2 MG/DL (ref 8.5–10.1)
CHLORIDE SERPL-SCNC: 107 MMOL/L (ref 97–108)
CHLORIDE SERPL-SCNC: 109 MMOL/L (ref 97–108)
CO2 SERPL-SCNC: 20 MMOL/L (ref 21–32)
CO2 SERPL-SCNC: 23 MMOL/L (ref 21–32)
COMMENT, HOLDF: NORMAL
CREAT SERPL-MCNC: 2.8 MG/DL (ref 0.7–1.3)
CREAT SERPL-MCNC: 2.86 MG/DL (ref 0.7–1.3)
DIFFERENTIAL METHOD BLD: ABNORMAL
EOSINOPHIL # BLD: 0.5 K/UL (ref 0–0.4)
EOSINOPHIL NFR BLD: 6 % (ref 0–7)
ERYTHROCYTE [DISTWIDTH] IN BLOOD BY AUTOMATED COUNT: 17.4 % (ref 11.5–14.5)
GLOBULIN SER CALC-MCNC: 4.2 G/DL (ref 2–4)
GLOBULIN SER CALC-MCNC: 4.4 G/DL (ref 2–4)
GLUCOSE BLD STRIP.AUTO-MCNC: 132 MG/DL (ref 65–117)
GLUCOSE SERPL-MCNC: 140 MG/DL (ref 65–100)
GLUCOSE SERPL-MCNC: 148 MG/DL (ref 65–100)
HCT VFR BLD AUTO: 26.1 % (ref 36.6–50.3)
HGB BLD-MCNC: 7.9 G/DL (ref 12.1–17)
IMM GRANULOCYTES # BLD AUTO: 0 K/UL (ref 0–0.04)
IMM GRANULOCYTES NFR BLD AUTO: 0 % (ref 0–0.5)
LYMPHOCYTES # BLD: 0.7 K/UL (ref 0.8–3.5)
LYMPHOCYTES NFR BLD: 8 % (ref 12–49)
MCH RBC QN AUTO: 25.7 PG (ref 26–34)
MCHC RBC AUTO-ENTMCNC: 30.3 G/DL (ref 30–36.5)
MCV RBC AUTO: 85 FL (ref 80–99)
MONOCYTES # BLD: 0.8 K/UL (ref 0–1)
MONOCYTES NFR BLD: 9 % (ref 5–13)
NEUTS SEG # BLD: 6.9 K/UL (ref 1.8–8)
NEUTS SEG NFR BLD: 77 % (ref 32–75)
NRBC # BLD: 0 K/UL (ref 0–0.01)
NRBC BLD-RTO: 0 PER 100 WBC
PLATELET # BLD AUTO: 260 K/UL (ref 150–400)
PMV BLD AUTO: 11.4 FL (ref 8.9–12.9)
POTASSIUM SERPL-SCNC: 3.8 MMOL/L (ref 3.5–5.1)
POTASSIUM SERPL-SCNC: 4.1 MMOL/L (ref 3.5–5.1)
PROT SERPL-MCNC: 6.5 G/DL (ref 6.4–8.2)
PROT SERPL-MCNC: 6.9 G/DL (ref 6.4–8.2)
RBC # BLD AUTO: 3.07 M/UL (ref 4.1–5.7)
RBC MORPH BLD: ABNORMAL
SAMPLES BEING HELD,HOLD: NORMAL
SERVICE CMNT-IMP: ABNORMAL
SODIUM SERPL-SCNC: 136 MMOL/L (ref 136–145)
SODIUM SERPL-SCNC: 138 MMOL/L (ref 136–145)
WBC # BLD AUTO: 8.9 K/UL (ref 4.1–11.1)

## 2023-04-14 PROCEDURE — 85025 COMPLETE CBC W/AUTO DIFF WBC: CPT

## 2023-04-14 PROCEDURE — 82962 GLUCOSE BLOOD TEST: CPT

## 2023-04-14 PROCEDURE — 74011000250 HC RX REV CODE- 250: Performed by: STUDENT IN AN ORGANIZED HEALTH CARE EDUCATION/TRAINING PROGRAM

## 2023-04-14 PROCEDURE — 80053 COMPREHEN METABOLIC PANEL: CPT

## 2023-04-14 PROCEDURE — 36415 COLL VENOUS BLD VENIPUNCTURE: CPT

## 2023-04-14 PROCEDURE — 74011250637 HC RX REV CODE- 250/637: Performed by: STUDENT IN AN ORGANIZED HEALTH CARE EDUCATION/TRAINING PROGRAM

## 2023-04-14 PROCEDURE — 74011250636 HC RX REV CODE- 250/636: Performed by: STUDENT IN AN ORGANIZED HEALTH CARE EDUCATION/TRAINING PROGRAM

## 2023-04-14 PROCEDURE — 65270000029 HC RM PRIVATE

## 2023-04-14 PROCEDURE — 74011000258 HC RX REV CODE- 258: Performed by: STUDENT IN AN ORGANIZED HEALTH CARE EDUCATION/TRAINING PROGRAM

## 2023-04-14 RX ADMIN — HYDRALAZINE HYDROCHLORIDE 50 MG: 50 TABLET, FILM COATED ORAL at 08:55

## 2023-04-14 RX ADMIN — AMPICILLIN AND SULBACTAM 3 G: 2; 1 INJECTION, POWDER, FOR SOLUTION INTRAMUSCULAR; INTRAVENOUS at 16:26

## 2023-04-14 RX ADMIN — HYDRALAZINE HYDROCHLORIDE 50 MG: 50 TABLET, FILM COATED ORAL at 00:08

## 2023-04-14 RX ADMIN — DOXYCYCLINE 100 MG: 100 INJECTION, POWDER, LYOPHILIZED, FOR SOLUTION INTRAVENOUS at 22:54

## 2023-04-14 RX ADMIN — SODIUM CHLORIDE, PRESERVATIVE FREE 10 ML: 5 INJECTION INTRAVENOUS at 22:54

## 2023-04-14 RX ADMIN — CARVEDILOL 25 MG: 12.5 TABLET, FILM COATED ORAL at 16:26

## 2023-04-14 RX ADMIN — ISOSORBIDE DINITRATE 30 MG: 20 TABLET ORAL at 00:08

## 2023-04-14 RX ADMIN — HEPARIN SODIUM 5000 UNITS: 5000 INJECTION INTRAVENOUS; SUBCUTANEOUS at 05:52

## 2023-04-14 RX ADMIN — DOXYCYCLINE 100 MG: 100 INJECTION, POWDER, LYOPHILIZED, FOR SOLUTION INTRAVENOUS at 00:48

## 2023-04-14 RX ADMIN — AMPICILLIN AND SULBACTAM 3 G: 2; 1 INJECTION, POWDER, FOR SOLUTION INTRAMUSCULAR; INTRAVENOUS at 00:12

## 2023-04-14 RX ADMIN — TRAMADOL HYDROCHLORIDE 50 MG: 50 TABLET, COATED ORAL at 17:41

## 2023-04-14 RX ADMIN — BUMETANIDE 1 MG: 1 TABLET ORAL at 17:41

## 2023-04-14 RX ADMIN — ISOSORBIDE DINITRATE 30 MG: 20 TABLET ORAL at 16:26

## 2023-04-14 RX ADMIN — TRAMADOL HYDROCHLORIDE 50 MG: 50 TABLET, COATED ORAL at 05:59

## 2023-04-14 RX ADMIN — DOXYCYCLINE 100 MG: 100 INJECTION, POWDER, LYOPHILIZED, FOR SOLUTION INTRAVENOUS at 09:58

## 2023-04-14 RX ADMIN — CARVEDILOL 25 MG: 12.5 TABLET, FILM COATED ORAL at 08:54

## 2023-04-14 RX ADMIN — BUMETANIDE 1 MG: 1 TABLET ORAL at 08:55

## 2023-04-14 RX ADMIN — ISOSORBIDE DINITRATE 30 MG: 20 TABLET ORAL at 08:55

## 2023-04-14 RX ADMIN — HEPARIN SODIUM 5000 UNITS: 5000 INJECTION INTRAVENOUS; SUBCUTANEOUS at 15:44

## 2023-04-14 RX ADMIN — SODIUM CHLORIDE, PRESERVATIVE FREE 10 ML: 5 INJECTION INTRAVENOUS at 08:52

## 2023-04-14 RX ADMIN — AMPICILLIN AND SULBACTAM 3 G: 2; 1 INJECTION, POWDER, FOR SOLUTION INTRAMUSCULAR; INTRAVENOUS at 05:50

## 2023-04-14 RX ADMIN — ATORVASTATIN CALCIUM 20 MG: 20 TABLET, FILM COATED ORAL at 08:55

## 2023-04-14 RX ADMIN — HYDRALAZINE HYDROCHLORIDE 50 MG: 50 TABLET, FILM COATED ORAL at 22:55

## 2023-04-14 RX ADMIN — HEPARIN SODIUM 5000 UNITS: 5000 INJECTION INTRAVENOUS; SUBCUTANEOUS at 23:05

## 2023-04-14 RX ADMIN — HYDRALAZINE HYDROCHLORIDE 50 MG: 50 TABLET, FILM COATED ORAL at 15:45

## 2023-04-14 RX ADMIN — TRAMADOL HYDROCHLORIDE 50 MG: 50 TABLET, COATED ORAL at 00:08

## 2023-04-14 RX ADMIN — SODIUM CHLORIDE, PRESERVATIVE FREE 10 ML: 5 INJECTION INTRAVENOUS at 14:00

## 2023-04-14 RX ADMIN — AMPICILLIN AND SULBACTAM 3 G: 2; 1 INJECTION, POWDER, FOR SOLUTION INTRAMUSCULAR; INTRAVENOUS at 11:12

## 2023-04-14 RX ADMIN — ISOSORBIDE DINITRATE 30 MG: 20 TABLET ORAL at 22:55

## 2023-04-15 LAB
ANION GAP SERPL CALC-SCNC: 10 MMOL/L (ref 5–15)
BUN SERPL-MCNC: 54 MG/DL (ref 6–20)
BUN/CREAT SERPL: 20 (ref 12–20)
CALCIUM SERPL-MCNC: 9.3 MG/DL (ref 8.5–10.1)
CHLORIDE SERPL-SCNC: 108 MMOL/L (ref 97–108)
CO2 SERPL-SCNC: 19 MMOL/L (ref 21–32)
CREAT SERPL-MCNC: 2.7 MG/DL (ref 0.7–1.3)
ERYTHROCYTE [DISTWIDTH] IN BLOOD BY AUTOMATED COUNT: 17.4 % (ref 11.5–14.5)
GLUCOSE BLD STRIP.AUTO-MCNC: 144 MG/DL (ref 65–117)
GLUCOSE BLD STRIP.AUTO-MCNC: 145 MG/DL (ref 65–117)
GLUCOSE SERPL-MCNC: 128 MG/DL (ref 65–100)
HCT VFR BLD AUTO: 26.4 % (ref 36.6–50.3)
HGB BLD-MCNC: 8 G/DL (ref 12.1–17)
MCH RBC QN AUTO: 25.6 PG (ref 26–34)
MCHC RBC AUTO-ENTMCNC: 30.3 G/DL (ref 30–36.5)
MCV RBC AUTO: 84.3 FL (ref 80–99)
NRBC # BLD: 0 K/UL (ref 0–0.01)
NRBC BLD-RTO: 0 PER 100 WBC
PLATELET # BLD AUTO: 270 K/UL (ref 150–400)
PMV BLD AUTO: 11.8 FL (ref 8.9–12.9)
POTASSIUM SERPL-SCNC: 4 MMOL/L (ref 3.5–5.1)
RBC # BLD AUTO: 3.13 M/UL (ref 4.1–5.7)
SERVICE CMNT-IMP: ABNORMAL
SERVICE CMNT-IMP: ABNORMAL
SODIUM SERPL-SCNC: 137 MMOL/L (ref 136–145)
WBC # BLD AUTO: 8.5 K/UL (ref 4.1–11.1)

## 2023-04-15 PROCEDURE — 82962 GLUCOSE BLOOD TEST: CPT

## 2023-04-15 PROCEDURE — 36415 COLL VENOUS BLD VENIPUNCTURE: CPT

## 2023-04-15 PROCEDURE — 85027 COMPLETE CBC AUTOMATED: CPT

## 2023-04-15 PROCEDURE — 80048 BASIC METABOLIC PNL TOTAL CA: CPT

## 2023-04-15 PROCEDURE — 65270000029 HC RM PRIVATE

## 2023-04-15 PROCEDURE — 74011250636 HC RX REV CODE- 250/636: Performed by: STUDENT IN AN ORGANIZED HEALTH CARE EDUCATION/TRAINING PROGRAM

## 2023-04-15 PROCEDURE — 74011000258 HC RX REV CODE- 258: Performed by: STUDENT IN AN ORGANIZED HEALTH CARE EDUCATION/TRAINING PROGRAM

## 2023-04-15 PROCEDURE — 74011000250 HC RX REV CODE- 250: Performed by: STUDENT IN AN ORGANIZED HEALTH CARE EDUCATION/TRAINING PROGRAM

## 2023-04-15 PROCEDURE — 74011250637 HC RX REV CODE- 250/637: Performed by: STUDENT IN AN ORGANIZED HEALTH CARE EDUCATION/TRAINING PROGRAM

## 2023-04-15 PROCEDURE — 74011250637 HC RX REV CODE- 250/637: Performed by: SURGERY

## 2023-04-15 RX ORDER — GABAPENTIN 100 MG/1
100 CAPSULE ORAL 3 TIMES DAILY
Status: DISCONTINUED | OUTPATIENT
Start: 2023-04-15 | End: 2023-04-16 | Stop reason: HOSPADM

## 2023-04-15 RX ADMIN — ISOSORBIDE DINITRATE 30 MG: 20 TABLET ORAL at 17:05

## 2023-04-15 RX ADMIN — TRAMADOL HYDROCHLORIDE 50 MG: 50 TABLET, COATED ORAL at 15:12

## 2023-04-15 RX ADMIN — AMPICILLIN AND SULBACTAM 3 G: 2; 1 INJECTION, POWDER, FOR SOLUTION INTRAMUSCULAR; INTRAVENOUS at 01:45

## 2023-04-15 RX ADMIN — SODIUM CHLORIDE, PRESERVATIVE FREE 10 ML: 5 INJECTION INTRAVENOUS at 14:00

## 2023-04-15 RX ADMIN — CARVEDILOL 25 MG: 12.5 TABLET, FILM COATED ORAL at 08:29

## 2023-04-15 RX ADMIN — SODIUM CHLORIDE, PRESERVATIVE FREE 10 ML: 5 INJECTION INTRAVENOUS at 06:33

## 2023-04-15 RX ADMIN — HYDRALAZINE HYDROCHLORIDE 50 MG: 50 TABLET, FILM COATED ORAL at 08:29

## 2023-04-15 RX ADMIN — SODIUM CHLORIDE, PRESERVATIVE FREE 10 ML: 5 INJECTION INTRAVENOUS at 21:08

## 2023-04-15 RX ADMIN — CARVEDILOL 25 MG: 12.5 TABLET, FILM COATED ORAL at 17:05

## 2023-04-15 RX ADMIN — HEPARIN SODIUM 5000 UNITS: 5000 INJECTION INTRAVENOUS; SUBCUTANEOUS at 07:07

## 2023-04-15 RX ADMIN — AMPICILLIN AND SULBACTAM 3 G: 2; 1 INJECTION, POWDER, FOR SOLUTION INTRAMUSCULAR; INTRAVENOUS at 21:08

## 2023-04-15 RX ADMIN — HEPARIN SODIUM 5000 UNITS: 5000 INJECTION INTRAVENOUS; SUBCUTANEOUS at 21:32

## 2023-04-15 RX ADMIN — AMPICILLIN AND SULBACTAM 3 G: 2; 1 INJECTION, POWDER, FOR SOLUTION INTRAMUSCULAR; INTRAVENOUS at 13:31

## 2023-04-15 RX ADMIN — SODIUM CHLORIDE, PRESERVATIVE FREE 10 ML: 5 INJECTION INTRAVENOUS at 07:07

## 2023-04-15 RX ADMIN — BUMETANIDE 1 MG: 1 TABLET ORAL at 08:29

## 2023-04-15 RX ADMIN — GABAPENTIN 100 MG: 100 CAPSULE ORAL at 21:11

## 2023-04-15 RX ADMIN — TRAMADOL HYDROCHLORIDE 50 MG: 50 TABLET, COATED ORAL at 07:16

## 2023-04-15 RX ADMIN — TRAMADOL HYDROCHLORIDE 50 MG: 50 TABLET, COATED ORAL at 21:11

## 2023-04-15 RX ADMIN — HYDRALAZINE HYDROCHLORIDE 50 MG: 50 TABLET, FILM COATED ORAL at 15:12

## 2023-04-15 RX ADMIN — DOXYCYCLINE 100 MG: 100 INJECTION, POWDER, LYOPHILIZED, FOR SOLUTION INTRAVENOUS at 21:37

## 2023-04-15 RX ADMIN — GABAPENTIN 100 MG: 100 CAPSULE ORAL at 18:37

## 2023-04-15 RX ADMIN — AMPICILLIN AND SULBACTAM 3 G: 2; 1 INJECTION, POWDER, FOR SOLUTION INTRAMUSCULAR; INTRAVENOUS at 07:07

## 2023-04-15 RX ADMIN — HYDRALAZINE HYDROCHLORIDE 50 MG: 50 TABLET, FILM COATED ORAL at 21:11

## 2023-04-15 RX ADMIN — ATORVASTATIN CALCIUM 20 MG: 20 TABLET, FILM COATED ORAL at 08:29

## 2023-04-15 RX ADMIN — BUMETANIDE 1 MG: 1 TABLET ORAL at 17:05

## 2023-04-15 RX ADMIN — ISOSORBIDE DINITRATE 30 MG: 20 TABLET ORAL at 21:32

## 2023-04-15 RX ADMIN — DOXYCYCLINE 100 MG: 100 INJECTION, POWDER, LYOPHILIZED, FOR SOLUTION INTRAVENOUS at 10:00

## 2023-04-15 RX ADMIN — HEPARIN SODIUM 5000 UNITS: 5000 INJECTION INTRAVENOUS; SUBCUTANEOUS at 13:31

## 2023-04-16 VITALS
HEART RATE: 77 BPM | SYSTOLIC BLOOD PRESSURE: 142 MMHG | WEIGHT: 212.3 LBS | TEMPERATURE: 98.7 F | RESPIRATION RATE: 16 BRPM | BODY MASS INDEX: 32.18 KG/M2 | HEIGHT: 68 IN | DIASTOLIC BLOOD PRESSURE: 86 MMHG | OXYGEN SATURATION: 95 %

## 2023-04-16 LAB
GLUCOSE BLD STRIP.AUTO-MCNC: 142 MG/DL (ref 65–117)
GLUCOSE BLD STRIP.AUTO-MCNC: 220 MG/DL (ref 65–117)
SERVICE CMNT-IMP: ABNORMAL
SERVICE CMNT-IMP: ABNORMAL

## 2023-04-16 PROCEDURE — 82962 GLUCOSE BLOOD TEST: CPT

## 2023-04-16 PROCEDURE — 74011000258 HC RX REV CODE- 258: Performed by: STUDENT IN AN ORGANIZED HEALTH CARE EDUCATION/TRAINING PROGRAM

## 2023-04-16 PROCEDURE — 74011250637 HC RX REV CODE- 250/637: Performed by: SURGERY

## 2023-04-16 PROCEDURE — 74011000250 HC RX REV CODE- 250: Performed by: STUDENT IN AN ORGANIZED HEALTH CARE EDUCATION/TRAINING PROGRAM

## 2023-04-16 PROCEDURE — 74011250636 HC RX REV CODE- 250/636: Performed by: STUDENT IN AN ORGANIZED HEALTH CARE EDUCATION/TRAINING PROGRAM

## 2023-04-16 PROCEDURE — 74011250637 HC RX REV CODE- 250/637: Performed by: STUDENT IN AN ORGANIZED HEALTH CARE EDUCATION/TRAINING PROGRAM

## 2023-04-16 RX ORDER — GABAPENTIN 100 MG/1
100 CAPSULE ORAL 3 TIMES DAILY
Qty: 60 CAPSULE | Refills: 0 | Status: SHIPPED | OUTPATIENT
Start: 2023-04-16

## 2023-04-16 RX ORDER — DOXYCYCLINE 100 MG/1
100 CAPSULE ORAL 2 TIMES DAILY
Qty: 10 CAPSULE | Refills: 0 | Status: SHIPPED | OUTPATIENT
Start: 2023-04-16 | End: 2023-04-16 | Stop reason: SDUPTHER

## 2023-04-16 RX ORDER — DOXYCYCLINE 100 MG/1
100 CAPSULE ORAL 2 TIMES DAILY
Qty: 10 CAPSULE | Refills: 0 | Status: SHIPPED | OUTPATIENT
Start: 2023-04-16

## 2023-04-16 RX ADMIN — BUMETANIDE 1 MG: 1 TABLET ORAL at 08:55

## 2023-04-16 RX ADMIN — AMPICILLIN AND SULBACTAM 3 G: 2; 1 INJECTION, POWDER, FOR SOLUTION INTRAMUSCULAR; INTRAVENOUS at 11:32

## 2023-04-16 RX ADMIN — ATORVASTATIN CALCIUM 20 MG: 20 TABLET, FILM COATED ORAL at 08:55

## 2023-04-16 RX ADMIN — CARVEDILOL 25 MG: 12.5 TABLET, FILM COATED ORAL at 08:55

## 2023-04-16 RX ADMIN — TRAMADOL HYDROCHLORIDE 50 MG: 50 TABLET, COATED ORAL at 06:25

## 2023-04-16 RX ADMIN — SODIUM CHLORIDE, PRESERVATIVE FREE 10 ML: 5 INJECTION INTRAVENOUS at 05:58

## 2023-04-16 RX ADMIN — AMPICILLIN AND SULBACTAM 3 G: 2; 1 INJECTION, POWDER, FOR SOLUTION INTRAMUSCULAR; INTRAVENOUS at 05:57

## 2023-04-16 RX ADMIN — HYDRALAZINE HYDROCHLORIDE 50 MG: 50 TABLET, FILM COATED ORAL at 08:55

## 2023-04-16 RX ADMIN — HEPARIN SODIUM 5000 UNITS: 5000 INJECTION INTRAVENOUS; SUBCUTANEOUS at 05:58

## 2023-04-16 RX ADMIN — DOXYCYCLINE 100 MG: 100 INJECTION, POWDER, LYOPHILIZED, FOR SOLUTION INTRAVENOUS at 10:00

## 2023-04-16 RX ADMIN — GABAPENTIN 100 MG: 100 CAPSULE ORAL at 08:55

## 2023-04-16 RX ADMIN — ISOSORBIDE DINITRATE 30 MG: 20 TABLET ORAL at 08:55

## 2023-04-18 LAB
BACTERIA SPEC CULT: NORMAL
SERVICE CMNT-IMP: NORMAL

## 2023-04-27 ENCOUNTER — HOSPITAL ENCOUNTER (OUTPATIENT)
Dept: WOUND CARE | Age: 58
Discharge: HOME | End: 2023-04-27
Payer: MEDICARE

## 2023-04-27 VITALS — TEMPERATURE: 98.8 F | SYSTOLIC BLOOD PRESSURE: 151 MMHG | HEART RATE: 82 BPM | DIASTOLIC BLOOD PRESSURE: 93 MMHG

## 2023-04-27 DIAGNOSIS — L97.222 VENOUS STASIS ULCER OF LEFT CALF WITH FAT LAYER EXPOSED WITHOUT VARICOSE VEINS (HCC): ICD-10-CM

## 2023-04-27 DIAGNOSIS — I87.2 VENOUS STASIS ULCER OF LEFT CALF WITH FAT LAYER EXPOSED WITHOUT VARICOSE VEINS (HCC): ICD-10-CM

## 2023-04-27 DIAGNOSIS — L89.890 PRESSURE INJURY OF LEFT LEG, UNSTAGEABLE (HCC): Primary | ICD-10-CM

## 2023-04-27 PROCEDURE — 11045 DBRDMT SUBQ TISS EACH ADDL: CPT

## 2023-04-27 PROCEDURE — 11042 DBRDMT SUBQ TIS 1ST 20SQCM/<: CPT

## 2023-04-27 RX ORDER — LIDOCAINE 40 MG/G
CREAM TOPICAL ONCE
OUTPATIENT
Start: 2023-04-27 | End: 2023-04-27

## 2023-04-27 RX ORDER — MUPIROCIN 20 MG/G
OINTMENT TOPICAL ONCE
OUTPATIENT
Start: 2023-04-27 | End: 2023-04-27

## 2023-04-27 RX ORDER — CLOBETASOL PROPIONATE 0.5 MG/G
OINTMENT TOPICAL ONCE
OUTPATIENT
Start: 2023-04-27 | End: 2023-04-27

## 2023-04-27 RX ORDER — LIDOCAINE HYDROCHLORIDE 20 MG/ML
JELLY TOPICAL ONCE
OUTPATIENT
Start: 2023-04-27 | End: 2023-04-27

## 2023-04-27 RX ORDER — SILVER SULFADIAZINE 10 G/1000G
CREAM TOPICAL ONCE
OUTPATIENT
Start: 2023-04-27 | End: 2023-04-27

## 2023-04-27 RX ORDER — TRIAMCINOLONE ACETONIDE 1 MG/G
OINTMENT TOPICAL ONCE
OUTPATIENT
Start: 2023-04-27 | End: 2023-04-27

## 2023-04-27 RX ORDER — BACITRACIN 500 [USP'U]/G
OINTMENT TOPICAL ONCE
OUTPATIENT
Start: 2023-04-27 | End: 2023-04-27

## 2023-04-27 RX ORDER — LIDOCAINE 50 MG/G
OINTMENT TOPICAL ONCE
OUTPATIENT
Start: 2023-04-27 | End: 2023-04-27

## 2023-04-27 RX ORDER — BETAMETHASONE DIPROPIONATE 0.5 MG/G
OINTMENT TOPICAL ONCE
OUTPATIENT
Start: 2023-04-27 | End: 2023-04-27

## 2023-04-27 RX ORDER — BACITRACIN ZINC AND POLYMYXIN B SULFATE 500; 1000 [USP'U]/G; [USP'U]/G
OINTMENT TOPICAL ONCE
OUTPATIENT
Start: 2023-04-27 | End: 2023-04-27

## 2023-04-27 RX ORDER — LIDOCAINE HYDROCHLORIDE 40 MG/ML
SOLUTION TOPICAL ONCE
OUTPATIENT
Start: 2023-04-27 | End: 2023-04-27

## 2023-04-27 RX ORDER — GENTAMICIN SULFATE 1 MG/G
OINTMENT TOPICAL ONCE
OUTPATIENT
Start: 2023-04-27 | End: 2023-04-27

## 2023-04-27 NOTE — WOUND CARE
04/27/23 1356   Right Leg Edema Point of Measurement   Leg circumference 38.5 cm   Ankle circumference 21 cm   Left Leg Edema Point of Measurement   Leg circumference 37 cm   Ankle circumference 22 cm   LLE Peripheral Vascular    Capillary Refill Less than/equal to 3 seconds   Color Appropriate for race   Temperature Warm   Sensation Present   Pedal Pulse Palpable   RLE Peripheral Vascular    Capillary Refill Less than/equal to 3 seconds   Color Appropriate for race   Temperature Warm   Sensation Present   Pedal Pulse Palpable   Wound Leg lower Left;Posterior; Lateral #1 left lower leg posterior lateral 04/13/23   Date First Assessed/Time First Assessed: 04/13/23 1408   Present on Hospital Admission: Yes  Wound Approximate Age at First Assessment (Weeks): 12 weeks  Primary Wound Type: Venous Ulcer  Location: Leg lower  Wound Location Orientation: Left;Posterior. .. Wound Image    Wound Etiology Venous   Dressing Status Intact; Old drainage noted   Cleansed Soap and water   Wound Length (cm) 13.8 cm   Wound Width (cm) 5.8 cm   Wound Depth (cm) 0.2 cm   Wound Surface Area (cm^2) 80.04 cm^2   Change in Wound Size % 48.03   Wound Volume (cm^3) 16.008 cm^3   Wound Healing % 48   Wound Assessment Eschar dry;Slough   Drainage Amount Moderate   Drainage Description Serosanguinous   Wound Odor None   Karen-Wound/Incision Assessment Hemosiderin staining (brown yellow)   Edges Undefined edges   Wound Thickness Description Full thickness   Wound Leg lower Right;Posterior #2  right lower leg posterior 04/13/23   Date First Assessed/Time First Assessed: 04/13/23 1410   Present on Hospital Admission: Yes  Wound Approximate Age at First Assessment (Weeks): 12 weeks  Primary Wound Type: Venous Ulcer  Location: Leg lower  Wound Location Orientation: Right;Posterio. .. Wound Image    Wound Etiology Venous   Dressing Status Dry; Intact   Cleansed Soap and water   Wound Length (cm) 0.1 cm   Wound Width (cm) 0.1 cm   Wound Depth (cm) 0.1 cm   Wound Surface Area (cm^2) 0.01 cm^2   Change in Wound Size % 99.87   Wound Volume (cm^3) 0.001 cm^3   Wound Healing % 100   Wound Assessment Other (Comment)  (scabbed, dries exudate)   Drainage Amount None   Wound Odor None   Karen-Wound/Incision Assessment Hemosiderin staining (brown yellow)   Edges Undefined edges   Pain 1   Pain Scale 1 Numeric (0 - 10)   Pain Intensity 1 6   Patient Stated Pain Goal 2   Pain Location 1 Leg   Pain Orientation 1 Left; Lower   Pain Intervention(s) 1 Medication (see MAR)     Visit Vitals  BP (!) 151/93 (BP 1 Location: Left upper arm, BP Patient Position: At rest;Sitting)   Pulse 82   Temp 98.8 °F (37.1 °C)

## 2023-04-27 NOTE — H&P
Patient presents to wound clinic for: Vani Olson is a 66 y.o. male with extensive vascular history who presents with left posterior calf wound. Patient has had the wound for over one month and was recently admitted to the hospital on 4/13/23 for Vascular Surgery consult and possible surgical debridement. He completed a course of Doxycycline and feels his wound is improving since discharge. Vascular Surgery with no concern for peripheral arterial disease. Denies nausea, chills, vomiting, fever, sob or chest pain. Pertinent Medical History:  Past Medical History:   Diagnosis Date    Congestive heart failure (HCC)     Coronary artery disease     Diabetes (HCC)     Heart failure (HCC)     CHF, cardiomyopathy    Hypertension     ICD (implantable cardioverter-defibrillator) in place     left upper chest    PAD (peripheral artery disease) ([facility] [address]      Past Surgical History:   Procedure Laterality Date    HX HEART CATHETERIZATION  2/2015    x1    HX IMPLANTABLE CARDIOVERTER DEFIBRILLATOR  2/16/2015     Prior to Admission medications    Medication Sig Start Date End Date Taking? Authorizing Provider   gabapentin (NEURONTIN) 100 mg capsule Take 1 Capsule by mouth three (3) times daily. Max Daily Amount: 300 mg. 4/16/23  Yes Tamir Das MD   traMADoL (ULTRAM) 50 mg tablet Take 1 Tablet by mouth every six (6) hours as needed for Pain. Yes Provider, Historical   bumetanide (BUMEX) 1 mg tablet Take 1 Tablet by mouth two (2) times a day. Yes Provider, Historical   hydrALAZINE (APRESOLINE) 50 mg tablet Take 1 Tablet by mouth three (3) times daily. 3/9/23  Yes Judith Mitchell NP   isosorbide dinitrate (ISORDIL) 30 mg tablet Take 1 Tablet by mouth three (3) times daily. 3/9/23  Yes Judith Mitchell NP   insulin NPH/insulin regular (NOVOLIN 70/30, HUMULIN 70/30) 100 unit/mL (70-30) injection 26-27 Units by SubCUTAneous route Daily (before dinner).  (22 units in the morning; 26-27 units in the evening; evening dose is on a sliding scale)   Yes Provider, Historical   carvediloL (COREG) 25 mg tablet TAKE 2 TABLETS BY MOUTH TWICE DAILY . 12/29/22  Yes Anatoly Naik MD   simvastatin (ZOCOR) 40 mg tablet Take 1 Tablet by mouth nightly. 6/3/21  Yes Provider, Historical   multivitamin with iron tablet Take 1 Tablet by mouth daily. Yes Other, MD Nadege   doxycycline (MONODOX) 100 mg capsule Take 1 Capsule by mouth two (2) times a day. Patient not taking: Reported on 4/27/2023 4/16/23   Cecil Madison MD     No Known Allergies  Family History   Problem Relation Age of Onset    Diabetes Mother     Hypertension Father     Diabetes Father     Diabetes Brother     Hypertension Brother      Social History     Socioeconomic History    Marital status:      Spouse name: Not on file    Number of children: Not on file    Years of education: Not on file    Highest education level: Not on file   Occupational History    Not on file   Tobacco Use    Smoking status: Never    Smokeless tobacco: Never   Vaping Use    Vaping Use: Never used   Substance and Sexual Activity    Alcohol use: Yes     Comment: 1 a month    Drug use: No    Sexual activity: Not Currently   Other Topics Concern    Not on file   Social History Narrative    Not on file     Social Determinants of Health     Financial Resource Strain: Not on file   Food Insecurity: Not on file   Transportation Needs: Not on file   Physical Activity: Not on file   Stress: Not on file   Social Connections: Not on file   Intimate Partner Violence: Not on file   Housing Stability: Not on file       Vitals:    04/27/23 1356   BP: (!) 151/93   Pulse: 82   Temp: 98.8 °F (37.1 °C)       Review of Systems:    Gen: No fever, chills, malaise, weight loss/gain. Heent: No headache, rhinorrhea, epistaxis, ear pain, hearing loss, sinus pain, neck pain/stiffness, sore throat. Heart: No chest pain, palpitations, VILLARREAL, pnd, or orthopnea. Resp: No cough, hemoptysis, wheezing and shortness of breath. GI: No nausea, vomiting, diarrhea, constipation, melena or hematochezia. : No urinary obstruction, dysuria or hematuria. Derm: see below  Musc/skeletal: no bone or joint complains. Vasc: No edema, cyanosis or claudication. Endo: No heat/cold intolerance, no polyuria,polydipsia or polyphagia. Neuro: No unilateral weakness, numbness, tingling. No seizures. Heme: No easy bruising or bleeding. Wound Description:     04/27/23 1356    Right Leg Edema Point of Measurement   Leg circumference 38.5 cm   Ankle circumference 21 cm   Left Leg Edema Point of Measurement   Leg circumference 37 cm   Ankle circumference 22 cm   LLE Peripheral Vascular    Capillary Refill Less than/equal to 3 seconds   Color Appropriate for race   Temperature Warm   Sensation Present   Pedal Pulse Palpable   RLE Peripheral Vascular    Capillary Refill Less than/equal to 3 seconds   Color Appropriate for race   Temperature Warm   Sensation Present   Pedal Pulse Palpable   Wound Leg lower Left;Posterior; Lateral #1 left lower leg posterior lateral 04/13/23   Date First Assessed/Time First Assessed: 04/13/23 1408   Present on Hospital Admission: Yes  Wound Approximate Age at First Assessment (Weeks): 12 weeks  Primary Wound Type: Venous Ulcer  Location: Leg lower  Wound Location Orientation: Left;Posterior. .. Wound Image    Wound Etiology Venous   Dressing Status Intact; Old drainage noted   Cleansed Soap and water   Wound Length (cm) 13.8 cm   Wound Width (cm) 5.8 cm   Wound Depth (cm) 0.2 cm   Wound Surface Area (cm^2) 80.04 cm^2   Change in Wound Size % 48.03   Wound Volume (cm^3) 16.008 cm^3   Wound Healing % 48   Wound Assessment Eschar dry;Slough   Drainage Amount Moderate   Drainage Description Serosanguinous   Wound Odor None   Karen-Wound/Incision Assessment Hemosiderin staining (brown yellow)   Edges Undefined edges   Wound Thickness Description Full thickness       Debridement:   Debridement Wound Care        Problem List Items Addressed This Visit          Other    Pressure injury of left leg, unstageable ([facility] [address] - Primary    Relevant Orders    INITIATE OUTPATIENT WOUND CARE PROTOCOL       Procedure Note  Indications:  Based on my examination of this patient's wound(s)/ulcer(s) today, debridement is required to promote healing and evaluate the wound base. Performed by: Christine Carrillo DPM    Consent obtained: Yes    Time out taken: Yes    Debridement: Excisional    Using curette, forceps, and # 15 blade scalpel the wound(s)/ulcer(s) was/were sharply debrided down through and including the removal of    epidermis, dermis, and subcutaneous tissue    Devitalized Tissue Debrided: fibrin, necrotic/eschar, and callus    Pre Debridement Measurements:  Are located in the [place]  Documentation Flow Sheet    Non-Pressure ulcer, fat layer exposed    Wound/Ulcer #: 1    Post Debridement Measurements:  Wound/Ulcer Descriptions are Pre Debridement except measurements:    Wound Leg Lower abraisions on dylan legs 03/02/23 (Active)   Number of days: 55       Wound Leg lower Left;Posterior; Lateral #1 left lower leg posterior lateral 04/13/23 (Active)   Wound Image   04/27/23 1356   Wound Etiology Venous 04/27/23 1356   Dressing Status Intact; Old drainage noted 04/27/23 1356   Cleansed Soap and water 04/27/23 1356   Wound Length (cm) 13.8 cm 04/27/23 1356   Wound Width (cm) 5.8 cm 04/27/23 1356   Wound Depth (cm) 0.2 cm 04/27/23 1356   Wound Surface Area (cm^2) 80.04 cm^2 04/27/23 1356   Change in Wound Size % 48.03 04/27/23 1356   Wound Volume (cm^3) 16.008 cm^3 04/27/23 1356   Wound Healing % 48 04/27/23 1356   Wound Assessment Eschar dry;Slough 04/27/23 1356   Drainage Amount Moderate 04/27/23 1356   Drainage Description Serosanguinous 04/27/23 1356   Wound Odor None 04/27/23 1356   Karen-Wound/Incision Assessment Hemosiderin staining (brown yellow) 04/27/23 1356   Edges Undefined edges 04/27/23 1356   Wound Thickness Description Full thickness 04/27/23 1356   Number of days: 14     Total Surface Area Debrided:  24 sq cm     Estimated Blood Loss:  Minimal     Hemostasis Achieved: Pressure    Procedural Pain: 9 / 10     Post Procedural Pain: 1 / 10     Response to treatment: Treatment was poorly tolerated by patient    Assessment:  Left leg venous stasis ulceration     Plan:   - Patient seen and evaluated. - I debrided the left posterior calf today, but patient was in significant pain when debriding the surrounding eschar. Will utilize Santyl dressing to help with necrotic areas. May need surgical debridement at some point.  - Patient with no evidence of peripheral arterial disease per Vascular surgery at last hospitalization. The posterior calf wound is most likely secondary to venous insufficiency. A two-layer compression dressing was applied today. He will have nursing changes twice a week and follow up with me in 2 weeks.

## 2023-04-27 NOTE — WOUND CARE
04/27/23 1504   Right Leg Edema Point of Measurement   Compression Therapy Tubular elastic support bandage  (double layer)   Tubular Elastic Support Bandage Compression Pressure Medium   Left Leg Edema Point of Measurement   Compression Therapy 2 layer compression wrap   Wound Leg lower Left;Posterior; Lateral #1 left lower leg posterior lateral 04/13/23   Date First Assessed/Time First Assessed: 04/13/23 1408   Present on Hospital Admission: Yes  Wound Approximate Age at First Assessment (Weeks): 12 weeks  Primary Wound Type: Venous Ulcer  Location: Leg lower  Wound Location Orientation: Left;Posterior. .. Dressing Status New dressing applied   Dressing/Treatment   (vashe, santyl,  hydrofera blue ready, ABD, 2 layer wrap.)     Multilayer Compression Wrap   (Not Unna) Below the Knee    NAME:  Neela Rosario  YOB: 1965  MEDICAL RECORD NUMBER:  [id number]  DATE:  April 27, 2023    Removed old Multilayer wrap if indicated and wash leg with mild soap/water. Applied moisturizing agent to dry skin as needed. Applied primary and secondary dressing as ordered. Applied multilayered dressing below the knee to left lower leg. Instructed patient/caregiver not to remove dressing and to keep it clean and dry. Instructed patient/caregiver on complications to report to provider, such as pain, numbness in toes, heavy drainage, and slippage of dressing.     Response to treatment: Well tolerated by patient       Electronically signed by Asha Anne RN on 4/27/2023 at 3:06 PM

## 2023-04-27 NOTE — DISCHARGE INSTRUCTIONS
Discharge Instructions for          [place] [address] [facility], [address]  Phone: [phone] Fax: [phone]    NAME:  Connie Forrester  YOB: 1965  MEDICAL RECORD NUMBER:  [id number]  DATE:  April 27, 2023  WOUND CARE ORDERS:  Left lower leg wound - Cleanse with vashe solution. Let sit for 5-7 minutes, pat dry. Apply santyl ointment to wound bed. Cover with hydrofera blue ready. Cover with abd pad. Apply LITE 2 layer calamine wrap for mild compression. Change twice a week in clinic. Right lower leg - Double tubigrip size E daily. Activity:  [x] Activity as tolerated: Elevate leg(s) above the level of the heart when sitting and a void prolonged standing in one place. Do no get dressing/wrap wet. [] Remain off Work:       [] May return to full duty work:                                     [] Return to work with restrictions:  Dietary:  [] Diet as tolerated      [x] Diabetic Diet            [x] Increase Protein: examples (Meat, cheese, eggs, greek yogurt, fish, nuts)          [x] Asaf Therapeutic Nutrition Powder  [] Other:  Return Appointment:  [x] Return Appointment: With Dr. Shereen Vega in 2 weeks. [x] Nurse Visit : Next Monday, Thursday, and the following Monday  [] Ordered tests:    Electronically signed Kennedy Gomez RN on 4/27/2023 at 2:22 PM     Della [address]: Should you experience any significant changes in your wound(s) or have questions about your wound care, please contact the [address] [place] [address] at [address] [place] [address] 8:00 am - 4:30. If you need help with your wound outside these hours and cannot wait until we are again available, contact your PCP or go to the hospital emergency room. PLEASE NOTE: IF YOU ARE UNABLE TO OBTAIN WOUND SUPPLIES, CONTINUE TO USE THE SUPPLIES YOU HAVE AVAILABLE UNTIL YOU ARE ABLE TO REACH US. IT IS MOST IMPORTANT TO KEEP THE WOUND COVERED AT ALL TIMES.      Physician Signature:_______________________    Date: ___________ Time:  ____________

## 2023-05-01 ENCOUNTER — HOSPITAL ENCOUNTER (OUTPATIENT)
Dept: WOUND CARE | Age: 58
Discharge: HOME | End: 2023-05-01
Payer: MEDICARE

## 2023-05-01 VITALS — SYSTOLIC BLOOD PRESSURE: 148 MMHG | HEART RATE: 75 BPM | DIASTOLIC BLOOD PRESSURE: 76 MMHG | RESPIRATION RATE: 16 BRPM

## 2023-05-01 DIAGNOSIS — L89.890 PRESSURE INJURY OF LEFT LEG, UNSTAGEABLE (HCC): Primary | ICD-10-CM

## 2023-05-01 PROCEDURE — 29581 APPL MULTLAYER CMPRN SYS LEG: CPT

## 2023-05-04 ENCOUNTER — HOSPITAL ENCOUNTER (OUTPATIENT)
Dept: WOUND CARE | Age: 58
Discharge: HOME | End: 2023-05-04
Payer: MEDICARE

## 2023-05-04 VITALS
RESPIRATION RATE: 18 BRPM | TEMPERATURE: 98.4 F | SYSTOLIC BLOOD PRESSURE: 140 MMHG | DIASTOLIC BLOOD PRESSURE: 78 MMHG | HEART RATE: 76 BPM

## 2023-05-04 DIAGNOSIS — L89.890 PRESSURE INJURY OF LEFT LEG, UNSTAGEABLE (HCC): Primary | ICD-10-CM

## 2023-05-04 PROCEDURE — 29581 APPL MULTLAYER CMPRN SYS LEG: CPT

## 2023-05-04 RX ORDER — BACITRACIN 500 [USP'U]/G
OINTMENT TOPICAL ONCE
OUTPATIENT
Start: 2023-05-04 | End: 2023-05-04

## 2023-05-04 RX ORDER — LIDOCAINE HYDROCHLORIDE 40 MG/ML
SOLUTION TOPICAL ONCE
OUTPATIENT
Start: 2023-05-04 | End: 2023-05-04

## 2023-05-04 RX ORDER — LIDOCAINE HYDROCHLORIDE 20 MG/ML
JELLY TOPICAL ONCE
OUTPATIENT
Start: 2023-05-04 | End: 2023-05-04

## 2023-05-04 RX ORDER — MUPIROCIN 20 MG/G
OINTMENT TOPICAL ONCE
OUTPATIENT
Start: 2023-05-04 | End: 2023-05-04

## 2023-05-04 RX ORDER — BACITRACIN ZINC AND POLYMYXIN B SULFATE 500; 1000 [USP'U]/G; [USP'U]/G
OINTMENT TOPICAL ONCE
OUTPATIENT
Start: 2023-05-04 | End: 2023-05-04

## 2023-05-04 RX ORDER — ALLOPURINOL 100 MG/1
50 TABLET ORAL DAILY
Qty: 15 TABLET | Refills: 1 | Status: SHIPPED | OUTPATIENT
Start: 2023-05-04

## 2023-05-04 RX ORDER — TRIAMCINOLONE ACETONIDE 1 MG/G
OINTMENT TOPICAL ONCE
OUTPATIENT
Start: 2023-05-04 | End: 2023-05-04

## 2023-05-04 RX ORDER — GENTAMICIN SULFATE 1 MG/G
OINTMENT TOPICAL ONCE
OUTPATIENT
Start: 2023-05-04 | End: 2023-05-04

## 2023-05-04 RX ORDER — BETAMETHASONE DIPROPIONATE 0.5 MG/G
OINTMENT TOPICAL ONCE
OUTPATIENT
Start: 2023-05-04 | End: 2023-05-04

## 2023-05-04 RX ORDER — SILVER SULFADIAZINE 10 G/1000G
CREAM TOPICAL ONCE
OUTPATIENT
Start: 2023-05-04 | End: 2023-05-04

## 2023-05-04 RX ORDER — CLOBETASOL PROPIONATE 0.5 MG/G
OINTMENT TOPICAL ONCE
OUTPATIENT
Start: 2023-05-04 | End: 2023-05-04

## 2023-05-04 RX ORDER — LIDOCAINE 40 MG/G
CREAM TOPICAL ONCE
OUTPATIENT
Start: 2023-05-04 | End: 2023-05-04

## 2023-05-04 RX ORDER — LIDOCAINE 50 MG/G
OINTMENT TOPICAL ONCE
OUTPATIENT
Start: 2023-05-04 | End: 2023-05-04

## 2023-05-05 DIAGNOSIS — L89.890 PRESSURE INJURY OF LEFT LEG, UNSTAGEABLE (HCC): ICD-10-CM

## 2023-05-08 ENCOUNTER — HOSPITAL ENCOUNTER (OUTPATIENT)
Facility: HOSPITAL | Age: 58
Discharge: HOME | End: 2023-05-08
Payer: MEDICARE

## 2023-05-08 VITALS — HEART RATE: 83 BPM | DIASTOLIC BLOOD PRESSURE: 83 MMHG | TEMPERATURE: 97.6 F | SYSTOLIC BLOOD PRESSURE: 147 MMHG

## 2023-05-08 PROCEDURE — 29581 APPL MULTLAYER CMPRN SYS LEG: CPT

## 2023-05-11 ENCOUNTER — HOSPITAL ENCOUNTER (OUTPATIENT)
Facility: HOSPITAL | Age: 58
Discharge: HOME | End: 2023-05-11
Payer: MEDICARE

## 2023-05-11 VITALS — RESPIRATION RATE: 18 BRPM | TEMPERATURE: 97.4 F

## 2023-05-11 PROCEDURE — 11045 DBRDMT SUBQ TISS EACH ADDL: CPT

## 2023-05-11 PROCEDURE — 11042 DBRDMT SUBQ TIS 1ST 20SQCM/<: CPT

## 2023-05-11 ASSESSMENT — PAIN DESCRIPTION - PAIN TYPE: TYPE: CHRONIC PAIN

## 2023-05-11 ASSESSMENT — PAIN SCALES - GENERAL: PAINLEVEL_OUTOF10: 7

## 2023-05-11 ASSESSMENT — PAIN DESCRIPTION - ORIENTATION: ORIENTATION: LEFT;RIGHT

## 2023-05-11 ASSESSMENT — PAIN DESCRIPTION - FREQUENCY: FREQUENCY: INTERMITTENT

## 2023-05-11 ASSESSMENT — PAIN DESCRIPTION - LOCATION: LOCATION: LEG

## 2023-05-11 NOTE — FLOWSHEET NOTE
05/11/23 1500   Wound 04/13/23 Leg Left;Posterior; Lateral;Lower #1   Date First Assessed: 04/13/23   Primary Wound Type: Venous Ulcer  Location: Leg  Wound Location Orientation: Left;Posterior; Lateral;Lower  Wound Description (Comments): #1   Dressing Status New dressing applied   Dressing/Treatment   (vashe, santyl, hydrofera blue, ABD, 2 layer wrap.)

## 2023-05-11 NOTE — PROGRESS NOTES
Assessment Slough;Eschar dry;Pink/red   Drainage Amount Moderate   Drainage Description Serosanguinous   Odor Mild   Kay-wound Assessment Hemosiderin staining (brown yellow)   Margins Defined edges   Wound Thickness Description not for Pressure Injury Full thickness   Pain Assessment   Pain Assessment 0-10   Pain Level 7   Patient's Stated Pain Goal 4   Pain Location Leg   Pain Orientation Left;Right   Pain Descriptors Gnawing; Jewel Sheree; Other (Comment)  (\"electrical\")   Pain Type Chronic pain   Pain Frequency Intermittent      Temp 97.4 °F (36.3 °C) (Temporal)   Resp 18      Debridement:   Debridement Wound Care        Problem List Items Addressed This Visit    None      Procedure Note  Indications:  Based on my examination of this patient's wound(s)/ulcer(s) today, debridement is  required to promote healing and evaluate the wound base. Performed by: Kory Ye DPM    Consent obtained: Yes    Time out taken: Yes    Debridement: excisional    Using curette the wound(s)/ulcer(s) was/were sharply debrided down through and including the removal of    epidermis, dermis, and subcutaneous tissue    Devitalized Tissue Debrided: fibrin, slough, and necrotic/eschar    Pre Debridement Measurements:  Are located in the [place]  Documentation Flow Sheet    Non-Pressure ulcer, fat layer exposed    Wound/Ulcer #: 1    Post Debridement Measurements:  Wound/Ulcer Descriptions are Pre Debridement except measurements: Total Surface Area Debrided:  112.05 sq cm     Estimated Blood Loss: Minimal    Hemostasis Achieved: Pressure    Procedural Pain: 3 / 10     Post Procedural Pain: 1 / 10     Response to treatment: Patient tolerated treatment with moderate discomfort but relieved after procedure was completed        Assessment:  Left leg venous stasis ulceration     Plan:   - Patient seen and evaluated. - Wound with significant improvement and less eschar today.  I debrided the left posterior calf today and patient able

## 2023-05-11 NOTE — DISCHARGE INSTRUCTIONS
Discharge Instructions/Wound Orders  [place] [address] 1200 [facility], [address]  Telephone: [phone]    NAME:  Oumou Oviedo  YOB: 1965  MEDICAL RECORD NUMBER:  [id number]  DATE:  May 11, 2023  Wound Care Orders:  Left lower extremity wound: Cleanse with vashe solution. Let sit 5-7 minutes, pat dry. Apply Santyl ointment to wound bed. Cover with [facility] Ready. Cover with ABD pad. Apply LITE 2 layer Calamine wrap for mild compression. Change twice a week in clinic. Activity:  [] Activity as tolerated:     [] Remain off Work:       [] May return to full duty work:                                     [] Return to work with restrictions:  Dietary:  [] Diet as tolerated      [] Diabetic Diet            [] Increase Protein: examples (Meat, cheese, eggs, greek yogurt, fish, nuts)          [] [address]  [] Other:  [] Dial a Dietician : Call [facility] at [phone] enter code [phone] when prompted. M-F 9am-5pm EST. Return Appointment:  [x] Return Appointment: With Dr. Deirdre Goel in [facility]) / Nurse Visits for Monday, Thursday and following Monday  [] Ordered tests:    Electronically signed Rekha Downey RN on 5/11/2023 at 2:16 PM     [address] Information: Should you experience any significant changes in your wound(s) or have questions about your wound care, please contact the [address] [place] [address] at [address] [place] [address] 8:00 am - 4:30. If you need help with your wound outside these hours and cannot wait until we are again available, contact your PCP or go to the hospital emergency room. PLEASE NOTE: IF YOU ARE UNABLE TO OBTAIN WOUND SUPPLIES, CONTINUE TO USE THE SUPPLIES YOU HAVE AVAILABLE UNTIL YOU ARE ABLE TO REACH US. IT IS MOST IMPORTANT TO KEEP THE WOUND COVERED AT ALL TIMES.      Physician Signature:_______________________    Date: ___________ Time:  ____________

## 2023-05-11 NOTE — FLOWSHEET NOTE
05/08/23 1200   Left Leg Edema Point of Measurement   Compression Therapy 2 layer compression wrap   Wound 04/13/23 Leg Left;Posterior; Lateral;Lower #1   Date First Assessed: 04/13/23   Primary Wound Type: Venous Ulcer  Location: Leg  Wound Location Orientation: Left;Posterior; Lateral;Lower  Wound Description (Comments): #1   Dressing/Treatment   (Vashe / Santyl / HFBR / ABD / 2 layer Calamine LITE)     Patient tolerated dressing well.

## 2023-05-11 NOTE — FLOWSHEET NOTE
05/11/23 1401   Right Leg Edema Point of Measurement   Heel to calf 39   Leg circumference 22 cm   Left Leg Edema Point of Measurement   Leg circumference 37 cm   Ankle circumference 22 cm   Compression Therapy 2 layer compression wrap   RLE Neurovascular Assessment   Capillary Refill Less than/Equal to 3 seconds   Color Appropriate for Ethnicity   Temperature Warm   R Pedal Pulse +2   LLE Neurovascular Assessment   Capillary Refill Less than/Equal to 3 seconds   Color Appropriate for Ethnicity   Temperature Warm   L Pedal Pulse +2   Wound 04/13/23 Leg Left;Posterior; Lateral;Lower #1   Date First Assessed: 04/13/23   Primary Wound Type: Venous Ulcer  Location: Leg  Wound Location Orientation: Left;Posterior; Lateral;Lower  Wound Description (Comments): #1   Wound Image    Wound Etiology Venous   Dressing Status Intact; Old drainage noted   Wound Cleansed Soap and water   Wound Length (cm) 13.5 cm   Wound Width (cm) 8.3 cm   Wound Depth (cm) 0.3 cm   Wound Surface Area (cm^2) 112.05 cm^2   Change in Wound Size % (l*w) -39.99   Wound Volume (cm^3) 33.615 cm^3   Wound Healing % -110   Wound Assessment Slough;Eschar dry;Pink/red   Drainage Amount Moderate   Drainage Description Serosanguinous   Odor Mild   Kay-wound Assessment Hemosiderin staining (brown yellow)   Margins Defined edges   Wound Thickness Description not for Pressure Injury Full thickness   Pain Assessment   Pain Assessment 0-10   Pain Level 7   Patient's Stated Pain Goal 4   Pain Location Leg   Pain Orientation Left;Right   Pain Descriptors Gnawing; Dewane Tony; Other (Comment)  (\"electrical\")   Pain Type Chronic pain   Pain Frequency Intermittent     Temp 97.4 °F (36.3 °C) (Temporal)   Resp 18

## 2023-05-15 ENCOUNTER — HOSPITAL ENCOUNTER (OUTPATIENT)
Facility: HOSPITAL | Age: 58
Discharge: HOME | End: 2023-05-15
Payer: MEDICARE

## 2023-05-15 VITALS
RESPIRATION RATE: 17 BRPM | TEMPERATURE: 98.1 F | SYSTOLIC BLOOD PRESSURE: 142 MMHG | DIASTOLIC BLOOD PRESSURE: 87 MMHG | HEART RATE: 80 BPM

## 2023-05-15 PROCEDURE — 29581 APPL MULTLAYER CMPRN SYS LEG: CPT

## 2023-05-15 ASSESSMENT — PAIN DESCRIPTION - PAIN TYPE: TYPE: CHRONIC PAIN

## 2023-05-15 ASSESSMENT — PAIN SCALES - GENERAL: PAINLEVEL_OUTOF10: 6

## 2023-05-15 ASSESSMENT — PAIN DESCRIPTION - LOCATION: LOCATION: LEG

## 2023-05-15 ASSESSMENT — PAIN DESCRIPTION - ORIENTATION: ORIENTATION: RIGHT;LEFT

## 2023-05-15 NOTE — FLOWSHEET NOTE
05/15/23 1103   Wound 04/13/23 Leg Left;Posterior; Lateral;Lower #1   Date First Assessed: 04/13/23   Primary Wound Type: Venous Ulcer  Location: Leg  Wound Location Orientation: Left;Posterior; Lateral;Lower  Wound Description (Comments): #1   Wound Image    Wound Etiology Venous   Dressing Status New dressing applied   Wound Cleansed Soap and water   Dressing/Treatment   (vashe, santyl, hydrofera blue, ABD, 2 layer wrap.)     BP (!) 142/87   Pulse 80   Temp 98.1 °F (36.7 °C) (Temporal)   Resp 17

## 2023-05-17 RX ORDER — GABAPENTIN 100 MG/1
CAPSULE ORAL
Qty: 60 CAPSULE | Refills: 0 | Status: SHIPPED | OUTPATIENT
Start: 2023-05-17

## 2023-05-18 ENCOUNTER — HOSPITAL ENCOUNTER (OUTPATIENT)
Facility: HOSPITAL | Age: 58
Discharge: HOME | End: 2023-05-18

## 2023-05-22 ENCOUNTER — HOSPITAL ENCOUNTER (OUTPATIENT)
Facility: HOSPITAL | Age: 58
Discharge: HOME | End: 2023-05-22
Payer: MEDICARE

## 2023-05-22 VITALS
HEART RATE: 75 BPM | DIASTOLIC BLOOD PRESSURE: 73 MMHG | RESPIRATION RATE: 18 BRPM | TEMPERATURE: 98 F | SYSTOLIC BLOOD PRESSURE: 109 MMHG

## 2023-05-22 PROCEDURE — 29581 APPL MULTLAYER CMPRN SYS LEG: CPT

## 2023-05-22 ASSESSMENT — PAIN SCALES - GENERAL: PAINLEVEL_OUTOF10: 7

## 2023-05-22 ASSESSMENT — PAIN DESCRIPTION - LOCATION: LOCATION: LEG

## 2023-05-22 ASSESSMENT — PAIN DESCRIPTION - ORIENTATION: ORIENTATION: LEFT;RIGHT

## 2023-05-22 NOTE — FLOWSHEET NOTE
05/22/23 1201   Wound 04/13/23 Leg Left;Posterior; Lateral;Lower #1   Date First Assessed: 04/13/23   Primary Wound Type: Venous Ulcer  Location: Leg  Wound Location Orientation: Left;Posterior; Lateral;Lower  Wound Description (Comments): #1   Dressing Status New dressing applied   Dressing/Treatment   (vashe, santyl, hydrofera blue ready, ABD, 2 layer wrap.)     Multilayer Compression Wrap   (Not Unna) Below the Knee    NAME:  Mony Bravo  YOB: 1965  MEDICAL RECORD NUMBER:  [id number]  DATE:  May 22, 2023    Removed old dressing; wash with soap and water, Applied primary and secondary dressing as ordered, Place compression to left lower leg, Instructed patient/caregiver not to remove dressing and to keep it clean and dry, Instructed patient/caregiver on complications to report to provider, such as pain, numbness in toes, heavy drainage, and slippage of dressing, and Instructed patient/caregiver on purpose of compression dressing and on activity and exercise recommendations    Response to treatment: Well tolerated by patient  /73   Pulse 75   Temp 98 °F (36.7 °C) (Temporal)   Resp 18         Electronically signed by Nishant Crabtree RN on 5/22/2023 at 12:02 PM

## 2023-05-25 DIAGNOSIS — I50.22 CHRONIC SYSTOLIC HEART FAILURE (HCC): Primary | ICD-10-CM

## 2023-05-30 ENCOUNTER — HOSPITAL ENCOUNTER (OUTPATIENT)
Facility: HOSPITAL | Age: 58
Discharge: HOME | End: 2023-05-30
Payer: MEDICARE

## 2023-05-30 VITALS — RESPIRATION RATE: 18 BRPM | TEMPERATURE: 98.6 F

## 2023-05-30 PROCEDURE — 29581 APPL MULTLAYER CMPRN SYS LEG: CPT

## 2023-05-30 ASSESSMENT — PAIN DESCRIPTION - LOCATION: LOCATION: LEG

## 2023-05-30 ASSESSMENT — PAIN DESCRIPTION - ORIENTATION: ORIENTATION: RIGHT;LEFT

## 2023-05-30 ASSESSMENT — PAIN DESCRIPTION - DESCRIPTORS: DESCRIPTORS: BURNING

## 2023-05-30 ASSESSMENT — PAIN DESCRIPTION - PAIN TYPE: TYPE: CHRONIC PAIN

## 2023-05-30 NOTE — FLOWSHEET NOTE
Multilayer Compression Wrap   (Not Unna) Below the Knee    NAME:  Safia Peralta  YOB: 1965  MEDICAL RECORD NUMBER:  [id number]  DATE:  May 30, 2023     05/30/23 1322   Left Leg Edema Point of Measurement   Compression Therapy 2 layer compression wrap   Wound 04/13/23 Leg Left;Posterior; Lateral;Lower #1   Date First Assessed: 04/13/23   Primary Wound Type: Venous Ulcer  Location: Leg  Wound Location Orientation: Left;Posterior; Lateral;Lower  Wound Description (Comments): #1   Wound Cleansed Vashe   Dressing/Treatment Other (comment); Hydrofera blue;ABD       Removed old dressing; wash with soap and water, Applied primary and secondary dressing as ordered, Place compression to left lower leg, Instructed patient/caregiver not to remove dressing and to keep it clean and dry, Instructed patient/caregiver on complications to report to provider, such as pain, numbness in toes, heavy drainage, and slippage of dressing, and Instructed patient/caregiver on purpose of compression dressing and on activity and exercise recommendations    Response to treatment: Well tolerated by patient      Electronically signed by Marisel Cutler RN on 5/30/2023 at 1:24 PM

## 2023-05-30 NOTE — FLOWSHEET NOTE
05/30/23 1311   Left Leg Edema Point of Measurement   Compression Therapy 2 layer compression wrap   RLE Neurovascular Assessment   Capillary Refill Less than/Equal to 3 seconds   Color Appropriate for Ethnicity   Temperature Warm   R Pedal Pulse +2   RLE Sensation  Pain   LLE Neurovascular Assessment   Capillary Refill Less than/Equal to 3 seconds   Color Appropriate for Ethnicity   Temperature Warm   L Pedal Pulse +2   LLE Sensation  Pain   Wound 04/13/23 Leg Left;Posterior; Lateral;Lower #1   Date First Assessed: 04/13/23   Primary Wound Type: Venous Ulcer  Location: Leg  Wound Location Orientation: Left;Posterior; Lateral;Lower  Wound Description (Comments): #1   Wound Etiology Venous   Dressing Status Intact; Old drainage noted   Wound Cleansed Soap and water   Wound Assessment Slough;Eschar moist   Drainage Amount Large   Drainage Description Serosanguinous   Odor Moderate   Kay-wound Assessment Edematous;Fragile; Hemosiderin staining (brown yellow)   Margins Defined edges   Wound Thickness Description not for Pressure Injury Full thickness   Pain Assessment   Pain Assessment 0-10   Pain Location Leg   Pain Orientation Right;Left  (\"regular nerve pain\")   Pain Descriptors Burning   Pain Type Chronic pain     Temp 98.6 °F (37 °C) (Temporal)   Resp 18

## 2023-06-01 ENCOUNTER — HOSPITAL ENCOUNTER (OUTPATIENT)
Facility: HOSPITAL | Age: 58
Discharge: HOME | End: 2023-06-01
Payer: MEDICARE

## 2023-06-01 VITALS
SYSTOLIC BLOOD PRESSURE: 124 MMHG | HEART RATE: 82 BPM | TEMPERATURE: 97.7 F | RESPIRATION RATE: 18 BRPM | DIASTOLIC BLOOD PRESSURE: 73 MMHG

## 2023-06-01 DIAGNOSIS — L97.912 CALCIPHYLAXIS OF RIGHT LOWER EXTREMITY WITH NONHEALING ULCER WITH FAT LAYER EXPOSED (HCC): ICD-10-CM

## 2023-06-01 DIAGNOSIS — E83.59 CALCIPHYLAXIS OF RIGHT LOWER EXTREMITY WITH NONHEALING ULCER WITH FAT LAYER EXPOSED (HCC): ICD-10-CM

## 2023-06-01 DIAGNOSIS — I87.2 VENOUS STASIS ULCER OF LEFT CALF WITH FAT LAYER EXPOSED WITHOUT VARICOSE VEINS (HCC): ICD-10-CM

## 2023-06-01 DIAGNOSIS — E83.59 CALCIPHYLAXIS OF LEFT LOWER EXTREMITY WITH NONHEALING ULCER WITH FAT LAYER EXPOSED (HCC): Primary | ICD-10-CM

## 2023-06-01 DIAGNOSIS — L97.922 CALCIPHYLAXIS OF LEFT LOWER EXTREMITY WITH NONHEALING ULCER WITH FAT LAYER EXPOSED (HCC): Primary | ICD-10-CM

## 2023-06-01 DIAGNOSIS — L97.222 VENOUS STASIS ULCER OF LEFT CALF WITH FAT LAYER EXPOSED WITHOUT VARICOSE VEINS (HCC): ICD-10-CM

## 2023-06-01 PROCEDURE — 11042 DBRDMT SUBQ TIS 1ST 20SQCM/<: CPT

## 2023-06-01 PROCEDURE — 11045 DBRDMT SUBQ TISS EACH ADDL: CPT

## 2023-06-01 RX ORDER — LIDOCAINE 40 MG/G
CREAM TOPICAL ONCE
OUTPATIENT
Start: 2023-06-01 | End: 2023-06-01

## 2023-06-01 RX ORDER — CLOBETASOL PROPIONATE 0.5 MG/G
OINTMENT TOPICAL ONCE
OUTPATIENT
Start: 2023-06-01 | End: 2023-06-01

## 2023-06-01 RX ORDER — BACITRACIN, NEOMYCIN, POLYMYXIN B 400; 3.5; 5 [USP'U]/G; MG/G; [USP'U]/G
OINTMENT TOPICAL ONCE
OUTPATIENT
Start: 2023-06-01 | End: 2023-06-01

## 2023-06-01 RX ORDER — LIDOCAINE HYDROCHLORIDE 20 MG/ML
JELLY TOPICAL ONCE
OUTPATIENT
Start: 2023-06-01 | End: 2023-06-01

## 2023-06-01 RX ORDER — BETAMETHASONE DIPROPIONATE 0.05 %
OINTMENT (GRAM) TOPICAL ONCE
OUTPATIENT
Start: 2023-06-01 | End: 2023-06-01

## 2023-06-01 RX ORDER — BACITRACIN ZINC AND POLYMYXIN B SULFATE 500; 1000 [USP'U]/G; [USP'U]/G
OINTMENT TOPICAL ONCE
Status: CANCELLED | OUTPATIENT
Start: 2023-06-01 | End: 2023-06-01

## 2023-06-01 RX ORDER — LIDOCAINE 50 MG/G
OINTMENT TOPICAL ONCE
Status: CANCELLED | OUTPATIENT
Start: 2023-06-01 | End: 2023-06-01

## 2023-06-01 RX ORDER — GENTAMICIN SULFATE 1 MG/G
OINTMENT TOPICAL ONCE
Status: CANCELLED | OUTPATIENT
Start: 2023-06-01 | End: 2023-06-01

## 2023-06-01 RX ORDER — SODIUM CHLOR/HYPOCHLOROUS ACID 0.033 %
SOLUTION, IRRIGATION IRRIGATION ONCE
Status: CANCELLED | OUTPATIENT
Start: 2023-06-01 | End: 2023-06-01

## 2023-06-01 RX ORDER — BETAMETHASONE DIPROPIONATE 0.05 %
OINTMENT (GRAM) TOPICAL ONCE
Status: CANCELLED | OUTPATIENT
Start: 2023-06-01 | End: 2023-06-01

## 2023-06-01 RX ORDER — LIDOCAINE HYDROCHLORIDE 20 MG/ML
JELLY TOPICAL ONCE
Status: CANCELLED | OUTPATIENT
Start: 2023-06-01 | End: 2023-06-01

## 2023-06-01 RX ORDER — LIDOCAINE HYDROCHLORIDE 40 MG/ML
SOLUTION TOPICAL ONCE
Status: CANCELLED | OUTPATIENT
Start: 2023-06-01 | End: 2023-06-01

## 2023-06-01 RX ORDER — CLOBETASOL PROPIONATE 0.5 MG/G
OINTMENT TOPICAL ONCE
Status: CANCELLED | OUTPATIENT
Start: 2023-06-01 | End: 2023-06-01

## 2023-06-01 RX ORDER — LIDOCAINE 40 MG/G
CREAM TOPICAL ONCE
Status: CANCELLED | OUTPATIENT
Start: 2023-06-01 | End: 2023-06-01

## 2023-06-01 RX ORDER — GINSENG 100 MG
CAPSULE ORAL ONCE
OUTPATIENT
Start: 2023-06-01 | End: 2023-06-01

## 2023-06-01 RX ORDER — SODIUM CHLOR/HYPOCHLOROUS ACID 0.033 %
SOLUTION, IRRIGATION IRRIGATION ONCE
OUTPATIENT
Start: 2023-06-01 | End: 2023-06-01

## 2023-06-01 RX ORDER — GINSENG 100 MG
CAPSULE ORAL ONCE
Status: CANCELLED | OUTPATIENT
Start: 2023-06-01 | End: 2023-06-01

## 2023-06-01 RX ORDER — GENTAMICIN SULFATE 1 MG/G
OINTMENT TOPICAL ONCE
OUTPATIENT
Start: 2023-06-01 | End: 2023-06-01

## 2023-06-01 RX ORDER — LIDOCAINE HYDROCHLORIDE 40 MG/ML
SOLUTION TOPICAL ONCE
OUTPATIENT
Start: 2023-06-01 | End: 2023-06-01

## 2023-06-01 RX ORDER — BACITRACIN ZINC AND POLYMYXIN B SULFATE 500; 1000 [USP'U]/G; [USP'U]/G
OINTMENT TOPICAL ONCE
OUTPATIENT
Start: 2023-06-01 | End: 2023-06-01

## 2023-06-01 RX ORDER — LIDOCAINE 50 MG/G
OINTMENT TOPICAL ONCE
OUTPATIENT
Start: 2023-06-01 | End: 2023-06-01

## 2023-06-01 RX ORDER — BACITRACIN, NEOMYCIN, POLYMYXIN B 400; 3.5; 5 [USP'U]/G; MG/G; [USP'U]/G
OINTMENT TOPICAL ONCE
Status: CANCELLED | OUTPATIENT
Start: 2023-06-01 | End: 2023-06-01

## 2023-06-01 ASSESSMENT — PAIN DESCRIPTION - PAIN TYPE: TYPE: CHRONIC PAIN

## 2023-06-01 ASSESSMENT — PAIN DESCRIPTION - ONSET: ONSET: ON-GOING

## 2023-06-01 ASSESSMENT — PAIN SCALES - GENERAL: PAINLEVEL_OUTOF10: 8

## 2023-06-01 ASSESSMENT — PAIN DESCRIPTION - LOCATION: LOCATION: LEG

## 2023-06-01 ASSESSMENT — PAIN - FUNCTIONAL ASSESSMENT: PAIN_FUNCTIONAL_ASSESSMENT: ACTIVITIES ARE NOT PREVENTED

## 2023-06-01 ASSESSMENT — PAIN DESCRIPTION - FREQUENCY: FREQUENCY: INTERMITTENT

## 2023-06-01 ASSESSMENT — PAIN DESCRIPTION - ORIENTATION: ORIENTATION: RIGHT;LEFT

## 2023-06-01 NOTE — FLOWSHEET NOTE
Multilayer Compression Wrap   (Not Unna) Below the Knee    NAME:  Johnna Hutchinson  YOB: 1965  MEDICAL RECORD NUMBER:  [id number]  DATE:  June 1, 2023 06/01/23 1549   Right Leg Edema Point of Measurement   Compression Therapy Tubular elastic support bandage   Tubular Elastic Support Bandage Compression Pressure Medium   Left Leg Edema Point of Measurement   Compression Therapy 2 layer compression wrap   Wound 04/13/23 Leg Left;Posterior; Lateral;Lower #1   Date First Assessed: 04/13/23   Primary Wound Type: Venous Ulcer  Location: Leg  Wound Location Orientation: Left;Posterior; Lateral;Lower  Wound Description (Comments): #1   Dressing Status New dressing applied   Dressing/Treatment Other (comment)  (santyl, hydrofera blue, ABD.)   Wound 06/01/23 Leg Lateral;Right #2 right lateral leg. Date First Assessed/Time First Assessed: 06/01/23 1455   Present on Hospital Admission: Yes  Wound Approximate Age at First Assessment (Weeks): 1 weeks  Primary Wound Type: Venous Ulcer  Location: Leg  Wound Location Orientation: Lateral;Right  Wound . ..    Dressing Status New dressing applied   Dressing/Treatment Other (comment)  (santyl, gauze, roll gauze.)       Removed old dressing; wash with soap and water, Applied primary and secondary dressing as ordered, Place compression to left lower leg, Instructed patient/caregiver not to remove dressing and to keep it clean and dry, Instructed patient/caregiver on complications to report to provider, such as pain, numbness in toes, heavy drainage, and slippage of dressing, and Instructed patient/caregiver on purpose of compression dressing and on activity and exercise recommendations    Response to treatment: Well tolerated by patient      Electronically signed by Malaika Martinez RN on 6/1/2023 at 3:51 PM

## 2023-06-01 NOTE — PROGRESS NOTES
Patient presents to wound clinic for: Elba Taylor is a 66 y.o. male with extensive vascular history who presents with left posterior calf wound. Patient has had the wound for over one month and was recently admitted to the hospital on 4/13/23 for Vascular Surgery consult and possible surgical debridement. He completed a course of Doxycycline and feels his wound is improving since discharge. Vascular Surgery with no concern for peripheral arterial disease. Denies nausea, chills, vomiting, fever, sob or chest pain. Update 6/1/23:  Patient returns today for follow up of left posterior calf wound. Patient reports the wounds have been extremely painful and keeps him up at night. He states his pain medication is currently managed by his Nephrologist and PCP. He has had a compression dressing on and having one nursing change each week. Denies nausea, chills, vomiting, fever, sob or chest pain. Pertinent Medical History:  Past Medical History:   Diagnosis Date    Congestive heart failure (HCC)     Coronary artery disease     Diabetes (HCC)     Heart failure (HCC)     CHF, cardiomyopathy    Hypertension     ICD (implantable cardioverter-defibrillator) in place     left upper chest    PAD (peripheral artery disease) [facility])      Past Surgical History:   Procedure Laterality Date    CARDIAC CATHETERIZATION  2/2015    x1    CARDIAC DEFIBRILLATOR PLACEMENT  2/16/2015     Prior to Admission medications    Medication Sig Start Date End Date Taking? Authorizing Provider   bumetanide (BUMEX) 1 MG tablet Take 1 tablet by mouth daily    Historical Provider, MD   doxycycline monohydrate (MONODOX) 100 MG capsule Take 1 capsule by mouth 2 times daily  Patient not taking: Reported on 6/1/2023    Historical Provider, MD   traMADol (ULTRAM) 50 MG tablet Take 2 tablets by mouth every 6 hours as needed for Pain.     Historical Provider, MD   Multiple Vitamins-Iron (MULTIVITAMIN PLUS IRON ADULT PO) Take by mouth    Historical Provider,

## 2023-06-01 NOTE — FLOWSHEET NOTE
06/01/23 1451   Anesthetic   Anesthetic 4% Lidocaine Liquid Topical   Right Leg Edema Point of Measurement   Leg circumference 40.5 cm   Ankle circumference 23 cm   Left Leg Edema Point of Measurement   Leg circumference 35 cm   Ankle circumference 22 cm   Compression Therapy 2 layer compression wrap   RLE Neurovascular Assessment   Capillary Refill Less than/Equal to 3 seconds   Color Appropriate for Ethnicity   Temperature Warm   RLE Sensation  Pain   R Pedal Pulse +2   LLE Neurovascular Assessment   Capillary Refill Less than/Equal to 3 seconds   Color Appropriate for Ethnicity   Temperature Warm   LLE Sensation  Pain   L Pedal Pulse +2   Wound 04/13/23 Leg Left;Posterior; Lateral;Lower #1   Date First Assessed: 04/13/23   Primary Wound Type: Venous Ulcer  Location: Leg  Wound Location Orientation: Left;Posterior; Lateral;Lower  Wound Description (Comments): #1   Wound Image    Wound Etiology Venous   Dressing Status Old drainage noted   Wound Cleansed Soap and water   Wound Length (cm) 14.2 cm   Wound Width (cm) 9 cm   Wound Depth (cm) 0.3 cm   Wound Surface Area (cm^2) 127.8 cm^2   Change in Wound Size % (l*w) -59.67   Wound Volume (cm^3) 38.34 cm^3   Wound Healing % -140   Wound Assessment Pink/red;Eschar moist;Slough   Drainage Amount Moderate   Drainage Description Serosanguinous   Odor Moderate   Kay-wound Assessment Edematous;Fragile   Margins Defined edges   Wound Thickness Description not for Pressure Injury Full thickness   Wound 06/01/23 Leg Lateral;Right #2 right lateral leg. Date First Assessed/Time First Assessed: 06/01/23 1455   Present on Hospital Admission: Yes  Wound Approximate Age at First Assessment (Weeks): 1 weeks  Primary Wound Type: Venous Ulcer  Location: Leg  Wound Location Orientation: Lateral;Right  Wound . .. Wound Image    Wound Etiology Venous   Dressing Status Clean;Dry; Intact   Wound Cleansed Soap and water   Wound Length (cm) 8 cm   Wound Width (cm) 1.9 cm   Wound Depth

## 2023-06-02 ENCOUNTER — OFFICE VISIT (OUTPATIENT)
Age: 58
End: 2023-06-02
Payer: MEDICARE

## 2023-06-02 VITALS
DIASTOLIC BLOOD PRESSURE: 92 MMHG | HEIGHT: 68 IN | WEIGHT: 218.6 LBS | SYSTOLIC BLOOD PRESSURE: 136 MMHG | OXYGEN SATURATION: 95 % | HEART RATE: 75 BPM | BODY MASS INDEX: 33.13 KG/M2

## 2023-06-02 DIAGNOSIS — N18.32 CHRONIC KIDNEY DISEASE, STAGE 3B (HCC): ICD-10-CM

## 2023-06-02 DIAGNOSIS — I10 PRIMARY HYPERTENSION: ICD-10-CM

## 2023-06-02 DIAGNOSIS — I42.0 DILATED CARDIOMYOPATHY (HCC): Primary | ICD-10-CM

## 2023-06-02 DIAGNOSIS — I50.22 CHRONIC SYSTOLIC HEART FAILURE (HCC): ICD-10-CM

## 2023-06-02 PROCEDURE — 99213 OFFICE O/P EST LOW 20 MIN: CPT | Performed by: SPECIALIST

## 2023-06-02 PROCEDURE — 3017F COLORECTAL CA SCREEN DOC REV: CPT | Performed by: SPECIALIST

## 2023-06-02 PROCEDURE — 3080F DIAST BP >= 90 MM HG: CPT | Performed by: SPECIALIST

## 2023-06-02 PROCEDURE — G8427 DOCREV CUR MEDS BY ELIG CLIN: HCPCS | Performed by: SPECIALIST

## 2023-06-02 PROCEDURE — 3075F SYST BP GE 130 - 139MM HG: CPT | Performed by: SPECIALIST

## 2023-06-02 PROCEDURE — G8417 CALC BMI ABV UP PARAM F/U: HCPCS | Performed by: SPECIALIST

## 2023-06-02 PROCEDURE — 1036F TOBACCO NON-USER: CPT | Performed by: SPECIALIST

## 2023-06-02 ASSESSMENT — PATIENT HEALTH QUESTIONNAIRE - PHQ9
SUM OF ALL RESPONSES TO PHQ QUESTIONS 1-9: 0
SUM OF ALL RESPONSES TO PHQ QUESTIONS 1-9: 0
2. FEELING DOWN, DEPRESSED OR HOPELESS: 0
1. LITTLE INTEREST OR PLEASURE IN DOING THINGS: 0
SUM OF ALL RESPONSES TO PHQ QUESTIONS 1-9: 0
SUM OF ALL RESPONSES TO PHQ QUESTIONS 1-9: 0
SUM OF ALL RESPONSES TO PHQ9 QUESTIONS 1 & 2: 0

## 2023-06-02 NOTE — PROGRESS NOTES
HISTORY OF PRESENT ILLNESS  Marciano Desir is a 66 y.o. male     SUMMARY:   Patient Active Problem List   Diagnosis    Diabetes mellitus, type II, insulin dependent ([address]    Hypertension    Cardiomyopathy ([address]    Chronic systolic heart failure ([address]    Acute kidney injury superimposed on chronic kidney disease (HCC)    Acute on chronic HFrEF (heart failure with reduced ejection fraction) ([address]    ALEXIA (acute kidney injury) ([address]    Pressure injury of left leg, unstageable (HCC)    Diabetic foot infection ([address]    Venous stasis ulcer of left calf with fat layer exposed without varicose veins (HCC)    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 Multiple Vitamins-Iron (MULTIVITAMIN PLUS IRON ADULT PO) Take by mouth      allopurinol (ZYLOPRIM) 100 MG tablet Take 0.5 tablets by mouth daily      carvedilol (COREG) 25 MG tablet Take 2 tablets by mouth 2 times daily      hydrALAZINE (APRESOLINE) 50 MG tablet Take 1 tablet by mouth 3 times daily      insulin 70-30 (HUMULIN;NOVOLIN) (70-30) 100 UNIT per ML injection vial Inject 26-27 Units into the skin      isosorbide dinitrate (ISORDIL) 30 MG tablet Take 1 tablet by mouth 3 times daily      simvastatin (ZOCOR) 40 MG tablet Take 1 tablet by mouth nightly       No current facility-administered medications for this visit. CARDIOLOGY STUDIES TO DATE:  9/14 echo dilated lv with lvef 15%, mod lae, mild to mod mr, mild pul regurg     2/15 echo lvef 50% lae   2/15 echo lvef 65%, no sig valve abnormalities   2/17 lvh, otherwise normal echo   08/16/19 dilated lv with mod lvh and lvef 25%.  Lae, mild

## 2023-06-08 ENCOUNTER — HOSPITAL ENCOUNTER (OUTPATIENT)
Facility: HOSPITAL | Age: 58
Discharge: HOME | End: 2023-06-08
Payer: MEDICARE

## 2023-06-08 VITALS — TEMPERATURE: 98.7 F | SYSTOLIC BLOOD PRESSURE: 127 MMHG | HEART RATE: 79 BPM | DIASTOLIC BLOOD PRESSURE: 85 MMHG

## 2023-06-08 DIAGNOSIS — L97.912 CALCIPHYLAXIS OF RIGHT LOWER EXTREMITY WITH NONHEALING ULCER WITH FAT LAYER EXPOSED (HCC): ICD-10-CM

## 2023-06-08 DIAGNOSIS — L97.922 CALCIPHYLAXIS OF LEFT LOWER EXTREMITY WITH NONHEALING ULCER WITH FAT LAYER EXPOSED (HCC): ICD-10-CM

## 2023-06-08 DIAGNOSIS — I87.2 VENOUS STASIS ULCER OF LEFT CALF WITH FAT LAYER EXPOSED WITHOUT VARICOSE VEINS (HCC): Primary | ICD-10-CM

## 2023-06-08 DIAGNOSIS — L97.222 VENOUS STASIS ULCER OF LEFT CALF WITH FAT LAYER EXPOSED WITHOUT VARICOSE VEINS (HCC): Primary | ICD-10-CM

## 2023-06-08 DIAGNOSIS — E83.59 CALCIPHYLAXIS OF RIGHT LOWER EXTREMITY WITH NONHEALING ULCER WITH FAT LAYER EXPOSED (HCC): ICD-10-CM

## 2023-06-08 DIAGNOSIS — E83.59 CALCIPHYLAXIS OF LEFT LOWER EXTREMITY WITH NONHEALING ULCER WITH FAT LAYER EXPOSED (HCC): ICD-10-CM

## 2023-06-08 PROCEDURE — 29581 APPL MULTLAYER CMPRN SYS LEG: CPT

## 2023-06-08 PROCEDURE — 11102 TANGNTL BX SKIN SINGLE LES: CPT

## 2023-06-08 RX ORDER — LIDOCAINE HYDROCHLORIDE 40 MG/ML
SOLUTION TOPICAL ONCE
OUTPATIENT
Start: 2023-06-08 | End: 2023-06-08

## 2023-06-08 RX ORDER — BACITRACIN ZINC AND POLYMYXIN B SULFATE 500; 1000 [USP'U]/G; [USP'U]/G
OINTMENT TOPICAL ONCE
OUTPATIENT
Start: 2023-06-08 | End: 2023-06-08

## 2023-06-08 RX ORDER — SODIUM CHLOR/HYPOCHLOROUS ACID 0.033 %
SOLUTION, IRRIGATION IRRIGATION ONCE
OUTPATIENT
Start: 2023-06-08 | End: 2023-06-08

## 2023-06-08 RX ORDER — BETAMETHASONE DIPROPIONATE 0.05 %
OINTMENT (GRAM) TOPICAL ONCE
OUTPATIENT
Start: 2023-06-08 | End: 2023-06-08

## 2023-06-08 RX ORDER — LIDOCAINE HYDROCHLORIDE 20 MG/ML
JELLY TOPICAL ONCE
OUTPATIENT
Start: 2023-06-08 | End: 2023-06-08

## 2023-06-08 RX ORDER — CLOBETASOL PROPIONATE 0.5 MG/G
OINTMENT TOPICAL ONCE
OUTPATIENT
Start: 2023-06-08 | End: 2023-06-08

## 2023-06-08 RX ORDER — LIDOCAINE 40 MG/G
CREAM TOPICAL ONCE
OUTPATIENT
Start: 2023-06-08 | End: 2023-06-08

## 2023-06-08 RX ORDER — GINSENG 100 MG
CAPSULE ORAL ONCE
OUTPATIENT
Start: 2023-06-08 | End: 2023-06-08

## 2023-06-08 RX ORDER — LIDOCAINE 50 MG/G
OINTMENT TOPICAL ONCE
OUTPATIENT
Start: 2023-06-08 | End: 2023-06-08

## 2023-06-08 RX ORDER — GENTAMICIN SULFATE 1 MG/G
OINTMENT TOPICAL ONCE
OUTPATIENT
Start: 2023-06-08 | End: 2023-06-08

## 2023-06-08 RX ORDER — IBUPROFEN 200 MG
TABLET ORAL ONCE
OUTPATIENT
Start: 2023-06-08 | End: 2023-06-08

## 2023-06-08 ASSESSMENT — PAIN SCALES - GENERAL: PAINLEVEL_OUTOF10: 8

## 2023-06-08 NOTE — FLOWSHEET NOTE
Multilayer Compression Wrap   (Not Unna) Below the Knee    NAME:  Patti Cuello  YOB: 1965  MEDICAL RECORD NUMBER:  [id number]  DATE:  June 8, 2023 06/08/23 1557   Right Leg Edema Point of Measurement   Compression Therapy Tubular elastic support bandage   Tubular Elastic Support Bandage Compression Pressure Medium   Left Leg Edema Point of Measurement   Compression Therapy 2 layer compression wrap   Wound 04/13/23 Leg Left;Posterior; Lateral;Lower #1   Date First Assessed: 04/13/23   Primary Wound Type: Venous Ulcer  Location: Leg  Wound Location Orientation: Left;Posterior; Lateral;Lower  Wound Description (Comments): #1   Dressing Status New dressing applied   Wound Cleansed Vashe   Dressing/Treatment Hydrofera blue;ABD;Roll gauze  (santyl)   Wound 06/01/23 Leg Lateral;Right #2 right lateral leg. Date First Assessed/Time First Assessed: 06/01/23 1455   Present on Hospital Admission: Yes  Wound Approximate Age at First Assessment (Weeks): 1 weeks  Primary Wound Type: Venous Ulcer  Location: Leg  Wound Location Orientation: Lateral;Right  Wound . ..    Dressing Status New dressing applied   Wound Cleansed Vashe   Dressing/Treatment Gauze dressing/dressing sponge;Roll gauze  (santyl)       Removed old dressing; wash with soap and water, Applied primary and secondary dressing as ordered, Place compression to left lower leg, Instructed patient/caregiver not to remove dressing and to keep it clean and dry, Instructed patient/caregiver on complications to report to provider, such as pain, numbness in toes, heavy drainage, and slippage of dressing, and Instructed patient/caregiver on purpose of compression dressing and on activity and exercise recommendations    Response to treatment: Well tolerated by patient      Electronically signed by Markell Tijerina RN on 6/8/2023 at 3:58 PM

## 2023-06-08 NOTE — FLOWSHEET NOTE
06/08/23 1446   Wound 04/13/23 Leg Left;Posterior; Lateral;Lower #1   Date First Assessed: 04/13/23   Primary Wound Type: Venous Ulcer  Location: Leg  Wound Location Orientation: Left;Posterior; Lateral;Lower  Wound Description (Comments): #1   Wound Image    Wound Etiology Other   Dressing Status Old drainage noted; Intact   Wound Cleansed Cleansed with saline; Soap and water   Offloading for Diabetic Foot Ulcers Offloading not required   Wound Length (cm) 13.5 cm   Wound Width (cm) 10 cm   Wound Depth (cm) 0.3 cm   Wound Surface Area (cm^2) 135 cm^2   Change in Wound Size % (l*w) -68.67   Wound Volume (cm^3) 40.5 cm^3   Wound Healing % -153   Wound Assessment Slough;Eschar moist;Pink/red   Drainage Amount Large   Drainage Description Purulent;Brown;Green   Odor Mild   Kay-wound Assessment Edematous; Hyperpigmented   Margins Defined edges   Wound Thickness Description not for Pressure Injury Full thickness   Wound 06/01/23 Leg Lateral;Right #2 right lateral leg. Date First Assessed/Time First Assessed: 06/01/23 1455   Present on Hospital Admission: Yes  Wound Approximate Age at First Assessment (Weeks): 1 weeks  Primary Wound Type: Venous Ulcer  Location: Leg  Wound Location Orientation: Lateral;Right  Wound . ..    Wound Image    Wound Etiology Other   Dressing Status Intact;Dry;Clean   Wound Cleansed Cleansed with saline   Offloading for Diabetic Foot Ulcers Offloading not required   Wound Length (cm) 8 cm   Wound Width (cm) 1.5 cm   Wound Depth (cm) 0.1 cm   Wound Surface Area (cm^2) 12 cm^2   Change in Wound Size % (l*w) 21.05   Wound Volume (cm^3) 1.2 cm^3   Wound Healing % 21   Wound Assessment Eschar dry   Drainage Amount None   Odor None   Kay-wound Assessment Dry/flaky;Edematous   Margins Undefined edges   Wound Thickness Description not for Pressure Injury Full thickness     /85   Pulse 79   Temp 98.7 °F (37.1 °C) (Temporal)

## 2023-06-08 NOTE — DISCHARGE INSTRUCTIONS
room.     PLEASE NOTE: IF YOU ARE UNABLE TO OBTAIN WOUND SUPPLIES, CONTINUE TO USE THE SUPPLIES YOU HAVE AVAILABLE UNTIL YOU ARE ABLE TO REACH US. IT IS MOST IMPORTANT TO KEEP THE WOUND COVERED AT ALL TIMES.      Physician Signature:_______________________    Date: ___________ Time:  ____________

## 2023-06-08 NOTE — PROGRESS NOTES
Wound Assessment Eschar dry   Drainage Amount None   Odor None   Kay-wound Assessment Dry/flaky;Edematous   Margins Undefined edges   Wound Thickness Description not for Pressure Injury Full thickness      /85   Pulse 79   Temp 98.7 °F (37.1 °C) (Temporal)     Assessment:  Possible Calciphylaxis of bilateral lower extremity with ulceration    Procedure:  I discussed skin biopsy of ulceration today with patient and verbal consent was obtained. I injected the posterolateral left leg with 5 cc's of 1% lidocaine plain. A punch biopsy was utilized to obtain a specimen of the posterolateral ulceration on the left leg. The specimen was placed in a specimen cup with formalin. This will be sent to Pathology for histologic examination. Plan:   - Patient seen and evaluated. - I obtained a biopsy of the left leg ulceration without incident today to assess for Calciphylaxis. Patient will see PCP on Tuesday and start work up for Calciphylaxis. - A two-layer compression dressing with Santyl was applied today to the LLE. A Santyl dressing with tubigrip was applied to the RLE.   - Follow up in 2 weeks.

## 2023-06-19 ENCOUNTER — HOSPITAL ENCOUNTER (OUTPATIENT)
Facility: HOSPITAL | Age: 58
Discharge: HOME | End: 2023-06-19
Payer: MEDICARE

## 2023-06-19 VITALS
HEART RATE: 76 BPM | SYSTOLIC BLOOD PRESSURE: 126 MMHG | DIASTOLIC BLOOD PRESSURE: 78 MMHG | TEMPERATURE: 98 F | RESPIRATION RATE: 19 BRPM

## 2023-06-19 DIAGNOSIS — L97.912 CALCIPHYLAXIS OF RIGHT LOWER EXTREMITY WITH NONHEALING ULCER WITH FAT LAYER EXPOSED (HCC): ICD-10-CM

## 2023-06-19 DIAGNOSIS — L97.222 VENOUS STASIS ULCER OF LEFT CALF WITH FAT LAYER EXPOSED WITHOUT VARICOSE VEINS (HCC): Primary | ICD-10-CM

## 2023-06-19 DIAGNOSIS — E83.59 CALCIPHYLAXIS OF LEFT LOWER EXTREMITY WITH NONHEALING ULCER WITH FAT LAYER EXPOSED (HCC): ICD-10-CM

## 2023-06-19 DIAGNOSIS — E83.59 CALCIPHYLAXIS OF RIGHT LOWER EXTREMITY WITH NONHEALING ULCER WITH FAT LAYER EXPOSED (HCC): ICD-10-CM

## 2023-06-19 DIAGNOSIS — L97.922 CALCIPHYLAXIS OF LEFT LOWER EXTREMITY WITH NONHEALING ULCER WITH FAT LAYER EXPOSED (HCC): ICD-10-CM

## 2023-06-19 DIAGNOSIS — I87.2 VENOUS STASIS ULCER OF LEFT CALF WITH FAT LAYER EXPOSED WITHOUT VARICOSE VEINS (HCC): Primary | ICD-10-CM

## 2023-06-19 PROCEDURE — 29581 APPL MULTLAYER CMPRN SYS LEG: CPT

## 2023-06-19 ASSESSMENT — PAIN SCALES - GENERAL: PAINLEVEL_OUTOF10: 7

## 2023-06-19 ASSESSMENT — PAIN DESCRIPTION - LOCATION: LOCATION: LEG

## 2023-06-19 ASSESSMENT — PAIN DESCRIPTION - ORIENTATION: ORIENTATION: RIGHT;LEFT

## 2023-06-19 NOTE — FLOWSHEET NOTE
06/19/23 1110   Wound 06/01/23 Leg Lateral;Right #2 right lateral leg. Date First Assessed/Time First Assessed: 06/01/23 1455   Present on Hospital Admission: Yes  Wound Approximate Age at First Assessment (Weeks): 1 weeks  Primary Wound Type: Venous Ulcer  Location: Leg  Wound Location Orientation: Lateral;Right  Wound . .. Dressing Status New dressing applied   Dressing/Treatment   (vashe, santyl, gauze, roll gauze, tubigrip.)   Wound 04/13/23 Leg Left;Posterior; Lateral;Lower #1   Date First Assessed: 04/13/23   Primary Wound Type: Venous Ulcer  Location: Leg  Wound Location Orientation: Left;Posterior; Lateral;Lower  Wound Description (Comments): #1   Dressing Status New dressing applied   Dressing/Treatment   (vashe, santyl, hydrofera blue, ABD, roll gauze, two layer wrap.)     Multilayer Compression Wrap   (Not Unna) Below the Knee    NAME:  Christina Stallworth  YOB: 1965  MEDICAL RECORD NUMBER:  [id number]  DATE:  June 19, 2023    Removed old dressing; wash with soap and water, Applied primary and secondary dressing as ordered, Place compression to left lower leg, Instructed patient/caregiver not to remove dressing and to keep it clean and dry, Instructed patient/caregiver on complications to report to provider, such as pain, numbness in toes, heavy drainage, and slippage of dressing, and Instructed patient/caregiver on purpose of compression dressing and on activity and exercise recommendations    Response to treatment: Well tolerated by patient      Electronically signed by Odell Umanzor RN on 6/19/2023 at 11:12 AM

## 2023-06-22 ENCOUNTER — HOSPITAL ENCOUNTER (OUTPATIENT)
Facility: HOSPITAL | Age: 58
Discharge: HOME | End: 2023-06-22
Payer: MEDICARE

## 2023-06-22 VITALS
SYSTOLIC BLOOD PRESSURE: 128 MMHG | DIASTOLIC BLOOD PRESSURE: 81 MMHG | TEMPERATURE: 98.3 F | RESPIRATION RATE: 18 BRPM | HEART RATE: 81 BPM

## 2023-06-22 DIAGNOSIS — E83.59 CALCIPHYLAXIS OF RIGHT LOWER EXTREMITY WITH NONHEALING ULCER WITH FAT LAYER EXPOSED (HCC): ICD-10-CM

## 2023-06-22 DIAGNOSIS — L97.222 VENOUS STASIS ULCER OF LEFT CALF WITH FAT LAYER EXPOSED WITHOUT VARICOSE VEINS (HCC): Primary | ICD-10-CM

## 2023-06-22 DIAGNOSIS — I87.2 VENOUS STASIS ULCER OF LEFT CALF WITH FAT LAYER EXPOSED WITHOUT VARICOSE VEINS (HCC): Primary | ICD-10-CM

## 2023-06-22 DIAGNOSIS — L97.922 CALCIPHYLAXIS OF LEFT LOWER EXTREMITY WITH NONHEALING ULCER WITH FAT LAYER EXPOSED (HCC): ICD-10-CM

## 2023-06-22 DIAGNOSIS — L97.912 CALCIPHYLAXIS OF RIGHT LOWER EXTREMITY WITH NONHEALING ULCER WITH FAT LAYER EXPOSED (HCC): ICD-10-CM

## 2023-06-22 DIAGNOSIS — E83.59 CALCIPHYLAXIS OF LEFT LOWER EXTREMITY WITH NONHEALING ULCER WITH FAT LAYER EXPOSED (HCC): ICD-10-CM

## 2023-06-22 PROCEDURE — 11042 DBRDMT SUBQ TIS 1ST 20SQCM/<: CPT

## 2023-06-22 PROCEDURE — 11045 DBRDMT SUBQ TISS EACH ADDL: CPT

## 2023-06-22 RX ORDER — GENTAMICIN SULFATE 1 MG/G
OINTMENT TOPICAL ONCE
OUTPATIENT
Start: 2023-06-22 | End: 2023-06-22

## 2023-06-22 RX ORDER — SODIUM CHLOR/HYPOCHLOROUS ACID 0.033 %
SOLUTION, IRRIGATION IRRIGATION ONCE
OUTPATIENT
Start: 2023-06-22 | End: 2023-06-22

## 2023-06-22 RX ORDER — LIDOCAINE 40 MG/G
CREAM TOPICAL ONCE
OUTPATIENT
Start: 2023-06-22 | End: 2023-06-22

## 2023-06-22 RX ORDER — IBUPROFEN 200 MG
TABLET ORAL ONCE
OUTPATIENT
Start: 2023-06-22 | End: 2023-06-22

## 2023-06-22 RX ORDER — GINSENG 100 MG
CAPSULE ORAL ONCE
OUTPATIENT
Start: 2023-06-22 | End: 2023-06-22

## 2023-06-22 RX ORDER — LIDOCAINE HYDROCHLORIDE 40 MG/ML
SOLUTION TOPICAL ONCE
OUTPATIENT
Start: 2023-06-22 | End: 2023-06-22

## 2023-06-22 RX ORDER — LIDOCAINE HYDROCHLORIDE 20 MG/ML
JELLY TOPICAL ONCE
OUTPATIENT
Start: 2023-06-22 | End: 2023-06-22

## 2023-06-22 RX ORDER — BETAMETHASONE DIPROPIONATE 0.05 %
OINTMENT (GRAM) TOPICAL ONCE
OUTPATIENT
Start: 2023-06-22 | End: 2023-06-22

## 2023-06-22 RX ORDER — BACITRACIN ZINC AND POLYMYXIN B SULFATE 500; 1000 [USP'U]/G; [USP'U]/G
OINTMENT TOPICAL ONCE
OUTPATIENT
Start: 2023-06-22 | End: 2023-06-22

## 2023-06-22 RX ORDER — LIDOCAINE 50 MG/G
OINTMENT TOPICAL ONCE
OUTPATIENT
Start: 2023-06-22 | End: 2023-06-22

## 2023-06-22 RX ORDER — CLOBETASOL PROPIONATE 0.5 MG/G
OINTMENT TOPICAL ONCE
OUTPATIENT
Start: 2023-06-22 | End: 2023-06-22

## 2023-06-22 ASSESSMENT — PAIN DESCRIPTION - ORIENTATION: ORIENTATION: RIGHT;LEFT

## 2023-06-22 ASSESSMENT — PAIN DESCRIPTION - LOCATION: LOCATION: LEG

## 2023-06-22 ASSESSMENT — PAIN SCALES - GENERAL: PAINLEVEL_OUTOF10: 7

## 2023-06-22 NOTE — PROGRESS NOTES
Patient presents to wound clinic for: Areli Lorenzana is a 66 y.o. male with extensive vascular history who presents with left posterior calf wound. Patient has had the wound for over one month and was recently admitted to the hospital on 4/13/23 for Vascular Surgery consult and possible surgical debridement. He completed a course of Doxycycline and feels his wound is improving since discharge. Vascular Surgery with no concern for peripheral arterial disease. Denies nausea, chills, vomiting, fever, sob or chest pain. Update 6/22/23:  Patient returns today for follow up of bilateral leg wound. He states that he has had labs ordered for Calciphylaxis workup but has not gone because he has been busy with work. He has not yet to see his PCP. Patient reports the pain has remained unchanged. Denies nausea, chills, vomiting, fever, sob or chest pain. Pertinent Medical History:  Past Medical History:   Diagnosis Date    Congestive heart failure (HCC)     Coronary artery disease     Diabetes (HCC)     Heart failure (HCC)     CHF, cardiomyopathy    Hypertension     ICD (implantable cardioverter-defibrillator) in place     left upper chest    PAD (peripheral artery disease) [facility])      Past Surgical History:   Procedure Laterality Date    CARDIAC CATHETERIZATION  2/2015    x1    CARDIAC DEFIBRILLATOR PLACEMENT  2/16/2015     Prior to Admission medications    Medication Sig Start Date End Date Taking? Authorizing Provider   bumetanide (BUMEX) 1 MG tablet Take 1 tablet by mouth daily    Historical Provider, MD   traMADol (ULTRAM) 50 MG tablet Take 2 tablets by mouth every 6 hours as needed for Pain.     Historical Provider, MD   Multiple Vitamins-Iron (MULTIVITAMIN PLUS IRON ADULT PO) Take by mouth    Historical Provider, MD   allopurinol (ZYLOPRIM) 100 MG tablet Take 0.5 tablets by mouth daily 2/22/23   Ar Automatic Reconciliation   carvedilol (COREG) 25 MG tablet Take 2 tablets by mouth 2 times daily 12/29/22   Ar

## 2023-06-22 NOTE — FLOWSHEET NOTE
06/22/23 1526   Right Leg Edema Point of Measurement   Compression Therapy Tubular elastic support bandage   Tubular Elastic Support Bandage Compression Pressure Low   Left Leg Edema Point of Measurement   Compression Therapy Tubular elastic support bandage   Tubular Elastic Support Bandage Compression Pressure Low   Wound 06/01/23 Leg Lateral;Right #2 right lateral leg. Date First Assessed/Time First Assessed: 06/01/23 1455   Present on Hospital Admission: Yes  Wound Approximate Age at First Assessment (Weeks): 1 weeks  Primary Wound Type: Venous Ulcer  Location: Leg  Wound Location Orientation: Lateral;Right  Wound . .. Dressing Status New dressing applied   Wound Cleansed Vashe   Dressing/Treatment Betadine swabs/povidone iodine;Gauze dressing/dressing sponge;Roll gauze   Wound 04/13/23 Leg Left;Posterior; Lateral;Lower #1   Date First Assessed: 04/13/23   Primary Wound Type: Venous Ulcer  Location: Leg  Wound Location Orientation: Left;Posterior; Lateral;Lower  Wound Description (Comments): #1   Dressing Status New dressing applied   Wound Cleansed Vashe   Dressing/Treatment Betadine swabs/povidone iodine;Gauze dressing/dressing sponge;Roll gauze

## 2023-06-22 NOTE — FLOWSHEET NOTE
06/22/23 1408   Wound 06/01/23 Leg Lateral;Right #2 right lateral leg. Date First Assessed/Time First Assessed: 06/01/23 1455   Present on Hospital Admission: Yes  Wound Approximate Age at First Assessment (Weeks): 1 weeks  Primary Wound Type: Venous Ulcer  Location: Leg  Wound Location Orientation: Lateral;Right  Wound . .. Wound Image    Dressing Status Intact;Dry   Wound Cleansed Irrigated with saline   Offloading for Diabetic Foot Ulcers Offloading not ordered   Wound Length (cm) 10 cm   Wound Width (cm) 2 cm   Wound Depth (cm) 0.1 cm   Wound Surface Area (cm^2) 20 cm^2   Change in Wound Size % (l*w) -31.58   Wound Volume (cm^3) 2 cm^3   Wound Healing % -32   Wound Assessment Eschar dry   Drainage Amount None   Odor None   Kay-wound Assessment Dry/flaky;Edematous   Margins Undefined edges   Wound Thickness Description not for Pressure Injury Full thickness   Wound 04/13/23 Leg Left;Posterior; Lateral;Lower #1   Date First Assessed: 04/13/23   Primary Wound Type: Venous Ulcer  Location: Leg  Wound Location Orientation: Left;Posterior; Lateral;Lower  Wound Description (Comments): #1   Wound Image      Dressing Status Intact; Old drainage noted   Wound Cleansed Soap and water   Offloading for Diabetic Foot Ulcers Offloading not ordered   Wound Length (cm) 16.5 cm   Wound Width (cm) 11 cm   Wound Depth (cm) 0.3 cm   Wound Surface Area (cm^2) 181.5 cm^2   Change in Wound Size % (l*w) -126.76   Wound Volume (cm^3) 54.45 cm^3   Wound Healing % -240   Wound Assessment Eschar moist;Slough   Drainage Amount Large   Drainage Description Serosanguinous   Odor Mild   Kay-wound Assessment Edematous; Hemosiderin staining (brown yellow)   Margins Defined edges   Wound Thickness Description not for Pressure Injury Full thickness     /81   Pulse 81   Temp 98.3 °F (36.8 °C) (Temporal)   Resp 18

## 2023-06-22 NOTE — FLOWSHEET NOTE
06/22/23 1512   Wound 06/01/23 Leg Lateral;Right #2 right lateral leg. Date First Assessed/Time First Assessed: 06/01/23 1455   Present on Hospital Admission: Yes  Wound Approximate Age at First Assessment (Weeks): 1 weeks  Primary Wound Type: Venous Ulcer  Location: Leg  Wound Location Orientation: Lateral;Right  Wound . .. Dressing Status New dressing applied   Dressing/Treatment   (vashe, betadine, ABD pad, rolled gauze. tubigrip.)   Wound 04/13/23 Leg Left;Posterior; Lateral;Lower #1   Date First Assessed: 04/13/23   Primary Wound Type: Venous Ulcer  Location: Leg  Wound Location Orientation: Left;Posterior; Lateral;Lower  Wound Description (Comments): #1   Dressing Status New dressing applied   Dressing/Treatment   (vashe, betadine, ABD pad, rolled gauze.  tubigrip.)

## 2023-06-22 NOTE — DISCHARGE INSTRUCTIONS
Discharge Instructions/Wound Orders  [place] [address] [facility], [facility] [address]  Telephone: [phone]    NAME:  Valente Godinez  YOB: 1965  MEDICAL RECORD NUMBER:  [id number]  DATE:  June 22, 2023  Wound Care Orders:  Bilateral lower extremity wounds - Cleanse with vashe solution. Allow to sit for 5-7 minutes. Apply betadine wet to dry to wound bed. Cover with ABD and rolled gauze. Apply single layer tubigrip for mild compression. Change every other day. Activity:  [x] Activity as tolerated     Dietary:  [x] Diet as tolerated      [] Diabetic Diet            [] Increase Protein: examples (Meat, cheese, eggs, greek yogurt, fish, nuts)          [x] [employer] Therapeutic Nutrition Powder  Return Appointment:  [x] Return Appointment: With Dr. Pranay Leiva in 1 week. Follow up with your PCP for medical management of calciphylaxis. [] Ordered tests:    Electronically signed Radha Shaw RN on 6/22/2023 at 2:56 PM     Jessika Barcenas 281: Should you experience any significant changes in your wound(s) or have questions about your wound care, please contact the [address] [place] [address] at [address] [place] [address] 8:00 am - 4:30. If you need help with your wound outside these hours and cannot wait until we are again available, contact your PCP or go to the hospital emergency room. PLEASE NOTE: IF YOU ARE UNABLE TO OBTAIN WOUND SUPPLIES, CONTINUE TO USE THE SUPPLIES YOU HAVE AVAILABLE UNTIL YOU ARE ABLE TO REACH US. IT IS MOST IMPORTANT TO KEEP THE WOUND COVERED AT ALL TIMES.      Physician Signature:_______________________    Date: ___________ Time:  ____________

## 2023-06-22 NOTE — FLOWSHEET NOTE
06/22/23 1408   Anesthetic   Anesthetic 4% Lidocaine Liquid Topical   Right Leg Edema Point of Measurement   Leg circumference 37.5 cm   Ankle circumference 21 cm   Compression Therapy Tubular elastic support bandage   Left Leg Edema Point of Measurement   Leg circumference 30 cm   Ankle circumference 22 cm   Compression Therapy 2 layer compression wrap   Wound 06/01/23 Leg Lateral;Right #2 right lateral leg. Date First Assessed/Time First Assessed: 06/01/23 1455   Present on Hospital Admission: Yes  Wound Approximate Age at First Assessment (Weeks): 1 weeks  Primary Wound Type: Venous Ulcer  Location: Leg  Wound Location Orientation: Lateral;Right  Wound . .. Wound Image    Dressing Status Intact;Dry   Wound Cleansed Irrigated with saline   Offloading for Diabetic Foot Ulcers Offloading not ordered   Wound Assessment Eschar dry   Drainage Amount None   Odor None   Kay-wound Assessment Dry/flaky;Edematous   Margins Undefined edges   Wound Thickness Description not for Pressure Injury Full thickness   Wound 04/13/23 Leg Left;Posterior; Lateral;Lower #1   Date First Assessed: 04/13/23   Primary Wound Type: Venous Ulcer  Location: Leg  Wound Location Orientation: Left;Posterior; Lateral;Lower  Wound Description (Comments): #1   Wound Image      Dressing Status Intact; Old drainage noted   Wound Cleansed Soap and water   Offloading for Diabetic Foot Ulcers Offloading not ordered   Wound Length (cm) 16.5 cm   Wound Width (cm) 11 cm   Wound Depth (cm) 0.3 cm   Wound Surface Area (cm^2) 181.5 cm^2   Change in Wound Size % (l*w) -126.76   Wound Volume (cm^3) 54.45 cm^3   Wound Healing % -240   Wound Assessment Eschar moist;Slough   Drainage Amount Large   Drainage Description Serosanguinous   Odor Mild   Kay-wound Assessment Edematous; Hemosiderin staining (brown yellow)   Margins Defined edges   Wound Thickness Description not for Pressure Injury Full thickness     /81   Pulse 81   Temp 98.3 °F (36.8 °C)

## 2023-06-23 ENCOUNTER — TELEPHONE (OUTPATIENT)
Age: 58
End: 2023-06-23

## 2023-06-26 ENCOUNTER — HOSPITAL ENCOUNTER (OUTPATIENT)
Facility: HOSPITAL | Age: 58
Discharge: HOME | End: 2023-06-26
Payer: MEDICARE

## 2023-06-26 VITALS
TEMPERATURE: 98.6 F | RESPIRATION RATE: 18 BRPM | DIASTOLIC BLOOD PRESSURE: 82 MMHG | SYSTOLIC BLOOD PRESSURE: 144 MMHG | HEART RATE: 80 BPM

## 2023-06-26 DIAGNOSIS — E83.59 CALCIPHYLAXIS OF RIGHT LOWER EXTREMITY WITH NONHEALING ULCER WITH FAT LAYER EXPOSED (HCC): ICD-10-CM

## 2023-06-26 DIAGNOSIS — I87.2 VENOUS STASIS ULCER OF LEFT CALF WITH FAT LAYER EXPOSED WITHOUT VARICOSE VEINS (HCC): Primary | ICD-10-CM

## 2023-06-26 DIAGNOSIS — E83.59 CALCIPHYLAXIS OF LEFT LOWER EXTREMITY WITH NONHEALING ULCER WITH FAT LAYER EXPOSED (HCC): ICD-10-CM

## 2023-06-26 DIAGNOSIS — L97.922 CALCIPHYLAXIS OF LEFT LOWER EXTREMITY WITH NONHEALING ULCER WITH FAT LAYER EXPOSED (HCC): ICD-10-CM

## 2023-06-26 DIAGNOSIS — L97.222 VENOUS STASIS ULCER OF LEFT CALF WITH FAT LAYER EXPOSED WITHOUT VARICOSE VEINS (HCC): Primary | ICD-10-CM

## 2023-06-26 DIAGNOSIS — L97.912 CALCIPHYLAXIS OF RIGHT LOWER EXTREMITY WITH NONHEALING ULCER WITH FAT LAYER EXPOSED (HCC): ICD-10-CM

## 2023-06-26 PROCEDURE — 99213 OFFICE O/P EST LOW 20 MIN: CPT

## 2023-06-26 ASSESSMENT — PAIN DESCRIPTION - LOCATION: LOCATION: LEG

## 2023-06-26 ASSESSMENT — PAIN SCALES - GENERAL: PAINLEVEL_OUTOF10: 7

## 2023-06-26 ASSESSMENT — PAIN DESCRIPTION - ORIENTATION: ORIENTATION: RIGHT;LEFT

## 2023-06-29 ENCOUNTER — HOSPITAL ENCOUNTER (OUTPATIENT)
Facility: HOSPITAL | Age: 58
Discharge: HOME | End: 2023-06-29
Payer: MEDICARE

## 2023-06-29 VITALS
HEART RATE: 75 BPM | RESPIRATION RATE: 18 BRPM | TEMPERATURE: 98.3 F | SYSTOLIC BLOOD PRESSURE: 148 MMHG | DIASTOLIC BLOOD PRESSURE: 93 MMHG

## 2023-06-29 DIAGNOSIS — L97.922 CALCIPHYLAXIS OF LEFT LOWER EXTREMITY WITH NONHEALING ULCER WITH FAT LAYER EXPOSED (HCC): ICD-10-CM

## 2023-06-29 DIAGNOSIS — E83.59 CALCIPHYLAXIS OF LEFT LOWER EXTREMITY WITH NONHEALING ULCER WITH FAT LAYER EXPOSED (HCC): ICD-10-CM

## 2023-06-29 DIAGNOSIS — I87.2 VENOUS STASIS ULCER OF LEFT CALF WITH FAT LAYER EXPOSED WITHOUT VARICOSE VEINS (HCC): Primary | ICD-10-CM

## 2023-06-29 DIAGNOSIS — L97.222 VENOUS STASIS ULCER OF LEFT CALF WITH FAT LAYER EXPOSED WITHOUT VARICOSE VEINS (HCC): Primary | ICD-10-CM

## 2023-06-29 DIAGNOSIS — L97.912 CALCIPHYLAXIS OF RIGHT LOWER EXTREMITY WITH NONHEALING ULCER WITH FAT LAYER EXPOSED (HCC): ICD-10-CM

## 2023-06-29 DIAGNOSIS — E83.59 CALCIPHYLAXIS OF RIGHT LOWER EXTREMITY WITH NONHEALING ULCER WITH FAT LAYER EXPOSED (HCC): ICD-10-CM

## 2023-06-29 PROCEDURE — 11045 DBRDMT SUBQ TISS EACH ADDL: CPT

## 2023-06-29 PROCEDURE — 11042 DBRDMT SUBQ TIS 1ST 20SQCM/<: CPT

## 2023-06-29 RX ORDER — GENTAMICIN SULFATE 1 MG/G
OINTMENT TOPICAL ONCE
Status: CANCELLED | OUTPATIENT
Start: 2023-06-29 | End: 2023-06-29

## 2023-06-29 RX ORDER — GINSENG 100 MG
CAPSULE ORAL ONCE
OUTPATIENT
Start: 2023-06-29 | End: 2023-06-29

## 2023-06-29 RX ORDER — SODIUM CHLOR/HYPOCHLOROUS ACID 0.033 %
SOLUTION, IRRIGATION IRRIGATION ONCE
OUTPATIENT
Start: 2023-06-29 | End: 2023-06-29

## 2023-06-29 RX ORDER — BETAMETHASONE DIPROPIONATE 0.05 %
OINTMENT (GRAM) TOPICAL ONCE
Status: CANCELLED | OUTPATIENT
Start: 2023-06-29 | End: 2023-06-29

## 2023-06-29 RX ORDER — CLOBETASOL PROPIONATE 0.5 MG/G
OINTMENT TOPICAL ONCE
Status: CANCELLED | OUTPATIENT
Start: 2023-06-29 | End: 2023-06-29

## 2023-06-29 RX ORDER — IBUPROFEN 200 MG
TABLET ORAL ONCE
OUTPATIENT
Start: 2023-06-29 | End: 2023-06-29

## 2023-06-29 RX ORDER — GENTAMICIN SULFATE 1 MG/G
OINTMENT TOPICAL ONCE
OUTPATIENT
Start: 2023-06-29 | End: 2023-06-29

## 2023-06-29 RX ORDER — BACITRACIN ZINC AND POLYMYXIN B SULFATE 500; 1000 [USP'U]/G; [USP'U]/G
OINTMENT TOPICAL ONCE
Status: CANCELLED | OUTPATIENT
Start: 2023-06-29 | End: 2023-06-29

## 2023-06-29 RX ORDER — LIDOCAINE 40 MG/G
CREAM TOPICAL ONCE
Status: CANCELLED | OUTPATIENT
Start: 2023-06-29 | End: 2023-06-29

## 2023-06-29 RX ORDER — LIDOCAINE HYDROCHLORIDE 20 MG/ML
JELLY TOPICAL ONCE
OUTPATIENT
Start: 2023-06-29 | End: 2023-06-29

## 2023-06-29 RX ORDER — SODIUM CHLOR/HYPOCHLOROUS ACID 0.033 %
SOLUTION, IRRIGATION IRRIGATION ONCE
Status: CANCELLED | OUTPATIENT
Start: 2023-06-29 | End: 2023-06-29

## 2023-06-29 RX ORDER — GINSENG 100 MG
CAPSULE ORAL ONCE
Status: CANCELLED | OUTPATIENT
Start: 2023-06-29 | End: 2023-06-29

## 2023-06-29 RX ORDER — CLOBETASOL PROPIONATE 0.5 MG/G
OINTMENT TOPICAL ONCE
OUTPATIENT
Start: 2023-06-29 | End: 2023-06-29

## 2023-06-29 RX ORDER — LIDOCAINE 50 MG/G
OINTMENT TOPICAL ONCE
OUTPATIENT
Start: 2023-06-29 | End: 2023-06-29

## 2023-06-29 RX ORDER — LIDOCAINE 40 MG/G
CREAM TOPICAL ONCE
OUTPATIENT
Start: 2023-06-29 | End: 2023-06-29

## 2023-06-29 RX ORDER — LIDOCAINE HYDROCHLORIDE 40 MG/ML
SOLUTION TOPICAL ONCE
Status: CANCELLED | OUTPATIENT
Start: 2023-06-29 | End: 2023-06-29

## 2023-06-29 RX ORDER — LIDOCAINE HYDROCHLORIDE 40 MG/ML
SOLUTION TOPICAL ONCE
OUTPATIENT
Start: 2023-06-29 | End: 2023-06-29

## 2023-06-29 RX ORDER — BETAMETHASONE DIPROPIONATE 0.05 %
OINTMENT (GRAM) TOPICAL ONCE
OUTPATIENT
Start: 2023-06-29 | End: 2023-06-29

## 2023-06-29 RX ORDER — LIDOCAINE HYDROCHLORIDE 20 MG/ML
JELLY TOPICAL ONCE
Status: CANCELLED | OUTPATIENT
Start: 2023-06-29 | End: 2023-06-29

## 2023-06-29 RX ORDER — IBUPROFEN 200 MG
TABLET ORAL ONCE
Status: CANCELLED | OUTPATIENT
Start: 2023-06-29 | End: 2023-06-29

## 2023-06-29 RX ORDER — BACITRACIN ZINC AND POLYMYXIN B SULFATE 500; 1000 [USP'U]/G; [USP'U]/G
OINTMENT TOPICAL ONCE
OUTPATIENT
Start: 2023-06-29 | End: 2023-06-29

## 2023-06-29 RX ORDER — LIDOCAINE 50 MG/G
OINTMENT TOPICAL ONCE
Status: CANCELLED | OUTPATIENT
Start: 2023-06-29 | End: 2023-06-29

## 2023-06-29 ASSESSMENT — PAIN DESCRIPTION - LOCATION: LOCATION: LEG

## 2023-06-29 ASSESSMENT — PAIN SCALES - GENERAL: PAINLEVEL_OUTOF10: 7

## 2023-06-29 ASSESSMENT — PAIN DESCRIPTION - ORIENTATION: ORIENTATION: RIGHT;LEFT

## 2023-07-06 ENCOUNTER — HOSPITAL ENCOUNTER (OUTPATIENT)
Facility: HOSPITAL | Age: 58
Discharge: HOME | End: 2023-07-06
Payer: MEDICARE

## 2023-07-06 PROCEDURE — 29581 APPL MULTLAYER CMPRN SYS LEG: CPT

## 2023-07-10 PROBLEM — E83.59 CALCIPHYLAXIS: Status: ACTIVE | Noted: 2023-07-10

## 2023-07-11 ENCOUNTER — HOSPITAL ENCOUNTER (OUTPATIENT)
Facility: HOSPITAL | Age: 58
Setting detail: INFUSION SERIES
End: 2023-07-11
Payer: MEDICARE

## 2023-07-11 VITALS
DIASTOLIC BLOOD PRESSURE: 82 MMHG | HEART RATE: 77 BPM | RESPIRATION RATE: 18 BRPM | SYSTOLIC BLOOD PRESSURE: 150 MMHG | TEMPERATURE: 97.9 F

## 2023-07-11 DIAGNOSIS — E83.59 CALCIPHYLAXIS: Primary | ICD-10-CM

## 2023-07-11 PROCEDURE — 96365 THER/PROPH/DIAG IV INF INIT: CPT

## 2023-07-11 PROCEDURE — 2580000003 HC RX 258

## 2023-07-11 PROCEDURE — 6360000002 HC RX W HCPCS: Performed by: INTERNAL MEDICINE

## 2023-07-11 RX ORDER — SODIUM CHLORIDE 9 MG/ML
INJECTION, SOLUTION INTRAVENOUS CONTINUOUS
Status: DISCONTINUED | OUTPATIENT
Start: 2023-07-11 | End: 2023-07-12 | Stop reason: HOSPADM

## 2023-07-11 RX ADMIN — SODIUM THIOSULFATE 25 G: 250 INJECTION, SOLUTION INTRAVENOUS at 13:49

## 2023-07-11 RX ADMIN — SODIUM CHLORIDE: 9 INJECTION, SOLUTION INTRAVENOUS at 13:20

## 2023-07-11 ASSESSMENT — PAIN DESCRIPTION - ORIENTATION: ORIENTATION: RIGHT;LEFT

## 2023-07-11 ASSESSMENT — PAIN DESCRIPTION - LOCATION: LOCATION: LEG

## 2023-07-11 ASSESSMENT — PAIN SCALES - GENERAL: PAINLEVEL_OUTOF10: 8

## 2023-07-13 ENCOUNTER — HOSPITAL ENCOUNTER (OUTPATIENT)
Facility: HOSPITAL | Age: 58
Discharge: HOME | End: 2023-07-13
Payer: MEDICARE

## 2023-07-13 VITALS — DIASTOLIC BLOOD PRESSURE: 80 MMHG | SYSTOLIC BLOOD PRESSURE: 134 MMHG | HEART RATE: 81 BPM | RESPIRATION RATE: 18 BRPM

## 2023-07-13 DIAGNOSIS — E83.59 CALCIPHYLAXIS OF RIGHT LOWER EXTREMITY WITH NONHEALING ULCER WITH FAT LAYER EXPOSED (HCC): ICD-10-CM

## 2023-07-13 DIAGNOSIS — I87.2 VENOUS STASIS ULCER OF LEFT CALF WITH FAT LAYER EXPOSED WITHOUT VARICOSE VEINS (HCC): Primary | ICD-10-CM

## 2023-07-13 DIAGNOSIS — L97.222 VENOUS STASIS ULCER OF LEFT CALF WITH FAT LAYER EXPOSED WITHOUT VARICOSE VEINS (HCC): Primary | ICD-10-CM

## 2023-07-13 DIAGNOSIS — L97.912 CALCIPHYLAXIS OF RIGHT LOWER EXTREMITY WITH NONHEALING ULCER WITH FAT LAYER EXPOSED (HCC): ICD-10-CM

## 2023-07-13 DIAGNOSIS — L97.922 CALCIPHYLAXIS OF LEFT LOWER EXTREMITY WITH NONHEALING ULCER WITH FAT LAYER EXPOSED (HCC): ICD-10-CM

## 2023-07-13 DIAGNOSIS — E83.59 CALCIPHYLAXIS OF LEFT LOWER EXTREMITY WITH NONHEALING ULCER WITH FAT LAYER EXPOSED (HCC): ICD-10-CM

## 2023-07-13 PROCEDURE — 11042 DBRDMT SUBQ TIS 1ST 20SQCM/<: CPT

## 2023-07-13 PROCEDURE — 11045 DBRDMT SUBQ TISS EACH ADDL: CPT

## 2023-07-13 RX ORDER — LIDOCAINE HYDROCHLORIDE 20 MG/ML
JELLY TOPICAL ONCE
OUTPATIENT
Start: 2023-07-13 | End: 2023-07-13

## 2023-07-13 RX ORDER — BACITRACIN ZINC AND POLYMYXIN B SULFATE 500; 1000 [USP'U]/G; [USP'U]/G
OINTMENT TOPICAL ONCE
OUTPATIENT
Start: 2023-07-13 | End: 2023-07-13

## 2023-07-13 RX ORDER — CLOBETASOL PROPIONATE 0.5 MG/G
OINTMENT TOPICAL ONCE
OUTPATIENT
Start: 2023-07-13 | End: 2023-07-13

## 2023-07-13 RX ORDER — SODIUM CHLOR/HYPOCHLOROUS ACID 0.033 %
SOLUTION, IRRIGATION IRRIGATION ONCE
OUTPATIENT
Start: 2023-07-13 | End: 2023-07-13

## 2023-07-13 RX ORDER — BETAMETHASONE DIPROPIONATE 0.05 %
OINTMENT (GRAM) TOPICAL ONCE
OUTPATIENT
Start: 2023-07-13 | End: 2023-07-13

## 2023-07-13 RX ORDER — LIDOCAINE 40 MG/G
CREAM TOPICAL ONCE
OUTPATIENT
Start: 2023-07-13 | End: 2023-07-13

## 2023-07-13 RX ORDER — LIDOCAINE 50 MG/G
OINTMENT TOPICAL ONCE
OUTPATIENT
Start: 2023-07-13 | End: 2023-07-13

## 2023-07-13 RX ORDER — LIDOCAINE HYDROCHLORIDE 40 MG/ML
SOLUTION TOPICAL ONCE
OUTPATIENT
Start: 2023-07-13 | End: 2023-07-13

## 2023-07-13 RX ORDER — GINSENG 100 MG
CAPSULE ORAL ONCE
OUTPATIENT
Start: 2023-07-13 | End: 2023-07-13

## 2023-07-13 RX ORDER — GENTAMICIN SULFATE 1 MG/G
OINTMENT TOPICAL ONCE
OUTPATIENT
Start: 2023-07-13 | End: 2023-07-13

## 2023-07-13 RX ORDER — IBUPROFEN 200 MG
TABLET ORAL ONCE
OUTPATIENT
Start: 2023-07-13 | End: 2023-07-13

## 2023-07-13 RX ORDER — SODIUM CHLOR/HYPOCHLOROUS ACID 0.033 %
SOLUTION, IRRIGATION IRRIGATION ONCE
Status: DISCONTINUED | OUTPATIENT
Start: 2023-07-13 | End: 2023-07-14 | Stop reason: HOSPADM

## 2023-07-13 RX ORDER — PREGABALIN 100 MG/1
100 CAPSULE ORAL 2 TIMES DAILY
COMMUNITY

## 2023-07-13 ASSESSMENT — PAIN DESCRIPTION - ONSET: ONSET: ON-GOING

## 2023-07-13 ASSESSMENT — PAIN SCALES - GENERAL: PAINLEVEL_OUTOF10: 10

## 2023-07-13 ASSESSMENT — PAIN DESCRIPTION - PAIN TYPE: TYPE: CHRONIC PAIN

## 2023-07-13 ASSESSMENT — PAIN DESCRIPTION - LOCATION: LOCATION: LEG

## 2023-07-13 ASSESSMENT — PAIN DESCRIPTION - ORIENTATION: ORIENTATION: RIGHT;LEFT;LOWER

## 2023-07-13 ASSESSMENT — PAIN DESCRIPTION - FREQUENCY: FREQUENCY: CONTINUOUS

## 2023-07-13 NOTE — PROGRESS NOTES
Encounter   Medication Sig Dispense Refill    pregabalin (LYRICA) 100 MG capsule Take 1 capsule by mouth 2 times daily. Max Daily Amount: 200 mg      bumetanide (BUMEX) 1 MG tablet Take 1 tablet by mouth daily      traMADol (ULTRAM) 50 MG tablet Take 2 tablets by mouth every 6 hours as needed for Pain. Multiple Vitamins-Iron (MULTIVITAMIN PLUS IRON ADULT PO) Take by mouth (Patient not taking: Reported on 6/29/2023)      allopurinol (ZYLOPRIM) 100 MG tablet Take 0.5 tablets by mouth daily (Patient not taking: Reported on 6/29/2023)      carvedilol (COREG) 25 MG tablet Take 2 tablets by mouth 2 times daily      hydrALAZINE (APRESOLINE) 50 MG tablet Take 1 tablet by mouth 3 times daily      insulin 70-30 (HUMULIN;NOVOLIN) (70-30) 100 UNIT per ML injection vial Inject 26-27 Units into the skin      isosorbide dinitrate (ISORDIL) 30 MG tablet Take 1 tablet by mouth 3 times daily      simvastatin (ZOCOR) 40 MG tablet Take 1 tablet by mouth nightly       No current facility-administered medications on file prior to encounter. REVIEW OF SYSTEMS  A comprehensive review of systems was negative except for what has been indicated above and: see above    Written patient dismissal instructions given to patient and signed by patient or POA. Discharge Instructions                                                                                                   Discharge Instructions for          [address], [address]70 [address]  Phone: [phone] Fax: [phone]    NAME:  Kelly Kumar  YOB: 1965  MEDICAL RECORD NUMBER:  [id number]  DATE:  July 13, 2023  WOUND CARE ORDERS:  Left lower extremity wound: Cleanse with vashe solution. Allow to sit for 5-7 minutes. Hydrofera blue ready to wound bed. Cover with ABD and rolled gauze. Apply double layer tubigrip size F for compression. Change three times a week.  If you need to change it more often between

## 2023-07-13 NOTE — FLOWSHEET NOTE
07/13/23 1206   Wound 06/01/23 Leg Lateral;Right #2 right lateral leg. Date First Assessed/Time First Assessed: 06/01/23 1455   Present on Hospital Admission: Yes  Wound Approximate Age at First Assessment (Weeks): 1 weeks  Primary Wound Type: Venous Ulcer  Location: Leg  Wound Location Orientation: Lateral;Right  Wound . .. Wound Image    Dressing Status Old drainage noted   Wound Cleansed Soap and water   Wound Length (cm) 9.5 cm   Wound Width (cm) 4.8 cm   Wound Depth (cm) 0.2 cm   Wound Surface Area (cm^2) 45.6 cm^2   Change in Wound Size % (l*w) -200   Wound Volume (cm^3) 9.12 cm^3   Wound Healing % -500   Wound Assessment [place]/red;Slough   Drainage Amount Moderate   Drainage Description Serosanguinous   Odor None   Kay-wound Assessment Dry/flaky   Margins Defined edges   Wound Thickness Description not for Pressure Injury Full thickness   Wound 04/13/23 Leg Left;Posterior; Lateral;Lower #1   Date First Assessed: 04/13/23   Primary Wound Type: Venous Ulcer  Location: Leg  Wound Location Orientation: Left;Posterior; Lateral;Lower  Wound Description (Comments): #1   Wound Image      Dressing Status Old drainage noted   Wound Cleansed Soap and water   Wound Length (cm) 20 cm   Wound Width (cm) 21.8 cm   Wound Depth (cm) 0.3 cm   Wound Surface Area (cm^2) 436 cm^2   Change in Wound Size % (l*w) -444.73   Wound Volume (cm^3) 130.8 cm^3   Wound Healing % -717   Wound Assessment [place]/red;Slough   Drainage Amount Large   Drainage Description Serosanguinous   Odor Mild   Kay-wound Assessment Dry/flaky   Margins Defined edges   Wound Thickness Description not for Pressure Injury Full thickness     /80   Pulse 81   Resp 18

## 2023-07-14 ENCOUNTER — HOSPITAL ENCOUNTER (OUTPATIENT)
Facility: HOSPITAL | Age: 58
Setting detail: INFUSION SERIES
End: 2023-07-14
Payer: MEDICARE

## 2023-07-14 VITALS
TEMPERATURE: 98 F | SYSTOLIC BLOOD PRESSURE: 138 MMHG | RESPIRATION RATE: 18 BRPM | HEART RATE: 67 BPM | DIASTOLIC BLOOD PRESSURE: 84 MMHG

## 2023-07-14 DIAGNOSIS — E83.59 CALCIPHYLAXIS: Primary | ICD-10-CM

## 2023-07-14 PROCEDURE — 96365 THER/PROPH/DIAG IV INF INIT: CPT

## 2023-07-14 PROCEDURE — 6360000002 HC RX W HCPCS: Performed by: INTERNAL MEDICINE

## 2023-07-14 PROCEDURE — 2580000003 HC RX 258: Performed by: INTERNAL MEDICINE

## 2023-07-14 RX ORDER — SODIUM CHLORIDE 9 MG/ML
INJECTION, SOLUTION INTRAVENOUS CONTINUOUS
Status: DISCONTINUED | OUTPATIENT
Start: 2023-07-14 | End: 2023-07-15 | Stop reason: HOSPADM

## 2023-07-14 RX ORDER — SODIUM CHLORIDE 0.9 % (FLUSH) 0.9 %
5-40 SYRINGE (ML) INJECTION 2 TIMES DAILY
Status: DISCONTINUED | OUTPATIENT
Start: 2023-07-14 | End: 2023-07-15 | Stop reason: HOSPADM

## 2023-07-14 RX ADMIN — SODIUM CHLORIDE, PRESERVATIVE FREE 10 ML: 5 INJECTION INTRAVENOUS at 13:40

## 2023-07-14 RX ADMIN — SODIUM CHLORIDE: 9 INJECTION, SOLUTION INTRAVENOUS at 13:41

## 2023-07-14 RX ADMIN — SODIUM THIOSULFATE 25 G: 250 INJECTION, SOLUTION INTRAVENOUS at 14:16

## 2023-07-14 ASSESSMENT — PAIN SCALES - GENERAL: PAINLEVEL_OUTOF10: 4

## 2023-07-14 ASSESSMENT — PAIN DESCRIPTION - LOCATION: LOCATION: LEG

## 2023-07-14 ASSESSMENT — PAIN DESCRIPTION - PAIN TYPE: TYPE: CHRONIC PAIN

## 2023-07-14 ASSESSMENT — PAIN DESCRIPTION - FREQUENCY: FREQUENCY: CONTINUOUS

## 2023-07-14 ASSESSMENT — PAIN DESCRIPTION - ONSET: ONSET: ON-GOING

## 2023-07-14 ASSESSMENT — PAIN DESCRIPTION - DESCRIPTORS: DESCRIPTORS: ACHING;BURNING;DISCOMFORT

## 2023-07-14 ASSESSMENT — PAIN DESCRIPTION - ORIENTATION: ORIENTATION: RIGHT;LEFT;LOWER

## 2023-07-18 ENCOUNTER — HOSPITAL ENCOUNTER (OUTPATIENT)
Facility: HOSPITAL | Age: 58
Setting detail: INFUSION SERIES
End: 2023-07-18
Payer: MEDICARE

## 2023-07-18 VITALS
HEART RATE: 84 BPM | TEMPERATURE: 98.8 F | DIASTOLIC BLOOD PRESSURE: 90 MMHG | SYSTOLIC BLOOD PRESSURE: 148 MMHG | RESPIRATION RATE: 18 BRPM

## 2023-07-18 DIAGNOSIS — E83.59 CALCIPHYLAXIS: Primary | ICD-10-CM

## 2023-07-18 PROCEDURE — 6360000002 HC RX W HCPCS: Performed by: INTERNAL MEDICINE

## 2023-07-18 PROCEDURE — 2580000003 HC RX 258: Performed by: INTERNAL MEDICINE

## 2023-07-18 PROCEDURE — 96365 THER/PROPH/DIAG IV INF INIT: CPT

## 2023-07-18 RX ORDER — SODIUM CHLORIDE 0.9 % (FLUSH) 0.9 %
5 SYRINGE (ML) INJECTION PRN
Status: DISCONTINUED | OUTPATIENT
Start: 2023-07-18 | End: 2023-07-19 | Stop reason: HOSPADM

## 2023-07-18 RX ORDER — SODIUM CHLORIDE 9 MG/ML
INJECTION, SOLUTION INTRAVENOUS ONCE
Status: COMPLETED | OUTPATIENT
Start: 2023-07-18 | End: 2023-07-18

## 2023-07-18 RX ADMIN — SODIUM THIOSULFATE 25 G: 250 INJECTION, SOLUTION INTRAVENOUS at 14:55

## 2023-07-18 RX ADMIN — SODIUM CHLORIDE: 9 INJECTION, SOLUTION INTRAVENOUS at 14:36

## 2023-07-18 RX ADMIN — SODIUM CHLORIDE, PRESERVATIVE FREE 5 ML: 5 INJECTION INTRAVENOUS at 14:35

## 2023-07-18 ASSESSMENT — PAIN DESCRIPTION - ONSET: ONSET: ON-GOING

## 2023-07-18 ASSESSMENT — PAIN DESCRIPTION - DESCRIPTORS: DESCRIPTORS: ACHING;BURNING;DISCOMFORT

## 2023-07-18 ASSESSMENT — PAIN DESCRIPTION - LOCATION: LOCATION: LEG

## 2023-07-18 ASSESSMENT — PAIN DESCRIPTION - ORIENTATION: ORIENTATION: RIGHT;LEFT;LOWER

## 2023-07-18 ASSESSMENT — PAIN SCALES - GENERAL: PAINLEVEL_OUTOF10: 8

## 2023-07-18 ASSESSMENT — PAIN DESCRIPTION - PAIN TYPE: TYPE: CHRONIC PAIN

## 2023-07-18 ASSESSMENT — PAIN DESCRIPTION - FREQUENCY: FREQUENCY: CONTINUOUS

## 2023-07-20 ENCOUNTER — HOSPITAL ENCOUNTER (OUTPATIENT)
Facility: HOSPITAL | Age: 58
Discharge: HOME | End: 2023-07-20
Payer: MEDICARE

## 2023-07-20 VITALS
DIASTOLIC BLOOD PRESSURE: 74 MMHG | RESPIRATION RATE: 18 BRPM | HEART RATE: 73 BPM | SYSTOLIC BLOOD PRESSURE: 118 MMHG | TEMPERATURE: 97.9 F

## 2023-07-20 DIAGNOSIS — E83.59 CALCIPHYLAXIS OF LEFT LOWER EXTREMITY WITH NONHEALING ULCER WITH FAT LAYER EXPOSED (HCC): Primary | ICD-10-CM

## 2023-07-20 DIAGNOSIS — L97.912 CALCIPHYLAXIS OF RIGHT LOWER EXTREMITY WITH NONHEALING ULCER WITH FAT LAYER EXPOSED (HCC): ICD-10-CM

## 2023-07-20 DIAGNOSIS — I87.2 VENOUS STASIS ULCER OF LEFT CALF WITH FAT LAYER EXPOSED WITHOUT VARICOSE VEINS (HCC): ICD-10-CM

## 2023-07-20 DIAGNOSIS — L97.222 VENOUS STASIS ULCER OF LEFT CALF WITH FAT LAYER EXPOSED WITHOUT VARICOSE VEINS (HCC): ICD-10-CM

## 2023-07-20 DIAGNOSIS — L97.922 CALCIPHYLAXIS OF LEFT LOWER EXTREMITY WITH NONHEALING ULCER WITH FAT LAYER EXPOSED (HCC): Primary | ICD-10-CM

## 2023-07-20 DIAGNOSIS — E83.59 CALCIPHYLAXIS OF RIGHT LOWER EXTREMITY WITH NONHEALING ULCER WITH FAT LAYER EXPOSED (HCC): ICD-10-CM

## 2023-07-20 LAB
GLUCOSE BLD STRIP.AUTO-MCNC: 106 MG/DL (ref 65–117)
GLUCOSE BLD STRIP.AUTO-MCNC: 45 MG/DL (ref 65–117)
GLUCOSE BLD STRIP.AUTO-MCNC: 59 MG/DL (ref 65–117)
SERVICE CMNT-IMP: ABNORMAL
SERVICE CMNT-IMP: ABNORMAL
SERVICE CMNT-IMP: NORMAL

## 2023-07-20 PROCEDURE — 11042 DBRDMT SUBQ TIS 1ST 20SQCM/<: CPT

## 2023-07-20 PROCEDURE — 11045 DBRDMT SUBQ TISS EACH ADDL: CPT

## 2023-07-20 PROCEDURE — 82962 GLUCOSE BLOOD TEST: CPT

## 2023-07-20 RX ORDER — BETAMETHASONE DIPROPIONATE 0.05 %
OINTMENT (GRAM) TOPICAL ONCE
OUTPATIENT
Start: 2023-07-20 | End: 2023-07-20

## 2023-07-20 RX ORDER — CLOBETASOL PROPIONATE 0.5 MG/G
OINTMENT TOPICAL ONCE
OUTPATIENT
Start: 2023-07-20 | End: 2023-07-20

## 2023-07-20 RX ORDER — LIDOCAINE HYDROCHLORIDE 40 MG/ML
SOLUTION TOPICAL ONCE
OUTPATIENT
Start: 2023-07-20 | End: 2023-07-20

## 2023-07-20 RX ORDER — SODIUM CHLOR/HYPOCHLOROUS ACID 0.033 %
SOLUTION, IRRIGATION IRRIGATION ONCE
OUTPATIENT
Start: 2023-07-20 | End: 2023-07-20

## 2023-07-20 RX ORDER — LIDOCAINE 50 MG/G
OINTMENT TOPICAL ONCE
OUTPATIENT
Start: 2023-07-20 | End: 2023-07-20

## 2023-07-20 RX ORDER — LIDOCAINE HYDROCHLORIDE 20 MG/ML
JELLY TOPICAL ONCE
OUTPATIENT
Start: 2023-07-20 | End: 2023-07-20

## 2023-07-20 RX ORDER — IBUPROFEN 200 MG
TABLET ORAL ONCE
OUTPATIENT
Start: 2023-07-20 | End: 2023-07-20

## 2023-07-20 RX ORDER — BACITRACIN ZINC AND POLYMYXIN B SULFATE 500; 1000 [USP'U]/G; [USP'U]/G
OINTMENT TOPICAL ONCE
OUTPATIENT
Start: 2023-07-20 | End: 2023-07-20

## 2023-07-20 RX ORDER — GENTAMICIN SULFATE 1 MG/G
OINTMENT TOPICAL ONCE
OUTPATIENT
Start: 2023-07-20 | End: 2023-07-20

## 2023-07-20 RX ORDER — SODIUM CHLOR/HYPOCHLOROUS ACID 0.033 %
SOLUTION, IRRIGATION IRRIGATION ONCE
Status: COMPLETED | OUTPATIENT
Start: 2023-07-20 | End: 2023-07-20

## 2023-07-20 RX ORDER — GINSENG 100 MG
CAPSULE ORAL ONCE
OUTPATIENT
Start: 2023-07-20 | End: 2023-07-20

## 2023-07-20 RX ORDER — LIDOCAINE 40 MG/G
CREAM TOPICAL ONCE
OUTPATIENT
Start: 2023-07-20 | End: 2023-07-20

## 2023-07-20 RX ADMIN — Medication: at 15:41

## 2023-07-20 ASSESSMENT — PAIN DESCRIPTION - DESCRIPTORS: DESCRIPTORS: BURNING;SHARP

## 2023-07-20 ASSESSMENT — PAIN SCALES - GENERAL: PAINLEVEL_OUTOF10: 8

## 2023-07-20 ASSESSMENT — PAIN DESCRIPTION - ORIENTATION: ORIENTATION: RIGHT;LEFT

## 2023-07-20 ASSESSMENT — PAIN DESCRIPTION - LOCATION: LOCATION: LEG

## 2023-07-20 NOTE — FLOWSHEET NOTE
07/20/23 1455   Anesthetic   Anesthetic 4% Lidocaine Liquid Topical   Right Leg Edema Point of Measurement   Leg circumference 43 cm   Ankle circumference 21.5 cm   Compression Therapy Tubular elastic support bandage   Tubular Elastic Support Bandage Compression Pressure Low   Left Leg Edema Point of Measurement   Leg circumference 37.5 cm   Ankle circumference 2 cm   Compression Therapy Tubular elastic support bandage   Tubular Elastic Support Bandage Compression Pressure Low   RLE Neurovascular Assessment   Capillary Refill Less than/Equal to 3 seconds   Color Appropriate for Ethnicity   Temperature Warm   RLE Sensation  Pain   R Pedal Pulse +2   LLE Neurovascular Assessment   Capillary Refill Less than/Equal to 3 seconds   Color Yellow-Brown/Hemosiderin Staining   Temperature Warm   LLE Sensation  Pain   L Pedal Pulse +2   Wound 04/13/23 Leg left circumferential   Date First Assessed: 04/13/23   Primary Wound Type: Venous Ulcer  Location: Leg  Wound Description (Comments): left circumferential   Wound Image    Wound Etiology Other  (calciphylaxis)   Dressing Status Intact; Old drainage noted   Wound Cleansed Soap and water   Offloading for Diabetic Foot Ulcers Offloading not required   Wound Length (cm) 18.5 cm   Wound Width (cm) 34 cm   Wound Depth (cm) 0.3 cm   Wound Surface Area (cm^2) 629 cm^2   Change in Wound Size % (l*w) -685.86   Wound Volume (cm^3) 188.7 cm^3   Wound Healing % -1079   Wound Assessment [place]/red;Slough   Drainage Amount Large   Drainage Description Serosanguinous   Odor Malodorous/putrid   Kay-wound Assessment Edematous;Fragile   Margins Defined edges   Wound Thickness Description not for Pressure Injury Full thickness   Wound 06/01/23 Leg Lateral;Right #2 right lateral leg.    Date First Assessed/Time First Assessed: 06/01/23 1455   Present on Hospital Admission: Yes  Wound Approximate Age at First Assessment (Weeks): 1 weeks  Primary Wound Type: Venous Ulcer  Location: Leg  Wound

## 2023-07-20 NOTE — DISCHARGE INSTRUCTIONS
Discharge Instructions/Wound Orders  [address]  Telephone: [phone]    NAME:  Delmar Parker  YOB: 1965  MEDICAL RECORD NUMBER:  [id number]  DATE:  July 20, 2023  Wound Care Orders:  Bilateral lower extremity wounds - Cleanse with vashe solution. Allow to sit for 5-7 minutes. Hydrofera blue ready to wound bed. Cover with ABD and rolled gauze. Apply double layer tubigrip size F for compression. Change three times a week. If you need to change it more often between dressing changes, please dress with a vashe wet to dry dressing instead of the hydrofera blue pad. Activity:  [x] Activity as tolerated:     [] Remain off Work:       [] May return to full duty work:                                     [] Return to work with restrictions:  Dietary:  [x] Diet as tolerated      [] Diabetic Diet            [] Increase Protein: examples (Meat, cheese, eggs, greek yogurt, fish, nuts)          [x] [address]  Return Appointment:  [x] Return Appointment: With Dr. Kylah Mora in 1 week. [] Ordered tests:    Electronically signed Arturo Marin RN on 7/20/2023 at 3:00 PM     [address]: Should you experience any significant changes in your wound(s) or have questions about your wound care, please contact the [address] [place] [address] at 1 [facility] 8:00 am - 4:30. If you need help with your wound outside these hours and cannot wait until we are again available, contact your PCP or go to the hospital emergency room. PLEASE NOTE: IF YOU ARE UNABLE TO OBTAIN WOUND SUPPLIES, CONTINUE TO USE THE SUPPLIES YOU HAVE AVAILABLE UNTIL YOU ARE ABLE TO REACH US. IT IS MOST IMPORTANT TO KEEP THE WOUND COVERED AT ALL TIMES.      Physician Signature:_______________________    Date: ___________ Time:  ____________

## 2023-07-21 ENCOUNTER — HOSPITAL ENCOUNTER (OUTPATIENT)
Facility: HOSPITAL | Age: 58
Setting detail: INFUSION SERIES
End: 2023-07-21
Payer: MEDICARE

## 2023-07-21 VITALS
SYSTOLIC BLOOD PRESSURE: 139 MMHG | DIASTOLIC BLOOD PRESSURE: 78 MMHG | HEART RATE: 79 BPM | RESPIRATION RATE: 18 BRPM | TEMPERATURE: 98.5 F

## 2023-07-21 DIAGNOSIS — E83.59 CALCIPHYLAXIS: Primary | ICD-10-CM

## 2023-07-21 PROCEDURE — 96365 THER/PROPH/DIAG IV INF INIT: CPT

## 2023-07-21 PROCEDURE — 6360000002 HC RX W HCPCS: Performed by: INTERNAL MEDICINE

## 2023-07-21 PROCEDURE — 2580000003 HC RX 258: Performed by: RADIOLOGY

## 2023-07-21 RX ORDER — SODIUM CHLORIDE 9 MG/ML
INJECTION, SOLUTION INTRAVENOUS CONTINUOUS
Status: DISCONTINUED | OUTPATIENT
Start: 2023-07-21 | End: 2023-10-28 | Stop reason: HOSPADM

## 2023-07-21 RX ADMIN — SODIUM CHLORIDE: 9 INJECTION, SOLUTION INTRAVENOUS at 14:45

## 2023-07-21 RX ADMIN — SODIUM THIOSULFATE 25 G: 250 INJECTION, SOLUTION INTRAVENOUS at 15:09

## 2023-07-21 ASSESSMENT — PAIN DESCRIPTION - LOCATION: LOCATION: LEG

## 2023-07-21 ASSESSMENT — PAIN DESCRIPTION - DESCRIPTORS: DESCRIPTORS: BURNING;SHARP

## 2023-07-21 ASSESSMENT — PAIN SCALES - GENERAL: PAINLEVEL_OUTOF10: 10

## 2023-07-21 ASSESSMENT — PAIN DESCRIPTION - ORIENTATION: ORIENTATION: RIGHT;LEFT

## 2023-07-21 NOTE — PROGRESS NOTES
mouth 2 times daily. Max Daily Amount: 200 mg      bumetanide (BUMEX) 1 MG tablet Take 1 tablet by mouth daily      traMADol (ULTRAM) 50 MG tablet Take 2 tablets by mouth every 6 hours as needed for Pain. Multiple Vitamins-Iron (MULTIVITAMIN PLUS IRON ADULT PO) Take by mouth (Patient not taking: Reported on 6/29/2023)      allopurinol (ZYLOPRIM) 100 MG tablet Take 0.5 tablets by mouth daily (Patient not taking: Reported on 6/29/2023)      carvedilol (COREG) 25 MG tablet Take 2 tablets by mouth 2 times daily      hydrALAZINE (APRESOLINE) 50 MG tablet Take 1 tablet by mouth 3 times daily      insulin 70-30 (HUMULIN;NOVOLIN) (70-30) 100 UNIT per ML injection vial Inject 26-27 Units into the skin      isosorbide dinitrate (ISORDIL) 30 MG tablet Take 1 tablet by mouth 3 times daily      simvastatin (ZOCOR) 40 MG tablet Take 1 tablet by mouth nightly       No current facility-administered medications on file prior to encounter. REVIEW OF SYSTEMS  A comprehensive review of systems was negative except for what has been indicated above and: see above. Depression    Written patient dismissal instructions given to patient and signed by patient or POA. Discharge Instructions         Discharge Instructions/Wound Orders  [address], [address]  Telephone: [phone]    NAME:  Brad Collado  YOB: 1965  MEDICAL RECORD NUMBER:  [id number]  DATE:  July 20, 2023  Wound Care Orders:  Bilateral lower extremity wounds - Cleanse with vashe solution. Allow to sit for 5-7 minutes. Hydrofera blue ready to wound bed. Cover with ABD and rolled gauze. Apply double layer tubigrip size F for compression. Change three times a week. If you need to change it more often between dressing changes, please dress with a vashe wet to dry dressing instead of the hydrofera blue pad.     Activity:  [x] Activity as tolerated:     [] Remain off Work:

## 2023-07-25 ENCOUNTER — HOSPITAL ENCOUNTER (OUTPATIENT)
Facility: HOSPITAL | Age: 58
Setting detail: INFUSION SERIES
End: 2023-07-25
Payer: MEDICARE

## 2023-07-25 VITALS
HEART RATE: 81 BPM | SYSTOLIC BLOOD PRESSURE: 126 MMHG | RESPIRATION RATE: 18 BRPM | DIASTOLIC BLOOD PRESSURE: 69 MMHG | TEMPERATURE: 98.7 F

## 2023-07-25 DIAGNOSIS — E83.59 CALCIPHYLAXIS: Primary | ICD-10-CM

## 2023-07-25 PROCEDURE — 6360000002 HC RX W HCPCS: Performed by: INTERNAL MEDICINE

## 2023-07-25 PROCEDURE — 96365 THER/PROPH/DIAG IV INF INIT: CPT

## 2023-07-25 PROCEDURE — 2580000003 HC RX 258: Performed by: INTERNAL MEDICINE

## 2023-07-25 RX ORDER — SODIUM CHLORIDE 9 MG/ML
INJECTION, SOLUTION INTRAVENOUS CONTINUOUS
Status: DISCONTINUED | OUTPATIENT
Start: 2023-07-25 | End: 2023-07-26 | Stop reason: HOSPADM

## 2023-07-25 RX ADMIN — SODIUM THIOSULFATE 25 G: 250 INJECTION, SOLUTION INTRAVENOUS at 14:52

## 2023-07-25 RX ADMIN — SODIUM CHLORIDE: 9 INJECTION, SOLUTION INTRAVENOUS at 14:35

## 2023-07-25 ASSESSMENT — PAIN DESCRIPTION - DESCRIPTORS: DESCRIPTORS: BURNING;SHARP

## 2023-07-25 ASSESSMENT — PAIN DESCRIPTION - LOCATION: LOCATION: LEG

## 2023-07-25 ASSESSMENT — PAIN SCALES - GENERAL: PAINLEVEL_OUTOF10: 10

## 2023-07-25 ASSESSMENT — PAIN DESCRIPTION - ORIENTATION: ORIENTATION: RIGHT;LEFT

## 2023-07-27 ENCOUNTER — HOSPITAL ENCOUNTER (OUTPATIENT)
Facility: HOSPITAL | Age: 58
Discharge: HOME | End: 2023-07-27
Payer: MEDICARE

## 2023-07-27 VITALS — DIASTOLIC BLOOD PRESSURE: 78 MMHG | HEART RATE: 84 BPM | TEMPERATURE: 98.5 F | SYSTOLIC BLOOD PRESSURE: 130 MMHG

## 2023-07-27 DIAGNOSIS — I87.2 VENOUS STASIS ULCER OF LEFT CALF WITH FAT LAYER EXPOSED WITHOUT VARICOSE VEINS (HCC): Primary | ICD-10-CM

## 2023-07-27 DIAGNOSIS — L97.922 CALCIPHYLAXIS OF LEFT LOWER EXTREMITY WITH NONHEALING ULCER WITH FAT LAYER EXPOSED (HCC): ICD-10-CM

## 2023-07-27 DIAGNOSIS — L97.222 VENOUS STASIS ULCER OF LEFT CALF WITH FAT LAYER EXPOSED WITHOUT VARICOSE VEINS (HCC): Primary | ICD-10-CM

## 2023-07-27 DIAGNOSIS — E83.59 CALCIPHYLAXIS OF RIGHT LOWER EXTREMITY WITH NONHEALING ULCER WITH FAT LAYER EXPOSED (HCC): ICD-10-CM

## 2023-07-27 DIAGNOSIS — L97.912 CALCIPHYLAXIS OF RIGHT LOWER EXTREMITY WITH NONHEALING ULCER WITH FAT LAYER EXPOSED (HCC): ICD-10-CM

## 2023-07-27 DIAGNOSIS — E83.59 CALCIPHYLAXIS OF LEFT LOWER EXTREMITY WITH NONHEALING ULCER WITH FAT LAYER EXPOSED (HCC): ICD-10-CM

## 2023-07-27 PROBLEM — N17.9 AKI (ACUTE KIDNEY INJURY) (HCC): Status: ACTIVE | Noted: 2023-03-19

## 2023-07-27 PROCEDURE — 11042 DBRDMT SUBQ TIS 1ST 20SQCM/<: CPT

## 2023-07-27 RX ORDER — LIDOCAINE 50 MG/G
OINTMENT TOPICAL ONCE
OUTPATIENT
Start: 2023-07-27 | End: 2023-07-27

## 2023-07-27 RX ORDER — LIDOCAINE HYDROCHLORIDE 20 MG/ML
JELLY TOPICAL ONCE
OUTPATIENT
Start: 2023-07-27 | End: 2023-07-27

## 2023-07-27 RX ORDER — LIDOCAINE HYDROCHLORIDE 40 MG/ML
SOLUTION TOPICAL ONCE
OUTPATIENT
Start: 2023-07-27 | End: 2023-07-27

## 2023-07-27 RX ORDER — GINSENG 100 MG
CAPSULE ORAL ONCE
OUTPATIENT
Start: 2023-07-27 | End: 2023-07-27

## 2023-07-27 RX ORDER — LIDOCAINE 40 MG/G
CREAM TOPICAL ONCE
OUTPATIENT
Start: 2023-07-27 | End: 2023-07-27

## 2023-07-27 RX ORDER — SODIUM CHLOR/HYPOCHLOROUS ACID 0.033 %
SOLUTION, IRRIGATION IRRIGATION ONCE
OUTPATIENT
Start: 2023-07-27 | End: 2023-07-27

## 2023-07-27 RX ORDER — SODIUM CHLOR/HYPOCHLOROUS ACID 0.033 %
SOLUTION, IRRIGATION IRRIGATION ONCE
Status: COMPLETED | OUTPATIENT
Start: 2023-07-27 | End: 2023-07-27

## 2023-07-27 RX ORDER — BETAMETHASONE DIPROPIONATE 0.05 %
OINTMENT (GRAM) TOPICAL ONCE
OUTPATIENT
Start: 2023-07-27 | End: 2023-07-27

## 2023-07-27 RX ORDER — GENTAMICIN SULFATE 1 MG/G
OINTMENT TOPICAL ONCE
OUTPATIENT
Start: 2023-07-27 | End: 2023-07-27

## 2023-07-27 RX ORDER — BACITRACIN ZINC AND POLYMYXIN B SULFATE 500; 1000 [USP'U]/G; [USP'U]/G
OINTMENT TOPICAL ONCE
OUTPATIENT
Start: 2023-07-27 | End: 2023-07-27

## 2023-07-27 RX ORDER — IBUPROFEN 200 MG
TABLET ORAL ONCE
OUTPATIENT
Start: 2023-07-27 | End: 2023-07-27

## 2023-07-27 RX ORDER — CLOBETASOL PROPIONATE 0.5 MG/G
OINTMENT TOPICAL ONCE
OUTPATIENT
Start: 2023-07-27 | End: 2023-07-27

## 2023-07-27 RX ADMIN — Medication: at 14:24

## 2023-07-27 ASSESSMENT — PAIN DESCRIPTION - DESCRIPTORS: DESCRIPTORS: BURNING

## 2023-07-27 ASSESSMENT — PAIN SCALES - GENERAL: PAINLEVEL_OUTOF10: 6

## 2023-07-27 ASSESSMENT — PAIN DESCRIPTION - LOCATION: LOCATION: LEG

## 2023-07-27 ASSESSMENT — PAIN DESCRIPTION - ORIENTATION: ORIENTATION: RIGHT

## 2023-07-27 NOTE — PROGRESS NOTES
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 He has cardiomyopathy with LVEF of 15-20% by echocardiogram.  Patient has been somewhat noncompliant with dressing changes from what I understand from previous notes. Patient has been seen by nephrology by previous records and referred for to PCP for mental health assistance. 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 Patient has completed 5 infusions from nephrology for his calciphylaxis. Patient's phosphorus 4.6 previously    Patient still has the painful wounds but overall they seem to be improving, was using Santyl on the eschar previously but now just the [facility]. Review of Systems   Constitutional:  Positive for fatigue. See HPI   All other systems reviewed and are negative. Objective: There were no vitals taken for this visit. No data recorded. Physical Exam  Vitals and nursing note reviewed. Exam conducted with a chaperone present. Constitutional:       General: He is not in acute distress. Appearance: Normal appearance. HENT:      Head: Normocephalic and atraumatic. Nose: Nose normal.   Cardiovascular:      Rate and Rhythm: Normal rate. Pulmonary:      Effort: Pulmonary effort is normal.   Neurological:      General: No focal deficit present. Mental Status: He is alert and oriented to person, place, and time. Psychiatric:         Mood and Affect: Mood normal.         Behavior: Behavior normal.         Thought Content:  Thought content normal.         Judgment: Judgment normal.     Bilateral calf pedal pulses adequate, ABIs right 1.2, 1.4 on left, could represent some calcifications arterial  Blood areas of dry black eschar, and on the right there is a 2 cm x 2 cm area of loose

## 2023-07-27 NOTE — FLOWSHEET NOTE
07/27/23 1455   Right Leg Edema Point of Measurement   Compression Therapy Tubular elastic support bandage   Tubular Elastic Support Bandage Compression Pressure Low  (pt refuses double layer)   Left Leg Edema Point of Measurement   Compression Therapy Tubular elastic support bandage   Tubular Elastic Support Bandage Compression Pressure Low  (pt refuses double layer)   Wound 06/01/23 Leg Lateral;Right #2 right lateral leg. Date First Assessed/Time First Assessed: 06/01/23 1455   Present on Hospital Admission: Yes  Wound Approximate Age at First Assessment (Weeks): 1 weeks  Primary Wound Type: Venous Ulcer  Location: Leg  Wound Location Orientation: Lateral;Right  Wound . ..    Dressing Status New dressing applied   Wound Cleansed Vashe   Dressing/Treatment Hydrofera blue;ABD;Roll gauze   Wound 04/13/23 Leg left circumferential   Date First Assessed: 04/13/23   Primary Wound Type: Venous Ulcer  Location: Leg  Wound Description (Comments): left circumferential   Dressing Status New dressing applied   Wound Cleansed Vashe   Dressing/Treatment Hydrofera blue;ABD;Roll gauze

## 2023-07-28 ENCOUNTER — HOSPITAL ENCOUNTER (OUTPATIENT)
Facility: HOSPITAL | Age: 58
Setting detail: INFUSION SERIES
End: 2023-07-28
Payer: MEDICARE

## 2023-07-28 VITALS
TEMPERATURE: 97.8 F | RESPIRATION RATE: 18 BRPM | DIASTOLIC BLOOD PRESSURE: 76 MMHG | SYSTOLIC BLOOD PRESSURE: 142 MMHG | HEART RATE: 74 BPM

## 2023-07-28 DIAGNOSIS — E83.59 CALCIPHYLAXIS: Primary | ICD-10-CM

## 2023-07-28 PROCEDURE — 6360000002 HC RX W HCPCS: Performed by: INTERNAL MEDICINE

## 2023-07-28 PROCEDURE — 2580000003 HC RX 258: Performed by: INTERNAL MEDICINE

## 2023-07-28 PROCEDURE — 96365 THER/PROPH/DIAG IV INF INIT: CPT

## 2023-07-28 RX ORDER — SODIUM CHLORIDE 9 MG/ML
INJECTION, SOLUTION INTRAVENOUS CONTINUOUS
Status: DISCONTINUED | OUTPATIENT
Start: 2023-07-28 | End: 2023-10-28 | Stop reason: HOSPADM

## 2023-07-28 RX ADMIN — SODIUM THIOSULFATE 25 G: 250 INJECTION, SOLUTION INTRAVENOUS at 14:59

## 2023-07-28 RX ADMIN — SODIUM CHLORIDE: 9 INJECTION, SOLUTION INTRAVENOUS at 14:37

## 2023-07-28 ASSESSMENT — PAIN DESCRIPTION - LOCATION: LOCATION: LEG

## 2023-07-28 ASSESSMENT — PAIN DESCRIPTION - DESCRIPTORS: DESCRIPTORS: SHARP;BURNING

## 2023-07-28 ASSESSMENT — PAIN SCALES - GENERAL: PAINLEVEL_OUTOF10: 10

## 2023-07-28 ASSESSMENT — PAIN DESCRIPTION - ORIENTATION: ORIENTATION: RIGHT

## 2023-08-01 ENCOUNTER — HOSPITAL ENCOUNTER (OUTPATIENT)
Facility: HOSPITAL | Age: 58
Setting detail: INFUSION SERIES
End: 2023-08-01
Payer: MEDICARE

## 2023-08-01 VITALS
SYSTOLIC BLOOD PRESSURE: 109 MMHG | HEART RATE: 71 BPM | RESPIRATION RATE: 18 BRPM | DIASTOLIC BLOOD PRESSURE: 73 MMHG | TEMPERATURE: 97.6 F

## 2023-08-01 DIAGNOSIS — E83.59 CALCIPHYLAXIS: Primary | ICD-10-CM

## 2023-08-01 LAB
ALBUMIN SERPL-MCNC: 2.1 G/DL (ref 3.5–5)
ANION GAP SERPL CALC-SCNC: 7 MMOL/L (ref 5–15)
BUN SERPL-MCNC: 32 MG/DL (ref 6–20)
BUN/CREAT SERPL: 14 (ref 12–20)
CALCIUM SERPL-MCNC: 8.7 MG/DL (ref 8.5–10.1)
CHLORIDE SERPL-SCNC: 108 MMOL/L (ref 97–108)
CO2 SERPL-SCNC: 25 MMOL/L (ref 21–32)
COMMENT:: NORMAL
CREAT SERPL-MCNC: 2.37 MG/DL (ref 0.7–1.3)
GLUCOSE SERPL-MCNC: 125 MG/DL (ref 65–100)
PHOSPHATE SERPL-MCNC: 2.7 MG/DL (ref 2.6–4.7)
POTASSIUM SERPL-SCNC: 2.5 MMOL/L (ref 3.5–5.1)
SODIUM SERPL-SCNC: 140 MMOL/L (ref 136–145)
SPECIMEN HOLD: NORMAL

## 2023-08-01 PROCEDURE — 2580000003 HC RX 258: Performed by: INTERNAL MEDICINE

## 2023-08-01 PROCEDURE — 96365 THER/PROPH/DIAG IV INF INIT: CPT

## 2023-08-01 PROCEDURE — 6360000002 HC RX W HCPCS: Performed by: INTERNAL MEDICINE

## 2023-08-01 PROCEDURE — 80069 RENAL FUNCTION PANEL: CPT

## 2023-08-01 PROCEDURE — 36415 COLL VENOUS BLD VENIPUNCTURE: CPT

## 2023-08-01 RX ORDER — SODIUM CHLORIDE 9 MG/ML
INJECTION, SOLUTION INTRAVENOUS CONTINUOUS
Status: CANCELLED
Start: 2023-08-03

## 2023-08-01 RX ORDER — SODIUM CHLORIDE 9 MG/ML
INJECTION, SOLUTION INTRAVENOUS CONTINUOUS
Status: DISCONTINUED | OUTPATIENT
Start: 2023-08-01 | End: 2023-08-02 | Stop reason: HOSPADM

## 2023-08-01 RX ORDER — SODIUM CHLORIDE 9 MG/ML
INJECTION, SOLUTION INTRAVENOUS CONTINUOUS
Status: CANCELLED
Start: 2023-08-01

## 2023-08-01 RX ADMIN — SODIUM THIOSULFATE 25 G: 250 INJECTION, SOLUTION INTRAVENOUS at 14:57

## 2023-08-01 RX ADMIN — SODIUM CHLORIDE: 9 INJECTION, SOLUTION INTRAVENOUS at 14:37

## 2023-08-01 ASSESSMENT — PAIN DESCRIPTION - DESCRIPTORS: DESCRIPTORS: BURNING;SHARP

## 2023-08-01 ASSESSMENT — PAIN DESCRIPTION - ORIENTATION: ORIENTATION: RIGHT

## 2023-08-01 ASSESSMENT — PAIN SCALES - GENERAL: PAINLEVEL_OUTOF10: 10

## 2023-08-01 ASSESSMENT — PAIN DESCRIPTION - LOCATION: LOCATION: LEG

## 2023-08-04 ENCOUNTER — HOSPITAL ENCOUNTER (OUTPATIENT)
Facility: HOSPITAL | Age: 58
Setting detail: INFUSION SERIES
End: 2023-08-04
Payer: MEDICARE

## 2023-08-04 VITALS
SYSTOLIC BLOOD PRESSURE: 132 MMHG | HEART RATE: 71 BPM | TEMPERATURE: 97.7 F | DIASTOLIC BLOOD PRESSURE: 79 MMHG | RESPIRATION RATE: 18 BRPM

## 2023-08-04 DIAGNOSIS — E83.59 CALCIPHYLAXIS: Primary | ICD-10-CM

## 2023-08-04 PROCEDURE — 2580000003 HC RX 258: Performed by: INTERNAL MEDICINE

## 2023-08-04 PROCEDURE — 6360000002 HC RX W HCPCS: Performed by: INTERNAL MEDICINE

## 2023-08-04 PROCEDURE — 96365 THER/PROPH/DIAG IV INF INIT: CPT

## 2023-08-04 RX ORDER — SODIUM CHLORIDE 9 MG/ML
INJECTION, SOLUTION INTRAVENOUS CONTINUOUS
Status: CANCELLED
Start: 2023-08-07

## 2023-08-04 RX ORDER — SODIUM CHLORIDE 9 MG/ML
INJECTION, SOLUTION INTRAVENOUS CONTINUOUS
Status: DISCONTINUED | OUTPATIENT
Start: 2023-08-04 | End: 2023-10-28 | Stop reason: HOSPADM

## 2023-08-04 RX ADMIN — SODIUM CHLORIDE: 9 INJECTION, SOLUTION INTRAVENOUS at 14:35

## 2023-08-04 RX ADMIN — SODIUM THIOSULFATE 25 G: 250 INJECTION, SOLUTION INTRAVENOUS at 15:27

## 2023-08-04 ASSESSMENT — PAIN DESCRIPTION - DESCRIPTORS: DESCRIPTORS: BURNING;SHARP

## 2023-08-04 ASSESSMENT — PAIN SCALES - GENERAL: PAINLEVEL_OUTOF10: 10

## 2023-08-04 ASSESSMENT — PAIN DESCRIPTION - ORIENTATION: ORIENTATION: RIGHT

## 2023-08-04 ASSESSMENT — PAIN DESCRIPTION - LOCATION: LOCATION: LEG

## 2023-08-09 ENCOUNTER — APPOINTMENT (OUTPATIENT)
Facility: HOSPITAL | Age: 58
DRG: 287 | End: 2023-08-09
Payer: MEDICARE

## 2023-08-09 ENCOUNTER — HOSPITAL ENCOUNTER (INPATIENT)
Facility: HOSPITAL | Age: 58
LOS: 25 days | Discharge: HOME HEALTH | DRG: 287 | End: 2023-09-03
Attending: EMERGENCY MEDICINE | Admitting: HOSPITALIST
Payer: MEDICARE

## 2023-08-09 DIAGNOSIS — I42.9 CARDIOMYOPATHY, UNSPECIFIED TYPE (HCC): ICD-10-CM

## 2023-08-09 DIAGNOSIS — R53.81 DEBILITY: ICD-10-CM

## 2023-08-09 DIAGNOSIS — I42.0 DILATED CARDIOMYOPATHY (HCC): ICD-10-CM

## 2023-08-09 DIAGNOSIS — L89.890 PRESSURE INJURY OF LEFT LEG, UNSTAGEABLE (HCC): ICD-10-CM

## 2023-08-09 DIAGNOSIS — N18.9 CHRONIC KIDNEY DISEASE, UNSPECIFIED CKD STAGE: ICD-10-CM

## 2023-08-09 DIAGNOSIS — Z51.5 PALLIATIVE CARE ENCOUNTER: ICD-10-CM

## 2023-08-09 DIAGNOSIS — I50.9 CONGESTIVE HEART FAILURE, UNSPECIFIED HF CHRONICITY, UNSPECIFIED HEART FAILURE TYPE (HCC): ICD-10-CM

## 2023-08-09 DIAGNOSIS — L97.912 CALCIPHYLAXIS OF RIGHT LOWER EXTREMITY WITH NONHEALING ULCER WITH FAT LAYER EXPOSED (HCC): ICD-10-CM

## 2023-08-09 DIAGNOSIS — E83.59 CALCIPHYLAXIS OF RIGHT LOWER EXTREMITY WITH NONHEALING ULCER WITH FAT LAYER EXPOSED (HCC): ICD-10-CM

## 2023-08-09 DIAGNOSIS — S81.809A MULTIPLE OPENS WOUND OF LOWER EXTREMITY, UNSPECIFIED LATERALITY, INITIAL ENCOUNTER: ICD-10-CM

## 2023-08-09 DIAGNOSIS — E83.59 CALCIPHYLAXIS OF LEFT LOWER EXTREMITY WITH NONHEALING ULCER WITH FAT LAYER EXPOSED (HCC): ICD-10-CM

## 2023-08-09 DIAGNOSIS — R52 PAIN: ICD-10-CM

## 2023-08-09 DIAGNOSIS — E83.59 CALCIPHYLAXIS: ICD-10-CM

## 2023-08-09 DIAGNOSIS — I50.22 CHRONIC SYSTOLIC HEART FAILURE (HCC): Primary | ICD-10-CM

## 2023-08-09 DIAGNOSIS — R60.9 SWELLING: ICD-10-CM

## 2023-08-09 DIAGNOSIS — L97.922 CALCIPHYLAXIS OF LEFT LOWER EXTREMITY WITH NONHEALING ULCER WITH FAT LAYER EXPOSED (HCC): ICD-10-CM

## 2023-08-09 PROBLEM — R60.0 EDEMA OF RIGHT UPPER ARM: Status: ACTIVE | Noted: 2023-08-09

## 2023-08-09 LAB
ALBUMIN SERPL-MCNC: 2.4 G/DL (ref 3.5–5)
ALBUMIN/GLOB SERPL: 0.5 (ref 1.1–2.2)
ALP SERPL-CCNC: 220 U/L (ref 45–117)
ALT SERPL-CCNC: 12 U/L (ref 12–78)
ANION GAP SERPL CALC-SCNC: 9 MMOL/L (ref 5–15)
AST SERPL-CCNC: 20 U/L (ref 15–37)
BILIRUB SERPL-MCNC: 0.8 MG/DL (ref 0.2–1)
BUN SERPL-MCNC: 27 MG/DL (ref 6–20)
BUN/CREAT SERPL: 12 (ref 12–20)
CALCIUM SERPL-MCNC: 8.8 MG/DL (ref 8.5–10.1)
CALCIUM SERPL-MCNC: 8.9 MG/DL (ref 8.5–10.1)
CHLORIDE SERPL-SCNC: 109 MMOL/L (ref 97–108)
CO2 SERPL-SCNC: 26 MMOL/L (ref 21–32)
COMMENT:: NORMAL
COMMENT:: NORMAL
CREAT SERPL-MCNC: 2.23 MG/DL (ref 0.7–1.3)
CREAT UR-MCNC: 72.2 MG/DL
D DIMER PPP FEU-MCNC: 1.57 MG/L FEU (ref 0–0.65)
ECHO BSA: 2.32 M2
ECHO BSA: 2.32 M2
ERYTHROCYTE [DISTWIDTH] IN BLOOD BY AUTOMATED COUNT: 17.6 % (ref 11.5–14.5)
EST. AVERAGE GLUCOSE BLD GHB EST-MCNC: 166 MG/DL
EST. AVERAGE GLUCOSE BLD GHB EST-MCNC: 166 MG/DL
FERRITIN SERPL-MCNC: 93 NG/ML (ref 26–388)
FOLATE SERPL-MCNC: 15 NG/ML (ref 5–21)
GLOBULIN SER CALC-MCNC: 4.4 G/DL (ref 2–4)
GLUCOSE BLD STRIP.AUTO-MCNC: 145 MG/DL (ref 65–117)
GLUCOSE BLD STRIP.AUTO-MCNC: 200 MG/DL (ref 65–117)
GLUCOSE SERPL-MCNC: 127 MG/DL (ref 65–100)
HBA1C MFR BLD: 7.4 % (ref 4–5.6)
HBA1C MFR BLD: 7.4 % (ref 4–5.6)
HCT VFR BLD AUTO: 31.5 % (ref 36.6–50.3)
HGB BLD-MCNC: 9.5 G/DL (ref 12.1–17)
IRON SATN MFR SERPL: 17 % (ref 20–50)
IRON SERPL-MCNC: 37 UG/DL (ref 35–150)
MCH RBC QN AUTO: 26.5 PG (ref 26–34)
MCHC RBC AUTO-ENTMCNC: 30.2 G/DL (ref 30–36.5)
MCV RBC AUTO: 87.7 FL (ref 80–99)
MICROALBUMIN UR-MCNC: 25.4 MG/DL
MICROALBUMIN/CREAT UR-RTO: 352 MG/G (ref 0–30)
NRBC # BLD: 0 K/UL (ref 0–0.01)
NRBC BLD-RTO: 0 PER 100 WBC
PLATELET # BLD AUTO: 177 K/UL (ref 150–400)
PMV BLD AUTO: 12.9 FL (ref 8.9–12.9)
POTASSIUM SERPL-SCNC: 2.8 MMOL/L (ref 3.5–5.1)
PROT SERPL-MCNC: 6.8 G/DL (ref 6.4–8.2)
PTH-INTACT SERPL-MCNC: 80.4 PG/ML (ref 18.4–88)
RBC # BLD AUTO: 3.59 M/UL (ref 4.1–5.7)
SERVICE CMNT-IMP: ABNORMAL
SERVICE CMNT-IMP: ABNORMAL
SODIUM SERPL-SCNC: 144 MMOL/L (ref 136–145)
SPECIMEN HOLD: NORMAL
SPECIMEN HOLD: NORMAL
TIBC SERPL-MCNC: 224 UG/DL (ref 250–450)
TROPONIN I SERPL HS-MCNC: 54 NG/L (ref 0–76)
VIT B12 SERPL-MCNC: 596 PG/ML (ref 193–986)
WBC # BLD AUTO: 7.5 K/UL (ref 4.1–11.1)

## 2023-08-09 PROCEDURE — 99285 EMERGENCY DEPT VISIT HI MDM: CPT

## 2023-08-09 PROCEDURE — 82728 ASSAY OF FERRITIN: CPT

## 2023-08-09 PROCEDURE — 83036 HEMOGLOBIN GLYCOSYLATED A1C: CPT

## 2023-08-09 PROCEDURE — 80053 COMPREHEN METABOLIC PANEL: CPT

## 2023-08-09 PROCEDURE — 6370000000 HC RX 637 (ALT 250 FOR IP): Performed by: NURSE PRACTITIONER

## 2023-08-09 PROCEDURE — 83540 ASSAY OF IRON: CPT

## 2023-08-09 PROCEDURE — 83550 IRON BINDING TEST: CPT

## 2023-08-09 PROCEDURE — 83970 ASSAY OF PARATHORMONE: CPT

## 2023-08-09 PROCEDURE — 71046 X-RAY EXAM CHEST 2 VIEWS: CPT

## 2023-08-09 PROCEDURE — 76705 ECHO EXAM OF ABDOMEN: CPT

## 2023-08-09 PROCEDURE — 6360000002 HC RX W HCPCS: Performed by: EMERGENCY MEDICINE

## 2023-08-09 PROCEDURE — 6360000002 HC RX W HCPCS: Performed by: HOSPITALIST

## 2023-08-09 PROCEDURE — 93005 ELECTROCARDIOGRAM TRACING: CPT | Performed by: EMERGENCY MEDICINE

## 2023-08-09 PROCEDURE — 82570 ASSAY OF URINE CREATININE: CPT

## 2023-08-09 PROCEDURE — 82962 GLUCOSE BLOOD TEST: CPT

## 2023-08-09 PROCEDURE — 2580000003 HC RX 258: Performed by: HOSPITALIST

## 2023-08-09 PROCEDURE — 85027 COMPLETE CBC AUTOMATED: CPT

## 2023-08-09 PROCEDURE — 85379 FIBRIN DEGRADATION QUANT: CPT

## 2023-08-09 PROCEDURE — 36415 COLL VENOUS BLD VENIPUNCTURE: CPT

## 2023-08-09 PROCEDURE — 82043 UR ALBUMIN QUANTITATIVE: CPT

## 2023-08-09 PROCEDURE — 6370000000 HC RX 637 (ALT 250 FOR IP): Performed by: HOSPITALIST

## 2023-08-09 PROCEDURE — 82607 VITAMIN B-12: CPT

## 2023-08-09 PROCEDURE — 96374 THER/PROPH/DIAG INJ IV PUSH: CPT

## 2023-08-09 PROCEDURE — 93971 EXTREMITY STUDY: CPT

## 2023-08-09 PROCEDURE — 2060000000 HC ICU INTERMEDIATE R&B

## 2023-08-09 PROCEDURE — 82746 ASSAY OF FOLIC ACID SERUM: CPT

## 2023-08-09 PROCEDURE — 84484 ASSAY OF TROPONIN QUANT: CPT

## 2023-08-09 RX ORDER — ONDANSETRON 2 MG/ML
4 INJECTION INTRAMUSCULAR; INTRAVENOUS EVERY 6 HOURS PRN
Status: DISCONTINUED | OUTPATIENT
Start: 2023-08-09 | End: 2023-09-03 | Stop reason: HOSPADM

## 2023-08-09 RX ORDER — HYDROMORPHONE HYDROCHLORIDE 1 MG/ML
1 INJECTION, SOLUTION INTRAMUSCULAR; INTRAVENOUS; SUBCUTANEOUS EVERY 4 HOURS PRN
Status: DISCONTINUED | OUTPATIENT
Start: 2023-08-09 | End: 2023-08-17

## 2023-08-09 RX ORDER — CARVEDILOL 12.5 MG/1
50 TABLET ORAL 2 TIMES DAILY
Status: DISCONTINUED | OUTPATIENT
Start: 2023-08-09 | End: 2023-08-19

## 2023-08-09 RX ORDER — BUMETANIDE 0.25 MG/ML
1 INJECTION INTRAMUSCULAR; INTRAVENOUS EVERY 12 HOURS
Status: DISCONTINUED | OUTPATIENT
Start: 2023-08-09 | End: 2023-08-10

## 2023-08-09 RX ORDER — ENOXAPARIN SODIUM 100 MG/ML
30 INJECTION SUBCUTANEOUS 2 TIMES DAILY
Status: DISCONTINUED | OUTPATIENT
Start: 2023-08-10 | End: 2023-08-10

## 2023-08-09 RX ORDER — SODIUM CHLORIDE 9 MG/ML
INJECTION, SOLUTION INTRAVENOUS PRN
Status: DISCONTINUED | OUTPATIENT
Start: 2023-08-09 | End: 2023-09-03 | Stop reason: HOSPADM

## 2023-08-09 RX ORDER — POTASSIUM CHLORIDE 750 MG/1
60 TABLET, FILM COATED, EXTENDED RELEASE ORAL 2 TIMES DAILY
Status: DISCONTINUED | OUTPATIENT
Start: 2023-08-09 | End: 2023-08-12

## 2023-08-09 RX ORDER — PREGABALIN 100 MG/1
100 CAPSULE ORAL 2 TIMES DAILY
Status: DISCONTINUED | OUTPATIENT
Start: 2023-08-09 | End: 2023-08-09

## 2023-08-09 RX ORDER — SODIUM CHLORIDE 0.9 % (FLUSH) 0.9 %
5-40 SYRINGE (ML) INJECTION PRN
Status: DISCONTINUED | OUTPATIENT
Start: 2023-08-09 | End: 2023-09-03 | Stop reason: HOSPADM

## 2023-08-09 RX ORDER — POLYETHYLENE GLYCOL 3350 17 G/17G
17 POWDER, FOR SOLUTION ORAL DAILY PRN
Status: DISCONTINUED | OUTPATIENT
Start: 2023-08-09 | End: 2023-09-03 | Stop reason: HOSPADM

## 2023-08-09 RX ORDER — PREGABALIN 25 MG/1
50 CAPSULE ORAL 2 TIMES DAILY
Status: DISCONTINUED | OUTPATIENT
Start: 2023-08-09 | End: 2023-08-29

## 2023-08-09 RX ORDER — ACETAMINOPHEN 650 MG/1
650 SUPPOSITORY RECTAL EVERY 6 HOURS PRN
Status: DISCONTINUED | OUTPATIENT
Start: 2023-08-09 | End: 2023-09-03 | Stop reason: HOSPADM

## 2023-08-09 RX ORDER — MORPHINE SULFATE 4 MG/ML
4 INJECTION, SOLUTION INTRAMUSCULAR; INTRAVENOUS
Status: COMPLETED | OUTPATIENT
Start: 2023-08-09 | End: 2023-08-09

## 2023-08-09 RX ORDER — DEXTROSE MONOHYDRATE 100 MG/ML
INJECTION, SOLUTION INTRAVENOUS CONTINUOUS PRN
Status: DISCONTINUED | OUTPATIENT
Start: 2023-08-09 | End: 2023-09-03 | Stop reason: HOSPADM

## 2023-08-09 RX ORDER — HYDRALAZINE HYDROCHLORIDE 50 MG/1
50 TABLET, FILM COATED ORAL 3 TIMES DAILY
Status: DISCONTINUED | OUTPATIENT
Start: 2023-08-09 | End: 2023-08-15

## 2023-08-09 RX ORDER — POTASSIUM CHLORIDE 1.5 G/1.58G
60 POWDER, FOR SOLUTION ORAL DAILY
COMMUNITY

## 2023-08-09 RX ORDER — INSULIN LISPRO 100 [IU]/ML
0-4 INJECTION, SOLUTION INTRAVENOUS; SUBCUTANEOUS
Status: DISCONTINUED | OUTPATIENT
Start: 2023-08-09 | End: 2023-09-03 | Stop reason: HOSPADM

## 2023-08-09 RX ORDER — INSULIN LISPRO 100 [IU]/ML
0-4 INJECTION, SOLUTION INTRAVENOUS; SUBCUTANEOUS NIGHTLY
Status: DISCONTINUED | OUTPATIENT
Start: 2023-08-09 | End: 2023-09-03 | Stop reason: HOSPADM

## 2023-08-09 RX ORDER — INSULIN GLARGINE 100 [IU]/ML
0.25 INJECTION, SOLUTION SUBCUTANEOUS NIGHTLY
Status: DISCONTINUED | OUTPATIENT
Start: 2023-08-09 | End: 2023-08-10

## 2023-08-09 RX ORDER — ONDANSETRON 4 MG/1
4 TABLET, ORALLY DISINTEGRATING ORAL EVERY 8 HOURS PRN
Status: DISCONTINUED | OUTPATIENT
Start: 2023-08-09 | End: 2023-09-03 | Stop reason: HOSPADM

## 2023-08-09 RX ORDER — BUMETANIDE 1 MG/1
1 TABLET ORAL DAILY
Status: DISCONTINUED | OUTPATIENT
Start: 2023-08-09 | End: 2023-08-09

## 2023-08-09 RX ORDER — TRAMADOL HYDROCHLORIDE 50 MG/1
100 TABLET ORAL EVERY 6 HOURS PRN
Status: DISCONTINUED | OUTPATIENT
Start: 2023-08-09 | End: 2023-08-10

## 2023-08-09 RX ORDER — ACETAMINOPHEN 325 MG/1
650 TABLET ORAL EVERY 6 HOURS PRN
Status: DISCONTINUED | OUTPATIENT
Start: 2023-08-09 | End: 2023-09-03 | Stop reason: HOSPADM

## 2023-08-09 RX ORDER — SODIUM CHLORIDE 0.9 % (FLUSH) 0.9 %
5-40 SYRINGE (ML) INJECTION EVERY 12 HOURS SCHEDULED
Status: DISCONTINUED | OUTPATIENT
Start: 2023-08-09 | End: 2023-09-03 | Stop reason: HOSPADM

## 2023-08-09 RX ADMIN — Medication 5 ML: at 21:37

## 2023-08-09 RX ADMIN — ISOSORBIDE DINITRATE 30 MG: 20 TABLET ORAL at 21:33

## 2023-08-09 RX ADMIN — MORPHINE SULFATE 4 MG: 4 INJECTION, SOLUTION INTRAMUSCULAR; INTRAVENOUS at 14:15

## 2023-08-09 RX ADMIN — HYDRALAZINE HYDROCHLORIDE 50 MG: 50 TABLET, FILM COATED ORAL at 21:35

## 2023-08-09 RX ADMIN — CARVEDILOL 50 MG: 12.5 TABLET, FILM COATED ORAL at 21:34

## 2023-08-09 RX ADMIN — HYDROMORPHONE HYDROCHLORIDE 1 MG: 1 INJECTION, SOLUTION INTRAMUSCULAR; INTRAVENOUS; SUBCUTANEOUS at 22:17

## 2023-08-09 RX ADMIN — HYDRALAZINE HYDROCHLORIDE 50 MG: 50 TABLET, FILM COATED ORAL at 17:47

## 2023-08-09 RX ADMIN — HYDROMORPHONE HYDROCHLORIDE 1 MG: 1 INJECTION, SOLUTION INTRAMUSCULAR; INTRAVENOUS; SUBCUTANEOUS at 17:42

## 2023-08-09 RX ADMIN — ISOSORBIDE DINITRATE 30 MG: 20 TABLET ORAL at 17:47

## 2023-08-09 RX ADMIN — PREGABALIN 50 MG: 25 CAPSULE ORAL at 21:36

## 2023-08-09 RX ADMIN — POTASSIUM CHLORIDE 60 MEQ: 750 TABLET, FILM COATED, EXTENDED RELEASE ORAL at 21:35

## 2023-08-09 ASSESSMENT — PAIN DESCRIPTION - DESCRIPTORS
DESCRIPTORS: ACHING
DESCRIPTORS: ACHING
DESCRIPTORS: SHARP
DESCRIPTORS: SHARP

## 2023-08-09 ASSESSMENT — PAIN DESCRIPTION - ORIENTATION
ORIENTATION: LEFT;RIGHT
ORIENTATION: RIGHT;LEFT

## 2023-08-09 ASSESSMENT — PAIN SCALES - GENERAL
PAINLEVEL_OUTOF10: 8
PAINLEVEL_OUTOF10: 2
PAINLEVEL_OUTOF10: 10

## 2023-08-09 ASSESSMENT — PAIN - FUNCTIONAL ASSESSMENT
PAIN_FUNCTIONAL_ASSESSMENT: 0-10
PAIN_FUNCTIONAL_ASSESSMENT: PREVENTS OR INTERFERES SOME ACTIVE ACTIVITIES AND ADLS

## 2023-08-09 ASSESSMENT — LIFESTYLE VARIABLES
HOW OFTEN DO YOU HAVE A DRINK CONTAINING ALCOHOL: NEVER
HOW MANY STANDARD DRINKS CONTAINING ALCOHOL DO YOU HAVE ON A TYPICAL DAY: PATIENT DOES NOT DRINK

## 2023-08-09 ASSESSMENT — ENCOUNTER SYMPTOMS
CONSTIPATION: 0
SORE THROAT: 0
SHORTNESS OF BREATH: 0

## 2023-08-09 ASSESSMENT — PAIN DESCRIPTION - ONSET: ONSET: ON-GOING

## 2023-08-09 ASSESSMENT — PAIN DESCRIPTION - LOCATION
LOCATION: LEG

## 2023-08-09 ASSESSMENT — PAIN DESCRIPTION - PAIN TYPE: TYPE: ACUTE PAIN

## 2023-08-09 ASSESSMENT — PAIN DESCRIPTION - FREQUENCY: FREQUENCY: CONTINUOUS

## 2023-08-09 NOTE — ED NOTES
Report from [address] [place] [address]. Assuming care of pt at this time.      Shannon Mota RN  08/09/23 1943

## 2023-08-09 NOTE — ED NOTES
Shift change report given to [facility] (oncoming nurse) by Christa López (offgoing nurse). Report included the following information Nurse Handoff Report, Index, ED Encounter Summary, ED SBAR, Adult Overview, Intake/Output, MAR, and Recent Results.         Jose Obrien RN  08/09/23 1941

## 2023-08-09 NOTE — H&P
History and Physical    Date of Service:  8/9/2023  Primary Care Provider: Kenia Canchola MD  Source of information: The patient, Chart review, and Spouse/family member    Chief Complaint: Referral - General      History of Presenting Illness:   Adalberto Holland is a 66 y.o. male CKD stage IV, calciphylaxis underwent sodium thiosulfate infusion, chronic cardiomyopathy is referred by his nephrologist and is being admitted for asymmetrical right upper and right lower extremity edema. Patient states he has been increasingly weak with increasing edema isolated on the right upper and right lower extremities. He has bilateral leg wounds due to concern Hoa and goes to the wound care center, he is receiving sodium thiosulfate infusion for calciphylaxis. He denies shortness of breath or chest pain, cough, fever or chills. Workup in the ED potassium 2.8, creatinine 2.23  Hemoglobin 9.5           REVIEW OF SYSTEMS:  A comprehensive review of systems was negative except for that written in the History of Present Illness. Past Medical History:   Diagnosis Date    Congestive heart failure (HCC)     Coronary artery disease     Diabetes (HCC)     Heart failure (HCC)     CHF, cardiomyopathy    Hypertension     ICD (implantable cardioverter-defibrillator) in place     left upper chest    PAD (peripheral artery disease) [facility])       Past Surgical History:   Procedure Laterality Date    CARDIAC CATHETERIZATION  2/2015    x1    CARDIAC DEFIBRILLATOR PLACEMENT  2/16/2015     Prior to Admission medications    Medication Sig Start Date End Date Taking? Authorizing Provider   pregabalin (LYRICA) 100 MG capsule Take 1 capsule by mouth 2 times daily. Max Daily Amount: 200 mg    Historical Provider, MD   bumetanide (BUMEX) 1 MG tablet Take 1 tablet by mouth daily    Historical Provider, MD   traMADol (ULTRAM) 50 MG tablet Take 2 tablets by mouth every 6 hours as needed for Pain.     Historical Provider, MD   Multiple

## 2023-08-09 NOTE — ED PROVIDER NOTES
1667 EKG 12 Lead  ED ECG interpretation:  Rhythm: normal sinus rhythm. Rate (approx.): 69.  Axis: normal.  ST segment:  No concerning ST elevations or depressions. This EKG was independently interpreted by Braxton Valdivia MD,ED Provider. [JM]   9658 I discussed the patient with nurse practitioner from Dr. Magdaleno Handing office. They confirmed plan to admit to the hospitalist service. Also recommending that we consult Dr. Chris Damon regarding patient's cardiomyopathy. [JM]      ED Course User Index  [JM] Braxton Valdivia MD           CONSULTS:  IP CONSULT TO NEPHROLOGY  IP CONSULT TO ADVANCED HEART FAILURE  IP WOUND CARE NURSE CONSULT TO EVAL    PROCEDURES:  Unless otherwise noted below, none     Procedures      FINAL IMPRESSION      1. Chronic systolic heart failure ([address])    2. Chronic kidney disease, unspecified CKD stage    3. Cardiomyopathy, unspecified type ([address])    4. Calciphylaxis    5. Dilated cardiomyopathy ([address])    6. Multiple opens wound of lower extremity, unspecified laterality, initial encounter          DISPOSITION/PLAN   DISPOSITION Decision To Admit 08/09/2023 01:34:01 PM    Perfect Serve Consult for Admission  2:20 PM    ED Room Number: ER08/08  Patient Name and age:  Brady Rinne 66 y.o.  male  Working Diagnosis:   1. Chronic systolic heart failure ([address])    2. Chronic kidney disease, unspecified CKD stage    3. Cardiomyopathy, unspecified type ([address])    4. Calciphylaxis    5. Dilated cardiomyopathy ([address])    6. Multiple opens wound of lower extremity, unspecified laterality, initial encounter        COVID-19 Suspicion: No  Sepsis present:  No  Reassessment needed: No  Code Status:  Full Code  Readmission: No  Isolation Requirements: no  Recommended Level of Care: med/surg  Department: [facility] Adult ED - [phone]  Consulting Provider:     Other:  Sent by Preston Askew for admission. CKD not on HD. Cardiomyopathy. Calciphylaxis with chronic wounds b/l lower extremities.        Total critical care

## 2023-08-09 NOTE — ED TRIAGE NOTES
Patient is coming in from kidney doctor for admission. Patient had heart failure. Patient is not on dialysis.

## 2023-08-10 ENCOUNTER — APPOINTMENT (OUTPATIENT)
Facility: HOSPITAL | Age: 58
DRG: 287 | End: 2023-08-10
Payer: MEDICARE

## 2023-08-10 LAB
ANION GAP SERPL CALC-SCNC: 6 MMOL/L (ref 5–15)
ANION GAP SERPL CALC-SCNC: 8 MMOL/L (ref 5–15)
BASOPHILS # BLD: 0.1 K/UL (ref 0–0.1)
BASOPHILS NFR BLD: 1 % (ref 0–1)
BUN SERPL-MCNC: 28 MG/DL (ref 6–20)
BUN SERPL-MCNC: 28 MG/DL (ref 6–20)
BUN/CREAT SERPL: 12 (ref 12–20)
BUN/CREAT SERPL: 13 (ref 12–20)
CALCIUM SERPL-MCNC: 8.7 MG/DL (ref 8.5–10.1)
CALCIUM SERPL-MCNC: 8.8 MG/DL (ref 8.5–10.1)
CHLORIDE SERPL-SCNC: 110 MMOL/L (ref 97–108)
CHLORIDE SERPL-SCNC: 111 MMOL/L (ref 97–108)
CO2 SERPL-SCNC: 23 MMOL/L (ref 21–32)
CO2 SERPL-SCNC: 26 MMOL/L (ref 21–32)
CREAT SERPL-MCNC: 2.22 MG/DL (ref 0.7–1.3)
CREAT SERPL-MCNC: 2.34 MG/DL (ref 0.7–1.3)
DIFFERENTIAL METHOD BLD: ABNORMAL
ECHO BSA: 2.32 M2
ECHO LV EF PHYSICIAN: 20 %
ECHO PULMONARY ARTERY SYSTOLIC PRESSURE (PASP): 49 MMHG
ECHO TV REGURGITANT PEAK GRADIENT: 43 MMHG
EKG ATRIAL RATE: 69 BPM
EKG DIAGNOSIS: NORMAL
EKG P AXIS: 94 DEGREES
EKG P-R INTERVAL: 198 MS
EKG Q-T INTERVAL: 502 MS
EKG QRS DURATION: 114 MS
EKG QTC CALCULATION (BAZETT): 537 MS
EKG R AXIS: -10 DEGREES
EKG T AXIS: -26 DEGREES
EKG VENTRICULAR RATE: 69 BPM
EOSINOPHIL # BLD: 0.1 K/UL (ref 0–0.4)
EOSINOPHIL NFR BLD: 2 % (ref 0–7)
ERYTHROCYTE [DISTWIDTH] IN BLOOD BY AUTOMATED COUNT: 18.1 % (ref 11.5–14.5)
GLUCOSE BLD STRIP.AUTO-MCNC: 198 MG/DL (ref 65–117)
GLUCOSE BLD STRIP.AUTO-MCNC: 211 MG/DL (ref 65–117)
GLUCOSE BLD STRIP.AUTO-MCNC: 220 MG/DL (ref 65–117)
GLUCOSE BLD STRIP.AUTO-MCNC: 235 MG/DL (ref 65–117)
GLUCOSE SERPL-MCNC: 186 MG/DL (ref 65–100)
GLUCOSE SERPL-MCNC: 200 MG/DL (ref 65–100)
HCT VFR BLD AUTO: 28.2 % (ref 36.6–50.3)
HGB BLD-MCNC: 8.6 G/DL (ref 12.1–17)
IMM GRANULOCYTES # BLD AUTO: 0 K/UL (ref 0–0.04)
IMM GRANULOCYTES NFR BLD AUTO: 1 % (ref 0–0.5)
LYMPHOCYTES # BLD: 0.8 K/UL (ref 0.8–3.5)
LYMPHOCYTES NFR BLD: 13 % (ref 12–49)
MAGNESIUM SERPL-MCNC: 1.9 MG/DL (ref 1.6–2.4)
MCH RBC QN AUTO: 26.6 PG (ref 26–34)
MCHC RBC AUTO-ENTMCNC: 30.5 G/DL (ref 30–36.5)
MCV RBC AUTO: 87.3 FL (ref 80–99)
MONOCYTES # BLD: 0.7 K/UL (ref 0–1)
MONOCYTES NFR BLD: 10 % (ref 5–13)
NEUTS SEG # BLD: 4.7 K/UL (ref 1.8–8)
NEUTS SEG NFR BLD: 73 % (ref 32–75)
NRBC # BLD: 0 K/UL (ref 0–0.01)
NRBC BLD-RTO: 0 PER 100 WBC
PLATELET # BLD AUTO: 180 K/UL (ref 150–400)
PMV BLD AUTO: 12.7 FL (ref 8.9–12.9)
POTASSIUM SERPL-SCNC: 3 MMOL/L (ref 3.5–5.1)
POTASSIUM SERPL-SCNC: ABNORMAL MMOL/L (ref 3.5–5.1)
RBC # BLD AUTO: 3.23 M/UL (ref 4.1–5.7)
SERVICE CMNT-IMP: ABNORMAL
SODIUM SERPL-SCNC: 140 MMOL/L (ref 136–145)
SODIUM SERPL-SCNC: 144 MMOL/L (ref 136–145)
WBC # BLD AUTO: 6.4 K/UL (ref 4.1–11.1)

## 2023-08-10 PROCEDURE — 93010 ELECTROCARDIOGRAM REPORT: CPT | Performed by: SPECIALIST

## 2023-08-10 PROCEDURE — 82962 GLUCOSE BLOOD TEST: CPT

## 2023-08-10 PROCEDURE — 2500000003 HC RX 250 WO HCPCS: Performed by: HOSPITALIST

## 2023-08-10 PROCEDURE — 6370000000 HC RX 637 (ALT 250 FOR IP): Performed by: NURSE PRACTITIONER

## 2023-08-10 PROCEDURE — 6370000000 HC RX 637 (ALT 250 FOR IP): Performed by: FAMILY MEDICINE

## 2023-08-10 PROCEDURE — C8929 TTE W OR WO FOL WCON,DOPPLER: HCPCS

## 2023-08-10 PROCEDURE — 85025 COMPLETE CBC W/AUTO DIFF WBC: CPT

## 2023-08-10 PROCEDURE — 2580000003 HC RX 258: Performed by: HOSPITALIST

## 2023-08-10 PROCEDURE — 6360000004 HC RX CONTRAST MEDICATION: Performed by: FAMILY MEDICINE

## 2023-08-10 PROCEDURE — 6370000000 HC RX 637 (ALT 250 FOR IP): Performed by: HOSPITALIST

## 2023-08-10 PROCEDURE — 36415 COLL VENOUS BLD VENIPUNCTURE: CPT

## 2023-08-10 PROCEDURE — 2060000000 HC ICU INTERMEDIATE R&B

## 2023-08-10 PROCEDURE — 2500000003 HC RX 250 WO HCPCS: Performed by: INTERNAL MEDICINE

## 2023-08-10 PROCEDURE — 6360000002 HC RX W HCPCS: Performed by: HOSPITALIST

## 2023-08-10 PROCEDURE — 80048 BASIC METABOLIC PNL TOTAL CA: CPT

## 2023-08-10 PROCEDURE — 83735 ASSAY OF MAGNESIUM: CPT

## 2023-08-10 PROCEDURE — 6360000002 HC RX W HCPCS: Performed by: INTERNAL MEDICINE

## 2023-08-10 RX ORDER — BUMETANIDE 0.25 MG/ML
2 INJECTION INTRAMUSCULAR; INTRAVENOUS EVERY 12 HOURS
Status: DISCONTINUED | OUTPATIENT
Start: 2023-08-10 | End: 2023-08-11

## 2023-08-10 RX ORDER — TRAMADOL HYDROCHLORIDE 50 MG/1
50 TABLET ORAL 2 TIMES DAILY
Status: DISCONTINUED | OUTPATIENT
Start: 2023-08-10 | End: 2023-08-22

## 2023-08-10 RX ORDER — MILRINONE LACTATE 0.2 MG/ML
0.12 INJECTION, SOLUTION INTRAVENOUS CONTINUOUS
Status: DISCONTINUED | OUTPATIENT
Start: 2023-08-10 | End: 2023-08-11

## 2023-08-10 RX ORDER — BUMETANIDE 2 MG/1
4 TABLET ORAL 2 TIMES DAILY
Status: ON HOLD | COMMUNITY
End: 2023-08-23 | Stop reason: HOSPADM

## 2023-08-10 RX ADMIN — MILRINONE LACTATE IN DEXTROSE 0.12 MCG/KG/MIN: 200 INJECTION, SOLUTION INTRAVENOUS at 18:25

## 2023-08-10 RX ADMIN — Medication 1 UNITS: at 13:15

## 2023-08-10 RX ADMIN — HYDROMORPHONE HYDROCHLORIDE 1 MG: 1 INJECTION, SOLUTION INTRAMUSCULAR; INTRAVENOUS; SUBCUTANEOUS at 21:48

## 2023-08-10 RX ADMIN — ISOSORBIDE DINITRATE 30 MG: 20 TABLET ORAL at 21:41

## 2023-08-10 RX ADMIN — BUMETANIDE 1 MG: 0.25 INJECTION, SOLUTION INTRAMUSCULAR; INTRAVENOUS at 06:42

## 2023-08-10 RX ADMIN — TRAMADOL HYDROCHLORIDE 50 MG: 50 TABLET ORAL at 14:58

## 2023-08-10 RX ADMIN — HYDRALAZINE HYDROCHLORIDE 50 MG: 50 TABLET, FILM COATED ORAL at 15:06

## 2023-08-10 RX ADMIN — HYDRALAZINE HYDROCHLORIDE 50 MG: 50 TABLET, FILM COATED ORAL at 08:58

## 2023-08-10 RX ADMIN — PREGABALIN 50 MG: 25 CAPSULE ORAL at 08:58

## 2023-08-10 RX ADMIN — APIXABAN 2.5 MG: 2.5 TABLET, FILM COATED ORAL at 21:42

## 2023-08-10 RX ADMIN — PERFLUTREN 1.5 ML: 6.52 INJECTION, SUSPENSION INTRAVENOUS at 17:19

## 2023-08-10 RX ADMIN — PREGABALIN 50 MG: 25 CAPSULE ORAL at 21:42

## 2023-08-10 RX ADMIN — Medication 1 UNITS: at 17:51

## 2023-08-10 RX ADMIN — HYDROMORPHONE HYDROCHLORIDE 1 MG: 1 INJECTION, SOLUTION INTRAMUSCULAR; INTRAVENOUS; SUBCUTANEOUS at 03:26

## 2023-08-10 RX ADMIN — CARVEDILOL 50 MG: 12.5 TABLET, FILM COATED ORAL at 21:41

## 2023-08-10 RX ADMIN — Medication 10 ML: at 08:58

## 2023-08-10 RX ADMIN — ISOSORBIDE DINITRATE 30 MG: 20 TABLET ORAL at 15:06

## 2023-08-10 RX ADMIN — HYDROMORPHONE HYDROCHLORIDE 1 MG: 1 INJECTION, SOLUTION INTRAMUSCULAR; INTRAVENOUS; SUBCUTANEOUS at 09:41

## 2023-08-10 RX ADMIN — HYDRALAZINE HYDROCHLORIDE 50 MG: 50 TABLET, FILM COATED ORAL at 21:41

## 2023-08-10 RX ADMIN — ISOSORBIDE DINITRATE 30 MG: 20 TABLET ORAL at 08:58

## 2023-08-10 RX ADMIN — CARVEDILOL 50 MG: 12.5 TABLET, FILM COATED ORAL at 08:58

## 2023-08-10 RX ADMIN — Medication 10 ML: at 21:48

## 2023-08-10 RX ADMIN — APIXABAN 2.5 MG: 2.5 TABLET, FILM COATED ORAL at 13:15

## 2023-08-10 RX ADMIN — TRAMADOL HYDROCHLORIDE 50 MG: 50 TABLET ORAL at 21:41

## 2023-08-10 RX ADMIN — POTASSIUM CHLORIDE 60 MEQ: 750 TABLET, FILM COATED, EXTENDED RELEASE ORAL at 08:58

## 2023-08-10 RX ADMIN — HYDROMORPHONE HYDROCHLORIDE 1 MG: 1 INJECTION, SOLUTION INTRAMUSCULAR; INTRAVENOUS; SUBCUTANEOUS at 16:36

## 2023-08-10 RX ADMIN — BUMETANIDE 2 MG: 0.25 INJECTION, SOLUTION INTRAMUSCULAR; INTRAVENOUS at 17:51

## 2023-08-10 RX ADMIN — POTASSIUM CHLORIDE 60 MEQ: 750 TABLET, FILM COATED, EXTENDED RELEASE ORAL at 21:41

## 2023-08-10 ASSESSMENT — PAIN DESCRIPTION - LOCATION
LOCATION: LEG

## 2023-08-10 ASSESSMENT — PAIN SCALES - GENERAL
PAINLEVEL_OUTOF10: 9
PAINLEVEL_OUTOF10: 10

## 2023-08-10 ASSESSMENT — PAIN DESCRIPTION - ORIENTATION
ORIENTATION: DISTAL
ORIENTATION: RIGHT;LEFT
ORIENTATION: RIGHT;LEFT;DISTAL

## 2023-08-10 ASSESSMENT — PAIN DESCRIPTION - DESCRIPTORS
DESCRIPTORS: ACHING
DESCRIPTORS: ACHING;GNAWING

## 2023-08-10 NOTE — DISCHARGE INSTRUCTIONS
Future Appointments   Date Time Provider 4600  46[facility]   9/11/2023 11:30 AM LAURA Ramos - NP [facility] BS AMB   12/4/2023  1:20 PM MD MARIAMA Lizarraga AMB           Download the Heart Failure Mason Selena: Search in your Google Play Store (Android) or "[employer] Selena Store ([employer]phone): Search for- HF Mason Selena.    [email or web address]    HF Mason is a brand-new phone selena that helps you track daily symptoms, vitals, mood, energy level and more. You can even add your heart failure care team members to view your data and monitor your condition at home.     HF Mason lets you:  Track symptoms, medications and more  Share health information with your health care team  Connect with others living with heart failure

## 2023-08-10 NOTE — WOUND CARE
Wound Care Note:     New consult placed by physician request for BLE wounds    Chart shows:  Admitted for edema of right upper arm  Past Medical History:   Diagnosis Date    Congestive heart failure ([address])     Coronary artery disease     Diabetes ([address])     Heart failure ([address])     CHF, cardiomyopathy    Hypertension     ICD (implantable cardioverter-defibrillator) in place     left upper chest    PAD (peripheral artery disease) ([address])      WBC = 6.4 on 8/10/23  Admitted from physicians office    Assessment:   Patient is A&O x 4, communicative, continent with no assistance needed in repositioning. Bed: [facility]  Diet:   Patient pre-medicated for pain by RN. Bilateral heels, buttocks, and sacral skin intact and without erythema. Palpable DP pulses bilaterally. 1. POA bilateral lower leg calciphylaxis wounds, left is greater than right, patient has been receiving sodium thiosulfate and attending outpatient wound care center, wounds have been debrided but sodium thiosulfate is causing wounds to cover with eschar, according to patient, wounds are very painful to patient, moderate amount of serous drainage and foul odor noted on dressing prior to removal, no odor after removal of dressings. Cleansed with VASHE, allowed gauze to stay on wounds for about 10 minutes, Optifoam Gentle NB AG applied to open wounds, ABD pads applied to entire lower leg for comfort, wrapped in thin Kerlix and then roll gauze at request of patient. Right lateral/posterior lower leg      Left medial lower leg      Left lateral lower leg    Patient repositioned supine for lunch.      Recommendations:    Bilateral lower legs- Every other day and as needed for breakthrough drainage, gently remove dressings, use normal saline to assist with removing any dressing that is sticking, moisten small roll gauze with VASHE and wrap around lower legs, let sit for 5 to 10 minutes,

## 2023-08-11 LAB
ALBUMIN SERPL-MCNC: 2.2 G/DL (ref 3.5–5)
ANION GAP SERPL CALC-SCNC: 6 MMOL/L (ref 5–15)
BUN SERPL-MCNC: 30 MG/DL (ref 6–20)
BUN/CREAT SERPL: 13 (ref 12–20)
CALCIUM SERPL-MCNC: 8.6 MG/DL (ref 8.5–10.1)
CHLORIDE SERPL-SCNC: 111 MMOL/L (ref 97–108)
CO2 SERPL-SCNC: 26 MMOL/L (ref 21–32)
CREAT SERPL-MCNC: 2.27 MG/DL (ref 0.7–1.3)
ERYTHROCYTE [DISTWIDTH] IN BLOOD BY AUTOMATED COUNT: 18.1 % (ref 11.5–14.5)
GLUCOSE BLD STRIP.AUTO-MCNC: 203 MG/DL (ref 65–117)
GLUCOSE BLD STRIP.AUTO-MCNC: 204 MG/DL (ref 65–117)
GLUCOSE BLD STRIP.AUTO-MCNC: 220 MG/DL (ref 65–117)
GLUCOSE BLD STRIP.AUTO-MCNC: 226 MG/DL (ref 65–117)
GLUCOSE SERPL-MCNC: 204 MG/DL (ref 65–100)
HCT VFR BLD AUTO: 27.4 % (ref 36.6–50.3)
HGB BLD-MCNC: 8.2 G/DL (ref 12.1–17)
MAGNESIUM SERPL-MCNC: 2 MG/DL (ref 1.6–2.4)
MCH RBC QN AUTO: 26.2 PG (ref 26–34)
MCHC RBC AUTO-ENTMCNC: 29.9 G/DL (ref 30–36.5)
MCV RBC AUTO: 87.5 FL (ref 80–99)
NRBC # BLD: 0 K/UL (ref 0–0.01)
NRBC BLD-RTO: 0 PER 100 WBC
NT PRO BNP: ABNORMAL PG/ML
PHOSPHATE SERPL-MCNC: 3 MG/DL (ref 2.6–4.7)
PLATELET # BLD AUTO: 150 K/UL (ref 150–400)
PMV BLD AUTO: 11.8 FL (ref 8.9–12.9)
POTASSIUM SERPL-SCNC: 3.7 MMOL/L (ref 3.5–5.1)
RBC # BLD AUTO: 3.13 M/UL (ref 4.1–5.7)
SERVICE CMNT-IMP: ABNORMAL
SODIUM SERPL-SCNC: 143 MMOL/L (ref 136–145)
WBC # BLD AUTO: 7 K/UL (ref 4.1–11.1)

## 2023-08-11 PROCEDURE — 2500000003 HC RX 250 WO HCPCS: Performed by: INTERNAL MEDICINE

## 2023-08-11 PROCEDURE — 6360000002 HC RX W HCPCS: Performed by: INTERNAL MEDICINE

## 2023-08-11 PROCEDURE — 82962 GLUCOSE BLOOD TEST: CPT

## 2023-08-11 PROCEDURE — 6370000000 HC RX 637 (ALT 250 FOR IP): Performed by: NURSE PRACTITIONER

## 2023-08-11 PROCEDURE — 6370000000 HC RX 637 (ALT 250 FOR IP): Performed by: FAMILY MEDICINE

## 2023-08-11 PROCEDURE — 6370000000 HC RX 637 (ALT 250 FOR IP): Performed by: HOSPITALIST

## 2023-08-11 PROCEDURE — 2580000003 HC RX 258: Performed by: HOSPITALIST

## 2023-08-11 PROCEDURE — 36415 COLL VENOUS BLD VENIPUNCTURE: CPT

## 2023-08-11 PROCEDURE — 6360000002 HC RX W HCPCS: Performed by: HOSPITALIST

## 2023-08-11 PROCEDURE — 2580000003 HC RX 258: Performed by: INTERNAL MEDICINE

## 2023-08-11 PROCEDURE — 99231 SBSQ HOSP IP/OBS SF/LOW 25: CPT | Performed by: INTERNAL MEDICINE

## 2023-08-11 PROCEDURE — 83880 ASSAY OF NATRIURETIC PEPTIDE: CPT

## 2023-08-11 PROCEDURE — 6360000002 HC RX W HCPCS: Performed by: FAMILY MEDICINE

## 2023-08-11 PROCEDURE — 80069 RENAL FUNCTION PANEL: CPT

## 2023-08-11 PROCEDURE — 83735 ASSAY OF MAGNESIUM: CPT

## 2023-08-11 PROCEDURE — 2060000000 HC ICU INTERMEDIATE R&B

## 2023-08-11 PROCEDURE — 85027 COMPLETE CBC AUTOMATED: CPT

## 2023-08-11 RX ORDER — MILRINONE LACTATE 0.2 MG/ML
0.25 INJECTION, SOLUTION INTRAVENOUS CONTINUOUS
Status: DISCONTINUED | OUTPATIENT
Start: 2023-08-11 | End: 2023-08-14

## 2023-08-11 RX ADMIN — Medication 1 UNITS: at 06:51

## 2023-08-11 RX ADMIN — HYDROMORPHONE HYDROCHLORIDE 1 MG: 1 INJECTION, SOLUTION INTRAMUSCULAR; INTRAVENOUS; SUBCUTANEOUS at 05:14

## 2023-08-11 RX ADMIN — POTASSIUM CHLORIDE 60 MEQ: 750 TABLET, FILM COATED, EXTENDED RELEASE ORAL at 09:11

## 2023-08-11 RX ADMIN — POTASSIUM CHLORIDE 60 MEQ: 750 TABLET, FILM COATED, EXTENDED RELEASE ORAL at 21:23

## 2023-08-11 RX ADMIN — APIXABAN 2.5 MG: 2.5 TABLET, FILM COATED ORAL at 09:11

## 2023-08-11 RX ADMIN — CARVEDILOL 50 MG: 12.5 TABLET, FILM COATED ORAL at 09:11

## 2023-08-11 RX ADMIN — PREGABALIN 50 MG: 25 CAPSULE ORAL at 21:23

## 2023-08-11 RX ADMIN — Medication 10 ML: at 09:19

## 2023-08-11 RX ADMIN — Medication 1 UNITS: at 17:50

## 2023-08-11 RX ADMIN — ERYTHROPOIETIN 10000 UNITS: 10000 INJECTION, SOLUTION INTRAVENOUS; SUBCUTANEOUS at 09:19

## 2023-08-11 RX ADMIN — SODIUM THIOSULFATE 25 G: 250 INJECTION, SOLUTION INTRAVENOUS at 12:34

## 2023-08-11 RX ADMIN — ISOSORBIDE DINITRATE 30 MG: 20 TABLET ORAL at 21:23

## 2023-08-11 RX ADMIN — HYDROMORPHONE HYDROCHLORIDE 1 MG: 1 INJECTION, SOLUTION INTRAMUSCULAR; INTRAVENOUS; SUBCUTANEOUS at 15:51

## 2023-08-11 RX ADMIN — CARVEDILOL 50 MG: 12.5 TABLET, FILM COATED ORAL at 21:22

## 2023-08-11 RX ADMIN — HYDROMORPHONE HYDROCHLORIDE 1 MG: 1 INJECTION, SOLUTION INTRAMUSCULAR; INTRAVENOUS; SUBCUTANEOUS at 09:09

## 2023-08-11 RX ADMIN — Medication 1 UNITS: at 12:34

## 2023-08-11 RX ADMIN — TRAMADOL HYDROCHLORIDE 50 MG: 50 TABLET ORAL at 21:23

## 2023-08-11 RX ADMIN — MILRINONE LACTATE IN DEXTROSE 0.25 MCG/KG/MIN: 200 INJECTION, SOLUTION INTRAVENOUS at 19:08

## 2023-08-11 RX ADMIN — HYDROMORPHONE HYDROCHLORIDE 1 MG: 1 INJECTION, SOLUTION INTRAMUSCULAR; INTRAVENOUS; SUBCUTANEOUS at 19:48

## 2023-08-11 RX ADMIN — HYDRALAZINE HYDROCHLORIDE 50 MG: 50 TABLET, FILM COATED ORAL at 15:20

## 2023-08-11 RX ADMIN — TRAMADOL HYDROCHLORIDE 50 MG: 50 TABLET ORAL at 09:12

## 2023-08-11 RX ADMIN — BUMETANIDE 2 MG: 0.25 INJECTION, SOLUTION INTRAMUSCULAR; INTRAVENOUS at 17:50

## 2023-08-11 RX ADMIN — PREGABALIN 50 MG: 25 CAPSULE ORAL at 09:11

## 2023-08-11 RX ADMIN — Medication 10 ML: at 21:29

## 2023-08-11 RX ADMIN — HYDRALAZINE HYDROCHLORIDE 50 MG: 50 TABLET, FILM COATED ORAL at 21:23

## 2023-08-11 RX ADMIN — BUMETANIDE 0.5 MG/HR: 0.25 INJECTION INTRAMUSCULAR; INTRAVENOUS at 19:48

## 2023-08-11 RX ADMIN — APIXABAN 2.5 MG: 2.5 TABLET, FILM COATED ORAL at 21:23

## 2023-08-11 RX ADMIN — ISOSORBIDE DINITRATE 30 MG: 20 TABLET ORAL at 15:20

## 2023-08-11 RX ADMIN — ISOSORBIDE DINITRATE 30 MG: 20 TABLET ORAL at 09:11

## 2023-08-11 RX ADMIN — HYDRALAZINE HYDROCHLORIDE 50 MG: 50 TABLET, FILM COATED ORAL at 09:11

## 2023-08-11 RX ADMIN — BUMETANIDE 2 MG: 0.25 INJECTION, SOLUTION INTRAMUSCULAR; INTRAVENOUS at 05:14

## 2023-08-11 ASSESSMENT — PAIN - FUNCTIONAL ASSESSMENT: PAIN_FUNCTIONAL_ASSESSMENT: ACTIVITIES ARE NOT PREVENTED

## 2023-08-11 ASSESSMENT — PAIN DESCRIPTION - LOCATION
LOCATION: ARM
LOCATION: LEG

## 2023-08-11 ASSESSMENT — PAIN SCALES - GENERAL
PAINLEVEL_OUTOF10: 10
PAINLEVEL_OUTOF10: 4

## 2023-08-11 ASSESSMENT — PAIN DESCRIPTION - PAIN TYPE: TYPE: CHRONIC PAIN

## 2023-08-11 ASSESSMENT — PAIN DESCRIPTION - ORIENTATION
ORIENTATION: LEFT;RIGHT
ORIENTATION: RIGHT

## 2023-08-11 ASSESSMENT — PAIN DESCRIPTION - DESCRIPTORS
DESCRIPTORS: ACHING
DESCRIPTORS: ACHING

## 2023-08-11 NOTE — NURSE NAVIGATOR
HEART FAILURE NURSE NAVIGATOR NOTE  [address]    Patient chart was reviewed by Heart Failure (HF) Nurse Navigators for compliance of prescribed treatment with guidelines directed medical therapy (GDMT) and HF database completed. Please, review beneath recommendations for symptomatic patients with HF with Reduced Ejection Fraction ? 40% (HFrEF)* and patients whose LVEF improved > 40% (HFimpEF)* for your consideration when taking care of this patient. Current Medical Therapy:    Name Kalyn OLIVEIRA 1965   LVEF    20%   NYHA Functional Class    II   ARNi/ACEi/ARB   Contraindicated renal recovery   Beta-blocker   Coreg   Aldosterone Antagonist    Contraindicated GFR<30   SGLT2 inhibitor   Contraindicated GFR<30   Hydralazine/Isosorbide Dinitrate  Hydralazine/Isordil   Consulting Cardiologist:   F     Recommendations:    Please, add the following GDMT for HFrEF ? 40% [Class 1] or document in discharge summary/progress note why patient cannot take the medication:  ARNi/ACEi or ARB  Beta-blockers (carvedilol, sustained-release metoprolol succinate or bisoprolol)  Aldosterone antagonists GFR > 30 and K< 5  SGLT2 inhibitor  Hydralazine/Isosorbide dinitrate for  Americans with Class III/IV symptoms  Adjust diuretic dose at discharge if hospitalized for volume overload    Consider adding the following GDMT for HFrEF ? 40%, if appropriate [Class 2b]:   Ivabradine for patients on maximally tolerated beta-blocker dose in order to achieve HR 70-80bpm  Digoxin, goal level 0.5-0.9  Polyunsaturated fatty acids  Vericuguat  For patient with hyperkalemia while on RAASi > 5.5, consider adding potassium binders (patiromer, sodium zirconium cycosilicate)    Note: the following medications may be potentially harmful in heart failure [Class 3]:  Calcium channel blockers (doxazosin, diltiazem, verapamil, nifedipine)  Antiarrhythmics (flecanide, disopyrimide, sotalol,

## 2023-08-11 NOTE — CARE COORDINATION
Chart reviewed. CM attempted to meet with patient to complete CM assessment, patient was sleeping and was unable to stay awake for CM to complete assessment. CM will follow-up later. Noted patient has been going to [facility] [place] outpatient wound-care center.     Emergency contact: Son Borden #[phone]    [place], BSW/CRM

## 2023-08-11 NOTE — CARE COORDINATION
Care Management Initial Assessment       RUR: 19%  Readmission? No  1st IM letter given? Yes - 1st IMM delivered 8/9, 2nd IMM needed prior to discharge. Transition of Care: Anticipate discharge home with family when medically stable. Patient lives at home with his wife and two adult children in a one story home with 4 stairs to enter. Patient stated he is usually independent with ADLs and uses a cane to ambulate, however patient has needed family assistance with ADLs in the last few weeks. CM requested orders for PT/OT from attending. Noted patient is followed by the [facility] outpatient wound care center for bilateral leg wounds. Family plans to transport patient home at discharge. Main contact, spouse Sosa Adjutant #[phone]    Noted wound care, nephrology, cardiology are following.     08/11/23 1417   Service Assessment   Patient Orientation Alert and Oriented   Cognition Alert   History Provided By Patient   Primary Caregiver Self  (patient stated he has needed wife to assist with ADLs in the past few weeks)   Support Systems Spouse/Significant Other;Children   Patient's Healthcare Decision Maker is: Named in [address]    PCP Verified by CM Yes   Last Visit to PCP Within last 3 months   Prior Functional Level Independent in ADLs/IADLs   Current Functional Level Bathing;Dressing; Toileting;Assistance with the following:;Mobility   Can patient return to prior living arrangement Yes   Ability to make needs known: Good   Family able to assist with home care needs: Yes   Financial Resources Medicare   Social/Functional History   Lives With Spouse;Daughter;Son   Home Layout One level   Home Access Stairs to enter with rails   Entrance Stairs - Number of Steps 4   Receives Help From Family   Mode of Transportation Family   Occupation Full time employment   Discharge [address]  ([address] [facility])

## 2023-08-12 LAB
ALBUMIN SERPL-MCNC: 2.2 G/DL (ref 3.5–5)
ANION GAP SERPL CALC-SCNC: 9 MMOL/L (ref 5–15)
BUN SERPL-MCNC: 27 MG/DL (ref 6–20)
BUN/CREAT SERPL: 14 (ref 12–20)
CALCIUM SERPL-MCNC: 8.9 MG/DL (ref 8.5–10.1)
CHLORIDE SERPL-SCNC: 111 MMOL/L (ref 97–108)
CO2 SERPL-SCNC: 23 MMOL/L (ref 21–32)
CREAT SERPL-MCNC: 1.97 MG/DL (ref 0.7–1.3)
ERYTHROCYTE [DISTWIDTH] IN BLOOD BY AUTOMATED COUNT: 18.5 % (ref 11.5–14.5)
GLUCOSE BLD STRIP.AUTO-MCNC: 203 MG/DL (ref 65–117)
GLUCOSE BLD STRIP.AUTO-MCNC: 206 MG/DL (ref 65–117)
GLUCOSE BLD STRIP.AUTO-MCNC: 207 MG/DL (ref 65–117)
GLUCOSE BLD STRIP.AUTO-MCNC: 221 MG/DL (ref 65–117)
GLUCOSE SERPL-MCNC: 207 MG/DL (ref 65–100)
HCT VFR BLD AUTO: 27.7 % (ref 36.6–50.3)
HGB BLD-MCNC: 8.3 G/DL (ref 12.1–17)
MCH RBC QN AUTO: 26.2 PG (ref 26–34)
MCHC RBC AUTO-ENTMCNC: 30 G/DL (ref 30–36.5)
MCV RBC AUTO: 87.4 FL (ref 80–99)
NRBC # BLD: 0 K/UL (ref 0–0.01)
NRBC BLD-RTO: 0 PER 100 WBC
PHOSPHATE SERPL-MCNC: 2.2 MG/DL (ref 2.6–4.7)
PLATELET # BLD AUTO: 153 K/UL (ref 150–400)
PMV BLD AUTO: 12.8 FL (ref 8.9–12.9)
POTASSIUM SERPL-SCNC: 3.8 MMOL/L (ref 3.5–5.1)
RBC # BLD AUTO: 3.17 M/UL (ref 4.1–5.7)
SERVICE CMNT-IMP: ABNORMAL
SODIUM SERPL-SCNC: 143 MMOL/L (ref 136–145)
WBC # BLD AUTO: 7.7 K/UL (ref 4.1–11.1)

## 2023-08-12 PROCEDURE — 80069 RENAL FUNCTION PANEL: CPT

## 2023-08-12 PROCEDURE — 6370000000 HC RX 637 (ALT 250 FOR IP): Performed by: FAMILY MEDICINE

## 2023-08-12 PROCEDURE — 85027 COMPLETE CBC AUTOMATED: CPT

## 2023-08-12 PROCEDURE — 99231 SBSQ HOSP IP/OBS SF/LOW 25: CPT | Performed by: NURSE PRACTITIONER

## 2023-08-12 PROCEDURE — 2060000000 HC ICU INTERMEDIATE R&B

## 2023-08-12 PROCEDURE — 6370000000 HC RX 637 (ALT 250 FOR IP): Performed by: HOSPITALIST

## 2023-08-12 PROCEDURE — 6360000002 HC RX W HCPCS: Performed by: HOSPITALIST

## 2023-08-12 PROCEDURE — 2500000003 HC RX 250 WO HCPCS: Performed by: INTERNAL MEDICINE

## 2023-08-12 PROCEDURE — 6360000002 HC RX W HCPCS: Performed by: INTERNAL MEDICINE

## 2023-08-12 PROCEDURE — 82962 GLUCOSE BLOOD TEST: CPT

## 2023-08-12 PROCEDURE — 6370000000 HC RX 637 (ALT 250 FOR IP): Performed by: NURSE PRACTITIONER

## 2023-08-12 PROCEDURE — 2580000003 HC RX 258: Performed by: HOSPITALIST

## 2023-08-12 PROCEDURE — 36415 COLL VENOUS BLD VENIPUNCTURE: CPT

## 2023-08-12 RX ORDER — POTASSIUM CHLORIDE 750 MG/1
40 TABLET, FILM COATED, EXTENDED RELEASE ORAL 2 TIMES DAILY
Status: DISCONTINUED | OUTPATIENT
Start: 2023-08-12 | End: 2023-08-19

## 2023-08-12 RX ORDER — POTASSIUM CHLORIDE 750 MG/1
40 TABLET, FILM COATED, EXTENDED RELEASE ORAL 2 TIMES DAILY
Status: DISCONTINUED | OUTPATIENT
Start: 2023-08-12 | End: 2023-08-12

## 2023-08-12 RX ORDER — INSULIN LISPRO 100 [IU]/ML
7 INJECTION, SOLUTION INTRAVENOUS; SUBCUTANEOUS 2 TIMES DAILY WITH MEALS
Status: DISCONTINUED | OUTPATIENT
Start: 2023-08-12 | End: 2023-08-16

## 2023-08-12 RX ADMIN — TRAMADOL HYDROCHLORIDE 50 MG: 50 TABLET ORAL at 20:16

## 2023-08-12 RX ADMIN — HYDRALAZINE HYDROCHLORIDE 50 MG: 50 TABLET, FILM COATED ORAL at 10:03

## 2023-08-12 RX ADMIN — HYDROMORPHONE HYDROCHLORIDE 1 MG: 1 INJECTION, SOLUTION INTRAMUSCULAR; INTRAVENOUS; SUBCUTANEOUS at 02:25

## 2023-08-12 RX ADMIN — ISOSORBIDE DINITRATE 30 MG: 20 TABLET ORAL at 10:03

## 2023-08-12 RX ADMIN — HYDROMORPHONE HYDROCHLORIDE 1 MG: 1 INJECTION, SOLUTION INTRAMUSCULAR; INTRAVENOUS; SUBCUTANEOUS at 07:27

## 2023-08-12 RX ADMIN — Medication 1 UNITS: at 07:27

## 2023-08-12 RX ADMIN — Medication 1 UNITS: at 16:49

## 2023-08-12 RX ADMIN — BUMETANIDE 0.5 MG/HR: 0.25 INJECTION INTRAMUSCULAR; INTRAVENOUS at 11:41

## 2023-08-12 RX ADMIN — HYDRALAZINE HYDROCHLORIDE 50 MG: 50 TABLET, FILM COATED ORAL at 20:14

## 2023-08-12 RX ADMIN — Medication 10 UNITS: at 16:50

## 2023-08-12 RX ADMIN — APIXABAN 2.5 MG: 2.5 TABLET, FILM COATED ORAL at 20:14

## 2023-08-12 RX ADMIN — POTASSIUM CHLORIDE 40 MEQ: 750 TABLET, FILM COATED, EXTENDED RELEASE ORAL at 10:04

## 2023-08-12 RX ADMIN — HYDROMORPHONE HYDROCHLORIDE 1 MG: 1 INJECTION, SOLUTION INTRAMUSCULAR; INTRAVENOUS; SUBCUTANEOUS at 11:01

## 2023-08-12 RX ADMIN — CARVEDILOL 50 MG: 12.5 TABLET, FILM COATED ORAL at 10:03

## 2023-08-12 RX ADMIN — Medication 1 UNITS: at 12:06

## 2023-08-12 RX ADMIN — PREGABALIN 50 MG: 25 CAPSULE ORAL at 20:15

## 2023-08-12 RX ADMIN — MILRINONE LACTATE IN DEXTROSE 0.25 MCG/KG/MIN: 200 INJECTION, SOLUTION INTRAVENOUS at 18:34

## 2023-08-12 RX ADMIN — APIXABAN 2.5 MG: 2.5 TABLET, FILM COATED ORAL at 10:04

## 2023-08-12 RX ADMIN — HYDRALAZINE HYDROCHLORIDE 50 MG: 50 TABLET, FILM COATED ORAL at 15:02

## 2023-08-12 RX ADMIN — ISOSORBIDE DINITRATE 30 MG: 20 TABLET ORAL at 15:03

## 2023-08-12 RX ADMIN — HYDROMORPHONE HYDROCHLORIDE 1 MG: 1 INJECTION, SOLUTION INTRAMUSCULAR; INTRAVENOUS; SUBCUTANEOUS at 15:06

## 2023-08-12 RX ADMIN — POTASSIUM CHLORIDE 40 MEQ: 750 TABLET, FILM COATED, EXTENDED RELEASE ORAL at 20:14

## 2023-08-12 RX ADMIN — Medication 10 ML: at 20:16

## 2023-08-12 RX ADMIN — ISOSORBIDE DINITRATE 30 MG: 20 TABLET ORAL at 20:16

## 2023-08-12 RX ADMIN — HYDROMORPHONE HYDROCHLORIDE 1 MG: 1 INJECTION, SOLUTION INTRAMUSCULAR; INTRAVENOUS; SUBCUTANEOUS at 20:16

## 2023-08-12 RX ADMIN — TRAMADOL HYDROCHLORIDE 50 MG: 50 TABLET ORAL at 10:04

## 2023-08-12 RX ADMIN — PREGABALIN 50 MG: 25 CAPSULE ORAL at 10:03

## 2023-08-12 RX ADMIN — MILRINONE LACTATE IN DEXTROSE 0.25 MCG/KG/MIN: 200 INJECTION, SOLUTION INTRAVENOUS at 06:09

## 2023-08-12 RX ADMIN — CARVEDILOL 50 MG: 12.5 TABLET, FILM COATED ORAL at 20:15

## 2023-08-12 RX ADMIN — Medication 7 UNITS: at 16:50

## 2023-08-12 RX ADMIN — Medication 10 ML: at 11:02

## 2023-08-12 ASSESSMENT — PAIN DESCRIPTION - DESCRIPTORS
DESCRIPTORS: ACHING
DESCRIPTORS: ACHING
DESCRIPTORS: BURNING;ACHING

## 2023-08-12 ASSESSMENT — PAIN DESCRIPTION - PAIN TYPE: TYPE: CHRONIC PAIN

## 2023-08-12 ASSESSMENT — PAIN DESCRIPTION - ORIENTATION
ORIENTATION: RIGHT;LEFT
ORIENTATION: LEFT;RIGHT
ORIENTATION: RIGHT;LEFT

## 2023-08-12 ASSESSMENT — PAIN SCALES - GENERAL
PAINLEVEL_OUTOF10: 0
PAINLEVEL_OUTOF10: 5
PAINLEVEL_OUTOF10: 10

## 2023-08-12 ASSESSMENT — PAIN DESCRIPTION - LOCATION
LOCATION: LEG

## 2023-08-12 ASSESSMENT — PAIN - FUNCTIONAL ASSESSMENT
PAIN_FUNCTIONAL_ASSESSMENT: ACTIVITIES ARE NOT PREVENTED
PAIN_FUNCTIONAL_ASSESSMENT: ACTIVITIES ARE NOT PREVENTED

## 2023-08-13 LAB
ALBUMIN SERPL-MCNC: 2.2 G/DL (ref 3.5–5)
ALBUMIN/GLOB SERPL: 0.5 (ref 1.1–2.2)
ALP SERPL-CCNC: 181 U/L (ref 45–117)
ALT SERPL-CCNC: 8 U/L (ref 12–78)
ANION GAP SERPL CALC-SCNC: 7 MMOL/L (ref 5–15)
AST SERPL-CCNC: 11 U/L (ref 15–37)
BILIRUB DIRECT SERPL-MCNC: 0.5 MG/DL (ref 0–0.2)
BILIRUB SERPL-MCNC: 0.8 MG/DL (ref 0.2–1)
BUN SERPL-MCNC: 24 MG/DL (ref 6–20)
BUN/CREAT SERPL: 13 (ref 12–20)
CALCIUM SERPL-MCNC: 9 MG/DL (ref 8.5–10.1)
CHLORIDE SERPL-SCNC: 112 MMOL/L (ref 97–108)
CO2 SERPL-SCNC: 26 MMOL/L (ref 21–32)
CREAT SERPL-MCNC: 1.84 MG/DL (ref 0.7–1.3)
ERYTHROCYTE [DISTWIDTH] IN BLOOD BY AUTOMATED COUNT: 18.6 % (ref 11.5–14.5)
GLOBULIN SER CALC-MCNC: 4.1 G/DL (ref 2–4)
GLUCOSE BLD STRIP.AUTO-MCNC: 114 MG/DL (ref 65–117)
GLUCOSE BLD STRIP.AUTO-MCNC: 125 MG/DL (ref 65–117)
GLUCOSE BLD STRIP.AUTO-MCNC: 178 MG/DL (ref 65–117)
GLUCOSE BLD STRIP.AUTO-MCNC: 83 MG/DL (ref 65–117)
GLUCOSE SERPL-MCNC: 182 MG/DL (ref 65–100)
HCT VFR BLD AUTO: 27.8 % (ref 36.6–50.3)
HGB BLD-MCNC: 8.4 G/DL (ref 12.1–17)
MAGNESIUM SERPL-MCNC: 1.7 MG/DL (ref 1.6–2.4)
MCH RBC QN AUTO: 26.4 PG (ref 26–34)
MCHC RBC AUTO-ENTMCNC: 30.2 G/DL (ref 30–36.5)
MCV RBC AUTO: 87.4 FL (ref 80–99)
NRBC # BLD: 0 K/UL (ref 0–0.01)
NRBC BLD-RTO: 0 PER 100 WBC
NT PRO BNP: ABNORMAL PG/ML
PLATELET # BLD AUTO: 162 K/UL (ref 150–400)
PMV BLD AUTO: 12.5 FL (ref 8.9–12.9)
POTASSIUM SERPL-SCNC: 3.9 MMOL/L (ref 3.5–5.1)
PROT SERPL-MCNC: 6.3 G/DL (ref 6.4–8.2)
RBC # BLD AUTO: 3.18 M/UL (ref 4.1–5.7)
SERVICE CMNT-IMP: ABNORMAL
SERVICE CMNT-IMP: ABNORMAL
SERVICE CMNT-IMP: NORMAL
SERVICE CMNT-IMP: NORMAL
SODIUM SERPL-SCNC: 145 MMOL/L (ref 136–145)
WBC # BLD AUTO: 7.9 K/UL (ref 4.1–11.1)

## 2023-08-13 PROCEDURE — 6370000000 HC RX 637 (ALT 250 FOR IP): Performed by: NURSE PRACTITIONER

## 2023-08-13 PROCEDURE — 6370000000 HC RX 637 (ALT 250 FOR IP): Performed by: FAMILY MEDICINE

## 2023-08-13 PROCEDURE — 82962 GLUCOSE BLOOD TEST: CPT

## 2023-08-13 PROCEDURE — 80076 HEPATIC FUNCTION PANEL: CPT

## 2023-08-13 PROCEDURE — 6360000002 HC RX W HCPCS: Performed by: HOSPITALIST

## 2023-08-13 PROCEDURE — 6370000000 HC RX 637 (ALT 250 FOR IP): Performed by: HOSPITALIST

## 2023-08-13 PROCEDURE — 2060000000 HC ICU INTERMEDIATE R&B

## 2023-08-13 PROCEDURE — 6360000002 HC RX W HCPCS: Performed by: INTERNAL MEDICINE

## 2023-08-13 PROCEDURE — 85027 COMPLETE CBC AUTOMATED: CPT

## 2023-08-13 PROCEDURE — 2580000003 HC RX 258: Performed by: HOSPITALIST

## 2023-08-13 PROCEDURE — 83735 ASSAY OF MAGNESIUM: CPT

## 2023-08-13 PROCEDURE — 80048 BASIC METABOLIC PNL TOTAL CA: CPT

## 2023-08-13 PROCEDURE — 83880 ASSAY OF NATRIURETIC PEPTIDE: CPT

## 2023-08-13 PROCEDURE — 2500000003 HC RX 250 WO HCPCS: Performed by: INTERNAL MEDICINE

## 2023-08-13 PROCEDURE — 36415 COLL VENOUS BLD VENIPUNCTURE: CPT

## 2023-08-13 RX ADMIN — Medication 10 UNITS: at 08:05

## 2023-08-13 RX ADMIN — HYDROMORPHONE HYDROCHLORIDE 1 MG: 1 INJECTION, SOLUTION INTRAMUSCULAR; INTRAVENOUS; SUBCUTANEOUS at 23:53

## 2023-08-13 RX ADMIN — TRAMADOL HYDROCHLORIDE 50 MG: 50 TABLET ORAL at 08:55

## 2023-08-13 RX ADMIN — HYDROMORPHONE HYDROCHLORIDE 1 MG: 1 INJECTION, SOLUTION INTRAMUSCULAR; INTRAVENOUS; SUBCUTANEOUS at 11:19

## 2023-08-13 RX ADMIN — MILRINONE LACTATE IN DEXTROSE 0.25 MCG/KG/MIN: 200 INJECTION, SOLUTION INTRAVENOUS at 19:57

## 2023-08-13 RX ADMIN — POTASSIUM CHLORIDE 40 MEQ: 750 TABLET, FILM COATED, EXTENDED RELEASE ORAL at 22:22

## 2023-08-13 RX ADMIN — HYDRALAZINE HYDROCHLORIDE 50 MG: 50 TABLET, FILM COATED ORAL at 15:37

## 2023-08-13 RX ADMIN — TRAMADOL HYDROCHLORIDE 50 MG: 50 TABLET ORAL at 22:17

## 2023-08-13 RX ADMIN — ISOSORBIDE DINITRATE 30 MG: 20 TABLET ORAL at 15:36

## 2023-08-13 RX ADMIN — HYDRALAZINE HYDROCHLORIDE 50 MG: 50 TABLET, FILM COATED ORAL at 22:16

## 2023-08-13 RX ADMIN — ISOSORBIDE DINITRATE 30 MG: 20 TABLET ORAL at 08:53

## 2023-08-13 RX ADMIN — HYDRALAZINE HYDROCHLORIDE 50 MG: 50 TABLET, FILM COATED ORAL at 08:55

## 2023-08-13 RX ADMIN — Medication 10 ML: at 08:55

## 2023-08-13 RX ADMIN — PREGABALIN 50 MG: 25 CAPSULE ORAL at 21:00

## 2023-08-13 RX ADMIN — Medication 10 UNITS: at 17:39

## 2023-08-13 RX ADMIN — POTASSIUM CHLORIDE 40 MEQ: 750 TABLET, FILM COATED, EXTENDED RELEASE ORAL at 08:54

## 2023-08-13 RX ADMIN — PREGABALIN 50 MG: 25 CAPSULE ORAL at 08:54

## 2023-08-13 RX ADMIN — HYDROMORPHONE HYDROCHLORIDE 1 MG: 1 INJECTION, SOLUTION INTRAMUSCULAR; INTRAVENOUS; SUBCUTANEOUS at 15:37

## 2023-08-13 RX ADMIN — ISOSORBIDE DINITRATE 30 MG: 20 TABLET ORAL at 22:21

## 2023-08-13 RX ADMIN — HYDROMORPHONE HYDROCHLORIDE 1 MG: 1 INJECTION, SOLUTION INTRAMUSCULAR; INTRAVENOUS; SUBCUTANEOUS at 19:03

## 2023-08-13 RX ADMIN — APIXABAN 2.5 MG: 2.5 TABLET, FILM COATED ORAL at 21:00

## 2023-08-13 RX ADMIN — MILRINONE LACTATE IN DEXTROSE 0.25 MCG/KG/MIN: 200 INJECTION, SOLUTION INTRAVENOUS at 08:06

## 2023-08-13 RX ADMIN — CARVEDILOL 50 MG: 12.5 TABLET, FILM COATED ORAL at 08:55

## 2023-08-13 RX ADMIN — Medication 10 ML: at 22:22

## 2023-08-13 RX ADMIN — HYDROMORPHONE HYDROCHLORIDE 1 MG: 1 INJECTION, SOLUTION INTRAMUSCULAR; INTRAVENOUS; SUBCUTANEOUS at 06:18

## 2023-08-13 RX ADMIN — BUMETANIDE 0.5 MG/HR: 0.25 INJECTION INTRAMUSCULAR; INTRAVENOUS at 09:00

## 2023-08-13 RX ADMIN — HYDROMORPHONE HYDROCHLORIDE 1 MG: 1 INJECTION, SOLUTION INTRAMUSCULAR; INTRAVENOUS; SUBCUTANEOUS at 01:40

## 2023-08-13 RX ADMIN — Medication 7 UNITS: at 08:52

## 2023-08-13 RX ADMIN — CARVEDILOL 50 MG: 12.5 TABLET, FILM COATED ORAL at 22:20

## 2023-08-13 RX ADMIN — APIXABAN 2.5 MG: 2.5 TABLET, FILM COATED ORAL at 08:54

## 2023-08-13 ASSESSMENT — PAIN DESCRIPTION - LOCATION
LOCATION: LEG

## 2023-08-13 ASSESSMENT — PAIN DESCRIPTION - DESCRIPTORS
DESCRIPTORS: PINS AND NEEDLES;SHARP
DESCRIPTORS: PINS AND NEEDLES
DESCRIPTORS: SHARP;PINS AND NEEDLES;STABBING
DESCRIPTORS: ACHING
DESCRIPTORS: ACHING
DESCRIPTORS: PINS AND NEEDLES
DESCRIPTORS: ACHING

## 2023-08-13 ASSESSMENT — PAIN SCALES - GENERAL
PAINLEVEL_OUTOF10: 9
PAINLEVEL_OUTOF10: 10
PAINLEVEL_OUTOF10: 5
PAINLEVEL_OUTOF10: 9
PAINLEVEL_OUTOF10: 5
PAINLEVEL_OUTOF10: 10
PAINLEVEL_OUTOF10: 9
PAINLEVEL_OUTOF10: 2

## 2023-08-13 ASSESSMENT — PAIN - FUNCTIONAL ASSESSMENT
PAIN_FUNCTIONAL_ASSESSMENT: PREVENTS OR INTERFERES SOME ACTIVE ACTIVITIES AND ADLS

## 2023-08-13 ASSESSMENT — PAIN DESCRIPTION - ORIENTATION
ORIENTATION: RIGHT;LEFT
ORIENTATION: RIGHT;LEFT;LOWER
ORIENTATION: RIGHT;LEFT
ORIENTATION: RIGHT;LEFT;LOWER
ORIENTATION: LEFT;RIGHT

## 2023-08-13 ASSESSMENT — PAIN DESCRIPTION - PAIN TYPE
TYPE: CHRONIC PAIN
TYPE: CHRONIC PAIN

## 2023-08-13 ASSESSMENT — PAIN DESCRIPTION - ONSET
ONSET: ON-GOING
ONSET: ON-GOING

## 2023-08-13 ASSESSMENT — PAIN DESCRIPTION - FREQUENCY
FREQUENCY: CONTINUOUS
FREQUENCY: CONTINUOUS

## 2023-08-13 NOTE — PROCEDURES
Received order for midline cath. Pt has CKD 4, swelling, tenderness with thrombosis to right upper extremity and is also on milrinone IV. He is not appropriate for a midline. Discussed with Dr. Virginie Huston and Dr. Mayda Steel as pt may require central access. Nephrology declined to give clearance for PICC and would prefer magdiel catheter or CVC is necessary. Accucath placed in distal left forearm. Guide wire advanced easily but unable to advance catheter. Catheter flushed easily with brisk blood return without pain or swelling. External length left at 3 cm. Primary RN notified.

## 2023-08-14 LAB
ALBUMIN SERPL-MCNC: 2.3 G/DL (ref 3.5–5)
ANION GAP SERPL CALC-SCNC: 6 MMOL/L (ref 5–15)
BUN SERPL-MCNC: 20 MG/DL (ref 6–20)
BUN/CREAT SERPL: 12 (ref 12–20)
CALCIUM SERPL-MCNC: 9.1 MG/DL (ref 8.5–10.1)
CHLORIDE SERPL-SCNC: 108 MMOL/L (ref 97–108)
CO2 SERPL-SCNC: 30 MMOL/L (ref 21–32)
CREAT SERPL-MCNC: 1.69 MG/DL (ref 0.7–1.3)
ERYTHROCYTE [DISTWIDTH] IN BLOOD BY AUTOMATED COUNT: 19 % (ref 11.5–14.5)
GLUCOSE BLD STRIP.AUTO-MCNC: 129 MG/DL (ref 65–117)
GLUCOSE BLD STRIP.AUTO-MCNC: 132 MG/DL (ref 65–117)
GLUCOSE BLD STRIP.AUTO-MCNC: 133 MG/DL (ref 65–117)
GLUCOSE BLD STRIP.AUTO-MCNC: 202 MG/DL (ref 65–117)
GLUCOSE SERPL-MCNC: 115 MG/DL (ref 65–100)
HCT VFR BLD AUTO: 29.3 % (ref 36.6–50.3)
HGB BLD-MCNC: 9.1 G/DL (ref 12.1–17)
MCH RBC QN AUTO: 26.6 PG (ref 26–34)
MCHC RBC AUTO-ENTMCNC: 31.1 G/DL (ref 30–36.5)
MCV RBC AUTO: 85.7 FL (ref 80–99)
NRBC # BLD: 0 K/UL (ref 0–0.01)
NRBC BLD-RTO: 0 PER 100 WBC
NT PRO BNP: ABNORMAL PG/ML
PHOSPHATE SERPL-MCNC: 3.6 MG/DL (ref 2.6–4.7)
PLATELET # BLD AUTO: 156 K/UL (ref 150–400)
PMV BLD AUTO: 12.6 FL (ref 8.9–12.9)
POTASSIUM SERPL-SCNC: 5.1 MMOL/L (ref 3.5–5.1)
RBC # BLD AUTO: 3.42 M/UL (ref 4.1–5.7)
SERVICE CMNT-IMP: ABNORMAL
SODIUM SERPL-SCNC: 144 MMOL/L (ref 136–145)
WBC # BLD AUTO: 8.4 K/UL (ref 4.1–11.1)

## 2023-08-14 PROCEDURE — 80069 RENAL FUNCTION PANEL: CPT

## 2023-08-14 PROCEDURE — 6370000000 HC RX 637 (ALT 250 FOR IP): Performed by: NURSE PRACTITIONER

## 2023-08-14 PROCEDURE — 6370000000 HC RX 637 (ALT 250 FOR IP): Performed by: HOSPITALIST

## 2023-08-14 PROCEDURE — 6370000000 HC RX 637 (ALT 250 FOR IP): Performed by: FAMILY MEDICINE

## 2023-08-14 PROCEDURE — 2580000003 HC RX 258: Performed by: HOSPITALIST

## 2023-08-14 PROCEDURE — 82962 GLUCOSE BLOOD TEST: CPT

## 2023-08-14 PROCEDURE — 2500000003 HC RX 250 WO HCPCS: Performed by: INTERNAL MEDICINE

## 2023-08-14 PROCEDURE — 83880 ASSAY OF NATRIURETIC PEPTIDE: CPT

## 2023-08-14 PROCEDURE — 6360000002 HC RX W HCPCS: Performed by: HOSPITALIST

## 2023-08-14 PROCEDURE — 99233 SBSQ HOSP IP/OBS HIGH 50: CPT | Performed by: INTERNAL MEDICINE

## 2023-08-14 PROCEDURE — 6370000000 HC RX 637 (ALT 250 FOR IP): Performed by: INTERNAL MEDICINE

## 2023-08-14 PROCEDURE — 97530 THERAPEUTIC ACTIVITIES: CPT

## 2023-08-14 PROCEDURE — 6360000002 HC RX W HCPCS: Performed by: INTERNAL MEDICINE

## 2023-08-14 PROCEDURE — 36415 COLL VENOUS BLD VENIPUNCTURE: CPT

## 2023-08-14 PROCEDURE — 6360000002 HC RX W HCPCS: Performed by: FAMILY MEDICINE

## 2023-08-14 PROCEDURE — 85027 COMPLETE CBC AUTOMATED: CPT

## 2023-08-14 PROCEDURE — 2060000000 HC ICU INTERMEDIATE R&B

## 2023-08-14 PROCEDURE — 97166 OT EVAL MOD COMPLEX 45 MIN: CPT

## 2023-08-14 RX ORDER — BUMETANIDE 0.25 MG/ML
2 INJECTION INTRAMUSCULAR; INTRAVENOUS EVERY 12 HOURS
Status: DISCONTINUED | OUTPATIENT
Start: 2023-08-14 | End: 2023-08-15

## 2023-08-14 RX ORDER — MILRINONE LACTATE 0.2 MG/ML
0.12 INJECTION, SOLUTION INTRAVENOUS CONTINUOUS
Status: DISCONTINUED | OUTPATIENT
Start: 2023-08-14 | End: 2023-08-15

## 2023-08-14 RX ADMIN — HYDROMORPHONE HYDROCHLORIDE 1 MG: 1 INJECTION, SOLUTION INTRAMUSCULAR; INTRAVENOUS; SUBCUTANEOUS at 14:57

## 2023-08-14 RX ADMIN — APIXABAN 2.5 MG: 2.5 TABLET, FILM COATED ORAL at 09:45

## 2023-08-14 RX ADMIN — BUMETANIDE 2 MG: 0.25 INJECTION INTRAMUSCULAR; INTRAVENOUS at 21:38

## 2023-08-14 RX ADMIN — Medication 10 ML: at 09:50

## 2023-08-14 RX ADMIN — ISOSORBIDE DINITRATE 30 MG: 20 TABLET ORAL at 09:45

## 2023-08-14 RX ADMIN — CARVEDILOL 50 MG: 12.5 TABLET, FILM COATED ORAL at 09:46

## 2023-08-14 RX ADMIN — Medication 7 UNITS: at 18:07

## 2023-08-14 RX ADMIN — Medication 1 UNITS: at 18:07

## 2023-08-14 RX ADMIN — POTASSIUM CHLORIDE 40 MEQ: 750 TABLET, FILM COATED, EXTENDED RELEASE ORAL at 20:43

## 2023-08-14 RX ADMIN — SACUBITRIL AND VALSARTAN 1 TABLET: 24; 26 TABLET, FILM COATED ORAL at 14:44

## 2023-08-14 RX ADMIN — ISOSORBIDE DINITRATE 30 MG: 20 TABLET ORAL at 14:44

## 2023-08-14 RX ADMIN — TRAMADOL HYDROCHLORIDE 50 MG: 50 TABLET ORAL at 09:46

## 2023-08-14 RX ADMIN — PREGABALIN 50 MG: 25 CAPSULE ORAL at 20:47

## 2023-08-14 RX ADMIN — PREGABALIN 50 MG: 25 CAPSULE ORAL at 09:46

## 2023-08-14 RX ADMIN — HYDRALAZINE HYDROCHLORIDE 50 MG: 50 TABLET, FILM COATED ORAL at 20:46

## 2023-08-14 RX ADMIN — ISOSORBIDE DINITRATE 30 MG: 20 TABLET ORAL at 20:43

## 2023-08-14 RX ADMIN — HYDROMORPHONE HYDROCHLORIDE 1 MG: 1 INJECTION, SOLUTION INTRAMUSCULAR; INTRAVENOUS; SUBCUTANEOUS at 06:55

## 2023-08-14 RX ADMIN — HYDROMORPHONE HYDROCHLORIDE 1 MG: 1 INJECTION, SOLUTION INTRAMUSCULAR; INTRAVENOUS; SUBCUTANEOUS at 10:38

## 2023-08-14 RX ADMIN — SACUBITRIL AND VALSARTAN 1 TABLET: 24; 26 TABLET, FILM COATED ORAL at 20:42

## 2023-08-14 RX ADMIN — Medication 10 UNITS: at 18:07

## 2023-08-14 RX ADMIN — TRAMADOL HYDROCHLORIDE 50 MG: 50 TABLET ORAL at 20:44

## 2023-08-14 RX ADMIN — POTASSIUM CHLORIDE 40 MEQ: 750 TABLET, FILM COATED, EXTENDED RELEASE ORAL at 09:47

## 2023-08-14 RX ADMIN — CARVEDILOL 50 MG: 12.5 TABLET, FILM COATED ORAL at 20:46

## 2023-08-14 RX ADMIN — MILRINONE LACTATE IN DEXTROSE 0.25 MCG/KG/MIN: 200 INJECTION, SOLUTION INTRAVENOUS at 07:33

## 2023-08-14 RX ADMIN — ERYTHROPOIETIN 10000 UNITS: 10000 INJECTION, SOLUTION INTRAVENOUS; SUBCUTANEOUS at 09:48

## 2023-08-14 RX ADMIN — APIXABAN 2.5 MG: 2.5 TABLET, FILM COATED ORAL at 20:47

## 2023-08-14 RX ADMIN — Medication 7 UNITS: at 09:49

## 2023-08-14 RX ADMIN — BUMETANIDE 2 MG: 0.25 INJECTION INTRAMUSCULAR; INTRAVENOUS at 10:38

## 2023-08-14 RX ADMIN — HYDRALAZINE HYDROCHLORIDE 50 MG: 50 TABLET, FILM COATED ORAL at 14:44

## 2023-08-14 RX ADMIN — BUMETANIDE 0.5 MG/HR: 0.25 INJECTION INTRAMUSCULAR; INTRAVENOUS at 05:35

## 2023-08-14 RX ADMIN — HYDRALAZINE HYDROCHLORIDE 50 MG: 50 TABLET, FILM COATED ORAL at 09:46

## 2023-08-14 RX ADMIN — Medication 10 ML: at 20:48

## 2023-08-14 ASSESSMENT — PAIN DESCRIPTION - LOCATION
LOCATION: LEG

## 2023-08-14 ASSESSMENT — PAIN - FUNCTIONAL ASSESSMENT
PAIN_FUNCTIONAL_ASSESSMENT: PREVENTS OR INTERFERES SOME ACTIVE ACTIVITIES AND ADLS
PAIN_FUNCTIONAL_ASSESSMENT: PREVENTS OR INTERFERES SOME ACTIVE ACTIVITIES AND ADLS

## 2023-08-14 ASSESSMENT — PAIN DESCRIPTION - ORIENTATION
ORIENTATION: RIGHT;LEFT
ORIENTATION: RIGHT;LEFT;LOWER
ORIENTATION: RIGHT;LEFT;LOWER
ORIENTATION: RIGHT;LEFT
ORIENTATION: RIGHT;LEFT;LOWER
ORIENTATION: LEFT;RIGHT

## 2023-08-14 ASSESSMENT — PAIN SCALES - GENERAL
PAINLEVEL_OUTOF10: 9
PAINLEVEL_OUTOF10: 2
PAINLEVEL_OUTOF10: 8
PAINLEVEL_OUTOF10: 5
PAINLEVEL_OUTOF10: 5
PAINLEVEL_OUTOF10: 8
PAINLEVEL_OUTOF10: 10
PAINLEVEL_OUTOF10: 8
PAINLEVEL_OUTOF10: 10
PAINLEVEL_OUTOF10: 4
PAINLEVEL_OUTOF10: 10
PAINLEVEL_OUTOF10: 8

## 2023-08-14 ASSESSMENT — PAIN DESCRIPTION - DESCRIPTORS
DESCRIPTORS: ACHING
DESCRIPTORS: STABBING;SHARP;PINS AND NEEDLES
DESCRIPTORS: SHARP;STABBING;PINS AND NEEDLES
DESCRIPTORS: ACHING
DESCRIPTORS: ACHING
DESCRIPTORS: SHARP;STABBING;PINS AND NEEDLES
DESCRIPTORS: STABBING;SHARP;PINS AND NEEDLES

## 2023-08-14 NOTE — CARDIO/PULMONARY
Chart reviewed: Patient is 66 y.o. male admitted with Chronic systolic heart failure (HCC) [I50.22]  Swelling [R60.9]  Dilated cardiomyopathy ([address]) [I42.0]  Calciphylaxis [E83.59]  Multiple opens wound of lower extremity, unspecified laterality, initial encounter [S82.781B]  Cardiomyopathy, unspecified type ([address]) [I42.9]  Chronic kidney disease, unspecified CKD stage [N18.9]  Edema of right upper arm [R60.0]. Most recent is EF: 20%. On 8/10 echo. Education: Living with Heart Failure Booklet given to Anita Shah on a previous admit but is at home. Visited to reinforce previous HF education to evaluate for OP CR. Educated using teach back method. Discussed diagnosis definition and assessed patient understanding. Reviewed importance of daily weight monitoring and Low Sodium diet (7486-9395 mg. daily). As well as the need for medication compliance and communication with physician. Encouraged activity and rest periods within symptom limitations and as ordered by physician. .     Discussed the Cardiac Rehab Program, benefits, format. Anita Shah has bilateral leg wounds and is very weak. He has asked for a wheelchair for home use. At this time he is not a ideal candidate for the OP program due to wounds and debility/mobility issues.       Dai Cervantes RN

## 2023-08-14 NOTE — WOUND CARE
Wound care follow up with Dr. Braeden Bergeron. Dr. Moiz Garrison is interested in patient receiving HBO and a skin graft. This nurse is not familiar with these being used for Calciphylaxis; will search articles to see if this would be beneficial.    Photos of wounds:              Dressings are being changed every 2 days, drainage is being maintained and silver is not causing additional pain for patient. Will continue with current wound care. Recommendations:    Bilateral lower legs- Every other day and as needed for breakthrough drainage, gently remove dressings, use normal saline to assist with removing any dressing that is sticking, moisten small roll gauze with VASHE and wrap around lower legs, let sit for 5 to 10 minutes, cleanse wounds, apply Optifoam Gentle NB AG to open wounds, use 6 ABD pads (for each lower leg) to cover lower legs, tape can be used to assist with wrapping, apply \"Conforming Gauze Bandage\" roll gauze and apply \"Conforming Stretch Gauze Bandage\" roll gauze, secure with tape. Skin Care & Pressure Prevention:  Minimize layers of linen/pads under patient to optimize support surface. Turn/reposition approximately every 2 hours and offload heels.   Manage incontinence / promote continence   Nourishing Skin Cream to dry skin, minimize use of briefs when able     Discussed above plan with patient & Vee Gonzalez RN     Transition of Care: Plan to follow as needed while admitted to hospital.     SANJUANA CalixtoN, RN, [facility]  Certified Wound and Ostomy Nurse  office [phone]  Best way to contact me is through [address]

## 2023-08-15 LAB
ALBUMIN SERPL-MCNC: 2.2 G/DL (ref 3.5–5)
ANION GAP SERPL CALC-SCNC: 7 MMOL/L (ref 5–15)
BUN SERPL-MCNC: 20 MG/DL (ref 6–20)
BUN/CREAT SERPL: 11 (ref 12–20)
CALCIUM SERPL-MCNC: 9.1 MG/DL (ref 8.5–10.1)
CHLORIDE SERPL-SCNC: 102 MMOL/L (ref 97–108)
CO2 SERPL-SCNC: 33 MMOL/L (ref 21–32)
CREAT SERPL-MCNC: 1.76 MG/DL (ref 0.7–1.3)
ECHO BSA: 2.32 M2
ERYTHROCYTE [DISTWIDTH] IN BLOOD BY AUTOMATED COUNT: 18.8 % (ref 11.5–14.5)
GLUCOSE BLD STRIP.AUTO-MCNC: 104 MG/DL (ref 65–117)
GLUCOSE BLD STRIP.AUTO-MCNC: 134 MG/DL (ref 65–117)
GLUCOSE BLD STRIP.AUTO-MCNC: 148 MG/DL (ref 65–117)
GLUCOSE BLD STRIP.AUTO-MCNC: 160 MG/DL (ref 65–117)
GLUCOSE BLD STRIP.AUTO-MCNC: 163 MG/DL (ref 65–117)
GLUCOSE SERPL-MCNC: 135 MG/DL (ref 65–100)
HCT VFR BLD AUTO: 31.2 % (ref 36.6–50.3)
HGB BLD-MCNC: 9.5 G/DL (ref 12.1–17)
MCH RBC QN AUTO: 26.4 PG (ref 26–34)
MCHC RBC AUTO-ENTMCNC: 30.4 G/DL (ref 30–36.5)
MCV RBC AUTO: 86.7 FL (ref 80–99)
NRBC # BLD: 0 K/UL (ref 0–0.01)
NRBC BLD-RTO: 0 PER 100 WBC
NT PRO BNP: ABNORMAL PG/ML
PHOSPHATE SERPL-MCNC: 3.2 MG/DL (ref 2.6–4.7)
PLATELET # BLD AUTO: 178 K/UL (ref 150–400)
PMV BLD AUTO: 13 FL (ref 8.9–12.9)
POTASSIUM SERPL-SCNC: 4.1 MMOL/L (ref 3.5–5.1)
RBC # BLD AUTO: 3.6 M/UL (ref 4.1–5.7)
SERVICE CMNT-IMP: ABNORMAL
SERVICE CMNT-IMP: NORMAL
SODIUM SERPL-SCNC: 142 MMOL/L (ref 136–145)
WBC # BLD AUTO: 8.8 K/UL (ref 4.1–11.1)

## 2023-08-15 PROCEDURE — 6360000002 HC RX W HCPCS: Performed by: HOSPITALIST

## 2023-08-15 PROCEDURE — 76937 US GUIDE VASCULAR ACCESS: CPT | Performed by: INTERNAL MEDICINE

## 2023-08-15 PROCEDURE — 6360000002 HC RX W HCPCS: Performed by: INTERNAL MEDICINE

## 2023-08-15 PROCEDURE — C1894 INTRO/SHEATH, NON-LASER: HCPCS | Performed by: INTERNAL MEDICINE

## 2023-08-15 PROCEDURE — 97530 THERAPEUTIC ACTIVITIES: CPT

## 2023-08-15 PROCEDURE — 93451 RIGHT HEART CATH: CPT | Performed by: INTERNAL MEDICINE

## 2023-08-15 PROCEDURE — 4A023N6 MEASUREMENT OF CARDIAC SAMPLING AND PRESSURE, RIGHT HEART, PERCUTANEOUS APPROACH: ICD-10-PCS | Performed by: INTERNAL MEDICINE

## 2023-08-15 PROCEDURE — 82962 GLUCOSE BLOOD TEST: CPT

## 2023-08-15 PROCEDURE — 99152 MOD SED SAME PHYS/QHP 5/>YRS: CPT | Performed by: INTERNAL MEDICINE

## 2023-08-15 PROCEDURE — 2580000003 HC RX 258: Performed by: INTERNAL MEDICINE

## 2023-08-15 PROCEDURE — 2500000003 HC RX 250 WO HCPCS: Performed by: INTERNAL MEDICINE

## 2023-08-15 PROCEDURE — 97161 PT EVAL LOW COMPLEX 20 MIN: CPT

## 2023-08-15 PROCEDURE — 2709999900 HC NON-CHARGEABLE SUPPLY: Performed by: INTERNAL MEDICINE

## 2023-08-15 PROCEDURE — 6370000000 HC RX 637 (ALT 250 FOR IP): Performed by: HOSPITALIST

## 2023-08-15 PROCEDURE — 6370000000 HC RX 637 (ALT 250 FOR IP): Performed by: NURSE PRACTITIONER

## 2023-08-15 PROCEDURE — C1725 CATH, TRANSLUMIN NON-LASER: HCPCS | Performed by: INTERNAL MEDICINE

## 2023-08-15 PROCEDURE — 6370000000 HC RX 637 (ALT 250 FOR IP): Performed by: INTERNAL MEDICINE

## 2023-08-15 PROCEDURE — 85027 COMPLETE CBC AUTOMATED: CPT

## 2023-08-15 PROCEDURE — 83880 ASSAY OF NATRIURETIC PEPTIDE: CPT

## 2023-08-15 PROCEDURE — 36415 COLL VENOUS BLD VENIPUNCTURE: CPT

## 2023-08-15 PROCEDURE — C1713 ANCHOR/SCREW BN/BN,TIS/BN: HCPCS | Performed by: INTERNAL MEDICINE

## 2023-08-15 PROCEDURE — 80069 RENAL FUNCTION PANEL: CPT

## 2023-08-15 PROCEDURE — 97116 GAIT TRAINING THERAPY: CPT

## 2023-08-15 PROCEDURE — 2060000000 HC ICU INTERMEDIATE R&B

## 2023-08-15 PROCEDURE — 2580000003 HC RX 258: Performed by: HOSPITALIST

## 2023-08-15 PROCEDURE — 99233 SBSQ HOSP IP/OBS HIGH 50: CPT | Performed by: INTERNAL MEDICINE

## 2023-08-15 PROCEDURE — B2141ZZ FLUOROSCOPY OF RIGHT HEART USING LOW OSMOLAR CONTRAST: ICD-10-PCS | Performed by: INTERNAL MEDICINE

## 2023-08-15 PROCEDURE — 6370000000 HC RX 637 (ALT 250 FOR IP): Performed by: FAMILY MEDICINE

## 2023-08-15 RX ORDER — LIDOCAINE HYDROCHLORIDE 10 MG/ML
INJECTION, SOLUTION INFILTRATION; PERINEURAL PRN
Status: DISCONTINUED | OUTPATIENT
Start: 2023-08-15 | End: 2023-08-15 | Stop reason: HOSPADM

## 2023-08-15 RX ORDER — SODIUM CHLORIDE 9 MG/ML
INJECTION, SOLUTION INTRAVENOUS CONTINUOUS PRN
Status: COMPLETED | OUTPATIENT
Start: 2023-08-15 | End: 2023-08-15

## 2023-08-15 RX ORDER — BUMETANIDE 0.25 MG/ML
3 INJECTION INTRAMUSCULAR; INTRAVENOUS EVERY 12 HOURS
Status: DISCONTINUED | OUTPATIENT
Start: 2023-08-15 | End: 2023-08-15

## 2023-08-15 RX ORDER — HYDRALAZINE HYDROCHLORIDE 50 MG/1
100 TABLET, FILM COATED ORAL 3 TIMES DAILY
Status: DISCONTINUED | OUTPATIENT
Start: 2023-08-15 | End: 2023-08-19

## 2023-08-15 RX ORDER — BUMETANIDE 0.25 MG/ML
2 INJECTION INTRAMUSCULAR; INTRAVENOUS EVERY 12 HOURS
Status: DISCONTINUED | OUTPATIENT
Start: 2023-08-15 | End: 2023-08-17

## 2023-08-15 RX ORDER — HEPARIN SODIUM 200 [USP'U]/100ML
INJECTION, SOLUTION INTRAVENOUS CONTINUOUS PRN
Status: COMPLETED | OUTPATIENT
Start: 2023-08-15 | End: 2023-08-15

## 2023-08-15 RX ADMIN — ISOSORBIDE DINITRATE 30 MG: 20 TABLET ORAL at 08:44

## 2023-08-15 RX ADMIN — TRAMADOL HYDROCHLORIDE 50 MG: 50 TABLET ORAL at 20:36

## 2023-08-15 RX ADMIN — SACUBITRIL AND VALSARTAN 1 TABLET: 24; 26 TABLET, FILM COATED ORAL at 20:35

## 2023-08-15 RX ADMIN — CARVEDILOL 50 MG: 12.5 TABLET, FILM COATED ORAL at 20:34

## 2023-08-15 RX ADMIN — SODIUM THIOSULFATE 25 G: 250 INJECTION, SOLUTION INTRAVENOUS at 10:06

## 2023-08-15 RX ADMIN — BUMETANIDE 2 MG: 0.25 INJECTION INTRAMUSCULAR; INTRAVENOUS at 08:45

## 2023-08-15 RX ADMIN — CARVEDILOL 50 MG: 12.5 TABLET, FILM COATED ORAL at 08:45

## 2023-08-15 RX ADMIN — EMPAGLIFLOZIN 10 MG: 10 TABLET, FILM COATED ORAL at 08:45

## 2023-08-15 RX ADMIN — HYDROMORPHONE HYDROCHLORIDE 1 MG: 1 INJECTION, SOLUTION INTRAMUSCULAR; INTRAVENOUS; SUBCUTANEOUS at 14:43

## 2023-08-15 RX ADMIN — HYDRALAZINE HYDROCHLORIDE 100 MG: 50 TABLET, FILM COATED ORAL at 08:45

## 2023-08-15 RX ADMIN — BUMETANIDE 2 MG: 0.25 INJECTION INTRAMUSCULAR; INTRAVENOUS at 20:37

## 2023-08-15 RX ADMIN — Medication 7 UNITS: at 09:06

## 2023-08-15 RX ADMIN — POTASSIUM CHLORIDE 40 MEQ: 750 TABLET, FILM COATED, EXTENDED RELEASE ORAL at 20:35

## 2023-08-15 RX ADMIN — POTASSIUM CHLORIDE 40 MEQ: 750 TABLET, FILM COATED, EXTENDED RELEASE ORAL at 08:45

## 2023-08-15 RX ADMIN — MILRINONE LACTATE IN DEXTROSE 0.12 MCG/KG/MIN: 200 INJECTION, SOLUTION INTRAVENOUS at 00:32

## 2023-08-15 RX ADMIN — HYDRALAZINE HYDROCHLORIDE 100 MG: 50 TABLET, FILM COATED ORAL at 13:34

## 2023-08-15 RX ADMIN — HYDROMORPHONE HYDROCHLORIDE 1 MG: 1 INJECTION, SOLUTION INTRAMUSCULAR; INTRAVENOUS; SUBCUTANEOUS at 10:06

## 2023-08-15 RX ADMIN — ISOSORBIDE DINITRATE 30 MG: 20 TABLET ORAL at 13:34

## 2023-08-15 RX ADMIN — HYDROMORPHONE HYDROCHLORIDE 1 MG: 1 INJECTION, SOLUTION INTRAMUSCULAR; INTRAVENOUS; SUBCUTANEOUS at 23:49

## 2023-08-15 RX ADMIN — ISOSORBIDE DINITRATE 30 MG: 20 TABLET ORAL at 20:33

## 2023-08-15 RX ADMIN — PREGABALIN 50 MG: 25 CAPSULE ORAL at 08:44

## 2023-08-15 RX ADMIN — Medication 10 ML: at 20:37

## 2023-08-15 RX ADMIN — SACUBITRIL AND VALSARTAN 1 TABLET: 24; 26 TABLET, FILM COATED ORAL at 08:45

## 2023-08-15 RX ADMIN — HYDROMORPHONE HYDROCHLORIDE 1 MG: 1 INJECTION, SOLUTION INTRAMUSCULAR; INTRAVENOUS; SUBCUTANEOUS at 05:47

## 2023-08-15 RX ADMIN — Medication 10 UNITS: at 08:45

## 2023-08-15 RX ADMIN — Medication 10 ML: at 08:46

## 2023-08-15 RX ADMIN — HYDRALAZINE HYDROCHLORIDE 100 MG: 50 TABLET, FILM COATED ORAL at 20:35

## 2023-08-15 RX ADMIN — TRAMADOL HYDROCHLORIDE 50 MG: 50 TABLET ORAL at 08:44

## 2023-08-15 RX ADMIN — APIXABAN 2.5 MG: 2.5 TABLET, FILM COATED ORAL at 08:45

## 2023-08-15 RX ADMIN — APIXABAN 2.5 MG: 2.5 TABLET, FILM COATED ORAL at 20:36

## 2023-08-15 RX ADMIN — PREGABALIN 50 MG: 25 CAPSULE ORAL at 20:35

## 2023-08-15 ASSESSMENT — PAIN DESCRIPTION - LOCATION
LOCATION: LEG

## 2023-08-15 ASSESSMENT — PAIN SCALES - GENERAL
PAINLEVEL_OUTOF10: 2
PAINLEVEL_OUTOF10: 10
PAINLEVEL_OUTOF10: 2
PAINLEVEL_OUTOF10: 8
PAINLEVEL_OUTOF10: 9
PAINLEVEL_OUTOF10: 7
PAINLEVEL_OUTOF10: 2
PAINLEVEL_OUTOF10: 5
PAINLEVEL_OUTOF10: 10

## 2023-08-15 ASSESSMENT — PAIN - FUNCTIONAL ASSESSMENT
PAIN_FUNCTIONAL_ASSESSMENT: PREVENTS OR INTERFERES SOME ACTIVE ACTIVITIES AND ADLS

## 2023-08-15 ASSESSMENT — PAIN DESCRIPTION - ORIENTATION
ORIENTATION: RIGHT;LEFT;LOWER
ORIENTATION: RIGHT;LEFT
ORIENTATION: RIGHT;LEFT;LOWER
ORIENTATION: RIGHT;LEFT;LOWER
ORIENTATION: LEFT;RIGHT
ORIENTATION: RIGHT;LEFT;LOWER

## 2023-08-15 ASSESSMENT — PAIN DESCRIPTION - DESCRIPTORS
DESCRIPTORS: SHARP;STABBING;PINS AND NEEDLES
DESCRIPTORS: ACHING
DESCRIPTORS: SHARP;SHOOTING;PINS AND NEEDLES
DESCRIPTORS: ACHING
DESCRIPTORS: SHARP;STABBING;PINS AND NEEDLES

## 2023-08-15 NOTE — WOUND CARE
Dr. Jose L Peoples clarified that he was only interested in HBO for patient. Called [facility]patient [facility]ound care center. Calciphylaxis is not a qualifying diagnosis for HBO. Also, patient needs to have EF at least 35 and Mr. Mark Saeed is 21. Notified Dr. Jose L Peoples.     Joanna Gee \"Gregoria\" JONATHON Lucas, RN, [facility]  Office x [phone]  Fasted way to contact me is [facility]

## 2023-08-16 PROBLEM — N18.32 TYPE 2 DIABETES MELLITUS WITH STAGE 3B CHRONIC KIDNEY DISEASE, WITH LONG-TERM CURRENT USE OF INSULIN (HCC): Status: ACTIVE | Noted: 2023-08-16

## 2023-08-16 PROBLEM — Z79.4 TYPE 2 DIABETES MELLITUS WITH STAGE 3B CHRONIC KIDNEY DISEASE, WITH LONG-TERM CURRENT USE OF INSULIN (HCC): Status: ACTIVE | Noted: 2023-08-16

## 2023-08-16 PROBLEM — E11.22 TYPE 2 DIABETES MELLITUS WITH STAGE 3B CHRONIC KIDNEY DISEASE, WITH LONG-TERM CURRENT USE OF INSULIN (HCC): Status: ACTIVE | Noted: 2023-08-16

## 2023-08-16 LAB
ALBUMIN SERPL-MCNC: 2.3 G/DL (ref 3.5–5)
ANION GAP SERPL CALC-SCNC: 13 MMOL/L (ref 5–15)
BUN SERPL-MCNC: 19 MG/DL (ref 6–20)
BUN/CREAT SERPL: 10 (ref 12–20)
CALCIUM SERPL-MCNC: 9.2 MG/DL (ref 8.5–10.1)
CHLORIDE SERPL-SCNC: 99 MMOL/L (ref 97–108)
CO2 SERPL-SCNC: 30 MMOL/L (ref 21–32)
CREAT SERPL-MCNC: 1.98 MG/DL (ref 0.7–1.3)
ERYTHROCYTE [DISTWIDTH] IN BLOOD BY AUTOMATED COUNT: 18.9 % (ref 11.5–14.5)
GLUCOSE BLD STRIP.AUTO-MCNC: 126 MG/DL (ref 65–117)
GLUCOSE BLD STRIP.AUTO-MCNC: 146 MG/DL (ref 65–117)
GLUCOSE BLD STRIP.AUTO-MCNC: 68 MG/DL (ref 65–117)
GLUCOSE BLD STRIP.AUTO-MCNC: 78 MG/DL (ref 65–117)
GLUCOSE BLD STRIP.AUTO-MCNC: 93 MG/DL (ref 65–117)
GLUCOSE SERPL-MCNC: 166 MG/DL (ref 65–100)
HCT VFR BLD AUTO: 32.3 % (ref 36.6–50.3)
HGB BLD-MCNC: 10 G/DL (ref 12.1–17)
MCH RBC QN AUTO: 26.1 PG (ref 26–34)
MCHC RBC AUTO-ENTMCNC: 31 G/DL (ref 30–36.5)
MCV RBC AUTO: 84.3 FL (ref 80–99)
NRBC # BLD: 0 K/UL (ref 0–0.01)
NRBC BLD-RTO: 0 PER 100 WBC
NT PRO BNP: ABNORMAL PG/ML
PHOSPHATE SERPL-MCNC: 3.1 MG/DL (ref 2.6–4.7)
PLATELET # BLD AUTO: 206 K/UL (ref 150–400)
PMV BLD AUTO: 12.8 FL (ref 8.9–12.9)
POTASSIUM SERPL-SCNC: 4.1 MMOL/L (ref 3.5–5.1)
RBC # BLD AUTO: 3.83 M/UL (ref 4.1–5.7)
SERVICE CMNT-IMP: ABNORMAL
SERVICE CMNT-IMP: ABNORMAL
SERVICE CMNT-IMP: NORMAL
SODIUM SERPL-SCNC: 142 MMOL/L (ref 136–145)
WBC # BLD AUTO: 9.5 K/UL (ref 4.1–11.1)

## 2023-08-16 PROCEDURE — 6360000002 HC RX W HCPCS: Performed by: FAMILY MEDICINE

## 2023-08-16 PROCEDURE — 83880 ASSAY OF NATRIURETIC PEPTIDE: CPT

## 2023-08-16 PROCEDURE — 2500000003 HC RX 250 WO HCPCS: Performed by: INTERNAL MEDICINE

## 2023-08-16 PROCEDURE — 80069 RENAL FUNCTION PANEL: CPT

## 2023-08-16 PROCEDURE — 99223 1ST HOSP IP/OBS HIGH 75: CPT | Performed by: NURSE PRACTITIONER

## 2023-08-16 PROCEDURE — 2060000000 HC ICU INTERMEDIATE R&B

## 2023-08-16 PROCEDURE — 36415 COLL VENOUS BLD VENIPUNCTURE: CPT

## 2023-08-16 PROCEDURE — 2580000003 HC RX 258: Performed by: HOSPITALIST

## 2023-08-16 PROCEDURE — 97530 THERAPEUTIC ACTIVITIES: CPT

## 2023-08-16 PROCEDURE — 82962 GLUCOSE BLOOD TEST: CPT

## 2023-08-16 PROCEDURE — 6370000000 HC RX 637 (ALT 250 FOR IP): Performed by: INTERNAL MEDICINE

## 2023-08-16 PROCEDURE — 85027 COMPLETE CBC AUTOMATED: CPT

## 2023-08-16 PROCEDURE — 6370000000 HC RX 637 (ALT 250 FOR IP): Performed by: NURSE PRACTITIONER

## 2023-08-16 PROCEDURE — 6370000000 HC RX 637 (ALT 250 FOR IP): Performed by: FAMILY MEDICINE

## 2023-08-16 PROCEDURE — 6370000000 HC RX 637 (ALT 250 FOR IP): Performed by: HOSPITALIST

## 2023-08-16 PROCEDURE — 6370000000 HC RX 637 (ALT 250 FOR IP)

## 2023-08-16 PROCEDURE — 6360000002 HC RX W HCPCS: Performed by: HOSPITALIST

## 2023-08-16 PROCEDURE — 99221 1ST HOSP IP/OBS SF/LOW 40: CPT

## 2023-08-16 RX ORDER — HYDROMORPHONE HYDROCHLORIDE 2 MG/1
3 TABLET ORAL EVERY 6 HOURS
Status: DISCONTINUED | OUTPATIENT
Start: 2023-08-16 | End: 2023-08-24

## 2023-08-16 RX ORDER — ISOSORBIDE DINITRATE 20 MG/1
40 TABLET ORAL 3 TIMES DAILY
Status: DISCONTINUED | OUTPATIENT
Start: 2023-08-16 | End: 2023-08-24

## 2023-08-16 RX ORDER — INSULIN LISPRO 100 [IU]/ML
6 INJECTION, SOLUTION INTRAVENOUS; SUBCUTANEOUS 2 TIMES DAILY WITH MEALS
Status: DISCONTINUED | OUTPATIENT
Start: 2023-08-16 | End: 2023-08-16

## 2023-08-16 RX ORDER — OXYCODONE HYDROCHLORIDE 5 MG/1
10 TABLET ORAL EVERY 4 HOURS PRN
Status: DISCONTINUED | OUTPATIENT
Start: 2023-08-16 | End: 2023-08-24

## 2023-08-16 RX ORDER — SENNA AND DOCUSATE SODIUM 50; 8.6 MG/1; MG/1
1 TABLET, FILM COATED ORAL DAILY
Status: DISCONTINUED | OUTPATIENT
Start: 2023-08-16 | End: 2023-09-03 | Stop reason: HOSPADM

## 2023-08-16 RX ADMIN — TRAMADOL HYDROCHLORIDE 50 MG: 50 TABLET ORAL at 08:57

## 2023-08-16 RX ADMIN — CARVEDILOL 50 MG: 12.5 TABLET, FILM COATED ORAL at 21:43

## 2023-08-16 RX ADMIN — Medication 10 ML: at 21:45

## 2023-08-16 RX ADMIN — SACUBITRIL AND VALSARTAN 1 TABLET: 24; 26 TABLET, FILM COATED ORAL at 21:43

## 2023-08-16 RX ADMIN — Medication 10 ML: at 09:00

## 2023-08-16 RX ADMIN — POTASSIUM CHLORIDE 40 MEQ: 750 TABLET, FILM COATED, EXTENDED RELEASE ORAL at 21:43

## 2023-08-16 RX ADMIN — OXYCODONE HYDROCHLORIDE 10 MG: 5 TABLET ORAL at 18:40

## 2023-08-16 RX ADMIN — ISOSORBIDE DINITRATE 40 MG: 20 TABLET ORAL at 21:43

## 2023-08-16 RX ADMIN — Medication 10 UNITS: at 08:55

## 2023-08-16 RX ADMIN — APIXABAN 2.5 MG: 2.5 TABLET, FILM COATED ORAL at 21:42

## 2023-08-16 RX ADMIN — HYDRALAZINE HYDROCHLORIDE 100 MG: 50 TABLET, FILM COATED ORAL at 15:41

## 2023-08-16 RX ADMIN — OXYCODONE HYDROCHLORIDE 10 MG: 5 TABLET ORAL at 12:42

## 2023-08-16 RX ADMIN — PREGABALIN 50 MG: 25 CAPSULE ORAL at 08:56

## 2023-08-16 RX ADMIN — ISOSORBIDE DINITRATE 40 MG: 20 TABLET ORAL at 08:56

## 2023-08-16 RX ADMIN — Medication 7 UNITS: at 08:55

## 2023-08-16 RX ADMIN — HYDROMORPHONE HYDROCHLORIDE 1 MG: 1 INJECTION, SOLUTION INTRAMUSCULAR; INTRAVENOUS; SUBCUTANEOUS at 09:13

## 2023-08-16 RX ADMIN — CARVEDILOL 50 MG: 12.5 TABLET, FILM COATED ORAL at 08:56

## 2023-08-16 RX ADMIN — HYDRALAZINE HYDROCHLORIDE 100 MG: 50 TABLET, FILM COATED ORAL at 08:57

## 2023-08-16 RX ADMIN — APIXABAN 2.5 MG: 2.5 TABLET, FILM COATED ORAL at 08:58

## 2023-08-16 RX ADMIN — Medication 8 UNITS: at 16:41

## 2023-08-16 RX ADMIN — BUMETANIDE 2 MG: 0.25 INJECTION INTRAMUSCULAR; INTRAVENOUS at 21:39

## 2023-08-16 RX ADMIN — HYDRALAZINE HYDROCHLORIDE 100 MG: 50 TABLET, FILM COATED ORAL at 21:42

## 2023-08-16 RX ADMIN — EMPAGLIFLOZIN 10 MG: 10 TABLET, FILM COATED ORAL at 08:56

## 2023-08-16 RX ADMIN — POTASSIUM CHLORIDE 40 MEQ: 750 TABLET, FILM COATED, EXTENDED RELEASE ORAL at 08:57

## 2023-08-16 RX ADMIN — ISOSORBIDE DINITRATE 40 MG: 20 TABLET ORAL at 15:41

## 2023-08-16 RX ADMIN — PREGABALIN 50 MG: 25 CAPSULE ORAL at 21:42

## 2023-08-16 RX ADMIN — HYDROMORPHONE HYDROCHLORIDE 1 MG: 1 INJECTION, SOLUTION INTRAMUSCULAR; INTRAVENOUS; SUBCUTANEOUS at 04:45

## 2023-08-16 RX ADMIN — TRAMADOL HYDROCHLORIDE 50 MG: 50 TABLET ORAL at 21:41

## 2023-08-16 RX ADMIN — BUMETANIDE 2 MG: 0.25 INJECTION INTRAMUSCULAR; INTRAVENOUS at 08:54

## 2023-08-16 RX ADMIN — ERYTHROPOIETIN 10000 UNITS: 10000 INJECTION, SOLUTION INTRAVENOUS; SUBCUTANEOUS at 09:13

## 2023-08-16 RX ADMIN — SACUBITRIL AND VALSARTAN 1 TABLET: 24; 26 TABLET, FILM COATED ORAL at 08:57

## 2023-08-16 ASSESSMENT — PAIN SCALES - GENERAL
PAINLEVEL_OUTOF10: 3
PAINLEVEL_OUTOF10: 10
PAINLEVEL_OUTOF10: 3
PAINLEVEL_OUTOF10: 10
PAINLEVEL_OUTOF10: 2
PAINLEVEL_OUTOF10: 3
PAINLEVEL_OUTOF10: 7
PAINLEVEL_OUTOF10: 3
PAINLEVEL_OUTOF10: 10
PAINLEVEL_OUTOF10: 3
PAINLEVEL_OUTOF10: 7
PAINLEVEL_OUTOF10: 3
PAINLEVEL_OUTOF10: 8
PAINLEVEL_OUTOF10: 0

## 2023-08-16 ASSESSMENT — PAIN DESCRIPTION - DESCRIPTORS
DESCRIPTORS: DISCOMFORT
DESCRIPTORS: PINS AND NEEDLES
DESCRIPTORS: DISCOMFORT
DESCRIPTORS: DISCOMFORT
DESCRIPTORS: SHARP;STABBING;PINS AND NEEDLES
DESCRIPTORS: DISCOMFORT
DESCRIPTORS: DISCOMFORT

## 2023-08-16 ASSESSMENT — PAIN DESCRIPTION - ORIENTATION
ORIENTATION: RIGHT;LEFT;LOWER
ORIENTATION: RIGHT;LEFT
ORIENTATION: RIGHT;LEFT
ORIENTATION: RIGHT;LEFT;LOWER
ORIENTATION: RIGHT;LEFT
ORIENTATION: RIGHT;LEFT;LOWER
ORIENTATION: RIGHT;LOWER;LEFT
ORIENTATION: RIGHT;LEFT;LOWER
ORIENTATION: RIGHT;LEFT
ORIENTATION: LEFT;RIGHT

## 2023-08-16 ASSESSMENT — PAIN DESCRIPTION - LOCATION
LOCATION: LEG
LOCATION: FOOT
LOCATION: LEG

## 2023-08-16 ASSESSMENT — PAIN DESCRIPTION - FREQUENCY
FREQUENCY: CONTINUOUS

## 2023-08-16 ASSESSMENT — PAIN DESCRIPTION - ONSET
ONSET: ON-GOING
ONSET: ON-GOING

## 2023-08-16 ASSESSMENT — PAIN DESCRIPTION - PAIN TYPE
TYPE: CHRONIC PAIN

## 2023-08-17 PROBLEM — R52 PAIN: Status: ACTIVE | Noted: 2023-08-17

## 2023-08-17 PROBLEM — R40.0 DROWSY: Status: ACTIVE | Noted: 2023-08-17

## 2023-08-17 PROBLEM — R53.1 WEAKNESS GENERALIZED: Status: ACTIVE | Noted: 2023-08-17

## 2023-08-17 PROBLEM — I50.23 ACUTE ON CHRONIC SYSTOLIC HEART FAILURE (HCC): Status: ACTIVE | Noted: 2023-03-02

## 2023-08-17 PROBLEM — Z71.89 GOALS OF CARE, COUNSELING/DISCUSSION: Status: ACTIVE | Noted: 2023-08-17

## 2023-08-17 PROBLEM — Z51.5 PALLIATIVE CARE ENCOUNTER: Status: ACTIVE | Noted: 2023-08-17

## 2023-08-17 LAB
ALBUMIN SERPL-MCNC: 2.4 G/DL (ref 3.5–5)
ALBUMIN/GLOB SERPL: 0.5 (ref 1.1–2.2)
ALP SERPL-CCNC: 159 U/L (ref 45–117)
ALT SERPL-CCNC: 12 U/L (ref 12–78)
ANION GAP SERPL CALC-SCNC: 9 MMOL/L (ref 5–15)
AST SERPL-CCNC: 20 U/L (ref 15–37)
BILIRUB DIRECT SERPL-MCNC: 0.7 MG/DL (ref 0–0.2)
BILIRUB SERPL-MCNC: 1.4 MG/DL (ref 0.2–1)
BUN SERPL-MCNC: 19 MG/DL (ref 6–20)
BUN/CREAT SERPL: 9 (ref 12–20)
CALCIUM SERPL-MCNC: 9 MG/DL (ref 8.5–10.1)
CHLORIDE SERPL-SCNC: 100 MMOL/L (ref 97–108)
CO2 SERPL-SCNC: 31 MMOL/L (ref 21–32)
CREAT SERPL-MCNC: 2.23 MG/DL (ref 0.7–1.3)
ERYTHROCYTE [DISTWIDTH] IN BLOOD BY AUTOMATED COUNT: 19 % (ref 11.5–14.5)
GLOBULIN SER CALC-MCNC: 4.4 G/DL (ref 2–4)
GLUCOSE BLD STRIP.AUTO-MCNC: 120 MG/DL (ref 65–117)
GLUCOSE BLD STRIP.AUTO-MCNC: 133 MG/DL (ref 65–117)
GLUCOSE BLD STRIP.AUTO-MCNC: 177 MG/DL (ref 65–117)
GLUCOSE BLD STRIP.AUTO-MCNC: 190 MG/DL (ref 65–117)
GLUCOSE BLD STRIP.AUTO-MCNC: 228 MG/DL (ref 65–117)
GLUCOSE SERPL-MCNC: 140 MG/DL (ref 65–100)
HCT VFR BLD AUTO: 35.1 % (ref 36.6–50.3)
HGB BLD-MCNC: 10.8 G/DL (ref 12.1–17)
MCH RBC QN AUTO: 26.2 PG (ref 26–34)
MCHC RBC AUTO-ENTMCNC: 30.8 G/DL (ref 30–36.5)
MCV RBC AUTO: 85 FL (ref 80–99)
NRBC # BLD: 0 K/UL (ref 0–0.01)
NRBC BLD-RTO: 0 PER 100 WBC
NT PRO BNP: ABNORMAL PG/ML
PHOSPHATE SERPL-MCNC: 3.4 MG/DL (ref 2.6–4.7)
PLATELET # BLD AUTO: 206 K/UL (ref 150–400)
PMV BLD AUTO: 12.9 FL (ref 8.9–12.9)
POTASSIUM SERPL-SCNC: 4.4 MMOL/L (ref 3.5–5.1)
PROT SERPL-MCNC: 6.8 G/DL (ref 6.4–8.2)
RBC # BLD AUTO: 4.13 M/UL (ref 4.1–5.7)
SERVICE CMNT-IMP: ABNORMAL
SODIUM SERPL-SCNC: 140 MMOL/L (ref 136–145)
WBC # BLD AUTO: 10.4 K/UL (ref 4.1–11.1)

## 2023-08-17 PROCEDURE — 36415 COLL VENOUS BLD VENIPUNCTURE: CPT

## 2023-08-17 PROCEDURE — 84100 ASSAY OF PHOSPHORUS: CPT

## 2023-08-17 PROCEDURE — 6370000000 HC RX 637 (ALT 250 FOR IP): Performed by: NURSE PRACTITIONER

## 2023-08-17 PROCEDURE — 82962 GLUCOSE BLOOD TEST: CPT

## 2023-08-17 PROCEDURE — 6370000000 HC RX 637 (ALT 250 FOR IP): Performed by: HOSPITALIST

## 2023-08-17 PROCEDURE — 2580000003 HC RX 258: Performed by: HOSPITALIST

## 2023-08-17 PROCEDURE — 97530 THERAPEUTIC ACTIVITIES: CPT

## 2023-08-17 PROCEDURE — 99232 SBSQ HOSP IP/OBS MODERATE 35: CPT | Performed by: NURSE PRACTITIONER

## 2023-08-17 PROCEDURE — 80048 BASIC METABOLIC PNL TOTAL CA: CPT

## 2023-08-17 PROCEDURE — 85027 COMPLETE CBC AUTOMATED: CPT

## 2023-08-17 PROCEDURE — 2060000000 HC ICU INTERMEDIATE R&B

## 2023-08-17 PROCEDURE — 6370000000 HC RX 637 (ALT 250 FOR IP): Performed by: INTERNAL MEDICINE

## 2023-08-17 PROCEDURE — 83880 ASSAY OF NATRIURETIC PEPTIDE: CPT

## 2023-08-17 PROCEDURE — 99231 SBSQ HOSP IP/OBS SF/LOW 25: CPT

## 2023-08-17 PROCEDURE — 97535 SELF CARE MNGMENT TRAINING: CPT

## 2023-08-17 PROCEDURE — 6370000000 HC RX 637 (ALT 250 FOR IP): Performed by: FAMILY MEDICINE

## 2023-08-17 PROCEDURE — 6370000000 HC RX 637 (ALT 250 FOR IP)

## 2023-08-17 PROCEDURE — 80076 HEPATIC FUNCTION PANEL: CPT

## 2023-08-17 PROCEDURE — 99233 SBSQ HOSP IP/OBS HIGH 50: CPT | Performed by: INTERNAL MEDICINE

## 2023-08-17 RX ORDER — INSULIN LISPRO 100 [IU]/ML
0.05 INJECTION, SOLUTION INTRAVENOUS; SUBCUTANEOUS
Status: DISCONTINUED | OUTPATIENT
Start: 2023-08-17 | End: 2023-08-24

## 2023-08-17 RX ADMIN — POTASSIUM CHLORIDE 40 MEQ: 750 TABLET, FILM COATED, EXTENDED RELEASE ORAL at 09:35

## 2023-08-17 RX ADMIN — CARVEDILOL 50 MG: 12.5 TABLET, FILM COATED ORAL at 09:35

## 2023-08-17 RX ADMIN — POTASSIUM CHLORIDE 40 MEQ: 750 TABLET, FILM COATED, EXTENDED RELEASE ORAL at 22:55

## 2023-08-17 RX ADMIN — HYDROMORPHONE HYDROCHLORIDE 3 MG: 2 TABLET ORAL at 04:36

## 2023-08-17 RX ADMIN — TRAMADOL HYDROCHLORIDE 50 MG: 50 TABLET ORAL at 22:55

## 2023-08-17 RX ADMIN — APIXABAN 2.5 MG: 2.5 TABLET, FILM COATED ORAL at 09:35

## 2023-08-17 RX ADMIN — Medication 10 ML: at 22:57

## 2023-08-17 RX ADMIN — HYDROMORPHONE HYDROCHLORIDE 3 MG: 2 TABLET ORAL at 14:03

## 2023-08-17 RX ADMIN — OXYCODONE HYDROCHLORIDE 10 MG: 5 TABLET ORAL at 23:51

## 2023-08-17 RX ADMIN — ISOSORBIDE DINITRATE 40 MG: 20 TABLET ORAL at 14:03

## 2023-08-17 RX ADMIN — Medication 1 UNITS: at 11:22

## 2023-08-17 RX ADMIN — HYDRALAZINE HYDROCHLORIDE 100 MG: 50 TABLET, FILM COATED ORAL at 14:04

## 2023-08-17 RX ADMIN — SACUBITRIL AND VALSARTAN 1 TABLET: 24; 26 TABLET, FILM COATED ORAL at 09:35

## 2023-08-17 RX ADMIN — PREGABALIN 50 MG: 25 CAPSULE ORAL at 22:54

## 2023-08-17 RX ADMIN — HYDRALAZINE HYDROCHLORIDE 100 MG: 50 TABLET, FILM COATED ORAL at 22:54

## 2023-08-17 RX ADMIN — Medication 4 UNITS: at 17:30

## 2023-08-17 RX ADMIN — PREGABALIN 50 MG: 25 CAPSULE ORAL at 09:35

## 2023-08-17 RX ADMIN — ISOSORBIDE DINITRATE 40 MG: 20 TABLET ORAL at 09:34

## 2023-08-17 RX ADMIN — APIXABAN 2.5 MG: 2.5 TABLET, FILM COATED ORAL at 22:55

## 2023-08-17 RX ADMIN — HYDRALAZINE HYDROCHLORIDE 100 MG: 50 TABLET, FILM COATED ORAL at 09:35

## 2023-08-17 RX ADMIN — OXYCODONE HYDROCHLORIDE 10 MG: 5 TABLET ORAL at 12:42

## 2023-08-17 RX ADMIN — TRAMADOL HYDROCHLORIDE 50 MG: 50 TABLET ORAL at 09:35

## 2023-08-17 RX ADMIN — Medication 8 UNITS: at 17:30

## 2023-08-17 RX ADMIN — ISOSORBIDE DINITRATE 40 MG: 20 TABLET ORAL at 22:54

## 2023-08-17 RX ADMIN — CARVEDILOL 50 MG: 12.5 TABLET, FILM COATED ORAL at 22:54

## 2023-08-17 RX ADMIN — SENNOSIDES AND DOCUSATE SODIUM 1 TABLET: 50; 8.6 TABLET ORAL at 09:34

## 2023-08-17 RX ADMIN — Medication 4 UNITS: at 11:22

## 2023-08-17 RX ADMIN — Medication 8 UNITS: at 06:59

## 2023-08-17 RX ADMIN — SACUBITRIL AND VALSARTAN 1 TABLET: 24; 26 TABLET, FILM COATED ORAL at 22:54

## 2023-08-17 RX ADMIN — HYDROMORPHONE HYDROCHLORIDE 3 MG: 2 TABLET ORAL at 09:39

## 2023-08-17 ASSESSMENT — PAIN SCALES - GENERAL
PAINLEVEL_OUTOF10: 0
PAINLEVEL_OUTOF10: 8
PAINLEVEL_OUTOF10: 3
PAINLEVEL_OUTOF10: 4
PAINLEVEL_OUTOF10: 2
PAINLEVEL_OUTOF10: 4
PAINLEVEL_OUTOF10: 7
PAINLEVEL_OUTOF10: 2
PAINLEVEL_OUTOF10: 2
PAINLEVEL_OUTOF10: 0

## 2023-08-17 ASSESSMENT — PAIN - FUNCTIONAL ASSESSMENT
PAIN_FUNCTIONAL_ASSESSMENT: PREVENTS OR INTERFERES SOME ACTIVE ACTIVITIES AND ADLS

## 2023-08-17 ASSESSMENT — PAIN DESCRIPTION - DESCRIPTORS
DESCRIPTORS: ACHING
DESCRIPTORS: ACHING
DESCRIPTORS: BURNING

## 2023-08-17 ASSESSMENT — PAIN DESCRIPTION - LOCATION
LOCATION: LEG

## 2023-08-17 ASSESSMENT — PAIN DESCRIPTION - ORIENTATION
ORIENTATION: RIGHT;LEFT
ORIENTATION: LEFT;RIGHT
ORIENTATION: RIGHT;LEFT

## 2023-08-17 ASSESSMENT — PAIN DESCRIPTION - ONSET
ONSET: ON-GOING
ONSET: ON-GOING

## 2023-08-17 ASSESSMENT — PAIN DESCRIPTION - FREQUENCY
FREQUENCY: CONTINUOUS
FREQUENCY: CONTINUOUS

## 2023-08-17 ASSESSMENT — PAIN DESCRIPTION - PAIN TYPE
TYPE: CHRONIC PAIN
TYPE: CHRONIC PAIN

## 2023-08-18 LAB
ALBUMIN SERPL-MCNC: 2.5 G/DL (ref 3.5–5)
ALBUMIN/GLOB SERPL: 0.5 (ref 1.1–2.2)
ALP SERPL-CCNC: 162 U/L (ref 45–117)
ALT SERPL-CCNC: 12 U/L (ref 12–78)
ANION GAP SERPL CALC-SCNC: 6 MMOL/L (ref 5–15)
AST SERPL-CCNC: 18 U/L (ref 15–37)
BILIRUB DIRECT SERPL-MCNC: 0.6 MG/DL (ref 0–0.2)
BILIRUB SERPL-MCNC: 1.2 MG/DL (ref 0.2–1)
BUN SERPL-MCNC: 30 MG/DL (ref 6–20)
BUN/CREAT SERPL: 13 (ref 12–20)
CALCIUM SERPL-MCNC: 9.3 MG/DL (ref 8.5–10.1)
CHLORIDE SERPL-SCNC: 100 MMOL/L (ref 97–108)
CO2 SERPL-SCNC: 31 MMOL/L (ref 21–32)
CREAT SERPL-MCNC: 2.4 MG/DL (ref 0.7–1.3)
ERYTHROCYTE [DISTWIDTH] IN BLOOD BY AUTOMATED COUNT: 19.2 % (ref 11.5–14.5)
GLOBULIN SER CALC-MCNC: 4.6 G/DL (ref 2–4)
GLUCOSE BLD STRIP.AUTO-MCNC: 120 MG/DL (ref 65–117)
GLUCOSE BLD STRIP.AUTO-MCNC: 141 MG/DL (ref 65–117)
GLUCOSE BLD STRIP.AUTO-MCNC: 159 MG/DL (ref 65–117)
GLUCOSE BLD STRIP.AUTO-MCNC: 96 MG/DL (ref 65–117)
GLUCOSE SERPL-MCNC: 119 MG/DL (ref 65–100)
HCT VFR BLD AUTO: 35.1 % (ref 36.6–50.3)
HGB BLD-MCNC: 10.9 G/DL (ref 12.1–17)
MCH RBC QN AUTO: 26.7 PG (ref 26–34)
MCHC RBC AUTO-ENTMCNC: 31.1 G/DL (ref 30–36.5)
MCV RBC AUTO: 85.8 FL (ref 80–99)
NRBC # BLD: 0 K/UL (ref 0–0.01)
NRBC BLD-RTO: 0 PER 100 WBC
NT PRO BNP: ABNORMAL PG/ML
PLATELET # BLD AUTO: 227 K/UL (ref 150–400)
POTASSIUM SERPL-SCNC: 4.8 MMOL/L (ref 3.5–5.1)
PROT SERPL-MCNC: 7.1 G/DL (ref 6.4–8.2)
RBC # BLD AUTO: 4.09 M/UL (ref 4.1–5.7)
SERVICE CMNT-IMP: ABNORMAL
SERVICE CMNT-IMP: NORMAL
SODIUM SERPL-SCNC: 137 MMOL/L (ref 136–145)
WBC # BLD AUTO: 10.5 K/UL (ref 4.1–11.1)

## 2023-08-18 PROCEDURE — 97530 THERAPEUTIC ACTIVITIES: CPT

## 2023-08-18 PROCEDURE — 2060000000 HC ICU INTERMEDIATE R&B

## 2023-08-18 PROCEDURE — 83880 ASSAY OF NATRIURETIC PEPTIDE: CPT

## 2023-08-18 PROCEDURE — 6370000000 HC RX 637 (ALT 250 FOR IP): Performed by: NURSE PRACTITIONER

## 2023-08-18 PROCEDURE — 99233 SBSQ HOSP IP/OBS HIGH 50: CPT | Performed by: INTERNAL MEDICINE

## 2023-08-18 PROCEDURE — 97116 GAIT TRAINING THERAPY: CPT

## 2023-08-18 PROCEDURE — 36415 COLL VENOUS BLD VENIPUNCTURE: CPT

## 2023-08-18 PROCEDURE — 6370000000 HC RX 637 (ALT 250 FOR IP): Performed by: FAMILY MEDICINE

## 2023-08-18 PROCEDURE — 99231 SBSQ HOSP IP/OBS SF/LOW 25: CPT

## 2023-08-18 PROCEDURE — 80048 BASIC METABOLIC PNL TOTAL CA: CPT

## 2023-08-18 PROCEDURE — 6370000000 HC RX 637 (ALT 250 FOR IP): Performed by: INTERNAL MEDICINE

## 2023-08-18 PROCEDURE — 6360000002 HC RX W HCPCS: Performed by: INTERNAL MEDICINE

## 2023-08-18 PROCEDURE — 80076 HEPATIC FUNCTION PANEL: CPT

## 2023-08-18 PROCEDURE — 85027 COMPLETE CBC AUTOMATED: CPT

## 2023-08-18 PROCEDURE — 6370000000 HC RX 637 (ALT 250 FOR IP)

## 2023-08-18 PROCEDURE — 2580000003 HC RX 258: Performed by: INTERNAL MEDICINE

## 2023-08-18 PROCEDURE — 82962 GLUCOSE BLOOD TEST: CPT

## 2023-08-18 PROCEDURE — 2580000003 HC RX 258: Performed by: HOSPITALIST

## 2023-08-18 PROCEDURE — 6370000000 HC RX 637 (ALT 250 FOR IP): Performed by: HOSPITALIST

## 2023-08-18 RX ORDER — 0.9 % SODIUM CHLORIDE 0.9 %
500 INTRAVENOUS SOLUTION INTRAVENOUS ONCE
Status: COMPLETED | OUTPATIENT
Start: 2023-08-18 | End: 2023-08-18

## 2023-08-18 RX ADMIN — PREGABALIN 50 MG: 25 CAPSULE ORAL at 08:22

## 2023-08-18 RX ADMIN — Medication 8 UNITS: at 18:00

## 2023-08-18 RX ADMIN — SODIUM THIOSULFATE 25 G: 250 INJECTION, SOLUTION INTRAVENOUS at 12:10

## 2023-08-18 RX ADMIN — HYDRALAZINE HYDROCHLORIDE 100 MG: 50 TABLET, FILM COATED ORAL at 08:23

## 2023-08-18 RX ADMIN — Medication 10 ML: at 08:21

## 2023-08-18 RX ADMIN — POTASSIUM CHLORIDE 40 MEQ: 750 TABLET, FILM COATED, EXTENDED RELEASE ORAL at 21:20

## 2023-08-18 RX ADMIN — ISOSORBIDE DINITRATE 40 MG: 20 TABLET ORAL at 21:20

## 2023-08-18 RX ADMIN — CARVEDILOL 50 MG: 12.5 TABLET, FILM COATED ORAL at 08:22

## 2023-08-18 RX ADMIN — APIXABAN 2.5 MG: 2.5 TABLET, FILM COATED ORAL at 08:23

## 2023-08-18 RX ADMIN — HYDROMORPHONE HYDROCHLORIDE 3 MG: 2 TABLET ORAL at 13:24

## 2023-08-18 RX ADMIN — PREGABALIN 50 MG: 25 CAPSULE ORAL at 21:21

## 2023-08-18 RX ADMIN — HYDROMORPHONE HYDROCHLORIDE 3 MG: 2 TABLET ORAL at 08:21

## 2023-08-18 RX ADMIN — ISOSORBIDE DINITRATE 40 MG: 20 TABLET ORAL at 13:25

## 2023-08-18 RX ADMIN — ISOSORBIDE DINITRATE 40 MG: 20 TABLET ORAL at 08:24

## 2023-08-18 RX ADMIN — HYDROMORPHONE HYDROCHLORIDE 3 MG: 2 TABLET ORAL at 21:20

## 2023-08-18 RX ADMIN — SODIUM CHLORIDE 500 ML: 9 INJECTION, SOLUTION INTRAVENOUS at 10:06

## 2023-08-18 RX ADMIN — Medication 10 ML: at 21:21

## 2023-08-18 RX ADMIN — APIXABAN 2.5 MG: 2.5 TABLET, FILM COATED ORAL at 21:21

## 2023-08-18 RX ADMIN — POTASSIUM CHLORIDE 40 MEQ: 750 TABLET, FILM COATED, EXTENDED RELEASE ORAL at 08:24

## 2023-08-18 RX ADMIN — TRAMADOL HYDROCHLORIDE 50 MG: 50 TABLET ORAL at 08:24

## 2023-08-18 RX ADMIN — TRAMADOL HYDROCHLORIDE 50 MG: 50 TABLET ORAL at 21:20

## 2023-08-18 RX ADMIN — Medication 8 UNITS: at 07:32

## 2023-08-18 RX ADMIN — Medication 4 UNITS: at 08:23

## 2023-08-18 ASSESSMENT — PAIN DESCRIPTION - LOCATION
LOCATION: LEG

## 2023-08-18 ASSESSMENT — PAIN SCALES - GENERAL
PAINLEVEL_OUTOF10: 0
PAINLEVEL_OUTOF10: 10
PAINLEVEL_OUTOF10: 9
PAINLEVEL_OUTOF10: 9
PAINLEVEL_OUTOF10: 2
PAINLEVEL_OUTOF10: 5
PAINLEVEL_OUTOF10: 3
PAINLEVEL_OUTOF10: 4
PAINLEVEL_OUTOF10: 0
PAINLEVEL_OUTOF10: 3
PAINLEVEL_OUTOF10: 7

## 2023-08-18 ASSESSMENT — PAIN - FUNCTIONAL ASSESSMENT
PAIN_FUNCTIONAL_ASSESSMENT: PREVENTS OR INTERFERES WITH MANY ACTIVE NOT PASSIVE ACTIVITIES
PAIN_FUNCTIONAL_ASSESSMENT: PREVENTS OR INTERFERES SOME ACTIVE ACTIVITIES AND ADLS

## 2023-08-18 ASSESSMENT — PAIN DESCRIPTION - ORIENTATION
ORIENTATION: LEFT;RIGHT
ORIENTATION: RIGHT;LEFT
ORIENTATION: LEFT;RIGHT
ORIENTATION: RIGHT;LEFT
ORIENTATION: LEFT;RIGHT

## 2023-08-18 ASSESSMENT — PAIN DESCRIPTION - FREQUENCY
FREQUENCY: CONTINUOUS
FREQUENCY: CONTINUOUS

## 2023-08-18 ASSESSMENT — PAIN DESCRIPTION - PAIN TYPE: TYPE: CHRONIC PAIN

## 2023-08-18 ASSESSMENT — PAIN DESCRIPTION - DESCRIPTORS
DESCRIPTORS: BURNING
DESCRIPTORS: ACHING
DESCRIPTORS: BURNING

## 2023-08-18 ASSESSMENT — PAIN DESCRIPTION - ONSET
ONSET: ON-GOING

## 2023-08-18 NOTE — WOUND CARE
Wound Care Note:     Wound care follow up for bilateral lower legs. Chart shows:  Admitted for edema of right upper arm  Past Medical History:   Diagnosis Date    Congestive heart failure ([address])     Coronary artery disease     Diabetes (HCC)     Heart failure (HCC)     CHF, cardiomyopathy    Hypertension     ICD (implantable cardioverter-defibrillator) in place     left upper chest    PAD (peripheral artery disease) ([address])      WBC = 10.5 on 8/18/23  Admitted from physicians office    Assessment:   Patient is A&O x 4, communicative, continent with no assistance needed in repositioning. Bed: [facility]  Diet: Adult diet regular 4 carb choices with nutritional supplements  Patient pre-medicated for pain by RN. Bilateral heels, buttocks, and sacral skin intact and without erythema. Palpable DP pulses bilaterally. 1. POA bilateral lower leg calciphylaxis wounds seem to be improving, wounds are very painful to patient, moderate amount of serous drainage and foul odor noted on dressing prior to removal, no odor after removal of dressings. Cleansed with VASHE, allowed gauze to stay on wounds for about 10 minutes, Optifoam Gentle NB AG applied to open wounds, ABD pads applied to entire lower leg for comfort, wrapped in thin Kerlix and then roll gauze at request of patient. Patient discouraged today, he was up in walking then BP bottomed out and he could not receive pain medication. Advised him to keep looking at the bright side, he was able to walk today. Last time I saw him, he could not even stand.          Right lateral/posterior lower leg       Left medial lower leg       Left lateral lower leg    Patient repositioned supine      Recommendations:    Bilateral lower legs- Every other day and as needed for breakthrough drainage, gently remove dressings, use normal saline to assist with removing any dressing that is sticking, moisten small roll

## 2023-08-19 LAB
ALBUMIN SERPL-MCNC: 2.3 G/DL (ref 3.5–5)
ALBUMIN/GLOB SERPL: 0.6 (ref 1.1–2.2)
ALP SERPL-CCNC: 151 U/L (ref 45–117)
ALT SERPL-CCNC: 13 U/L (ref 12–78)
ANION GAP SERPL CALC-SCNC: 10 MMOL/L (ref 5–15)
AST SERPL-CCNC: 15 U/L (ref 15–37)
BILIRUB DIRECT SERPL-MCNC: 0.7 MG/DL (ref 0–0.2)
BILIRUB SERPL-MCNC: 1.1 MG/DL (ref 0.2–1)
BUN SERPL-MCNC: 29 MG/DL (ref 6–20)
BUN/CREAT SERPL: 12 (ref 12–20)
CALCIUM SERPL-MCNC: 9.2 MG/DL (ref 8.5–10.1)
CHLORIDE SERPL-SCNC: 107 MMOL/L (ref 97–108)
CO2 SERPL-SCNC: 25 MMOL/L (ref 21–32)
COMMENT:: NORMAL
CREAT SERPL-MCNC: 2.35 MG/DL (ref 0.7–1.3)
GLOBULIN SER CALC-MCNC: 3.7 G/DL (ref 2–4)
GLUCOSE BLD STRIP.AUTO-MCNC: 118 MG/DL (ref 65–117)
GLUCOSE BLD STRIP.AUTO-MCNC: 130 MG/DL (ref 65–117)
GLUCOSE BLD STRIP.AUTO-MCNC: 152 MG/DL (ref 65–117)
GLUCOSE BLD STRIP.AUTO-MCNC: 193 MG/DL (ref 65–117)
GLUCOSE BLD STRIP.AUTO-MCNC: 201 MG/DL (ref 65–117)
GLUCOSE SERPL-MCNC: 118 MG/DL (ref 65–100)
NT PRO BNP: 9730 PG/ML
POTASSIUM SERPL-SCNC: 5.3 MMOL/L (ref 3.5–5.1)
PROT SERPL-MCNC: 6 G/DL (ref 6.4–8.2)
SERVICE CMNT-IMP: ABNORMAL
SODIUM SERPL-SCNC: 142 MMOL/L (ref 136–145)
SPECIMEN HOLD: NORMAL

## 2023-08-19 PROCEDURE — 83880 ASSAY OF NATRIURETIC PEPTIDE: CPT

## 2023-08-19 PROCEDURE — 99233 SBSQ HOSP IP/OBS HIGH 50: CPT | Performed by: INTERNAL MEDICINE

## 2023-08-19 PROCEDURE — 82962 GLUCOSE BLOOD TEST: CPT

## 2023-08-19 PROCEDURE — 6370000000 HC RX 637 (ALT 250 FOR IP): Performed by: NURSE PRACTITIONER

## 2023-08-19 PROCEDURE — 80048 BASIC METABOLIC PNL TOTAL CA: CPT

## 2023-08-19 PROCEDURE — 6370000000 HC RX 637 (ALT 250 FOR IP): Performed by: INTERNAL MEDICINE

## 2023-08-19 PROCEDURE — 2060000000 HC ICU INTERMEDIATE R&B

## 2023-08-19 PROCEDURE — 80076 HEPATIC FUNCTION PANEL: CPT

## 2023-08-19 PROCEDURE — 2580000003 HC RX 258: Performed by: HOSPITALIST

## 2023-08-19 PROCEDURE — 6370000000 HC RX 637 (ALT 250 FOR IP): Performed by: FAMILY MEDICINE

## 2023-08-19 PROCEDURE — 36415 COLL VENOUS BLD VENIPUNCTURE: CPT

## 2023-08-19 PROCEDURE — 6370000000 HC RX 637 (ALT 250 FOR IP)

## 2023-08-19 RX ORDER — HYDRALAZINE HYDROCHLORIDE 50 MG/1
50 TABLET, FILM COATED ORAL 3 TIMES DAILY
Status: DISCONTINUED | OUTPATIENT
Start: 2023-08-19 | End: 2023-08-24

## 2023-08-19 RX ORDER — CARVEDILOL 12.5 MG/1
25 TABLET ORAL 2 TIMES DAILY
Status: DISCONTINUED | OUTPATIENT
Start: 2023-08-19 | End: 2023-08-20

## 2023-08-19 RX ADMIN — CARVEDILOL 25 MG: 12.5 TABLET, FILM COATED ORAL at 22:30

## 2023-08-19 RX ADMIN — Medication 10 ML: at 08:48

## 2023-08-19 RX ADMIN — HYDROMORPHONE HYDROCHLORIDE 3 MG: 2 TABLET ORAL at 12:49

## 2023-08-19 RX ADMIN — HYDRALAZINE HYDROCHLORIDE 50 MG: 50 TABLET, FILM COATED ORAL at 12:49

## 2023-08-19 RX ADMIN — HYDRALAZINE HYDROCHLORIDE 50 MG: 50 TABLET, FILM COATED ORAL at 22:31

## 2023-08-19 RX ADMIN — APIXABAN 2.5 MG: 2.5 TABLET, FILM COATED ORAL at 22:31

## 2023-08-19 RX ADMIN — Medication 8 UNITS: at 16:00

## 2023-08-19 RX ADMIN — Medication 10 ML: at 22:31

## 2023-08-19 RX ADMIN — ISOSORBIDE DINITRATE 40 MG: 20 TABLET ORAL at 12:49

## 2023-08-19 RX ADMIN — APIXABAN 2.5 MG: 2.5 TABLET, FILM COATED ORAL at 08:47

## 2023-08-19 RX ADMIN — SENNOSIDES AND DOCUSATE SODIUM 1 TABLET: 50; 8.6 TABLET ORAL at 08:47

## 2023-08-19 RX ADMIN — HYDROMORPHONE HYDROCHLORIDE 3 MG: 2 TABLET ORAL at 02:10

## 2023-08-19 RX ADMIN — ISOSORBIDE DINITRATE 40 MG: 20 TABLET ORAL at 08:47

## 2023-08-19 RX ADMIN — Medication 4 UNITS: at 08:47

## 2023-08-19 RX ADMIN — HYDROMORPHONE HYDROCHLORIDE 3 MG: 2 TABLET ORAL at 22:38

## 2023-08-19 RX ADMIN — TRAMADOL HYDROCHLORIDE 50 MG: 50 TABLET ORAL at 08:47

## 2023-08-19 RX ADMIN — ISOSORBIDE DINITRATE 40 MG: 20 TABLET ORAL at 22:31

## 2023-08-19 RX ADMIN — PREGABALIN 50 MG: 25 CAPSULE ORAL at 08:47

## 2023-08-19 RX ADMIN — Medication 8 UNITS: at 08:48

## 2023-08-19 RX ADMIN — PREGABALIN 50 MG: 25 CAPSULE ORAL at 22:31

## 2023-08-19 RX ADMIN — Medication 4 UNITS: at 12:52

## 2023-08-19 RX ADMIN — HYDROMORPHONE HYDROCHLORIDE 3 MG: 2 TABLET ORAL at 07:09

## 2023-08-19 ASSESSMENT — PAIN SCALES - GENERAL
PAINLEVEL_OUTOF10: 5
PAINLEVEL_OUTOF10: 8
PAINLEVEL_OUTOF10: 8
PAINLEVEL_OUTOF10: 6
PAINLEVEL_OUTOF10: 8
PAINLEVEL_OUTOF10: 3

## 2023-08-19 ASSESSMENT — PAIN DESCRIPTION - LOCATION
LOCATION: LEG

## 2023-08-19 ASSESSMENT — PAIN DESCRIPTION - DESCRIPTORS
DESCRIPTORS: ACHING

## 2023-08-19 ASSESSMENT — PAIN DESCRIPTION - ORIENTATION
ORIENTATION: RIGHT;LEFT

## 2023-08-20 LAB
ALBUMIN SERPL-MCNC: 2.5 G/DL (ref 3.5–5)
ALBUMIN/GLOB SERPL: 0.6 (ref 1.1–2.2)
ALP SERPL-CCNC: 154 U/L (ref 45–117)
ALT SERPL-CCNC: 11 U/L (ref 12–78)
ANION GAP SERPL CALC-SCNC: 6 MMOL/L (ref 5–15)
AST SERPL-CCNC: 17 U/L (ref 15–37)
BASOPHILS # BLD: 0.1 K/UL (ref 0–0.1)
BASOPHILS NFR BLD: 1 % (ref 0–1)
BILIRUB DIRECT SERPL-MCNC: 0.7 MG/DL (ref 0–0.2)
BILIRUB SERPL-MCNC: 1.2 MG/DL (ref 0.2–1)
BUN SERPL-MCNC: 29 MG/DL (ref 6–20)
BUN/CREAT SERPL: 13 (ref 12–20)
CALCIUM SERPL-MCNC: 9.4 MG/DL (ref 8.5–10.1)
CHLORIDE SERPL-SCNC: 108 MMOL/L (ref 97–108)
CO2 SERPL-SCNC: 25 MMOL/L (ref 21–32)
CREAT SERPL-MCNC: 2.25 MG/DL (ref 0.7–1.3)
DIFFERENTIAL METHOD BLD: ABNORMAL
EOSINOPHIL # BLD: 0.2 K/UL (ref 0–0.4)
EOSINOPHIL NFR BLD: 2 % (ref 0–7)
ERYTHROCYTE [DISTWIDTH] IN BLOOD BY AUTOMATED COUNT: 19.1 % (ref 11.5–14.5)
GLOBULIN SER CALC-MCNC: 4.5 G/DL (ref 2–4)
GLUCOSE BLD STRIP.AUTO-MCNC: 104 MG/DL (ref 65–117)
GLUCOSE BLD STRIP.AUTO-MCNC: 136 MG/DL (ref 65–117)
GLUCOSE BLD STRIP.AUTO-MCNC: 158 MG/DL (ref 65–117)
GLUCOSE BLD STRIP.AUTO-MCNC: 184 MG/DL (ref 65–117)
GLUCOSE SERPL-MCNC: 182 MG/DL (ref 65–100)
HCT VFR BLD AUTO: 34.6 % (ref 36.6–50.3)
HGB BLD-MCNC: 10.4 G/DL (ref 12.1–17)
IMM GRANULOCYTES # BLD AUTO: 0 K/UL (ref 0–0.04)
IMM GRANULOCYTES NFR BLD AUTO: 0 % (ref 0–0.5)
LYMPHOCYTES # BLD: 1.4 K/UL (ref 0.8–3.5)
LYMPHOCYTES NFR BLD: 15 % (ref 12–49)
MCH RBC QN AUTO: 26.3 PG (ref 26–34)
MCHC RBC AUTO-ENTMCNC: 30.1 G/DL (ref 30–36.5)
MCV RBC AUTO: 87.4 FL (ref 80–99)
MONOCYTES # BLD: 1.1 K/UL (ref 0–1)
MONOCYTES NFR BLD: 12 % (ref 5–13)
NEUTS SEG # BLD: 6.4 K/UL (ref 1.8–8)
NEUTS SEG NFR BLD: 70 % (ref 32–75)
NRBC # BLD: 0 K/UL (ref 0–0.01)
NRBC BLD-RTO: 0 PER 100 WBC
NT PRO BNP: ABNORMAL PG/ML
PLATELET # BLD AUTO: 243 K/UL (ref 150–400)
PMV BLD AUTO: 12 FL (ref 8.9–12.9)
POTASSIUM SERPL-SCNC: 4.8 MMOL/L (ref 3.5–5.1)
PROT SERPL-MCNC: 7 G/DL (ref 6.4–8.2)
RBC # BLD AUTO: 3.96 M/UL (ref 4.1–5.7)
SERVICE CMNT-IMP: ABNORMAL
SERVICE CMNT-IMP: NORMAL
SODIUM SERPL-SCNC: 139 MMOL/L (ref 136–145)
WBC # BLD AUTO: 9.1 K/UL (ref 4.1–11.1)

## 2023-08-20 PROCEDURE — 6370000000 HC RX 637 (ALT 250 FOR IP): Performed by: NURSE PRACTITIONER

## 2023-08-20 PROCEDURE — 80048 BASIC METABOLIC PNL TOTAL CA: CPT

## 2023-08-20 PROCEDURE — 83880 ASSAY OF NATRIURETIC PEPTIDE: CPT

## 2023-08-20 PROCEDURE — 6370000000 HC RX 637 (ALT 250 FOR IP)

## 2023-08-20 PROCEDURE — 6370000000 HC RX 637 (ALT 250 FOR IP): Performed by: INTERNAL MEDICINE

## 2023-08-20 PROCEDURE — 85025 COMPLETE CBC W/AUTO DIFF WBC: CPT

## 2023-08-20 PROCEDURE — 2580000003 HC RX 258: Performed by: HOSPITALIST

## 2023-08-20 PROCEDURE — 82962 GLUCOSE BLOOD TEST: CPT

## 2023-08-20 PROCEDURE — 80076 HEPATIC FUNCTION PANEL: CPT

## 2023-08-20 PROCEDURE — 2060000000 HC ICU INTERMEDIATE R&B

## 2023-08-20 PROCEDURE — 36415 COLL VENOUS BLD VENIPUNCTURE: CPT

## 2023-08-20 PROCEDURE — 6370000000 HC RX 637 (ALT 250 FOR IP): Performed by: FAMILY MEDICINE

## 2023-08-20 PROCEDURE — 99233 SBSQ HOSP IP/OBS HIGH 50: CPT | Performed by: INTERNAL MEDICINE

## 2023-08-20 RX ORDER — CARVEDILOL 12.5 MG/1
50 TABLET ORAL 2 TIMES DAILY
Status: DISCONTINUED | OUTPATIENT
Start: 2023-08-20 | End: 2023-08-25

## 2023-08-20 RX ADMIN — HYDRALAZINE HYDROCHLORIDE 50 MG: 50 TABLET, FILM COATED ORAL at 21:18

## 2023-08-20 RX ADMIN — HYDROMORPHONE HYDROCHLORIDE 3 MG: 2 TABLET ORAL at 23:57

## 2023-08-20 RX ADMIN — PREGABALIN 50 MG: 25 CAPSULE ORAL at 08:43

## 2023-08-20 RX ADMIN — HYDRALAZINE HYDROCHLORIDE 50 MG: 50 TABLET, FILM COATED ORAL at 13:06

## 2023-08-20 RX ADMIN — ISOSORBIDE DINITRATE 40 MG: 20 TABLET ORAL at 13:06

## 2023-08-20 RX ADMIN — PREGABALIN 50 MG: 25 CAPSULE ORAL at 21:18

## 2023-08-20 RX ADMIN — Medication 4 UNITS: at 08:44

## 2023-08-20 RX ADMIN — HYDRALAZINE HYDROCHLORIDE 50 MG: 50 TABLET, FILM COATED ORAL at 08:43

## 2023-08-20 RX ADMIN — Medication 4 UNITS: at 13:06

## 2023-08-20 RX ADMIN — Medication 10 ML: at 08:44

## 2023-08-20 RX ADMIN — SENNOSIDES AND DOCUSATE SODIUM 1 TABLET: 50; 8.6 TABLET ORAL at 08:43

## 2023-08-20 RX ADMIN — HYDROMORPHONE HYDROCHLORIDE 3 MG: 2 TABLET ORAL at 04:34

## 2023-08-20 RX ADMIN — CARVEDILOL 50 MG: 12.5 TABLET, FILM COATED ORAL at 21:18

## 2023-08-20 RX ADMIN — ISOSORBIDE DINITRATE 40 MG: 20 TABLET ORAL at 08:44

## 2023-08-20 RX ADMIN — CARVEDILOL 50 MG: 12.5 TABLET, FILM COATED ORAL at 08:43

## 2023-08-20 RX ADMIN — HYDROMORPHONE HYDROCHLORIDE 3 MG: 2 TABLET ORAL at 08:42

## 2023-08-20 RX ADMIN — Medication 8 UNITS: at 17:31

## 2023-08-20 RX ADMIN — APIXABAN 2.5 MG: 2.5 TABLET, FILM COATED ORAL at 21:18

## 2023-08-20 RX ADMIN — TRAMADOL HYDROCHLORIDE 50 MG: 50 TABLET ORAL at 08:43

## 2023-08-20 RX ADMIN — Medication 10 ML: at 21:20

## 2023-08-20 RX ADMIN — Medication 8 UNITS: at 07:44

## 2023-08-20 RX ADMIN — Medication 4 UNITS: at 17:31

## 2023-08-20 RX ADMIN — APIXABAN 2.5 MG: 2.5 TABLET, FILM COATED ORAL at 08:43

## 2023-08-20 RX ADMIN — TRAMADOL HYDROCHLORIDE 50 MG: 50 TABLET ORAL at 21:18

## 2023-08-20 RX ADMIN — ISOSORBIDE DINITRATE 40 MG: 20 TABLET ORAL at 21:18

## 2023-08-20 RX ADMIN — HYDROMORPHONE HYDROCHLORIDE 3 MG: 2 TABLET ORAL at 17:30

## 2023-08-20 ASSESSMENT — PAIN SCALES - GENERAL
PAINLEVEL_OUTOF10: 6
PAINLEVEL_OUTOF10: 7
PAINLEVEL_OUTOF10: 8
PAINLEVEL_OUTOF10: 7
PAINLEVEL_OUTOF10: 5

## 2023-08-20 ASSESSMENT — PAIN DESCRIPTION - DESCRIPTORS
DESCRIPTORS: BURNING
DESCRIPTORS: ACHING
DESCRIPTORS: BURNING
DESCRIPTORS: ACHING

## 2023-08-20 ASSESSMENT — PAIN DESCRIPTION - ORIENTATION
ORIENTATION: RIGHT;LEFT;LOWER
ORIENTATION: LEFT;RIGHT
ORIENTATION: RIGHT;LEFT;LOWER
ORIENTATION: RIGHT;LEFT

## 2023-08-20 ASSESSMENT — PAIN DESCRIPTION - LOCATION
LOCATION: LEG

## 2023-08-21 LAB
ALBUMIN SERPL-MCNC: 2.1 G/DL (ref 3.5–5)
ALBUMIN/GLOB SERPL: 0.4 (ref 1.1–2.2)
ALP SERPL-CCNC: 128 U/L (ref 45–117)
ALT SERPL-CCNC: ABNORMAL U/L (ref 12–78)
ANION GAP SERPL CALC-SCNC: 2 MMOL/L (ref 5–15)
AST SERPL-CCNC: ABNORMAL U/L (ref 15–37)
BASOPHILS # BLD: 0.1 K/UL (ref 0–0.1)
BASOPHILS NFR BLD: 1 % (ref 0–1)
BILIRUB DIRECT SERPL-MCNC: <0.1 MG/DL (ref 0–0.2)
BILIRUB SERPL-MCNC: <0.1 MG/DL (ref 0.2–1)
BUN SERPL-MCNC: 34 MG/DL (ref 6–20)
BUN/CREAT SERPL: 16 (ref 12–20)
CALCIUM SERPL-MCNC: 9.1 MG/DL (ref 8.5–10.1)
CHLORIDE SERPL-SCNC: 105 MMOL/L (ref 97–108)
CO2 SERPL-SCNC: 24 MMOL/L (ref 21–32)
CREAT SERPL-MCNC: 2.12 MG/DL (ref 0.7–1.3)
DIFFERENTIAL METHOD BLD: ABNORMAL
EOSINOPHIL # BLD: 0.2 K/UL (ref 0–0.4)
EOSINOPHIL NFR BLD: 2 % (ref 0–7)
ERYTHROCYTE [DISTWIDTH] IN BLOOD BY AUTOMATED COUNT: 19.8 % (ref 11.5–14.5)
GLOBULIN SER CALC-MCNC: 5.8 G/DL (ref 2–4)
GLUCOSE BLD STRIP.AUTO-MCNC: 137 MG/DL (ref 65–117)
GLUCOSE BLD STRIP.AUTO-MCNC: 161 MG/DL (ref 65–117)
GLUCOSE BLD STRIP.AUTO-MCNC: 200 MG/DL (ref 65–117)
GLUCOSE BLD STRIP.AUTO-MCNC: 228 MG/DL (ref 65–117)
GLUCOSE SERPL-MCNC: 150 MG/DL (ref 65–100)
HCT VFR BLD AUTO: 32.3 % (ref 36.6–50.3)
HGB BLD-MCNC: 9.5 G/DL (ref 12.1–17)
IMM GRANULOCYTES # BLD AUTO: 0.1 K/UL (ref 0–0.04)
IMM GRANULOCYTES NFR BLD AUTO: 1 % (ref 0–0.5)
LYMPHOCYTES # BLD: 1.3 K/UL (ref 0.8–3.5)
LYMPHOCYTES NFR BLD: 16 % (ref 12–49)
MCH RBC QN AUTO: 26.1 PG (ref 26–34)
MCHC RBC AUTO-ENTMCNC: 29.4 G/DL (ref 30–36.5)
MCV RBC AUTO: 88.7 FL (ref 80–99)
MONOCYTES # BLD: 1.2 K/UL (ref 0–1)
MONOCYTES NFR BLD: 15 % (ref 5–13)
NEUTS SEG # BLD: 5.3 K/UL (ref 1.8–8)
NEUTS SEG NFR BLD: 66 % (ref 32–75)
NRBC # BLD: 0 K/UL (ref 0–0.01)
NRBC BLD-RTO: 0 PER 100 WBC
NT PRO BNP: ABNORMAL PG/ML
PLATELET # BLD AUTO: 242 K/UL (ref 150–400)
POTASSIUM SERPL-SCNC: ABNORMAL MMOL/L (ref 3.5–5.1)
PROT SERPL-MCNC: 7.9 G/DL (ref 6.4–8.2)
RBC # BLD AUTO: 3.64 M/UL (ref 4.1–5.7)
SERVICE CMNT-IMP: ABNORMAL
SODIUM SERPL-SCNC: 131 MMOL/L (ref 136–145)
WBC # BLD AUTO: 8 K/UL (ref 4.1–11.1)

## 2023-08-21 PROCEDURE — 2580000003 HC RX 258: Performed by: HOSPITALIST

## 2023-08-21 PROCEDURE — 2060000000 HC ICU INTERMEDIATE R&B

## 2023-08-21 PROCEDURE — 99232 SBSQ HOSP IP/OBS MODERATE 35: CPT | Performed by: NURSE PRACTITIONER

## 2023-08-21 PROCEDURE — 82962 GLUCOSE BLOOD TEST: CPT

## 2023-08-21 PROCEDURE — 80048 BASIC METABOLIC PNL TOTAL CA: CPT

## 2023-08-21 PROCEDURE — 6370000000 HC RX 637 (ALT 250 FOR IP): Performed by: INTERNAL MEDICINE

## 2023-08-21 PROCEDURE — 2580000003 HC RX 258: Performed by: INTERNAL MEDICINE

## 2023-08-21 PROCEDURE — 6370000000 HC RX 637 (ALT 250 FOR IP): Performed by: HOSPITALIST

## 2023-08-21 PROCEDURE — 97530 THERAPEUTIC ACTIVITIES: CPT

## 2023-08-21 PROCEDURE — 85025 COMPLETE CBC W/AUTO DIFF WBC: CPT

## 2023-08-21 PROCEDURE — 6360000002 HC RX W HCPCS: Performed by: FAMILY MEDICINE

## 2023-08-21 PROCEDURE — 94760 N-INVAS EAR/PLS OXIMETRY 1: CPT

## 2023-08-21 PROCEDURE — 6360000002 HC RX W HCPCS: Performed by: INTERNAL MEDICINE

## 2023-08-21 PROCEDURE — 6370000000 HC RX 637 (ALT 250 FOR IP): Performed by: NURSE PRACTITIONER

## 2023-08-21 PROCEDURE — 83880 ASSAY OF NATRIURETIC PEPTIDE: CPT

## 2023-08-21 PROCEDURE — 6370000000 HC RX 637 (ALT 250 FOR IP): Performed by: FAMILY MEDICINE

## 2023-08-21 PROCEDURE — 36415 COLL VENOUS BLD VENIPUNCTURE: CPT

## 2023-08-21 PROCEDURE — 6370000000 HC RX 637 (ALT 250 FOR IP)

## 2023-08-21 PROCEDURE — 80076 HEPATIC FUNCTION PANEL: CPT

## 2023-08-21 RX ORDER — TORSEMIDE 20 MG/1
60 TABLET ORAL 2 TIMES DAILY
Status: DISCONTINUED | OUTPATIENT
Start: 2023-08-21 | End: 2023-08-25

## 2023-08-21 RX ORDER — TORSEMIDE 20 MG/1
80 TABLET ORAL 2 TIMES DAILY
Status: DISCONTINUED | OUTPATIENT
Start: 2023-08-21 | End: 2023-08-21

## 2023-08-21 RX ADMIN — ISOSORBIDE DINITRATE 40 MG: 20 TABLET ORAL at 14:35

## 2023-08-21 RX ADMIN — TORSEMIDE 60 MG: 20 TABLET ORAL at 09:58

## 2023-08-21 RX ADMIN — HYDRALAZINE HYDROCHLORIDE 50 MG: 50 TABLET, FILM COATED ORAL at 21:37

## 2023-08-21 RX ADMIN — TRAMADOL HYDROCHLORIDE 50 MG: 50 TABLET ORAL at 09:58

## 2023-08-21 RX ADMIN — APIXABAN 2.5 MG: 2.5 TABLET, FILM COATED ORAL at 09:58

## 2023-08-21 RX ADMIN — PREGABALIN 50 MG: 25 CAPSULE ORAL at 09:58

## 2023-08-21 RX ADMIN — CARVEDILOL 50 MG: 12.5 TABLET, FILM COATED ORAL at 09:58

## 2023-08-21 RX ADMIN — HYDROMORPHONE HYDROCHLORIDE 3 MG: 2 TABLET ORAL at 16:22

## 2023-08-21 RX ADMIN — ISOSORBIDE DINITRATE 40 MG: 20 TABLET ORAL at 21:37

## 2023-08-21 RX ADMIN — OXYCODONE HYDROCHLORIDE 10 MG: 5 TABLET ORAL at 11:43

## 2023-08-21 RX ADMIN — TORSEMIDE 60 MG: 20 TABLET ORAL at 21:37

## 2023-08-21 RX ADMIN — PREGABALIN 50 MG: 25 CAPSULE ORAL at 21:37

## 2023-08-21 RX ADMIN — Medication 1 UNITS: at 17:36

## 2023-08-21 RX ADMIN — TRAMADOL HYDROCHLORIDE 50 MG: 50 TABLET ORAL at 21:37

## 2023-08-21 RX ADMIN — HYDROMORPHONE HYDROCHLORIDE 3 MG: 2 TABLET ORAL at 21:36

## 2023-08-21 RX ADMIN — APIXABAN 2.5 MG: 2.5 TABLET, FILM COATED ORAL at 21:38

## 2023-08-21 RX ADMIN — SODIUM THIOSULFATE 25 G: 250 INJECTION, SOLUTION INTRAVENOUS at 10:07

## 2023-08-21 RX ADMIN — Medication 4 UNITS: at 17:36

## 2023-08-21 RX ADMIN — Medication 10 ML: at 10:00

## 2023-08-21 RX ADMIN — Medication 10 ML: at 21:40

## 2023-08-21 RX ADMIN — ERYTHROPOIETIN 10000 UNITS: 10000 INJECTION, SOLUTION INTRAVENOUS; SUBCUTANEOUS at 11:50

## 2023-08-21 RX ADMIN — CARVEDILOL 50 MG: 12.5 TABLET, FILM COATED ORAL at 21:37

## 2023-08-21 RX ADMIN — Medication 8 UNITS: at 16:22

## 2023-08-21 RX ADMIN — HYDRALAZINE HYDROCHLORIDE 50 MG: 50 TABLET, FILM COATED ORAL at 14:35

## 2023-08-21 RX ADMIN — HYDRALAZINE HYDROCHLORIDE 50 MG: 50 TABLET, FILM COATED ORAL at 09:59

## 2023-08-21 RX ADMIN — Medication 1 UNITS: at 11:51

## 2023-08-21 RX ADMIN — ISOSORBIDE DINITRATE 40 MG: 20 TABLET ORAL at 10:01

## 2023-08-21 RX ADMIN — Medication 8 UNITS: at 08:14

## 2023-08-21 ASSESSMENT — PAIN DESCRIPTION - ORIENTATION
ORIENTATION: RIGHT;LEFT
ORIENTATION: RIGHT
ORIENTATION: RIGHT;LEFT
ORIENTATION: RIGHT
ORIENTATION: LOWER
ORIENTATION: RIGHT

## 2023-08-21 ASSESSMENT — PAIN SCALES - GENERAL
PAINLEVEL_OUTOF10: 5
PAINLEVEL_OUTOF10: 8
PAINLEVEL_OUTOF10: 8
PAINLEVEL_OUTOF10: 9
PAINLEVEL_OUTOF10: 9
PAINLEVEL_OUTOF10: 3
PAINLEVEL_OUTOF10: 9
PAINLEVEL_OUTOF10: 4
PAINLEVEL_OUTOF10: 0
PAINLEVEL_OUTOF10: 8

## 2023-08-21 ASSESSMENT — PAIN DESCRIPTION - LOCATION
LOCATION: LEG

## 2023-08-21 ASSESSMENT — PAIN DESCRIPTION - DESCRIPTORS
DESCRIPTORS: SHOOTING;SHARP
DESCRIPTORS: BURNING;ACHING
DESCRIPTORS: SHARP
DESCRIPTORS: ACHING
DESCRIPTORS: SHARP
DESCRIPTORS: SHARP

## 2023-08-21 ASSESSMENT — ENCOUNTER SYMPTOMS
NAUSEA: 0
COUGH: 0
SHORTNESS OF BREATH: 0
ABDOMINAL DISTENTION: 0

## 2023-08-21 ASSESSMENT — PAIN DESCRIPTION - FREQUENCY: FREQUENCY: CONTINUOUS

## 2023-08-21 ASSESSMENT — PAIN DESCRIPTION - ONSET: ONSET: ON-GOING

## 2023-08-21 ASSESSMENT — PAIN DESCRIPTION - PAIN TYPE: TYPE: CHRONIC PAIN

## 2023-08-21 NOTE — WOUND CARE
WOCN Note:     Reconsult for right leg wound. Previously seen by MICHAEL Dior RN CWON on 8.18.23. RN reports that right leg wound was bleeding during PT. Wound not currently bleeding. Old clot removed from wound bed. Cleansed and redressed with Optifoam NB AG as ordered. Left leg dressings changed by RN.     Transition of Care:   Plan to follow as needed while admitted to hospital.    SANJUANA PaezN RN [facility] Inpatient Wound [facility]  Available on [employer]  Office [phone]

## 2023-08-22 PROBLEM — R53.81 DEBILITY: Status: ACTIVE | Noted: 2023-08-22

## 2023-08-22 LAB
ALBUMIN SERPL-MCNC: 2.4 G/DL (ref 3.5–5)
ALBUMIN/GLOB SERPL: 0.5 (ref 1.1–2.2)
ALP SERPL-CCNC: 140 U/L (ref 45–117)
ALT SERPL-CCNC: 14 U/L (ref 12–78)
ANION GAP SERPL CALC-SCNC: 10 MMOL/L (ref 5–15)
AST SERPL-CCNC: 16 U/L (ref 15–37)
BILIRUB DIRECT SERPL-MCNC: 0.7 MG/DL (ref 0–0.2)
BILIRUB SERPL-MCNC: 1.1 MG/DL (ref 0.2–1)
BUN SERPL-MCNC: 33 MG/DL (ref 6–20)
BUN/CREAT SERPL: 15 (ref 12–20)
CALCIUM SERPL-MCNC: 9.1 MG/DL (ref 8.5–10.1)
CHLORIDE SERPL-SCNC: 105 MMOL/L (ref 97–108)
CO2 SERPL-SCNC: 24 MMOL/L (ref 21–32)
COMMENT:: NORMAL
CREAT SERPL-MCNC: 2.27 MG/DL (ref 0.7–1.3)
GLOBULIN SER CALC-MCNC: 4.7 G/DL (ref 2–4)
GLUCOSE BLD STRIP.AUTO-MCNC: 145 MG/DL (ref 65–117)
GLUCOSE BLD STRIP.AUTO-MCNC: 148 MG/DL (ref 65–117)
GLUCOSE BLD STRIP.AUTO-MCNC: 151 MG/DL (ref 65–117)
GLUCOSE BLD STRIP.AUTO-MCNC: 232 MG/DL (ref 65–117)
GLUCOSE BLD STRIP.AUTO-MCNC: 236 MG/DL (ref 65–117)
GLUCOSE SERPL-MCNC: 134 MG/DL (ref 65–100)
NT PRO BNP: ABNORMAL PG/ML
POTASSIUM SERPL-SCNC: 3.9 MMOL/L (ref 3.5–5.1)
PROT SERPL-MCNC: 7.1 G/DL (ref 6.4–8.2)
SERVICE CMNT-IMP: ABNORMAL
SODIUM SERPL-SCNC: 139 MMOL/L (ref 136–145)
SPECIMEN HOLD: NORMAL

## 2023-08-22 PROCEDURE — 99232 SBSQ HOSP IP/OBS MODERATE 35: CPT | Performed by: INTERNAL MEDICINE

## 2023-08-22 PROCEDURE — 99233 SBSQ HOSP IP/OBS HIGH 50: CPT | Performed by: NURSE PRACTITIONER

## 2023-08-22 PROCEDURE — 97535 SELF CARE MNGMENT TRAINING: CPT

## 2023-08-22 PROCEDURE — 2060000000 HC ICU INTERMEDIATE R&B

## 2023-08-22 PROCEDURE — 97530 THERAPEUTIC ACTIVITIES: CPT

## 2023-08-22 PROCEDURE — 6370000000 HC RX 637 (ALT 250 FOR IP)

## 2023-08-22 PROCEDURE — 82962 GLUCOSE BLOOD TEST: CPT

## 2023-08-22 PROCEDURE — 6370000000 HC RX 637 (ALT 250 FOR IP): Performed by: NURSE PRACTITIONER

## 2023-08-22 PROCEDURE — 6370000000 HC RX 637 (ALT 250 FOR IP): Performed by: INTERNAL MEDICINE

## 2023-08-22 PROCEDURE — 99231 SBSQ HOSP IP/OBS SF/LOW 25: CPT

## 2023-08-22 PROCEDURE — 83880 ASSAY OF NATRIURETIC PEPTIDE: CPT

## 2023-08-22 PROCEDURE — 2580000003 HC RX 258: Performed by: HOSPITALIST

## 2023-08-22 PROCEDURE — 80076 HEPATIC FUNCTION PANEL: CPT

## 2023-08-22 PROCEDURE — 80048 BASIC METABOLIC PNL TOTAL CA: CPT

## 2023-08-22 PROCEDURE — 6370000000 HC RX 637 (ALT 250 FOR IP): Performed by: HOSPITALIST

## 2023-08-22 PROCEDURE — 6360000002 HC RX W HCPCS: Performed by: NURSE PRACTITIONER

## 2023-08-22 PROCEDURE — 94760 N-INVAS EAR/PLS OXIMETRY 1: CPT

## 2023-08-22 PROCEDURE — 36415 COLL VENOUS BLD VENIPUNCTURE: CPT

## 2023-08-22 PROCEDURE — 6370000000 HC RX 637 (ALT 250 FOR IP): Performed by: FAMILY MEDICINE

## 2023-08-22 PROCEDURE — P9045 ALBUMIN (HUMAN), 5%, 250 ML: HCPCS | Performed by: NURSE PRACTITIONER

## 2023-08-22 RX ORDER — ALBUMIN, HUMAN INJ 5% 5 %
12.5 SOLUTION INTRAVENOUS ONCE
Status: COMPLETED | OUTPATIENT
Start: 2023-08-22 | End: 2023-08-22

## 2023-08-22 RX ORDER — FENTANYL 50 UG/1
1 PATCH TRANSDERMAL
Status: DISCONTINUED | OUTPATIENT
Start: 2023-08-22 | End: 2023-08-22

## 2023-08-22 RX ORDER — FENTANYL 25 UG/1
1 PATCH TRANSDERMAL
Status: DISCONTINUED | OUTPATIENT
Start: 2023-08-22 | End: 2023-08-29

## 2023-08-22 RX ADMIN — Medication 10 ML: at 20:43

## 2023-08-22 RX ADMIN — APIXABAN 2.5 MG: 2.5 TABLET, FILM COATED ORAL at 09:50

## 2023-08-22 RX ADMIN — Medication 10 ML: at 09:50

## 2023-08-22 RX ADMIN — APIXABAN 2.5 MG: 2.5 TABLET, FILM COATED ORAL at 20:42

## 2023-08-22 RX ADMIN — CARVEDILOL 50 MG: 12.5 TABLET, FILM COATED ORAL at 20:51

## 2023-08-22 RX ADMIN — CARVEDILOL 50 MG: 12.5 TABLET, FILM COATED ORAL at 09:49

## 2023-08-22 RX ADMIN — Medication 4 UNITS: at 12:57

## 2023-08-22 RX ADMIN — ISOSORBIDE DINITRATE 40 MG: 20 TABLET ORAL at 20:51

## 2023-08-22 RX ADMIN — TORSEMIDE 60 MG: 20 TABLET ORAL at 09:49

## 2023-08-22 RX ADMIN — PREGABALIN 50 MG: 25 CAPSULE ORAL at 09:48

## 2023-08-22 RX ADMIN — OXYCODONE HYDROCHLORIDE 10 MG: 5 TABLET ORAL at 00:28

## 2023-08-22 RX ADMIN — HYDRALAZINE HYDROCHLORIDE 50 MG: 50 TABLET, FILM COATED ORAL at 09:49

## 2023-08-22 RX ADMIN — ISOSORBIDE DINITRATE 40 MG: 20 TABLET ORAL at 09:49

## 2023-08-22 RX ADMIN — Medication 8 UNITS: at 07:13

## 2023-08-22 RX ADMIN — TORSEMIDE 60 MG: 20 TABLET ORAL at 20:51

## 2023-08-22 RX ADMIN — PREGABALIN 50 MG: 25 CAPSULE ORAL at 20:42

## 2023-08-22 RX ADMIN — HYDROMORPHONE HYDROCHLORIDE 3 MG: 2 TABLET ORAL at 16:20

## 2023-08-22 RX ADMIN — SENNOSIDES AND DOCUSATE SODIUM 1 TABLET: 50; 8.6 TABLET ORAL at 09:49

## 2023-08-22 RX ADMIN — HYDRALAZINE HYDROCHLORIDE 50 MG: 50 TABLET, FILM COATED ORAL at 20:51

## 2023-08-22 RX ADMIN — HYDROMORPHONE HYDROCHLORIDE 3 MG: 2 TABLET ORAL at 09:48

## 2023-08-22 RX ADMIN — Medication 8 UNITS: at 19:36

## 2023-08-22 RX ADMIN — Medication 4 UNITS: at 19:35

## 2023-08-22 RX ADMIN — ALBUMIN (HUMAN) 12.5 G: 12.5 INJECTION, SOLUTION INTRAVENOUS at 12:57

## 2023-08-22 RX ADMIN — HYDROMORPHONE HYDROCHLORIDE 3 MG: 2 TABLET ORAL at 20:41

## 2023-08-22 RX ADMIN — Medication 1 UNITS: at 12:56

## 2023-08-22 RX ADMIN — TRAMADOL HYDROCHLORIDE 50 MG: 50 TABLET ORAL at 09:49

## 2023-08-22 ASSESSMENT — PAIN SCALES - GENERAL
PAINLEVEL_OUTOF10: 6
PAINLEVEL_OUTOF10: 9
PAINLEVEL_OUTOF10: 8
PAINLEVEL_OUTOF10: 0
PAINLEVEL_OUTOF10: 3
PAINLEVEL_OUTOF10: 4
PAINLEVEL_OUTOF10: 2
PAINLEVEL_OUTOF10: 0
PAINLEVEL_OUTOF10: 3
PAINLEVEL_OUTOF10: 3
PAINLEVEL_OUTOF10: 9
PAINLEVEL_OUTOF10: 0

## 2023-08-22 ASSESSMENT — ENCOUNTER SYMPTOMS
NAUSEA: 0
COUGH: 0
ABDOMINAL DISTENTION: 0
SHORTNESS OF BREATH: 0

## 2023-08-22 ASSESSMENT — PAIN DESCRIPTION - DESCRIPTORS
DESCRIPTORS: SHARP
DESCRIPTORS: ACHING
DESCRIPTORS: ACHING

## 2023-08-22 ASSESSMENT — PAIN DESCRIPTION - PAIN TYPE
TYPE: CHRONIC PAIN
TYPE: CHRONIC PAIN

## 2023-08-22 ASSESSMENT — PAIN DESCRIPTION - ONSET
ONSET: ON-GOING
ONSET: ON-GOING

## 2023-08-22 ASSESSMENT — PAIN DESCRIPTION - ORIENTATION
ORIENTATION: RIGHT;LEFT
ORIENTATION: RIGHT;LEFT

## 2023-08-22 ASSESSMENT — PAIN DESCRIPTION - FREQUENCY
FREQUENCY: CONTINUOUS
FREQUENCY: CONTINUOUS

## 2023-08-22 ASSESSMENT — PAIN DESCRIPTION - LOCATION
LOCATION: LEG

## 2023-08-23 DIAGNOSIS — R53.81 DEBILITY: ICD-10-CM

## 2023-08-23 DIAGNOSIS — L89.890 PRESSURE INJURY OF LEFT LEG, UNSTAGEABLE (HCC): ICD-10-CM

## 2023-08-23 DIAGNOSIS — L97.922 CALCIPHYLAXIS OF LEFT LOWER EXTREMITY WITH NONHEALING ULCER WITH FAT LAYER EXPOSED (HCC): ICD-10-CM

## 2023-08-23 DIAGNOSIS — R52 PAIN: ICD-10-CM

## 2023-08-23 DIAGNOSIS — E83.59 CALCIPHYLAXIS OF RIGHT LOWER EXTREMITY WITH NONHEALING ULCER WITH FAT LAYER EXPOSED (HCC): ICD-10-CM

## 2023-08-23 DIAGNOSIS — E83.59 CALCIPHYLAXIS: ICD-10-CM

## 2023-08-23 DIAGNOSIS — L97.912 CALCIPHYLAXIS OF RIGHT LOWER EXTREMITY WITH NONHEALING ULCER WITH FAT LAYER EXPOSED (HCC): ICD-10-CM

## 2023-08-23 DIAGNOSIS — Z51.5 PALLIATIVE CARE ENCOUNTER: ICD-10-CM

## 2023-08-23 DIAGNOSIS — E83.59 CALCIPHYLAXIS OF LEFT LOWER EXTREMITY WITH NONHEALING ULCER WITH FAT LAYER EXPOSED (HCC): ICD-10-CM

## 2023-08-23 PROBLEM — R53.1 WEAKNESS GENERALIZED: Status: RESOLVED | Noted: 2023-08-17 | Resolved: 2023-08-23

## 2023-08-23 PROBLEM — R60.0 EDEMA OF RIGHT UPPER ARM: Status: RESOLVED | Noted: 2023-08-09 | Resolved: 2023-08-23

## 2023-08-23 PROBLEM — Z71.89 ADVANCED CARE PLANNING/COUNSELING DISCUSSION: Status: ACTIVE | Noted: 2023-08-23

## 2023-08-23 PROBLEM — Z71.89 GOALS OF CARE, COUNSELING/DISCUSSION: Status: RESOLVED | Noted: 2023-08-17 | Resolved: 2023-08-23

## 2023-08-23 PROBLEM — Z71.89 DNR (DO NOT RESUSCITATE) DISCUSSION: Status: ACTIVE | Noted: 2023-08-23

## 2023-08-23 PROBLEM — R40.0 DROWSY: Status: RESOLVED | Noted: 2023-08-17 | Resolved: 2023-08-23

## 2023-08-23 LAB
ALBUMIN SERPL-MCNC: 2.5 G/DL (ref 3.5–5)
ALBUMIN SERPL-MCNC: 2.8 G/DL (ref 3.5–5)
ALBUMIN/GLOB SERPL: 0.6 (ref 1.1–2.2)
ALBUMIN/GLOB SERPL: 0.6 (ref 1.1–2.2)
ALP SERPL-CCNC: 139 U/L (ref 45–117)
ALP SERPL-CCNC: 152 U/L (ref 45–117)
ALT SERPL-CCNC: 16 U/L (ref 12–78)
ALT SERPL-CCNC: 17 U/L (ref 12–78)
ANION GAP SERPL CALC-SCNC: 4 MMOL/L (ref 5–15)
ANION GAP SERPL CALC-SCNC: 8 MMOL/L (ref 5–15)
AST SERPL-CCNC: 18 U/L (ref 15–37)
AST SERPL-CCNC: 20 U/L (ref 15–37)
BASOPHILS # BLD: 0.1 K/UL (ref 0–0.1)
BASOPHILS NFR BLD: 1 % (ref 0–1)
BILIRUB DIRECT SERPL-MCNC: 0.7 MG/DL (ref 0–0.2)
BILIRUB SERPL-MCNC: 1.2 MG/DL (ref 0.2–1)
BILIRUB SERPL-MCNC: 1.3 MG/DL (ref 0.2–1)
BUN SERPL-MCNC: 40 MG/DL (ref 6–20)
BUN SERPL-MCNC: 47 MG/DL (ref 6–20)
BUN/CREAT SERPL: 16 (ref 12–20)
BUN/CREAT SERPL: 17 (ref 12–20)
CALCIUM SERPL-MCNC: 9.2 MG/DL (ref 8.5–10.1)
CALCIUM SERPL-MCNC: 9.8 MG/DL (ref 8.5–10.1)
CHLORIDE SERPL-SCNC: 100 MMOL/L (ref 97–108)
CHLORIDE SERPL-SCNC: 105 MMOL/L (ref 97–108)
CO2 SERPL-SCNC: 25 MMOL/L (ref 21–32)
CO2 SERPL-SCNC: 29 MMOL/L (ref 21–32)
COMMENT:: NORMAL
COMMENT:: NORMAL
CREAT SERPL-MCNC: 2.39 MG/DL (ref 0.7–1.3)
CREAT SERPL-MCNC: 2.92 MG/DL (ref 0.7–1.3)
DIFFERENTIAL METHOD BLD: ABNORMAL
EOSINOPHIL # BLD: 0.1 K/UL (ref 0–0.4)
EOSINOPHIL NFR BLD: 1 % (ref 0–7)
ERYTHROCYTE [DISTWIDTH] IN BLOOD BY AUTOMATED COUNT: 18.2 % (ref 11.5–14.5)
GLOBULIN SER CALC-MCNC: 4.5 G/DL (ref 2–4)
GLOBULIN SER CALC-MCNC: 5 G/DL (ref 2–4)
GLUCOSE BLD STRIP.AUTO-MCNC: 167 MG/DL (ref 65–117)
GLUCOSE BLD STRIP.AUTO-MCNC: 177 MG/DL (ref 65–117)
GLUCOSE BLD STRIP.AUTO-MCNC: 182 MG/DL (ref 65–117)
GLUCOSE BLD STRIP.AUTO-MCNC: 183 MG/DL (ref 65–117)
GLUCOSE BLD STRIP.AUTO-MCNC: 286 MG/DL (ref 65–117)
GLUCOSE SERPL-MCNC: 144 MG/DL (ref 65–100)
GLUCOSE SERPL-MCNC: 163 MG/DL (ref 65–100)
HCT VFR BLD AUTO: 37.8 % (ref 36.6–50.3)
HGB BLD-MCNC: 11.5 G/DL (ref 12.1–17)
IMM GRANULOCYTES # BLD AUTO: 0.1 K/UL (ref 0–0.04)
IMM GRANULOCYTES NFR BLD AUTO: 1 % (ref 0–0.5)
LYMPHOCYTES # BLD: 1.6 K/UL (ref 0.8–3.5)
LYMPHOCYTES NFR BLD: 16 % (ref 12–49)
MCH RBC QN AUTO: 26.1 PG (ref 26–34)
MCHC RBC AUTO-ENTMCNC: 30.4 G/DL (ref 30–36.5)
MCV RBC AUTO: 85.9 FL (ref 80–99)
MONOCYTES # BLD: 1.7 K/UL (ref 0–1)
MONOCYTES NFR BLD: 17 % (ref 5–13)
NEUTS SEG # BLD: 6.6 K/UL (ref 1.8–8)
NEUTS SEG NFR BLD: 64 % (ref 32–75)
NRBC # BLD: 0 K/UL (ref 0–0.01)
NRBC BLD-RTO: 0 PER 100 WBC
NT PRO BNP: ABNORMAL PG/ML
PLATELET # BLD AUTO: 324 K/UL (ref 150–400)
PMV BLD AUTO: 12.6 FL (ref 8.9–12.9)
POTASSIUM SERPL-SCNC: 3.7 MMOL/L (ref 3.5–5.1)
POTASSIUM SERPL-SCNC: 4.1 MMOL/L (ref 3.5–5.1)
PROT SERPL-MCNC: 7 G/DL (ref 6.4–8.2)
PROT SERPL-MCNC: 7.8 G/DL (ref 6.4–8.2)
RBC # BLD AUTO: 4.4 M/UL (ref 4.1–5.7)
SERVICE CMNT-IMP: ABNORMAL
SODIUM SERPL-SCNC: 133 MMOL/L (ref 136–145)
SODIUM SERPL-SCNC: 138 MMOL/L (ref 136–145)
SPECIMEN HOLD: NORMAL
SPECIMEN HOLD: NORMAL
WBC # BLD AUTO: 10 K/UL (ref 4.1–11.1)

## 2023-08-23 PROCEDURE — APPSS30 APP SPLIT SHARED TIME 16-30 MINUTES: Performed by: NURSE PRACTITIONER

## 2023-08-23 PROCEDURE — 82962 GLUCOSE BLOOD TEST: CPT

## 2023-08-23 PROCEDURE — 6360000002 HC RX W HCPCS: Performed by: FAMILY MEDICINE

## 2023-08-23 PROCEDURE — 6370000000 HC RX 637 (ALT 250 FOR IP)

## 2023-08-23 PROCEDURE — 83880 ASSAY OF NATRIURETIC PEPTIDE: CPT

## 2023-08-23 PROCEDURE — 97530 THERAPEUTIC ACTIVITIES: CPT

## 2023-08-23 PROCEDURE — 97162 PT EVAL MOD COMPLEX 30 MIN: CPT

## 2023-08-23 PROCEDURE — 6370000000 HC RX 637 (ALT 250 FOR IP): Performed by: INTERNAL MEDICINE

## 2023-08-23 PROCEDURE — 85025 COMPLETE CBC W/AUTO DIFF WBC: CPT

## 2023-08-23 PROCEDURE — 99232 SBSQ HOSP IP/OBS MODERATE 35: CPT | Performed by: INTERNAL MEDICINE

## 2023-08-23 PROCEDURE — 99233 SBSQ HOSP IP/OBS HIGH 50: CPT | Performed by: NURSE PRACTITIONER

## 2023-08-23 PROCEDURE — 6370000000 HC RX 637 (ALT 250 FOR IP): Performed by: NURSE PRACTITIONER

## 2023-08-23 PROCEDURE — 36415 COLL VENOUS BLD VENIPUNCTURE: CPT

## 2023-08-23 PROCEDURE — 94760 N-INVAS EAR/PLS OXIMETRY 1: CPT

## 2023-08-23 PROCEDURE — 2580000003 HC RX 258: Performed by: HOSPITALIST

## 2023-08-23 PROCEDURE — 6370000000 HC RX 637 (ALT 250 FOR IP): Performed by: FAMILY MEDICINE

## 2023-08-23 PROCEDURE — 2060000000 HC ICU INTERMEDIATE R&B

## 2023-08-23 PROCEDURE — 6370000000 HC RX 637 (ALT 250 FOR IP): Performed by: HOSPITALIST

## 2023-08-23 PROCEDURE — 80076 HEPATIC FUNCTION PANEL: CPT

## 2023-08-23 PROCEDURE — 80053 COMPREHEN METABOLIC PANEL: CPT

## 2023-08-23 RX ORDER — FENTANYL 25 UG/1
1 PATCH TRANSDERMAL
Qty: 10 PATCH | Refills: 0 | Status: SHIPPED | OUTPATIENT
Start: 2023-08-25 | End: 2023-09-03 | Stop reason: SDUPTHER

## 2023-08-23 RX ORDER — HYDROMORPHONE HYDROCHLORIDE 4 MG/1
4 TABLET ORAL EVERY 6 HOURS PRN
Qty: 112 TABLET | Refills: 0 | Status: SHIPPED | OUTPATIENT
Start: 2023-08-23 | End: 2023-09-03 | Stop reason: SDUPTHER

## 2023-08-23 RX ORDER — HYDROMORPHONE HYDROCHLORIDE 2 MG/1
4 TABLET ORAL EVERY 6 HOURS PRN
Qty: 112 TABLET | Refills: 0 | Status: SHIPPED | OUTPATIENT
Start: 2023-08-23 | End: 2023-08-23

## 2023-08-23 RX ADMIN — HYDRALAZINE HYDROCHLORIDE 50 MG: 50 TABLET, FILM COATED ORAL at 20:52

## 2023-08-23 RX ADMIN — CARVEDILOL 50 MG: 12.5 TABLET, FILM COATED ORAL at 20:53

## 2023-08-23 RX ADMIN — TORSEMIDE 60 MG: 20 TABLET ORAL at 20:53

## 2023-08-23 RX ADMIN — HYDRALAZINE HYDROCHLORIDE 50 MG: 50 TABLET, FILM COATED ORAL at 08:51

## 2023-08-23 RX ADMIN — PREGABALIN 50 MG: 25 CAPSULE ORAL at 08:52

## 2023-08-23 RX ADMIN — Medication 4 UNITS: at 17:42

## 2023-08-23 RX ADMIN — ISOSORBIDE DINITRATE 40 MG: 20 TABLET ORAL at 20:53

## 2023-08-23 RX ADMIN — Medication 2 UNITS: at 11:33

## 2023-08-23 RX ADMIN — TORSEMIDE 60 MG: 20 TABLET ORAL at 08:52

## 2023-08-23 RX ADMIN — HYDROMORPHONE HYDROCHLORIDE 3 MG: 2 TABLET ORAL at 08:50

## 2023-08-23 RX ADMIN — Medication 4 UNITS: at 11:33

## 2023-08-23 RX ADMIN — PREGABALIN 50 MG: 25 CAPSULE ORAL at 20:53

## 2023-08-23 RX ADMIN — OXYCODONE HYDROCHLORIDE 10 MG: 5 TABLET ORAL at 19:03

## 2023-08-23 RX ADMIN — OXYCODONE HYDROCHLORIDE 10 MG: 5 TABLET ORAL at 11:33

## 2023-08-23 RX ADMIN — ERYTHROPOIETIN 10000 UNITS: 10000 INJECTION, SOLUTION INTRAVENOUS; SUBCUTANEOUS at 09:06

## 2023-08-23 RX ADMIN — CARVEDILOL 50 MG: 12.5 TABLET, FILM COATED ORAL at 08:52

## 2023-08-23 RX ADMIN — Medication 8 UNITS: at 17:41

## 2023-08-23 RX ADMIN — Medication 4 UNITS: at 08:50

## 2023-08-23 RX ADMIN — ISOSORBIDE DINITRATE 40 MG: 20 TABLET ORAL at 08:52

## 2023-08-23 RX ADMIN — Medication 8 UNITS: at 06:56

## 2023-08-23 RX ADMIN — Medication 10 ML: at 08:53

## 2023-08-23 RX ADMIN — Medication 10 ML: at 20:56

## 2023-08-23 RX ADMIN — APIXABAN 2.5 MG: 2.5 TABLET, FILM COATED ORAL at 08:52

## 2023-08-23 ASSESSMENT — PAIN SCALES - GENERAL
PAINLEVEL_OUTOF10: 8
PAINLEVEL_OUTOF10: 3
PAINLEVEL_OUTOF10: 0
PAINLEVEL_OUTOF10: 8
PAINLEVEL_OUTOF10: 5
PAINLEVEL_OUTOF10: 8
PAINLEVEL_OUTOF10: 7
PAINLEVEL_OUTOF10: 3
PAINLEVEL_OUTOF10: 3

## 2023-08-23 ASSESSMENT — PAIN DESCRIPTION - LOCATION
LOCATION: LEG

## 2023-08-23 ASSESSMENT — ENCOUNTER SYMPTOMS
NAUSEA: 0
SHORTNESS OF BREATH: 0
COUGH: 0
ABDOMINAL DISTENTION: 0

## 2023-08-23 ASSESSMENT — PAIN DESCRIPTION - DESCRIPTORS
DESCRIPTORS: ACHING
DESCRIPTORS: SHARP
DESCRIPTORS: SHARP
DESCRIPTORS: ACHING;SHARP

## 2023-08-23 ASSESSMENT — PAIN DESCRIPTION - ORIENTATION
ORIENTATION: LEFT
ORIENTATION: RIGHT;LEFT

## 2023-08-23 ASSESSMENT — PAIN DESCRIPTION - PAIN TYPE: TYPE: CHRONIC PAIN

## 2023-08-23 ASSESSMENT — PAIN - FUNCTIONAL ASSESSMENT
PAIN_FUNCTIONAL_ASSESSMENT: PREVENTS OR INTERFERES SOME ACTIVE ACTIVITIES AND ADLS

## 2023-08-23 NOTE — PALLIATIVE CARE DISCHARGE
Palliative Discharge Note    Thank you for allowing me to be a part of your care team.     The Palliative Medicine team was consulted as part of your/your loved one's care in the hospital. Our team is a supportive service; we strive to relieve suffering and improve quality of life. Please read the following important information:    Pain Medication Regimen:  On 8/22/2023 , you started a new pain medicine, Duragesic (Fentanyl) 25 mcg Trans Dermal (TD) patch. Leave your current patch on until Friday 8/25 afternoon, then take it off and put on a new Duragesic patch. I have [facility] your [facility] for the Duragesic patch [facility] [address] [place] [address] [facility]. The patches stay on x 72 hours (3 days), then take off and put on a new patch. The patches should be placed on clean, dry and hairless skin. Can be placed on upper arm/shoulder, chest, back etc.    2. I have also sent your Prescription for Dilaudid (hydromorphone) 4mg tablets, One tablet by mouth every 6 hours As Needed,  to the [address] [place] [address]. The Dilaudid is to help with Moderate to Severe Break Through pain. Constipation:  Pain Medicines such as Fentanyl and Dilaudid cause constipation. If you have not had a Bowel Movement in 2 or more days, you may be constipated. If constipated, I recommend taking something over the counter to assist with, such as  Senna S or  Miralax, or  drink warm prune juice or pear juice. APPOINTMENT: Per our discussion, I have referred you to our Out Patient Palliative Clinic at [facility] with Dr. Jessica Parkinson. You should expect a call from coordinator, Carlito Suresh RN, within a day or two after you are discharged. Lanette Landeros will assist with scheduling the appointment. IF YOU HAVE ANY QUESTIONS, PLEASE CALL the Palliative office at [phone].       We reviewed advance care planning information, which includes the following:    Primary Decision Maker: Brook Other - Domestic Partner -

## 2023-08-23 NOTE — ACP (ADVANCE CARE PLANNING)
Advanced Care Planning Discussion    Discussion between Mr. Jack Caraballo and myself. Advanced Care Planning Discussion-  Mr. Barber Cody does have an AMD, a copy of which is in his EMR. Mr. Barber Cody elected to have his long term partner, Lauri Miranda, [phone], to serve as his primary decision maker and his brother, Boston Yip, [phone], to serve as secondary. Mr. Barber Cody indicated that he would want life prolonging efforts, but only if he was awake and alert, with chance of recovery. Discussed Advanced Heart Failure and now need for daily Peritoneal Dialysis to manage fluid overload. We also discussed recent development of Calciphylaxis a poor prognostic indicator. DNR discussion- Mr. Barber Cody would want CPR. Discussed benefits vs burdens in setting of advanced diseases described above.

## 2023-08-23 NOTE — CARE COORDINATION
Transition of Care Plan:    RUR: 19% - moderate  Prior Level of Functioning: ambulated independent; req assist for mobility  Disposition: home health  Follow up appointments: wound care clinic; PCP; Cardiology  DME needed: wheelchair  Transportation at discharge: wife  IM/IMM Medicare/ letter given: 8/23/23  Caregiver Contact: wife - Se Case - [phone]    Discharge Caregiver contacted prior to discharge? no  Care Conference needed? no  Barriers to discharge: medical    CM spoke with patient re disposition plan. Patient advised he would like to continue care with the outpatient wound care clinic. Discussed barriers/limitations for mobility and pain with making appointments. CM advised patient that wheelchair and RW were ordered and Medicare likely would only cover one assistive device. Patient advised due to pain with standing and ambulating, he would need a wheelchair at this time. Patient typically uses wound care clinic wheelchair to get from the car to building. Patient's wife is a CNA and cares for wounds on days patient does not have wound care clinic appointment. Patient is agreeable to home health for PT and OT only to regain strength - any agency in network. Patient will have to ambulate 3 steps into his home and advised he will work with therapy today on stairs. CM updated patient that if unable to ambulate steps we would have to order stretcher transport for home and cannot guarantee insurance coverage. Patient acknowledged understanding of same. CM provided update to PT re wheelchair and stair training today to prep for safe discharge. CM ordered wheelchair through [employer] for home delivery. CM sent [facility] PT/OT order to [facility]; agency accepts patient for [facility] services. Discharge Address: [address] [facility], [address] [address] Patient advised this is his current address and requested system update to reflect same.      CM will continue to

## 2023-08-23 NOTE — WOUND CARE
Wound Care Note:     Wound care follow up for bilateral lower legs. Chart shows:  Admitted for edema of right upper arm  Past Medical History:   Diagnosis Date    Congestive heart failure ([address])     Coronary artery disease     Diabetes (HCC)     Heart failure (HCC)     CHF, cardiomyopathy    Hypertension     ICD (implantable cardioverter-defibrillator) in place     left upper chest    PAD (peripheral artery disease) ([address])      WBC = 8.0 on 8/21/23  Admitted from physicians office    Assessment:   Patient is A&O x 4, communicative, continent with no assistance needed in repositioning. Bed: [facility]  Diet: Adult diet regular 4 carb choices with nutritional supplements  Patient pre-medicated for pain by RN. Palpable DP pulses bilaterally. 1. POA bilateral lower leg calciphylaxis wounds seem to be improving, wounds are very painful to patient, moderate amount of serous/tan drainage and foul odor noted on dressing prior to removal, no odor after removal of dressings. Cleansed, Optifoam Gentle NB AG applied to open wounds, ABD pads applied to entire lower leg for comfort, wrapped in thin Kerlix and then roll gauze at request of patient. Right lateral/posterior lower leg         Left medial lower leg         Left lateral lower leg    Patient repositioned supine      Recommendations:    Bilateral lower legs- Every other day and as needed for breakthrough drainage, gently remove dressings, use normal saline to assist with removing any dressing that is sticking, moisten small roll gauze with VASHE and wrap around lower legs, let sit for 5 to 10 minutes, cleanse wounds, apply Optifoam Gentle NB AG to open wounds, use 6 ABD pads (for each lower leg) to cover lower legs, tape can be used to assist with wrapping, apply \"Conforming Gauze Bandage\" roll gauze and apply \"Conforming Stretch Gauze Bandage\" roll gauze, secure with tape.     Skin Care &

## 2023-08-24 ENCOUNTER — TELEPHONE (OUTPATIENT)
Age: 58
End: 2023-08-24

## 2023-08-24 LAB
ALBUMIN SERPL-MCNC: 2.7 G/DL (ref 3.5–5)
ALBUMIN/GLOB SERPL: 0.6 (ref 1.1–2.2)
ALP SERPL-CCNC: 140 U/L (ref 45–117)
ALT SERPL-CCNC: 16 U/L (ref 12–78)
ANION GAP SERPL CALC-SCNC: 9 MMOL/L (ref 5–15)
AST SERPL-CCNC: 19 U/L (ref 15–37)
BILIRUB DIRECT SERPL-MCNC: 0.7 MG/DL (ref 0–0.2)
BILIRUB SERPL-MCNC: 1.2 MG/DL (ref 0.2–1)
BUN SERPL-MCNC: 52 MG/DL (ref 6–20)
BUN/CREAT SERPL: 19 (ref 12–20)
CALCIUM SERPL-MCNC: 9.3 MG/DL (ref 8.5–10.1)
CHLORIDE SERPL-SCNC: 101 MMOL/L (ref 97–108)
CO2 SERPL-SCNC: 26 MMOL/L (ref 21–32)
CREAT SERPL-MCNC: 2.75 MG/DL (ref 0.7–1.3)
GLOBULIN SER CALC-MCNC: 4.7 G/DL (ref 2–4)
GLUCOSE BLD STRIP.AUTO-MCNC: 150 MG/DL (ref 65–117)
GLUCOSE BLD STRIP.AUTO-MCNC: 186 MG/DL (ref 65–117)
GLUCOSE BLD STRIP.AUTO-MCNC: 196 MG/DL (ref 65–117)
GLUCOSE BLD STRIP.AUTO-MCNC: 216 MG/DL (ref 65–117)
GLUCOSE SERPL-MCNC: 180 MG/DL (ref 65–100)
NT PRO BNP: ABNORMAL PG/ML
POTASSIUM SERPL-SCNC: 3.8 MMOL/L (ref 3.5–5.1)
PROT SERPL-MCNC: 7.4 G/DL (ref 6.4–8.2)
SERVICE CMNT-IMP: ABNORMAL
SODIUM SERPL-SCNC: 136 MMOL/L (ref 136–145)

## 2023-08-24 PROCEDURE — 99232 SBSQ HOSP IP/OBS MODERATE 35: CPT | Performed by: NURSE PRACTITIONER

## 2023-08-24 PROCEDURE — 6370000000 HC RX 637 (ALT 250 FOR IP): Performed by: NURSE PRACTITIONER

## 2023-08-24 PROCEDURE — 80048 BASIC METABOLIC PNL TOTAL CA: CPT

## 2023-08-24 PROCEDURE — 99233 SBSQ HOSP IP/OBS HIGH 50: CPT | Performed by: INTERNAL MEDICINE

## 2023-08-24 PROCEDURE — 6360000002 HC RX W HCPCS: Performed by: INTERNAL MEDICINE

## 2023-08-24 PROCEDURE — P9047 ALBUMIN (HUMAN), 25%, 50ML: HCPCS | Performed by: INTERNAL MEDICINE

## 2023-08-24 PROCEDURE — 82962 GLUCOSE BLOOD TEST: CPT

## 2023-08-24 PROCEDURE — 36415 COLL VENOUS BLD VENIPUNCTURE: CPT

## 2023-08-24 PROCEDURE — 6370000000 HC RX 637 (ALT 250 FOR IP): Performed by: HOSPITALIST

## 2023-08-24 PROCEDURE — P9045 ALBUMIN (HUMAN), 5%, 250 ML: HCPCS | Performed by: INTERNAL MEDICINE

## 2023-08-24 PROCEDURE — 97530 THERAPEUTIC ACTIVITIES: CPT

## 2023-08-24 PROCEDURE — APPSS30 APP SPLIT SHARED TIME 16-30 MINUTES: Performed by: NURSE PRACTITIONER

## 2023-08-24 PROCEDURE — 2580000003 HC RX 258: Performed by: INTERNAL MEDICINE

## 2023-08-24 PROCEDURE — 6370000000 HC RX 637 (ALT 250 FOR IP): Performed by: INTERNAL MEDICINE

## 2023-08-24 PROCEDURE — 83880 ASSAY OF NATRIURETIC PEPTIDE: CPT

## 2023-08-24 PROCEDURE — 6370000000 HC RX 637 (ALT 250 FOR IP)

## 2023-08-24 PROCEDURE — 80076 HEPATIC FUNCTION PANEL: CPT

## 2023-08-24 PROCEDURE — 2060000000 HC ICU INTERMEDIATE R&B

## 2023-08-24 PROCEDURE — 2580000003 HC RX 258: Performed by: HOSPITALIST

## 2023-08-24 RX ORDER — ISOSORBIDE DINITRATE 20 MG/1
20 TABLET ORAL 3 TIMES DAILY
Qty: 90 TABLET | Refills: 3 | Status: SHIPPED | OUTPATIENT
Start: 2023-08-24 | End: 2023-09-01 | Stop reason: HOSPADM

## 2023-08-24 RX ORDER — INSULIN LISPRO 100 [IU]/ML
5 INJECTION, SOLUTION INTRAVENOUS; SUBCUTANEOUS
Status: DISCONTINUED | OUTPATIENT
Start: 2023-08-24 | End: 2023-08-28

## 2023-08-24 RX ORDER — ALBUMIN (HUMAN) 12.5 G/50ML
25 SOLUTION INTRAVENOUS ONCE
Status: COMPLETED | OUTPATIENT
Start: 2023-08-24 | End: 2023-08-24

## 2023-08-24 RX ORDER — ALBUMIN, HUMAN INJ 5% 5 %
25 SOLUTION INTRAVENOUS ONCE
Status: COMPLETED | OUTPATIENT
Start: 2023-08-24 | End: 2023-08-24

## 2023-08-24 RX ORDER — HYDRALAZINE HYDROCHLORIDE 25 MG/1
25 TABLET, FILM COATED ORAL 3 TIMES DAILY
Status: DISCONTINUED | OUTPATIENT
Start: 2023-08-24 | End: 2023-08-26

## 2023-08-24 RX ORDER — HYDROMORPHONE HYDROCHLORIDE 2 MG/1
3 TABLET ORAL EVERY 6 HOURS PRN
Status: DISCONTINUED | OUTPATIENT
Start: 2023-08-24 | End: 2023-08-29

## 2023-08-24 RX ORDER — ISOSORBIDE DINITRATE 20 MG/1
20 TABLET ORAL 3 TIMES DAILY
Status: DISCONTINUED | OUTPATIENT
Start: 2023-08-24 | End: 2023-09-02

## 2023-08-24 RX ORDER — HYDRALAZINE HYDROCHLORIDE 25 MG/1
25 TABLET, FILM COATED ORAL 3 TIMES DAILY
Qty: 90 TABLET | Refills: 3 | Status: SHIPPED | OUTPATIENT
Start: 2023-08-24 | End: 2023-09-01 | Stop reason: HOSPADM

## 2023-08-24 RX ADMIN — Medication 5 UNITS: at 17:41

## 2023-08-24 RX ADMIN — TORSEMIDE 60 MG: 20 TABLET ORAL at 08:41

## 2023-08-24 RX ADMIN — Medication 10 ML: at 08:41

## 2023-08-24 RX ADMIN — PREGABALIN 50 MG: 25 CAPSULE ORAL at 08:41

## 2023-08-24 RX ADMIN — SODIUM THIOSULFATE 25 G: 250 INJECTION, SOLUTION INTRAVENOUS at 09:27

## 2023-08-24 RX ADMIN — Medication 5 UNITS: at 08:41

## 2023-08-24 RX ADMIN — ISOSORBIDE DINITRATE 20 MG: 20 TABLET ORAL at 20:42

## 2023-08-24 RX ADMIN — ISOSORBIDE DINITRATE 20 MG: 20 TABLET ORAL at 16:31

## 2023-08-24 RX ADMIN — CARVEDILOL 50 MG: 12.5 TABLET, FILM COATED ORAL at 20:42

## 2023-08-24 RX ADMIN — Medication 1 UNITS: at 17:41

## 2023-08-24 RX ADMIN — Medication 10 ML: at 20:44

## 2023-08-24 RX ADMIN — HYDROMORPHONE HYDROCHLORIDE 3 MG: 2 TABLET ORAL at 17:44

## 2023-08-24 RX ADMIN — HYDROMORPHONE HYDROCHLORIDE 3 MG: 2 TABLET ORAL at 11:47

## 2023-08-24 RX ADMIN — Medication 8 UNITS: at 06:58

## 2023-08-24 RX ADMIN — Medication 10 UNITS: at 16:31

## 2023-08-24 RX ADMIN — Medication 5 UNITS: at 11:46

## 2023-08-24 RX ADMIN — ALBUMIN (HUMAN) 25 G: 12.5 INJECTION, SOLUTION INTRAVENOUS at 07:54

## 2023-08-24 RX ADMIN — SENNOSIDES AND DOCUSATE SODIUM 1 TABLET: 50; 8.6 TABLET ORAL at 08:41

## 2023-08-24 RX ADMIN — PREGABALIN 50 MG: 25 CAPSULE ORAL at 20:42

## 2023-08-24 RX ADMIN — ALBUMIN (HUMAN) 25 G: 0.25 INJECTION, SOLUTION INTRAVENOUS at 11:46

## 2023-08-24 RX ADMIN — TORSEMIDE 60 MG: 20 TABLET ORAL at 20:42

## 2023-08-24 RX ADMIN — HYDROMORPHONE HYDROCHLORIDE 3 MG: 2 TABLET ORAL at 22:16

## 2023-08-24 RX ADMIN — CARVEDILOL 50 MG: 12.5 TABLET, FILM COATED ORAL at 08:41

## 2023-08-24 ASSESSMENT — PAIN SCALES - GENERAL
PAINLEVEL_OUTOF10: 0
PAINLEVEL_OUTOF10: 3
PAINLEVEL_OUTOF10: 9
PAINLEVEL_OUTOF10: 3
PAINLEVEL_OUTOF10: 8
PAINLEVEL_OUTOF10: 6
PAINLEVEL_OUTOF10: 2
PAINLEVEL_OUTOF10: 0
PAINLEVEL_OUTOF10: 0
PAINLEVEL_OUTOF10: 6
PAINLEVEL_OUTOF10: 3
PAINLEVEL_OUTOF10: 9
PAINLEVEL_OUTOF10: 0
PAINLEVEL_OUTOF10: 0

## 2023-08-24 ASSESSMENT — PAIN DESCRIPTION - LOCATION
LOCATION: LEG

## 2023-08-24 ASSESSMENT — PAIN - FUNCTIONAL ASSESSMENT
PAIN_FUNCTIONAL_ASSESSMENT: PREVENTS OR INTERFERES SOME ACTIVE ACTIVITIES AND ADLS

## 2023-08-24 ASSESSMENT — PAIN DESCRIPTION - ORIENTATION
ORIENTATION: RIGHT;LEFT
ORIENTATION: LEFT;RIGHT
ORIENTATION: RIGHT;LEFT

## 2023-08-24 ASSESSMENT — PAIN DESCRIPTION - DESCRIPTORS
DESCRIPTORS: ACHING;THROBBING
DESCRIPTORS: ACHING;SHARP
DESCRIPTORS: ACHING;SHARP
DESCRIPTORS: ACHING
DESCRIPTORS: ACHING

## 2023-08-24 ASSESSMENT — ENCOUNTER SYMPTOMS
ABDOMINAL DISTENTION: 0
SHORTNESS OF BREATH: 0
NAUSEA: 0
COUGH: 0

## 2023-08-24 ASSESSMENT — PAIN DESCRIPTION - ONSET: ONSET: ON-GOING

## 2023-08-24 ASSESSMENT — PAIN DESCRIPTION - FREQUENCY: FREQUENCY: CONTINUOUS

## 2023-08-24 ASSESSMENT — PAIN DESCRIPTION - PAIN TYPE: TYPE: CHRONIC PAIN

## 2023-08-24 NOTE — TELEPHONE ENCOUNTER
[facility] Palliative Medicine Office  Nursing Note  [phone]  Fax [phone]      Name:  David Winston  YOB: 1965    Received outpatient Palliative Medicine referral from Maritza Adams NP and Art SANONW to see patient for symptom management and supportive care. Chart  reviewed. David Winston is a 66 y.o. male with a past history of obesity, DM2, CKD stage IV (on home PD), recent development of bilateral LE wounds 2/2  calciphylaxis (s/p Sodium thiosulfate infusions), HTN, CAD, Cardiomyopathy w/ EF 20% (s/p AICD). Patient who was admitted to [address] [place] [address],[address] on 8/9/2023 from Nephrologists Appointment with a diagnosis of Acute on Chronic CHF, unilateral edema in Right UE and LE. Patient has been followed by inpatient Palliative Medicine team during his hospitalization. Nurse called patient's cell and his partner Soo's cell phones, no answer on either and voice mail boxes are full on both phones. This nurse will try to reach them again.      Konstantin Leyva RN, Gerontological Nursing-BC, [facility]

## 2023-08-25 LAB
ALBUMIN SERPL-MCNC: 2.8 G/DL (ref 3.5–5)
ALBUMIN/GLOB SERPL: 0.7 (ref 1.1–2.2)
ALP SERPL-CCNC: 118 U/L (ref 45–117)
ALT SERPL-CCNC: 12 U/L (ref 12–78)
ANION GAP SERPL CALC-SCNC: 11 MMOL/L (ref 5–15)
AST SERPL-CCNC: 13 U/L (ref 15–37)
BILIRUB DIRECT SERPL-MCNC: 0.6 MG/DL (ref 0–0.2)
BILIRUB SERPL-MCNC: 1 MG/DL (ref 0.2–1)
BUN SERPL-MCNC: 52 MG/DL (ref 6–20)
BUN/CREAT SERPL: 20 (ref 12–20)
CALCIUM SERPL-MCNC: 9.3 MG/DL (ref 8.5–10.1)
CHLORIDE SERPL-SCNC: 101 MMOL/L (ref 97–108)
CO2 SERPL-SCNC: 24 MMOL/L (ref 21–32)
CREAT SERPL-MCNC: 2.58 MG/DL (ref 0.7–1.3)
GLOBULIN SER CALC-MCNC: 4.2 G/DL (ref 2–4)
GLUCOSE BLD STRIP.AUTO-MCNC: 156 MG/DL (ref 65–117)
GLUCOSE BLD STRIP.AUTO-MCNC: 206 MG/DL (ref 65–117)
GLUCOSE BLD STRIP.AUTO-MCNC: 210 MG/DL (ref 65–117)
GLUCOSE BLD STRIP.AUTO-MCNC: 216 MG/DL (ref 65–117)
GLUCOSE BLD STRIP.AUTO-MCNC: 224 MG/DL (ref 65–117)
GLUCOSE SERPL-MCNC: 184 MG/DL (ref 65–100)
NT PRO BNP: 9807 PG/ML
POTASSIUM SERPL-SCNC: 3.4 MMOL/L (ref 3.5–5.1)
PROT SERPL-MCNC: 7 G/DL (ref 6.4–8.2)
SERVICE CMNT-IMP: ABNORMAL
SODIUM SERPL-SCNC: 136 MMOL/L (ref 136–145)

## 2023-08-25 PROCEDURE — 6360000002 HC RX W HCPCS: Performed by: INTERNAL MEDICINE

## 2023-08-25 PROCEDURE — 80076 HEPATIC FUNCTION PANEL: CPT

## 2023-08-25 PROCEDURE — 83880 ASSAY OF NATRIURETIC PEPTIDE: CPT

## 2023-08-25 PROCEDURE — 6370000000 HC RX 637 (ALT 250 FOR IP)

## 2023-08-25 PROCEDURE — 2060000000 HC ICU INTERMEDIATE R&B

## 2023-08-25 PROCEDURE — 6370000000 HC RX 637 (ALT 250 FOR IP): Performed by: HOSPITALIST

## 2023-08-25 PROCEDURE — 80048 BASIC METABOLIC PNL TOTAL CA: CPT

## 2023-08-25 PROCEDURE — 82962 GLUCOSE BLOOD TEST: CPT

## 2023-08-25 PROCEDURE — P9047 ALBUMIN (HUMAN), 25%, 50ML: HCPCS | Performed by: INTERNAL MEDICINE

## 2023-08-25 PROCEDURE — APPSS45 APP SPLIT SHARED TIME 31-45 MINUTES: Performed by: NURSE PRACTITIONER

## 2023-08-25 PROCEDURE — P9045 ALBUMIN (HUMAN), 5%, 250 ML: HCPCS | Performed by: INTERNAL MEDICINE

## 2023-08-25 PROCEDURE — 6370000000 HC RX 637 (ALT 250 FOR IP): Performed by: NURSE PRACTITIONER

## 2023-08-25 PROCEDURE — 2580000003 HC RX 258: Performed by: HOSPITALIST

## 2023-08-25 PROCEDURE — 6360000002 HC RX W HCPCS: Performed by: FAMILY MEDICINE

## 2023-08-25 PROCEDURE — 99232 SBSQ HOSP IP/OBS MODERATE 35: CPT | Performed by: INTERNAL MEDICINE

## 2023-08-25 PROCEDURE — 99231 SBSQ HOSP IP/OBS SF/LOW 25: CPT

## 2023-08-25 PROCEDURE — 36415 COLL VENOUS BLD VENIPUNCTURE: CPT

## 2023-08-25 RX ORDER — CARVEDILOL 12.5 MG/1
37.5 TABLET ORAL 2 TIMES DAILY
Status: DISCONTINUED | OUTPATIENT
Start: 2023-08-25 | End: 2023-08-27

## 2023-08-25 RX ORDER — ALBUMIN (HUMAN) 12.5 G/50ML
25 SOLUTION INTRAVENOUS ONCE
Status: COMPLETED | OUTPATIENT
Start: 2023-08-25 | End: 2023-08-25

## 2023-08-25 RX ORDER — ALBUMIN, HUMAN INJ 5% 5 %
25 SOLUTION INTRAVENOUS ONCE
Status: COMPLETED | OUTPATIENT
Start: 2023-08-25 | End: 2023-08-25

## 2023-08-25 RX ORDER — TORSEMIDE 20 MG/1
60 TABLET ORAL DAILY
Status: DISCONTINUED | OUTPATIENT
Start: 2023-08-26 | End: 2023-09-02

## 2023-08-25 RX ADMIN — CARVEDILOL 37.5 MG: 12.5 TABLET, FILM COATED ORAL at 22:55

## 2023-08-25 RX ADMIN — HYDROMORPHONE HYDROCHLORIDE 3 MG: 2 TABLET ORAL at 04:41

## 2023-08-25 RX ADMIN — PREGABALIN 50 MG: 25 CAPSULE ORAL at 09:28

## 2023-08-25 RX ADMIN — ISOSORBIDE DINITRATE 20 MG: 20 TABLET ORAL at 22:55

## 2023-08-25 RX ADMIN — Medication 1 UNITS: at 09:27

## 2023-08-25 RX ADMIN — ISOSORBIDE DINITRATE 20 MG: 20 TABLET ORAL at 09:26

## 2023-08-25 RX ADMIN — Medication 10 UNITS: at 06:46

## 2023-08-25 RX ADMIN — Medication 5 UNITS: at 09:26

## 2023-08-25 RX ADMIN — CARVEDILOL 50 MG: 12.5 TABLET, FILM COATED ORAL at 09:26

## 2023-08-25 RX ADMIN — Medication 10 ML: at 09:27

## 2023-08-25 RX ADMIN — Medication 10 ML: at 22:56

## 2023-08-25 RX ADMIN — Medication 12 UNITS: at 19:16

## 2023-08-25 RX ADMIN — ERYTHROPOIETIN 10000 UNITS: 10000 INJECTION, SOLUTION INTRAVENOUS; SUBCUTANEOUS at 09:28

## 2023-08-25 RX ADMIN — ALBUMIN (HUMAN) 25 G: 0.25 INJECTION, SOLUTION INTRAVENOUS at 19:16

## 2023-08-25 RX ADMIN — ISOSORBIDE DINITRATE 20 MG: 20 TABLET ORAL at 15:35

## 2023-08-25 RX ADMIN — PREGABALIN 50 MG: 25 CAPSULE ORAL at 22:55

## 2023-08-25 RX ADMIN — Medication 1 UNITS: at 19:17

## 2023-08-25 RX ADMIN — HYDROMORPHONE HYDROCHLORIDE 3 MG: 2 TABLET ORAL at 15:35

## 2023-08-25 RX ADMIN — ALBUMIN (HUMAN) 25 G: 12.5 INJECTION, SOLUTION INTRAVENOUS at 10:07

## 2023-08-25 ASSESSMENT — PAIN DESCRIPTION - ORIENTATION
ORIENTATION: RIGHT;LEFT
ORIENTATION: RIGHT;LEFT

## 2023-08-25 ASSESSMENT — PAIN DESCRIPTION - DESCRIPTORS
DESCRIPTORS: ACHING
DESCRIPTORS: ACHING;DISCOMFORT

## 2023-08-25 ASSESSMENT — PAIN DESCRIPTION - LOCATION
LOCATION: LEG
LOCATION: LEG

## 2023-08-25 ASSESSMENT — PAIN SCALES - GENERAL
PAINLEVEL_OUTOF10: 7
PAINLEVEL_OUTOF10: 2
PAINLEVEL_OUTOF10: 2
PAINLEVEL_OUTOF10: 3
PAINLEVEL_OUTOF10: 6

## 2023-08-25 ASSESSMENT — PAIN DESCRIPTION - FREQUENCY: FREQUENCY: CONTINUOUS

## 2023-08-25 ASSESSMENT — PAIN - FUNCTIONAL ASSESSMENT: PAIN_FUNCTIONAL_ASSESSMENT: PREVENTS OR INTERFERES SOME ACTIVE ACTIVITIES AND ADLS

## 2023-08-25 ASSESSMENT — ENCOUNTER SYMPTOMS
COUGH: 0
SHORTNESS OF BREATH: 0
ABDOMINAL DISTENTION: 0
NAUSEA: 0

## 2023-08-25 ASSESSMENT — PAIN DESCRIPTION - ONSET: ONSET: ON-GOING

## 2023-08-25 ASSESSMENT — PAIN DESCRIPTION - PAIN TYPE: TYPE: CHRONIC PAIN

## 2023-08-26 LAB
ALBUMIN SERPL-MCNC: 3.3 G/DL (ref 3.5–5)
ALBUMIN/GLOB SERPL: 0.9 (ref 1.1–2.2)
ALP SERPL-CCNC: 115 U/L (ref 45–117)
ALT SERPL-CCNC: 13 U/L (ref 12–78)
ANION GAP SERPL CALC-SCNC: 12 MMOL/L (ref 5–15)
AST SERPL-CCNC: 14 U/L (ref 15–37)
BILIRUB DIRECT SERPL-MCNC: 0.8 MG/DL (ref 0–0.2)
BILIRUB SERPL-MCNC: 1.4 MG/DL (ref 0.2–1)
BUN SERPL-MCNC: 51 MG/DL (ref 6–20)
BUN/CREAT SERPL: 23 (ref 12–20)
CALCIUM SERPL-MCNC: 9.2 MG/DL (ref 8.5–10.1)
CHLORIDE SERPL-SCNC: 103 MMOL/L (ref 97–108)
CO2 SERPL-SCNC: 21 MMOL/L (ref 21–32)
COMMENT:: NORMAL
CREAT SERPL-MCNC: 2.22 MG/DL (ref 0.7–1.3)
GLOBULIN SER CALC-MCNC: 3.5 G/DL (ref 2–4)
GLUCOSE BLD STRIP.AUTO-MCNC: 156 MG/DL (ref 65–117)
GLUCOSE BLD STRIP.AUTO-MCNC: 223 MG/DL (ref 65–117)
GLUCOSE BLD STRIP.AUTO-MCNC: 231 MG/DL (ref 65–117)
GLUCOSE SERPL-MCNC: 200 MG/DL (ref 65–100)
NT PRO BNP: ABNORMAL PG/ML
POTASSIUM SERPL-SCNC: 3.6 MMOL/L (ref 3.5–5.1)
PROT SERPL-MCNC: 6.8 G/DL (ref 6.4–8.2)
SERVICE CMNT-IMP: ABNORMAL
SODIUM SERPL-SCNC: 136 MMOL/L (ref 136–145)
SPECIMEN HOLD: NORMAL

## 2023-08-26 PROCEDURE — 6370000000 HC RX 637 (ALT 250 FOR IP): Performed by: INTERNAL MEDICINE

## 2023-08-26 PROCEDURE — 83880 ASSAY OF NATRIURETIC PEPTIDE: CPT

## 2023-08-26 PROCEDURE — 36415 COLL VENOUS BLD VENIPUNCTURE: CPT

## 2023-08-26 PROCEDURE — 99232 SBSQ HOSP IP/OBS MODERATE 35: CPT | Performed by: NURSE PRACTITIONER

## 2023-08-26 PROCEDURE — 6370000000 HC RX 637 (ALT 250 FOR IP): Performed by: NURSE PRACTITIONER

## 2023-08-26 PROCEDURE — 2060000000 HC ICU INTERMEDIATE R&B

## 2023-08-26 PROCEDURE — 2580000003 HC RX 258: Performed by: HOSPITALIST

## 2023-08-26 PROCEDURE — 6370000000 HC RX 637 (ALT 250 FOR IP): Performed by: HOSPITALIST

## 2023-08-26 PROCEDURE — 80076 HEPATIC FUNCTION PANEL: CPT

## 2023-08-26 PROCEDURE — 6370000000 HC RX 637 (ALT 250 FOR IP)

## 2023-08-26 PROCEDURE — 82962 GLUCOSE BLOOD TEST: CPT

## 2023-08-26 PROCEDURE — 80048 BASIC METABOLIC PNL TOTAL CA: CPT

## 2023-08-26 RX ADMIN — Medication 12 UNITS: at 17:48

## 2023-08-26 RX ADMIN — Medication 10 ML: at 23:00

## 2023-08-26 RX ADMIN — Medication 1 UNITS: at 10:05

## 2023-08-26 RX ADMIN — HYDROMORPHONE HYDROCHLORIDE 3 MG: 2 TABLET ORAL at 10:04

## 2023-08-26 RX ADMIN — PREGABALIN 50 MG: 25 CAPSULE ORAL at 10:05

## 2023-08-26 RX ADMIN — Medication 5 UNITS: at 17:47

## 2023-08-26 RX ADMIN — TORSEMIDE 60 MG: 20 TABLET ORAL at 10:05

## 2023-08-26 RX ADMIN — Medication 10 ML: at 10:06

## 2023-08-26 RX ADMIN — CARVEDILOL 37.5 MG: 12.5 TABLET, FILM COATED ORAL at 10:05

## 2023-08-26 RX ADMIN — SENNOSIDES AND DOCUSATE SODIUM 1 TABLET: 50; 8.6 TABLET ORAL at 10:05

## 2023-08-26 RX ADMIN — HYDROMORPHONE HYDROCHLORIDE 3 MG: 2 TABLET ORAL at 17:47

## 2023-08-26 RX ADMIN — Medication 1 UNITS: at 13:46

## 2023-08-26 RX ADMIN — Medication 5 UNITS: at 10:06

## 2023-08-26 RX ADMIN — ISOSORBIDE DINITRATE 20 MG: 20 TABLET ORAL at 11:20

## 2023-08-26 RX ADMIN — Medication 12 UNITS: at 10:05

## 2023-08-26 RX ADMIN — Medication 5 UNITS: at 13:46

## 2023-08-26 ASSESSMENT — PAIN DESCRIPTION - DESCRIPTORS
DESCRIPTORS: BURNING;ACHING;SORE
DESCRIPTORS: ACHING;DISCOMFORT;SHARP

## 2023-08-26 ASSESSMENT — ENCOUNTER SYMPTOMS
ABDOMINAL DISTENTION: 0
NAUSEA: 0
COUGH: 0
SHORTNESS OF BREATH: 0

## 2023-08-26 ASSESSMENT — PAIN DESCRIPTION - LOCATION
LOCATION: LEG
LOCATION: LEG

## 2023-08-26 ASSESSMENT — PAIN DESCRIPTION - ORIENTATION
ORIENTATION: RIGHT;LEFT
ORIENTATION: RIGHT;LEFT

## 2023-08-26 ASSESSMENT — PAIN SCALES - GENERAL
PAINLEVEL_OUTOF10: 7
PAINLEVEL_OUTOF10: 7

## 2023-08-26 NOTE — DISCHARGE INSTRUCTIONS
REACH US. IT IS MOST IMPORTANT TO KEEP THE WOUND COVERED AT ALL TIMES.      Physician Signature:_______________________    Date: ___________ Time:  ____________ None

## 2023-08-27 LAB
ALBUMIN SERPL-MCNC: 3.1 G/DL (ref 3.5–5)
ALBUMIN/GLOB SERPL: 0.9 (ref 1.1–2.2)
ALP SERPL-CCNC: 127 U/L (ref 45–117)
ALT SERPL-CCNC: 15 U/L (ref 12–78)
ANION GAP SERPL CALC-SCNC: 10 MMOL/L (ref 5–15)
AST SERPL-CCNC: 19 U/L (ref 15–37)
BILIRUB DIRECT SERPL-MCNC: 0.7 MG/DL (ref 0–0.2)
BILIRUB SERPL-MCNC: 1.1 MG/DL (ref 0.2–1)
BUN SERPL-MCNC: 58 MG/DL (ref 6–20)
BUN/CREAT SERPL: 28 (ref 12–20)
CALCIUM SERPL-MCNC: 9.1 MG/DL (ref 8.5–10.1)
CHLORIDE SERPL-SCNC: 103 MMOL/L (ref 97–108)
CO2 SERPL-SCNC: 22 MMOL/L (ref 21–32)
CREAT SERPL-MCNC: 2.1 MG/DL (ref 0.7–1.3)
GLOBULIN SER CALC-MCNC: 3.6 G/DL (ref 2–4)
GLUCOSE BLD STRIP.AUTO-MCNC: 134 MG/DL (ref 65–117)
GLUCOSE BLD STRIP.AUTO-MCNC: 164 MG/DL (ref 65–117)
GLUCOSE BLD STRIP.AUTO-MCNC: 191 MG/DL (ref 65–117)
GLUCOSE BLD STRIP.AUTO-MCNC: 195 MG/DL (ref 65–117)
GLUCOSE BLD STRIP.AUTO-MCNC: 213 MG/DL (ref 65–117)
GLUCOSE SERPL-MCNC: 146 MG/DL (ref 65–100)
NT PRO BNP: ABNORMAL PG/ML
POTASSIUM SERPL-SCNC: 3.4 MMOL/L (ref 3.5–5.1)
PROT SERPL-MCNC: 6.7 G/DL (ref 6.4–8.2)
SERVICE CMNT-IMP: ABNORMAL
SODIUM SERPL-SCNC: 135 MMOL/L (ref 136–145)

## 2023-08-27 PROCEDURE — 82962 GLUCOSE BLOOD TEST: CPT

## 2023-08-27 PROCEDURE — 80048 BASIC METABOLIC PNL TOTAL CA: CPT

## 2023-08-27 PROCEDURE — 6360000002 HC RX W HCPCS: Performed by: FAMILY MEDICINE

## 2023-08-27 PROCEDURE — 93005 ELECTROCARDIOGRAM TRACING: CPT | Performed by: FAMILY MEDICINE

## 2023-08-27 PROCEDURE — 36415 COLL VENOUS BLD VENIPUNCTURE: CPT

## 2023-08-27 PROCEDURE — 83880 ASSAY OF NATRIURETIC PEPTIDE: CPT

## 2023-08-27 PROCEDURE — 6370000000 HC RX 637 (ALT 250 FOR IP): Performed by: FAMILY MEDICINE

## 2023-08-27 PROCEDURE — 6370000000 HC RX 637 (ALT 250 FOR IP): Performed by: NURSE PRACTITIONER

## 2023-08-27 PROCEDURE — 94760 N-INVAS EAR/PLS OXIMETRY 1: CPT

## 2023-08-27 PROCEDURE — 2060000000 HC ICU INTERMEDIATE R&B

## 2023-08-27 PROCEDURE — 80076 HEPATIC FUNCTION PANEL: CPT

## 2023-08-27 PROCEDURE — 93005 ELECTROCARDIOGRAM TRACING: CPT | Performed by: INTERNAL MEDICINE

## 2023-08-27 PROCEDURE — 6370000000 HC RX 637 (ALT 250 FOR IP): Performed by: HOSPITALIST

## 2023-08-27 PROCEDURE — 99232 SBSQ HOSP IP/OBS MODERATE 35: CPT | Performed by: NURSE PRACTITIONER

## 2023-08-27 PROCEDURE — 2580000003 HC RX 258: Performed by: HOSPITALIST

## 2023-08-27 PROCEDURE — 6370000000 HC RX 637 (ALT 250 FOR IP)

## 2023-08-27 PROCEDURE — 6370000000 HC RX 637 (ALT 250 FOR IP): Performed by: INTERNAL MEDICINE

## 2023-08-27 RX ORDER — POTASSIUM CHLORIDE 750 MG/1
30 TABLET, FILM COATED, EXTENDED RELEASE ORAL ONCE
Status: COMPLETED | OUTPATIENT
Start: 2023-08-27 | End: 2023-08-27

## 2023-08-27 RX ORDER — CARVEDILOL 12.5 MG/1
50 TABLET ORAL 2 TIMES DAILY
Status: DISCONTINUED | OUTPATIENT
Start: 2023-08-27 | End: 2023-08-28

## 2023-08-27 RX ORDER — MAGNESIUM SULFATE IN WATER 40 MG/ML
2000 INJECTION, SOLUTION INTRAVENOUS ONCE
Status: COMPLETED | OUTPATIENT
Start: 2023-08-27 | End: 2023-08-27

## 2023-08-27 RX ADMIN — CARVEDILOL 37.5 MG: 12.5 TABLET, FILM COATED ORAL at 09:55

## 2023-08-27 RX ADMIN — HYDROMORPHONE HYDROCHLORIDE 3 MG: 2 TABLET ORAL at 01:45

## 2023-08-27 RX ADMIN — Medication 1 UNITS: at 10:06

## 2023-08-27 RX ADMIN — ISOSORBIDE DINITRATE 20 MG: 20 TABLET ORAL at 23:59

## 2023-08-27 RX ADMIN — Medication 12 UNITS: at 10:07

## 2023-08-27 RX ADMIN — POTASSIUM CHLORIDE 30 MEQ: 750 TABLET, FILM COATED, EXTENDED RELEASE ORAL at 17:55

## 2023-08-27 RX ADMIN — CARVEDILOL 50 MG: 12.5 TABLET, FILM COATED ORAL at 21:13

## 2023-08-27 RX ADMIN — Medication 5 UNITS: at 17:57

## 2023-08-27 RX ADMIN — CARVEDILOL 37.5 MG: 12.5 TABLET, FILM COATED ORAL at 01:06

## 2023-08-27 RX ADMIN — PREGABALIN 50 MG: 25 CAPSULE ORAL at 01:06

## 2023-08-27 RX ADMIN — MAGNESIUM SULFATE HEPTAHYDRATE 2000 MG: 40 INJECTION, SOLUTION INTRAVENOUS at 17:54

## 2023-08-27 RX ADMIN — Medication 10 ML: at 21:13

## 2023-08-27 RX ADMIN — TORSEMIDE 60 MG: 20 TABLET ORAL at 09:55

## 2023-08-27 RX ADMIN — Medication 5 UNITS: at 10:07

## 2023-08-27 RX ADMIN — Medication 10 ML: at 09:58

## 2023-08-27 RX ADMIN — ISOSORBIDE DINITRATE 20 MG: 20 TABLET ORAL at 01:06

## 2023-08-27 RX ADMIN — PREGABALIN 50 MG: 25 CAPSULE ORAL at 09:55

## 2023-08-27 RX ADMIN — PREGABALIN 50 MG: 25 CAPSULE ORAL at 21:13

## 2023-08-27 RX ADMIN — ISOSORBIDE DINITRATE 20 MG: 20 TABLET ORAL at 09:54

## 2023-08-27 RX ADMIN — Medication 12 UNITS: at 17:57

## 2023-08-27 ASSESSMENT — PAIN DESCRIPTION - LOCATION: LOCATION: LEG

## 2023-08-27 ASSESSMENT — PAIN SCALES - GENERAL
PAINLEVEL_OUTOF10: 7
PAINLEVEL_OUTOF10: 0

## 2023-08-27 ASSESSMENT — ENCOUNTER SYMPTOMS
SHORTNESS OF BREATH: 0
NAUSEA: 0
COUGH: 0
ABDOMINAL DISTENTION: 0

## 2023-08-27 ASSESSMENT — PAIN DESCRIPTION - DESCRIPTORS: DESCRIPTORS: BURNING

## 2023-08-27 ASSESSMENT — PAIN DESCRIPTION - ORIENTATION: ORIENTATION: RIGHT;LEFT

## 2023-08-28 LAB
ALBUMIN SERPL-MCNC: 3 G/DL (ref 3.5–5)
ALBUMIN/GLOB SERPL: 0.9 (ref 1.1–2.2)
ALP SERPL-CCNC: 125 U/L (ref 45–117)
ALT SERPL-CCNC: 13 U/L (ref 12–78)
ANION GAP SERPL CALC-SCNC: 8 MMOL/L (ref 5–15)
AST SERPL-CCNC: 20 U/L (ref 15–37)
BILIRUB DIRECT SERPL-MCNC: 0.6 MG/DL (ref 0–0.2)
BILIRUB SERPL-MCNC: 1 MG/DL (ref 0.2–1)
BUN SERPL-MCNC: 62 MG/DL (ref 6–20)
BUN/CREAT SERPL: 29 (ref 12–20)
CALCIUM SERPL-MCNC: 9.1 MG/DL (ref 8.5–10.1)
CHLORIDE SERPL-SCNC: 102 MMOL/L (ref 97–108)
CO2 SERPL-SCNC: 24 MMOL/L (ref 21–32)
CREAT SERPL-MCNC: 2.12 MG/DL (ref 0.7–1.3)
ERYTHROCYTE [DISTWIDTH] IN BLOOD BY AUTOMATED COUNT: 17.3 % (ref 11.5–14.5)
GLOBULIN SER CALC-MCNC: 3.5 G/DL (ref 2–4)
GLUCOSE BLD STRIP.AUTO-MCNC: 121 MG/DL (ref 65–117)
GLUCOSE BLD STRIP.AUTO-MCNC: 134 MG/DL (ref 65–117)
GLUCOSE BLD STRIP.AUTO-MCNC: 196 MG/DL (ref 65–117)
GLUCOSE BLD STRIP.AUTO-MCNC: 213 MG/DL (ref 65–117)
GLUCOSE SERPL-MCNC: 161 MG/DL (ref 65–100)
HCT VFR BLD AUTO: 30.8 % (ref 36.6–50.3)
HGB BLD-MCNC: 9.6 G/DL (ref 12.1–17)
MCH RBC QN AUTO: 26.2 PG (ref 26–34)
MCHC RBC AUTO-ENTMCNC: 31.2 G/DL (ref 30–36.5)
MCV RBC AUTO: 84.2 FL (ref 80–99)
NRBC # BLD: 0 K/UL (ref 0–0.01)
NRBC BLD-RTO: 0 PER 100 WBC
NT PRO BNP: ABNORMAL PG/ML
PLATELET # BLD AUTO: 296 K/UL (ref 150–400)
PMV BLD AUTO: 12.5 FL (ref 8.9–12.9)
POTASSIUM SERPL-SCNC: 4 MMOL/L (ref 3.5–5.1)
PROT SERPL-MCNC: 6.5 G/DL (ref 6.4–8.2)
RBC # BLD AUTO: 3.66 M/UL (ref 4.1–5.7)
SERVICE CMNT-IMP: ABNORMAL
SODIUM SERPL-SCNC: 134 MMOL/L (ref 136–145)
WBC # BLD AUTO: 8.9 K/UL (ref 4.1–11.1)

## 2023-08-28 PROCEDURE — 80076 HEPATIC FUNCTION PANEL: CPT

## 2023-08-28 PROCEDURE — 83880 ASSAY OF NATRIURETIC PEPTIDE: CPT

## 2023-08-28 PROCEDURE — 2060000000 HC ICU INTERMEDIATE R&B

## 2023-08-28 PROCEDURE — 85027 COMPLETE CBC AUTOMATED: CPT

## 2023-08-28 PROCEDURE — 97530 THERAPEUTIC ACTIVITIES: CPT

## 2023-08-28 PROCEDURE — 99232 SBSQ HOSP IP/OBS MODERATE 35: CPT | Performed by: NURSE PRACTITIONER

## 2023-08-28 PROCEDURE — 97116 GAIT TRAINING THERAPY: CPT

## 2023-08-28 PROCEDURE — 2580000003 HC RX 258: Performed by: INTERNAL MEDICINE

## 2023-08-28 PROCEDURE — 6370000000 HC RX 637 (ALT 250 FOR IP): Performed by: NURSE PRACTITIONER

## 2023-08-28 PROCEDURE — 6360000002 HC RX W HCPCS: Performed by: NURSE PRACTITIONER

## 2023-08-28 PROCEDURE — 82962 GLUCOSE BLOOD TEST: CPT

## 2023-08-28 PROCEDURE — 97535 SELF CARE MNGMENT TRAINING: CPT

## 2023-08-28 PROCEDURE — 6360000002 HC RX W HCPCS: Performed by: INTERNAL MEDICINE

## 2023-08-28 PROCEDURE — 6370000000 HC RX 637 (ALT 250 FOR IP): Performed by: INTERNAL MEDICINE

## 2023-08-28 PROCEDURE — 36415 COLL VENOUS BLD VENIPUNCTURE: CPT

## 2023-08-28 PROCEDURE — P9047 ALBUMIN (HUMAN), 25%, 50ML: HCPCS | Performed by: NURSE PRACTITIONER

## 2023-08-28 PROCEDURE — 6370000000 HC RX 637 (ALT 250 FOR IP)

## 2023-08-28 PROCEDURE — 2580000003 HC RX 258: Performed by: HOSPITALIST

## 2023-08-28 PROCEDURE — 80048 BASIC METABOLIC PNL TOTAL CA: CPT

## 2023-08-28 PROCEDURE — 6370000000 HC RX 637 (ALT 250 FOR IP): Performed by: HOSPITALIST

## 2023-08-28 RX ORDER — INSULIN LISPRO 100 [IU]/ML
6 INJECTION, SOLUTION INTRAVENOUS; SUBCUTANEOUS
Status: DISCONTINUED | OUTPATIENT
Start: 2023-08-28 | End: 2023-08-29

## 2023-08-28 RX ORDER — CARVEDILOL 12.5 MG/1
37.5 TABLET ORAL 2 TIMES DAILY
Status: DISCONTINUED | OUTPATIENT
Start: 2023-08-28 | End: 2023-09-02

## 2023-08-28 RX ORDER — ALBUMIN (HUMAN) 12.5 G/50ML
12.5 SOLUTION INTRAVENOUS ONCE
Status: COMPLETED | OUTPATIENT
Start: 2023-08-28 | End: 2023-08-28

## 2023-08-28 RX ADMIN — ERYTHROPOIETIN 10000 UNITS: 10000 INJECTION, SOLUTION INTRAVENOUS; SUBCUTANEOUS at 21:53

## 2023-08-28 RX ADMIN — PREGABALIN 50 MG: 25 CAPSULE ORAL at 21:41

## 2023-08-28 RX ADMIN — ALBUMIN (HUMAN) 12.5 G: 0.25 INJECTION, SOLUTION INTRAVENOUS at 17:47

## 2023-08-28 RX ADMIN — Medication 12 UNITS: at 07:30

## 2023-08-28 RX ADMIN — Medication 5 UNITS: at 09:42

## 2023-08-28 RX ADMIN — HYDROMORPHONE HYDROCHLORIDE 3 MG: 2 TABLET ORAL at 15:41

## 2023-08-28 RX ADMIN — Medication 6 UNITS: at 17:46

## 2023-08-28 RX ADMIN — CARVEDILOL 37.5 MG: 12.5 TABLET, FILM COATED ORAL at 21:41

## 2023-08-28 RX ADMIN — Medication 1 UNITS: at 09:36

## 2023-08-28 RX ADMIN — SENNOSIDES AND DOCUSATE SODIUM 1 TABLET: 50; 8.6 TABLET ORAL at 09:36

## 2023-08-28 RX ADMIN — SODIUM THIOSULFATE 25 G: 250 INJECTION, SOLUTION INTRAVENOUS at 09:42

## 2023-08-28 RX ADMIN — TORSEMIDE 60 MG: 20 TABLET ORAL at 09:36

## 2023-08-28 RX ADMIN — Medication 5 UNITS: at 12:42

## 2023-08-28 RX ADMIN — Medication 10 ML: at 09:43

## 2023-08-28 RX ADMIN — Medication 14 UNITS: at 17:46

## 2023-08-28 RX ADMIN — PREGABALIN 50 MG: 25 CAPSULE ORAL at 09:36

## 2023-08-28 RX ADMIN — Medication 10 ML: at 21:50

## 2023-08-28 RX ADMIN — HYDROMORPHONE HYDROCHLORIDE 3 MG: 2 TABLET ORAL at 01:12

## 2023-08-28 ASSESSMENT — PAIN SCALES - GENERAL
PAINLEVEL_OUTOF10: 0
PAINLEVEL_OUTOF10: 7
PAINLEVEL_OUTOF10: 0
PAINLEVEL_OUTOF10: 0
PAINLEVEL_OUTOF10: 6
PAINLEVEL_OUTOF10: 7
PAINLEVEL_OUTOF10: 0
PAINLEVEL_OUTOF10: 4
PAINLEVEL_OUTOF10: 4

## 2023-08-28 ASSESSMENT — PAIN DESCRIPTION - LOCATION
LOCATION: LEG

## 2023-08-28 ASSESSMENT — PAIN DESCRIPTION - ORIENTATION: ORIENTATION: RIGHT;LEFT

## 2023-08-28 ASSESSMENT — PAIN DESCRIPTION - DESCRIPTORS: DESCRIPTORS: SHARP

## 2023-08-29 LAB
ALBUMIN SERPL-MCNC: 2.7 G/DL (ref 3.5–5)
ALBUMIN/GLOB SERPL: 0.6 (ref 1.1–2.2)
ALP SERPL-CCNC: 120 U/L (ref 45–117)
ALT SERPL-CCNC: 13 U/L (ref 12–78)
ANION GAP SERPL CALC-SCNC: 15 MMOL/L (ref 5–15)
AST SERPL-CCNC: 12 U/L (ref 15–37)
BILIRUB DIRECT SERPL-MCNC: 0.6 MG/DL (ref 0–0.2)
BILIRUB SERPL-MCNC: 0.9 MG/DL (ref 0.2–1)
BUN SERPL-MCNC: 61 MG/DL (ref 6–20)
BUN/CREAT SERPL: 27 (ref 12–20)
CALCIUM SERPL-MCNC: 9.2 MG/DL (ref 8.5–10.1)
CHLORIDE SERPL-SCNC: 100 MMOL/L (ref 97–108)
CO2 SERPL-SCNC: 19 MMOL/L (ref 21–32)
COMMENT:: NORMAL
CREAT SERPL-MCNC: 2.25 MG/DL (ref 0.7–1.3)
EKG ATRIAL RATE: 264 BPM
EKG ATRIAL RATE: 271 BPM
EKG DIAGNOSIS: NORMAL
EKG DIAGNOSIS: NORMAL
EKG Q-T INTERVAL: 320 MS
EKG Q-T INTERVAL: 400 MS
EKG QRS DURATION: 124 MS
EKG QRS DURATION: 128 MS
EKG QTC CALCULATION (BAZETT): 422 MS
EKG QTC CALCULATION (BAZETT): 568 MS
EKG R AXIS: -32 DEGREES
EKG R AXIS: -33 DEGREES
EKG T AXIS: 106 DEGREES
EKG T AXIS: 121 DEGREES
EKG VENTRICULAR RATE: 105 BPM
EKG VENTRICULAR RATE: 121 BPM
GLOBULIN SER CALC-MCNC: 4.3 G/DL (ref 2–4)
GLUCOSE BLD STRIP.AUTO-MCNC: 134 MG/DL (ref 65–117)
GLUCOSE BLD STRIP.AUTO-MCNC: 158 MG/DL (ref 65–117)
GLUCOSE BLD STRIP.AUTO-MCNC: 174 MG/DL (ref 65–117)
GLUCOSE BLD STRIP.AUTO-MCNC: 92 MG/DL (ref 65–117)
GLUCOSE SERPL-MCNC: 162 MG/DL (ref 65–100)
NT PRO BNP: ABNORMAL PG/ML
POTASSIUM SERPL-SCNC: 3.5 MMOL/L (ref 3.5–5.1)
PROT SERPL-MCNC: 7 G/DL (ref 6.4–8.2)
SERVICE CMNT-IMP: ABNORMAL
SERVICE CMNT-IMP: NORMAL
SODIUM SERPL-SCNC: 134 MMOL/L (ref 136–145)
SPECIMEN HOLD: NORMAL

## 2023-08-29 PROCEDURE — 2580000003 HC RX 258: Performed by: NURSE PRACTITIONER

## 2023-08-29 PROCEDURE — 97530 THERAPEUTIC ACTIVITIES: CPT

## 2023-08-29 PROCEDURE — 2580000003 HC RX 258: Performed by: HOSPITALIST

## 2023-08-29 PROCEDURE — 6360000002 HC RX W HCPCS: Performed by: FAMILY MEDICINE

## 2023-08-29 PROCEDURE — 6370000000 HC RX 637 (ALT 250 FOR IP): Performed by: NURSE PRACTITIONER

## 2023-08-29 PROCEDURE — 6360000002 HC RX W HCPCS: Performed by: NURSE PRACTITIONER

## 2023-08-29 PROCEDURE — 80048 BASIC METABOLIC PNL TOTAL CA: CPT

## 2023-08-29 PROCEDURE — P9047 ALBUMIN (HUMAN), 25%, 50ML: HCPCS | Performed by: NURSE PRACTITIONER

## 2023-08-29 PROCEDURE — 82962 GLUCOSE BLOOD TEST: CPT

## 2023-08-29 PROCEDURE — 83880 ASSAY OF NATRIURETIC PEPTIDE: CPT

## 2023-08-29 PROCEDURE — 6370000000 HC RX 637 (ALT 250 FOR IP): Performed by: INTERNAL MEDICINE

## 2023-08-29 PROCEDURE — 99232 SBSQ HOSP IP/OBS MODERATE 35: CPT | Performed by: NURSE PRACTITIONER

## 2023-08-29 PROCEDURE — 2060000000 HC ICU INTERMEDIATE R&B

## 2023-08-29 PROCEDURE — 80076 HEPATIC FUNCTION PANEL: CPT

## 2023-08-29 PROCEDURE — 99231 SBSQ HOSP IP/OBS SF/LOW 25: CPT

## 2023-08-29 PROCEDURE — 36415 COLL VENOUS BLD VENIPUNCTURE: CPT

## 2023-08-29 PROCEDURE — 99223 1ST HOSP IP/OBS HIGH 75: CPT | Performed by: INTERNAL MEDICINE

## 2023-08-29 PROCEDURE — 97116 GAIT TRAINING THERAPY: CPT

## 2023-08-29 PROCEDURE — 6370000000 HC RX 637 (ALT 250 FOR IP)

## 2023-08-29 PROCEDURE — 6370000000 HC RX 637 (ALT 250 FOR IP): Performed by: FAMILY MEDICINE

## 2023-08-29 RX ORDER — HYDROMORPHONE HYDROCHLORIDE 2 MG/1
3 TABLET ORAL EVERY 4 HOURS PRN
Status: DISCONTINUED | OUTPATIENT
Start: 2023-08-29 | End: 2023-09-03 | Stop reason: HOSPADM

## 2023-08-29 RX ORDER — SODIUM CHLORIDE 9 MG/ML
INJECTION, SOLUTION INTRAVENOUS CONTINUOUS
Status: DISPENSED | OUTPATIENT
Start: 2023-08-29 | End: 2023-08-29

## 2023-08-29 RX ORDER — HYDROMORPHONE HYDROCHLORIDE 1 MG/ML
1 INJECTION, SOLUTION INTRAMUSCULAR; INTRAVENOUS; SUBCUTANEOUS DAILY PRN
Status: DISCONTINUED | OUTPATIENT
Start: 2023-08-29 | End: 2023-09-03 | Stop reason: HOSPADM

## 2023-08-29 RX ORDER — INSULIN LISPRO 100 [IU]/ML
5 INJECTION, SOLUTION INTRAVENOUS; SUBCUTANEOUS
Status: DISCONTINUED | OUTPATIENT
Start: 2023-08-29 | End: 2023-09-03 | Stop reason: HOSPADM

## 2023-08-29 RX ORDER — MIDODRINE HYDROCHLORIDE 5 MG/1
10 TABLET ORAL ONCE
Status: COMPLETED | OUTPATIENT
Start: 2023-08-29 | End: 2023-08-29

## 2023-08-29 RX ORDER — ALBUMIN (HUMAN) 12.5 G/50ML
12.5 SOLUTION INTRAVENOUS ONCE
Status: COMPLETED | OUTPATIENT
Start: 2023-08-29 | End: 2023-08-29

## 2023-08-29 RX ORDER — FENTANYL 25 UG/1
1 PATCH TRANSDERMAL
Status: DISCONTINUED | OUTPATIENT
Start: 2023-08-29 | End: 2023-09-03 | Stop reason: HOSPADM

## 2023-08-29 RX ORDER — FENTANYL 12.5 UG/1
1 PATCH TRANSDERMAL
Status: DISCONTINUED | OUTPATIENT
Start: 2023-08-29 | End: 2023-09-03 | Stop reason: HOSPADM

## 2023-08-29 RX ORDER — PREGABALIN 100 MG/1
100 CAPSULE ORAL 2 TIMES DAILY
Status: DISCONTINUED | OUTPATIENT
Start: 2023-08-29 | End: 2023-09-03 | Stop reason: HOSPADM

## 2023-08-29 RX ADMIN — CARVEDILOL 37.5 MG: 12.5 TABLET, FILM COATED ORAL at 09:01

## 2023-08-29 RX ADMIN — Medication 6 UNITS: at 09:01

## 2023-08-29 RX ADMIN — HYDROMORPHONE HYDROCHLORIDE 3 MG: 2 TABLET ORAL at 06:48

## 2023-08-29 RX ADMIN — TORSEMIDE 60 MG: 20 TABLET ORAL at 09:01

## 2023-08-29 RX ADMIN — Medication 14 UNITS: at 06:47

## 2023-08-29 RX ADMIN — HYDROMORPHONE HYDROCHLORIDE 1 MG: 1 INJECTION, SOLUTION INTRAMUSCULAR; INTRAVENOUS; SUBCUTANEOUS at 12:13

## 2023-08-29 RX ADMIN — Medication 10 ML: at 23:05

## 2023-08-29 RX ADMIN — SENNOSIDES AND DOCUSATE SODIUM 1 TABLET: 50; 8.6 TABLET ORAL at 09:01

## 2023-08-29 RX ADMIN — PREGABALIN 50 MG: 25 CAPSULE ORAL at 09:01

## 2023-08-29 RX ADMIN — SODIUM CHLORIDE: 9 INJECTION, SOLUTION INTRAVENOUS at 12:25

## 2023-08-29 RX ADMIN — PREGABALIN 100 MG: 100 CAPSULE ORAL at 23:05

## 2023-08-29 RX ADMIN — MIDODRINE HYDROCHLORIDE 10 MG: 5 TABLET ORAL at 12:13

## 2023-08-29 RX ADMIN — CARVEDILOL 37.5 MG: 12.5 TABLET, FILM COATED ORAL at 23:04

## 2023-08-29 RX ADMIN — Medication 10 ML: at 09:02

## 2023-08-29 RX ADMIN — ALBUMIN (HUMAN) 12.5 G: 0.25 INJECTION, SOLUTION INTRAVENOUS at 11:42

## 2023-08-29 ASSESSMENT — PAIN DESCRIPTION - LOCATION: LOCATION: LEG

## 2023-08-29 ASSESSMENT — PAIN DESCRIPTION - ONSET: ONSET: ON-GOING

## 2023-08-29 ASSESSMENT — PAIN - FUNCTIONAL ASSESSMENT: PAIN_FUNCTIONAL_ASSESSMENT: PREVENTS OR INTERFERES SOME ACTIVE ACTIVITIES AND ADLS

## 2023-08-29 ASSESSMENT — PAIN DESCRIPTION - FREQUENCY: FREQUENCY: CONTINUOUS

## 2023-08-29 ASSESSMENT — PAIN SCALES - GENERAL
PAINLEVEL_OUTOF10: 3
PAINLEVEL_OUTOF10: 0
PAINLEVEL_OUTOF10: 7
PAINLEVEL_OUTOF10: 0

## 2023-08-29 ASSESSMENT — PAIN DESCRIPTION - ORIENTATION: ORIENTATION: RIGHT;LEFT

## 2023-08-29 ASSESSMENT — PAIN DESCRIPTION - DESCRIPTORS: DESCRIPTORS: ACHING

## 2023-08-29 ASSESSMENT — PAIN DESCRIPTION - PAIN TYPE: TYPE: CHRONIC PAIN

## 2023-08-29 NOTE — WOUND CARE
Wound Care Note:     Wound care follow up for bilateral lower legs; assessed with Dr. Bam Fuentes shows:  Admitted for edema of right upper arm  Past Medical History:   Diagnosis Date    Congestive heart failure ([address])     Coronary artery disease     Diabetes ([address])     Heart failure ([address])     CHF, cardiomyopathy    Hypertension     ICD (implantable cardioverter-defibrillator) in place     left upper chest    PAD (peripheral artery disease) ([address])      WBC = 8.9 on 8/28/23  Admitted from physicians office    Assessment:   Patient is A&O x 4, communicative, continent with no assistance needed in repositioning. Bed: [facility]  Diet: Adult diet regular 4 carb choices, low fat/low chol/high fiber/TUNG with nutritional supplements  Patient pre-medicated for pain by RN. Palpable DP pulses bilaterally. 1. POA bilateral lower leg calciphylaxis wounds seem to be improving, wounds are very painful to patient, more tan drainage today than sero/sang, foul odor noted on dressing prior to removal, no odor after removal of dressings. Cleansed with VASHE, Optifoam Gentle NB AG applied to open wounds, ABD pads applied to entire lower leg for comfort, wrapped in thin Kerlix and then roll gauze at request of patient.        Right lateral lower leg       Right medial lower leg      Left medial lower leg      Left lateral lower leg          Patient repositioned supine      Recommendations:    Bilateral lower legs- Every other day and as needed for breakthrough drainage, gently remove dressings, use normal saline to assist with removing any dressing that is sticking, moisten small roll gauze with VASHE and wrap around lower legs, let sit for 5 to 10 minutes, cleanse wounds, apply Optifoam Gentle NB AG to open wounds, use 6 ABD pads (for each lower leg) to cover lower legs, tape can be used to assist with wrapping, apply \"Conforming Gauze Bandage\" roll gauze and apply

## 2023-08-30 LAB
ALBUMIN SERPL-MCNC: 3 G/DL (ref 3.5–5)
ALBUMIN/GLOB SERPL: 0.9 (ref 1.1–2.2)
ALP SERPL-CCNC: 126 U/L (ref 45–117)
ALT SERPL-CCNC: 12 U/L (ref 12–78)
ANION GAP SERPL CALC-SCNC: 11 MMOL/L (ref 5–15)
AST SERPL-CCNC: 12 U/L (ref 15–37)
BILIRUB DIRECT SERPL-MCNC: 0.6 MG/DL (ref 0–0.2)
BILIRUB SERPL-MCNC: 1 MG/DL (ref 0.2–1)
BUN SERPL-MCNC: 57 MG/DL (ref 6–20)
BUN/CREAT SERPL: 29 (ref 12–20)
CALCIUM SERPL-MCNC: 9.1 MG/DL (ref 8.5–10.1)
CHLORIDE SERPL-SCNC: 104 MMOL/L (ref 97–108)
CO2 SERPL-SCNC: 21 MMOL/L (ref 21–32)
CREAT SERPL-MCNC: 1.99 MG/DL (ref 0.7–1.3)
FERRITIN SERPL-MCNC: 209 NG/ML (ref 26–388)
GLOBULIN SER CALC-MCNC: 3.5 G/DL (ref 2–4)
GLUCOSE BLD STRIP.AUTO-MCNC: 117 MG/DL (ref 65–117)
GLUCOSE BLD STRIP.AUTO-MCNC: 169 MG/DL (ref 65–117)
GLUCOSE BLD STRIP.AUTO-MCNC: 187 MG/DL (ref 65–117)
GLUCOSE BLD STRIP.AUTO-MCNC: 210 MG/DL (ref 65–117)
GLUCOSE SERPL-MCNC: 155 MG/DL (ref 65–100)
IRON SATN MFR SERPL: 9 % (ref 20–50)
IRON SERPL-MCNC: 14 UG/DL (ref 35–150)
LACTATE SERPL-SCNC: 0.7 MMOL/L (ref 0.4–2)
NT PRO BNP: ABNORMAL PG/ML
POTASSIUM SERPL-SCNC: 3.5 MMOL/L (ref 3.5–5.1)
PROT SERPL-MCNC: 6.5 G/DL (ref 6.4–8.2)
SERVICE CMNT-IMP: ABNORMAL
SERVICE CMNT-IMP: NORMAL
SODIUM SERPL-SCNC: 136 MMOL/L (ref 136–145)
TIBC SERPL-MCNC: 156 UG/DL (ref 250–450)

## 2023-08-30 PROCEDURE — 6370000000 HC RX 637 (ALT 250 FOR IP)

## 2023-08-30 PROCEDURE — 83540 ASSAY OF IRON: CPT

## 2023-08-30 PROCEDURE — 83550 IRON BINDING TEST: CPT

## 2023-08-30 PROCEDURE — 97530 THERAPEUTIC ACTIVITIES: CPT

## 2023-08-30 PROCEDURE — 82728 ASSAY OF FERRITIN: CPT

## 2023-08-30 PROCEDURE — 6370000000 HC RX 637 (ALT 250 FOR IP): Performed by: FAMILY MEDICINE

## 2023-08-30 PROCEDURE — 6360000002 HC RX W HCPCS: Performed by: NURSE PRACTITIONER

## 2023-08-30 PROCEDURE — 83880 ASSAY OF NATRIURETIC PEPTIDE: CPT

## 2023-08-30 PROCEDURE — 80048 BASIC METABOLIC PNL TOTAL CA: CPT

## 2023-08-30 PROCEDURE — 99231 SBSQ HOSP IP/OBS SF/LOW 25: CPT | Performed by: NURSE PRACTITIONER

## 2023-08-30 PROCEDURE — P9047 ALBUMIN (HUMAN), 25%, 50ML: HCPCS | Performed by: NURSE PRACTITIONER

## 2023-08-30 PROCEDURE — 2580000003 HC RX 258: Performed by: HOSPITALIST

## 2023-08-30 PROCEDURE — 6370000000 HC RX 637 (ALT 250 FOR IP): Performed by: NURSE PRACTITIONER

## 2023-08-30 PROCEDURE — 80076 HEPATIC FUNCTION PANEL: CPT

## 2023-08-30 PROCEDURE — 99231 SBSQ HOSP IP/OBS SF/LOW 25: CPT

## 2023-08-30 PROCEDURE — 36415 COLL VENOUS BLD VENIPUNCTURE: CPT

## 2023-08-30 PROCEDURE — 97116 GAIT TRAINING THERAPY: CPT

## 2023-08-30 PROCEDURE — 83605 ASSAY OF LACTIC ACID: CPT

## 2023-08-30 PROCEDURE — 2060000000 HC ICU INTERMEDIATE R&B

## 2023-08-30 PROCEDURE — 82962 GLUCOSE BLOOD TEST: CPT

## 2023-08-30 PROCEDURE — 6370000000 HC RX 637 (ALT 250 FOR IP): Performed by: HOSPITALIST

## 2023-08-30 PROCEDURE — 2580000003 HC RX 258: Performed by: NURSE PRACTITIONER

## 2023-08-30 RX ORDER — POTASSIUM CHLORIDE 750 MG/1
40 TABLET, FILM COATED, EXTENDED RELEASE ORAL DAILY
Status: DISCONTINUED | OUTPATIENT
Start: 2023-08-30 | End: 2023-09-03 | Stop reason: HOSPADM

## 2023-08-30 RX ORDER — SODIUM CHLORIDE 9 MG/ML
INJECTION, SOLUTION INTRAVENOUS CONTINUOUS
Status: DISPENSED | OUTPATIENT
Start: 2023-08-30 | End: 2023-08-30

## 2023-08-30 RX ORDER — MIDODRINE HYDROCHLORIDE 5 MG/1
10 TABLET ORAL 3 TIMES DAILY PRN
Status: DISCONTINUED | OUTPATIENT
Start: 2023-08-30 | End: 2023-08-31

## 2023-08-30 RX ORDER — ALBUMIN (HUMAN) 12.5 G/50ML
12.5 SOLUTION INTRAVENOUS ONCE
Status: COMPLETED | OUTPATIENT
Start: 2023-08-30 | End: 2023-08-30

## 2023-08-30 RX ADMIN — Medication 10 ML: at 22:32

## 2023-08-30 RX ADMIN — SODIUM CHLORIDE: 9 INJECTION, SOLUTION INTRAVENOUS at 13:09

## 2023-08-30 RX ADMIN — Medication 10 ML: at 09:33

## 2023-08-30 RX ADMIN — PREGABALIN 100 MG: 100 CAPSULE ORAL at 09:31

## 2023-08-30 RX ADMIN — Medication 14 UNITS: at 16:59

## 2023-08-30 RX ADMIN — Medication 14 UNITS: at 07:13

## 2023-08-30 RX ADMIN — IRON SUCROSE 200 MG: 20 INJECTION, SOLUTION INTRAVENOUS at 16:59

## 2023-08-30 RX ADMIN — SENNOSIDES AND DOCUSATE SODIUM 1 TABLET: 50; 8.6 TABLET ORAL at 09:31

## 2023-08-30 RX ADMIN — PREGABALIN 100 MG: 100 CAPSULE ORAL at 22:20

## 2023-08-30 RX ADMIN — Medication 1 UNITS: at 16:59

## 2023-08-30 RX ADMIN — MIDODRINE HYDROCHLORIDE 10 MG: 5 TABLET ORAL at 13:52

## 2023-08-30 RX ADMIN — CARVEDILOL 37.5 MG: 12.5 TABLET, FILM COATED ORAL at 09:31

## 2023-08-30 RX ADMIN — ERYTHROPOIETIN 10000 UNITS: 10000 INJECTION, SOLUTION INTRAVENOUS; SUBCUTANEOUS at 22:20

## 2023-08-30 RX ADMIN — Medication 5 UNITS: at 16:59

## 2023-08-30 RX ADMIN — HYDROMORPHONE HYDROCHLORIDE 3 MG: 2 TABLET ORAL at 09:33

## 2023-08-30 RX ADMIN — ALBUMIN (HUMAN) 12.5 G: 0.25 INJECTION, SOLUTION INTRAVENOUS at 13:08

## 2023-08-30 RX ADMIN — HYDROMORPHONE HYDROCHLORIDE 3 MG: 2 TABLET ORAL at 13:52

## 2023-08-30 RX ADMIN — Medication 5 UNITS: at 09:04

## 2023-08-30 RX ADMIN — POTASSIUM CHLORIDE 40 MEQ: 750 TABLET, FILM COATED, EXTENDED RELEASE ORAL at 13:09

## 2023-08-30 RX ADMIN — MIDODRINE HYDROCHLORIDE 10 MG: 5 TABLET ORAL at 22:32

## 2023-08-30 ASSESSMENT — PAIN SCALES - GENERAL
PAINLEVEL_OUTOF10: 7
PAINLEVEL_OUTOF10: 7
PAINLEVEL_OUTOF10: 0

## 2023-08-30 ASSESSMENT — PAIN DESCRIPTION - DESCRIPTORS
DESCRIPTORS: ACHING
DESCRIPTORS: ACHING

## 2023-08-30 ASSESSMENT — PAIN DESCRIPTION - LOCATION
LOCATION: LEG
LOCATION: LEG

## 2023-08-30 ASSESSMENT — PAIN DESCRIPTION - ORIENTATION
ORIENTATION: RIGHT
ORIENTATION: LEFT;RIGHT

## 2023-08-31 LAB
ALBUMIN SERPL-MCNC: 2.7 G/DL (ref 3.5–5)
ALBUMIN/GLOB SERPL: 0.7 (ref 1.1–2.2)
ALP SERPL-CCNC: 122 U/L (ref 45–117)
ALT SERPL-CCNC: 12 U/L (ref 12–78)
ANION GAP SERPL CALC-SCNC: 9 MMOL/L (ref 5–15)
AST SERPL-CCNC: 10 U/L (ref 15–37)
BILIRUB DIRECT SERPL-MCNC: 0.5 MG/DL (ref 0–0.2)
BILIRUB SERPL-MCNC: 0.9 MG/DL (ref 0.2–1)
BUN SERPL-MCNC: 61 MG/DL (ref 6–20)
BUN/CREAT SERPL: 31 (ref 12–20)
CALCIUM SERPL-MCNC: 9.2 MG/DL (ref 8.5–10.1)
CHLORIDE SERPL-SCNC: 105 MMOL/L (ref 97–108)
CO2 SERPL-SCNC: 20 MMOL/L (ref 21–32)
COMMENT:: NORMAL
CREAT SERPL-MCNC: 1.99 MG/DL (ref 0.7–1.3)
GLOBULIN SER CALC-MCNC: 4 G/DL (ref 2–4)
GLUCOSE BLD STRIP.AUTO-MCNC: 100 MG/DL (ref 65–117)
GLUCOSE BLD STRIP.AUTO-MCNC: 155 MG/DL (ref 65–117)
GLUCOSE BLD STRIP.AUTO-MCNC: 162 MG/DL (ref 65–117)
GLUCOSE BLD STRIP.AUTO-MCNC: 80 MG/DL (ref 65–117)
GLUCOSE SERPL-MCNC: 150 MG/DL (ref 65–100)
NT PRO BNP: ABNORMAL PG/ML
POTASSIUM SERPL-SCNC: 3.8 MMOL/L (ref 3.5–5.1)
PROT SERPL-MCNC: 6.7 G/DL (ref 6.4–8.2)
SERVICE CMNT-IMP: ABNORMAL
SERVICE CMNT-IMP: ABNORMAL
SERVICE CMNT-IMP: NORMAL
SERVICE CMNT-IMP: NORMAL
SODIUM SERPL-SCNC: 134 MMOL/L (ref 136–145)
SPECIMEN HOLD: NORMAL

## 2023-08-31 PROCEDURE — 6370000000 HC RX 637 (ALT 250 FOR IP): Performed by: NURSE PRACTITIONER

## 2023-08-31 PROCEDURE — 2580000003 HC RX 258: Performed by: NURSE PRACTITIONER

## 2023-08-31 PROCEDURE — 97530 THERAPEUTIC ACTIVITIES: CPT

## 2023-08-31 PROCEDURE — 80048 BASIC METABOLIC PNL TOTAL CA: CPT

## 2023-08-31 PROCEDURE — 36415 COLL VENOUS BLD VENIPUNCTURE: CPT

## 2023-08-31 PROCEDURE — 2060000000 HC ICU INTERMEDIATE R&B

## 2023-08-31 PROCEDURE — 99231 SBSQ HOSP IP/OBS SF/LOW 25: CPT | Performed by: NURSE PRACTITIONER

## 2023-08-31 PROCEDURE — 97535 SELF CARE MNGMENT TRAINING: CPT

## 2023-08-31 PROCEDURE — 2580000003 HC RX 258: Performed by: HOSPITALIST

## 2023-08-31 PROCEDURE — 80076 HEPATIC FUNCTION PANEL: CPT

## 2023-08-31 PROCEDURE — 2580000003 HC RX 258: Performed by: INTERNAL MEDICINE

## 2023-08-31 PROCEDURE — 6370000000 HC RX 637 (ALT 250 FOR IP)

## 2023-08-31 PROCEDURE — 97116 GAIT TRAINING THERAPY: CPT

## 2023-08-31 PROCEDURE — 94760 N-INVAS EAR/PLS OXIMETRY 1: CPT

## 2023-08-31 PROCEDURE — 83880 ASSAY OF NATRIURETIC PEPTIDE: CPT

## 2023-08-31 PROCEDURE — P9047 ALBUMIN (HUMAN), 25%, 50ML: HCPCS | Performed by: NURSE PRACTITIONER

## 2023-08-31 PROCEDURE — 6360000002 HC RX W HCPCS: Performed by: INTERNAL MEDICINE

## 2023-08-31 PROCEDURE — 82962 GLUCOSE BLOOD TEST: CPT

## 2023-08-31 PROCEDURE — 6360000002 HC RX W HCPCS: Performed by: NURSE PRACTITIONER

## 2023-08-31 RX ORDER — MIDODRINE HYDROCHLORIDE 5 MG/1
5 TABLET ORAL 3 TIMES DAILY PRN
Status: DISCONTINUED | OUTPATIENT
Start: 2023-08-31 | End: 2023-09-03 | Stop reason: HOSPADM

## 2023-08-31 RX ORDER — ALBUMIN (HUMAN) 12.5 G/50ML
25 SOLUTION INTRAVENOUS ONCE
Status: COMPLETED | OUTPATIENT
Start: 2023-08-31 | End: 2023-08-31

## 2023-08-31 RX ADMIN — Medication 14 UNITS: at 17:29

## 2023-08-31 RX ADMIN — Medication 14 UNITS: at 08:40

## 2023-08-31 RX ADMIN — HYDROMORPHONE HYDROCHLORIDE 3 MG: 2 TABLET ORAL at 09:41

## 2023-08-31 RX ADMIN — SENNOSIDES AND DOCUSATE SODIUM 1 TABLET: 50; 8.6 TABLET ORAL at 08:40

## 2023-08-31 RX ADMIN — HYDROMORPHONE HYDROCHLORIDE 3 MG: 2 TABLET ORAL at 14:03

## 2023-08-31 RX ADMIN — HYDROMORPHONE HYDROCHLORIDE 3 MG: 2 TABLET ORAL at 03:06

## 2023-08-31 RX ADMIN — Medication 10 ML: at 22:40

## 2023-08-31 RX ADMIN — CARVEDILOL 37.5 MG: 12.5 TABLET, FILM COATED ORAL at 08:40

## 2023-08-31 RX ADMIN — Medication 10 ML: at 08:41

## 2023-08-31 RX ADMIN — PREGABALIN 100 MG: 100 CAPSULE ORAL at 08:41

## 2023-08-31 RX ADMIN — POTASSIUM CHLORIDE 40 MEQ: 750 TABLET, FILM COATED, EXTENDED RELEASE ORAL at 08:40

## 2023-08-31 RX ADMIN — HYDROMORPHONE HYDROCHLORIDE 3 MG: 2 TABLET ORAL at 22:38

## 2023-08-31 RX ADMIN — Medication 5 UNITS: at 08:40

## 2023-08-31 RX ADMIN — Medication 5 UNITS: at 12:33

## 2023-08-31 RX ADMIN — PREGABALIN 100 MG: 100 CAPSULE ORAL at 22:39

## 2023-08-31 RX ADMIN — IRON SUCROSE 200 MG: 20 INJECTION, SOLUTION INTRAVENOUS at 11:14

## 2023-08-31 RX ADMIN — SODIUM THIOSULFATE 25 G: 250 INJECTION, SOLUTION INTRAVENOUS at 09:40

## 2023-08-31 RX ADMIN — ALBUMIN (HUMAN) 25 G: 0.25 INJECTION, SOLUTION INTRAVENOUS at 17:29

## 2023-08-31 ASSESSMENT — PAIN DESCRIPTION - ORIENTATION
ORIENTATION: RIGHT;LEFT
ORIENTATION: LEFT;RIGHT
ORIENTATION: RIGHT;LEFT

## 2023-08-31 ASSESSMENT — PAIN DESCRIPTION - LOCATION
LOCATION: LEG

## 2023-08-31 ASSESSMENT — PAIN - FUNCTIONAL ASSESSMENT
PAIN_FUNCTIONAL_ASSESSMENT: PREVENTS OR INTERFERES SOME ACTIVE ACTIVITIES AND ADLS
PAIN_FUNCTIONAL_ASSESSMENT: PREVENTS OR INTERFERES SOME ACTIVE ACTIVITIES AND ADLS
PAIN_FUNCTIONAL_ASSESSMENT: PREVENTS OR INTERFERES WITH MANY ACTIVE NOT PASSIVE ACTIVITIES
PAIN_FUNCTIONAL_ASSESSMENT: PREVENTS OR INTERFERES SOME ACTIVE ACTIVITIES AND ADLS

## 2023-08-31 ASSESSMENT — PAIN SCALES - GENERAL
PAINLEVEL_OUTOF10: 6
PAINLEVEL_OUTOF10: 7
PAINLEVEL_OUTOF10: 0
PAINLEVEL_OUTOF10: 0
PAINLEVEL_OUTOF10: 4
PAINLEVEL_OUTOF10: 2
PAINLEVEL_OUTOF10: 6
PAINLEVEL_OUTOF10: 10
PAINLEVEL_OUTOF10: 3
PAINLEVEL_OUTOF10: 0
PAINLEVEL_OUTOF10: 10
PAINLEVEL_OUTOF10: 4
PAINLEVEL_OUTOF10: 8
PAINLEVEL_OUTOF10: 6

## 2023-08-31 ASSESSMENT — PAIN DESCRIPTION - DESCRIPTORS
DESCRIPTORS: ACHING;SQUEEZING;THROBBING
DESCRIPTORS: SHARP
DESCRIPTORS: ACHING;SQUEEZING;THROBBING
DESCRIPTORS: SHARP
DESCRIPTORS: ACHING
DESCRIPTORS: SHARP

## 2023-09-01 PROBLEM — I50.23 ACUTE ON CHRONIC SYSTOLIC HEART FAILURE (HCC): Status: RESOLVED | Noted: 2023-03-02 | Resolved: 2023-09-01

## 2023-09-01 PROBLEM — Z71.89 DNR (DO NOT RESUSCITATE) DISCUSSION: Status: RESOLVED | Noted: 2023-08-23 | Resolved: 2023-09-01

## 2023-09-01 LAB
25(OH)D3 SERPL-MCNC: 22.4 NG/ML (ref 30–100)
ALBUMIN SERPL-MCNC: 3.1 G/DL (ref 3.5–5)
ALBUMIN/GLOB SERPL: 1 (ref 1.1–2.2)
ALP SERPL-CCNC: 112 U/L (ref 45–117)
ALT SERPL-CCNC: 10 U/L (ref 12–78)
ANION GAP SERPL CALC-SCNC: 12 MMOL/L (ref 5–15)
AST SERPL-CCNC: 6 U/L (ref 15–37)
BASOPHILS # BLD: 0.1 K/UL (ref 0–0.1)
BASOPHILS NFR BLD: 1 % (ref 0–1)
BILIRUB DIRECT SERPL-MCNC: 0.7 MG/DL (ref 0–0.2)
BILIRUB SERPL-MCNC: 1.1 MG/DL (ref 0.2–1)
BUN SERPL-MCNC: 58 MG/DL (ref 6–20)
BUN/CREAT SERPL: 30 (ref 12–20)
CALCIUM SERPL-MCNC: 9.4 MG/DL (ref 8.5–10.1)
CHLORIDE SERPL-SCNC: 106 MMOL/L (ref 97–108)
CO2 SERPL-SCNC: 18 MMOL/L (ref 21–32)
CREAT SERPL-MCNC: 1.96 MG/DL (ref 0.7–1.3)
DIFFERENTIAL METHOD BLD: ABNORMAL
EOSINOPHIL # BLD: 0.2 K/UL (ref 0–0.4)
EOSINOPHIL NFR BLD: 2 % (ref 0–7)
ERYTHROCYTE [DISTWIDTH] IN BLOOD BY AUTOMATED COUNT: 17.9 % (ref 11.5–14.5)
ERYTHROCYTE [SEDIMENTATION RATE] IN BLOOD: 67 MM/HR (ref 0–20)
GLOBULIN SER CALC-MCNC: 3.1 G/DL (ref 2–4)
GLUCOSE BLD STRIP.AUTO-MCNC: 122 MG/DL (ref 65–117)
GLUCOSE BLD STRIP.AUTO-MCNC: 158 MG/DL (ref 65–117)
GLUCOSE BLD STRIP.AUTO-MCNC: 167 MG/DL (ref 65–117)
GLUCOSE BLD STRIP.AUTO-MCNC: 190 MG/DL (ref 65–117)
GLUCOSE BLD STRIP.AUTO-MCNC: 69 MG/DL (ref 65–117)
GLUCOSE BLD STRIP.AUTO-MCNC: 82 MG/DL (ref 65–117)
GLUCOSE BLD STRIP.AUTO-MCNC: 96 MG/DL (ref 65–117)
GLUCOSE SERPL-MCNC: 82 MG/DL (ref 65–100)
HCT VFR BLD AUTO: 26.5 % (ref 36.6–50.3)
HGB BLD-MCNC: 8.2 G/DL (ref 12.1–17)
IMM GRANULOCYTES # BLD AUTO: 0.1 K/UL (ref 0–0.04)
IMM GRANULOCYTES NFR BLD AUTO: 1 % (ref 0–0.5)
LYMPHOCYTES # BLD: 1.5 K/UL (ref 0.8–3.5)
LYMPHOCYTES NFR BLD: 17 % (ref 12–49)
MCH RBC QN AUTO: 25.9 PG (ref 26–34)
MCHC RBC AUTO-ENTMCNC: 30.9 G/DL (ref 30–36.5)
MCV RBC AUTO: 83.6 FL (ref 80–99)
MONOCYTES # BLD: 1.2 K/UL (ref 0–1)
MONOCYTES NFR BLD: 13 % (ref 5–13)
NEUTS SEG # BLD: 5.9 K/UL (ref 1.8–8)
NEUTS SEG NFR BLD: 66 % (ref 32–75)
NRBC # BLD: 0 K/UL (ref 0–0.01)
NRBC BLD-RTO: 0 PER 100 WBC
NT PRO BNP: ABNORMAL PG/ML
PLATELET # BLD AUTO: 288 K/UL (ref 150–400)
PMV BLD AUTO: 11.9 FL (ref 8.9–12.9)
POTASSIUM SERPL-SCNC: 4.3 MMOL/L (ref 3.5–5.1)
PROCALCITONIN SERPL-MCNC: 0.09 NG/ML
PROT SERPL-MCNC: 6.2 G/DL (ref 6.4–8.2)
RBC # BLD AUTO: 3.17 M/UL (ref 4.1–5.7)
SERVICE CMNT-IMP: ABNORMAL
SERVICE CMNT-IMP: NORMAL
SODIUM SERPL-SCNC: 136 MMOL/L (ref 136–145)
TSH SERPL DL<=0.05 MIU/L-ACNC: 1.08 UIU/ML (ref 0.36–3.74)
WBC # BLD AUTO: 8.9 K/UL (ref 4.1–11.1)

## 2023-09-01 PROCEDURE — 80076 HEPATIC FUNCTION PANEL: CPT

## 2023-09-01 PROCEDURE — 94760 N-INVAS EAR/PLS OXIMETRY 1: CPT

## 2023-09-01 PROCEDURE — 36415 COLL VENOUS BLD VENIPUNCTURE: CPT

## 2023-09-01 PROCEDURE — 6370000000 HC RX 637 (ALT 250 FOR IP): Performed by: NURSE PRACTITIONER

## 2023-09-01 PROCEDURE — 2580000003 HC RX 258: Performed by: HOSPITALIST

## 2023-09-01 PROCEDURE — 84145 PROCALCITONIN (PCT): CPT

## 2023-09-01 PROCEDURE — 2060000000 HC ICU INTERMEDIATE R&B

## 2023-09-01 PROCEDURE — 6370000000 HC RX 637 (ALT 250 FOR IP)

## 2023-09-01 PROCEDURE — 6360000002 HC RX W HCPCS: Performed by: FAMILY MEDICINE

## 2023-09-01 PROCEDURE — 97535 SELF CARE MNGMENT TRAINING: CPT

## 2023-09-01 PROCEDURE — 97530 THERAPEUTIC ACTIVITIES: CPT

## 2023-09-01 PROCEDURE — 82306 VITAMIN D 25 HYDROXY: CPT

## 2023-09-01 PROCEDURE — 6360000002 HC RX W HCPCS

## 2023-09-01 PROCEDURE — 85025 COMPLETE CBC W/AUTO DIFF WBC: CPT

## 2023-09-01 PROCEDURE — 6360000002 HC RX W HCPCS: Performed by: NURSE PRACTITIONER

## 2023-09-01 PROCEDURE — 82962 GLUCOSE BLOOD TEST: CPT

## 2023-09-01 PROCEDURE — P9047 ALBUMIN (HUMAN), 25%, 50ML: HCPCS

## 2023-09-01 PROCEDURE — 80048 BASIC METABOLIC PNL TOTAL CA: CPT

## 2023-09-01 PROCEDURE — 97116 GAIT TRAINING THERAPY: CPT

## 2023-09-01 PROCEDURE — 99231 SBSQ HOSP IP/OBS SF/LOW 25: CPT | Performed by: NURSE PRACTITIONER

## 2023-09-01 PROCEDURE — 84443 ASSAY THYROID STIM HORMONE: CPT

## 2023-09-01 PROCEDURE — 83880 ASSAY OF NATRIURETIC PEPTIDE: CPT

## 2023-09-01 PROCEDURE — 85652 RBC SED RATE AUTOMATED: CPT

## 2023-09-01 RX ORDER — ALBUMIN (HUMAN) 12.5 G/50ML
25 SOLUTION INTRAVENOUS ONCE
Status: COMPLETED | OUTPATIENT
Start: 2023-09-01 | End: 2023-09-01

## 2023-09-01 RX ORDER — TORSEMIDE 20 MG/1
20 TABLET ORAL DAILY
Qty: 30 TABLET | Refills: 2 | Status: SHIPPED | OUTPATIENT
Start: 2023-09-01

## 2023-09-01 RX ORDER — MELATONIN
2000 DAILY
Qty: 90 TABLET | Refills: 1 | Status: SHIPPED | OUTPATIENT
Start: 2023-09-01

## 2023-09-01 RX ORDER — HYDROMORPHONE HYDROCHLORIDE 1 MG/ML
1 INJECTION, SOLUTION INTRAMUSCULAR; INTRAVENOUS; SUBCUTANEOUS ONCE
Status: COMPLETED | OUTPATIENT
Start: 2023-09-01 | End: 2023-09-01

## 2023-09-01 RX ORDER — CARVEDILOL 25 MG/1
25 TABLET ORAL 2 TIMES DAILY
Qty: 60 TABLET | Refills: 3 | Status: SHIPPED | OUTPATIENT
Start: 2023-09-01

## 2023-09-01 RX ADMIN — CARVEDILOL 37.5 MG: 12.5 TABLET, FILM COATED ORAL at 09:35

## 2023-09-01 RX ADMIN — HYDROMORPHONE HYDROCHLORIDE 1 MG: 1 INJECTION, SOLUTION INTRAMUSCULAR; INTRAVENOUS; SUBCUTANEOUS at 23:18

## 2023-09-01 RX ADMIN — Medication 5 UNITS: at 12:20

## 2023-09-01 RX ADMIN — POTASSIUM CHLORIDE 40 MEQ: 750 TABLET, FILM COATED, EXTENDED RELEASE ORAL at 09:34

## 2023-09-01 RX ADMIN — Medication 10 ML: at 21:53

## 2023-09-01 RX ADMIN — PREGABALIN 100 MG: 100 CAPSULE ORAL at 21:51

## 2023-09-01 RX ADMIN — PREGABALIN 100 MG: 100 CAPSULE ORAL at 09:34

## 2023-09-01 RX ADMIN — HYDROMORPHONE HYDROCHLORIDE 3 MG: 2 TABLET ORAL at 13:10

## 2023-09-01 RX ADMIN — MIDODRINE HYDROCHLORIDE 5 MG: 5 TABLET ORAL at 13:10

## 2023-09-01 RX ADMIN — HYDROMORPHONE HYDROCHLORIDE 1 MG: 1 INJECTION, SOLUTION INTRAMUSCULAR; INTRAVENOUS; SUBCUTANEOUS at 05:57

## 2023-09-01 RX ADMIN — ERYTHROPOIETIN 10000 UNITS: 10000 INJECTION, SOLUTION INTRAVENOUS; SUBCUTANEOUS at 21:53

## 2023-09-01 RX ADMIN — SENNOSIDES AND DOCUSATE SODIUM 1 TABLET: 50; 8.6 TABLET ORAL at 09:35

## 2023-09-01 RX ADMIN — ALBUMIN (HUMAN) 25 G: 0.25 INJECTION, SOLUTION INTRAVENOUS at 21:51

## 2023-09-01 RX ADMIN — Medication 5 UNITS: at 17:53

## 2023-09-01 RX ADMIN — CARVEDILOL 37.5 MG: 12.5 TABLET, FILM COATED ORAL at 01:16

## 2023-09-01 RX ADMIN — Medication 14 UNITS: at 16:20

## 2023-09-01 RX ADMIN — Medication 10 ML: at 09:37

## 2023-09-01 RX ADMIN — Medication 14 UNITS: at 08:11

## 2023-09-01 ASSESSMENT — PAIN - FUNCTIONAL ASSESSMENT
PAIN_FUNCTIONAL_ASSESSMENT: ACTIVITIES ARE NOT PREVENTED
PAIN_FUNCTIONAL_ASSESSMENT: PREVENTS OR INTERFERES SOME ACTIVE ACTIVITIES AND ADLS

## 2023-09-01 ASSESSMENT — PAIN DESCRIPTION - LOCATION
LOCATION: LEG

## 2023-09-01 ASSESSMENT — PAIN DESCRIPTION - DESCRIPTORS
DESCRIPTORS: ACHING
DESCRIPTORS: ACHING;SHARP
DESCRIPTORS: SHARP
DESCRIPTORS: ACHING;THROBBING
DESCRIPTORS: ACHING;THROBBING

## 2023-09-01 ASSESSMENT — PAIN SCALES - GENERAL
PAINLEVEL_OUTOF10: 9
PAINLEVEL_OUTOF10: 2
PAINLEVEL_OUTOF10: 2
PAINLEVEL_OUTOF10: 4
PAINLEVEL_OUTOF10: 10
PAINLEVEL_OUTOF10: 2
PAINLEVEL_OUTOF10: 2
PAINLEVEL_OUTOF10: 3
PAINLEVEL_OUTOF10: 10

## 2023-09-01 ASSESSMENT — PAIN DESCRIPTION - ORIENTATION
ORIENTATION: RIGHT;LEFT

## 2023-09-01 ASSESSMENT — PAIN DESCRIPTION - ONSET: ONSET: ON-GOING

## 2023-09-01 ASSESSMENT — PAIN DESCRIPTION - PAIN TYPE: TYPE: CHRONIC PAIN

## 2023-09-01 ASSESSMENT — PAIN DESCRIPTION - FREQUENCY: FREQUENCY: CONTINUOUS

## 2023-09-01 NOTE — CARE COORDINATION
CM was notified by RN that [employer] was unable to deliver walker. CM delivered walker to patient and patient signed form.       Zhen

## 2023-09-02 LAB
GLUCOSE BLD STRIP.AUTO-MCNC: 136 MG/DL (ref 65–117)
GLUCOSE BLD STRIP.AUTO-MCNC: 136 MG/DL (ref 65–117)
GLUCOSE BLD STRIP.AUTO-MCNC: 153 MG/DL (ref 65–117)
GLUCOSE BLD STRIP.AUTO-MCNC: 180 MG/DL (ref 65–117)
GLUCOSE BLD STRIP.AUTO-MCNC: 89 MG/DL (ref 65–117)
SERVICE CMNT-IMP: ABNORMAL
SERVICE CMNT-IMP: NORMAL

## 2023-09-02 PROCEDURE — 82962 GLUCOSE BLOOD TEST: CPT

## 2023-09-02 PROCEDURE — 6370000000 HC RX 637 (ALT 250 FOR IP)

## 2023-09-02 PROCEDURE — 6370000000 HC RX 637 (ALT 250 FOR IP): Performed by: NURSE PRACTITIONER

## 2023-09-02 PROCEDURE — 94760 N-INVAS EAR/PLS OXIMETRY 1: CPT

## 2023-09-02 PROCEDURE — 2580000003 HC RX 258: Performed by: HOSPITALIST

## 2023-09-02 PROCEDURE — 2060000000 HC ICU INTERMEDIATE R&B

## 2023-09-02 RX ORDER — CARVEDILOL 12.5 MG/1
25 TABLET ORAL 2 TIMES DAILY
Status: DISCONTINUED | OUTPATIENT
Start: 2023-09-02 | End: 2023-09-03 | Stop reason: HOSPADM

## 2023-09-02 RX ORDER — TORSEMIDE 20 MG/1
20 TABLET ORAL DAILY
Status: DISCONTINUED | OUTPATIENT
Start: 2023-09-02 | End: 2023-09-03 | Stop reason: HOSPADM

## 2023-09-02 RX ADMIN — PREGABALIN 100 MG: 100 CAPSULE ORAL at 09:21

## 2023-09-02 RX ADMIN — POTASSIUM CHLORIDE 40 MEQ: 750 TABLET, FILM COATED, EXTENDED RELEASE ORAL at 09:21

## 2023-09-02 RX ADMIN — Medication 14 UNITS: at 07:22

## 2023-09-02 RX ADMIN — TORSEMIDE 20 MG: 20 TABLET ORAL at 10:30

## 2023-09-02 RX ADMIN — Medication 10 ML: at 09:22

## 2023-09-02 RX ADMIN — Medication 10 ML: at 21:29

## 2023-09-02 RX ADMIN — HYDROMORPHONE HYDROCHLORIDE 3 MG: 2 TABLET ORAL at 11:08

## 2023-09-02 RX ADMIN — CARVEDILOL 25 MG: 12.5 TABLET, FILM COATED ORAL at 21:29

## 2023-09-02 RX ADMIN — SENNOSIDES AND DOCUSATE SODIUM 1 TABLET: 50; 8.6 TABLET ORAL at 09:22

## 2023-09-02 RX ADMIN — CARVEDILOL 37.5 MG: 12.5 TABLET, FILM COATED ORAL at 09:21

## 2023-09-02 RX ADMIN — PREGABALIN 100 MG: 100 CAPSULE ORAL at 21:29

## 2023-09-02 RX ADMIN — Medication 5 UNITS: at 12:44

## 2023-09-02 RX ADMIN — Medication 14 UNITS: at 16:22

## 2023-09-02 ASSESSMENT — PAIN SCALES - GENERAL
PAINLEVEL_OUTOF10: 3
PAINLEVEL_OUTOF10: 2
PAINLEVEL_OUTOF10: 3
PAINLEVEL_OUTOF10: 4
PAINLEVEL_OUTOF10: 7

## 2023-09-02 ASSESSMENT — PAIN DESCRIPTION - DESCRIPTORS: DESCRIPTORS: SHARP

## 2023-09-02 ASSESSMENT — PAIN DESCRIPTION - ORIENTATION: ORIENTATION: LEFT;RIGHT

## 2023-09-02 ASSESSMENT — PAIN DESCRIPTION - LOCATION: LOCATION: LEG

## 2023-09-02 NOTE — DISCHARGE SUMMARY
Discharge Summary       PATIENT ID: Juanjo Robert  MRN: [id number]   YOB: 1965    DATE OF ADMISSION: 8/9/2023  1:38 PM    DATE OF DISCHARGE: 9/1/2023   PRIMARY CARE PROVIDER: Noah Oscar MD     ATTENDING PHYSICIAN: Nubia  DISCHARGING PROVIDER: Wilman Ghotra MD    To contact this individual call [phone] and ask the  to page. If unavailable ask to be transferred the Adult Hospitalist Department. CONSULTATIONS: IP CONSULT TO NEPHROLOGY  IP CONSULT TO ADVANCED HEART FAILURE  IP WOUND CARE NURSE CONSULT TO EVAL  IP WOUND CARE NURSE CONSULT TO EVAL  IP WOUND CARE NURSE CONSULT TO EVAL  IP CONSULT TO ADVANCED HEART FAILURE  IP CONSULT TO CARDIOLOGY  IP CONSULT TO DIABETES MANAGEMENT  IP CONSULT TO PALLIATIVE CARE  IP CONSULT TO PALLIATIVE CARE  IP CONSULT TO CASE MANAGEMENT  IP CONSULT HOME HEALTH  IP CONSULT TO CARDIOLOGY  IP CONSULT HOME HEALTH    PROCEDURES/SURGERIES: Procedure(s):  Right heart cath     [address]:   Acute on chronic HFrEF  Leg wounds due to calciphylaxis  CKD IV  Type 2 DM  HTN  Diabetic neuropathy    66 y.o man w CHF, CKD, calciphylaxis, who presented due to edema. Found to have decompensated CHF. Treated with milrinone infusion, diuretics. RHC showed elevated L sided filling pressures. Of note, had a run of afib w/ rvr while here, isolated event that resolved. He developed wound bleeding with apixaban and coumadin is contraindicated with calciphylaxis. Hypotension limited advancement of GDMT. He has severe wounds due to calciphylaxis, given sodium thiosulfate, and wound care arranged. Severe pain due to wounds necessitating IV opiates, transitioned to fentanyl patch and PRN Dilaudid with palliative care assistance. If the patient needs anticoagulation in the future, avoid warfarin.         DISCHARGE DIAGNOSES / PLAN:      FOLLOW UP APPOINTMENTS:    Follow-up Information       Follow up With Specialties Details Why
Debility    DNR (do not resuscitate) discussion        Greater than 31 minutes were spent with the patient on counseling and coordination of care    Signed:    Nilesh Norris MD  9/3/2023  2:30 PM
(APRESOLINE) 50 MG tablet Take 1 tablet by mouth 3 times daily      insulin 70-30 (HUMULIN;NOVOLIN) (70-30) 100 UNIT per ML injection vial Inject 26-27 Units into the skin      isosorbide dinitrate (ISORDIL) 30 MG tablet Take 1 tablet by mouth 3 times daily                NOTIFY YOUR PHYSICIAN FOR ANY OF THE FOLLOWING:   Fever over 101 degrees for 24 hours. Chest pain, shortness of breath, fever, chills, nausea, vomiting, diarrhea, change in mentation, falling, weakness, bleeding. Severe pain or pain not relieved by medications. Or, any other signs or symptoms that you may have questions about.     DISPOSITION:   X Home With:   OT  PT  HH  RN       Long term SNF/Inpatient Rehab    Independent/assisted living    Hospice    Other:       PATIENT CONDITION AT DISCHARGE:     Functional status   X Poor     Deconditioned     Independent      Cognition    X Lucid     Forgetful     Dementia      Catheters/lines (plus indication)    Duff     PICC     PEG    X None      Code status    X Full code     DNR      PHYSICAL EXAMINATION AT DISCHARGE:    General : alert x 3, awake, no acute distress,   HEENT: PEERL, EOMI, moist mucus membrane, TM clear  Neck: supple, no JVD, no meningeal signs  Chest: Clear to auscultation bilaterally   CVS: S1 S2 heard, Capillary refill less than 2 seconds  Abd: soft/ Non tender, non distended, BS physiological,   Ext: no clubbing, no cyanosis, no edema, brisk 2+ DP pulses  Neuro/Psych: pleasant mood and affect, CN 2-12 grossly intact,  Skin: warm     CHRONIC MEDICAL DIAGNOSES:      Greater than 31 minutes were spent with the patient on counseling and coordination of care    Signed:   Chica Osei MD  8/23/2023  1:51 PM

## 2023-09-02 NOTE — CARE COORDINATION
Transition of Care Plan:     RUR: 19% - moderate  Prior Level of Functioning: ambulated independent; req assist for mobility  Dis[facility]  Follow up appointments: wound care clinic; PCP; Cardiology  DME needed: wheelchair/walker  Transportation at discharge: wife  IM/IMM Medicare/ letter given: 8/23/23 & 9/2/23  Caregiver Contact: wife - Stanley Villa - [phone]    Discharge Caregiver contacted prior to discharge? no  Care Conference needed? no  Barriers to discharge: N/A    CM met with patient at bedside. Patient to discharge home today with home health through [facility]. CM notified agency via [address] of patient's discharge. IMM letter signed. Patient's wife will provide transportation. Cecil Clark delivered yesterday. Wheelchair also ordered. No barriers to discharge noted. [address] [place] [address] contacted [facility] at [phone] to find out if insurance has approved wheelchair. CM was only able to speak with the answering service, but was told she would receive a return call.     Pema Lingpool, [facility]  [phone]

## 2023-09-03 VITALS
DIASTOLIC BLOOD PRESSURE: 70 MMHG | BODY MASS INDEX: 27.04 KG/M2 | HEIGHT: 68 IN | RESPIRATION RATE: 10 BRPM | SYSTOLIC BLOOD PRESSURE: 133 MMHG | WEIGHT: 178.4 LBS | HEART RATE: 68 BPM | OXYGEN SATURATION: 99 % | TEMPERATURE: 98.2 F

## 2023-09-03 LAB
GLUCOSE BLD STRIP.AUTO-MCNC: 113 MG/DL (ref 65–117)
GLUCOSE BLD STRIP.AUTO-MCNC: 148 MG/DL (ref 65–117)
SERVICE CMNT-IMP: ABNORMAL
SERVICE CMNT-IMP: NORMAL

## 2023-09-03 PROCEDURE — 6370000000 HC RX 637 (ALT 250 FOR IP): Performed by: NURSE PRACTITIONER

## 2023-09-03 PROCEDURE — 2580000003 HC RX 258: Performed by: HOSPITALIST

## 2023-09-03 PROCEDURE — 82962 GLUCOSE BLOOD TEST: CPT

## 2023-09-03 RX ORDER — FENTANYL 25 UG/1
1 PATCH TRANSDERMAL
Qty: 10 PATCH | Refills: 0 | Status: SHIPPED | OUTPATIENT
Start: 2023-09-03 | End: 2023-09-03 | Stop reason: SDUPTHER

## 2023-09-03 RX ORDER — FENTANYL 25 UG/1
1 PATCH TRANSDERMAL
Qty: 5 PATCH | Refills: 0 | Status: SHIPPED | OUTPATIENT
Start: 2023-09-03 | End: 2023-09-16

## 2023-09-03 RX ORDER — HYDROMORPHONE HYDROCHLORIDE 4 MG/1
4 TABLET ORAL EVERY 6 HOURS PRN
Qty: 112 TABLET | Refills: 0 | Status: SHIPPED | OUTPATIENT
Start: 2023-09-03 | End: 2023-09-03 | Stop reason: SDUPTHER

## 2023-09-03 RX ORDER — HYDROMORPHONE HYDROCHLORIDE 4 MG/1
4 TABLET ORAL EVERY 6 HOURS PRN
Qty: 30 TABLET | Refills: 0 | Status: SHIPPED | OUTPATIENT
Start: 2023-09-03 | End: 2023-10-03

## 2023-09-03 RX ADMIN — HYDROMORPHONE HYDROCHLORIDE 3 MG: 2 TABLET ORAL at 05:30

## 2023-09-03 RX ADMIN — TORSEMIDE 20 MG: 20 TABLET ORAL at 08:50

## 2023-09-03 RX ADMIN — Medication 10 ML: at 08:50

## 2023-09-03 RX ADMIN — SENNOSIDES AND DOCUSATE SODIUM 1 TABLET: 50; 8.6 TABLET ORAL at 08:50

## 2023-09-03 RX ADMIN — POTASSIUM CHLORIDE 40 MEQ: 750 TABLET, FILM COATED, EXTENDED RELEASE ORAL at 08:50

## 2023-09-03 RX ADMIN — CARVEDILOL 25 MG: 12.5 TABLET, FILM COATED ORAL at 08:50

## 2023-09-03 RX ADMIN — HYDROMORPHONE HYDROCHLORIDE 3 MG: 2 TABLET ORAL at 13:12

## 2023-09-03 RX ADMIN — HYDROMORPHONE HYDROCHLORIDE 3 MG: 2 TABLET ORAL at 00:39

## 2023-09-03 RX ADMIN — PREGABALIN 100 MG: 100 CAPSULE ORAL at 08:50

## 2023-09-03 ASSESSMENT — PAIN DESCRIPTION - ORIENTATION
ORIENTATION: RIGHT;LEFT

## 2023-09-03 ASSESSMENT — PAIN DESCRIPTION - DESCRIPTORS
DESCRIPTORS: SHARP
DESCRIPTORS: ACHING
DESCRIPTORS: ACHING

## 2023-09-03 ASSESSMENT — PAIN SCALES - GENERAL
PAINLEVEL_OUTOF10: 3
PAINLEVEL_OUTOF10: 7
PAINLEVEL_OUTOF10: 2
PAINLEVEL_OUTOF10: 0
PAINLEVEL_OUTOF10: 0
PAINLEVEL_OUTOF10: 7
PAINLEVEL_OUTOF10: 7

## 2023-09-03 ASSESSMENT — PAIN DESCRIPTION - LOCATION
LOCATION: LEG
LOCATION: LEG
LOCATION: GENERALIZED

## 2023-09-03 ASSESSMENT — PAIN DESCRIPTION - PAIN TYPE
TYPE: CHRONIC PAIN
TYPE: CHRONIC PAIN

## 2023-09-03 ASSESSMENT — PAIN DESCRIPTION - FREQUENCY
FREQUENCY: CONTINUOUS
FREQUENCY: CONTINUOUS

## 2023-09-03 ASSESSMENT — PAIN DESCRIPTION - ONSET
ONSET: ON-GOING
ONSET: ON-GOING

## 2023-09-03 NOTE — CARE COORDINATION
Transition of Care Plan:     RUR: 19% - moderate  Prior Level of Functioning: ambulated independent; req assist for mobility  Disposition: home health w/At [address] [place] [address] [phone]  Follow up appointments: wound care clinic; PCP; Cardiology  DME needed: wheelchair/walker  Transportation at discharge: wife  IM/IMM Medicare/ letter given: 8/23/23 & 9/2/23  Caregiver Contact: wife - Son Borden - [phone]    Discharge Caregiver contacted prior to discharge? no  Care Conference needed? no  Barriers to discharge: N/A    I2nd MM provided 9/2      Patient discharging home today. Patient stated Wes Bland [phone] will deliver wheelchair after Tuesday when insurance Auth can be received from insurance company. Notified At [address] [place] [address] of patient discharge today.     Rafael Chong LCSW

## 2023-09-05 ENCOUNTER — TELEPHONE (OUTPATIENT)
Facility: HOSPITAL | Age: 58
End: 2023-09-05

## 2023-09-05 ENCOUNTER — TELEPHONE (OUTPATIENT)
Age: 58
End: 2023-09-05

## 2023-09-05 NOTE — TELEPHONE ENCOUNTER
[address] [facility] 250 Outpatient Palliative Medicine Office  Nursing Note  [phone]  Fax [phone]     Name:  Rocio Epperson  YOB: 1965     Call # 2  to patient to follow up on outpatient Palliative Medicine referral by Melissa Leger NP. Rocio Epperson is a 66 y.o. male with a past history of obesity, DM2, CKD stage IV (on home PD), recent development of bilateral LE wounds 2/2  calciphylaxis (s/p Sodium thiosulfate infusions), HTN, CAD, Cardiomyopathy w/ EF 20% (s/p AICD). Patient was admitted to [facility] on 8/9/2023 from Nephrologists Appointment with a diagnosis of Acute on Chronic CHF, unilateral edema in Right UE and LE. Patient has been followed by inpatient Palliative Medicine team during his hospitalization. Patient experiences leg pain secondary to calciphylaxis. Patient answered the phone today. This nurse explained outpatient Palliative Medicine. Appointment scheduled for 9/12/23 at 1:30pm with Dr. Heaven Del Real (patient says he prefers  location). This nurse instructed patient to arrive 15 minutes early in order to complete new patient paperwork, and to bring any pain medications he is taking in the original container. Also advised patient that we schedule one hour appointments so if he is unable to keep the appointment, please call our office @ [phone] as soon as possible to cancel/reschedule the appointment. Patient voices understanding.     Tyree Schmidt RN, [facility]ontological [facility]  Palliative Medicine  [phone]

## 2023-09-06 ENCOUNTER — TELEPHONE (OUTPATIENT)
Facility: HOSPITAL | Age: 58
End: 2023-09-06

## 2023-09-07 ENCOUNTER — HOSPITAL ENCOUNTER (OUTPATIENT)
Facility: HOSPITAL | Age: 58
Discharge: HOME | End: 2023-09-07
Payer: MEDICARE

## 2023-09-07 VITALS
SYSTOLIC BLOOD PRESSURE: 120 MMHG | RESPIRATION RATE: 19 BRPM | DIASTOLIC BLOOD PRESSURE: 80 MMHG | HEART RATE: 76 BPM | TEMPERATURE: 98.6 F

## 2023-09-07 DIAGNOSIS — E83.59 CALCIPHYLAXIS OF LEFT LOWER EXTREMITY WITH NONHEALING ULCER WITH FAT LAYER EXPOSED (HCC): ICD-10-CM

## 2023-09-07 DIAGNOSIS — I87.2 VENOUS STASIS ULCER OF LEFT CALF WITH FAT LAYER EXPOSED WITHOUT VARICOSE VEINS (HCC): Primary | ICD-10-CM

## 2023-09-07 DIAGNOSIS — L97.922 CALCIPHYLAXIS OF LEFT LOWER EXTREMITY WITH NONHEALING ULCER WITH FAT LAYER EXPOSED (HCC): ICD-10-CM

## 2023-09-07 DIAGNOSIS — L97.222 VENOUS STASIS ULCER OF LEFT CALF WITH FAT LAYER EXPOSED WITHOUT VARICOSE VEINS (HCC): Primary | ICD-10-CM

## 2023-09-07 DIAGNOSIS — E83.59 CALCIPHYLAXIS OF RIGHT LOWER EXTREMITY WITH NONHEALING ULCER WITH FAT LAYER EXPOSED (HCC): ICD-10-CM

## 2023-09-07 DIAGNOSIS — L97.912 CALCIPHYLAXIS OF RIGHT LOWER EXTREMITY WITH NONHEALING ULCER WITH FAT LAYER EXPOSED (HCC): ICD-10-CM

## 2023-09-07 PROCEDURE — 11043 DBRDMT MUSC&/FSCA 1ST 20/<: CPT

## 2023-09-07 PROCEDURE — 11046 DBRDMT MUSC&/FSCA EA ADDL: CPT

## 2023-09-07 RX ORDER — LIDOCAINE 50 MG/G
OINTMENT TOPICAL ONCE
OUTPATIENT
Start: 2023-09-07 | End: 2023-09-07

## 2023-09-07 RX ORDER — LIDOCAINE 40 MG/G
CREAM TOPICAL ONCE
OUTPATIENT
Start: 2023-09-07 | End: 2023-09-07

## 2023-09-07 RX ORDER — IBUPROFEN 200 MG
TABLET ORAL ONCE
OUTPATIENT
Start: 2023-09-07 | End: 2023-09-07

## 2023-09-07 RX ORDER — BETAMETHASONE DIPROPIONATE 0.05 %
OINTMENT (GRAM) TOPICAL ONCE
OUTPATIENT
Start: 2023-09-07 | End: 2023-09-07

## 2023-09-07 RX ORDER — BACITRACIN ZINC AND POLYMYXIN B SULFATE 500; 1000 [USP'U]/G; [USP'U]/G
OINTMENT TOPICAL ONCE
OUTPATIENT
Start: 2023-09-07 | End: 2023-09-07

## 2023-09-07 RX ORDER — LIDOCAINE HYDROCHLORIDE 20 MG/ML
JELLY TOPICAL ONCE
OUTPATIENT
Start: 2023-09-07 | End: 2023-09-07

## 2023-09-07 RX ORDER — GENTAMICIN SULFATE 1 MG/G
OINTMENT TOPICAL ONCE
OUTPATIENT
Start: 2023-09-07 | End: 2023-09-07

## 2023-09-07 RX ORDER — SODIUM CHLOR/HYPOCHLOROUS ACID 0.033 %
SOLUTION, IRRIGATION IRRIGATION ONCE
Status: DISCONTINUED | OUTPATIENT
Start: 2023-09-07 | End: 2023-09-08 | Stop reason: HOSPADM

## 2023-09-07 RX ORDER — CLOBETASOL PROPIONATE 0.5 MG/G
OINTMENT TOPICAL ONCE
OUTPATIENT
Start: 2023-09-07 | End: 2023-09-07

## 2023-09-07 RX ORDER — GINSENG 100 MG
CAPSULE ORAL ONCE
OUTPATIENT
Start: 2023-09-07 | End: 2023-09-07

## 2023-09-07 RX ORDER — LIDOCAINE HYDROCHLORIDE 40 MG/ML
SOLUTION TOPICAL ONCE
OUTPATIENT
Start: 2023-09-07 | End: 2023-09-07

## 2023-09-07 RX ORDER — SODIUM CHLOR/HYPOCHLOROUS ACID 0.033 %
SOLUTION, IRRIGATION IRRIGATION ONCE
OUTPATIENT
Start: 2023-09-07 | End: 2023-09-07

## 2023-09-07 ASSESSMENT — PAIN SCALES - GENERAL: PAINLEVEL_OUTOF10: 8

## 2023-09-07 ASSESSMENT — PAIN DESCRIPTION - LOCATION: LOCATION: LEG

## 2023-09-07 ASSESSMENT — PAIN DESCRIPTION - ORIENTATION: ORIENTATION: LEFT;RIGHT

## 2023-09-07 NOTE — DISCHARGE INSTRUCTIONS
Discharge Instructions for          [address]  Phone: [phone] Fax: [phone]    NAME:  Shara Goel  YOB: 1965  MEDICAL RECORD NUMBER:  [id number]  DATE:  September 7, 2023  WOUND CARE ORDERS:  Bilateral lower legs- Cleanse with vashe solution. Let sit on wounds for 5-7 minutes, pat dry. Apply hydrofera blue ready to wound beds. Cover with abd pads and roll gauze. Secure with tape. Change every other day. Activity:  [x] Elevate leg(s) above the level of the heart when sitting. [x] Avoid prolonged standing in one place. [x] Do no get dressing/wrap wet. Dietary:  [] Diet as tolerated      [x] Diabetic Diet            [] Increase Protein: examples (Meat, cheese, eggs, greek yogurt, fish, nuts)          [x] Avery Therapeutic Nutrition Powder  Return Appointment:  [x] Return Appointment: With Dr. Crystal Harley in 1 week. Electronically signed Vanessa Quan RN on 9/7/2023 at 3:11 PM     [facility]: Should you experience any significant changes in your wound(s) or have questions about your wound care, please contact the [address] [place] [address] at 1 [facility] [place] 8:00 am - 4:30. If you need help with your wound outside these hours and cannot wait until we are again available, contact your PCP or go to the hospital emergency room. PLEASE NOTE: IF YOU ARE UNABLE TO OBTAIN WOUND SUPPLIES, CONTINUE TO USE THE SUPPLIES YOU HAVE AVAILABLE UNTIL YOU ARE ABLE TO REACH US. IT IS MOST IMPORTANT TO KEEP THE WOUND COVERED AT ALL TIMES.      Physician Signature:_______________________    Date: ___________ Time:  ____________

## 2023-09-08 ENCOUNTER — TELEPHONE (OUTPATIENT)
Age: 58
End: 2023-09-08

## 2023-09-08 NOTE — PROGRESS NOTES
with fat layer exposed ([address])       Other    Venous stasis ulcer of left calf with fat layer exposed without varicose veins (HCC) - Primary       Wounds and Treatment Plan:  Bilateral lower leg ulcers: Both legs involved with extensive exfoliating thick chunks of dermis, some left hanging, exposing subcutaneous tissue with necrosis, muscle exposure. Constant drainage of serosanguinous exudative fluid. Malodorous necrotic tissue. Coagulated patches of fresh blood. Several areas of increase pain with touch. Hyperpigmented skin. Patchy areas of hypergranulated tissue, mostly in the left leg. Legs are now thinned and with no edema. Treatment - Debridement recommended and the patient agrees and consents. With scissor and pick ups, the necrotic tissue, slough, and denuded skin and nonviable tissue were removed where possible. Areas were applied topical lidocaine for local anesthesia prior to procedure. Silver nitrate applied to the hypergranulated tissue on the left leg. Wound was cleansed with Vashe and treated with hydrofera blue, ABD, roll gauze on both extremities. Patient to change dressing more frequently if possible. His wife is most helpful. Other associated diagnoses or problems addressed:  See above    Pertinent imaging reviewed including independent interpretation include:  None    Pertinent labs reviewed. Medical records and review of external note (s) from other providers done as well. New lab or imaging orders placed:  None     Prescription drug management:  Per PCP to manage his pain      Discussion of management or test interpretation with  patient and his wife . Comorbid conditions affecting wound healing: As per PMH which was reviewed. Risk of complications and/or mortality of patient management: This patient has a high risk of morbidity and mortality from additional diagnostic testing or treatment.  This is due to the above conditions affecting wound healing as well as patient and

## 2023-09-08 NOTE — TELEPHONE ENCOUNTER
Called patient to advise/confirm upcoming appt with Dr. Mecca Howard on 9/12/23  at 1:30  at [address] [place] [address]. Spoke with patient and confirmed appointment.

## 2023-09-12 ENCOUNTER — OFFICE VISIT (OUTPATIENT)
Age: 58
End: 2023-09-12
Payer: MEDICARE

## 2023-09-12 ENCOUNTER — TELEPHONE (OUTPATIENT)
Age: 58
End: 2023-09-12

## 2023-09-12 VITALS
SYSTOLIC BLOOD PRESSURE: 122 MMHG | WEIGHT: 176 LBS | HEIGHT: 68 IN | OXYGEN SATURATION: 96 % | HEART RATE: 78 BPM | TEMPERATURE: 96.1 F | RESPIRATION RATE: 18 BRPM | DIASTOLIC BLOOD PRESSURE: 27 MMHG | BODY MASS INDEX: 26.67 KG/M2

## 2023-09-12 DIAGNOSIS — E83.59 CALCIPHYLAXIS OF LEFT LOWER EXTREMITY WITH NONHEALING ULCER WITH FAT LAYER EXPOSED (HCC): Primary | ICD-10-CM

## 2023-09-12 DIAGNOSIS — L89.890 PRESSURE INJURY OF LEFT LEG, UNSTAGEABLE (HCC): ICD-10-CM

## 2023-09-12 DIAGNOSIS — L97.912 CALCIPHYLAXIS OF RIGHT LOWER EXTREMITY WITH NONHEALING ULCER WITH FAT LAYER EXPOSED (HCC): ICD-10-CM

## 2023-09-12 DIAGNOSIS — L97.222 VENOUS STASIS ULCER OF LEFT CALF WITH FAT LAYER EXPOSED WITHOUT VARICOSE VEINS (HCC): ICD-10-CM

## 2023-09-12 DIAGNOSIS — E83.59 CALCIPHYLAXIS OF RIGHT LOWER EXTREMITY WITH NONHEALING ULCER WITH FAT LAYER EXPOSED (HCC): ICD-10-CM

## 2023-09-12 DIAGNOSIS — L97.922 CALCIPHYLAXIS OF LEFT LOWER EXTREMITY WITH NONHEALING ULCER WITH FAT LAYER EXPOSED (HCC): Primary | ICD-10-CM

## 2023-09-12 DIAGNOSIS — N18.4 STAGE 4 CHRONIC KIDNEY DISEASE (HCC): ICD-10-CM

## 2023-09-12 DIAGNOSIS — I87.2 VENOUS STASIS ULCER OF LEFT CALF WITH FAT LAYER EXPOSED WITHOUT VARICOSE VEINS (HCC): ICD-10-CM

## 2023-09-12 DIAGNOSIS — I50.42 CHRONIC COMBINED SYSTOLIC AND DIASTOLIC CONGESTIVE HEART FAILURE (HCC): ICD-10-CM

## 2023-09-12 DIAGNOSIS — E83.59 CALCIPHYLAXIS: ICD-10-CM

## 2023-09-12 PROCEDURE — 1111F DSCHRG MED/CURRENT MED MERGE: CPT | Performed by: INTERNAL MEDICINE

## 2023-09-12 PROCEDURE — 3078F DIAST BP <80 MM HG: CPT | Performed by: INTERNAL MEDICINE

## 2023-09-12 PROCEDURE — G8428 CUR MEDS NOT DOCUMENT: HCPCS | Performed by: INTERNAL MEDICINE

## 2023-09-12 PROCEDURE — 1036F TOBACCO NON-USER: CPT | Performed by: INTERNAL MEDICINE

## 2023-09-12 PROCEDURE — 3074F SYST BP LT 130 MM HG: CPT | Performed by: INTERNAL MEDICINE

## 2023-09-12 PROCEDURE — 3017F COLORECTAL CA SCREEN DOC REV: CPT | Performed by: INTERNAL MEDICINE

## 2023-09-12 PROCEDURE — 99215 OFFICE O/P EST HI 40 MIN: CPT | Performed by: INTERNAL MEDICINE

## 2023-09-12 PROCEDURE — G8417 CALC BMI ABV UP PARAM F/U: HCPCS | Performed by: INTERNAL MEDICINE

## 2023-09-12 RX ORDER — HYDROMORPHONE HYDROCHLORIDE 4 MG/1
4 TABLET ORAL EVERY 6 HOURS PRN
Qty: 60 TABLET | Refills: 0 | Status: SHIPPED | OUTPATIENT
Start: 2023-09-12 | End: 2023-09-27

## 2023-09-12 RX ORDER — FENTANYL 25 UG/1
1 PATCH TRANSDERMAL
Qty: 10 PATCH | Refills: 0 | Status: SHIPPED | OUTPATIENT
Start: 2023-09-12 | End: 2023-10-12

## 2023-09-12 ASSESSMENT — PATIENT HEALTH QUESTIONNAIRE - PHQ9
SUM OF ALL RESPONSES TO PHQ QUESTIONS 1-9: 0
1. LITTLE INTEREST OR PLEASURE IN DOING THINGS: 0
SUM OF ALL RESPONSES TO PHQ9 QUESTIONS 1 & 2: 0
SUM OF ALL RESPONSES TO PHQ QUESTIONS 1-9: 0
SUM OF ALL RESPONSES TO PHQ QUESTIONS 1-9: 0
2. FEELING DOWN, DEPRESSED OR HOPELESS: 0
SUM OF ALL RESPONSES TO PHQ QUESTIONS 1-9: 0

## 2023-09-12 NOTE — PROGRESS NOTES
Palliative Medicine Office Visit  Palliative Medicine Nurse Check In  ([phone])    Patient Name: Barkley Felty  YOB: 1965      Date of Office Visit: 9/12/2023    Patient states: \"  \"    From Check In Sheet (scanned in Media):  Has a medical provider talked with you about cardiopulmonary resuscitation (CPR)? [x] Yes   [] No   [] Unable to obtain    Nurse reminder to complete or update ACP FlowSheet:    Is ACP on the Problem List?    [x] Yes    [] No  IF ACP Document is ON FILE; Nurse to place ACP on Problem List     Is there an ACP Note in Chart Review/Note? [x] Yes    [] No   If NO: ALERT PROVIDER       Primary Decision Maker: Mercedes Mccauley - Domestic Partner - [phone]    Secondary Decision Maker: Ajit Figueredo - Brother/Sister - [phone]      9/12/2023     1:30 PM   Demographics   Marital Status          Is there anything that we should know about you as a person in order to provide you the best care possible? Have you been to the ER, urgent care clinic since your last visit? [] Yes   [x] No   [] Unable to obtain    Have you been hospitalized since your last visit? [] Yes   [x] No   [] Unable to obtain    Have you seen or consulted any other health care providers outside of the [address] [place] [address] since your last visit?    [] Yes   [x] No   [] Unable to obtain    Functional status (describe):         Last BM: 9/12/2023     accessed (date): 9/2/2023    Bottle review (for opioid pain medication):  Medication 1:   Date filled:   Directions:   # filled:   # left:   # pills taking per day:  Last dose taken:    Medication 2:   Date filled:   Directions:   # filled:   # left:   # pills taking per day:  Last dose taken:    Medication 3:   Date filled:   Directions:   # filled:   # left:   # pills taking per day:  Last dose taken:    Medication 4:   Date filled:   Directions:   # filled:   # left:   # pills taking per day:  Last dose taken:
facility-administered medications for this visit. Facility-Administered Medications Ordered in Other Visits   Medication Dose Route Frequency    0.9 % sodium chloride infusion   IntraVENous Continuous    0.9 % sodium chloride infusion   IntraVENous Continuous    0.9 % sodium chloride infusion   IntraVENous Continuous          LAB and other DATA REVIEWED:     Lab Results   Component Value Date/Time    WBC 8.9 09/01/2023 05:40 AM    HGB 8.2 09/01/2023 05:40 AM     09/01/2023 05:40 AM     Lab Results   Component Value Date/Time     09/01/2023 05:40 AM    K 4.3 09/01/2023 05:40 AM     09/01/2023 05:40 AM    CO2 18 09/01/2023 05:40 AM    BUN 58 09/01/2023 05:40 AM    MG 1.7 08/13/2023 05:27 AM    PHOS 3.4 08/17/2023 04:46 AM      Lab Results   Component Value Date/Time    GLOB 3.1 09/01/2023 05:40 AM     Lab Results   Component Value Date/Time    INR 1.1 03/03/2023 04:33 AM    APTT 24.4 03/03/2023 04:33 AM      Lab Results   Component Value Date/Time    IRON 14 08/30/2023 04:45 AM    TIBC 156 08/30/2023 04:45 AM        Detail chart reviewed. Other specialists and referral provider notes reviewed. CONTROLLED SUBSTANCES SAFETY ASSESSMENT (IF ON CONTROLLED SUBSTANCES):     Reviewed opioid safety handout:  [x] Yes   [] No  24 hour opioid dose >150mg morphine equivalent/day:  [] Yes   [] No  Benzodiazepines:  [] Yes   [] No  Sleep apnea:  [] Yes   [] No  Urine Toxicology Testing within last 6 months:  [] Yes   [] No  History of or new aberrant medication taking behaviors:  [] Yes   [x] No  Has Narcan been prescribed [x] Yes   [] No          Total time: 70 minutes  Counseling / coordination time: 60 minutes  > 50% counseling / coordination?:  Yes    Please note that this dictation was completed with [employer], the [employer] voice recognition software. Quite often unanticipated grammatical, syntax, homophones, and other interpretive errors are inadvertently transcribed by the computer software.   Please

## 2023-09-12 NOTE — TELEPHONE ENCOUNTER
Returned call to patient , no answer , voice mail full unable to leave a message     Patients refills are too soon :    Fentanyl 25 mcg- 9/5/2023 /15 day supply, Can fill next week tuesday    Hydromorphone 4mg -  can be filled today pharmacy preparing

## 2023-09-12 NOTE — PATIENT INSTRUCTIONS
Dear Brady Rinne ,    It was a pleasure seeing you today    We will see you again in  8 weeks    If labs or imaging tests have been ordered for you today, please call the office  at [phone] 48 hours after completion to obtain the results. 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 You tell me that you use it only on days that you are moving your legs or getting debridement.  -You are pretty much wheelchair-bound due to severe pain.  -Your nephrologist is still trying to figure out if you really do have calciphylaxis, you may go back on IV sodium thiosulfate infusions.  -Opioid safety protocol reviewed in detail with you and your partner today    Congestive heart failure  -Thankfully you do not have any shortness of breath, do not need oxygen at home  -Follow closely with Dr. Chris Damon  -I want to try and avoid increasing your opioids as this will negatively impact your congestive heart failure    Constipation  -Constipation is a common side effect of pain medications as they slow your bowels. -Please start Pericolace 2 tabs daily and increase to 2 tabs two times a day if needed. This is necessary to keep your bowels soft. - Add Miralax every other day as a laxative. - Goal is to have soft bowel movements every day or every other day. - Please call us if you do not have bowel movements for more than 3 days.       This is what you have shared with us about Advance Care Planning:    Primary Decision Maker: Bright Medina - Domestic Partner - [phone]    Secondary Decision Maker: Netoruth Radha - Brother/Sister - [phone]            9/12/2023     1:30 PM   Demographics   Marital Status            The

## 2023-09-13 ENCOUNTER — TELEPHONE (OUTPATIENT)
Age: 58
End: 2023-09-13

## 2023-09-13 ENCOUNTER — OFFICE VISIT (OUTPATIENT)
Age: 58
End: 2023-09-13
Payer: MEDICARE

## 2023-09-13 VITALS
HEIGHT: 68 IN | BODY MASS INDEX: 26.52 KG/M2 | HEART RATE: 80 BPM | RESPIRATION RATE: 18 BRPM | TEMPERATURE: 98.2 F | WEIGHT: 175 LBS | OXYGEN SATURATION: 98 % | DIASTOLIC BLOOD PRESSURE: 82 MMHG | SYSTOLIC BLOOD PRESSURE: 110 MMHG

## 2023-09-13 DIAGNOSIS — I50.22 CHRONIC SYSTOLIC HEART FAILURE (HCC): Primary | ICD-10-CM

## 2023-09-13 LAB
ALBUMIN SERPL-MCNC: 3.2 G/DL (ref 3.5–5)
ALBUMIN/GLOB SERPL: 0.8 (ref 1.1–2.2)
ALP SERPL-CCNC: 128 U/L (ref 45–117)
ALT SERPL-CCNC: 14 U/L (ref 12–78)
ANION GAP SERPL CALC-SCNC: 10 MMOL/L (ref 5–15)
AST SERPL-CCNC: 21 U/L (ref 15–37)
BILIRUB SERPL-MCNC: 1.5 MG/DL (ref 0.2–1)
BUN SERPL-MCNC: 42 MG/DL (ref 6–20)
BUN/CREAT SERPL: 24 (ref 12–20)
CALCIUM SERPL-MCNC: 9.2 MG/DL (ref 8.5–10.1)
CHLORIDE SERPL-SCNC: 106 MMOL/L (ref 97–108)
CO2 SERPL-SCNC: 22 MMOL/L (ref 21–32)
COMMENT:: NORMAL
CREAT SERPL-MCNC: 1.78 MG/DL (ref 0.7–1.3)
GLOBULIN SER CALC-MCNC: 3.8 G/DL (ref 2–4)
GLUCOSE SERPL-MCNC: 154 MG/DL (ref 65–100)
MAGNESIUM SERPL-MCNC: 1.8 MG/DL (ref 1.6–2.4)
NT PRO BNP: ABNORMAL PG/ML
POTASSIUM SERPL-SCNC: 4.1 MMOL/L (ref 3.5–5.1)
PROT SERPL-MCNC: 7 G/DL (ref 6.4–8.2)
SODIUM SERPL-SCNC: 138 MMOL/L (ref 136–145)
SPECIMEN HOLD: NORMAL

## 2023-09-13 PROCEDURE — 1111F DSCHRG MED/CURRENT MED MERGE: CPT | Performed by: NURSE PRACTITIONER

## 2023-09-13 PROCEDURE — G8427 DOCREV CUR MEDS BY ELIG CLIN: HCPCS | Performed by: NURSE PRACTITIONER

## 2023-09-13 PROCEDURE — 3074F SYST BP LT 130 MM HG: CPT | Performed by: NURSE PRACTITIONER

## 2023-09-13 PROCEDURE — 3017F COLORECTAL CA SCREEN DOC REV: CPT | Performed by: NURSE PRACTITIONER

## 2023-09-13 PROCEDURE — G8417 CALC BMI ABV UP PARAM F/U: HCPCS | Performed by: NURSE PRACTITIONER

## 2023-09-13 PROCEDURE — 3079F DIAST BP 80-89 MM HG: CPT | Performed by: NURSE PRACTITIONER

## 2023-09-13 PROCEDURE — 99214 OFFICE O/P EST MOD 30 MIN: CPT | Performed by: NURSE PRACTITIONER

## 2023-09-13 PROCEDURE — 1036F TOBACCO NON-USER: CPT | Performed by: NURSE PRACTITIONER

## 2023-09-13 ASSESSMENT — PATIENT HEALTH QUESTIONNAIRE - PHQ9
SUM OF ALL RESPONSES TO PHQ QUESTIONS 1-9: 0
2. FEELING DOWN, DEPRESSED OR HOPELESS: 0
SUM OF ALL RESPONSES TO PHQ9 QUESTIONS 1 & 2: 0
1. LITTLE INTEREST OR PLEASURE IN DOING THINGS: 0
SUM OF ALL RESPONSES TO PHQ QUESTIONS 1-9: 0

## 2023-09-13 ASSESSMENT — ENCOUNTER SYMPTOMS
NAUSEA: 0
SHORTNESS OF BREATH: 0
COUGH: 0
VOMITING: 0
STRIDOR: 0

## 2023-09-13 NOTE — PATIENT INSTRUCTIONS
Medication changes:    No medication changes     Be sure that you take your coreg twice a day     Please take this to your pharmacy to notify them of the change in medications. Testing Ordered:    Labs drawn in clinic. You will be notified of any abnormal results that requires a change in medication regimen. Other Recommendations:       Please monitor your weights daily upon waking after using the bathroom. Keep a written records of your weights and bring to your next appointment. If you have a weight gain of 3 or more pounds overnight OR 5 or more pounds in one week please contact our office. Ensure your drinking an adequate amount of water with a goal of 6-8 eight ounce glasses (1.5-2 liters) of fluid daily. Your urine should be clear and light yellow straw colored. If your blood pressure begins to consistently run below 90/60 and/or you begin to experience dizziness or lightheadedness, please contact the Maeve Santamaria at [phone]. Follow up 4 weeks with Maeve Santamaria      Thank you for allowing us the privilege of being a part of your healthcare team! Please do not hesitate to contact our office at [phone] with any questions or concerns.

## 2023-09-13 NOTE — PROGRESS NOTES
Brother     Diabetes Brother     Diabetes Father     Hypertension Father        SOCIAL HISTORY:  Social History     Socioeconomic History    Marital status:      Spouse name: None    Number of children: None    Years of education: None    Highest education level: None   Tobacco Use    Smoking status: Never     Passive exposure: Never    Smokeless tobacco: Never   Vaping Use    Vaping Use: Never used   Substance and Sexual Activity    Alcohol use: Not Currently    Drug use: No       LABORATORY RESULTS:         Latest Ref Rng & Units 9/1/2023     5:40 AM 8/31/2023     4:41 AM 8/30/2023     4:45 AM 8/29/2023     5:35 AM 8/28/2023    12:12 AM 8/27/2023     1:15 AM 8/26/2023     5:45 AM   CBC   WBC 4.1 - 11.1 K/uL 8.9     8.9      RBC 4.10 - 5.70 M/uL 3.17     3.66      Hemoglobin Quant 12.1 - 17.0 g/dL 8.2     9.6      Hematocrit 36.6 - 50.3 % 26.5     30.8      MCV 80.0 - 99.0 FL 83.6     84.2      MCH 26.0 - 34.0 PG 25.9     26.2      MCHC 30.0 - 36.5 g/dL 30.9     31.2      MPV 8.9 - 12.9 FL 11.9     12.5      RDW 11.5 - 14.5 % 17.9     17.3      Basophils Absolute 0.0 - 0.1 K/UL 0.1          Nucleated Red Blood Cells 0.00 - 0.01 K/uL 0.00     0.00      Glucose, Random 65 - 100 mg/dL 82  150  155  162  161  146  200    BUN,BUNPL 6 - 20 MG/DL 58  61  57  61  62  58  51    Creatinine 0.70 - 1.30 MG/DL 1.96  1.99  1.99  2.25  2.12  2.10  2.22    Sodium 136 - 145 mmol/L 136  134  136  134  134  135  136    Potassium 3.5 - 5.1 mmol/L 4.3  3.8  3.5  3.5  4.0  3.4  3.6    Chloride 97 - 108 mmol/L 106  105  104  100  102  103  103    CO2 21 - 32 mmol/L 18  20  21  19  24  22  21    CALCIUM, SERUM, 037054 8.5 - 10.1 MG/DL 9.4  9.2  9.1  9.2  9.1  9.1  9.2    Total Protein 6.4 - 8.2 g/dL 6.2  6.7  6.5  7.0  6.5  6.7  6.8    Bilirubin, Direct 0.0 - 0.2 MG/DL 0.7  0.5  0.6  0.6  0.6  0.7  0.8    BILIRUBIN TOTAL 0.2 - 1.0 MG/DL 1.1  0.9  1.0  0.9  1.0  1.1  1.4    AST 15 - 37 U/L 6  10  12  12  20  19  14    ALT 12 - 78

## 2023-09-13 NOTE — TELEPHONE ENCOUNTER
Returned call to patient who is aware rx for Hydromorphone is filled and ready for  , Fentanyl patch too soon for refill , patient understands and aware , he does have enough until refill is due

## 2023-09-13 NOTE — TELEPHONE ENCOUNTER
The patient received two emails from the pharmacy stating both medications that were sent in are too early to fill.  # [id number].

## 2023-09-25 ENCOUNTER — HOSPITAL ENCOUNTER (OUTPATIENT)
Facility: HOSPITAL | Age: 58
Discharge: HOME | End: 2023-09-25
Attending: RADIOLOGY

## 2023-09-25 VITALS
DIASTOLIC BLOOD PRESSURE: 75 MMHG | HEART RATE: 73 BPM | SYSTOLIC BLOOD PRESSURE: 114 MMHG | TEMPERATURE: 97.7 F | RESPIRATION RATE: 16 BRPM

## 2023-09-25 DIAGNOSIS — L97.222 VENOUS STASIS ULCER OF LEFT CALF WITH FAT LAYER EXPOSED WITHOUT VARICOSE VEINS (HCC): ICD-10-CM

## 2023-09-25 DIAGNOSIS — E83.59 CALCIPHYLAXIS OF LEFT LOWER EXTREMITY WITH NONHEALING ULCER WITH FAT LAYER EXPOSED (HCC): Primary | ICD-10-CM

## 2023-09-25 DIAGNOSIS — L97.912 CALCIPHYLAXIS OF RIGHT LOWER EXTREMITY WITH NONHEALING ULCER WITH FAT LAYER EXPOSED (HCC): ICD-10-CM

## 2023-09-25 DIAGNOSIS — I87.2 VENOUS STASIS ULCER OF LEFT CALF WITH FAT LAYER EXPOSED WITHOUT VARICOSE VEINS (HCC): ICD-10-CM

## 2023-09-25 DIAGNOSIS — E83.59 CALCIPHYLAXIS OF RIGHT LOWER EXTREMITY WITH NONHEALING ULCER WITH FAT LAYER EXPOSED (HCC): ICD-10-CM

## 2023-09-25 DIAGNOSIS — L97.922 CALCIPHYLAXIS OF LEFT LOWER EXTREMITY WITH NONHEALING ULCER WITH FAT LAYER EXPOSED (HCC): Primary | ICD-10-CM

## 2023-09-25 RX ORDER — LIDOCAINE 50 MG/G
OINTMENT TOPICAL ONCE
OUTPATIENT
Start: 2023-09-25 | End: 2023-09-25

## 2023-09-25 RX ORDER — LIDOCAINE HYDROCHLORIDE 20 MG/ML
JELLY TOPICAL ONCE
OUTPATIENT
Start: 2023-09-25 | End: 2023-09-25

## 2023-09-25 RX ORDER — SODIUM CHLOR/HYPOCHLOROUS ACID 0.033 %
SOLUTION, IRRIGATION IRRIGATION ONCE
Status: COMPLETED | OUTPATIENT
Start: 2023-09-25 | End: 2023-09-25

## 2023-09-25 RX ORDER — BACITRACIN ZINC AND POLYMYXIN B SULFATE 500; 1000 [USP'U]/G; [USP'U]/G
OINTMENT TOPICAL ONCE
OUTPATIENT
Start: 2023-09-25 | End: 2023-09-25

## 2023-09-25 RX ORDER — IBUPROFEN 200 MG
TABLET ORAL ONCE
OUTPATIENT
Start: 2023-09-25 | End: 2023-09-25

## 2023-09-25 RX ORDER — GINSENG 100 MG
CAPSULE ORAL ONCE
OUTPATIENT
Start: 2023-09-25 | End: 2023-09-25

## 2023-09-25 RX ORDER — SODIUM CHLOR/HYPOCHLOROUS ACID 0.033 %
SOLUTION, IRRIGATION IRRIGATION ONCE
OUTPATIENT
Start: 2023-09-25 | End: 2023-09-25

## 2023-09-25 RX ORDER — METRONIDAZOLE 250 MG/1
250 TABLET ORAL 2 TIMES DAILY
Qty: 40 TABLET | Refills: 0 | Status: SHIPPED | OUTPATIENT
Start: 2023-09-25 | End: 2023-10-15

## 2023-09-25 RX ORDER — LIDOCAINE HYDROCHLORIDE 40 MG/ML
SOLUTION TOPICAL ONCE
OUTPATIENT
Start: 2023-09-25 | End: 2023-09-25

## 2023-09-25 RX ORDER — BETAMETHASONE DIPROPIONATE 0.05 %
OINTMENT (GRAM) TOPICAL ONCE
OUTPATIENT
Start: 2023-09-25 | End: 2023-09-25

## 2023-09-25 RX ORDER — GENTAMICIN SULFATE 1 MG/G
OINTMENT TOPICAL ONCE
OUTPATIENT
Start: 2023-09-25 | End: 2023-09-25

## 2023-09-25 RX ORDER — TRIAMCINOLONE ACETONIDE 1 MG/G
OINTMENT TOPICAL ONCE
OUTPATIENT
Start: 2023-09-25 | End: 2023-09-25

## 2023-09-25 RX ORDER — LIDOCAINE 40 MG/G
CREAM TOPICAL ONCE
OUTPATIENT
Start: 2023-09-25 | End: 2023-09-25

## 2023-09-25 RX ORDER — CLOBETASOL PROPIONATE 0.5 MG/G
OINTMENT TOPICAL ONCE
OUTPATIENT
Start: 2023-09-25 | End: 2023-09-25

## 2023-09-25 RX ADMIN — Medication: at 11:56

## 2023-09-25 ASSESSMENT — PAIN DESCRIPTION - ORIENTATION: ORIENTATION: RIGHT;LEFT

## 2023-09-25 ASSESSMENT — PAIN SCALES - GENERAL: PAINLEVEL_OUTOF10: 10

## 2023-09-25 ASSESSMENT — PAIN DESCRIPTION - LOCATION: LOCATION: LEG

## 2023-09-25 ASSESSMENT — PAIN DESCRIPTION - DESCRIPTORS: DESCRIPTORS: ACHING;BURNING;SHARP

## 2023-09-25 NOTE — DISCHARGE INSTRUCTIONS
Discharge Instructions for          [address], [address]  Phone: [phone] Fax: [phone]    NAME:  Holley Palma  YOB: 1965  MEDICAL RECORD NUMBER:  [id number]  DATE:  September 25, 2023  WOUND CARE ORDERS:  Bilateral lower legs- Cleanse with vashe solution. Let sit on wounds for 5-7 minutes, pat dry. At home, crush flagyl (Metronidazole) and sprinkle into wound to help with odor as needed. Apply hydrofera blue ready to wound beds. Cover with abd pads and roll gauze. Secure with tape. Change every other day. Activity:  [x] Elevate leg(s) above the level of the heart when sitting. [x] Avoid prolonged standing in one place. [x] Do no get dressing/wrap wet. Dietary:  [x] Diet as tolerated      [] Diabetic Diet            [] Increase Protein: examples (Meat, cheese, eggs, greek yogurt, fish, nuts)          [x] Avery Therapeutic Nutrition Powder  Return Appointment:  [x] Return Appointment: With Dr. Von Phoenix in 2 weeks. Electronically signed Simi Pearce RN on 9/25/2023 at 11:05 [facility] Information: Should you experience any significant changes in your wound(s) or have questions about your wound care, please contact the [address] [place] [address] at 1 [facility] 8:00 am - 4:30. If you need help with your wound outside these hours and cannot wait until we are again available, contact your PCP or go to the hospital emergency room. PLEASE NOTE: IF YOU ARE UNABLE TO OBTAIN WOUND SUPPLIES, CONTINUE TO USE THE SUPPLIES YOU HAVE AVAILABLE UNTIL YOU ARE ABLE TO REACH US. IT IS MOST IMPORTANT TO KEEP THE WOUND COVERED AT ALL TIMES.      Physician Signature:_______________________    Date: ___________ Time:  ____________

## 2023-09-25 NOTE — FLOWSHEET NOTE
09/25/23 1151   Wound 06/01/23 Leg Right; Lower circumferential   Date First Assessed/Time First Assessed: 06/01/23 1455   Present on Original Admission: Yes  Wound Approximate Age at First Assessment (Weeks): 1 weeks  Primary Wound Type: Venous Ulcer  Location: Leg  Wound Location Orientation: Right; Lower  Wound De. .. Dressing Status New dressing applied   Dressing/Treatment   (vashe, hydrofera blue ready, ABD, roll gauze and tape.)   Wound 04/13/23 Leg Left; Lower circumferential   Date First Assessed: 04/13/23   Primary Wound Type: Venous Ulcer  Location: Leg  Wound Location Orientation: Left; Lower  Wound Description (Comments): circumferential   Dressing Status New dressing applied   Dressing/Treatment   (vashe, hydrofera blue ready, ABD, roll gauze and tape.)

## 2023-09-25 NOTE — PROGRESS NOTES
Dressing Change Due 09/05/23 09/03/23 0755   Wound Length (cm) 21 cm 09/25/23 1039   Wound Width (cm) 34 cm 09/25/23 1039   Wound Depth (cm) 0.3 cm 09/25/23 1039   Wound Surface Area (cm^2) 714 cm^2 09/25/23 1039   Change in Wound Size % (l*w) -4597.37 09/25/23 1039   Wound Volume (cm^3) 214.2 cm^3 09/25/23 1039   Wound Healing % -69570 09/25/23 1039   Wound Assessment Pink/red;Slough; Hyper granulation tissue;Bleeding 09/25/23 1039   Drainage Amount Large (50-75% saturated) 09/25/23 1039   Drainage Description Serosanguinous 09/25/23 1039   Odor Malodorous/putrid 09/25/23 1039   Kay-wound Assessment Dry/flaky 09/25/23 1039   Margins Undefined edges 09/25/23 1039   Wound Thickness Description not for Pressure Injury Full thickness 09/25/23 1039   Number of days: 116          Procedures done during this encounter:   Debridement: Excisional Debridement  Indications:  Based on my examination of this patient's wound(s)/ulcer(s) today, debridement is required to promote healing and evaluate the wound base. Risks and benefits discussed with patient who has agreed to proceed. Performed by: Rosenda Arriaza MD  Consent obtained:  Yes  Time out taken:  Yes  Pain Control:     Using curette, scissors, and forceps the wound(s)/ulcer(s) was/were debrided down through and including the removal of epidermis, dermis, subcutaneous tissue, and muscle/fascia. Silver nitrate was used especially on the left leg applied to the hypergranulated tissue    Devitalized Tissue Debrided:  fibrin, slough, necrotic/eschar, exudate, and clots  Pre Debridement Measurements:  Are located in the Wound/Ulcer Documentation Flow Sheet  Wound/Ulcer #: 2 numerous in both legs  Post Debridement Measurements:  Wound/Ulcer Descriptions are Pre Debridement except measurements:   Total Surface Area Debrided:  1300 sq cm   Diabetic/Pressure/Non Pressure Ulcers only:  Ulcer: Non-Pressure ulcer, muscle necrosis   Estimated Blood Loss:  Minimal  Hemostasis

## 2023-09-25 NOTE — FLOWSHEET NOTE
09/25/23 1039   Right Leg Edema Point of Measurement   Leg circumference 34 cm   Ankle circumference 21 cm   Left Leg Edema Point of Measurement   Leg circumference 31.5 cm   Ankle circumference 20 cm   RLE Neurovascular Assessment   Capillary Refill Less than/Equal to 3 seconds   Color Yellow-Brown/Hemosiderin Staining   Temperature Warm   RLE Sensation  Pain   R Pedal Pulse +2   LLE Neurovascular Assessment   Capillary Refill Less than/Equal to 3 seconds   Color Yellow-Brown/Hemosiderin Staining   Temperature Warm   LLE Sensation  Pain   L Pedal Pulse +2   Wound 06/01/23 Leg Right; Lower circumferential   Date First Assessed/Time First Assessed: 06/01/23 1455   Present on Original Admission: Yes  Wound Approximate Age at First Assessment (Weeks): 1 weeks  Primary Wound Type: Venous Ulcer  Location: Leg  Wound Location Orientation: Right; Lower  Wound De. .. Wound Image      Wound Etiology Other  (calciphylaxis)   Dressing Status Intact; Old drainage noted   Wound Cleansed Soap and water   Offloading for Diabetic Foot Ulcers Offloading not required   Wound Length (cm) 21 cm   Wound Width (cm) 34 cm   Wound Depth (cm) 0.3 cm   Wound Surface Area (cm^2) 714 cm^2   Change in Wound Size % (l*w) -4597.37   Wound Volume (cm^3) 214.2 cm^3   Wound Healing % -73586   Wound Assessment Pink/red;Slough; Hyper granulation tissue;Bleeding   Drainage Amount Large (50-75% saturated)   Drainage Description Serosanguinous   Odor Malodorous/putrid   Kay-wound Assessment Dry/flaky   Margins Undefined edges   Wound Thickness Description not for Pressure Injury Full thickness   Wound 04/13/23 Leg Left; Lower circumferential   Date First Assessed: 04/13/23   Primary Wound Type: Venous Ulcer  Location: Leg  Wound Location Orientation: Left; Lower  Wound Description (Comments): circumferential   Wound Image      Wound Etiology Other  (calciphylaxis)   Dressing Status Intact; Old drainage noted   Wound Cleansed Soap and water   Offloading for

## 2023-10-03 ENCOUNTER — HOSPITAL ENCOUNTER (OUTPATIENT)
Facility: HOSPITAL | Age: 58
Setting detail: INFUSION SERIES
End: 2023-10-03
Payer: MEDICARE

## 2023-10-03 VITALS
DIASTOLIC BLOOD PRESSURE: 94 MMHG | TEMPERATURE: 98.6 F | RESPIRATION RATE: 18 BRPM | HEART RATE: 83 BPM | SYSTOLIC BLOOD PRESSURE: 148 MMHG

## 2023-10-03 DIAGNOSIS — E83.59 CALCIPHYLAXIS: Primary | ICD-10-CM

## 2023-10-03 LAB
ALBUMIN SERPL-MCNC: 3 G/DL (ref 3.5–5)
ANION GAP SERPL CALC-SCNC: 4 MMOL/L (ref 5–15)
BUN SERPL-MCNC: 26 MG/DL (ref 6–20)
BUN/CREAT SERPL: 14 (ref 12–20)
CALCIUM SERPL-MCNC: 9.2 MG/DL (ref 8.5–10.1)
CHLORIDE SERPL-SCNC: 106 MMOL/L (ref 97–108)
CO2 SERPL-SCNC: 27 MMOL/L (ref 21–32)
CREAT SERPL-MCNC: 1.85 MG/DL (ref 0.7–1.3)
GLUCOSE SERPL-MCNC: 89 MG/DL (ref 65–100)
PHOSPHATE SERPL-MCNC: 3.6 MG/DL (ref 2.6–4.7)
POTASSIUM SERPL-SCNC: 4.6 MMOL/L (ref 3.5–5.1)
SODIUM SERPL-SCNC: 137 MMOL/L (ref 136–145)

## 2023-10-03 PROCEDURE — 96365 THER/PROPH/DIAG IV INF INIT: CPT

## 2023-10-03 PROCEDURE — 6360000002 HC RX W HCPCS: Performed by: INTERNAL MEDICINE

## 2023-10-03 PROCEDURE — 80069 RENAL FUNCTION PANEL: CPT

## 2023-10-03 PROCEDURE — 36415 COLL VENOUS BLD VENIPUNCTURE: CPT

## 2023-10-03 PROCEDURE — 2580000003 HC RX 258: Performed by: INTERNAL MEDICINE

## 2023-10-03 RX ORDER — SODIUM CHLORIDE 9 MG/ML
INJECTION, SOLUTION INTRAVENOUS CONTINUOUS
Status: DISCONTINUED | OUTPATIENT
Start: 2023-10-03 | End: 2023-10-04 | Stop reason: HOSPADM

## 2023-10-03 RX ORDER — SODIUM CHLORIDE 9 MG/ML
INJECTION, SOLUTION INTRAVENOUS CONTINUOUS
Status: CANCELLED
Start: 2023-10-06

## 2023-10-03 RX ADMIN — SODIUM CHLORIDE: 9 INJECTION, SOLUTION INTRAVENOUS at 11:33

## 2023-10-03 RX ADMIN — SODIUM THIOSULFATE 25 G: 250 INJECTION, SOLUTION INTRAVENOUS at 12:09

## 2023-10-03 ASSESSMENT — PAIN SCALES - GENERAL: PAINLEVEL_OUTOF10: 5

## 2023-10-03 ASSESSMENT — PAIN DESCRIPTION - DESCRIPTORS: DESCRIPTORS: ACHING;SHARP;SHOOTING;BURNING

## 2023-10-03 ASSESSMENT — PAIN DESCRIPTION - LOCATION: LOCATION: LEG

## 2023-10-03 ASSESSMENT — PAIN DESCRIPTION - ORIENTATION: ORIENTATION: RIGHT;LEFT

## 2023-10-05 ENCOUNTER — HOSPITAL ENCOUNTER (OUTPATIENT)
Facility: HOSPITAL | Age: 58
Setting detail: INFUSION SERIES
End: 2023-10-05
Payer: MEDICARE

## 2023-10-05 VITALS
HEART RATE: 73 BPM | SYSTOLIC BLOOD PRESSURE: 145 MMHG | TEMPERATURE: 97.7 F | RESPIRATION RATE: 18 BRPM | DIASTOLIC BLOOD PRESSURE: 77 MMHG

## 2023-10-05 DIAGNOSIS — E83.59 CALCIPHYLAXIS: Primary | ICD-10-CM

## 2023-10-05 LAB
ALBUMIN SERPL-MCNC: 2.8 G/DL (ref 3.5–5)
ANION GAP SERPL CALC-SCNC: 5 MMOL/L (ref 5–15)
BUN SERPL-MCNC: 24 MG/DL (ref 6–20)
BUN/CREAT SERPL: 13 (ref 12–20)
CALCIUM SERPL-MCNC: 8.8 MG/DL (ref 8.5–10.1)
CHLORIDE SERPL-SCNC: 107 MMOL/L (ref 97–108)
CO2 SERPL-SCNC: 26 MMOL/L (ref 21–32)
CREAT SERPL-MCNC: 1.81 MG/DL (ref 0.7–1.3)
GLUCOSE SERPL-MCNC: 99 MG/DL (ref 65–100)
PHOSPHATE SERPL-MCNC: 3.2 MG/DL (ref 2.6–4.7)
POTASSIUM SERPL-SCNC: 3.2 MMOL/L (ref 3.5–5.1)
SODIUM SERPL-SCNC: 138 MMOL/L (ref 136–145)

## 2023-10-05 PROCEDURE — 96365 THER/PROPH/DIAG IV INF INIT: CPT

## 2023-10-05 PROCEDURE — 36415 COLL VENOUS BLD VENIPUNCTURE: CPT

## 2023-10-05 PROCEDURE — 6360000002 HC RX W HCPCS: Performed by: INTERNAL MEDICINE

## 2023-10-05 PROCEDURE — 2580000003 HC RX 258: Performed by: INTERNAL MEDICINE

## 2023-10-05 PROCEDURE — 80069 RENAL FUNCTION PANEL: CPT

## 2023-10-05 RX ORDER — SODIUM CHLORIDE 9 MG/ML
INJECTION, SOLUTION INTRAVENOUS CONTINUOUS
Status: CANCELLED
Start: 2023-10-10

## 2023-10-05 RX ORDER — SODIUM CHLORIDE 9 MG/ML
INJECTION, SOLUTION INTRAVENOUS CONTINUOUS
Status: DISCONTINUED | OUTPATIENT
Start: 2023-10-05 | End: 2023-10-06 | Stop reason: HOSPADM

## 2023-10-05 RX ORDER — SODIUM CHLORIDE 9 MG/ML
INJECTION, SOLUTION INTRAVENOUS CONTINUOUS
Status: CANCELLED
Start: 2023-10-06

## 2023-10-05 RX ADMIN — SODIUM THIOSULFATE 25 G: 250 INJECTION, SOLUTION INTRAVENOUS at 14:35

## 2023-10-05 RX ADMIN — SODIUM CHLORIDE: 9 INJECTION, SOLUTION INTRAVENOUS at 14:07

## 2023-10-05 ASSESSMENT — PAIN SCALES - GENERAL: PAINLEVEL_OUTOF10: 7

## 2023-10-05 ASSESSMENT — PAIN DESCRIPTION - LOCATION: LOCATION: LEG

## 2023-10-05 ASSESSMENT — PAIN DESCRIPTION - ORIENTATION: ORIENTATION: RIGHT;LEFT

## 2023-10-05 ASSESSMENT — PAIN DESCRIPTION - DESCRIPTORS: DESCRIPTORS: ACHING;SHARP;SHOOTING

## 2023-10-10 ENCOUNTER — HOSPITAL ENCOUNTER (OUTPATIENT)
Facility: HOSPITAL | Age: 58
Setting detail: INFUSION SERIES
End: 2023-10-10

## 2023-10-12 ENCOUNTER — HOSPITAL ENCOUNTER (OUTPATIENT)
Facility: HOSPITAL | Age: 58
Setting detail: INFUSION SERIES
End: 2023-10-12
Payer: MEDICARE

## 2023-10-12 VITALS
SYSTOLIC BLOOD PRESSURE: 137 MMHG | TEMPERATURE: 98 F | DIASTOLIC BLOOD PRESSURE: 84 MMHG | HEART RATE: 68 BPM | RESPIRATION RATE: 18 BRPM

## 2023-10-12 DIAGNOSIS — E83.59 CALCIPHYLAXIS: Primary | ICD-10-CM

## 2023-10-12 LAB
ALBUMIN SERPL-MCNC: 2.9 G/DL (ref 3.5–5)
ANION GAP SERPL CALC-SCNC: 7 MMOL/L (ref 5–15)
BUN SERPL-MCNC: 25 MG/DL (ref 6–20)
BUN/CREAT SERPL: 14 (ref 12–20)
CALCIUM SERPL-MCNC: 9 MG/DL (ref 8.5–10.1)
CHLORIDE SERPL-SCNC: 109 MMOL/L (ref 97–108)
CO2 SERPL-SCNC: 25 MMOL/L (ref 21–32)
CREAT SERPL-MCNC: 1.83 MG/DL (ref 0.7–1.3)
GLUCOSE SERPL-MCNC: 62 MG/DL (ref 65–100)
PHOSPHATE SERPL-MCNC: 2.7 MG/DL (ref 2.6–4.7)
POTASSIUM SERPL-SCNC: 2.8 MMOL/L (ref 3.5–5.1)
SODIUM SERPL-SCNC: 141 MMOL/L (ref 136–145)

## 2023-10-12 PROCEDURE — 80069 RENAL FUNCTION PANEL: CPT

## 2023-10-12 PROCEDURE — 96365 THER/PROPH/DIAG IV INF INIT: CPT

## 2023-10-12 PROCEDURE — 36415 COLL VENOUS BLD VENIPUNCTURE: CPT

## 2023-10-12 PROCEDURE — 2580000003 HC RX 258: Performed by: INTERNAL MEDICINE

## 2023-10-12 PROCEDURE — 6360000002 HC RX W HCPCS: Performed by: INTERNAL MEDICINE

## 2023-10-12 RX ORDER — SODIUM CHLORIDE 9 MG/ML
INJECTION, SOLUTION INTRAVENOUS CONTINUOUS
Status: CANCELLED
Start: 2023-10-13

## 2023-10-12 RX ORDER — SODIUM CHLORIDE 9 MG/ML
INJECTION, SOLUTION INTRAVENOUS CONTINUOUS
Status: DISCONTINUED | OUTPATIENT
Start: 2023-10-12 | End: 2023-10-13 | Stop reason: HOSPADM

## 2023-10-12 RX ADMIN — SODIUM THIOSULFATE 25 G: 250 INJECTION, SOLUTION INTRAVENOUS at 15:08

## 2023-10-12 RX ADMIN — SODIUM CHLORIDE: 9 INJECTION, SOLUTION INTRAVENOUS at 14:35

## 2023-10-12 ASSESSMENT — PAIN SCALES - GENERAL: PAINLEVEL_OUTOF10: 6

## 2023-10-12 ASSESSMENT — PAIN DESCRIPTION - DESCRIPTORS: DESCRIPTORS: ACHING;SHARP;SHOOTING

## 2023-10-12 ASSESSMENT — PAIN DESCRIPTION - LOCATION: LOCATION: LEG

## 2023-10-12 ASSESSMENT — PAIN DESCRIPTION - ORIENTATION: ORIENTATION: RIGHT;LEFT

## 2023-10-13 DIAGNOSIS — L97.922 CALCIPHYLAXIS OF LEFT LOWER EXTREMITY WITH NONHEALING ULCER WITH FAT LAYER EXPOSED (HCC): ICD-10-CM

## 2023-10-13 DIAGNOSIS — E83.59 CALCIPHYLAXIS OF LEFT LOWER EXTREMITY WITH NONHEALING ULCER WITH FAT LAYER EXPOSED (HCC): ICD-10-CM

## 2023-10-13 RX ORDER — HYDROMORPHONE HYDROCHLORIDE 4 MG/1
4 TABLET ORAL EVERY 6 HOURS PRN
Qty: 60 TABLET | Refills: 0 | Status: SHIPPED | OUTPATIENT
Start: 2023-10-13 | End: 2023-10-28

## 2023-10-13 RX ORDER — SODIUM CHLORIDE 9 MG/ML
INJECTION, SOLUTION INTRAVENOUS CONTINUOUS
Status: CANCELLED
Start: 2023-10-17

## 2023-10-13 NOTE — TELEPHONE ENCOUNTER
Triage for Controlled Substance Refill Request    Pain Diagnosis: _Calciphylaxis of left lower extremity with nonhealing ulcer with fat layer exposed ([address]) [E83.59, M36.867]    Last Outpatient Visit: _9/12/2023    Next Outpatient Visit: _11/7/2023    Reason for refill needed outside of office visit? -Appointment not scheduled prior to need for scheduled refill      Pharmacy: _[facility]/pharmacy [address] [place] [address]   Phone:  [phone]  Fax:  [phone]        Medication:HYDROmorphone (DILAUDID) 4 MG    Dose and directions: Take 1 tablet by mouth every 6 hours   Number dispensed:60  Date filled ( or Pharmacy):9/12/2023  #left:     reviewed: _yes    Date of Urine Drug Screen:  _    Opioid Safety Handout given:  _yes    Appropriate for refill:  _yes    Action:  _ please refill

## 2023-10-13 NOTE — TELEPHONE ENCOUNTER
Received call from patient requesting Hydromorphone refill. Patient has 4 pills remaining. He has started [facility] Therapy and it is a painful process. To be called in to [facility].

## 2023-10-17 ENCOUNTER — HOSPITAL ENCOUNTER (OUTPATIENT)
Facility: HOSPITAL | Age: 58
Setting detail: INFUSION SERIES
End: 2023-10-17

## 2023-10-19 ENCOUNTER — HOSPITAL ENCOUNTER (OUTPATIENT)
Facility: HOSPITAL | Age: 58
Setting detail: INFUSION SERIES
End: 2023-10-19
Payer: MEDICARE

## 2023-10-19 VITALS
HEART RATE: 80 BPM | RESPIRATION RATE: 18 BRPM | TEMPERATURE: 97.2 F | DIASTOLIC BLOOD PRESSURE: 98 MMHG | SYSTOLIC BLOOD PRESSURE: 139 MMHG | OXYGEN SATURATION: 94 %

## 2023-10-19 DIAGNOSIS — E83.59 CALCIPHYLAXIS: Primary | ICD-10-CM

## 2023-10-19 LAB
ALBUMIN SERPL-MCNC: 2.8 G/DL (ref 3.5–5)
ANION GAP SERPL CALC-SCNC: 6 MMOL/L (ref 5–15)
BUN SERPL-MCNC: 26 MG/DL (ref 6–20)
BUN/CREAT SERPL: 14 (ref 12–20)
CALCIUM SERPL-MCNC: 8.5 MG/DL (ref 8.5–10.1)
CHLORIDE SERPL-SCNC: 109 MMOL/L (ref 97–108)
CO2 SERPL-SCNC: 27 MMOL/L (ref 21–32)
CREAT SERPL-MCNC: 1.89 MG/DL (ref 0.7–1.3)
GLUCOSE SERPL-MCNC: 104 MG/DL (ref 65–100)
PHOSPHATE SERPL-MCNC: 2.7 MG/DL (ref 2.6–4.7)
POTASSIUM SERPL-SCNC: 2.7 MMOL/L (ref 3.5–5.1)
SODIUM SERPL-SCNC: 142 MMOL/L (ref 136–145)

## 2023-10-19 PROCEDURE — 80069 RENAL FUNCTION PANEL: CPT

## 2023-10-19 PROCEDURE — 6360000002 HC RX W HCPCS: Performed by: INTERNAL MEDICINE

## 2023-10-19 PROCEDURE — 96365 THER/PROPH/DIAG IV INF INIT: CPT

## 2023-10-19 PROCEDURE — 36415 COLL VENOUS BLD VENIPUNCTURE: CPT

## 2023-10-19 PROCEDURE — 2580000003 HC RX 258: Performed by: INTERNAL MEDICINE

## 2023-10-19 RX ORDER — SODIUM CHLORIDE 9 MG/ML
INJECTION, SOLUTION INTRAVENOUS CONTINUOUS
Status: DISCONTINUED | OUTPATIENT
Start: 2023-10-19 | End: 2023-10-20 | Stop reason: HOSPADM

## 2023-10-19 RX ORDER — SODIUM CHLORIDE 9 MG/ML
INJECTION, SOLUTION INTRAVENOUS CONTINUOUS
Status: CANCELLED
Start: 2023-10-22

## 2023-10-19 RX ADMIN — SODIUM THIOSULFATE 25 G: 250 INJECTION, SOLUTION INTRAVENOUS at 14:35

## 2023-10-19 RX ADMIN — SODIUM CHLORIDE: 9 INJECTION, SOLUTION INTRAVENOUS at 14:12

## 2023-10-19 ASSESSMENT — PAIN DESCRIPTION - LOCATION: LOCATION: LEG

## 2023-10-19 ASSESSMENT — PAIN DESCRIPTION - ORIENTATION: ORIENTATION: RIGHT;LEFT

## 2023-10-19 ASSESSMENT — PAIN DESCRIPTION - DESCRIPTORS: DESCRIPTORS: ACHING

## 2023-10-19 ASSESSMENT — PAIN SCALES - GENERAL: PAINLEVEL_OUTOF10: 7

## 2023-10-24 ENCOUNTER — HOSPITAL ENCOUNTER (OUTPATIENT)
Facility: HOSPITAL | Age: 58
Setting detail: INFUSION SERIES
End: 2023-10-24

## 2023-10-25 ENCOUNTER — OFFICE VISIT (OUTPATIENT)
Age: 58
End: 2023-10-25
Payer: MEDICARE

## 2023-10-25 VITALS
RESPIRATION RATE: 16 BRPM | HEART RATE: 76 BPM | SYSTOLIC BLOOD PRESSURE: 138 MMHG | WEIGHT: 200 LBS | HEIGHT: 68 IN | TEMPERATURE: 98.2 F | DIASTOLIC BLOOD PRESSURE: 102 MMHG | OXYGEN SATURATION: 100 % | BODY MASS INDEX: 30.31 KG/M2

## 2023-10-25 DIAGNOSIS — I50.22 CHRONIC SYSTOLIC HEART FAILURE (HCC): Primary | ICD-10-CM

## 2023-10-25 PROCEDURE — 3017F COLORECTAL CA SCREEN DOC REV: CPT | Performed by: NURSE PRACTITIONER

## 2023-10-25 PROCEDURE — G8417 CALC BMI ABV UP PARAM F/U: HCPCS | Performed by: NURSE PRACTITIONER

## 2023-10-25 PROCEDURE — G8484 FLU IMMUNIZE NO ADMIN: HCPCS | Performed by: NURSE PRACTITIONER

## 2023-10-25 PROCEDURE — 3075F SYST BP GE 130 - 139MM HG: CPT | Performed by: NURSE PRACTITIONER

## 2023-10-25 PROCEDURE — G8427 DOCREV CUR MEDS BY ELIG CLIN: HCPCS | Performed by: NURSE PRACTITIONER

## 2023-10-25 PROCEDURE — 99214 OFFICE O/P EST MOD 30 MIN: CPT | Performed by: NURSE PRACTITIONER

## 2023-10-25 PROCEDURE — 1036F TOBACCO NON-USER: CPT | Performed by: NURSE PRACTITIONER

## 2023-10-25 PROCEDURE — 3080F DIAST BP >= 90 MM HG: CPT | Performed by: NURSE PRACTITIONER

## 2023-10-25 RX ORDER — CIPROFLOXACIN 500 MG/1
500 TABLET, FILM COATED ORAL 2 TIMES DAILY
COMMUNITY
Start: 2023-10-20

## 2023-10-25 RX ORDER — INSULIN ASPART 100 [IU]/ML
22 INJECTION, SUSPENSION SUBCUTANEOUS
COMMUNITY
Start: 2023-09-29

## 2023-10-25 ASSESSMENT — ENCOUNTER SYMPTOMS
NAUSEA: 0
SHORTNESS OF BREATH: 0
COUGH: 0
VOMITING: 0
STRIDOR: 0

## 2023-10-25 ASSESSMENT — PATIENT HEALTH QUESTIONNAIRE - PHQ9
SUM OF ALL RESPONSES TO PHQ QUESTIONS 1-9: 0
2. FEELING DOWN, DEPRESSED OR HOPELESS: 0
SUM OF ALL RESPONSES TO PHQ9 QUESTIONS 1 & 2: 0
1. LITTLE INTEREST OR PLEASURE IN DOING THINGS: 0

## 2023-10-25 NOTE — PATIENT INSTRUCTIONS
Medication changes:    No medication changes     Please take this to your pharmacy to notify them of the change in medications. Testing Ordered     Please have labs completed in the next four weeks. You may go to any local lab [place] to have labs drawn. You will be notified of any abnormal results that requires a change in medication regimen. Other Recommendations:     Please call tomorrow to report weight [phone] option 2 and call in two weeks to report blood pressure logs     An order for echo has been placed to be done in December. You will be receiving an automated call from Coordination of Care to schedule this test. If you are unavailable to receive the call or would like to contact coordination of care yourself you may contact [phone] to schedule. You will need to contact coordination of care yourself if you miss their calls as they will only make 3 attempts to reach you. Please record blood pressure daily before medication and two hours after medication, please record weight daily upon waking/after using the bathroom. Keep a written records of your weights and blood pressure and bring to your next appointment. If you have a weight gain of 3 or more pounds overnight OR 5 or more pounds in one week, new/worsened shortness of breath or swelling, or if your blood pressure begins to consistently run below 90/60 and/or you begin to experience dizziness or lightheadedness please contact our office at [phone] option 2. Ensure your drinking an adequate amount of water with a goal of 6-8 eight ounce glasses (1.5-2 liters) of fluid daily. Your urine should be clear and light yellow straw colored. Follow up 3 months with Np  with [address]    Thank you for allowing us the privilege of being a part of your healthcare team! Please do not hesitate to contact our office at [phone] option 2 with any questions or concerns.

## 2023-10-25 NOTE — PROGRESS NOTES
sodium chloride infusion, , IntraVENous, Continuous, Imtiaz Adams MD, Stopped at 07/21/23 1779    PATIENT CARE TEAM:  Patient Care Team:  Esteban Neri MD as PCP - General     Thank you for allowing me to participate in this patient's care.     Adarsh Glass APRN - NP   [address]  [address] [facility], [address]  Phone: [phone]    Total Patient visit time: 35 minutes, >50% spent face to face and counseling

## 2023-10-26 ENCOUNTER — HOSPITAL ENCOUNTER (OUTPATIENT)
Facility: HOSPITAL | Age: 58
Setting detail: INFUSION SERIES
Discharge: HOME | End: 2023-10-26
Payer: MEDICARE

## 2023-10-26 VITALS
TEMPERATURE: 97.8 F | DIASTOLIC BLOOD PRESSURE: 67 MMHG | SYSTOLIC BLOOD PRESSURE: 130 MMHG | RESPIRATION RATE: 18 BRPM | HEART RATE: 78 BPM

## 2023-10-26 DIAGNOSIS — E83.59 CALCIPHYLAXIS: Primary | ICD-10-CM

## 2023-10-26 LAB
ALBUMIN SERPL-MCNC: 2.6 G/DL (ref 3.5–5)
ANION GAP SERPL CALC-SCNC: 4 MMOL/L (ref 5–15)
BUN SERPL-MCNC: 22 MG/DL (ref 6–20)
BUN/CREAT SERPL: 11 (ref 12–20)
CALCIUM SERPL-MCNC: 8.4 MG/DL (ref 8.5–10.1)
CHLORIDE SERPL-SCNC: 109 MMOL/L (ref 97–108)
CO2 SERPL-SCNC: 26 MMOL/L (ref 21–32)
CREAT SERPL-MCNC: 1.99 MG/DL (ref 0.7–1.3)
GLUCOSE SERPL-MCNC: 179 MG/DL (ref 65–100)
PHOSPHATE SERPL-MCNC: 3.6 MG/DL (ref 2.6–4.7)
POTASSIUM SERPL-SCNC: 5 MMOL/L (ref 3.5–5.1)
SODIUM SERPL-SCNC: 139 MMOL/L (ref 136–145)

## 2023-10-26 PROCEDURE — 6360000002 HC RX W HCPCS: Performed by: INTERNAL MEDICINE

## 2023-10-26 PROCEDURE — 2580000003 HC RX 258: Performed by: INTERNAL MEDICINE

## 2023-10-26 PROCEDURE — 80069 RENAL FUNCTION PANEL: CPT

## 2023-10-26 PROCEDURE — 96365 THER/PROPH/DIAG IV INF INIT: CPT

## 2023-10-26 PROCEDURE — 36415 COLL VENOUS BLD VENIPUNCTURE: CPT

## 2023-10-26 RX ORDER — SODIUM CHLORIDE 9 MG/ML
INJECTION, SOLUTION INTRAVENOUS CONTINUOUS
Status: DISCONTINUED | OUTPATIENT
Start: 2023-10-26 | End: 2023-10-27 | Stop reason: HOSPADM

## 2023-10-26 RX ORDER — SODIUM CHLORIDE 9 MG/ML
INJECTION, SOLUTION INTRAVENOUS CONTINUOUS
Status: CANCELLED
Start: 2023-10-28

## 2023-10-26 RX ADMIN — SODIUM THIOSULFATE 25 G: 250 INJECTION, SOLUTION INTRAVENOUS at 14:44

## 2023-10-26 RX ADMIN — SODIUM CHLORIDE: 9 INJECTION, SOLUTION INTRAVENOUS at 14:28

## 2023-10-26 ASSESSMENT — PAIN DESCRIPTION - DESCRIPTORS: DESCRIPTORS: ACHING

## 2023-10-26 ASSESSMENT — PAIN SCALES - GENERAL: PAINLEVEL_OUTOF10: 4

## 2023-10-26 ASSESSMENT — PAIN DESCRIPTION - ORIENTATION: ORIENTATION: RIGHT;LEFT

## 2023-10-26 ASSESSMENT — PAIN DESCRIPTION - LOCATION: LOCATION: LEG

## 2023-10-27 DIAGNOSIS — L97.912 CALCIPHYLAXIS OF RIGHT LOWER EXTREMITY WITH NONHEALING ULCER WITH FAT LAYER EXPOSED (HCC): ICD-10-CM

## 2023-10-27 DIAGNOSIS — E83.59 CALCIPHYLAXIS OF RIGHT LOWER EXTREMITY WITH NONHEALING ULCER WITH FAT LAYER EXPOSED (HCC): ICD-10-CM

## 2023-10-27 DIAGNOSIS — L97.922 CALCIPHYLAXIS OF LEFT LOWER EXTREMITY WITH NONHEALING ULCER WITH FAT LAYER EXPOSED (HCC): ICD-10-CM

## 2023-10-27 DIAGNOSIS — E83.59 CALCIPHYLAXIS OF LEFT LOWER EXTREMITY WITH NONHEALING ULCER WITH FAT LAYER EXPOSED (HCC): ICD-10-CM

## 2023-10-27 DIAGNOSIS — E83.59 CALCIPHYLAXIS: ICD-10-CM

## 2023-10-27 DIAGNOSIS — L89.890 PRESSURE INJURY OF LEFT LEG, UNSTAGEABLE (HCC): ICD-10-CM

## 2023-10-27 NOTE — TELEPHONE ENCOUNTER
Received call from patient requesting Fentanyl Patch refill. Patient has 2 remaining. Changes them every 3 days. To be called in to [facility] pharmacy.

## 2023-10-27 NOTE — TELEPHONE ENCOUNTER
Palliative Medicine  Nursing Note  [phone]  Fax [phone]      Telephone Call  Patient Name: Bel Pollard  YOB: 1965    10/27/2023        Primary Decision Maker: Janeth Sandoval - Domestic Partner - [phone]    Secondary Decision Maker: Katie Akilah - Brother/Sister - [phone]       10/27/2023     1:07 PM   Demographics   Marital Status      Requests for Fentanyl refill.     Triage for Controlled Substance Refill Request    Pain Diagnosis: Calciphylaxis    Last Outpatient Visit: 9/12/23    Next Outpatient Visit: 11/7/23    Reason for refill needed outside of office visit- Appointment not scheduled prior to need for scheduled refill,    Any Reported Side effects: none    Last dose taken:     Pharmacy: [facility]     Medication: Fentanyl 25 mcg  Dose and directions: 1 every 72 hours  Number dispensed: 10  Date filled ( or Pharmacy): 9/17/23  # left: 2       reviewed: Yes    Date of Urine Drug Screen:  N/A    Opioid Safety Handout given:  Yes    Appropriate for refill:  Yes    Action:  pended for Dr. Rosetta Slaughter, [place]  Palliative Medicine

## 2023-10-30 RX ORDER — FENTANYL 25 UG/1
1 PATCH TRANSDERMAL
Qty: 10 PATCH | Refills: 0 | Status: SHIPPED | OUTPATIENT
Start: 2023-10-30 | End: 2023-11-29

## 2023-10-31 ENCOUNTER — HOSPITAL ENCOUNTER (OUTPATIENT)
Facility: HOSPITAL | Age: 58
Setting detail: INFUSION SERIES
End: 2023-10-31

## 2023-11-02 ENCOUNTER — HOSPITAL ENCOUNTER (OUTPATIENT)
Facility: HOSPITAL | Age: 58
Setting detail: INFUSION SERIES
Discharge: HOME | End: 2023-11-02
Payer: MEDICARE

## 2023-11-02 VITALS
SYSTOLIC BLOOD PRESSURE: 145 MMHG | RESPIRATION RATE: 18 BRPM | TEMPERATURE: 98.1 F | DIASTOLIC BLOOD PRESSURE: 82 MMHG | HEART RATE: 82 BPM

## 2023-11-02 DIAGNOSIS — E83.59 CALCIPHYLAXIS: Primary | ICD-10-CM

## 2023-11-02 LAB
ALBUMIN SERPL-MCNC: 2.8 G/DL (ref 3.5–5)
ANION GAP SERPL CALC-SCNC: 4 MMOL/L (ref 5–15)
BUN SERPL-MCNC: 20 MG/DL (ref 6–20)
BUN/CREAT SERPL: 10 (ref 12–20)
CALCIUM SERPL-MCNC: 8.6 MG/DL (ref 8.5–10.1)
CHLORIDE SERPL-SCNC: 110 MMOL/L (ref 97–108)
CO2 SERPL-SCNC: 27 MMOL/L (ref 21–32)
CREAT SERPL-MCNC: 2.08 MG/DL (ref 0.7–1.3)
GLUCOSE SERPL-MCNC: 181 MG/DL (ref 65–100)
PHOSPHATE SERPL-MCNC: 2.9 MG/DL (ref 2.6–4.7)
POTASSIUM SERPL-SCNC: 3.4 MMOL/L (ref 3.5–5.1)
SODIUM SERPL-SCNC: 141 MMOL/L (ref 136–145)

## 2023-11-02 PROCEDURE — 96365 THER/PROPH/DIAG IV INF INIT: CPT

## 2023-11-02 PROCEDURE — 80069 RENAL FUNCTION PANEL: CPT

## 2023-11-02 PROCEDURE — 2580000003 HC RX 258: Performed by: INTERNAL MEDICINE

## 2023-11-02 PROCEDURE — 6360000002 HC RX W HCPCS: Performed by: INTERNAL MEDICINE

## 2023-11-02 PROCEDURE — 36415 COLL VENOUS BLD VENIPUNCTURE: CPT

## 2023-11-02 RX ORDER — SODIUM CHLORIDE 9 MG/ML
INJECTION, SOLUTION INTRAVENOUS CONTINUOUS
Status: DISCONTINUED | OUTPATIENT
Start: 2023-11-02 | End: 2023-11-03 | Stop reason: HOSPADM

## 2023-11-02 RX ORDER — SODIUM CHLORIDE 9 MG/ML
INJECTION, SOLUTION INTRAVENOUS CONTINUOUS
Start: 2023-11-04

## 2023-11-02 RX ADMIN — SODIUM THIOSULFATE 25 G: 250 INJECTION, SOLUTION INTRAVENOUS at 14:51

## 2023-11-02 RX ADMIN — SODIUM CHLORIDE: 9 INJECTION, SOLUTION INTRAVENOUS at 14:21

## 2023-11-02 ASSESSMENT — PAIN DESCRIPTION - DESCRIPTORS: DESCRIPTORS: ACHING

## 2023-11-02 ASSESSMENT — PAIN DESCRIPTION - ORIENTATION: ORIENTATION: RIGHT;LEFT

## 2023-11-02 ASSESSMENT — PAIN DESCRIPTION - LOCATION: LOCATION: LEG

## 2023-11-02 ASSESSMENT — PAIN SCALES - GENERAL: PAINLEVEL_OUTOF10: 4

## 2023-11-02 NOTE — PLAN OF CARE
Patient tolerated his Sodium Thiosulfate Infusion, had one episode of vomiting. Discharged with stable vitals.

## 2023-11-03 ENCOUNTER — TELEPHONE (OUTPATIENT)
Age: 58
End: 2023-11-03

## 2023-11-03 NOTE — TELEPHONE ENCOUNTER
Called patient to advise/confirm upcoming appt with Dr. Noah Falcon on 11/7/23  at 2:30  at [address] [place] [address]. Spoke with patient and confirmed appointment.

## 2023-11-08 ENCOUNTER — TELEMEDICINE (OUTPATIENT)
Age: 58
End: 2023-11-08
Payer: MEDICARE

## 2023-11-08 DIAGNOSIS — L89.890 PRESSURE INJURY OF LEFT LEG, UNSTAGEABLE (HCC): Primary | ICD-10-CM

## 2023-11-08 DIAGNOSIS — T14.8XXA PAIN ASSOCIATED WITH WOUND: ICD-10-CM

## 2023-11-08 DIAGNOSIS — I50.9 CONGESTIVE HEART FAILURE, UNSPECIFIED HF CHRONICITY, UNSPECIFIED HEART FAILURE TYPE (HCC): ICD-10-CM

## 2023-11-08 DIAGNOSIS — R52 PAIN ASSOCIATED WITH WOUND: ICD-10-CM

## 2023-11-08 DIAGNOSIS — L97.222 VENOUS STASIS ULCER OF LEFT CALF WITH FAT LAYER EXPOSED WITHOUT VARICOSE VEINS (HCC): ICD-10-CM

## 2023-11-08 DIAGNOSIS — I87.2 VENOUS STASIS ULCER OF LEFT CALF WITH FAT LAYER EXPOSED WITHOUT VARICOSE VEINS (HCC): ICD-10-CM

## 2023-11-08 PROCEDURE — 99215 OFFICE O/P EST HI 40 MIN: CPT | Performed by: INTERNAL MEDICINE

## 2023-11-08 PROCEDURE — 3017F COLORECTAL CA SCREEN DOC REV: CPT | Performed by: INTERNAL MEDICINE

## 2023-11-08 PROCEDURE — G8484 FLU IMMUNIZE NO ADMIN: HCPCS | Performed by: INTERNAL MEDICINE

## 2023-11-08 PROCEDURE — G8417 CALC BMI ABV UP PARAM F/U: HCPCS | Performed by: INTERNAL MEDICINE

## 2023-11-08 PROCEDURE — 1036F TOBACCO NON-USER: CPT | Performed by: INTERNAL MEDICINE

## 2023-11-08 PROCEDURE — G8428 CUR MEDS NOT DOCUMENT: HCPCS | Performed by: INTERNAL MEDICINE

## 2023-11-08 ASSESSMENT — PATIENT HEALTH QUESTIONNAIRE - PHQ9
1. LITTLE INTEREST OR PLEASURE IN DOING THINGS: 0
SUM OF ALL RESPONSES TO PHQ QUESTIONS 1-9: 0
2. FEELING DOWN, DEPRESSED OR HOPELESS: 0
SUM OF ALL RESPONSES TO PHQ QUESTIONS 1-9: 0
SUM OF ALL RESPONSES TO PHQ9 QUESTIONS 1 & 2: 0
SUM OF ALL RESPONSES TO PHQ QUESTIONS 1-9: 0
SUM OF ALL RESPONSES TO PHQ QUESTIONS 1-9: 0

## 2023-11-08 NOTE — PATIENT INSTRUCTIONS
PM   Demographics   Marital Status            The Palliative Medicine Team is here to support you and your family.        Sincerely,      Melanie Nichols MD   and the Palliative Medicine Team

## 2023-11-08 NOTE — PROGRESS NOTES
Palliative Medicine Office Visit  Palliative Medicine Nurse Check In  [phone]    Patient Name: Holley Palma  YOB: 1965    Date of Office Visit: 11/8/2023    Patient states: \"  \"    From Check In Sheet (scanned in Media):  Has a medical provider talked with you about cardiopulmonary resuscitation (CPR)? [x] Yes   [] No   [] Unable to obtain    Nurse reminder to complete or update ACP FlowSheet:    Is ACP on the Problem List?    [x] Yes    [] No  IF ACP Document is ON FILE; Nurse to place ACP on Problem List     Is there an ACP Note in Chart Review/Note? [x] Yes    [] No   If NO: ALERT PROVIDER       Primary Decision Maker: Iman Yanez - Domestic Partner - [phone]    Secondary Decision Maker: Ethel Albarran - Brother/Sister - [phone]      11/7/2023     2:30 PM   Demographics   Marital Status          Is there anything that we should know about you as a person in order to provide you the best care possible? Have you been to the ER, urgent care clinic since your last visit? [] Yes   [x] No   [] Unable to obtain    Have you been hospitalized since your last visit? [] Yes   [x] No   [] Unable to obtain    Have you seen or consulted any other health care providers outside of the [address] [place] [address] since your last visit?    [] Yes   [x] No   [] Unable to obtain    Functional status (describe):         Last BM: 11/8/2023     accessed (date): 11/8/2023    Bottle review (for opioid pain medication):  Medication 1:   Date filled:   Directions:   # filled:   # left:   # pills taking per day:  Last dose taken:    Medication 2:   Date filled:   Directions:   # filled:   # left:   # pills taking per day:  Last dose taken:    Medication 3:   Date filled:   Directions:   # filled:   # left:   # pills taking per day:  Last dose taken:    Medication 4:   Date filled:   Directions:   # filled:   # left:   # pills taking per day:  Last dose taken:
(KLOR-CON) 20 MEQ packet Take 40 mEq by mouth daily    pregabalin (LYRICA) 50 MG capsule Take 1 capsule by mouth 2 times daily. (Patient not taking: Reported on 10/25/2023)     No current facility-administered medications for this visit. LAB and other DATA REVIEWED:     Lab Results   Component Value Date/Time    WBC 8.9 09/01/2023 05:40 AM    HGB 8.2 09/01/2023 05:40 AM     09/01/2023 05:40 AM     Lab Results   Component Value Date/Time     11/02/2023 02:13 PM    K 3.4 11/02/2023 02:13 PM     11/02/2023 02:13 PM    CO2 27 11/02/2023 02:13 PM    BUN 20 11/02/2023 02:13 PM    MG 1.8 09/13/2023 01:21 AM    PHOS 2.9 11/02/2023 02:13 PM      Lab Results   Component Value Date/Time    GLOB 3.8 09/13/2023 01:21 AM     Lab Results   Component Value Date/Time    INR 1.1 03/03/2023 04:33 AM    APTT 24.4 03/03/2023 04:33 AM      Lab Results   Component Value Date/Time    IRON 14 08/30/2023 04:45 AM    TIBC 156 08/30/2023 04:45 AM        Detail chart reviewed. Other specialists and referral provider notes reviewed. CONTROLLED SUBSTANCES SAFETY ASSESSMENT (IF ON CONTROLLED SUBSTANCES):     Reviewed opioid safety handout:  [x] Yes   [] No  24 hour opioid dose >150mg morphine equivalent/day:  [] Yes   [] No  Benzodiazepines:  [] Yes   [] No  Sleep apnea:  [] Yes   [] No  Urine Toxicology Testing within last 6 months:  [] Yes   [] No  History of or new aberrant medication taking behaviors:  [] Yes   [x] No  Has Narcan been prescribed [x] Yes   [] No          Total time: 70 minutes  Counseling / coordination time: 60 minutes  > 50% counseling / coordination?:  Yes    Please note that this dictation was completed with [employer], the [employer] voice recognition software. Quite often unanticipated grammatical, syntax, homophones, and other interpretive errors are inadvertently transcribed by the computer software. Please disregard these errors.   Please excuse any errors that have escaped final

## 2023-11-09 ENCOUNTER — HOSPITAL ENCOUNTER (OUTPATIENT)
Facility: HOSPITAL | Age: 58
Setting detail: INFUSION SERIES
End: 2023-11-09

## 2023-11-14 RX ORDER — TORSEMIDE 20 MG/1
40 TABLET ORAL DAILY
Qty: 60 TABLET | Refills: 2 | Status: SHIPPED | OUTPATIENT
Start: 2023-11-14

## 2023-11-14 NOTE — TELEPHONE ENCOUNTER
Requested Prescriptions     Signed Prescriptions Disp Refills    torsemide (DEMADEX) 20 MG tablet 60 tablet 2     Sig: Take 2 tablets by mouth daily     Authorizing Provider: Tayo Nj     Ordering User: Felton Delcid

## 2023-11-16 ENCOUNTER — HOSPITAL ENCOUNTER (OUTPATIENT)
Facility: HOSPITAL | Age: 58
Setting detail: INFUSION SERIES
End: 2023-11-16
Payer: MEDICARE

## 2023-11-20 ENCOUNTER — HOSPITAL ENCOUNTER (OUTPATIENT)
Facility: HOSPITAL | Age: 58
Setting detail: INFUSION SERIES
Discharge: HOME | End: 2023-11-20
Payer: MEDICARE

## 2023-11-20 VITALS
SYSTOLIC BLOOD PRESSURE: 141 MMHG | RESPIRATION RATE: 20 BRPM | TEMPERATURE: 98 F | DIASTOLIC BLOOD PRESSURE: 95 MMHG | HEART RATE: 68 BPM

## 2023-11-20 DIAGNOSIS — E83.59 CALCIPHYLAXIS: Primary | ICD-10-CM

## 2023-11-20 LAB
ALBUMIN SERPL-MCNC: 3 G/DL (ref 3.5–5)
ANION GAP SERPL CALC-SCNC: 5 MMOL/L (ref 5–15)
BUN SERPL-MCNC: 26 MG/DL (ref 6–20)
BUN/CREAT SERPL: 12 (ref 12–20)
CALCIUM SERPL-MCNC: 8.6 MG/DL (ref 8.5–10.1)
CHLORIDE SERPL-SCNC: 105 MMOL/L (ref 97–108)
CO2 SERPL-SCNC: 29 MMOL/L (ref 21–32)
CREAT SERPL-MCNC: 2.16 MG/DL (ref 0.7–1.3)
GLUCOSE SERPL-MCNC: 93 MG/DL (ref 65–100)
PHOSPHATE SERPL-MCNC: 3 MG/DL (ref 2.6–4.7)
POTASSIUM SERPL-SCNC: 3.4 MMOL/L (ref 3.5–5.1)
SODIUM SERPL-SCNC: 139 MMOL/L (ref 136–145)

## 2023-11-20 PROCEDURE — 80069 RENAL FUNCTION PANEL: CPT

## 2023-11-20 PROCEDURE — 2580000003 HC RX 258: Performed by: INTERNAL MEDICINE

## 2023-11-20 PROCEDURE — 36415 COLL VENOUS BLD VENIPUNCTURE: CPT

## 2023-11-20 PROCEDURE — 6360000002 HC RX W HCPCS: Performed by: INTERNAL MEDICINE

## 2023-11-20 PROCEDURE — 96365 THER/PROPH/DIAG IV INF INIT: CPT

## 2023-11-20 RX ORDER — SODIUM CHLORIDE 9 MG/ML
INJECTION, SOLUTION INTRAVENOUS CONTINUOUS
Status: CANCELLED
Start: 2023-11-23

## 2023-11-20 RX ORDER — SODIUM CHLORIDE 9 MG/ML
INJECTION, SOLUTION INTRAVENOUS CONTINUOUS
Status: DISCONTINUED | OUTPATIENT
Start: 2023-11-20 | End: 2023-11-21 | Stop reason: HOSPADM

## 2023-11-20 RX ADMIN — SODIUM THIOSULFATE 25 G: 250 INJECTION, SOLUTION INTRAVENOUS at 16:22

## 2023-11-20 RX ADMIN — SODIUM CHLORIDE: 9 INJECTION, SOLUTION INTRAVENOUS at 14:55

## 2023-11-20 ASSESSMENT — PAIN DESCRIPTION - DESCRIPTORS: DESCRIPTORS: ACHING

## 2023-11-20 ASSESSMENT — PAIN SCALES - GENERAL: PAINLEVEL_OUTOF10: 2

## 2023-11-20 ASSESSMENT — PAIN DESCRIPTION - ORIENTATION: ORIENTATION: RIGHT;LEFT

## 2023-11-20 ASSESSMENT — PAIN DESCRIPTION - LOCATION: LOCATION: LEG

## 2023-11-24 PROBLEM — N18.32 CHRONIC KIDNEY DISEASE, STAGE 3B (HCC): Status: ACTIVE | Noted: 2023-11-24

## 2023-11-27 ENCOUNTER — HOSPITAL ENCOUNTER (OUTPATIENT)
Facility: HOSPITAL | Age: 58
Setting detail: INFUSION SERIES
Discharge: HOME | End: 2023-11-27
Payer: MEDICARE

## 2023-11-27 VITALS
TEMPERATURE: 98 F | SYSTOLIC BLOOD PRESSURE: 135 MMHG | DIASTOLIC BLOOD PRESSURE: 86 MMHG | HEART RATE: 68 BPM | RESPIRATION RATE: 18 BRPM

## 2023-11-27 DIAGNOSIS — E83.59 CALCIPHYLAXIS: ICD-10-CM

## 2023-11-27 DIAGNOSIS — N18.32 CHRONIC KIDNEY DISEASE, STAGE 3B (HCC): Primary | ICD-10-CM

## 2023-11-27 LAB
ALBUMIN SERPL-MCNC: 3 G/DL (ref 3.5–5)
ANION GAP SERPL CALC-SCNC: 3 MMOL/L (ref 5–15)
BUN SERPL-MCNC: 31 MG/DL (ref 6–20)
BUN/CREAT SERPL: 14 (ref 12–20)
CALCIUM SERPL-MCNC: 8.9 MG/DL (ref 8.5–10.1)
CHLORIDE SERPL-SCNC: 110 MMOL/L (ref 97–108)
CO2 SERPL-SCNC: 28 MMOL/L (ref 21–32)
CREAT SERPL-MCNC: 2.22 MG/DL (ref 0.7–1.3)
CREAT UR-MCNC: 26.5 MG/DL
ERYTHROCYTE [DISTWIDTH] IN BLOOD BY AUTOMATED COUNT: 18.6 % (ref 11.5–14.5)
GLUCOSE SERPL-MCNC: 106 MG/DL (ref 65–100)
HCT VFR BLD AUTO: 32.3 % (ref 36.6–50.3)
HGB BLD-MCNC: 10.1 G/DL (ref 12.1–17)
IRON SATN MFR SERPL: 12 % (ref 20–50)
IRON SERPL-MCNC: 39 UG/DL (ref 35–150)
MCH RBC QN AUTO: 27.5 PG (ref 26–34)
MCHC RBC AUTO-ENTMCNC: 31.3 G/DL (ref 30–36.5)
MCV RBC AUTO: 88 FL (ref 80–99)
MICROALBUMIN UR-MCNC: 28.3 MG/DL
MICROALBUMIN/CREAT UR-RTO: 1068 MG/G (ref 0–30)
NRBC # BLD: 0 K/UL (ref 0–0.01)
NRBC BLD-RTO: 0 PER 100 WBC
PHOSPHATE SERPL-MCNC: 2.9 MG/DL (ref 2.6–4.7)
PLATELET # BLD AUTO: 180 K/UL (ref 150–400)
POTASSIUM SERPL-SCNC: 4.5 MMOL/L (ref 3.5–5.1)
RBC # BLD AUTO: 3.67 M/UL (ref 4.1–5.7)
SODIUM SERPL-SCNC: 141 MMOL/L (ref 136–145)
TIBC SERPL-MCNC: 316 UG/DL (ref 250–450)
URATE SERPL-MCNC: 13 MG/DL (ref 3.5–7.2)
WBC # BLD AUTO: 4.9 K/UL (ref 4.1–11.1)

## 2023-11-27 PROCEDURE — 83970 ASSAY OF PARATHORMONE: CPT

## 2023-11-27 PROCEDURE — 85027 COMPLETE CBC AUTOMATED: CPT

## 2023-11-27 PROCEDURE — 82570 ASSAY OF URINE CREATININE: CPT

## 2023-11-27 PROCEDURE — 36415 COLL VENOUS BLD VENIPUNCTURE: CPT

## 2023-11-27 PROCEDURE — 2580000003 HC RX 258: Performed by: INTERNAL MEDICINE

## 2023-11-27 PROCEDURE — 6360000002 HC RX W HCPCS: Performed by: INTERNAL MEDICINE

## 2023-11-27 PROCEDURE — 96365 THER/PROPH/DIAG IV INF INIT: CPT

## 2023-11-27 PROCEDURE — 82306 VITAMIN D 25 HYDROXY: CPT

## 2023-11-27 PROCEDURE — 82043 UR ALBUMIN QUANTITATIVE: CPT

## 2023-11-27 PROCEDURE — 83550 IRON BINDING TEST: CPT

## 2023-11-27 PROCEDURE — 83540 ASSAY OF IRON: CPT

## 2023-11-27 PROCEDURE — 96375 TX/PRO/DX INJ NEW DRUG ADDON: CPT

## 2023-11-27 PROCEDURE — 80069 RENAL FUNCTION PANEL: CPT

## 2023-11-27 PROCEDURE — 84550 ASSAY OF BLOOD/URIC ACID: CPT

## 2023-11-27 RX ORDER — SODIUM CHLORIDE 9 MG/ML
INJECTION, SOLUTION INTRAVENOUS CONTINUOUS
Status: CANCELLED
Start: 2023-11-27

## 2023-11-27 RX ORDER — ONDANSETRON 2 MG/ML
4 INJECTION INTRAMUSCULAR; INTRAVENOUS
Status: COMPLETED | OUTPATIENT
Start: 2023-11-27 | End: 2023-11-27

## 2023-11-27 RX ORDER — SODIUM CHLORIDE 9 MG/ML
INJECTION, SOLUTION INTRAVENOUS CONTINUOUS
Status: DISCONTINUED | OUTPATIENT
Start: 2023-11-27 | End: 2023-11-28 | Stop reason: HOSPADM

## 2023-11-27 RX ADMIN — SODIUM THIOSULFATE 12.5 G: 250 INJECTION, SOLUTION INTRAVENOUS at 15:04

## 2023-11-27 RX ADMIN — ONDANSETRON HYDROCHLORIDE 4 MG: 2 INJECTION, SOLUTION INTRAMUSCULAR; INTRAVENOUS at 16:03

## 2023-11-27 RX ADMIN — SODIUM CHLORIDE: 9 INJECTION, SOLUTION INTRAVENOUS at 14:44

## 2023-11-28 LAB
25(OH)D3 SERPL-MCNC: 30 NG/ML (ref 30–100)
CALCIUM SERPL-MCNC: 9.1 MG/DL (ref 8.5–10.1)
PTH-INTACT SERPL-MCNC: 170.5 PG/ML (ref 18.4–88)

## 2023-12-01 ENCOUNTER — TELEPHONE (OUTPATIENT)
Age: 58
End: 2023-12-01

## 2023-12-01 NOTE — TELEPHONE ENCOUNTER
Called patient to advise/confirm upcoming appt with Dr. Kandis Hernadez on 12/5/23  at 2:30  at [address] [place] [address]. Spoke with patient and confirmed appointment.

## 2023-12-04 ENCOUNTER — OFFICE VISIT (OUTPATIENT)
Age: 58
End: 2023-12-04
Payer: MEDICARE

## 2023-12-04 VITALS
HEIGHT: 68 IN | SYSTOLIC BLOOD PRESSURE: 126 MMHG | OXYGEN SATURATION: 99 % | DIASTOLIC BLOOD PRESSURE: 70 MMHG | WEIGHT: 215 LBS | HEART RATE: 76 BPM | BODY MASS INDEX: 32.58 KG/M2

## 2023-12-04 DIAGNOSIS — I42.0 DILATED CARDIOMYOPATHY (HCC): ICD-10-CM

## 2023-12-04 DIAGNOSIS — I10 PRIMARY HYPERTENSION: Primary | ICD-10-CM

## 2023-12-04 DIAGNOSIS — N18.32 CHRONIC KIDNEY DISEASE, STAGE 3B (HCC): ICD-10-CM

## 2023-12-04 PROCEDURE — 1036F TOBACCO NON-USER: CPT | Performed by: SPECIALIST

## 2023-12-04 PROCEDURE — 99214 OFFICE O/P EST MOD 30 MIN: CPT | Performed by: SPECIALIST

## 2023-12-04 PROCEDURE — G8484 FLU IMMUNIZE NO ADMIN: HCPCS | Performed by: SPECIALIST

## 2023-12-04 PROCEDURE — G8427 DOCREV CUR MEDS BY ELIG CLIN: HCPCS | Performed by: SPECIALIST

## 2023-12-04 PROCEDURE — 3078F DIAST BP <80 MM HG: CPT | Performed by: SPECIALIST

## 2023-12-04 PROCEDURE — G8417 CALC BMI ABV UP PARAM F/U: HCPCS | Performed by: SPECIALIST

## 2023-12-04 PROCEDURE — 3017F COLORECTAL CA SCREEN DOC REV: CPT | Performed by: SPECIALIST

## 2023-12-04 PROCEDURE — 3074F SYST BP LT 130 MM HG: CPT | Performed by: SPECIALIST

## 2023-12-04 RX ORDER — CLINDAMYCIN HYDROCHLORIDE 150 MG/1
150 CAPSULE ORAL 4 TIMES DAILY
COMMUNITY
Start: 2023-12-01

## 2023-12-04 ASSESSMENT — PATIENT HEALTH QUESTIONNAIRE - PHQ9
SUM OF ALL RESPONSES TO PHQ9 QUESTIONS 1 & 2: 0
2. FEELING DOWN, DEPRESSED OR HOPELESS: 0
SUM OF ALL RESPONSES TO PHQ QUESTIONS 1-9: 0
1. LITTLE INTEREST OR PLEASURE IN DOING THINGS: 0

## 2023-12-05 ENCOUNTER — OFFICE VISIT (OUTPATIENT)
Age: 58
End: 2023-12-05
Payer: MEDICARE

## 2023-12-05 VITALS
HEIGHT: 68 IN | WEIGHT: 215.17 LBS | BODY MASS INDEX: 32.61 KG/M2 | TEMPERATURE: 96.8 F | SYSTOLIC BLOOD PRESSURE: 134 MMHG | DIASTOLIC BLOOD PRESSURE: 89 MMHG | RESPIRATION RATE: 16 BRPM

## 2023-12-05 DIAGNOSIS — L97.922 CALCIPHYLAXIS OF LEFT LOWER EXTREMITY WITH NONHEALING ULCER WITH FAT LAYER EXPOSED (HCC): ICD-10-CM

## 2023-12-05 DIAGNOSIS — L97.912 CALCIPHYLAXIS OF RIGHT LOWER EXTREMITY WITH NONHEALING ULCER WITH FAT LAYER EXPOSED (HCC): ICD-10-CM

## 2023-12-05 DIAGNOSIS — I42.0 DILATED CARDIOMYOPATHY (HCC): Primary | ICD-10-CM

## 2023-12-05 DIAGNOSIS — M79.605 LEG PAIN, BILATERAL: ICD-10-CM

## 2023-12-05 DIAGNOSIS — M79.604 LEG PAIN, BILATERAL: ICD-10-CM

## 2023-12-05 DIAGNOSIS — E83.59 CALCIPHYLAXIS OF RIGHT LOWER EXTREMITY WITH NONHEALING ULCER WITH FAT LAYER EXPOSED (HCC): ICD-10-CM

## 2023-12-05 DIAGNOSIS — E83.59 CALCIPHYLAXIS OF LEFT LOWER EXTREMITY WITH NONHEALING ULCER WITH FAT LAYER EXPOSED (HCC): ICD-10-CM

## 2023-12-05 PROCEDURE — 3075F SYST BP GE 130 - 139MM HG: CPT | Performed by: INTERNAL MEDICINE

## 2023-12-05 PROCEDURE — 1036F TOBACCO NON-USER: CPT | Performed by: INTERNAL MEDICINE

## 2023-12-05 PROCEDURE — G8484 FLU IMMUNIZE NO ADMIN: HCPCS | Performed by: INTERNAL MEDICINE

## 2023-12-05 PROCEDURE — G8417 CALC BMI ABV UP PARAM F/U: HCPCS | Performed by: INTERNAL MEDICINE

## 2023-12-05 PROCEDURE — G8428 CUR MEDS NOT DOCUMENT: HCPCS | Performed by: INTERNAL MEDICINE

## 2023-12-05 PROCEDURE — 3079F DIAST BP 80-89 MM HG: CPT | Performed by: INTERNAL MEDICINE

## 2023-12-05 PROCEDURE — 99215 OFFICE O/P EST HI 40 MIN: CPT | Performed by: INTERNAL MEDICINE

## 2023-12-05 PROCEDURE — 3017F COLORECTAL CA SCREEN DOC REV: CPT | Performed by: INTERNAL MEDICINE

## 2023-12-05 RX ORDER — FENTANYL 12.5 UG/1
1 PATCH TRANSDERMAL
Qty: 10 PATCH | Refills: 0 | Status: SHIPPED | OUTPATIENT
Start: 2023-12-05 | End: 2023-12-26 | Stop reason: DRUGHIGH

## 2023-12-05 RX ORDER — HYDROMORPHONE HYDROCHLORIDE 2 MG/1
2 TABLET ORAL EVERY 8 HOURS PRN
Qty: 45 TABLET | Refills: 0 | Status: SHIPPED | OUTPATIENT
Start: 2023-12-05 | End: 2023-12-08 | Stop reason: SDUPTHER

## 2023-12-05 NOTE — PATIENT INSTRUCTIONS
Dear Edson Grover ,    It was a pleasure seeing you today    We will see you again in 3 months    If labs or imaging tests have been ordered for you today, please call the office  at [phone] 48 hours after completion to obtain the results.        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  Thankfully, you do not use it on a regular basis, you only take 1 or 2 on the days you have hydrotherapy.-You are pretty much wheelchair-bound due to severe pain.  -Your nephrologist is still trying to figure out if you really do have calciphylaxis, you may go back on IV sodium thiosulfate infusions.  -Opioid safety protocol reviewed in detail with you and your partner today  - [facility]    Congestive heart failure  -Thankfully you do not have any shortness of breath, do not need oxygen at home  -Follow closely with Dr. Bartlett  -I want to try and avoid increasing your opioids as this will negatively impact your congestive heart failure    Constipation  -Constipation is a common side effect of pain medications as they slow your bowels.   -Please start Pericolace 2 tabs daily and increase to 2 tabs two times a day if needed. This is necessary to keep your bowels soft.   - Add Miralax every other day as a laxative.  - Goal is to have soft bowel movements every day or every other day.   - Please call us if you do not have bowel movements for more than 3 days.      This is what you have shared with us about Advance Care Planning:    Primary Decision Maker: Soo Mcgregor - Domestic Partner - [phone]    Secondary Decision Maker: Guevara Grover - Brother/Sister - [phone]            12/5/2023    10:43

## 2023-12-05 NOTE — PROGRESS NOTES
Palliative Medicine Office Visit  Palliative Medicine Nurse Check In  [phone])    Patient Name: Edson Grover  YOB: 1965      Date of Office Visit: 12/5/2023    Functional status (describe):   Patient in wheelchair, says he needs help with ADL's    Last BM: 12/5/23     accessed (date): 12/5/23    Bottle review (for opioid pain medication):  Medication 1:  Hydromorphone 4mg  Date filled:  10/13/23  Directions:  take one tablet by mouth every 6 hours as needed for pain  # filled: 60  # left: about 3  # pills taking per day: 2  Last dose taken:12/4/23      Yanna Agee RN, CHPN

## 2023-12-05 NOTE — PROGRESS NOTES
Palliative Medicine Outpatient Services  [place]: [phone] ([phone])    Patient Name: Edson Grover  YOB: 1965    Date of Current Visit: 12/05/23  Location of Current Visit:    [] [facility] Office  [] [facility] Office  [x] [facility] Office  [] Home  []Synchronous real-time A/V virtual visit    Date of Initial Visit: Patient seen inpatient by palliative medicine  Referral from: Dania Thornton NP  Primary Care Physician: Agapito Mo MD      SUMMARY:   Edson Grover is a 58 y.o. year old with a  history of DM 2, CKD stage IV NOT on dialysis, bilateral nonhealing lower extremity wounds secondary to calciphylaxis and venous stasis status post sodium thiosulfate infusions, hypertension, CAD, cardiomyopathy with a EF of 20% status post AICD, PAD, not a candidate for LVAD or heart transplant due to nonhealing wounds, dialysis, who was referred to Palliative Medicine by Dania Thornton palliative NP for management of pain.  The patients social history includes lives at home with his long-term partner, they have 2 children together and his partner Soo has 3 children from a previous relationship that they raised together.  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      Wound care-Dr. Lucero Johnson  Nephrology-Dr. Yan  Cardiology-Dr. Bartlett       PALLIATIVE DIAGNOSES:      Diagnosis Orders   1. Dilated cardiomyopathy (HCC)        2. 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 Calciphylaxis of left lower extremity with nonhealing ulcer with fat layer exposed (HCC)        4. Leg pain, bilateral                 PLAN:     Patient Instructions   Dear Edson Grover ,    It was a pleasure seeing you today    We will see you again in  8 weeks    If labs or imaging tests have been ordered for you today, please call the

## 2023-12-06 ENCOUNTER — HOSPITAL ENCOUNTER (OUTPATIENT)
Facility: HOSPITAL | Age: 58
Setting detail: INFUSION SERIES
Discharge: HOME | End: 2023-12-06
Payer: MEDICARE

## 2023-12-06 VITALS
RESPIRATION RATE: 18 BRPM | SYSTOLIC BLOOD PRESSURE: 129 MMHG | HEART RATE: 71 BPM | TEMPERATURE: 97.8 F | DIASTOLIC BLOOD PRESSURE: 92 MMHG

## 2023-12-06 DIAGNOSIS — N18.32 CHRONIC KIDNEY DISEASE, STAGE 3B (HCC): ICD-10-CM

## 2023-12-06 DIAGNOSIS — E83.59 CALCIPHYLAXIS: Primary | ICD-10-CM

## 2023-12-06 LAB
ALBUMIN SERPL-MCNC: 3 G/DL (ref 3.5–5)
ANION GAP SERPL CALC-SCNC: 5 MMOL/L (ref 5–15)
BUN SERPL-MCNC: 25 MG/DL (ref 6–20)
BUN/CREAT SERPL: 10 (ref 12–20)
CALCIUM SERPL-MCNC: 8.7 MG/DL (ref 8.5–10.1)
CHLORIDE SERPL-SCNC: 106 MMOL/L (ref 97–108)
CO2 SERPL-SCNC: 28 MMOL/L (ref 21–32)
CREAT SERPL-MCNC: 2.47 MG/DL (ref 0.7–1.3)
GLUCOSE SERPL-MCNC: 106 MG/DL (ref 65–100)
PHOSPHATE SERPL-MCNC: 3.5 MG/DL (ref 2.6–4.7)
POTASSIUM SERPL-SCNC: 4.2 MMOL/L (ref 3.5–5.1)
SODIUM SERPL-SCNC: 139 MMOL/L (ref 136–145)

## 2023-12-06 PROCEDURE — 6360000002 HC RX W HCPCS: Performed by: INTERNAL MEDICINE

## 2023-12-06 PROCEDURE — 96365 THER/PROPH/DIAG IV INF INIT: CPT

## 2023-12-06 PROCEDURE — 2580000003 HC RX 258: Performed by: INTERNAL MEDICINE

## 2023-12-06 PROCEDURE — 36415 COLL VENOUS BLD VENIPUNCTURE: CPT

## 2023-12-06 PROCEDURE — 80069 RENAL FUNCTION PANEL: CPT

## 2023-12-06 PROCEDURE — 96375 TX/PRO/DX INJ NEW DRUG ADDON: CPT

## 2023-12-06 RX ORDER — ONDANSETRON 2 MG/ML
4 INJECTION INTRAMUSCULAR; INTRAVENOUS
Start: 2023-12-07

## 2023-12-06 RX ORDER — SODIUM CHLORIDE 9 MG/ML
INJECTION, SOLUTION INTRAVENOUS ONCE
Status: COMPLETED | OUTPATIENT
Start: 2023-12-06 | End: 2023-12-06

## 2023-12-06 RX ORDER — ONDANSETRON 2 MG/ML
4 INJECTION INTRAMUSCULAR; INTRAVENOUS
Status: COMPLETED | OUTPATIENT
Start: 2023-12-06 | End: 2023-12-06

## 2023-12-06 RX ORDER — SODIUM CHLORIDE 0.9 % (FLUSH) 0.9 %
5 SYRINGE (ML) INJECTION PRN
Status: DISCONTINUED | OUTPATIENT
Start: 2023-12-06 | End: 2023-12-07 | Stop reason: HOSPADM

## 2023-12-06 RX ADMIN — SODIUM THIOSULFATE 12.5 G: 250 INJECTION, SOLUTION INTRAVENOUS at 14:47

## 2023-12-06 RX ADMIN — SODIUM CHLORIDE, PRESERVATIVE FREE 5 ML: 5 INJECTION INTRAVENOUS at 14:27

## 2023-12-06 RX ADMIN — ONDANSETRON HYDROCHLORIDE 4 MG: 2 INJECTION, SOLUTION INTRAMUSCULAR; INTRAVENOUS at 14:38

## 2023-12-06 RX ADMIN — SODIUM CHLORIDE: 9 INJECTION, SOLUTION INTRAVENOUS at 14:28

## 2023-12-06 ASSESSMENT — PAIN DESCRIPTION - ORIENTATION: ORIENTATION: RIGHT

## 2023-12-06 ASSESSMENT — PAIN DESCRIPTION - DESCRIPTORS: DESCRIPTORS: ACHING;SHARP;SHOOTING

## 2023-12-06 ASSESSMENT — PAIN SCALES - GENERAL: PAINLEVEL_OUTOF10: 2

## 2023-12-06 ASSESSMENT — PAIN DESCRIPTION - LOCATION: LOCATION: LEG

## 2023-12-08 DIAGNOSIS — E83.59 CALCIPHYLAXIS OF RIGHT LOWER EXTREMITY WITH NONHEALING ULCER WITH FAT LAYER EXPOSED (HCC): ICD-10-CM

## 2023-12-08 DIAGNOSIS — L97.912 CALCIPHYLAXIS OF RIGHT LOWER EXTREMITY WITH NONHEALING ULCER WITH FAT LAYER EXPOSED (HCC): ICD-10-CM

## 2023-12-08 RX ORDER — HYDROMORPHONE HYDROCHLORIDE 2 MG/1
2 TABLET ORAL EVERY 8 HOURS PRN
Qty: 45 TABLET | Refills: 0 | Status: SHIPPED | OUTPATIENT
Start: 2023-12-08 | End: 2023-12-23

## 2023-12-08 NOTE — TELEPHONE ENCOUNTER
Palliative Medicine Clinic   [place]: [phone])    Patient Name: Carin Sawyer  YOB: 1965    Medication Refill Request    Patient is scheduled for follow up per PSR note:  [x]  YES  []   NO    PDMP reviewed:  [x] YES   []  System down / Heidy Gao  []  NO- Patient fills out of state      Medication:HYDROmorphone (DILAUDID) 2 MG    Dose and directions: Take 1 tablet by mouth every 8 hours  Number dispensed:45  Date filled (PDMP or Pharmacy):10/13/2023  #left:    Appropriate for refill:  [x]  YES  []  Early Request - Requires MD/NP Review      Other pertinent information for prescriber:

## 2023-12-08 NOTE — TELEPHONE ENCOUNTER
Palliative Medicine Clinic   [place]: [phone] ([phone])    Patient Name: Carin Sawyer  YOB: 1965    Medication Refill Request Triage    Requested by:    [x]  Patient  []  Support person:      Alex Gale Clinic Visit:  12/5/2023    Shanika Geovany [place] Visit: 01/24/2024     Preferred Pharmacy: [facility]  Backup Pharmacy:  *    Medications requested:  Dilaudid    Is patient completely out? []   YES  [x]   NO  If NO, how many pills does patient have left?   5    Other pertinent information shared:

## 2023-12-13 ENCOUNTER — HOSPITAL ENCOUNTER (OUTPATIENT)
Facility: HOSPITAL | Age: 58
Setting detail: INFUSION SERIES
Discharge: HOME | End: 2023-12-13
Payer: MEDICARE

## 2023-12-13 VITALS
SYSTOLIC BLOOD PRESSURE: 129 MMHG | HEART RATE: 67 BPM | DIASTOLIC BLOOD PRESSURE: 88 MMHG | RESPIRATION RATE: 18 BRPM | TEMPERATURE: 97.4 F

## 2023-12-13 DIAGNOSIS — N18.32 CHRONIC KIDNEY DISEASE, STAGE 3B (HCC): ICD-10-CM

## 2023-12-13 DIAGNOSIS — E83.59 CALCIPHYLAXIS: Primary | ICD-10-CM

## 2023-12-13 LAB
ALBUMIN SERPL-MCNC: 2.8 G/DL (ref 3.5–5)
ANION GAP SERPL CALC-SCNC: 6 MMOL/L (ref 5–15)
BASOPHILS # BLD: 0 K/UL (ref 0–0.1)
BASOPHILS NFR BLD: 1 % (ref 0–1)
BUN SERPL-MCNC: 29 MG/DL (ref 6–20)
BUN/CREAT SERPL: 12 (ref 12–20)
CALCIUM SERPL-MCNC: 8.4 MG/DL (ref 8.5–10.1)
CHLORIDE SERPL-SCNC: 106 MMOL/L (ref 97–108)
CO2 SERPL-SCNC: 28 MMOL/L (ref 21–32)
CREAT SERPL-MCNC: 2.44 MG/DL (ref 0.7–1.3)
CREAT UR-MCNC: 52.5 MG/DL
DIFFERENTIAL METHOD BLD: ABNORMAL
EOSINOPHIL # BLD: 0.1 K/UL (ref 0–0.4)
EOSINOPHIL NFR BLD: 3 % (ref 0–7)
ERYTHROCYTE [DISTWIDTH] IN BLOOD BY AUTOMATED COUNT: 17.4 % (ref 11.5–14.5)
GLUCOSE SERPL-MCNC: 118 MG/DL (ref 65–100)
HCT VFR BLD AUTO: 30.3 % (ref 36.6–50.3)
HGB BLD-MCNC: 9.5 G/DL (ref 12.1–17)
IMM GRANULOCYTES # BLD AUTO: 0 K/UL (ref 0–0.04)
IMM GRANULOCYTES NFR BLD AUTO: 0 % (ref 0–0.5)
LYMPHOCYTES # BLD: 0.7 K/UL (ref 0.8–3.5)
LYMPHOCYTES NFR BLD: 19 % (ref 12–49)
MCH RBC QN AUTO: 27.7 PG (ref 26–34)
MCHC RBC AUTO-ENTMCNC: 31.4 G/DL (ref 30–36.5)
MCV RBC AUTO: 88.3 FL (ref 80–99)
MICROALBUMIN UR-MCNC: 26.6 MG/DL
MICROALBUMIN/CREAT UR-RTO: 507 MG/G (ref 0–30)
MONOCYTES # BLD: 0.5 K/UL (ref 0–1)
MONOCYTES NFR BLD: 13 % (ref 5–13)
NEUTS SEG # BLD: 2.3 K/UL (ref 1.8–8)
NEUTS SEG NFR BLD: 64 % (ref 32–75)
NRBC # BLD: 0 K/UL (ref 0–0.01)
NRBC BLD-RTO: 0 PER 100 WBC
PHOSPHATE SERPL-MCNC: 3.6 MG/DL (ref 2.6–4.7)
PLATELET # BLD AUTO: 108 K/UL (ref 150–400)
PLATELET COMMENT: ABNORMAL
PMV BLD AUTO: 12.3 FL (ref 8.9–12.9)
POTASSIUM SERPL-SCNC: 3.7 MMOL/L (ref 3.5–5.1)
RBC # BLD AUTO: 3.43 M/UL (ref 4.1–5.7)
RBC MORPH BLD: ABNORMAL
SODIUM SERPL-SCNC: 140 MMOL/L (ref 136–145)
URATE SERPL-MCNC: 12.1 MG/DL (ref 3.5–7.2)
WBC # BLD AUTO: 3.6 K/UL (ref 4.1–11.1)

## 2023-12-13 PROCEDURE — 84550 ASSAY OF BLOOD/URIC ACID: CPT

## 2023-12-13 PROCEDURE — 2580000003 HC RX 258: Performed by: INTERNAL MEDICINE

## 2023-12-13 PROCEDURE — 96375 TX/PRO/DX INJ NEW DRUG ADDON: CPT

## 2023-12-13 PROCEDURE — 85025 COMPLETE CBC W/AUTO DIFF WBC: CPT

## 2023-12-13 PROCEDURE — 80069 RENAL FUNCTION PANEL: CPT

## 2023-12-13 PROCEDURE — 96365 THER/PROPH/DIAG IV INF INIT: CPT

## 2023-12-13 PROCEDURE — 82043 UR ALBUMIN QUANTITATIVE: CPT

## 2023-12-13 PROCEDURE — 6360000002 HC RX W HCPCS: Performed by: INTERNAL MEDICINE

## 2023-12-13 PROCEDURE — 82570 ASSAY OF URINE CREATININE: CPT

## 2023-12-13 PROCEDURE — 36415 COLL VENOUS BLD VENIPUNCTURE: CPT

## 2023-12-13 RX ORDER — ONDANSETRON 2 MG/ML
4 INJECTION INTRAMUSCULAR; INTRAVENOUS
Start: 2023-12-14

## 2023-12-13 RX ORDER — SODIUM CHLORIDE 9 MG/ML
INJECTION, SOLUTION INTRAVENOUS CONTINUOUS
Status: DISCONTINUED | OUTPATIENT
Start: 2023-12-13 | End: 2023-12-14 | Stop reason: HOSPADM

## 2023-12-13 RX ORDER — ONDANSETRON 2 MG/ML
4 INJECTION INTRAMUSCULAR; INTRAVENOUS
Status: COMPLETED | OUTPATIENT
Start: 2023-12-13 | End: 2023-12-13

## 2023-12-13 RX ADMIN — ONDANSETRON HYDROCHLORIDE 4 MG: 2 INJECTION, SOLUTION INTRAMUSCULAR; INTRAVENOUS at 14:27

## 2023-12-13 RX ADMIN — SODIUM THIOSULFATE 12.5 G: 250 INJECTION, SOLUTION INTRAVENOUS at 14:54

## 2023-12-13 RX ADMIN — SODIUM CHLORIDE: 9 INJECTION, SOLUTION INTRAVENOUS at 14:21

## 2023-12-13 ASSESSMENT — PAIN DESCRIPTION - ORIENTATION: ORIENTATION: RIGHT

## 2023-12-13 ASSESSMENT — PAIN SCALES - GENERAL: PAINLEVEL_OUTOF10: 4

## 2023-12-13 ASSESSMENT — PAIN DESCRIPTION - DESCRIPTORS: DESCRIPTORS: ACHING;SHARP;SHOOTING

## 2023-12-13 ASSESSMENT — PAIN DESCRIPTION - LOCATION: LOCATION: LEG

## 2023-12-26 ENCOUNTER — TELEPHONE (OUTPATIENT)
Age: 58
End: 2023-12-26

## 2023-12-26 DIAGNOSIS — E83.59 CALCIPHYLAXIS OF RIGHT LOWER EXTREMITY WITH NONHEALING ULCER WITH FAT LAYER EXPOSED (HCC): Primary | ICD-10-CM

## 2023-12-26 DIAGNOSIS — L97.912 CALCIPHYLAXIS OF RIGHT LOWER EXTREMITY WITH NONHEALING ULCER WITH FAT LAYER EXPOSED (HCC): Primary | ICD-10-CM

## 2023-12-26 DIAGNOSIS — M79.604 LEG PAIN, BILATERAL: ICD-10-CM

## 2023-12-26 DIAGNOSIS — M79.605 LEG PAIN, BILATERAL: ICD-10-CM

## 2023-12-26 RX ORDER — FENTANYL 12.5 UG/1
1 PATCH TRANSDERMAL
Qty: 10 PATCH | Refills: 0 | Status: SHIPPED | OUTPATIENT
Start: 2023-12-26 | End: 2024-01-25

## 2023-12-26 NOTE — TELEPHONE ENCOUNTER
Palliative Medicine Clinic   [place]: [phone]    Patient Name: Kalyn Cameron  YOB: 1965    Medication Refill Request    Call placed to [address] [place] [address] and verified that patient did not  Fentanyl 12 mcg or Dilaudid 2 mg  on 12/5/23. Discontinued both prescriptions [facility].  He last filled Fentanyl 25 mcg on 10/31/23 and Dilaudid on [facility] from [facility].     Call placed to patient and he mentioned that Dr. Gifty Rg changed his last Fentanyl prescription to 12 mcg, but he never picked up. He shared that the Fentanyl 25 mcg was lasting up to 5 days which is why his prescription lasted so long. [facility].  HE shared that his hydrotherapy is going well and requiring only 1 Dilaudid a day for break through pain. New prescription for Fentanyl 12 mcg every 72 hours #10 for 30 days is pended for Dr. Gifty Rg.      Patient is scheduled for follow up per PSR note:  [x]  YES  []   NO    PDMP reviewed:  [x] YES   []  System down / Jerry Scales  []  NO- Patient fills out of state    Medication: Fentanyl 25 mcg  Dose and directions: 1 every 72 hours  Number dispensed: 10  Date filled (PDMP or Pharmacy): 10/31/23  # left: none      Appropriate for refill:  [x]  YES      Other pertinent information for prescriber:

## 2023-12-26 NOTE — TELEPHONE ENCOUNTER
Pt asked for fentanyl patches to be refilled at [facility], stated he just put the last one on.

## 2023-12-27 ENCOUNTER — HOSPITAL ENCOUNTER (OUTPATIENT)
Facility: HOSPITAL | Age: 58
Setting detail: INFUSION SERIES
Discharge: HOME | End: 2023-12-27
Payer: MEDICARE

## 2023-12-27 VITALS
DIASTOLIC BLOOD PRESSURE: 75 MMHG | SYSTOLIC BLOOD PRESSURE: 114 MMHG | HEART RATE: 70 BPM | TEMPERATURE: 97.9 F | RESPIRATION RATE: 20 BRPM

## 2023-12-27 DIAGNOSIS — N18.32 CHRONIC KIDNEY DISEASE, STAGE 3B (HCC): ICD-10-CM

## 2023-12-27 DIAGNOSIS — E83.59 CALCIPHYLAXIS: Primary | ICD-10-CM

## 2023-12-27 LAB
CREAT UR-MCNC: 48.1 MG/DL
ERYTHROCYTE [DISTWIDTH] IN BLOOD BY AUTOMATED COUNT: 16.5 % (ref 11.5–14.5)
HCT VFR BLD AUTO: 31.3 % (ref 36.6–50.3)
HGB BLD-MCNC: 9.8 G/DL (ref 12.1–17)
MCH RBC QN AUTO: 27.5 PG (ref 26–34)
MCHC RBC AUTO-ENTMCNC: 31.3 G/DL (ref 30–36.5)
MCV RBC AUTO: 87.7 FL (ref 80–99)
MICROALBUMIN UR-MCNC: 33.3 MG/DL
MICROALBUMIN/CREAT UR-RTO: 692 MG/G (ref 0–30)
NRBC # BLD: 0 K/UL (ref 0–0.01)
NRBC BLD-RTO: 0 PER 100 WBC
PLATELET # BLD AUTO: 140 K/UL (ref 150–400)
PMV BLD AUTO: 12.5 FL (ref 8.9–12.9)
RBC # BLD AUTO: 3.57 M/UL (ref 4.1–5.7)
URATE SERPL-MCNC: 12.6 MG/DL (ref 3.5–7.2)
WBC # BLD AUTO: 4.9 K/UL (ref 4.1–11.1)

## 2023-12-27 PROCEDURE — 84550 ASSAY OF BLOOD/URIC ACID: CPT

## 2023-12-27 PROCEDURE — 82570 ASSAY OF URINE CREATININE: CPT

## 2023-12-27 PROCEDURE — 85027 COMPLETE CBC AUTOMATED: CPT

## 2023-12-27 PROCEDURE — 6360000002 HC RX W HCPCS: Performed by: INTERNAL MEDICINE

## 2023-12-27 PROCEDURE — 2580000003 HC RX 258: Performed by: INTERNAL MEDICINE

## 2023-12-27 PROCEDURE — 82043 UR ALBUMIN QUANTITATIVE: CPT

## 2023-12-27 PROCEDURE — 36415 COLL VENOUS BLD VENIPUNCTURE: CPT

## 2023-12-27 PROCEDURE — 96365 THER/PROPH/DIAG IV INF INIT: CPT

## 2023-12-27 RX ORDER — ONDANSETRON 2 MG/ML
4 INJECTION INTRAMUSCULAR; INTRAVENOUS
Start: 2023-12-28

## 2023-12-27 RX ORDER — ONDANSETRON 2 MG/ML
4 INJECTION INTRAMUSCULAR; INTRAVENOUS
Status: COMPLETED | OUTPATIENT
Start: 2023-12-27 | End: 2023-12-27

## 2023-12-27 RX ORDER — SODIUM CHLORIDE 9 MG/ML
INJECTION, SOLUTION INTRAVENOUS CONTINUOUS
Status: DISCONTINUED | OUTPATIENT
Start: 2023-12-27 | End: 2023-12-28 | Stop reason: HOSPADM

## 2023-12-27 RX ADMIN — SODIUM CHLORIDE: 9 INJECTION, SOLUTION INTRAVENOUS at 14:40

## 2023-12-27 RX ADMIN — SODIUM THIOSULFATE 12.5 G: 250 INJECTION, SOLUTION INTRAVENOUS at 15:25

## 2023-12-27 RX ADMIN — ONDANSETRON HYDROCHLORIDE 4 MG: 2 INJECTION, SOLUTION INTRAMUSCULAR; INTRAVENOUS at 14:45

## 2023-12-27 ASSESSMENT — PAIN DESCRIPTION - LOCATION: LOCATION: LEG

## 2023-12-27 ASSESSMENT — PAIN DESCRIPTION - DESCRIPTORS: DESCRIPTORS: ACHING;SHARP;SHOOTING

## 2023-12-27 ASSESSMENT — PAIN SCALES - GENERAL: PAINLEVEL_OUTOF10: 2

## 2023-12-27 ASSESSMENT — PAIN DESCRIPTION - ORIENTATION: ORIENTATION: RIGHT

## 2023-12-27 NOTE — PROGRESS NOTES
[facility] Peds/Adult Note                       Date: 2023    Name: Edson Grover    MRN: [id number]         : 1965    1400 Patient arrives for sodium thiosulfate without acute problems. Please see Epic for complete assessment and education provided.        Vital signs stable throughout and prior to discharge. Patient tolerated procedure well and was discharged without incident.  Mr. Grover is aware of next [facility] appointment on 23.  Appointment card given.      Mr. Grover's vitals were reviewed prior to and after treatment.   Patient Vitals for the past 12 hrs:   Temp Pulse Resp BP   23 1558 -- 70 20 114/75   23 1400 97.9 °F (36.6 °C) 72 18 123/82         Lab results were obtained and reviewed.  Recent Results (from the past 12 hour(s))   Uric Acid    Collection Time: 23  2:37 PM   Result Value Ref Range    Uric Acid 12.6 (H) 3.5 - 7.2 MG/DL   CBC    Collection Time: 23  2:37 PM   Result Value Ref Range    WBC 4.9 4.1 - 11.1 K/uL    RBC 3.57 (L) 4.10 - 5.70 M/uL    Hemoglobin 9.8 (L) 12.1 - 17.0 g/dL    Hematocrit 31.3 (L) 36.6 - 50.3 %    MCV 87.7 80.0 - 99.0 FL    MCH 27.5 26.0 - 34.0 PG    MCHC 31.3 30.0 - 36.5 g/dL    RDW 16.5 (H) 11.5 - 14.5 %    Platelets 140 (L) 150 - 400 K/uL    MPV 12.5 8.9 - 12.9 FL    Nucleated RBCs 0.0 0  WBC    nRBC 0.00 0.00 - 0.01 K/uL       Medications given:   Medications Administered         0.9 % sodium chloride infusion Admin Date  2023 Action  New Bag Dose   Rate  25 mL/hr Route  IntraVENous Administered By  Mylene Redman, RN        ondansetron (ZOFRAN) injection 4 mg Admin Date  2023 Action  Given Dose  4 mg Rate   Route  IntraVENous Administered By  Mylene Redman, RN        sodium thiosulfate 12.5 g in sodium chloride 0.9 % 100 mL IVPB Admin Date  2023 Action  New Bag Dose  12.5 g Rate  320 mL/hr Route  IntraVENous Administered By  Mylene Redman, RN              Mr. Grover tolerated the  infusion, and had no complaints. Mr. Juaquin Osler was discharged from [address] [place] [address] in stable condition.      Future Appointments   Date Time Provider [address]   1/4/2024  3:00 PM [address]/[facility]CHIAT[facility] [place]   1/24/2024  1:00 PM LAURA Carranza - CNP [facility] BS AMB   1/24/2024  1:30 PM Mela Arauz APRN - NP [facility] BS AMB   6/4/2024  2:20 PM MD MARIAMA Bradshaw BS AMB       Michael Underwood RN  December 27, 2023  4:11 PM

## 2024-01-03 DIAGNOSIS — L97.912 CALCIPHYLAXIS OF RIGHT LOWER EXTREMITY WITH NONHEALING ULCER WITH FAT LAYER EXPOSED (HCC): ICD-10-CM

## 2024-01-03 DIAGNOSIS — E83.59 CALCIPHYLAXIS OF RIGHT LOWER EXTREMITY WITH NONHEALING ULCER WITH FAT LAYER EXPOSED (HCC): ICD-10-CM

## 2024-01-03 RX ORDER — HYDROMORPHONE HYDROCHLORIDE 2 MG/1
2 TABLET ORAL EVERY 8 HOURS PRN
Qty: 45 TABLET | Refills: 0 | Status: SHIPPED | OUTPATIENT
Start: 2024-01-03 | End: 2024-01-18

## 2024-01-03 NOTE — TELEPHONE ENCOUNTER
Returned call to patient who will contact his [facility] pharmacy to determine if Rx has been delivered today , if not patient will contact alternate pharmacy for stock and advise PM to forward new rx.    Self

## 2024-01-03 NOTE — TELEPHONE ENCOUNTER
Palliative Medicine Clinic   [place]: [phone] ([phone])    Patient Name: Edson Grover  YOB: 1965    Medication Refill Request    Patient is scheduled for follow up per PSR note:  [x]  YES  []   NO    PDMP reviewed:  [x] YES   []  System down / Unable  []  NO- Patient fills out of state    Medication:HYDROmorphone (DILAUDID) 2 MG    Dose and directions:Take 1 tablet by mouth every 8 hours  Number dispensed:45  Date filled (PDMP or Pharmacy):12/8/2023  # left:        Appropriate for refill:  [x]  YES  []  Early Request - Requires MD/NP Review      Other pertinent information for prescriber:

## 2024-01-03 NOTE — TELEPHONE ENCOUNTER
The patient states [facility] does not have Fentanyl in stock. He has been using Dilaudid to control the pain. He wants to know what is his next step. Does he come off the Fentanyl Patches? Search for a pharmacy that has them? Please give him a call back [phone].

## 2024-01-04 ENCOUNTER — TELEPHONE (OUTPATIENT)
Age: 59
End: 2024-01-04

## 2024-01-04 NOTE — TELEPHONE ENCOUNTER
Received [facility] from [facility] pharmacy [facility], Dilaudid 2 mg on back order,    Call to patient to determine alternate pharmacy, no answer v/m full     Call to pharmacy to determine medications on hand , hold time with pharmacy over 25 minutes before disconnecting

## 2024-01-05 NOTE — TELEPHONE ENCOUNTER
The patient called to advise [facility] contacted [facility] to advise they received Hydromorphone. They will be filling script today by 4 pm.

## 2024-01-09 DIAGNOSIS — L97.912 CALCIPHYLAXIS OF RIGHT LOWER EXTREMITY WITH NONHEALING ULCER WITH FAT LAYER EXPOSED (HCC): ICD-10-CM

## 2024-01-09 DIAGNOSIS — E83.59 CALCIPHYLAXIS OF RIGHT LOWER EXTREMITY WITH NONHEALING ULCER WITH FAT LAYER EXPOSED (HCC): ICD-10-CM

## 2024-01-09 RX ORDER — HYDROMORPHONE HYDROCHLORIDE 4 MG/1
2 TABLET ORAL EVERY 8 HOURS PRN
Qty: 45 TABLET | Refills: 0 | Status: SHIPPED | OUTPATIENT
Start: 2024-01-09 | End: 2024-02-08

## 2024-01-09 NOTE — TELEPHONE ENCOUNTER
Palliative Medicine Clinic   [place]: [phone] ([phone])     Patient Name: Edson Grover  YOB: 1965     Medication Refill Request     Patient is scheduled for follow up per PSR note:  [x]  YES  []   NO     PDMP reviewed:  [x] YES   []  System down / Unable  []  NO- Patient fills out of state        Medication:HYDROmorphone (DILAUDID) 2 MG     Dose and directions:Take 1 tablet by mouth every 8 hours  Number dispensed:45  Date filled (PDMP or Pharmacy):12/8/2023  #left:     Appropriate for refill:  [x]  YES  []  Early Request - Requires MD/NP Review       Other pertinent information for prescriber:

## 2024-01-09 NOTE — TELEPHONE ENCOUNTER
Patient called, [facility] not [facility] the Hyd[facility]one 2 mg, however they do have hydromorphone 4 mg. A new Rx will need to be written if the patient would like to pick it up. # [phone]

## 2024-01-10 ENCOUNTER — HOSPITAL ENCOUNTER (OUTPATIENT)
Facility: HOSPITAL | Age: 59
Setting detail: INFUSION SERIES
Discharge: HOME | End: 2024-01-10
Payer: MEDICARE

## 2024-01-10 VITALS
SYSTOLIC BLOOD PRESSURE: 138 MMHG | RESPIRATION RATE: 18 BRPM | DIASTOLIC BLOOD PRESSURE: 86 MMHG | TEMPERATURE: 97.5 F | HEART RATE: 66 BPM

## 2024-01-10 DIAGNOSIS — N18.32 CHRONIC KIDNEY DISEASE, STAGE 3B (HCC): ICD-10-CM

## 2024-01-10 DIAGNOSIS — E83.59 CALCIPHYLAXIS: Primary | ICD-10-CM

## 2024-01-10 LAB
ALBUMIN SERPL-MCNC: 3 G/DL (ref 3.5–5)
ANION GAP SERPL CALC-SCNC: 6 MMOL/L (ref 5–15)
BUN SERPL-MCNC: 27 MG/DL (ref 6–20)
BUN/CREAT SERPL: 11 (ref 12–20)
CALCIUM SERPL-MCNC: 8.7 MG/DL (ref 8.5–10.1)
CHLORIDE SERPL-SCNC: 109 MMOL/L (ref 97–108)
CO2 SERPL-SCNC: 28 MMOL/L (ref 21–32)
CREAT SERPL-MCNC: 2.48 MG/DL (ref 0.7–1.3)
CREAT UR-MCNC: 44.6 MG/DL
ERYTHROCYTE [DISTWIDTH] IN BLOOD BY AUTOMATED COUNT: 16.9 % (ref 11.5–14.5)
GLUCOSE SERPL-MCNC: 129 MG/DL (ref 65–100)
HCT VFR BLD AUTO: 33.4 % (ref 36.6–50.3)
HGB BLD-MCNC: 10.8 G/DL (ref 12.1–17)
MCH RBC QN AUTO: 28.4 PG (ref 26–34)
MCHC RBC AUTO-ENTMCNC: 32.3 G/DL (ref 30–36.5)
MCV RBC AUTO: 87.9 FL (ref 80–99)
MICROALBUMIN UR-MCNC: 32.7 MG/DL
MICROALBUMIN/CREAT UR-RTO: 733 MG/G (ref 0–30)
NRBC # BLD: 0 K/UL (ref 0–0.01)
NRBC BLD-RTO: 0 PER 100 WBC
PHOSPHATE SERPL-MCNC: 3.7 MG/DL (ref 2.6–4.7)
PLATELET # BLD AUTO: 138 K/UL (ref 150–400)
PMV BLD AUTO: 12.2 FL (ref 8.9–12.9)
POTASSIUM SERPL-SCNC: 3.8 MMOL/L (ref 3.5–5.1)
RBC # BLD AUTO: 3.8 M/UL (ref 4.1–5.7)
SODIUM SERPL-SCNC: 143 MMOL/L (ref 136–145)
URATE SERPL-MCNC: 12.5 MG/DL (ref 3.5–7.2)
WBC # BLD AUTO: 4.2 K/UL (ref 4.1–11.1)

## 2024-01-10 PROCEDURE — 36415 COLL VENOUS BLD VENIPUNCTURE: CPT

## 2024-01-10 PROCEDURE — 82570 ASSAY OF URINE CREATININE: CPT

## 2024-01-10 PROCEDURE — 6360000002 HC RX W HCPCS: Performed by: INTERNAL MEDICINE

## 2024-01-10 PROCEDURE — 84550 ASSAY OF BLOOD/URIC ACID: CPT

## 2024-01-10 PROCEDURE — 85027 COMPLETE CBC AUTOMATED: CPT

## 2024-01-10 PROCEDURE — 80069 RENAL FUNCTION PANEL: CPT

## 2024-01-10 PROCEDURE — 96375 TX/PRO/DX INJ NEW DRUG ADDON: CPT

## 2024-01-10 PROCEDURE — 2580000003 HC RX 258: Performed by: INTERNAL MEDICINE

## 2024-01-10 PROCEDURE — 82043 UR ALBUMIN QUANTITATIVE: CPT

## 2024-01-10 PROCEDURE — 96365 THER/PROPH/DIAG IV INF INIT: CPT

## 2024-01-10 RX ORDER — SODIUM CHLORIDE 0.9 % (FLUSH) 0.9 %
5 SYRINGE (ML) INJECTION PRN
Status: DISCONTINUED | OUTPATIENT
Start: 2024-01-10 | End: 2024-01-11 | Stop reason: HOSPADM

## 2024-01-10 RX ORDER — ONDANSETRON 2 MG/ML
4 INJECTION INTRAMUSCULAR; INTRAVENOUS
Status: COMPLETED | OUTPATIENT
Start: 2024-01-10 | End: 2024-01-10

## 2024-01-10 RX ORDER — ONDANSETRON 2 MG/ML
4 INJECTION INTRAMUSCULAR; INTRAVENOUS
Start: 2024-01-11

## 2024-01-10 RX ORDER — SODIUM CHLORIDE 9 MG/ML
INJECTION, SOLUTION INTRAVENOUS ONCE
Status: COMPLETED | OUTPATIENT
Start: 2024-01-10 | End: 2024-01-10

## 2024-01-10 RX ADMIN — SODIUM CHLORIDE, PRESERVATIVE FREE 5 ML: 5 INJECTION INTRAVENOUS at 14:20

## 2024-01-10 RX ADMIN — SODIUM THIOSULFATE 12.5 G: 250 INJECTION, SOLUTION INTRAVENOUS at 14:40

## 2024-01-10 RX ADMIN — SODIUM CHLORIDE: 9 INJECTION, SOLUTION INTRAVENOUS at 14:21

## 2024-01-10 RX ADMIN — ONDANSETRON HYDROCHLORIDE 4 MG: 2 INJECTION, SOLUTION INTRAMUSCULAR; INTRAVENOUS at 14:29

## 2024-01-10 ASSESSMENT — PAIN SCALES - GENERAL: PAINLEVEL_OUTOF10: 6

## 2024-01-10 ASSESSMENT — PAIN DESCRIPTION - DESCRIPTORS: DESCRIPTORS: ACHING;SHARP;SHOOTING

## 2024-01-10 ASSESSMENT — PAIN DESCRIPTION - ORIENTATION: ORIENTATION: RIGHT

## 2024-01-10 ASSESSMENT — PAIN DESCRIPTION - LOCATION: LOCATION: LEG

## 2024-01-10 NOTE — PROGRESS NOTES
[facility] Peds/Adult Note                       Date: January 10, 2024    Name: Edson Grover    MRN: [id number]         : 1965    1400 Patient arrives for Sodium Thiosulfate Infusion without acute problems. Please see Epic for complete assessment and education provided.    Vital signs stable throughout and prior to discharge. Patient tolerated procedure well and was discharged without incident.  Patient/wife is aware of no further [facility] appointments @ this time & to follow up with healthcare provider for next appointment.       Mr. Grover's vitals were reviewed prior to and after treatment.   Patient Vitals for the past 12 hrs:   Temp Pulse Resp BP   01/10/24 1512 97.5 °F (36.4 °C) 66 18 138/86   01/10/24 1400 97.5 °F (36.4 °C) 68 18 136/88         Lab results were obtained and reviewed.  Labs Pending, please see [facility] for results.    Recent Results (from the past 12 hour(s))   CBC    Collection Time: 01/10/24  2:20 PM   Result Value Ref Range    WBC 4.2 4.1 - 11.1 K/uL    RBC 3.80 (L) 4.10 - 5.70 M/uL    Hemoglobin 10.8 (L) 12.1 - 17.0 g/dL    Hematocrit 33.4 (L) 36.6 - 50.3 %    MCV 87.9 80.0 - 99.0 FL    MCH 28.4 26.0 - 34.0 PG    MCHC 32.3 30.0 - 36.5 g/dL    RDW 16.9 (H) 11.5 - 14.5 %    Platelets 138 (L) 150 - 400 K/uL    MPV 12.2 8.9 - 12.9 FL    Nucleated RBCs 0.0 0  WBC    nRBC 0.00 0.00 - 0.01 K/uL   Uric Acid    Collection Time: 01/10/24  2:20 PM   Result Value Ref Range    Uric Acid 12.5 (H) 3.5 - 7.2 MG/DL   Renal Function Panel    Collection Time: 01/10/24  2:43 PM   Result Value Ref Range    Sodium 143 136 - 145 mmol/L    Potassium 3.8 3.5 - 5.1 mmol/L    Chloride 109 (H) 97 - 108 mmol/L    CO2 28 21 - 32 mmol/L    Anion Gap 6 5 - 15 mmol/L    Glucose 129 (H) 65 - 100 mg/dL    BUN 27 (H) 6 - 20 MG/DL    Creatinine 2.48 (H) 0.70 - 1.30 MG/DL    Bun/Cre Ratio 11 (L) 12 - 20      Est, Glom Filt Rate 30 (L) >60 ml/min/1.73m2    Calcium 8.7 8.5 - 10.1 MG/DL    Phosphorus 3.7 2.6 - 4.7

## 2024-01-19 ENCOUNTER — TELEPHONE (OUTPATIENT)
Age: 59
End: 2024-01-19

## 2024-01-19 ENCOUNTER — PATIENT MESSAGE (OUTPATIENT)
Age: 59
End: 2024-01-19

## 2024-01-19 NOTE — TELEPHONE ENCOUNTER
SW was sent message from PSR about pt. High balance. SW called and  used two patient identifiers. Spoke with pt. And gave instructions for financial assistance application. Pt. Verbalized understanding and will bring in his proof of income at visit next week.

## 2024-01-19 NOTE — TELEPHONE ENCOUNTER
Telephone Call RE:  Appointment reminder     Outcome:     [x] Patient confirmed appointment   [] Patient rescheduled appointment for    [] Unable to reach  [] Left message              [] Other:       Patient has an appointment at [facility] at 1:00 P.M. on 01.24.24. Said he will try to reschedule that appointment because he wants to see Ying. He will call back to let us know what is final, but to keep this appointment with us for now.

## 2024-01-19 NOTE — TELEPHONE ENCOUNTER
Called patient to advise/confirm upcoming appt with Cory on 1/24/2024  at 1:00  at [facility] Office.  Left a message

## 2024-01-22 ENCOUNTER — TELEPHONE (OUTPATIENT)
Age: 59
End: 2024-01-22

## 2024-01-23 ASSESSMENT — ENCOUNTER SYMPTOMS
VOMITING: 0
NAUSEA: 0
SHORTNESS OF BREATH: 0
COUGH: 0
STRIDOR: 0

## 2024-01-23 NOTE — PROGRESS NOTES
AD[facility] FAILURE CENTER  [facility] in [place], [place]  Outpatient Clinic  Note      Patient name: Edson Grover  Patient : 1965  Patient MRN: [id number]  Date of service: 24    Chief Complaint   Patient presents with    Shortness of Breath     W/ exertion    Edema     Abdomen and thighs per patient     Congestive Heart Failure    Follow-up         PLAN OF CARE     Briefly Edson Grover is a 58 y.o. man with chronic systolic heart failure due to non-ischemic cardiomyopathy, LVEF 15-20% (by echo 3/2023), stage C. History of CKD stage 4 (Cr 3.78), off nephrotoxic GDMT to allow for renal recovery. GDMT limited by hypotension.  Has calciphylaxis with large, non healing leg wounds. 1 year mortality >50% in this setting.   Patient is not a candidate for organ transplantation due to non-healing wound ulcerations, high mortality risk. Patient is not a candidate for LVAD as alternative to transplant for same reasons.  Patient was admitted with fluid overload, approximately 40 lbs, and underwent inotrope-assisted diuresis and weaned off inotrope.   is net negative almost 70lbs and hypotensive after standing or walking; suspect overdiuresis.  Also note having severe pain with walking.  male with h/o non-ischemic cardiomyopathy, LVEF 15%, stage C, NYHA class IIIB on optimal GDMT            INTERVAL HISTORY:  24 Edson Grover presents for HF follow up. He has increased SOB and BLE edema with abdominal distention. He saw nephrology last week who increased his torsemide but he states his weights have been the same at 216lbs. He has completed his STS infusions 2 weeks ago and is still attending hydrotherapy for his leg wounds which have greatly improved since his hospital admission. He states his BP is stable at home.     RECOMMENDATIONS:  Acute on chronic systolic heart failure: likely slightly dry   Beta Blocker: Continue Coreg 25mg BID  ACEi/ARB/ARNi: Unable to tolerate due to renal

## 2024-01-24 ENCOUNTER — OFFICE VISIT (OUTPATIENT)
Age: 59
End: 2024-01-24
Payer: MEDICARE

## 2024-01-24 VITALS
HEART RATE: 78 BPM | DIASTOLIC BLOOD PRESSURE: 78 MMHG | SYSTOLIC BLOOD PRESSURE: 108 MMHG | HEIGHT: 68 IN | RESPIRATION RATE: 18 BRPM | BODY MASS INDEX: 32.74 KG/M2 | OXYGEN SATURATION: 99 % | WEIGHT: 216 LBS

## 2024-01-24 DIAGNOSIS — I50.22 CHRONIC SYSTOLIC HEART FAILURE (HCC): Primary | ICD-10-CM

## 2024-01-24 PROCEDURE — 99214 OFFICE O/P EST MOD 30 MIN: CPT | Performed by: NURSE PRACTITIONER

## 2024-01-24 PROCEDURE — 3074F SYST BP LT 130 MM HG: CPT | Performed by: NURSE PRACTITIONER

## 2024-01-24 PROCEDURE — 3017F COLORECTAL CA SCREEN DOC REV: CPT | Performed by: NURSE PRACTITIONER

## 2024-01-24 PROCEDURE — 1036F TOBACCO NON-USER: CPT | Performed by: NURSE PRACTITIONER

## 2024-01-24 PROCEDURE — G8417 CALC BMI ABV UP PARAM F/U: HCPCS | Performed by: NURSE PRACTITIONER

## 2024-01-24 PROCEDURE — 3078F DIAST BP <80 MM HG: CPT | Performed by: NURSE PRACTITIONER

## 2024-01-24 PROCEDURE — G8427 DOCREV CUR MEDS BY ELIG CLIN: HCPCS | Performed by: NURSE PRACTITIONER

## 2024-01-24 PROCEDURE — G8484 FLU IMMUNIZE NO ADMIN: HCPCS | Performed by: NURSE PRACTITIONER

## 2024-01-24 RX ORDER — CARVEDILOL 25 MG/1
25 TABLET ORAL 2 TIMES DAILY
Qty: 180 TABLET | Refills: 3 | Status: SHIPPED | OUTPATIENT
Start: 2024-01-24

## 2024-01-24 RX ORDER — ALLOPURINOL 100 MG/1
200 TABLET ORAL DAILY
Qty: 180 TABLET | Refills: 1 | Status: SHIPPED | OUTPATIENT
Start: 2024-01-24

## 2024-01-24 RX ORDER — TORSEMIDE 20 MG/1
40 TABLET ORAL 2 TIMES DAILY
Qty: 60 TABLET | Refills: 2
Start: 2024-01-24

## 2024-01-24 ASSESSMENT — PATIENT HEALTH QUESTIONNAIRE - PHQ9
SUM OF ALL RESPONSES TO PHQ QUESTIONS 1-9: 0
2. FEELING DOWN, DEPRESSED OR HOPELESS: 0
SUM OF ALL RESPONSES TO PHQ QUESTIONS 1-9: 0
SUM OF ALL RESPONSES TO PHQ QUESTIONS 1-9: 0
1. LITTLE INTEREST OR PLEASURE IN DOING THINGS: 0
SUM OF ALL RESPONSES TO PHQ QUESTIONS 1-9: 0
SUM OF ALL RESPONSES TO PHQ9 QUESTIONS 1 & 2: 0

## 2024-01-24 ASSESSMENT — ENCOUNTER SYMPTOMS: ABDOMINAL DISTENTION: 1

## 2024-01-24 NOTE — PATIENT INSTRUCTIONS
Medication changes:    Increase torsemide to 40mg twice a day  Call in 1 week with weights and symptoms to [phone] option 2    Please take this to your pharmacy to notify them of the change in medications.     Testing Ordered:    An order for echocardiogram has been placed to be done in February. You will be receiving an automated call from Coordination of Care to schedule this test. If you are unavailable to receive the call or would like to contact coordination of care yourself you may contact [phone] to schedule. You will need to contact coordination of care yourself if you miss their calls as they will only make 3 attempts to reach you.      Other Recommendations:      Please record blood pressure and heart rate daily before medication and two hours after medication, please record weight daily upon waking/after using the bathroom. Keep a written records of your weights and blood pressure and bring to your next appointment. If you have a weight gain of 3 or more pounds overnight OR 5 or more pounds in one week, new/worsened shortness of breath or swelling, or if your blood pressure begins to consistently run below 90/60 and/or you begin to experience dizziness or lightheadedness please contact our office at [phone] option 2.    Ensure your drinking an adequate amount of water with a goal of 6-8 eight ounce glasses (1.5-2 liters) of fluid daily. Your urine should be clear and light yellow straw colored.     Follow up 3 months with [facility]    Thank you for allowing us the privilege of being a part of your healthcare team! Please do not hesitate to contact our office at [phone] option 2 with any questions or concerns.

## 2024-01-29 ENCOUNTER — TELEPHONE (OUTPATIENT)
Age: 59
End: 2024-01-29

## 2024-01-29 NOTE — TELEPHONE ENCOUNTER
----- Message from Wesley Evans RN sent at 1/24/2024  2:19 PM EST -----  Request labs from Dr Yan's office (drawing labs on Friday) give to Ying     Fax request send to Dr Yan's office for lab results to be faxed.  Labs received and given to provider for review

## 2024-01-30 ENCOUNTER — TELEPHONE (OUTPATIENT)
Age: 59
End: 2024-01-30

## 2024-02-05 RX ORDER — TORSEMIDE 20 MG/1
40 TABLET ORAL 2 TIMES DAILY
Qty: 120 TABLET | Refills: 1 | Status: SHIPPED | OUTPATIENT
Start: 2024-02-05

## 2024-02-05 NOTE — TELEPHONE ENCOUNTER
Requested Prescriptions     Pending Prescriptions Disp Refills    torsemide (DEMADEX) 20 MG tablet [Pharmacy Med Name: Torsemide 20 MG Oral Tablet] 120 tablet 1     Sig: Take 2 tablets by mouth in the morning and at bedtime

## 2024-02-06 ENCOUNTER — TELEPHONE (OUTPATIENT)
Age: 59
End: 2024-02-06

## 2024-02-06 NOTE — TELEPHONE ENCOUNTER
Called patient to advise/confirm upcoming appt with Cory Kapoor on 2/7/2024  at 3:00  at [facility] Office.  Spoke with patient and cancelled  appointment. Patient has an another appointment with nephrology at the same time. He has rescheduled for 2/13/2024 at 3:00.

## 2024-02-12 ENCOUNTER — TELEPHONE (OUTPATIENT)
Age: 59
End: 2024-02-12

## 2024-02-12 NOTE — TELEPHONE ENCOUNTER
Called patient to advise/confirm upcoming appt with Dr. Whatley on 2/13/2024  at 3:00  at [facility] Office.  Spoke with patient and confirmed appointment.

## 2024-02-13 ENCOUNTER — OFFICE VISIT (OUTPATIENT)
Age: 59
End: 2024-02-13
Payer: MEDICARE

## 2024-02-13 VITALS
TEMPERATURE: 97.6 F | WEIGHT: 219.8 LBS | RESPIRATION RATE: 18 BRPM | HEART RATE: 80 BPM | SYSTOLIC BLOOD PRESSURE: 148 MMHG | BODY MASS INDEX: 33.31 KG/M2 | DIASTOLIC BLOOD PRESSURE: 89 MMHG | HEIGHT: 68 IN | OXYGEN SATURATION: 98 %

## 2024-02-13 DIAGNOSIS — E83.59 CALCIPHYLAXIS OF RIGHT LOWER EXTREMITY WITH NONHEALING ULCER WITH FAT LAYER EXPOSED (HCC): ICD-10-CM

## 2024-02-13 DIAGNOSIS — I50.22 CHRONIC SYSTOLIC HEART FAILURE (HCC): Primary | ICD-10-CM

## 2024-02-13 DIAGNOSIS — R52 PAIN: ICD-10-CM

## 2024-02-13 DIAGNOSIS — Z51.5 PALLIATIVE CARE ENCOUNTER: ICD-10-CM

## 2024-02-13 DIAGNOSIS — L97.922 CALCIPHYLAXIS OF LEFT LOWER EXTREMITY WITH NONHEALING ULCER WITH FAT LAYER EXPOSED (HCC): ICD-10-CM

## 2024-02-13 DIAGNOSIS — E83.59 CALCIPHYLAXIS OF LEFT LOWER EXTREMITY WITH NONHEALING ULCER WITH FAT LAYER EXPOSED (HCC): ICD-10-CM

## 2024-02-13 DIAGNOSIS — R53.81 DEBILITY: ICD-10-CM

## 2024-02-13 DIAGNOSIS — L97.912 CALCIPHYLAXIS OF RIGHT LOWER EXTREMITY WITH NONHEALING ULCER WITH FAT LAYER EXPOSED (HCC): ICD-10-CM

## 2024-02-13 PROCEDURE — G8428 CUR MEDS NOT DOCUMENT: HCPCS

## 2024-02-13 PROCEDURE — G8417 CALC BMI ABV UP PARAM F/U: HCPCS

## 2024-02-13 PROCEDURE — 3017F COLORECTAL CA SCREEN DOC REV: CPT

## 2024-02-13 PROCEDURE — G8484 FLU IMMUNIZE NO ADMIN: HCPCS

## 2024-02-13 PROCEDURE — 3079F DIAST BP 80-89 MM HG: CPT

## 2024-02-13 PROCEDURE — 3077F SYST BP >= 140 MM HG: CPT

## 2024-02-13 PROCEDURE — 1036F TOBACCO NON-USER: CPT

## 2024-02-13 PROCEDURE — 99215 OFFICE O/P EST HI 40 MIN: CPT

## 2024-02-13 RX ORDER — HYDROMORPHONE HYDROCHLORIDE 4 MG/1
2 TABLET ORAL EVERY 6 HOURS PRN
Qty: 60 TABLET | Refills: 0 | Status: SHIPPED | OUTPATIENT
Start: 2024-02-13 | End: 2024-03-14

## 2024-02-13 NOTE — PATIENT INSTRUCTIONS
Dear Edson Grover ,    It was a pleasure seeing you today at [facility].     Return in about 6 weeks (around 3/26/2024).    If labs or imaging tests have been ordered for you today, please call the office at [phone] 48 hours after completion to obtain the results.        This is the plan we talked about:    Calciphylaxis and venous stasis induced chronic wounds in bilateral legs  -You are following with wound clinic at [facility] weekly and get debridement (Tues and Fridays).  -Continue fentanyl 25 mcg patch every 3 days  -Continue hydromorphone 4 mg every 4-6 hours only when you have pain.  Thankfully, you do not use it on a regular basis, you only take 1/2 tab twice daily on the days you have hydrotherapy.  -You are no longer wheelchair-bound, but ambulate with walker due to pain.  -Your nephrologist has determine that you do have calciphylaxis. you may go back on IV sodium thiosulfate infusions as needed.  -Opioid safety protocol reviewed in detail with you and your partner today  - You continue to see Dr. James for hydrotherapy     Congestive heart failure  -You reported shortness of breath with exertion, but note that it is related to fluid in your abdomen.  You do not need oxygen for this.  -Your Torsemide dose was recently increase from 20 mg to 40 mg daily.  -Follow closely with Dr. Bartlett and Dr. Yan  -I want to try and avoid increasing your opioids as this will negatively impact your congestive heart failure     Constipation  -Constipation is a common side effect of pain medications as they slow your bowels.   -Please start Pericolace 2 tabs daily and increase to 2 tabs two times a day if needed. This is necessary to keep your bowels soft.   - Add Miralax every other day as a laxative.  - Goal is to have soft bowel movements every day or every other day.   - Please call us if you do not have bowel movements for more than 3 days.    The Palliative

## 2024-02-13 NOTE — PROGRESS NOTES
Palliative Medicine Office Visit  Palliative Medicine Nurse Check In  [phone]    Patient Name: Edson Grover  YOB: 1965      Date of Office Visit: 2/13/2024    Patient states: \"  \"    From Check In Sheet (scanned in Media):  Has a medical provider talked with you about cardiopulmonary resuscitation (CPR)?   [x] Yes   [] No   [] Unable to obtain    Nurse reminder to complete or update ACP FlowSheet:    Is ACP on the Problem List?    [] Yes    [] No  IF ACP Document is ON FILE; Nurse to place ACP on Problem List     Is there an ACP Note in Chart Review/Note?    [] Yes    [] No   If NO: ALERT PROVIDER         2/13/2024     2:28 PM   Demographics   Marital Status        Is there anything that we should know about you as a person in order to provide you the best care possible?     Have you been to the ER, urgent care clinic since your last visit?   [] Yes   [x] No   [] Unable to obtain    Have you been hospitalized since your last visit?   [] Yes   [x] No   [] Unable to obtain    Have you seen or consulted any other health care providers outside of the [facility] since your last visit?   [] Yes   [x] No   [] Unable to obtain    Functional status (describe):         Last BM: 2/13/2024     accessed (date): 2/13/2024    Bottle review (for opioid pain medication):  Medication 1:   Date filled:   Directions:   # filled:   # left:   # pills taking per day:  Last dose taken:    Medication 2:   Date filled:   Directions:   # filled:   # left:   # pills taking per day:  Last dose taken:    Medication 3:   Date filled:   Directions:   # filled:   # left:   # pills taking per day:  Last dose taken:    Medication 4:   Date filled:   Directions:   # filled:   # left:   # pills taking per day:  Last dose taken:                
      Current Outpatient Medications   Medication Sig    HYDROmorphone (DILAUDID) 4 MG tablet Take 0.5 tablets by mouth every 6 hours as needed for Pain for up to 30 days. Indications: Severe Bilateral Lower Extremity Pain due to Calciphylaxis/Non healing wounds 2 mg every 6 hours Max Daily Amount: 8 mg    torsemide (DEMADEX) 20 MG tablet Take 2 tablets by mouth in the morning and at bedtime    carvedilol (COREG) 25 MG tablet Take 1 tablet by mouth 2 times daily    allopurinol (ZYLOPRIM) 100 MG tablet Take 2 tablets by mouth daily    insulin aspart protamine-insulin aspart (NOVOLOG MIX 70/30) (70-30) 100 UNIT/ML injection Inject 22 Units into the skin    potassium chloride (KLOR-CON) 20 MEQ packet Take 20 mEq by mouth 2 times daily    clindamycin (CLEOCIN) 150 MG capsule Take 1 capsule by mouth in the morning, at noon, in the evening, and at bedtime (Patient not taking: Reported on 2/13/2024)     No current facility-administered medications for this visit.          LAB and other DATA REVIEWED:     Lab Results   Component Value Date/Time    WBC 4.2 01/10/2024 02:20 PM    HGB 10.8 01/10/2024 02:20 PM     01/10/2024 02:20 PM     Lab Results   Component Value Date/Time     01/10/2024 02:43 PM    K 3.8 01/10/2024 02:43 PM     01/10/2024 02:43 PM    CO2 28 01/10/2024 02:43 PM    BUN 27 01/10/2024 02:43 PM    MG 1.8 09/13/2023 01:21 AM    PHOS 3.7 01/10/2024 02:43 PM      Lab Results   Component Value Date/Time    GLOB 3.8 09/13/2023 01:21 AM     Lab Results   Component Value Date/Time    INR 1.1 03/03/2023 04:33 AM    APTT 24.4 03/03/2023 04:33 AM      Lab Results   Component Value Date/Time    IRON 39 11/27/2023 02:42 PM    TIBC 316 11/27/2023 02:42 PM        Detail chart reviewed. Other specialists and referral provider notes reviewed.     CONTROLLED SUBSTANCES SAFETY ASSESSMENT (IF ON CONTROLLED SUBSTANCES):     Reviewed opioid safety handout:  [x] Yes   [] No  24 hour opioid dose >150mg morphine

## 2024-03-25 DIAGNOSIS — E83.59 CALCIPHYLAXIS OF RIGHT LOWER EXTREMITY WITH NONHEALING ULCER WITH FAT LAYER EXPOSED (HCC): ICD-10-CM

## 2024-03-25 DIAGNOSIS — L97.912 CALCIPHYLAXIS OF RIGHT LOWER EXTREMITY WITH NONHEALING ULCER WITH FAT LAYER EXPOSED (HCC): ICD-10-CM

## 2024-03-25 RX ORDER — FENTANYL 12.5 UG/1
1 PATCH TRANSDERMAL
Qty: 10 PATCH | Refills: 0 | Status: SHIPPED | OUTPATIENT
Start: 2024-03-25 | End: 2024-04-24

## 2024-03-25 NOTE — TELEPHONE ENCOUNTER
Called patient to advise/confirm upcoming appt with Cory Kapoor on 3/26/2024  at 1:00  at [facility] Office.  Spoke with patient and rescheduled  appointment for 4/1/2024 at 10:00.

## 2024-03-25 NOTE — TELEPHONE ENCOUNTER
Palliative Medicine Clinic   [place]: [phone] ([phone])    Patient Name: Edson Grover  YOB: 1965    Medication Refill Request    Patient is scheduled for follow up:  [x]  YES  []   NO  Next [facility] Visit:     PDMP reviewed:  [x] YES   []  System down / Unable  []  NO- Patient fills out of state    Medication:fentaNYL (DURAGESIC) 12 MCG/HR   Dose and directions:Place 1 patch onto the skin every 3 days   Number dispensed:10  Date filled (PDMP or Pharmacy):12/26/2023  # left:        Appropriate for refill:  [x]  YES  []  Early Request - Requires MD/NP Review      Other pertinent information for prescriber:

## 2024-03-25 NOTE — TELEPHONE ENCOUNTER
Palliative Medicine Clinic   [place]: [phone] ([phone])    Patient Name: Edson Grover  YOB: 1965    Medication Refill Request Triage    Requested by:    []  Patient  []  Support person:      Last Pall Med Clinic Visit:  2/13/2024    Next Pall Med Clinic Visit: 4/1/2024     Preferred Pharmacy: [facility] [address]  Backup Pharmacy:  *    Medications requested:  Fentanyl Patches  Is patient completely out?  []   YES  [x]   NO  If NO, how many pills does patient have left?  _1_    Other pertinent information shared:

## 2024-03-28 ENCOUNTER — TELEPHONE (OUTPATIENT)
Age: 59
End: 2024-03-28

## 2024-03-28 NOTE — TELEPHONE ENCOUNTER
Called patient to advise/confirm upcoming appt with Cory Kapoor on 4/1/2024  at 10:00  at [facility] Office.  Spoke with patient and confirmed appointment.

## 2024-03-30 NOTE — DISCHARGE INSTRUCTIONS
Discharge Instructions for          [place] [address] [facility], [address]  Phone: [phone] Fax: [phone]    NAME:  Safia Peralta  YOB: 1965  MEDICAL RECORD NUMBER:  [id number]  DATE:  June 1, 2023  WOUND CARE ORDERS:  Left lower extremity wound: Cleanse with vashe solution. Allow to sit for 5-7 minutes. Apply santyl, hydrofera blue ready, ABD, and rolled gauze. Apply 2 layer Calamine LITE compression wrap. Right Lower Extremity Wound: Cleanse with vashe solution. Allow to sit for 5-7 minutes. Pay dry. Apply Santyl to wound bed. Dry gauze, rolled gauze secure with tape. Double layer tubi  F for compression. Activity:  [x] Activity as tolerated: Elevate leg(s) above the level of the heart when sitting and a void prolonged standing in one place. Do no get dressing/wrap wet. [] Remain off Work:       [] May return to full duty work:                                     [] Return to work with restrictions:  Dietary:  [x] Diet as tolerated      [] Diabetic Diet            [x] Increase Protein: examples (Meat, cheese, eggs, greek yogurt, fish, nuts)          [x] [address]     Return Appointment:  [] Return Appointment: With {[id number]} in *** Week(s)  [] Nurse Visit : *** days  [] Ordered tests:    Electronically signed Joy Vargas RN on 6/1/2023 at 3:07 PM     Jessika Barcenas 281: Should you experience any significant changes in your wound(s) or have questions about your wound care, please contact the [address] [place] [address] at [address] [place] [address] 8:00 am - 4:30. If you need help with your wound outside these hours and cannot wait until we are again available, contact your PCP or go to the hospital emergency room.      PLEASE NOTE: IF YOU ARE UNABLE TO OBTAIN WOUND SUPPLIES, CONTINUE TO USE THE SUPPLIES YOU Abdomen soft, non-tender and non-distended, no rebound, no guarding and no masses. no hepatosplenomegaly.

## 2024-04-04 ENCOUNTER — TELEPHONE (OUTPATIENT)
Age: 59
End: 2024-04-04

## 2024-04-04 NOTE — TELEPHONE ENCOUNTER
Called patient to advise/confirm upcoming appt with Cory Kapoor NP on 4/8/2024  at 10:00  at [facility] Office.  Left a message

## 2024-04-19 ENCOUNTER — TELEPHONE (OUTPATIENT)
Age: 59
End: 2024-04-19

## 2024-04-24 ENCOUNTER — OFFICE VISIT (OUTPATIENT)
Age: 59
End: 2024-04-24
Payer: MEDICARE

## 2024-04-24 VITALS
RESPIRATION RATE: 17 BRPM | DIASTOLIC BLOOD PRESSURE: 102 MMHG | TEMPERATURE: 97.8 F | WEIGHT: 226.4 LBS | SYSTOLIC BLOOD PRESSURE: 152 MMHG | HEART RATE: 76 BPM | OXYGEN SATURATION: 97 % | BODY MASS INDEX: 34.42 KG/M2

## 2024-04-24 DIAGNOSIS — I50.22 CHRONIC SYSTOLIC HEART FAILURE (HCC): Primary | ICD-10-CM

## 2024-04-24 DIAGNOSIS — I50.22 CHRONIC HFREF (HEART FAILURE WITH REDUCED EJECTION FRACTION) (HCC): ICD-10-CM

## 2024-04-24 PROCEDURE — 3074F SYST BP LT 130 MM HG: CPT | Performed by: NURSE PRACTITIONER

## 2024-04-24 PROCEDURE — G8417 CALC BMI ABV UP PARAM F/U: HCPCS | Performed by: NURSE PRACTITIONER

## 2024-04-24 PROCEDURE — 99214 OFFICE O/P EST MOD 30 MIN: CPT | Performed by: NURSE PRACTITIONER

## 2024-04-24 PROCEDURE — 1036F TOBACCO NON-USER: CPT | Performed by: NURSE PRACTITIONER

## 2024-04-24 PROCEDURE — G8427 DOCREV CUR MEDS BY ELIG CLIN: HCPCS | Performed by: NURSE PRACTITIONER

## 2024-04-24 PROCEDURE — 3017F COLORECTAL CA SCREEN DOC REV: CPT | Performed by: NURSE PRACTITIONER

## 2024-04-24 PROCEDURE — 3079F DIAST BP 80-89 MM HG: CPT | Performed by: NURSE PRACTITIONER

## 2024-04-24 RX ORDER — FUROSEMIDE INJECTION 80 MG/ 10 ML 8 MG/ML
80 INJECTION SUBCUTANEOUS ONCE
Qty: 1 EACH | Refills: 0 | Status: SHIPPED | COMMUNITY
Start: 2024-04-24 | End: 2024-04-24

## 2024-04-24 RX ORDER — BUMETANIDE 0.25 MG/ML
2 INJECTION INTRAMUSCULAR; INTRAVENOUS ONCE
Status: SHIPPED | OUTPATIENT
Start: 2024-04-24

## 2024-04-24 RX ORDER — AMOXICILLIN AND CLAVULANATE POTASSIUM 500; 125 MG/1; MG/1
1 TABLET, FILM COATED ORAL 2 TIMES DAILY
COMMUNITY
Start: 2024-04-22 | End: 2024-05-14

## 2024-04-24 ASSESSMENT — ENCOUNTER SYMPTOMS
VOMITING: 0
ABDOMINAL DISTENTION: 1
STRIDOR: 0
SHORTNESS OF BREATH: 1
COUGH: 0
NAUSEA: 0

## 2024-04-24 ASSESSMENT — PATIENT HEALTH QUESTIONNAIRE - PHQ9
SUM OF ALL RESPONSES TO PHQ QUESTIONS 1-9: 1
2. FEELING DOWN, DEPRESSED OR HOPELESS: SEVERAL DAYS
1. LITTLE INTEREST OR PLEASURE IN DOING THINGS: NOT AT ALL
SUM OF ALL RESPONSES TO PHQ9 QUESTIONS 1 & 2: 1
SUM OF ALL RESPONSES TO PHQ QUESTIONS 1-9: 1

## 2024-04-24 NOTE — PATIENT INSTRUCTIONS
Medication changes:    Increase torsemide to 40mg twice a day starting tomorrow     Increase potassium to 40meq twice a day     CALL on Friday with updated weight logs and symptoms [phone] option 2.     Please take this to your pharmacy to notify them of the change in medications.     Testing Ordered:    Lab work has been completed in clinic. If results are normal you will just receive a message through my chart. Please make sure to have an active my chart account. Having issues logging in? Contact the [employer] [facility] at [phone].Our office will notify you of any abnormal results requiring changes to your current plan of care.       Other Recommendations:      Please record blood pressure and heart rate daily before medication and two hours after medication, please record weight daily upon waking/after using the bathroom. Keep a written records of your weights and blood pressure and bring to your next appointment. If you have a weight gain of 3 or more pounds overnight OR 5 or more pounds in one week, new/worsened shortness of breath or swelling, or if your blood pressure begins to consistently run below 90/60 and/or you begin to experience dizziness or lightheadedness please contact our office at [phone] option 2.    Ensure your drinking an adequate amount of water with a goal of 6-8 eight ounce glasses (1.5-2 liters) of fluid daily. Your urine should be clear and light yellow straw colored.     Follow up  2 weeks for titration  [facility]    Thank you for allowing us the privilege of being a part of your healthcare team! Please do not hesitate to contact our office at [phone] option 2 with any questions or concerns.     We are restarting a monthly heart failure support group, this will be the last Wednesday of every month from 5-6pm at [facility] If you would like to attend you will need to RSVP to [email or web address]

## 2024-04-24 NOTE — PROGRESS NOTES
Educated patient on furoscix device and administration. Applied furoscix per instructions and activated device in clinic.  Provided patietn with package insert instructions incase he may have questions home. Educated patient to keep weight logs and call on Friday to report logs and symptoms.   
personally reviewing new vitals, test results, notes from recent visits, face to face encounter/physical exam of patient with counseling, writing orders, performing medical decision making, and documenting.

## 2024-04-26 ENCOUNTER — TELEPHONE (OUTPATIENT)
Age: 59
End: 2024-04-26

## 2024-04-29 ENCOUNTER — TELEPHONE (OUTPATIENT)
Age: 59
End: 2024-04-29

## 2024-04-29 NOTE — TELEPHONE ENCOUNTER
Called patient using two patient identifiers. Patient reports weight as follows     4/27 223.6   4/28 226   4/29 222.8 lbs     Patient states that abdominal bloating is better but still notices swelling in legs. He states he is feeling a bit better today. Patient confirms that he took torsemide 40mg BID during weekend as prescribed. Patient notes that he is supposed to get furoscix tomorrow. Advised patient I will follow up with NP then call back with further instructions.       Ying Arauz, APRN - NP  You1 minute ago (1:36 PM)       Ok, I think he needs to stay on the 40mg BID, we can try the furosix as well     Called patient with no answer. Left  with [facility] call back number. Will await return call.     Spoke with patient using two patient identifiers. Advised patient on above information per HOLGER Arauz NP. Educated patient to continue weight logs and call back on Wednesday with updated logs. Patient states understanding of instructions and had no further questions.

## 2024-04-29 NOTE — TELEPHONE ENCOUNTER
Called patient to advise/confirm upcoming appt with Cory Kapoor on 4/30/2024  at 1:00  at [facility] Office.  Left a message

## 2024-04-30 ENCOUNTER — TELEMEDICINE (OUTPATIENT)
Age: 59
End: 2024-04-30
Payer: MEDICARE

## 2024-04-30 DIAGNOSIS — Z51.5 PALLIATIVE CARE ENCOUNTER: ICD-10-CM

## 2024-04-30 DIAGNOSIS — E83.59 CALCIPHYLAXIS OF LEFT LOWER EXTREMITY WITH NONHEALING ULCER WITH FAT LAYER EXPOSED (HCC): ICD-10-CM

## 2024-04-30 DIAGNOSIS — R53.81 DEBILITY: ICD-10-CM

## 2024-04-30 DIAGNOSIS — L97.922 CALCIPHYLAXIS OF LEFT LOWER EXTREMITY WITH NONHEALING ULCER WITH FAT LAYER EXPOSED (HCC): ICD-10-CM

## 2024-04-30 DIAGNOSIS — E83.59 CALCIPHYLAXIS OF RIGHT LOWER EXTREMITY WITH NONHEALING ULCER WITH FAT LAYER EXPOSED (HCC): ICD-10-CM

## 2024-04-30 DIAGNOSIS — R52 PAIN: ICD-10-CM

## 2024-04-30 DIAGNOSIS — L97.912 CALCIPHYLAXIS OF RIGHT LOWER EXTREMITY WITH NONHEALING ULCER WITH FAT LAYER EXPOSED (HCC): ICD-10-CM

## 2024-04-30 PROCEDURE — 1036F TOBACCO NON-USER: CPT

## 2024-04-30 PROCEDURE — 99214 OFFICE O/P EST MOD 30 MIN: CPT

## 2024-04-30 PROCEDURE — G8427 DOCREV CUR MEDS BY ELIG CLIN: HCPCS

## 2024-04-30 PROCEDURE — G8417 CALC BMI ABV UP PARAM F/U: HCPCS

## 2024-04-30 PROCEDURE — 3017F COLORECTAL CA SCREEN DOC REV: CPT

## 2024-04-30 RX ORDER — HYDROMORPHONE HYDROCHLORIDE 4 MG/1
2 TABLET ORAL EVERY 6 HOURS PRN
Qty: 60 TABLET | Refills: 0 | Status: SHIPPED | OUTPATIENT
Start: 2024-04-30 | End: 2024-05-30

## 2024-04-30 ASSESSMENT — PATIENT HEALTH QUESTIONNAIRE - PHQ9
SUM OF ALL RESPONSES TO PHQ QUESTIONS 1-9: 0
SUM OF ALL RESPONSES TO PHQ9 QUESTIONS 1 & 2: 0
2. FEELING DOWN, DEPRESSED OR HOPELESS: NOT AT ALL
SUM OF ALL RESPONSES TO PHQ QUESTIONS 1-9: 0
1. LITTLE INTEREST OR PLEASURE IN DOING THINGS: NOT AT ALL
SUM OF ALL RESPONSES TO PHQ QUESTIONS 1-9: 0
SUM OF ALL RESPONSES TO PHQ QUESTIONS 1-9: 0

## 2024-04-30 NOTE — PROGRESS NOTES
Palliative Medicine Outpatient Services  [place]: [phone] ([phone])    Patient Name: Edson Grover  YOB: 1965    Date of Current Visit: 04/30/24  Location of Current Visit:    [] [facility] Office  [] [facility] Office  [] [facility] Office  [] Home  [x]Synchronous real-time A/V virtual visit    Date of Initial Visit: Patient seen inpatient by palliative medicine  Referral from: Dania Thornton NP  Primary Care Physician: Agapito Mo MD    Edson Grover, was evaluated through a synchronous (real-time) audio-video encounter. The patient (or guardian if applicable) is aware that this is a billable service, which includes applicable co-pays. This Virtual Visit was conducted with patient's (and/or legal guardian's) consent. Patient identification was verified, and a caregiver was present when appropriate.   The patient was located at Home: [address] [place] [address]  Provider was located at Facility (Appt Dept): [address] [place] [address]  Confirm you are appropriately licensed, registered, or certified to deliver care in the [facility] where the patient is located as indicated above. If you are not or unsure, please re-schedule the visit: Yes, I confirm.      Total time spent for this encounter:  30 minutes    --LAURA Ramírez - CNP on 4/30/2024 at 3:24 PM    An electronic signature was used to authenticate this note.        SUMMARY:   Edson Grover is a 59 y.o. year old with a  history of DM 2, CKD stage IV NOT on dialysis, bilateral nonhealing lower extremity wounds secondary to calciphylaxis and venous stasis status post sodium thiosulfate infusions, hypertension, CAD, cardiomyopathy with a EF of 20% status post AICD, PAD, not a candidate for LVAD or heart transplant due to nonhealing wounds, dialysis, who was referred to Palliative Medicine by Dania Thornton palliative NP for management of pain.  The patients social history includes lives at home with his long-term

## 2024-04-30 NOTE — PATIENT INSTRUCTIONS
do not have bowel movements for more than 3 days.       The Palliative Medicine Team is here to support you and your family.       Sincerely,      LAURA Ramírez - CNP

## 2024-04-30 NOTE — PROGRESS NOTES
Palliative Medicine Outpatient Clinic  Nurse Check in Note  [phone])    Patient Name: Edson Grover  YOB: 1965      Date of Visit: 04/30/2024  Visit Location:  [facility] Virtual Visit     Chief patient or family concern today:     Patient's Last Palliative Medicine Clinic Visit Date:  2/13/2024    Have you been to an ER or urgent care center since your last visit?  No  Have you been hospitalized since your last visit? No  Have you seen or consulted any health care providers outside of the [facility] system since your last visit?  No  If Yes, alert PSR to request appropriate records from non-[facility] offices    Medications:  Med reconciliation was performed with:  Patient    Requested refills:  Yes- pended for clinician    If prescribed an opioid, does patient have access to naloxone at home?:  YES  If No, pend naloxone nasal spray    Function and Symptoms:  Use of assist devices:  Cane or Wheelchair as needed    Palliative Performance Status (PPS):   Palliative Performance Scale (PPS)  PPS: 70    ESAS:  Modified-[place] Symptom Assessment Scale (ESAS)  Tiredness Score: 3  Drowsiness Score: Not drowsy  Depression Score: Not depressed  Pain Score: 4  Anxiety Score: Not anxious  Nausea Score: Not nauseated  Appetite Score: 3  Dyspnea Score: No shortness of breath  Constipation: No  Other Problem Score: Best possible response    Constipation?  No  Last BM: 4/30/2024    Advance Care Planning:  Currently listed healthcare agent:    Primary Decision Maker: Soo Mcgregor - Domestic Partner - [phone]    Secondary Decision Maker: Guevara Grover - Brother/Sister - [phone]    Is there an ACP Note within the past 12 months?  YES  If No, Alert Clinician and/or Social Work      Cynthia Zuniga LPN

## 2024-05-01 ENCOUNTER — TELEPHONE (OUTPATIENT)
Age: 59
End: 2024-05-01

## 2024-05-01 NOTE — TELEPHONE ENCOUNTER
Spoke with patient using two patient identifiers. Patient reports that he is just getting out of bed today but did not receive furoscix until late last night so he will place device today and call back tomorrow with logs.

## 2024-05-02 ENCOUNTER — TELEPHONE (OUTPATIENT)
Age: 59
End: 2024-05-02

## 2024-05-02 NOTE — TELEPHONE ENCOUNTER
I spoke ([phone]) with pt and confirmed his appt for the following information: appt date, time, provider and type.     VLT - PSR (Float [place])

## 2024-05-07 ENCOUNTER — OFFICE VISIT (OUTPATIENT)
Age: 59
End: 2024-05-07
Payer: MEDICARE

## 2024-05-07 VITALS
RESPIRATION RATE: 17 BRPM | DIASTOLIC BLOOD PRESSURE: 74 MMHG | OXYGEN SATURATION: 99 % | HEIGHT: 68 IN | SYSTOLIC BLOOD PRESSURE: 100 MMHG | WEIGHT: 219 LBS | BODY MASS INDEX: 33.19 KG/M2 | HEART RATE: 80 BPM

## 2024-05-07 DIAGNOSIS — N18.32 CHRONIC KIDNEY DISEASE, STAGE 3B (HCC): ICD-10-CM

## 2024-05-07 DIAGNOSIS — I50.22 CHRONIC SYSTOLIC HEART FAILURE (HCC): Primary | ICD-10-CM

## 2024-05-07 PROCEDURE — 3078F DIAST BP <80 MM HG: CPT | Performed by: NURSE PRACTITIONER

## 2024-05-07 PROCEDURE — G8417 CALC BMI ABV UP PARAM F/U: HCPCS | Performed by: NURSE PRACTITIONER

## 2024-05-07 PROCEDURE — 1036F TOBACCO NON-USER: CPT | Performed by: NURSE PRACTITIONER

## 2024-05-07 PROCEDURE — 99213 OFFICE O/P EST LOW 20 MIN: CPT | Performed by: NURSE PRACTITIONER

## 2024-05-07 PROCEDURE — G8427 DOCREV CUR MEDS BY ELIG CLIN: HCPCS | Performed by: NURSE PRACTITIONER

## 2024-05-07 PROCEDURE — 3017F COLORECTAL CA SCREEN DOC REV: CPT | Performed by: NURSE PRACTITIONER

## 2024-05-07 PROCEDURE — 3074F SYST BP LT 130 MM HG: CPT | Performed by: NURSE PRACTITIONER

## 2024-05-07 ASSESSMENT — PATIENT HEALTH QUESTIONNAIRE - PHQ9
SUM OF ALL RESPONSES TO PHQ QUESTIONS 1-9: 0
SUM OF ALL RESPONSES TO PHQ QUESTIONS 1-9: 0
SUM OF ALL RESPONSES TO PHQ9 QUESTIONS 1 & 2: 0
1. LITTLE INTEREST OR PLEASURE IN DOING THINGS: NOT AT ALL
2. FEELING DOWN, DEPRESSED OR HOPELESS: NOT AT ALL
SUM OF ALL RESPONSES TO PHQ QUESTIONS 1-9: 0
SUM OF ALL RESPONSES TO PHQ QUESTIONS 1-9: 0

## 2024-05-07 ASSESSMENT — ENCOUNTER SYMPTOMS
SHORTNESS OF BREATH: 0
COUGH: 0
VOMITING: 0
STRIDOR: 0
ABDOMINAL DISTENTION: 1
NAUSEA: 0

## 2024-05-07 NOTE — PATIENT INSTRUCTIONS
Medication changes:    STOP torsemide     START bumex 2mg twice a day     Please take this to your pharmacy to notify them of the change in medications.     Testing Ordered:    Please call [phone] to schedule echo     Other Recommendations:      Please record blood pressure and heart rate daily before medication and two hours after medication, please record weight daily upon waking/after using the bathroom. Keep a written records of your weights and blood pressure and bring to your next appointment. If you have a weight gain of 3 or more pounds overnight OR 5 or more pounds in one week, new/worsened shortness of breath or swelling, or if your blood pressure begins to consistently run below 90/60 and/or you begin to experience dizziness or lightheadedness please contact our office at [phone] option 2.    Ensure your drinking an adequate amount of water with a goal of 6-8 eight ounce glasses (1.5-2 liters) of fluid daily. Your urine should be clear and light yellow straw colored.     Follow up 3 months with [facility] [place]    Thank you for allowing us the privilege of being a part of your healthcare team! Please do not hesitate to contact our office at [phone] option 2 with any questions or concerns.     We are restarting a monthly heart failure support group, this will be the last Wednesday of every month from 5-6pm at [facility]. If you would like to attend you will need to RSVP to [email or web address]

## 2024-05-07 NOTE — PROGRESS NOTES
AD[facility]ANCED [facility] FAILURE CENTER  [facility] in [place], [place]  Outpatient Clinic  Note      Patient name: Edson Grover  Patient : 1965  Patient MRN: [id number]  Date of service: 24    Chief Complaint   Patient presents with    Congestive Heart Failure    Shortness of Breath     On exertion    Edema     Around thighs, abdomen, around knees         Plan of Care:  Briefly Edson Grover is a 58 y.o. man with chronic systolic heart failure due to non-ischemic cardiomyopathy, LVEF 15-20% (by echo 3/2023), stage C. History of CKD stage 4 (Cr 3.78), off nephrotoxic GDMT to allow for renal recovery. GDMT limited by hypotension.  Has calciphylaxis with large, non healing leg wounds. 1 year mortality >50% in this setting.   Patient is not a candidate for organ transplantation due to non-healing wound ulcerations, high mortality risk. Patient is not a candidate for LVAD as alternative to transplant for same reasons.  Patient was admitted with fluid overload, approximately 40 lbs, and underwent inotrope-assisted diuresis and weaned off inotrope.   is net negative almost 70lbs and hypotensive after standing or walking; suspect overdiuresis.  Also note having severe pain with walking.  male with h/o non-ischemic cardiomyopathy, LVEF 15%, stage C, NYHA class IIIB on optimal GDMT    INTERVAL HISTORY:  24 Edson Grover presents for titration visit following escalation of diuretics. His weight has improved over the last 2 weeks, his abdominal distention is better and his lower legs have decreased in size. He does note persistent edema in [place] upper thighs. We discussed that last year the bumex seemed to work a little better for him and he would like to try switching to see if his edema improves more. He has an appt with Dr. Yan from nephrology on Thursday.       RECOMMENDATIONS:  Acute on chronic systolic heart failure: likely slightly dry   Beta Blocker: Continue Coreg 25mg BID  ACEi/ARB/ARNi:

## 2024-05-15 ENCOUNTER — TELEPHONE (OUTPATIENT)
Age: 59
End: 2024-05-15

## 2024-05-15 DIAGNOSIS — I50.22 CHRONIC SYSTOLIC HEART FAILURE (HCC): Primary | ICD-10-CM

## 2024-05-15 NOTE — TELEPHONE ENCOUNTER
Spoke with Mr. Grover as he had not read the [employer] message sent 1 day ago. Made him aware that LOLLY He reviewed his blood pressure log and report of symptoms and she wants him to continue on current medication regimen. He was also informed that she would like to get lab work in 1 week. He would like lab orders sent to : [facility], [place] [address].

## 2024-06-04 ENCOUNTER — OFFICE VISIT (OUTPATIENT)
Age: 59
End: 2024-06-04
Payer: MEDICARE

## 2024-06-04 VITALS
DIASTOLIC BLOOD PRESSURE: 80 MMHG | HEART RATE: 68 BPM | BODY MASS INDEX: 28.89 KG/M2 | OXYGEN SATURATION: 100 % | HEIGHT: 68 IN | WEIGHT: 190.6 LBS | SYSTOLIC BLOOD PRESSURE: 126 MMHG

## 2024-06-04 DIAGNOSIS — N18.32 CHRONIC KIDNEY DISEASE, STAGE 3B (HCC): ICD-10-CM

## 2024-06-04 DIAGNOSIS — I50.22 CHRONIC SYSTOLIC HEART FAILURE (HCC): Primary | ICD-10-CM

## 2024-06-04 DIAGNOSIS — I10 PRIMARY HYPERTENSION: ICD-10-CM

## 2024-06-04 DIAGNOSIS — I42.9 CARDIOMYOPATHY, UNSPECIFIED TYPE (HCC): ICD-10-CM

## 2024-06-04 DIAGNOSIS — I42.0 DILATED CARDIOMYOPATHY (HCC): ICD-10-CM

## 2024-06-04 PROCEDURE — 3079F DIAST BP 80-89 MM HG: CPT | Performed by: SPECIALIST

## 2024-06-04 PROCEDURE — 3074F SYST BP LT 130 MM HG: CPT | Performed by: SPECIALIST

## 2024-06-04 PROCEDURE — 99214 OFFICE O/P EST MOD 30 MIN: CPT | Performed by: SPECIALIST

## 2024-06-04 PROCEDURE — G8427 DOCREV CUR MEDS BY ELIG CLIN: HCPCS | Performed by: SPECIALIST

## 2024-06-04 PROCEDURE — G8417 CALC BMI ABV UP PARAM F/U: HCPCS | Performed by: SPECIALIST

## 2024-06-04 PROCEDURE — 3017F COLORECTAL CA SCREEN DOC REV: CPT | Performed by: SPECIALIST

## 2024-06-04 PROCEDURE — 1036F TOBACCO NON-USER: CPT | Performed by: SPECIALIST

## 2024-06-04 RX ORDER — BUMETANIDE 2 MG/1
2 TABLET ORAL 2 TIMES DAILY
COMMUNITY

## 2024-06-04 ASSESSMENT — PATIENT HEALTH QUESTIONNAIRE - PHQ9
SUM OF ALL RESPONSES TO PHQ QUESTIONS 1-9: 0
SUM OF ALL RESPONSES TO PHQ QUESTIONS 1-9: 0
1. LITTLE INTEREST OR PLEASURE IN DOING THINGS: NOT AT ALL
2. FEELING DOWN, DEPRESSED OR HOPELESS: NOT AT ALL
SUM OF ALL RESPONSES TO PHQ9 QUESTIONS 1 & 2: 0
SUM OF ALL RESPONSES TO PHQ QUESTIONS 1-9: 0
SUM OF ALL RESPONSES TO PHQ QUESTIONS 1-9: 0

## 2024-06-04 NOTE — PROGRESS NOTES
HISTORY OF PRESENT ILLNESS  Edson Grover is a 59 y.o. male     SUMMARY:   Patient Active Problem List   Diagnosis    Diabetes mellitus, type II, insulin dependent (HCC)    Hypertension    Cardiomyopathy (HCC)    Chronic systolic heart failure (HCC)    Acute kidney injury superimposed on chronic kidney disease (HCC)    ALEXIA (acute kidney injury) (HCC)    Pressure injury of left leg, unstageable (HCC)    Diabetic foot infection (HCC)    Venous stasis ulcer of left calf with fat layer exposed without varicose veins (HCC)    Calciphylaxis of left lower extremity with nonhealing ulcer with fat layer exposed (HCC)    Calciphylaxis of right lower extremity with nonhealing ulcer with fat layer exposed (HCC)    Calciphylaxis    Type 2 diabetes mellitus with stage 3b chronic kidney disease, with long-term current use of insulin (HCC)    Advanced care planning/counseling discussion    Chronic kidney disease, stage 3b (HCC)            CARDIOLOGY STUDIES TO DATE:  9/14 echo dilated lv with lvef 15%, mod lae, mild to mod mr, mild pul regurg     2/15 echo lvef 50% lae   2/15 echo lvef 65%, no sig valve abnormalities   2/17 lvh, otherwise normal echo   08/16/19 dilated lv with mod lvh and lvef 25%. Lae, mild mr  2/21 echo lvef 29% lvh, lae, tr mr  12/21 lvef 28%, mild lve, lvh, lae, pacer icd present     3/22 echo lve, lvef 31%    3/23 cardiac cath, normal coronaries and cardiac output, elevated lvedp  3/23 echo lve, lvef 15-20%, lae, mild mr    8/23 echo lvef 20%, lvh, lae, mild to mod tr with pa pressure 49mm    Chief Complaint   Patient presents with    Congestive Heart Failure    Hypertension    Cardiomyopathy       HPI :  He has had intermittent problems with fluid retention and now is keeping a much closer eye on his weight and his diuretics are being adjusted more frequently.  His kidney function has been relatively stable and his leg wounds are almost completely healed.  He is just walking some for exercise no real

## 2024-06-10 ENCOUNTER — ANCILLARY PROCEDURE (OUTPATIENT)
Age: 59
End: 2024-06-10
Payer: MEDICARE

## 2024-06-10 VITALS
WEIGHT: 190 LBS | BODY MASS INDEX: 28.79 KG/M2 | DIASTOLIC BLOOD PRESSURE: 80 MMHG | HEIGHT: 68 IN | SYSTOLIC BLOOD PRESSURE: 126 MMHG

## 2024-06-10 DIAGNOSIS — I50.22 CHRONIC SYSTOLIC HEART FAILURE (HCC): ICD-10-CM

## 2024-06-10 DIAGNOSIS — I42.0 DILATED CARDIOMYOPATHY (HCC): ICD-10-CM

## 2024-06-10 DIAGNOSIS — N18.32 CHRONIC KIDNEY DISEASE, STAGE 3B (HCC): ICD-10-CM

## 2024-06-10 DIAGNOSIS — I10 PRIMARY HYPERTENSION: ICD-10-CM

## 2024-06-10 DIAGNOSIS — I42.9 CARDIOMYOPATHY, UNSPECIFIED TYPE (HCC): ICD-10-CM

## 2024-06-10 PROCEDURE — 93306 TTE W/DOPPLER COMPLETE: CPT | Performed by: INTERNAL MEDICINE

## 2024-06-15 LAB
ECHO AO ASC DIAM: 3.4 CM
ECHO AO ASCENDING AORTA INDEX: 1.7 CM/M2
ECHO AO ROOT DIAM: 3.2 CM
ECHO AO ROOT INDEX: 1.6 CM/M2
ECHO AV MEAN GRADIENT: 6 MMHG
ECHO AV MEAN VELOCITY: 1.2 M/S
ECHO AV PEAK GRADIENT: 12 MMHG
ECHO AV PEAK VELOCITY: 1.7 M/S
ECHO AV VELOCITY RATIO: 0.59
ECHO AV VTI: 30.4 CM
ECHO BSA: 2.03 M2
ECHO EST RA PRESSURE: 15 MMHG
ECHO LA DIAMETER INDEX: 2.95 CM/M2
ECHO LA DIAMETER: 5.9 CM
ECHO LA TO AORTIC ROOT RATIO: 1.84
ECHO LA VOL A-L A2C: 116 ML (ref 18–58)
ECHO LA VOL A-L A4C: 148 ML (ref 18–58)
ECHO LA VOL BP: 129 ML (ref 18–58)
ECHO LA VOL MOD A2C: 110 ML (ref 18–58)
ECHO LA VOL MOD A4C: 140 ML (ref 18–58)
ECHO LA VOL/BSA BIPLANE: 65 ML/M2 (ref 16–34)
ECHO LA VOLUME AREA LENGTH: 137 ML
ECHO LA VOLUME INDEX A-L A2C: 58 ML/M2 (ref 16–34)
ECHO LA VOLUME INDEX A-L A4C: 74 ML/M2 (ref 16–34)
ECHO LA VOLUME INDEX AREA LENGTH: 69 ML/M2 (ref 16–34)
ECHO LA VOLUME INDEX MOD A2C: 55 ML/M2 (ref 16–34)
ECHO LA VOLUME INDEX MOD A4C: 70 ML/M2 (ref 16–34)
ECHO LV E' LATERAL VELOCITY: 8 CM/S
ECHO LV E' SEPTAL VELOCITY: 4 CM/S
ECHO LV EDV A2C: 156 ML
ECHO LV EDV A4C: 172 ML
ECHO LV EDV BP: 166 ML (ref 67–155)
ECHO LV EDV INDEX A4C: 86 ML/M2
ECHO LV EDV INDEX BP: 83 ML/M2
ECHO LV EDV NDEX A2C: 78 ML/M2
ECHO LV EJECTION FRACTION A2C: 46 %
ECHO LV EJECTION FRACTION A4C: 34 %
ECHO LV EJECTION FRACTION BIPLANE: 40 % (ref 55–100)
ECHO LV ESV A2C: 84 ML
ECHO LV ESV A4C: 114 ML
ECHO LV ESV BP: 99 ML (ref 22–58)
ECHO LV ESV INDEX A2C: 42 ML/M2
ECHO LV ESV INDEX A4C: 57 ML/M2
ECHO LV ESV INDEX BP: 50 ML/M2
ECHO LV FRACTIONAL SHORTENING: 13 % (ref 28–44)
ECHO LV INTERNAL DIMENSION DIASTOLE INDEX: 3.05 CM/M2
ECHO LV INTERNAL DIMENSION DIASTOLIC: 6.1 CM (ref 4.2–5.9)
ECHO LV INTERNAL DIMENSION SYSTOLIC INDEX: 2.65 CM/M2
ECHO LV INTERNAL DIMENSION SYSTOLIC: 5.3 CM
ECHO LV IVSD: 1.2 CM (ref 0.6–1)
ECHO LV MASS 2D: 438.7 G (ref 88–224)
ECHO LV MASS INDEX 2D: 219.4 G/M2 (ref 49–115)
ECHO LV POSTERIOR WALL DIASTOLIC: 1.8 CM (ref 0.6–1)
ECHO LV RELATIVE WALL THICKNESS RATIO: 0.59
ECHO LVOT AV VTI INDEX: 0.51
ECHO LVOT MEAN GRADIENT: 2 MMHG
ECHO LVOT PEAK GRADIENT: 4 MMHG
ECHO LVOT PEAK VELOCITY: 1 M/S
ECHO LVOT VTI: 15.6 CM
ECHO MV A VELOCITY: 0.41 M/S
ECHO MV AREA PHT: 4.7 CM2
ECHO MV E DECELERATION TIME (DT): 163 MS
ECHO MV E VELOCITY: 1.19 M/S
ECHO MV E/A RATIO: 2.9
ECHO MV E/E' LATERAL: 14.88
ECHO MV E/E' RATIO (AVERAGED): 22.31
ECHO MV E/E' SEPTAL: 29.75
ECHO MV PRESSURE HALF TIME (PHT): 47.3 MS
ECHO RA AREA 4C: 29.1 CM2
ECHO RA END SYSTOLIC VOLUME APICAL 4 CHAMBER INDEX BSA: 60 ML/M2
ECHO RA VOLUME AREA LENGTH APICAL 4 CHAMBER: 121 ML
ECHO RA VOLUME: 119 ML
ECHO RIGHT VENTRICULAR SYSTOLIC PRESSURE (RVSP): 65 MMHG
ECHO RV FREE WALL PEAK S': 12 CM/S
ECHO RV INTERNAL DIMENSION: 5.2 CM
ECHO RV TAPSE: 1.8 CM (ref 1.7–?)
ECHO TV REGURGITANT MAX VELOCITY: 3.52 M/S
ECHO TV REGURGITANT PEAK GRADIENT: 49 MMHG

## 2024-06-17 ENCOUNTER — TELEPHONE (OUTPATIENT)
Age: 59
End: 2024-06-17

## 2024-06-17 NOTE — TELEPHONE ENCOUNTER
----- Message from Daniel Hope III, MD sent at 6/16/2024  9:39 AM EDT -----  Heart muscle is still weak but maybe slightly better than before. Stay on meds

## 2024-06-17 NOTE — TELEPHONE ENCOUNTER
Called pt. Verified patient's identity with two identifiers. Notified pt of results. Patient verbalized understanding and denied further questions or concerns.

## 2024-06-21 ENCOUNTER — TELEPHONE (OUTPATIENT)
Age: 59
End: 2024-06-21

## 2024-06-21 NOTE — TELEPHONE ENCOUNTER
Called patient to advise/confirm upcoming appt with Dr. Whatley on 6/25/2024  at 11:00  at [facility] Office.  Left a message.

## 2024-06-24 DIAGNOSIS — E83.59 CALCIPHYLAXIS OF RIGHT LOWER EXTREMITY WITH NONHEALING ULCER WITH FAT LAYER EXPOSED (HCC): ICD-10-CM

## 2024-06-24 DIAGNOSIS — L97.912 CALCIPHYLAXIS OF RIGHT LOWER EXTREMITY WITH NONHEALING ULCER WITH FAT LAYER EXPOSED (HCC): ICD-10-CM

## 2024-06-24 DIAGNOSIS — E83.59 CALCIPHYLAXIS OF LEFT LOWER EXTREMITY WITH NONHEALING ULCER WITH FAT LAYER EXPOSED (HCC): ICD-10-CM

## 2024-06-24 DIAGNOSIS — R53.81 DEBILITY: ICD-10-CM

## 2024-06-24 DIAGNOSIS — L97.922 CALCIPHYLAXIS OF LEFT LOWER EXTREMITY WITH NONHEALING ULCER WITH FAT LAYER EXPOSED (HCC): ICD-10-CM

## 2024-06-24 DIAGNOSIS — M79.605 LEG PAIN, BILATERAL: ICD-10-CM

## 2024-06-24 DIAGNOSIS — Z51.5 PALLIATIVE CARE ENCOUNTER: ICD-10-CM

## 2024-06-24 DIAGNOSIS — R52 PAIN: ICD-10-CM

## 2024-06-24 DIAGNOSIS — M79.604 LEG PAIN, BILATERAL: ICD-10-CM

## 2024-06-24 RX ORDER — FENTANYL 12.5 UG/1
1 PATCH TRANSDERMAL
Qty: 10 PATCH | Refills: 0 | Status: SHIPPED | OUTPATIENT
Start: 2024-06-24 | End: 2024-07-24

## 2024-06-24 NOTE — TELEPHONE ENCOUNTER
Palliative Medicine Clinic   [place]: [phone] ([phone])    Patient Name: Edson Grover  YOB: 1965    Medication Refill Request Triage    Requested by:    [x]  Patient  []  Support person:      Last Pall Med Clinic Visit:  4/30/2024    Next Pall Med Clinic Visit: 6/27/2024     Preferred Pharmacy: [facility]  Backup Pharmacy:  *    Medications requested:  Fentanyl Patches     Is patient completely out?  [x]   YES  []   NO  If NO, how many pills does patient have left?  __    Other pertinent information shared:

## 2024-06-24 NOTE — TELEPHONE ENCOUNTER
Palliative Medicine Clinic   [place]: [phone] ([phone])    Patient Name: Edson Grover  YOB: 1965    Medication Refill Request    Patient is scheduled for follow up:  [x]  YES  []   NO  Next [facility] Med Clinic Visit:     PDMP reviewed:  [x] YES   []  System down / Unable  []  NO- Patient fills out of state    Medication:fentaNYL (DURAGESIC) 12 MCG/HR   Dose and directions:Place 1 patch onto the skin every 3 day   Number dispensed:10  Date filled (PDMP or Pharmacy):3/25/2024  # left:        Appropriate for refill:  [x]  YES  []  Early Request - Requires MD/NP Review      Other pertinent information for prescriber:

## 2024-06-28 RX ORDER — BUMETANIDE 2 MG/1
2 TABLET ORAL 2 TIMES DAILY
Qty: 30 TABLET | Refills: 2 | Status: SHIPPED | OUTPATIENT
Start: 2024-06-28

## 2024-06-28 RX ORDER — POTASSIUM CHLORIDE 1.5 G/1.58G
20 POWDER, FOR SOLUTION ORAL 2 TIMES DAILY
Qty: 30 EACH | Refills: 2 | Status: SHIPPED | OUTPATIENT
Start: 2024-06-28

## 2024-07-05 ENCOUNTER — TELEPHONE (OUTPATIENT)
Age: 59
End: 2024-07-05

## 2024-07-05 DIAGNOSIS — R53.81 DEBILITY: ICD-10-CM

## 2024-07-05 DIAGNOSIS — E83.59 CALCIPHYLAXIS OF LEFT LOWER EXTREMITY WITH NONHEALING ULCER WITH FAT LAYER EXPOSED (HCC): ICD-10-CM

## 2024-07-05 DIAGNOSIS — Z51.5 PALLIATIVE CARE ENCOUNTER: ICD-10-CM

## 2024-07-05 DIAGNOSIS — E83.59 CALCIPHYLAXIS OF RIGHT LOWER EXTREMITY WITH NONHEALING ULCER WITH FAT LAYER EXPOSED (HCC): ICD-10-CM

## 2024-07-05 DIAGNOSIS — L97.922 CALCIPHYLAXIS OF LEFT LOWER EXTREMITY WITH NONHEALING ULCER WITH FAT LAYER EXPOSED (HCC): ICD-10-CM

## 2024-07-05 DIAGNOSIS — L97.912 CALCIPHYLAXIS OF RIGHT LOWER EXTREMITY WITH NONHEALING ULCER WITH FAT LAYER EXPOSED (HCC): ICD-10-CM

## 2024-07-05 DIAGNOSIS — R52 PAIN: ICD-10-CM

## 2024-07-05 RX ORDER — HYDROMORPHONE HYDROCHLORIDE 4 MG/1
2 TABLET ORAL EVERY 6 HOURS PRN
Qty: 60 TABLET | Refills: 0 | Status: SHIPPED | OUTPATIENT
Start: 2024-07-05 | End: 2024-08-04

## 2024-07-05 NOTE — TELEPHONE ENCOUNTER
Palliative Medicine Clinic   [place]: [phone] ([phone])    Patient Name: Edson Grover  YOB: 1965    Medication Refill Request    Patient is scheduled for follow up:  [x]  YES  []   NO  Next [facility] Med Clinic Visit: 07/09/24    PDMP reviewed:  [x] YES   []  System down / Unable  []  NO- Patient fills out of state    Medication:    HYDROmorphone (DILAUDID) 4 MG tablet     Dose and directions:Take 0.5 tablets by mouth every 6 hours as needed for Pain for up to 30 days.   Number dispensed:60  Date filled (PDMP or Pharmacy):04/30/24  # left:unknown        Appropriate for refill:  [x]  YES  []  Early Request - Requires MD/NP Review      Other pertinent information for prescriber:

## 2024-07-05 NOTE — TELEPHONE ENCOUNTER
Called patient to advise/confirm upcoming appt with Dr. Whatley on 7/9/2024  at 2:00  at [facility] Office.  Spoke with patient and confirmed appointment.

## 2024-07-05 NOTE — TELEPHONE ENCOUNTER
Palliative Medicine Clinic   [place]: [phone] ([phone])    Patient Name: Edson Grover  YOB: 1965    Medication Refill Request Triage    Requested by:    [x]  Patient  []  Support person:      Last Pall Med Clinic Visit:  4/30/2024    Next Pall Med Clinic Visit: 7/9/2024     Preferred [facility]  Backup Pharmacy:  *    Medications requested:  Dilaudid    Is patient completely out?  []   YES  [x]   NO  If NO, how many pills does patient have left?  _only today_    Other pertinent information shared:

## 2024-07-09 ENCOUNTER — TELEMEDICINE (OUTPATIENT)
Age: 59
End: 2024-07-09

## 2024-07-09 DIAGNOSIS — R52 PAIN: ICD-10-CM

## 2024-07-09 DIAGNOSIS — I50.22 CHRONIC SYSTOLIC HEART FAILURE (HCC): ICD-10-CM

## 2024-07-09 DIAGNOSIS — E83.59 CALCIPHYLAXIS OF LEFT LOWER EXTREMITY WITH NONHEALING ULCER WITH FAT LAYER EXPOSED (HCC): Primary | ICD-10-CM

## 2024-07-09 DIAGNOSIS — M79.604 LEG PAIN, BILATERAL: ICD-10-CM

## 2024-07-09 DIAGNOSIS — L97.922 CALCIPHYLAXIS OF LEFT LOWER EXTREMITY WITH NONHEALING ULCER WITH FAT LAYER EXPOSED (HCC): Primary | ICD-10-CM

## 2024-07-09 DIAGNOSIS — E83.59 CALCIPHYLAXIS OF RIGHT LOWER EXTREMITY WITH NONHEALING ULCER WITH FAT LAYER EXPOSED (HCC): ICD-10-CM

## 2024-07-09 DIAGNOSIS — L97.912 CALCIPHYLAXIS OF RIGHT LOWER EXTREMITY WITH NONHEALING ULCER WITH FAT LAYER EXPOSED (HCC): ICD-10-CM

## 2024-07-09 DIAGNOSIS — M79.605 LEG PAIN, BILATERAL: ICD-10-CM

## 2024-07-09 DIAGNOSIS — N18.4 STAGE 4 CHRONIC KIDNEY DISEASE (HCC): ICD-10-CM

## 2024-07-09 RX ORDER — FENTANYL 12.5 UG/1
1 PATCH TRANSDERMAL
Qty: 10 PATCH | Refills: 0 | Status: CANCELLED | OUTPATIENT
Start: 2024-07-22 | End: 2024-08-21

## 2024-07-09 ASSESSMENT — PATIENT HEALTH QUESTIONNAIRE - PHQ9
1. LITTLE INTEREST OR PLEASURE IN DOING THINGS: NOT AT ALL
SUM OF ALL RESPONSES TO PHQ QUESTIONS 1-9: 0
2. FEELING DOWN, DEPRESSED OR HOPELESS: NOT AT ALL
SUM OF ALL RESPONSES TO PHQ QUESTIONS 1-9: 0
SUM OF ALL RESPONSES TO PHQ QUESTIONS 1-9: 0
SUM OF ALL RESPONSES TO PHQ9 QUESTIONS 1 & 2: 0
SUM OF ALL RESPONSES TO PHQ QUESTIONS 1-9: 0

## 2024-07-09 NOTE — PATIENT INSTRUCTIONS
Dear Edson Grover ,    It was a pleasure seeing you today at [facility].     If labs or imaging tests have been ordered for you today, please call the office at [phone] 48 hours after completion to obtain the results.      Follow up in 6 weeks virtually    This is the plan we talked about:    Calciphylaxis and venous stasis induced chronic wounds in bilateral legs  - only a small wound on the right leg. Left leg completely healed. Not using wheelchair anymore, using cane for stability.  -You continue to follow with wound clinic at [facility] weekly and get debridement (Tues and Fridays).  -You continue to see Dr. James for hydrotherapy every 3 weeks.  - You are still using Fentanyl 12 mcg patch but it is time we wean you off of it. Your next patch change is on 7/11. Remove the patch and do not place another one. Take Dilaudid 2 mg every 8 hours for 2 days post patch removal to prevent any mild withdrawal effects you might be having. After that, go back to using dilaudid only on days of treatment.  -Continue hydromorphone 2 mg on the days of hydrotherapy. Taking about 3 tabs per week  -Your nephrologist has determine that you do have calciphylaxis. you may go back on IV sodium thiosulfate infusions as needed.  -Opioid safety protocol reviewed in detail with you and your partner today    Congestive heart failure  -You reported shortness of breath with exertion, but note that it is related to fluid in your abdomen and thighs.  You do not need oxygen for this.  -You Torsemide dose recently increased from 40 mg daily to BID  - Lasix injection started weekly - second dose will be taken tomorrow (your weight is down from 226 lb to 222.4 lbs today).  -Follow closely with Dr. Bartlett and Ying Arauz NP  -I want to try and avoid increasing your opioids as this will negatively impact your congestive heart failure     Constipation  -Constipation is a common side effect of

## 2024-07-09 NOTE — PROGRESS NOTES
patient record in prescription monitoring program.    CHIEF COMPLAINT:   No chief complaint on file.      HPI/SUBJECTIVE:    The patient is: [x] Verbal / [] Nonverbal     7/9  Patient doing quite well overall.  Right leg still has a small wound but very deep, left leg wounds have completely healed.  Still getting hydrotherapy and wound debridement for the right leg twice a week.  Hydrotherapy and wound debridements are extremely painful.  Uses Dilaudid only around the procedure time.  Otherwise not taking Dilaudid on a regular basis.  Still on fentanyl patch which was started for him when all of this started several months ago.  We talked about the importance of coming off of chronic opioids and he is agreeable although worried about pain.  He will be getting skin graft in the future.  Talked about the role of palliative medicine and how plastic surgery can manage his pain in the future if it is related to the skin grafts.      ------  4/30/2024  Patient seen in his home virtually today - changed from in person due to CHIRINOS caused by increased LE and abdominal fluid build up.  He reported that he saw heart failure provider last Wednesday and Torsemide 40 mg was adjusted from daily to twice daily, along with weekly Lasix injections.  Weight down from 226 lbs last Wednesday to 222.4 lbs today.    He continues to follow with wound care (Dr. James) for hydrotherapy and debridement.  Open wound continues to be on right leg.  He takes Dilaudid 4 mg (0.5 mg) tab on treatment days, and continue with Fentanyl 12 mcg patch every 3 days.    -----------    2/13/24  Patient seen in person today and reported that he feels fairly well.  He is now ambulating with a cane, and only use his wheelchair when he goes for wound care because of the distance.  He reported that his legs are healing nicely (left more so than right).  I was not able to see wound today, as he had dressing done this afternoon and it is heavenly bandaged.  He

## 2024-08-13 ENCOUNTER — TELEPHONE (OUTPATIENT)
Age: 59
End: 2024-08-13

## 2024-08-13 NOTE — TELEPHONE ENCOUNTER
Called patient to advise/confirm upcoming appt with Dr. Whatley on 8/19/2024  at 11:30 for a vv.  Spoke with patient and confirmed appointment.

## 2024-08-15 ENCOUNTER — TELEPHONE (OUTPATIENT)
Age: 59
End: 2024-08-15

## 2024-08-15 NOTE — TELEPHONE ENCOUNTER
Telephone Call RE:  Appointment reminder     Outcome:     [] Patient confirmed appointment   [] Patient rescheduled appointment for    [] Unable to reach  [x] Left message              [] Other:       Informed pt of parking.

## 2024-08-19 ENCOUNTER — TELEPHONE (OUTPATIENT)
Age: 59
End: 2024-08-19

## 2024-08-19 ENCOUNTER — TELEMEDICINE (OUTPATIENT)
Age: 59
End: 2024-08-19
Payer: MEDICARE

## 2024-08-19 DIAGNOSIS — E83.59 CALCIPHYLAXIS OF RIGHT LOWER EXTREMITY WITH NONHEALING ULCER WITH FAT LAYER EXPOSED (HCC): ICD-10-CM

## 2024-08-19 DIAGNOSIS — L97.912 CALCIPHYLAXIS OF RIGHT LOWER EXTREMITY WITH NONHEALING ULCER WITH FAT LAYER EXPOSED (HCC): ICD-10-CM

## 2024-08-19 DIAGNOSIS — E83.59 CALCIPHYLAXIS OF LEFT LOWER EXTREMITY WITH NONHEALING ULCER WITH FAT LAYER EXPOSED (HCC): ICD-10-CM

## 2024-08-19 DIAGNOSIS — L97.922 CALCIPHYLAXIS OF LEFT LOWER EXTREMITY WITH NONHEALING ULCER WITH FAT LAYER EXPOSED (HCC): ICD-10-CM

## 2024-08-19 DIAGNOSIS — R52 PAIN: ICD-10-CM

## 2024-08-19 DIAGNOSIS — R53.81 DEBILITY: ICD-10-CM

## 2024-08-19 DIAGNOSIS — Z51.5 PALLIATIVE CARE ENCOUNTER: ICD-10-CM

## 2024-08-19 PROCEDURE — 99215 OFFICE O/P EST HI 40 MIN: CPT | Performed by: INTERNAL MEDICINE

## 2024-08-19 PROCEDURE — G8417 CALC BMI ABV UP PARAM F/U: HCPCS | Performed by: INTERNAL MEDICINE

## 2024-08-19 PROCEDURE — 1036F TOBACCO NON-USER: CPT | Performed by: INTERNAL MEDICINE

## 2024-08-19 PROCEDURE — G8428 CUR MEDS NOT DOCUMENT: HCPCS | Performed by: INTERNAL MEDICINE

## 2024-08-19 PROCEDURE — 3017F COLORECTAL CA SCREEN DOC REV: CPT | Performed by: INTERNAL MEDICINE

## 2024-08-19 RX ORDER — HYDROMORPHONE HYDROCHLORIDE 2 MG/1
2 TABLET ORAL EVERY 12 HOURS
Qty: 60 TABLET | Refills: 0 | Status: SHIPPED | OUTPATIENT
Start: 2024-08-19 | End: 2024-09-18

## 2024-08-19 ASSESSMENT — PATIENT HEALTH QUESTIONNAIRE - PHQ9
SUM OF ALL RESPONSES TO PHQ QUESTIONS 1-9: 0
2. FEELING DOWN, DEPRESSED OR HOPELESS: NOT AT ALL
SUM OF ALL RESPONSES TO PHQ QUESTIONS 1-9: 0
SUM OF ALL RESPONSES TO PHQ9 QUESTIONS 1 & 2: 0
SUM OF ALL RESPONSES TO PHQ QUESTIONS 1-9: 0
1. LITTLE INTEREST OR PLEASURE IN DOING THINGS: NOT AT ALL
SUM OF ALL RESPONSES TO PHQ QUESTIONS 1-9: 0

## 2024-08-19 NOTE — PATIENT INSTRUCTIONS
Dear Edson Grover ,    It was a pleasure seeing you today   If labs or imaging tests have been ordered for you today, please call the office at [phone] 48 hours after completion to obtain the results.      Follow up in 6 weeks virtually    This is the plan we talked about:    Calciphylaxis and venous stasis induced chronic wounds in bilateral legs  - only a small wound on the right leg. Left leg completely healed. Not using wheelchair anymore, using cane for stability.  -You continue to follow with wound clinic at [facility] weekly and get debridement (Tues and Fridays).  -You continue to see Dr. James for hydrotherapy every 3 weeks.  - Off fentanyl patch, completed hydrotherapy, seeing Dr. James twice weekly.  - She started you on Gabapentin 300 mg two times daily.  - Your dilaudid usage has increased, you are now taking 2-3 tabs per day. This needs to be decreased.   - Decrease dilaudid 2 mg two times a day for 2 weeks, once a day for 2 weeks and then half tab of 1 mg for 1 week and then stop. I have given you a refill for 60 tabs of 2 mg dilaudid which should suffice for this wean. Further nerve pain and skin graft pain to be managed by Dr. James. We will get in touch with her office.  -Your nephrologist has determine that you do have calciphylaxis. you may go back on IV sodium thiosulfate infusions as needed.  -Opioid safety protocol reviewed in detail with you and your partner today    Congestive heart failure  -You reported shortness of breath with exertion, but note that it is related to fluid in your abdomen and thighs.  You do not need oxygen for this.  -You Torsemide dose recently increased from 40 mg daily to BID  - Lasix injection started weekly - second dose will be taken tomorrow (your weight is down from 226 lb to 222.4 lbs today).  -Follow closely with Dr. Bartlett and Ying Arauz NP  -I want to try and avoid increasing your opioids as this will negatively impact your

## 2024-08-19 NOTE — TELEPHONE ENCOUNTER
Palliative Medicine Clinic   [place]: [phone] ([phone])    Patient Name: Edson Grover  YOB: 1965    Medication Refill Request Triage    Requested by:    [x]  Patient  []  Support person:      Last Pall Med Clinic Visit:  Visit date not found    Next Pall Med Clinic Visit: 08/19/2024     Preferred Pharmacy: [facility]  Back Pharmacy:  *    Medications requested:  *Hydromorphone  Is patient completely out?  []   YES  [x]   NO  If NO, how many pills does patient have left?  unknown    Other pertinent information shared:

## 2024-08-19 NOTE — PROGRESS NOTES
Palliative Medicine Outpatient Clinic  Nurse Check in Note  [phone])    Patient Name: Edson Grover  YOB: 1965      Date of Visit: 08/19/2024  Visit Location:  [facility] Virtual Visit     Chief patient or family concern today:     Patient's Last Palliative Medicine Clinic Visit Date:  7/9/2024    Have you been to an ER or urgent care center since your last visit?  No  Have you been hospitalized since your last visit? No  Have you seen or consulted any health care providers outside of the [facility] system since your last visit?  No  If Yes, alert PSR to request appropriate records from non-[facility] offices    Medications:  Med reconciliation was performed with:  Patient    Requested refills:  Yes- pended for clinician    If prescribed an opioid, does patient have access to naloxone at home?:  NO  If No, pend naloxone nasal spray    Function and Symptoms:  Use of assist devices:  None    Palliative Performance Status (PPS):   Palliative Performance Scale (PPS)  PPS: 70    ESAS:  Modified-[place] Symptom Assessment Scale (ESAS)  Tiredness Score: 6  Drowsiness Score: 6  Depression Score: Not depressed  Pain Score: 8  Anxiety Score: Not anxious  Nausea Score: Not nauseated  Appetite Score: Best appetite  Dyspnea Score: No shortness of breath  Constipation: No  Wellbeing Score: 7  Other Problem Score: Best possible response    Constipation?  No  Last BM: 8/18/2024    Advance Care Planning:  Currently listed healthcare agent:    Primary Decision Maker: Soo Mcgregor - Domestic Partner - [phone]    Secondary Decision Maker: Guevara Grover - Brother/Sister - [phone]    Is there an ACP Note within the past 12 months?  YES  If No, Alert Clinician and/or Social Work      Cynthia Zuniga LPN

## 2024-08-19 NOTE — PROGRESS NOTES
Palliative Medicine Outpatient Services  [employer]: [phone] ([phone])    Patient Name: Edson Grover  YOB: 1965    Date of Current Visit: 08/20/24  Location of Current Visit:    [] [facility] Office  [] [facility] Office  [] [facility] Office  [] Home  [x]Synchronous real-time A/V virtual visit    Date of Initial Visit: Patient seen inpatient by palliative medicine  Referral from: Dania Thornton NP  Primary Care Physician: Agapito Mo MD        SUMMARY:   Edson Grover is a 59 y.o. year old with a  history of DM 2, CKD stage IV NOT on dialysis, bilateral nonhealing lower extremity wounds secondary to calciphylaxis and venous stasis status post sodium thiosulfate infusions, hypertension, CAD, cardiomyopathy with a EF of 20% status post AICD, PAD, not a candidate for LVAD or heart transplant due to nonhealing wounds, dialysis, who was referred to Palliative Medicine by Dania Thornton palliative NP for management of pain.  The patients social history includes lives at home with his long-term partner, they have 2 children together and his partner Soo has 3 children from a previous relationship that they raised together.  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      Wound care-Dr. Lucero Johnson  Nephrology-Dr. Yan  Cardiology-Dr. Bartlett and Ying Arauz NP           PLAN:     Patient Instructions   Dear Edson Grover ,    It was a pleasure seeing you today   If labs or imaging tests have been ordered for you today, please call the office at [phone] 48 hours after completion to obtain the results.      Follow up in 6 weeks virtually    This is the plan we talked about:    Calciphylaxis and venous stasis induced chronic wounds in bilateral legs  - only a small wound on the right leg. Left leg completely healed. Not using wheelchair anymore, using cane for stability.  -You continue to follow with wound clinic at [place]

## 2024-08-20 ENCOUNTER — TELEPHONE (OUTPATIENT)
Age: 59
End: 2024-08-20

## 2024-08-20 NOTE — TELEPHONE ENCOUNTER
Pt called to reschedule this afternoon's appt b/c his granddaughter was just born. He has been rescheduled for Thurs, 8/22/24 at 9:00 a.m. with Rosa Isela.

## 2024-08-28 ENCOUNTER — OFFICE VISIT (OUTPATIENT)
Age: 59
End: 2024-08-28
Payer: MEDICARE

## 2024-08-28 VITALS
TEMPERATURE: 97.9 F | OXYGEN SATURATION: 99 % | HEIGHT: 68 IN | BODY MASS INDEX: 27.1 KG/M2 | SYSTOLIC BLOOD PRESSURE: 112 MMHG | HEART RATE: 65 BPM | RESPIRATION RATE: 18 BRPM | WEIGHT: 178.8 LBS | DIASTOLIC BLOOD PRESSURE: 78 MMHG

## 2024-08-28 DIAGNOSIS — I50.22 CHRONIC SYSTOLIC HEART FAILURE (HCC): Primary | ICD-10-CM

## 2024-08-28 PROCEDURE — G8427 DOCREV CUR MEDS BY ELIG CLIN: HCPCS | Performed by: NURSE PRACTITIONER

## 2024-08-28 PROCEDURE — 99214 OFFICE O/P EST MOD 30 MIN: CPT | Performed by: NURSE PRACTITIONER

## 2024-08-28 PROCEDURE — 3074F SYST BP LT 130 MM HG: CPT | Performed by: NURSE PRACTITIONER

## 2024-08-28 PROCEDURE — 3017F COLORECTAL CA SCREEN DOC REV: CPT | Performed by: NURSE PRACTITIONER

## 2024-08-28 PROCEDURE — 3078F DIAST BP <80 MM HG: CPT | Performed by: NURSE PRACTITIONER

## 2024-08-28 PROCEDURE — 1036F TOBACCO NON-USER: CPT | Performed by: NURSE PRACTITIONER

## 2024-08-28 PROCEDURE — G8417 CALC BMI ABV UP PARAM F/U: HCPCS | Performed by: NURSE PRACTITIONER

## 2024-08-28 RX ORDER — HYDRALAZINE HYDROCHLORIDE 10 MG/1
10 TABLET, FILM COATED ORAL 3 TIMES DAILY
Qty: 90 TABLET | Refills: 3 | Status: SHIPPED | OUTPATIENT
Start: 2024-08-28 | End: 2025-08-28

## 2024-08-28 RX ORDER — BUMETANIDE 2 MG/1
2 TABLET ORAL 2 TIMES DAILY
Qty: 60 TABLET | Refills: 2 | Status: SHIPPED | OUTPATIENT
Start: 2024-08-28

## 2024-08-28 RX ORDER — GABAPENTIN 300 MG/1
300 CAPSULE ORAL 2 TIMES DAILY
COMMUNITY
Start: 2024-08-09

## 2024-08-28 RX ORDER — ALLOPURINOL 100 MG/1
200 TABLET ORAL DAILY
Qty: 180 TABLET | Refills: 1
Start: 2024-08-28

## 2024-08-28 ASSESSMENT — PATIENT HEALTH QUESTIONNAIRE - PHQ9
2. FEELING DOWN, DEPRESSED OR HOPELESS: NOT AT ALL
SUM OF ALL RESPONSES TO PHQ QUESTIONS 1-9: 0
1. LITTLE INTEREST OR PLEASURE IN DOING THINGS: NOT AT ALL
SUM OF ALL RESPONSES TO PHQ9 QUESTIONS 1 & 2: 0

## 2024-08-28 ASSESSMENT — ENCOUNTER SYMPTOMS
EYE PAIN: 0
SHORTNESS OF BREATH: 0
SORE THROAT: 0
ABDOMINAL PAIN: 0
COUGH: 0
CHEST TIGHTNESS: 0
ABDOMINAL DISTENTION: 0
BLOOD IN STOOL: 0

## 2024-08-28 NOTE — PROGRESS NOTES
ADVANCED HEART FAILURE CENTER  [facility] in [place], [place]  Outpatient Clinic Note      Patient name: Edson Grover  Patient : 1965  Patient MRN: [id number]  Date of service: 24    Chief Complaint   Patient presents with    Congestive Heart Failure    Edema     Legs, bilateral         Plan of Care:  Edson Grover is a 59 y.o. man with chronic systolic heart failure due to non-ischemic cardiomyopathy, LVEF 15-20% (by echo 3/2023), stage C.  GDMT limited by hypotension.  EF 25-30% ()   Has calciphylaxis with large, non healing leg wounds. 1 year mortality >50% in this setting.   Patient is not a candidate for organ transplantation due to non-healing wound ulcerations, high mortality risk. Patient is not a candidate for LVAD as alternative to transplant for same reasons as well as CKD.      INTERVAL HISTORY:  -VSS  -labs :  K 4.2; creat 2.99; pBNP 82706; t bili 1.6;   -weight down 31lbs!  -ECHO 24: EF 25-30%; LV mod dilated; mod posterior thickening; grade II dd; severe LAE; RV mod dilated;mod KARLA  -Edson Grover is feeling pretty well.  His breathing is doing very well.  No SOB or CHIRINOS or orthopnea.  No chest pain, palpitations, dizziness.  He does have some swelling and pain in his legs at his wounds, but he states his wounds are slowly healing.  He has close follow up for that.          RECOMMENDATIONS:  Acute on chronic systolic heart failure (EF 25-30%):   Beta Blocker: Continue Coreg 25mg BID  ACEi/ARB/ARNi: Unable to tolerate due to renal dysfunction, and hypotension  Hydralazine/Nitrate: start 10mg hydralazine TID.   If BP tolerates, will then add 10mg isordil TID  SGLT2i: Unable to tolerate due to renal dysfunction.  MRA: Unable to tolerate due to renal dysfunction.   Diuretic:continue Bumex 2mg BID  Cont allopurinol 200mg daily  Continue Vit D supplement   TTE annually, next   Labs next week with nephrology   Daily weights  2 g sodium limit  6 x 8 oz fluid  present.  RV: Not well visualized. Pacer/ICD present.     Echo (2/17/21)  LV: Calculated LVEF is 29%. Biplane method used to measure ejection fraction. Mildly dilated left ventricle. Mild to moderate hypertrophy. Severely and globally reduced systolic function. Wall motion: normal. Abnormal left ventricular strain. Global longitudinal strain is -8%. Moderate (grade 2) left ventricular diastolic dysfunction.  LA: Mildly dilated left atrium. Left Atrium volume index is 40 mL/m2.  MV: Moderate mitral annular calcification. Very mild mitral valve regurgitation is present.  Echo (8/16/19)  Left Ventricle: Mildly dilated left ventricle. Moderate concentric hypertrophy. Severe global systolic dysfunction. Estimated left ventricular ejection fraction is 21 - 25%. Biplane method used to measure ejection fraction. Left ventricular global hypokinesis. Abnormal left ventricular strain. Inconclusive left ventricular diastolic function.  Left Atrium: Mildly dilated left atrium.  Mitral Valve: Moderate mitral annular calcification. Mild mitral valve regurgitation.     EKG (3/2/23): SR with 1st degree AVB  EKG (8/5/19) NSR 73bpm QRS 124ms        LHC (2/105) normal cors  3/7/23- non obstructive CAD      HEMODYNAMICS:  RHC 3/7/23 CI 2.35, PCWP 34, RA 20   RHC 8/15/23 (off inotrope): RA 9, PA 59/28 (40), PCW 36, PA saturation 57.5%, care home 2.44, TDCI 2.61, PVR 0.75    6MW 3/9/23-  435 feet or 132 meters      LIFE GOALS:  Lifestyle goals discussed with the patient.     Review of Symptoms:  Review of Systems   Constitutional:  Negative for activity change, appetite change, chills, fatigue, fever and unexpected weight change.   HENT:  Negative for congestion, nosebleeds and sore throat.    Eyes:  Negative for pain and visual disturbance.   Respiratory:  Negative for cough, chest tightness and shortness of breath.    Cardiovascular:  Positive for leg swelling. Negative for chest pain and palpitations.   Gastrointestinal:  Negative for

## 2024-08-28 NOTE — PATIENT INSTRUCTIONS
Medication changes:    Start hydralazine 10mg three times a day       Contact [facility] in 1 week (by [employer] -preferable- or telephone) with your daily weights, blood pressures and any symptoms. If you leave a voicemail, please report this information in detail so we can follow up appropriately and efficiently. Be reassured, we will call you back. Phone number is [phone] option 2    Please take this to your pharmacy to notify them of the change in medications.         Testing Ordered:    -please take lab slips to give to nephrology next week. we will request the records of your labs from nephrology next week.       Any labs drawn in clinic will usually result the next day.  For normal lab values, we will reach out to you via [employer]   If any concerns or changes needed, a nurse will call you to go over your results.    [facility] labs will usually take longer to result, but you will again be notified either via [employer] or a phone call.          Referrals:      Other Recommendations:      - Record blood pressure and heart rate daily before medication and two hours after medication  - Record weight daily upon waking/after using the bathroom.   - Keep a written records of your weights and blood pressure and bring to your next appointment.   - Call the clinic at if you have a weight gain of 3 or more pounds overnight OR 5 or more pounds in one week, new/worsened shortness of breath or swelling, or if your blood pressure begins to consistently run below 90/60 and/or you begin to experience dizziness or lightheadedness. Our office phone number [phone] option 2.  - Ensure you are drinking an adequate amount of water with a goal of 6-8 eight ounce glasses (1.5-2 liters) of fluid daily. Your urine should be clear and light yellow straw colored.       Follow up 10/1 at 10:30am with patricia AMBROCIO  with [facility]    Thank you for allowing us the privilege of being a part of your healthcare team! Please do not

## 2024-09-05 LAB
ALBUMIN SERPL-MCNC: 3.7 G/DL (ref 3.8–4.9)
ALP SERPL-CCNC: 223 IU/L (ref 44–121)
ALT SERPL-CCNC: 7 IU/L (ref 0–44)
AST SERPL-CCNC: 19 IU/L (ref 0–40)
BILIRUB SERPL-MCNC: 1.1 MG/DL (ref 0–1.2)
BUN SERPL-MCNC: 64 MG/DL (ref 6–24)
BUN/CREAT SERPL: 18 (ref 9–20)
CALCIUM SERPL-MCNC: 8.9 MG/DL (ref 8.7–10.2)
CHLORIDE SERPL-SCNC: 101 MMOL/L (ref 96–106)
CO2 SERPL-SCNC: 22 MMOL/L (ref 20–29)
CREAT SERPL-MCNC: 3.65 MG/DL (ref 0.76–1.27)
EGFRCR SERPLBLD CKD-EPI 2021: 19 ML/MIN/1.73
GLOBULIN SER CALC-MCNC: 2.9 G/DL (ref 1.5–4.5)
GLUCOSE SERPL-MCNC: 49 MG/DL (ref 70–99)
MAGNESIUM SERPL-MCNC: 2.5 MG/DL (ref 1.6–2.3)
NT-PROBNP SERPL-MCNC: ABNORMAL PG/ML (ref 0–210)
POTASSIUM SERPL-SCNC: 4.6 MMOL/L (ref 3.5–5.2)
PROT SERPL-MCNC: 6.6 G/DL (ref 6–8.5)
SODIUM SERPL-SCNC: 139 MMOL/L (ref 134–144)

## 2024-09-26 ENCOUNTER — TELEPHONE (OUTPATIENT)
Age: 59
End: 2024-09-26

## 2024-09-26 DIAGNOSIS — E83.59 CALCIPHYLAXIS OF LEFT LOWER EXTREMITY WITH NONHEALING ULCER WITH FAT LAYER EXPOSED (HCC): ICD-10-CM

## 2024-09-26 DIAGNOSIS — E83.59 CALCIPHYLAXIS OF RIGHT LOWER EXTREMITY WITH NONHEALING ULCER WITH FAT LAYER EXPOSED (HCC): ICD-10-CM

## 2024-09-26 DIAGNOSIS — L97.912 CALCIPHYLAXIS OF RIGHT LOWER EXTREMITY WITH NONHEALING ULCER WITH FAT LAYER EXPOSED (HCC): ICD-10-CM

## 2024-09-26 DIAGNOSIS — R52 PAIN: ICD-10-CM

## 2024-09-26 DIAGNOSIS — R53.81 DEBILITY: ICD-10-CM

## 2024-09-26 DIAGNOSIS — Z51.5 PALLIATIVE CARE ENCOUNTER: ICD-10-CM

## 2024-09-26 DIAGNOSIS — L97.922 CALCIPHYLAXIS OF LEFT LOWER EXTREMITY WITH NONHEALING ULCER WITH FAT LAYER EXPOSED (HCC): ICD-10-CM

## 2024-09-26 RX ORDER — HYDROMORPHONE HYDROCHLORIDE 2 MG/1
2 TABLET ORAL EVERY 12 HOURS
Qty: 60 TABLET | Refills: 0 | Status: SHIPPED | OUTPATIENT
Start: 2024-09-26 | End: 2024-10-26

## 2024-09-27 ENCOUNTER — TELEPHONE (OUTPATIENT)
Age: 59
End: 2024-09-27

## 2024-10-01 ENCOUNTER — OFFICE VISIT (OUTPATIENT)
Age: 59
End: 2024-10-01
Payer: MEDICARE

## 2024-10-01 VITALS
BODY MASS INDEX: 23.76 KG/M2 | HEART RATE: 70 BPM | HEIGHT: 69 IN | DIASTOLIC BLOOD PRESSURE: 82 MMHG | OXYGEN SATURATION: 99 % | TEMPERATURE: 97.6 F | WEIGHT: 160.4 LBS | RESPIRATION RATE: 16 BRPM | SYSTOLIC BLOOD PRESSURE: 110 MMHG

## 2024-10-01 DIAGNOSIS — I42.9 CARDIOMYOPATHY, UNSPECIFIED TYPE (HCC): Primary | ICD-10-CM

## 2024-10-01 DIAGNOSIS — I50.22 CHRONIC SYSTOLIC HEART FAILURE (HCC): ICD-10-CM

## 2024-10-01 PROCEDURE — 3074F SYST BP LT 130 MM HG: CPT

## 2024-10-01 PROCEDURE — 3079F DIAST BP 80-89 MM HG: CPT

## 2024-10-01 PROCEDURE — 99215 OFFICE O/P EST HI 40 MIN: CPT

## 2024-10-01 RX ORDER — HYDRALAZINE HYDROCHLORIDE 10 MG/1
10 TABLET, FILM COATED ORAL 2 TIMES DAILY
Qty: 90 TABLET | Refills: 3 | Status: SHIPPED | OUTPATIENT
Start: 2024-10-01 | End: 2025-10-01

## 2024-10-01 ASSESSMENT — ENCOUNTER SYMPTOMS
SHORTNESS OF BREATH: 0
CHEST TIGHTNESS: 0
ABDOMINAL PAIN: 0
ABDOMINAL DISTENTION: 0
SORE THROAT: 0
EYE PAIN: 0
BLOOD IN STOOL: 0
COUGH: 0

## 2024-10-01 ASSESSMENT — PATIENT HEALTH QUESTIONNAIRE - PHQ9
SUM OF ALL RESPONSES TO PHQ9 QUESTIONS 1 & 2: 0
SUM OF ALL RESPONSES TO PHQ QUESTIONS 1-9: 0
SUM OF ALL RESPONSES TO PHQ QUESTIONS 1-9: 0
2. FEELING DOWN, DEPRESSED OR HOPELESS: NOT AT ALL
1. LITTLE INTEREST OR PLEASURE IN DOING THINGS: NOT AT ALL
SUM OF ALL RESPONSES TO PHQ QUESTIONS 1-9: 0
SUM OF ALL RESPONSES TO PHQ QUESTIONS 1-9: 0

## 2024-10-01 NOTE — PATIENT INSTRUCTIONS
Medication changes:    DECREASE Hydralazine to 10mg twice daily. HOLD the hydralazine if systolic blood pressure (top number) 110 or less. Please notify the office if your blood pressure is running low.     Farxiga patient assistance was completed today. We will notify you if your application has been approved.     Please take this to your pharmacy to notify them of the change in medications.       Testing Ordered:    An order for an ECHOCARDIOGRAM has been placed to be done January 2025. You will be receiving an automated call from Coordination of Care to schedule this test. If you are unavailable to receive the call or would like to contact coordination of care yourself you may contact [phone] to schedule. You will need to contact coordination of care yourself if you miss their calls as they will only make 3 attempts to reach you.       Referrals:      Other Recommendations:      - Record blood pressure and heart rate daily before medication and two hours after medication  - Record weight daily upon waking/after using the bathroom.   - Keep a written records of your weights and blood pressure and bring to your next appointment.   - Call the clinic at if you have a weight gain of 3 or more pounds overnight OR 5 or more pounds in one week, new/worsened shortness of breath or swelling, or if your blood pressure begins to consistently run below 90/60 and/or you begin to experience dizziness or lightheadedness. Our office phone number [phone] option 2.  - Ensure you are drinking an adequate amount of water with a goal of 6-8 eight ounce glasses (1.5-2 liters) of fluid daily. Your urine should be clear and light yellow straw colored.       Follow up 3 months with [facility]    Our monthly Heart Failure Support Group is held on the last Wednesday of every month from 5-6pm at [facility]. If you would like to attend, please RSVP to [email or web address]    Thank you for allowing us the

## 2024-10-01 NOTE — PROGRESS NOTES
ADVANCED HEART FAILURE CENTER  [facility] in [place], [place]  Outpatient Clinic Note      Patient name: Edson Grover  Patient : 1965  Patient MRN: [id number]  Date of service: 10/01/24    Chief Complaint   Patient presents with    Follow-up     Systolic Heart Failure     Plan of Care:  Edson Grover is a 59 y.o. man with chronic systolic heart failure due to non-ischemic cardiomyopathy, LVEF 15-20% (by echo 3/2023), stage C.  GDMT limited by hypotension.  EF 25-30% ()   Has calciphylaxis with large, non healing leg wounds. 1 year mortality >50% in this setting.   Patient is not a candidate for organ transplantation due to non-healing wound ulcerations, high mortality risk. Patient is not a candidate for LVAD as alternative to transplant for same reasons as well as CKD.    Current Visit:  Edson Grover is feeling pretty well.   No SOB or CHIRINOS or orthopnea.  No chest pain, palpitations, dizziness.  He does have some swelling and pain in his legs at his wounds, but he states his wounds are slowly healing. He sleeps with 2 pillows and sleeps well when he is not in pain. 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 He can not afford Jardiance or Farxiga and it is currently on hold per Nephrology.  We will look at the patient assistance programs.  He had stopped taking his hydralazine due to hypotension issues when he was ill. He will attempt to restart.      INTERVAL HISTORY:  -VSS  -Wt 178 to 164, had a  gi bug few weeks ago (dry wt 172)  -labs  Creatine 3.65 (fill follow up with Yuriy- get labs end October ), K 4.6, BNP 15 K flat, labs in 2-3 weeks with Nephrology  -Off Farxiga / Jardiance due to not able to afford, given  Farxiga patient assistance paperwork, however Nephrology has on hold for increasing creatine   -Being followed by Nephrology for CKD and Calciphylaxis  -Next labs 3-4 weeks with nephrology, stopped SGLT2 for increasing Creatine  -Restart

## 2024-10-24 DIAGNOSIS — R53.81 DEBILITY: ICD-10-CM

## 2024-10-24 DIAGNOSIS — R52 PAIN: ICD-10-CM

## 2024-10-24 DIAGNOSIS — E83.59 CALCIPHYLAXIS OF LEFT LOWER EXTREMITY WITH NONHEALING ULCER WITH FAT LAYER EXPOSED (HCC): ICD-10-CM

## 2024-10-24 DIAGNOSIS — Z51.5 PALLIATIVE CARE ENCOUNTER: ICD-10-CM

## 2024-10-24 DIAGNOSIS — L97.922 CALCIPHYLAXIS OF LEFT LOWER EXTREMITY WITH NONHEALING ULCER WITH FAT LAYER EXPOSED (HCC): ICD-10-CM

## 2024-10-24 DIAGNOSIS — E83.59 CALCIPHYLAXIS OF RIGHT LOWER EXTREMITY WITH NONHEALING ULCER WITH FAT LAYER EXPOSED (HCC): ICD-10-CM

## 2024-10-24 DIAGNOSIS — L97.912 CALCIPHYLAXIS OF RIGHT LOWER EXTREMITY WITH NONHEALING ULCER WITH FAT LAYER EXPOSED (HCC): ICD-10-CM

## 2024-10-24 RX ORDER — HYDROMORPHONE HYDROCHLORIDE 2 MG/1
2 TABLET ORAL EVERY 12 HOURS
Qty: 60 TABLET | Refills: 0 | Status: SHIPPED | OUTPATIENT
Start: 2024-10-24 | End: 2024-10-25 | Stop reason: CLARIF

## 2024-10-24 NOTE — TELEPHONE ENCOUNTER
Palliative Medicine Clinic   [place]: [phone] ([phone])    Patient Name: Edson Grover  YOB: 1965    Medication Refill Request    Patient is scheduled for follow up:  [x]  YES  []   NO  Next Pall Med Clinic Visit:     PDMP reviewed:  [x] YES   []  System down / Unable  []  NO- Patient fills out of state    Medication:HYDROmorphone (DILAUDID) 2 MG   Dose and directions:Take 1 tablet by mouth in the morning and 1 tablet in the evening   Number dispensed:60  Date filled (PDMP or Pharmacy):9/26/2024  # left:        Appropriate for refill:  [x]  YES  []  Early Request - Requires MD/NP Review      Other pertinent information for prescriber:

## 2024-10-24 NOTE — TELEPHONE ENCOUNTER
Palliative Medicine Clinic   [place]: [phone] ([phone])    Patient Name: Edson Grover  YOB: 1965    Medication Refill Request Triage    Requested by:    [x]  Patient  []  Support person:      Last Pall Med Clinic Visit:  Visit date not found    Next Pall Med Clinic Visit: 11/11/2024     Preferred Pharmacy: [facility]  Back Pharmacy:  *    Medications requested:  Hydromorphone    Is patient completely out?  []   YES  [x]   NO  If NO, how many pills does patient have left?  unclear    Other pertinent information shared:

## 2024-10-25 DIAGNOSIS — L97.922 CALCIPHYLAXIS OF LEFT LOWER EXTREMITY WITH NONHEALING ULCER WITH FAT LAYER EXPOSED (HCC): ICD-10-CM

## 2024-10-25 DIAGNOSIS — R52 PAIN: ICD-10-CM

## 2024-10-25 DIAGNOSIS — E83.59 CALCIPHYLAXIS OF LEFT LOWER EXTREMITY WITH NONHEALING ULCER WITH FAT LAYER EXPOSED (HCC): ICD-10-CM

## 2024-10-25 DIAGNOSIS — R53.81 DEBILITY: ICD-10-CM

## 2024-10-25 DIAGNOSIS — E83.59 CALCIPHYLAXIS OF RIGHT LOWER EXTREMITY WITH NONHEALING ULCER WITH FAT LAYER EXPOSED (HCC): ICD-10-CM

## 2024-10-25 DIAGNOSIS — Z51.5 PALLIATIVE CARE ENCOUNTER: ICD-10-CM

## 2024-10-25 DIAGNOSIS — L97.912 CALCIPHYLAXIS OF RIGHT LOWER EXTREMITY WITH NONHEALING ULCER WITH FAT LAYER EXPOSED (HCC): ICD-10-CM

## 2024-10-25 RX ORDER — HYDROMORPHONE HYDROCHLORIDE 2 MG/1
2 TABLET ORAL EVERY 12 HOURS
Qty: 60 TABLET | Refills: 0 | Status: SHIPPED | OUTPATIENT
Start: 2024-10-25 | End: 2024-11-24

## 2024-10-25 NOTE — TELEPHONE ENCOUNTER
Palliative Medicine Clinic   [place]: [phone] ([phone])    Patient Name: Edson Grover  YOB: 1965    Medication Refill Request    Patient is scheduled for follow up:  [x]  YES  []   NO  Next [facility] Med Clinic Visit: 11/11/2024    PDMP reviewed:  [x] YES   []  System down / Unable  []  NO- Patient fills out of state        Medication:HYDROmorphone (DILAUDID) 2 MG   Dose and directions:Take 1 tablet by mouth in the morning and 1 tablet in the evening.   Number dispensed:60  Date filled (PDMP or Pharmacy):9/26/2024  #left:    Appropriate for refill:  [x]  YES  []  Early Request - Requires MD/NP Review      Other pertinent information for prescriber:

## 2024-10-25 NOTE — TELEPHONE ENCOUNTER
Pharmacist called to clarify directions on a rx for Dilaudid 2 mg that was sent over. Please give her a call back [phone]

## 2024-11-06 ENCOUNTER — TELEPHONE (OUTPATIENT)
Age: 59
End: 2024-11-06

## 2024-11-06 NOTE — TELEPHONE ENCOUNTER
Called patient to advise/confirm upcoming appt with Dr. Whatley on 11/11/2024  at 9:00 for a vv.  Left a message

## 2024-11-11 ENCOUNTER — TELEMEDICINE (OUTPATIENT)
Age: 59
End: 2024-11-11
Payer: MEDICARE

## 2024-11-11 DIAGNOSIS — E83.59 CALCIPHYLAXIS OF RIGHT LOWER EXTREMITY WITH NONHEALING ULCER WITH FAT LAYER EXPOSED (HCC): Primary | ICD-10-CM

## 2024-11-11 DIAGNOSIS — I50.22 CHRONIC SYSTOLIC HEART FAILURE (HCC): ICD-10-CM

## 2024-11-11 DIAGNOSIS — L97.912 CALCIPHYLAXIS OF RIGHT LOWER EXTREMITY WITH NONHEALING ULCER WITH FAT LAYER EXPOSED (HCC): Primary | ICD-10-CM

## 2024-11-11 DIAGNOSIS — N18.4 STAGE 4 CHRONIC KIDNEY DISEASE (HCC): ICD-10-CM

## 2024-11-11 DIAGNOSIS — M79.2 NEUROPATHIC PAIN: ICD-10-CM

## 2024-11-11 PROCEDURE — G8428 CUR MEDS NOT DOCUMENT: HCPCS | Performed by: INTERNAL MEDICINE

## 2024-11-11 PROCEDURE — G8420 CALC BMI NORM PARAMETERS: HCPCS | Performed by: INTERNAL MEDICINE

## 2024-11-11 PROCEDURE — 99215 OFFICE O/P EST HI 40 MIN: CPT | Performed by: INTERNAL MEDICINE

## 2024-11-11 PROCEDURE — G8484 FLU IMMUNIZE NO ADMIN: HCPCS | Performed by: INTERNAL MEDICINE

## 2024-11-11 PROCEDURE — 1036F TOBACCO NON-USER: CPT | Performed by: INTERNAL MEDICINE

## 2024-11-11 PROCEDURE — 3017F COLORECTAL CA SCREEN DOC REV: CPT | Performed by: INTERNAL MEDICINE

## 2024-11-11 ASSESSMENT — PATIENT HEALTH QUESTIONNAIRE - PHQ9
1. LITTLE INTEREST OR PLEASURE IN DOING THINGS: NOT AT ALL
2. FEELING DOWN, DEPRESSED OR HOPELESS: NOT AT ALL
SUM OF ALL RESPONSES TO PHQ QUESTIONS 1-9: 0
SUM OF ALL RESPONSES TO PHQ9 QUESTIONS 1 & 2: 0

## 2024-11-11 NOTE — PROGRESS NOTES
Palliative Medicine Outpatient Clinic  Nurse Check in Note  [phone]    Patient Name: Edson Grover  YOB: 1965      Date of Visit: 11/11/2024  Visit Location:  [facility] Virtual Visit     Chief patient or family concern today:     Patient's Last Palliative Medicine Clinic Visit Date:  8/19/2024    Have you been to an ER or urgent care center since your last visit?  No  Have you been hospitalized since your last visit? No  Have you seen or consulted any health care providers outside of the [facility] system since your last visit?  No  If Yes, alert PSR to request appropriate records from non-[facility] offices    Medications:  Med reconciliation was performed with:  Patient    Requested refills:  None    If prescribed an opioid, does patient have access to naloxone at home?:  NO  If No, pend naloxone nasal spray    Function and Symptoms:  Use of assist devices:  None    Palliative Performance Status (PPS):        ESAS:       Constipation?  No  Last BM: 11/10/2024    Advance Care Planning:  Currently listed healthcare agent:    Primary Decision Maker: Soo Mcgregor - Domestic Partner - [phone]    Secondary Decision Maker: Guevara Grover - Brother/Sister - [phone]    Is there an ACP Note within the past 12 months?  NO  If No, Alert Clinician and/or Social Work      Cynthia Zuniga LPN

## 2024-11-11 NOTE — PATIENT INSTRUCTIONS
Dear Edson Grover ,    It was a pleasure seeing you today   If labs or imaging tests have been ordered for you today, please call the office at [phone] 48 hours after completion to obtain the results.        No further follow ups.  Patient graduated/ discharged from clinic      This is the plan we talked about:    Calciphylaxis and venous stasis induced chronic wounds in bilateral legs  - only a small wound on the right leg. Left leg completely healed. Not using wheelchair anymore, using cane for stability.  -You continue to follow with wound clinic at [facility] weekly and get debridement (Tues and Fridays).  -You continue to see Dr. James for hydrotherapy every 3 weeks.  - Off fentanyl patch, completed hydrotherapy, seeing Dr. James twice weekly.  - Dr. James started you on Gabapentin 300 mg two times daily.  - You are using Dilaudid 2-4 tabs on days on wound care only- some days on days on bandage change when the skin pulls off.  We have been discussing coming off of opioids for this pain, I came up with the weaning schedule during last visit which you did not follow because you forgot about it.  -You were given 60 tabs of Dilaudid on 10/25. You have about 30 left.  You agree that you do not need further refills, you will use the current 30 tablets for emergency only.  - From now on, take 1 or half tab of pain medicine when you usually 2 tabs. Take Tylenol 500 mg- 1000 mg to supplement pain control.  - You can also take Tylenol 1000 mg prior to bandage change.  - If you need opioids for emergency or bad pain days, please talk with Dr. James about low dose percocet 5/325 mg 1 tab for emergencies.     Further nerve pain and skin graft pain to be managed by Dr. James. We will get in touch with her office.  -Your nephrologist has determine that you do have calciphylaxis. you may go back on IV sodium thiosulfate infusions as needed.  -Opioid safety protocol reviewed in detail with

## 2024-11-11 NOTE — PROGRESS NOTES
Palliative Medicine Outpatient Services  [place]: [phone] ([phone])    Patient Name: Edson Grover  YOB: 1965    Date of Current Visit: 11/12/24  Location of Current Visit:    [] [facility] Office  [] [facility] Office  [] [facility] Office  [] Home  [x]Synchronous real-time A/V virtual visit    Date of Initial Visit: Patient seen inpatient by palliative medicine  Referral from: Dania Thornton NP  Primary Care Physician: Agapito Mo MD        SUMMARY:   Edson Grover is a 59 y.o. year old with a  history of DM 2, CKD stage IV NOT on dialysis, bilateral nonhealing lower extremity wounds secondary to calciphylaxis and venous stasis status post sodium thiosulfate infusions, hypertension, CAD, cardiomyopathy with a EF of 20% status post AICD, PAD, not a candidate for LVAD or heart transplant due to nonhealing wounds, dialysis, who was referred to Palliative Medicine by Dania Thornton palliative NP for management of pain.  The patients social history includes lives at home with his long-term partner, they have 2 children together and his partner Soo has 3 children from a previous relationship that they raised together.  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      Wound care-Dr. James and plastic surgery  Nephrology-Dr. Yan  Cardiology-Dr. Bartlett and Ying Arauz NP           PLAN:     Patient Instructions   Dear Edson Grover ,    It was a pleasure seeing you today   If labs or imaging tests have been ordered for you today, please call the office at [phone] 48 hours after completion to obtain the results.        No further follow ups.  Patient graduated/ discharged from clinic      This is the plan we talked about:    Calciphylaxis and venous stasis induced chronic wounds in bilateral legs  - only a small wound on the right leg. Left leg completely healed. Not using wheelchair anymore, using cane for

## 2024-11-29 NOTE — ED PROVIDER NOTES
EMERGENCY DEPARTMENT HISTORY AND PHYSICAL EXAM          Date: 11/4/2022  Patient Name: Kirk Babcock    History of Presenting Illness     Chief Complaint   Patient presents with    Flu Like Symptoms     For 2-3 weeks, states hx of heart condition with increase fluid    Ankle swelling         History Provided By: Patient    Chief Complaint: cough  Duration: 2 Weeks  Timing:  Gradual and Worsening  Quality:  loose congested cough  Severity: Moderate  Modifying Factors: none  Associated Symptoms:  nasal congestion bilateral ankle swelling      HPI: Kirk Babcock is a 56 y.o. male with a PMH of  CHF HTN  ICDwho presents with cough for 2 weeks . reports ankle swelling. States he is taking bumex as prescribed. reports SOB but states this is chronic for him. reports nasal congestion. States he is unsure if he had the flu. Denies fever    PCP: Earl Shepherd MD    Current Outpatient Medications   Medication Sig Dispense Refill    amoxicillin (AMOXIL) 875 mg tablet Take 1 Tablet by mouth two (2) times a day for 10 days. 20 Tablet 0    dextromethorphan-guaiFENesin (Robitussin Cough-Chest Carroll DM) 5-100 mg/5 mL liqd Take 10 ml every 6 hours as needed for cough 118 mL 0    carvediloL (COREG) 25 mg tablet Take 2 Tablets by mouth two (2) times a day. *NEED TO SCHEDULE FOLLOW UP APPOINTMENT FOR FURTHER REFILLS* 120 Tablet 0    bumetanide (BUMEX) 1 mg tablet Take 1 tablet by mouth twice daily 60 Tablet 1    amLODIPine (NORVASC) 10 mg tablet Take 1 tablet by mouth once daily. *NEED TO SCHEDULE FOLLOW UP APPOINTMENT FOR FURTHER REFILLS* 90 Tablet 0    isosorbide dinitrate (ISORDIL) 10 mg tablet TAKE 1 TABLET BY MOUTH THREE TIMES DAILY 90 Tablet 1    hydrALAZINE (APRESOLINE) 10 mg tablet TAKE 1 TABLET BY MOUTH THREE TIMES DAILY 90 Tablet 1    cholecalciferol (VITAMIN D3) 25 mcg (1,000 unit) cap Take 1,000 Units by mouth daily. NovoLIN 70/30 U-100 Insulin 100 unit/mL (70-30) injection 22 Units.       simvastatin (ZOCOR) 40 mg tablet See portal message   TAKE 1 TABLET BY MOUTH ONCE DAILY AT BEDTIME      Insulin Needles, Disposable, 31 ndle For 75/25 insulin administration 150 Each 2    Blood-Glucose Meter monitoring kit 1 1 Kit 0    glucose blood VI test strips (BLOOD GLUCOSE TEST) strip 1 1 Package 11    multivitamin with iron (DAILY MULTI-VITAMINS/IRON) tablet Take 1 tablet by mouth daily. Past History     Past Medical History:  Past Medical History:   Diagnosis Date    Diabetes ([facility] [address]     Heart failure ([facility] [address]     CHF, cardiomyopathy    Hypertension     ICD (implantable cardioverter-defibrillator) in place     left upper chest       Past Surgical History:  Past Surgical History:   Procedure Laterality Date    HX HEART CATHETERIZATION  2/2015    x1    HX IMPLANTABLE CARDIOVERTER DEFIBRILLATOR  2/16/2015       Family History:  Family History   Problem Relation Age of Onset    Hypertension Father     Diabetes Father     Diabetes Mother     Diabetes Brother     Hypertension Brother        Social History:  Social History     Tobacco Use    Smoking status: Never    Smokeless tobacco: Never   Vaping Use    Vaping Use: Never used   Substance Use Topics    Alcohol use: No    Drug use: No       Allergies:  No Known Allergies      Review of Systems   Review of Systems   Constitutional:  Negative for chills, fatigue and fever. HENT:  Positive for congestion. Negative for sore throat. Eyes:  Negative for redness. Respiratory:  Positive for cough and shortness of breath. Negative for chest tightness and wheezing. Cardiovascular:  Positive for leg swelling. Negative for chest pain. Gastrointestinal:  Negative for abdominal pain. Genitourinary:  Negative for dysuria. Musculoskeletal:  Negative for arthralgias, back pain, myalgias, neck pain and neck stiffness. Skin:  Negative for rash. Neurological:  Negative for dizziness, syncope, weakness, light-headedness, numbness and headaches. Hematological:  Negative for adenopathy.    Psychiatric/Behavioral: Negative for agitation and behavioral problems. All other systems reviewed and are negative. Physical Exam     Vitals:    11/04/22 0934 11/04/22 1024 11/04/22 1329   BP: 136/87 122/77 126/74   Pulse: 74     Resp: 18     Temp: 97.9 °F (36.6 °C)     SpO2: 99%  96%   Weight: 104.8 kg (231 lb 0.7 oz)     Height: 5' 8\" (1.727 m)       Physical Exam  Vitals and nursing note reviewed. Constitutional:       Appearance: Normal appearance. He is well-developed. He is obese. HENT:      Head: Normocephalic and atraumatic. Right Ear: Tympanic membrane, ear canal and external ear normal.      Nose: Congestion present. Eyes:      General:         Right eye: No discharge. Left eye: No discharge. Conjunctiva/sclera: Conjunctivae normal.   Cardiovascular:      Rate and Rhythm: Normal rate and regular rhythm. Pulmonary:      Effort: Pulmonary effort is normal. No respiratory distress. Breath sounds: Normal breath sounds. No wheezing. Abdominal:      General: Bowel sounds are normal.      Palpations: Abdomen is soft. Tenderness: There is no abdominal tenderness. Musculoskeletal:         General: Swelling present. Normal range of motion. Cervical back: Normal range of motion and neck supple. Lymphadenopathy:      Cervical: No cervical adenopathy. Skin:     General: Skin is warm and dry. Neurological:      Mental Status: He is alert and oriented to person, place, and time. Cranial Nerves: No cranial nerve deficit. Psychiatric:         Behavior: Behavior normal.         Thought Content: Thought content normal.         Judgment: Judgment normal.             Medical Decision Making   I am the first provider for this patient. I reviewed the vital signs, available nursing notes, past medical history, past surgical history, family history and social history. Vital Signs-Reviewed the patient's vital signs.     Records Reviewed: Nursing Notes and Old Medical Records    Provider Notes (Medical Decision Making):   DDX CHF pneumonia bronchitis URI influenza            Procedures:  Procedures    Diagnostic Study Results     Labs -     No results found for this or any previous visit (from the past 15 hour(s)). Radiologic Studies -   XR CHEST PORT   Final Result      No acute abnormality. CT Results  (Last 48 hours)      None          CXR Results  (Last 48 hours)      None                Disposition:  home    DISCHARGE NOTE:         Care plan outlined and precautions discussed. Patient has no new complaints, changes, or physical findings. Results of tests were reviewed with the patient. All medications were reviewed with the patient; will d/c home with robitussin amoxicillin. All of pt's questions and concerns were addressed. Patient was instructed and agrees to follow up with PCP, as well as to return to the ED upon further deterioration. Patient is ready to go home. Follow-up Information       Follow up With Specialties Details Why Braxton Paige MD Family Medicine In 3 days  [address] [place] [id number] [phone]              Discharge Medication List as of 11/4/2022  1:25 PM        START taking these medications    Details   amoxicillin (AMOXIL) 875 mg tablet Take 1 Tablet by mouth two (2) times a day for 10 days. , Normal, Disp-20 Tablet, R-0      dextromethorphan-guaiFENesin (Robitussin Cough-Chest Carroll DM) 5-100 mg/5 mL liqd Take 10 ml every 6 hours as needed for cough, Normal, Disp-118 mL, R-0           CONTINUE these medications which have NOT CHANGED    Details   carvediloL (COREG) 25 mg tablet Take 2 Tablets by mouth two (2) times a day. *NEED TO SCHEDULE FOLLOW UP APPOINTMENT FOR FURTHER REFILLS*, Normal, Disp-120 Tablet, R-0      bumetanide (BUMEX) 1 mg tablet Take 1 tablet by mouth twice daily, Normal, Disp-60 Tablet, R-1      amLODIPine (NORVASC) 10 mg tablet Take 1 tablet by mouth once daily.  *NEED TO SCHEDULE FOLLOW UP APPOINTMENT FOR FURTHER REFILLS*, Normal, Disp-90 Tablet, R-0      isosorbide dinitrate (ISORDIL) 10 mg tablet TAKE 1 TABLET BY MOUTH THREE TIMES DAILY, Normal, Disp-90 Tablet, R-1      hydrALAZINE (APRESOLINE) 10 mg tablet TAKE 1 TABLET BY MOUTH THREE TIMES DAILY, Normal, Disp-90 Tablet, R-1      cholecalciferol (VITAMIN D3) 25 mcg (1,000 unit) cap Take 1,000 Units by mouth daily. , Historical Med      NovoLIN 70/30 U-100 Insulin 100 unit/mL (70-30) injection 22 Units. , Historical Med, CHELO      simvastatin (ZOCOR) 40 mg tablet TAKE 1 TABLET BY MOUTH ONCE DAILY AT BEDTIME, Historical Med      Insulin Needles, Disposable, 31 ndle For 75/25 insulin administration, Print, Disp-150 Each, R-2      Blood-Glucose Meter monitoring kit 1, Print, Disp-1 Kit, R-0      glucose blood VI test strips (BLOOD GLUCOSE TEST) strip 1, Print, Disp-1 Package, R-11      multivitamin with iron (DAILY MULTI-VITAMINS/IRON) tablet Take 1 tablet by mouth daily. , Historical Med               Please note that this dictation was completed with Dragon, computer voice recognition software. Quite often unanticipated grammatical, syntax, homophones, and other interpretive errors are inadvertently transcribed by the computer software. Please disregard these errors. Additionally, please excuse any errors that have escaped final proofreading. Diagnosis     Clinical Impression:   1. ANA (acute kidney injury) ([facility] [address]    2. Bilateral leg edema    3. Sinus congestion    4.  Acute cough

## 2025-01-02 RX ORDER — ALLOPURINOL 100 MG/1
200 TABLET ORAL DAILY
Qty: 180 TABLET | Refills: 0 | Status: SHIPPED | OUTPATIENT
Start: 2025-01-02

## 2025-01-02 NOTE — TELEPHONE ENCOUNTER
Requested Prescriptions     Pending Prescriptions Disp Refills    allopurinol (ZYLOPRIM) 100 MG tablet [Pharmacy Med Name: Allopurinol 100 MG Oral Tablet] 180 tablet 0     Sig: Take 2 tablets by mouth once daily     Last visit 8/28/24  Next visit 1/13/25

## 2025-01-06 RX ORDER — BUMETANIDE 2 MG/1
2 TABLET ORAL 2 TIMES DAILY
Qty: 60 TABLET | Refills: 0 | Status: SHIPPED | OUTPATIENT
Start: 2025-01-06

## 2025-01-06 NOTE — TELEPHONE ENCOUNTER
Requested Prescriptions     Pending Prescriptions Disp Refills    bumetanide (BUMEX) 2 MG tablet [Pharmacy Med Name: BUMETANIDE 2MG      TAB] 60 tablet 0     Sig: Take 1 tablet by mouth twice daily     Last visit 10/1/24  Next visit 1/13/25

## 2025-01-09 ENCOUNTER — TELEPHONE (OUTPATIENT)
Age: 60
End: 2025-01-09

## 2025-01-13 ENCOUNTER — TELEPHONE (OUTPATIENT)
Age: 60
End: 2025-01-13
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
## 2025-01-14 ENCOUNTER — OFFICE VISIT (OUTPATIENT)
Age: 60
End: 2025-01-14
Payer: MEDICARE

## 2025-01-14 VITALS
BODY MASS INDEX: 27.4 KG/M2 | WEIGHT: 185 LBS | OXYGEN SATURATION: 100 % | SYSTOLIC BLOOD PRESSURE: 120 MMHG | HEIGHT: 69 IN | DIASTOLIC BLOOD PRESSURE: 80 MMHG | HEART RATE: 64 BPM

## 2025-01-14 DIAGNOSIS — I10 PRIMARY HYPERTENSION: ICD-10-CM

## 2025-01-14 DIAGNOSIS — I42.9 CARDIOMYOPATHY, UNSPECIFIED TYPE (HCC): Primary | ICD-10-CM

## 2025-01-14 DIAGNOSIS — N18.32 CHRONIC KIDNEY DISEASE, STAGE 3B (HCC): ICD-10-CM

## 2025-01-14 PROCEDURE — 99214 OFFICE O/P EST MOD 30 MIN: CPT | Performed by: SPECIALIST

## 2025-01-14 PROCEDURE — 3074F SYST BP LT 130 MM HG: CPT | Performed by: SPECIALIST

## 2025-01-14 PROCEDURE — 3079F DIAST BP 80-89 MM HG: CPT | Performed by: SPECIALIST

## 2025-01-14 PROCEDURE — 3017F COLORECTAL CA SCREEN DOC REV: CPT | Performed by: SPECIALIST

## 2025-01-14 PROCEDURE — 1036F TOBACCO NON-USER: CPT | Performed by: SPECIALIST

## 2025-01-14 PROCEDURE — G8417 CALC BMI ABV UP PARAM F/U: HCPCS | Performed by: SPECIALIST

## 2025-01-14 PROCEDURE — G8427 DOCREV CUR MEDS BY ELIG CLIN: HCPCS | Performed by: SPECIALIST

## 2025-01-14 ASSESSMENT — PATIENT HEALTH QUESTIONNAIRE - PHQ9
1. LITTLE INTEREST OR PLEASURE IN DOING THINGS: NOT AT ALL
SUM OF ALL RESPONSES TO PHQ QUESTIONS 1-9: 0
SUM OF ALL RESPONSES TO PHQ9 QUESTIONS 1 & 2: 0
2. FEELING DOWN, DEPRESSED OR HOPELESS: NOT AT ALL
SUM OF ALL RESPONSES TO PHQ QUESTIONS 1-9: 0

## 2025-01-14 NOTE — PROGRESS NOTES
and carvedilol.    Current treatment plan is effective,reviewed diet, exercise and weight control     1. Cardiomyopathy, unspecified type (HCC)  2. Primary hypertension  3. Chronic kidney disease, stage 3b (HCC)       Future Appointments   Date Time Provider Department Center   3/25/2025  9:30 AM Ying Arauz APRN - NP [facility]   7/15/2025  9:00 AM Daniel Hope III, MD [facility]   9/8/2025  1:50 PM nAa Maria Crespo MD [facility]        Daniel Hope MD  1/14/2025  Please note that this dictation was completed with Dipity, the computer voice recognition software.  Quite often unanticipated grammatical, syntax, homophones, and other interpretive errors are inadvertently transcribed by the computer software.  Please disregard these errors.  Please excuse any errors that have escaped final proofreading.  Thank you.

## 2025-01-27 RX ORDER — CARVEDILOL 25 MG/1
25 TABLET ORAL 2 TIMES DAILY
Qty: 180 TABLET | Refills: 0 | Status: SHIPPED | OUTPATIENT
Start: 2025-01-27

## 2025-01-27 NOTE — TELEPHONE ENCOUNTER
Requested Prescriptions     Pending Prescriptions Disp Refills    carvedilol (COREG) 25 MG tablet [Pharmacy Med Name: Carvedilol 25 MG Oral Tablet] 180 tablet 1     Sig: Take 1 tablet by mouth twice daily     Last visit 10/1  Next visit 3/25

## 2025-02-07 RX ORDER — BUMETANIDE 2 MG/1
2 TABLET ORAL 2 TIMES DAILY
Qty: 60 TABLET | Refills: 1 | Status: SHIPPED | OUTPATIENT
Start: 2025-02-07

## 2025-02-07 NOTE — TELEPHONE ENCOUNTER
Requested Prescriptions     Pending Prescriptions Disp Refills    bumetanide (BUMEX) 2 MG tablet [Pharmacy Med Name: BUMETANIDE 2MG      TAB] 60 tablet 1     Sig: Take 1 tablet by mouth twice daily      Last appt 10/1  Next appt 3/25

## 2025-02-24 RX ORDER — HYDRALAZINE HYDROCHLORIDE 10 MG/1
10 TABLET, FILM COATED ORAL 3 TIMES DAILY
Qty: 90 TABLET | Refills: 1 | Status: SHIPPED | OUTPATIENT
Start: 2025-02-24

## 2025-02-24 NOTE — TELEPHONE ENCOUNTER
Requested Prescriptions     Pending Prescriptions Disp Refills    hydrALAZINE (APRESOLINE) 10 MG tablet [Pharmacy Med Name: hydrALAZINE HCl 10 MG Oral Tablet] 90 tablet 1     Sig: TAKE 1 TABLET BY MOUTH THREE TIMES DAILY        Last appt 10/1  Next appt 3/25

## 2025-03-21 ENCOUNTER — TELEPHONE (OUTPATIENT)
Age: 60
End: 2025-03-21

## 2025-03-24 ASSESSMENT — ENCOUNTER SYMPTOMS
BLOOD IN STOOL: 0
SHORTNESS OF BREATH: 0
ABDOMINAL DISTENTION: 0
COUGH: 0
ABDOMINAL PAIN: 0
CHEST TIGHTNESS: 0
SORE THROAT: 0
EYE PAIN: 0

## 2025-03-24 NOTE — PROGRESS NOTES
ADVANCED HEART FAILURE CENTER  [facility] in [place], [place]  Outpatient Clinic Note      Patient name: Edson Grover  Patient : 1965  Patient MRN: [id number]  Date of service: 25    Chief Complaint   Patient presents with    Shortness of Breath     More than usual with sinus issues    Edema     In abdomen.    Congestive Heart Failure       Plan of Care:  Edson Grover is a 59 y.o. man with chronic systolic heart failure due to non-ischemic cardiomyopathy, LVEF 15-20% (by echo 3/2023), stage C.  GDMT limited by hypotension.  EF 25-30% ()   H/o calciphylaxis- OHT limitation if wounds are healed?   Patient is not a candidate for LVAD as alternative to transplant for same reasons as well as CKD.  Consider dual organ OHT if HF/renal failure progress    Current Visit:  3/25/25   Edson Grover presents for HF follow up. He states he feels ok, he notes increase in weight and abdominal distension, but does state his appetite has improved. He is able to do his normal activities but continues to have some CHIRINOS with more moderate activities. He remains off all AC due to bleeding wounds but may need to resume.   VS reviewed- Wt up 15lbs from January  Labs 2024 reviewed  TTE none since last visit         Previous Visit 10/2024  Edson Grover is feeling pretty well.   No SOB or CHIRINOS or orthopnea.  No chest pain, palpitations, dizziness.  He does have some swelling and pain in his legs at his wounds, but he states his wounds are slowly healing. He sleeps with 2 pillows and sleeps well when he is not in pain. 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 He can not afford Jardiance or Farxiga and it is currently on hold per Nephrology.  We will look at the patient assistance programs.  He had stopped taking his hydralazine due to hypotension issues when he was ill. He will attempt to restart.   -VSS  -Wt 178 to 164, had a  gi bug few weeks ago (dry wt 172)  -labs  Creatine 3.65

## 2025-03-25 ENCOUNTER — OFFICE VISIT (OUTPATIENT)
Age: 60
End: 2025-03-25
Payer: MEDICARE

## 2025-03-25 VITALS
HEIGHT: 69 IN | WEIGHT: 200 LBS | DIASTOLIC BLOOD PRESSURE: 86 MMHG | OXYGEN SATURATION: 100 % | BODY MASS INDEX: 29.62 KG/M2 | SYSTOLIC BLOOD PRESSURE: 124 MMHG | TEMPERATURE: 97.3 F | HEART RATE: 68 BPM | RESPIRATION RATE: 18 BRPM

## 2025-03-25 DIAGNOSIS — I50.22 CHRONIC HFREF (HEART FAILURE WITH REDUCED EJECTION FRACTION) (HCC): Primary | ICD-10-CM

## 2025-03-25 PROCEDURE — 1036F TOBACCO NON-USER: CPT | Performed by: NURSE PRACTITIONER

## 2025-03-25 PROCEDURE — 99214 OFFICE O/P EST MOD 30 MIN: CPT | Performed by: NURSE PRACTITIONER

## 2025-03-25 PROCEDURE — G8417 CALC BMI ABV UP PARAM F/U: HCPCS | Performed by: NURSE PRACTITIONER

## 2025-03-25 PROCEDURE — G8427 DOCREV CUR MEDS BY ELIG CLIN: HCPCS | Performed by: NURSE PRACTITIONER

## 2025-03-25 PROCEDURE — 3079F DIAST BP 80-89 MM HG: CPT | Performed by: NURSE PRACTITIONER

## 2025-03-25 PROCEDURE — 3017F COLORECTAL CA SCREEN DOC REV: CPT | Performed by: NURSE PRACTITIONER

## 2025-03-25 PROCEDURE — 3074F SYST BP LT 130 MM HG: CPT | Performed by: NURSE PRACTITIONER

## 2025-03-25 RX ORDER — AMOXICILLIN AND CLAVULANATE POTASSIUM 500; 125 MG/1; MG/1
1 TABLET, FILM COATED ORAL 2 TIMES DAILY
COMMUNITY
Start: 2025-03-15

## 2025-03-25 ASSESSMENT — PATIENT HEALTH QUESTIONNAIRE - PHQ9
2. FEELING DOWN, DEPRESSED OR HOPELESS: NOT AT ALL
SUM OF ALL RESPONSES TO PHQ QUESTIONS 1-9: 0
1. LITTLE INTEREST OR PLEASURE IN DOING THINGS: NOT AT ALL

## 2025-03-25 NOTE — PATIENT INSTRUCTIONS
Medication Changes:    INCREASE BUMEX 2 mg. Take 2 tablets (4 mg) in the morning and 1 tablet (2 mg) in the afternoon through FRIDAY.    Contact [facility] (Pro V&Vjayes[facility] is preferred or by telephone) ON FRIDAY with your daily weights, blood pressures and any symptoms. If you leave a voicemail, please report this information in detail so we can follow up appropriately and efficiently. Be reassured, we will call you back. Phone number is [phone] option 2        Testing Ordered:    ECHOCARDIOGRAM  Call Central Scheduling to schedule this test at [phone]      Referrals:      Other Recommendations:      - Record blood pressure and heart rate 2 times daily: before medication and two hours after medication  - Record weight daily upon waking/after using the bathroom.   - Keep a written records of your weights and blood pressure and bring to your next appointment.   - Call the clinic if you have a weight gain of 3 or more pounds overnight OR 5 or more pounds in one week, new/worsened shortness of breath or swelling, or if your blood pressure begins to consistently run below 90/60 and/or you begin to experience dizziness or lightheadedness. Our office phone number [phone] option 2.  - Drink an adequate amount of water with a goal of 6-8 eight ounce glasses (1.5-2 liters) of fluid daily. Your urine should be clear and light yellow straw colored.       Follow up     3 MONTH FOLLOW UP with [facility] [place]      Thank you for allowing us the privilege of being a part of your healthcare team!  Please do not hesitate to contact our office at [phone] option 2 with any questions or concerns.     Please make sure to have an active My Chart account. Having issues logging in? Contact the [facility] [employer] Help Desk at [phone].   - [employer]endo is the best way to communicate with [facility] Nurses. We can answer your questions, you can report your weight and BP logs and we notify you of any abnormal results requiring

## 2025-03-26 ENCOUNTER — TELEPHONE (OUTPATIENT)
Age: 60
End: 2025-03-26

## 2025-03-26 NOTE — TELEPHONE ENCOUNTER
Can we call him to restart his eliquis,  Dr. Hope can manage it.  I think with his renal function I would do 2.5mg BID for now and cardiology can adjust as needed  ----- Message -----  From: Daniel Hope III, MD  Sent: 3/25/2025   5:02 PM EDT  To: LAURA Alonzo NP    No reason not to restart that I can think of. Good call.  ----- Message -----  From: Ying Arauz APRN - NP  Sent: 3/25/2025   1:26 PM EDT  To: Daniel Hope III, MD    I saw him today, since his wounds are no longer open and bleeding should he be restarted on his AC for afib?  I don't think he has seen EP in a while    ---------------  03/26 @6889 - spoke with Edson Grover regarding the above recommendation to restart Eliquis.  - He stated confusion about restarting as he said \"it's been years since\" he was on that.  - He was not aware of having Afib as mentioned in above message.    I told Mr. Grover I would request further clarification and call him back.  ---  Ying Arauz APRN - NP to Me       3/27/25  9:26 AM  He was on it for Afib and Aflutter in 2023 and taken off because of the bleeding from his leg wounds. We can repeat his EKG and if he still has Afib now that his wounds are healed he should be back on Eliquis for stroke prevention    @1575 - Return call placed to Mr. Alexander. Discussed the above information from HOLGER Arauz. He said that his Right leg wound has not completely healed. He said he would like to discuss restarting Eliquis with his wound doctor who he sees tomorrow. He said he will call back tomorrow after the appointment with an updated weight log as well.

## 2025-04-03 RX ORDER — BUMETANIDE 2 MG/1
2 TABLET ORAL 2 TIMES DAILY
Qty: 60 TABLET | Refills: 0 | OUTPATIENT
Start: 2025-04-03

## 2025-04-25 RX ORDER — CARVEDILOL 25 MG/1
25 TABLET ORAL 2 TIMES DAILY
Qty: 180 TABLET | Refills: 0 | Status: SHIPPED | OUTPATIENT
Start: 2025-04-25

## 2025-04-25 NOTE — TELEPHONE ENCOUNTER
Requested Prescriptions     Pending Prescriptions Disp Refills    carvedilol (COREG) 25 MG tablet [Pharmacy Med Name: Carvedilol 25 MG Oral Tablet] 180 tablet 0     Sig: Take 1 tablet by mouth twice daily       Last office visit 3/25/25 next office visit scheduled 6/30/25

## 2025-05-05 RX ORDER — BUMETANIDE 2 MG/1
TABLET ORAL
Qty: 100 TABLET | Refills: 1 | Status: SHIPPED | OUTPATIENT
Start: 2025-05-05

## 2025-05-05 NOTE — TELEPHONE ENCOUNTER
Requested Prescriptions     Signed Prescriptions Disp Refills    bumetanide (BUMEX) 2 MG tablet 100 tablet 1     Sig: Take 2 tablets in the morning and 1 tablet in the afternoon. May take an additional tablet as directed by [facility]     Authorizing Provider: [employer]     Ordering User: ANDRES GUADALUPE      Last appt 3/26  Next appt 6/30

## 2025-06-26 ENCOUNTER — TELEPHONE (OUTPATIENT)
Age: 60
End: 2025-06-26

## 2025-06-27 ENCOUNTER — TELEPHONE (OUTPATIENT)
Age: 60
End: 2025-06-27

## 2025-07-01 ENCOUNTER — OFFICE VISIT (OUTPATIENT)
Age: 60
End: 2025-07-01

## 2025-07-01 VITALS
WEIGHT: 176 LBS | BODY MASS INDEX: 26.07 KG/M2 | SYSTOLIC BLOOD PRESSURE: 102 MMHG | OXYGEN SATURATION: 99 % | DIASTOLIC BLOOD PRESSURE: 68 MMHG | TEMPERATURE: 97.5 F | HEART RATE: 61 BPM | HEIGHT: 69 IN | RESPIRATION RATE: 16 BRPM

## 2025-07-01 DIAGNOSIS — N18.32 CHRONIC KIDNEY DISEASE, STAGE 3B (HCC): Primary | ICD-10-CM

## 2025-07-01 DIAGNOSIS — I50.22 CHRONIC SYSTOLIC HEART FAILURE (HCC): ICD-10-CM

## 2025-07-01 DIAGNOSIS — I10 PRIMARY HYPERTENSION: ICD-10-CM

## 2025-07-01 RX ORDER — METOLAZONE 2.5 MG/1
2.5 TABLET ORAL WEEKLY
COMMUNITY

## 2025-07-01 NOTE — PROGRESS NOTES
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   Recently regained ability to walk.   Most wounds healed, except for one on the right side.   Continue monitoring and treatment of remaining calciphylaxis wound.   Assess need for anticoagulation once wounds are completely healed.    Diabetes mellitus  Advised to follow-up with primary care physician for further management    Advanced Care Plan discussed  Recommend flu, covid and pneumonia vaccinations  Follow-up with primary cardiologist  Return to AHF Clinic in 4 months with NP/MD       Thank you for your referral and allowing me to participate in this patient's care.    Fahad Martinez MD, [facility]   Advanced Heart Failure       Orders Placed This Encounter   Procedures    External Referral To Heart Failure Clinic     Referral Priority:   Routine     Referral Type:   Eval and Treat     Referral Reason:   Specialty Services Required     Requested Specialty:   Advanced Heart Failure & Transplant Cardiology     Number of Visits Requested:   1    External Referral To Nephrology     Referral Priority:   Routine     Referral Type:   Eval and Treat     Referral Reason:   Specialty Services Required     Requested Specialty:   Nephrology     Number of Visits Requested:   1            HISTORY OF PRESENT ILLNESS:  I had the pleasure of seeing Edson Grover in Advanced [facility] Failure Clinic at [facility].    Edson Grover is a 60 y.o. male with a history of Chronic systolic heart failure due to nonischemic cardiomyopathy with an EF of 15 to 20% with last

## 2025-07-01 NOTE — PATIENT INSTRUCTIONS
Dear Edson,    Thank you for visiting today. Your dedication to following your care plan is greatly appreciated.    Following our consultation, here are the key instructions and recommendations for your care plan:    - Tests:    - Complete a cardiopulmonary exercise stress test on a treadmill, please call [phone]    - Referrals:     --[facility] kidney/heart transplant team for evaluation, if you dont hear from them in 2 weeks please let us know    - Follow-up:    - Return for a follow-up appointment in 4 months NP/ Dr. Martinez    - Medications: (no changes)    - Continue taking:      - Coreg 25 mg twice a day      - Hydralazine 10 mg three times a day      - Zaroxolyn      - Bumex 4 mg in the morning and 2 mg in the afternoon      - Metolazone weekly    Your health and well-being are our top priority. Please reach out if you have any questions or concerns.    Sincerely,    Fahad Martinez MD  Cardiology

## 2025-07-28 ENCOUNTER — OFFICE VISIT (OUTPATIENT)
Age: 60
End: 2025-07-28
Payer: COMMERCIAL

## 2025-07-28 VITALS
HEART RATE: 75 BPM | HEIGHT: 68 IN | DIASTOLIC BLOOD PRESSURE: 82 MMHG | OXYGEN SATURATION: 100 % | SYSTOLIC BLOOD PRESSURE: 110 MMHG | WEIGHT: 164 LBS | BODY MASS INDEX: 24.86 KG/M2

## 2025-07-28 DIAGNOSIS — N18.32 CHRONIC KIDNEY DISEASE, STAGE 3B (HCC): ICD-10-CM

## 2025-07-28 DIAGNOSIS — I42.0 DILATED CARDIOMYOPATHY (HCC): Primary | ICD-10-CM

## 2025-07-28 DIAGNOSIS — I10 PRIMARY HYPERTENSION: ICD-10-CM

## 2025-07-28 PROCEDURE — 3074F SYST BP LT 130 MM HG: CPT | Performed by: SPECIALIST

## 2025-07-28 PROCEDURE — 99214 OFFICE O/P EST MOD 30 MIN: CPT | Performed by: SPECIALIST

## 2025-07-28 PROCEDURE — 3079F DIAST BP 80-89 MM HG: CPT | Performed by: SPECIALIST

## 2025-07-28 RX ORDER — METOLAZONE 5 MG/1
5 TABLET ORAL WEEKLY
COMMUNITY

## 2025-07-28 RX ORDER — CARVEDILOL 25 MG/1
25 TABLET ORAL 2 TIMES DAILY
Qty: 180 TABLET | Refills: 0 | Status: SHIPPED | OUTPATIENT
Start: 2025-07-28

## 2025-07-28 RX ORDER — GABAPENTIN 300 MG/1
300 CAPSULE ORAL 2 TIMES DAILY
COMMUNITY

## 2025-07-28 ASSESSMENT — PATIENT HEALTH QUESTIONNAIRE - PHQ9
SUM OF ALL RESPONSES TO PHQ QUESTIONS 1-9: 0
SUM OF ALL RESPONSES TO PHQ QUESTIONS 1-9: 0
1. LITTLE INTEREST OR PLEASURE IN DOING THINGS: NOT AT ALL
SUM OF ALL RESPONSES TO PHQ QUESTIONS 1-9: 0
SUM OF ALL RESPONSES TO PHQ QUESTIONS 1-9: 0
2. FEELING DOWN, DEPRESSED OR HOPELESS: NOT AT ALL

## 2025-07-28 NOTE — TELEPHONE ENCOUNTER
Requested Prescriptions     Pending Prescriptions Disp Refills    carvedilol (COREG) 25 MG tablet [Pharmacy Med Name: Carvedilol 25 MG Oral Tablet] 180 tablet 0     Sig: Take 1 tablet by mouth twice daily     Last visit 7/1  Next visit TBD - 4 mos

## 2025-07-28 NOTE — PROGRESS NOTES
HISTORY OF PRESENT ILLNESS  Edson Grover is a 60 y.o. male     SUMMARY:   Patient Active Problem List   Diagnosis    Diabetes mellitus, type II, insulin dependent (HCC)    Hypertension    Cardiomyopathy (HCC)    Chronic systolic heart failure (HCC)    Acute kidney injury superimposed on chronic kidney disease    ALEXIA (acute kidney injury)    Pressure injury of left leg, unstageable (HCC)    Diabetic foot infection (HCC)    Venous stasis ulcer of left calf with fat layer exposed without varicose veins (HCC)    Calciphylaxis of left lower extremity with nonhealing ulcer with fat layer exposed (HCC)    Calciphylaxis of right lower extremity with nonhealing ulcer with fat layer exposed (HCC)    Calciphylaxis    Type 2 diabetes mellitus with stage 3b chronic kidney disease, with long-term current use of insulin (HCC)    Advanced care planning/counseling discussion    Chronic kidney disease, stage 3b (HCC)            CARDIOLOGY STUDIES TO DATE:  9/14 echo dilated lv with lvef 15%, mod lae, mild to mod mr, mild pul regurg     2/15 echo lvef 50% lae   2/15 echo lvef 65%, no sig valve abnormalities   2/17 lvh, otherwise normal echo   08/16/19 dilated lv with mod lvh and lvef 25%. Lae, mild mr  2/21 echo lvef 29% lvh, lae, tr mr  12/21 lvef 28%, mild lve, lvh, lae, pacer icd present     3/22 echo lve, lvef 31%    3/23 cardiac cath, normal coronaries and cardiac output, elevated lvedp  3/23 echo lve, lvef 15-20%, lae, mild mr    8/23 echo lvef 20%, lvh, lae, mild to mod tr with pa pressure 49mm    6/24 echo lve with lvef 25-30%, siddhartha, rve, mild mr and tr with pa pressure 65mm    Chief Complaint   Patient presents with    Cardiomyopathy       HPI :  In spite of all his issues he is doing remarkably well.  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  He is keeping his follow-up with advanced heart failure and his kidney doctor.  He does

## 2025-08-14 ENCOUNTER — TELEPHONE (OUTPATIENT)
Age: 60
End: 2025-08-14

## 2025-08-25 ENCOUNTER — TELEPHONE (OUTPATIENT)
Age: 60
End: 2025-08-25

## (undated) DEVICE — KIT MED IMAG CNTRST AGNT W/ IOPAMIDOL REUSE

## (undated) DEVICE — PROVE COVER: Brand: UNBRANDED

## (undated) DEVICE — CATH 5F 100CM JL35 -- DXTERITY

## (undated) DEVICE — CATH 5F 100CM JR40 -- DXTERITY

## (undated) DEVICE — 6FR THERMODILUTION BALLOON CATHETER SWAN (ARROW)

## (undated) DEVICE — PACK PROCEDURE SURG HRT CATH

## (undated) DEVICE — GLIDESHEATH SLENDER STAINLESS STEEL KIT: Brand: GLIDESHEATH SLENDER

## (undated) DEVICE — CATHETER ART THERMODILUTION 6 FRX110 CM 4 LUMEN SWAN

## (undated) DEVICE — PRESSURE MONITORING SET: Brand: TRUWAVE

## (undated) DEVICE — CO-SET DELIVERY SYSTEM FOR 123 ROOM TEMPATURE INJECTATE: Brand: CO-SET+

## (undated) DEVICE — WASTE KIT - ST MARY: Brand: MEDLINE INDUSTRIES, INC.

## (undated) DEVICE — ANGIOGRAPHY KIT

## (undated) DEVICE — SPECIAL PROCEDURE DRAPE 32" X 34": Brand: SPECIAL PROCEDURE DRAPE

## (undated) DEVICE — GOWN,SIRUS,NONRNF,SETINSLV,2XL,18/CS: Brand: MEDLINE

## (undated) DEVICE — KIT MFLD ISOLATN NACL CNTRST PRT TBNG SPIK W/ PRSS TRNSDUC

## (undated) DEVICE — KIT HND CTRL 3 W STPCOCK ROT END 54IN PREM HI PRSS TBNG AT

## (undated) DEVICE — TR BAND RADIAL ARTERY COMPRESSION DEVICE: Brand: TR BAND